# Patient Record
Sex: FEMALE | Race: WHITE | NOT HISPANIC OR LATINO | Employment: PART TIME | URBAN - METROPOLITAN AREA
[De-identification: names, ages, dates, MRNs, and addresses within clinical notes are randomized per-mention and may not be internally consistent; named-entity substitution may affect disease eponyms.]

---

## 2017-01-14 DIAGNOSIS — F33.1 MAJOR DEPRESSIVE DISORDER, RECURRENT EPISODE, MODERATE (H): Primary | ICD-10-CM

## 2017-01-16 ENCOUNTER — MYC MEDICAL ADVICE (OUTPATIENT)
Dept: FAMILY MEDICINE | Facility: CLINIC | Age: 62
End: 2017-01-16

## 2017-01-16 DIAGNOSIS — F33.1 MAJOR DEPRESSIVE DISORDER, RECURRENT EPISODE, MODERATE (H): Primary | ICD-10-CM

## 2017-01-16 RX ORDER — CITALOPRAM HYDROBROMIDE 20 MG/1
20 TABLET ORAL DAILY
Qty: 30 TABLET | Refills: 0 | OUTPATIENT
Start: 2017-01-16

## 2017-01-16 RX ORDER — CITALOPRAM HYDROBROMIDE 40 MG/1
40 TABLET ORAL DAILY
Qty: 90 TABLET | Refills: 1 | Status: SHIPPED | OUTPATIENT
Start: 2017-01-16 | End: 2017-07-08

## 2017-01-16 RX ORDER — CITALOPRAM HYDROBROMIDE 40 MG/1
TABLET ORAL
Qty: 30 TABLET | Status: CANCELLED | OUTPATIENT
Start: 2017-01-16

## 2017-01-16 RX ORDER — CITALOPRAM HYDROBROMIDE 40 MG/1
TABLET ORAL
Qty: 90 TABLET | Refills: 0 | OUTPATIENT
Start: 2017-01-16

## 2017-01-16 NOTE — TELEPHONE ENCOUNTER
Catrachita restarted at the 10/24 visit and she was suppose to talk with you at the next visit?

## 2017-01-16 NOTE — TELEPHONE ENCOUNTER
LES please review this refill.    It looks like you changed was made for 20mg to 40mg at her appointment with you in Dec.    Pending Prescriptions:                       Disp   Refills    citalopram (CELEXA) 40 MG tablet [Pharmac*90 tab*0            Sig: TAKE 1 TABLET (40 MG) BY MOUTH DAILY    Ready to be faxed when Rx is approved or denied.  Please close encounter when finished. Thanks, Liz

## 2017-01-16 NOTE — TELEPHONE ENCOUNTER
LES, can you please review this refill request. They are asking for Celexa 20 mg but in your last office note you have it at 40 mg ?  I did put a 40 mg in pending also.    Pending Prescriptions:                       Disp   Refills    citalopram (CELEXA) 20 MG tablet [Pharmac*30 tab*0            Sig: Take 1 tablet (20 mg) by mouth daily    citalopram (CELEXA) 40 MG tablet          30 tab*           Ready to be faxed when Rx is approved or denied.  Please close encounter when finished. Thanks, Liz

## 2017-01-16 NOTE — TELEPHONE ENCOUNTER
Pt replied back that she has been on 40 mg qd and that is an rx from [facility].

## 2017-01-19 DIAGNOSIS — M54.50 CHRONIC MIDLINE LOW BACK PAIN WITHOUT SCIATICA: Primary | ICD-10-CM

## 2017-01-19 DIAGNOSIS — G89.29 CHRONIC MIDLINE LOW BACK PAIN WITHOUT SCIATICA: Primary | ICD-10-CM

## 2017-01-19 RX ORDER — CYCLOBENZAPRINE HCL 5 MG
TABLET ORAL
Qty: 20 TABLET | Refills: 0 | OUTPATIENT
Start: 2017-01-19

## 2017-01-23 DIAGNOSIS — M48.02 SPINAL STENOSIS IN CERVICAL REGION: Primary | ICD-10-CM

## 2017-01-23 RX ORDER — GABAPENTIN 300 MG/1
CAPSULE ORAL
Qty: 120 CAPSULE | Refills: 0 | Status: SHIPPED | OUTPATIENT
Start: 2017-01-23 | End: 2017-02-03

## 2017-01-23 NOTE — TELEPHONE ENCOUNTER
Pt last seen 12/19/16 and last filled 12/19/16    Pending Prescriptions:                       Disp   Refills    gabapentin (NEURONTIN) 300 MG capsule     120 ca*0            Sig: Take one tablet at breakfast, one at lunch, two           at bedtime

## 2017-01-24 ENCOUNTER — MYC MEDICAL ADVICE (OUTPATIENT)
Dept: FAMILY MEDICINE | Facility: CLINIC | Age: 62
End: 2017-01-24

## 2017-01-24 NOTE — TELEPHONE ENCOUNTER
From: Bere Machuca  To: Wilma Armenta MD  Sent: 1/24/2017 9:59 AM CST  Subject: Chronic pain    I am coming in this Monday and wanted to see about talking to some sort of therapist. This pain I am in constantly is really taking a toll on my usual cheerful self ! I thought I would give you the heads up so you can think of someone. Maybe help with my weight issues also.

## 2017-01-24 NOTE — TELEPHONE ENCOUNTER
MY CHART message sent to patient RE MY CHART message below.     [facility]  Pain Management Clinic  [address]  [place] [address]  [phone] -- appt line  [email or web address]    [facility]   [address] [address]  [phone] -- appt line  [phone] -- fax  [email or web address]

## 2017-02-03 ENCOUNTER — OFFICE VISIT (OUTPATIENT)
Dept: FAMILY MEDICINE | Facility: CLINIC | Age: 62
End: 2017-02-03

## 2017-02-03 VITALS
WEIGHT: 293 LBS | SYSTOLIC BLOOD PRESSURE: 122 MMHG | HEART RATE: 66 BPM | DIASTOLIC BLOOD PRESSURE: 80 MMHG | OXYGEN SATURATION: 96 % | TEMPERATURE: 98.4 F | BODY MASS INDEX: 47.09 KG/M2 | HEIGHT: 66 IN

## 2017-02-03 DIAGNOSIS — M51.26 DISPLACEMENT OF LUMBAR INTERVERTEBRAL DISC WITHOUT MYELOPATHY: ICD-10-CM

## 2017-02-03 DIAGNOSIS — J41.0 SIMPLE CHRONIC BRONCHITIS (H): ICD-10-CM

## 2017-02-03 DIAGNOSIS — F33.1 MAJOR DEPRESSIVE DISORDER, RECURRENT EPISODE, MODERATE (H): ICD-10-CM

## 2017-02-03 DIAGNOSIS — E66.01 MORBID OBESITY DUE TO EXCESS CALORIES (H): ICD-10-CM

## 2017-02-03 DIAGNOSIS — D68.51 FACTOR 5 LEIDEN MUTATION, HETEROZYGOUS (H): Primary | ICD-10-CM

## 2017-02-03 DIAGNOSIS — M48.02 SPINAL STENOSIS IN CERVICAL REGION: ICD-10-CM

## 2017-02-03 DIAGNOSIS — Z79.01 LONG TERM CURRENT USE OF ANTICOAGULANT THERAPY: ICD-10-CM

## 2017-02-03 LAB — INR POINT OF CARE: 2.6 (ref 2–3)

## 2017-02-03 PROCEDURE — 99214 OFFICE O/P EST MOD 30 MIN: CPT | Mod: 25 | Performed by: PHYSICIAN ASSISTANT

## 2017-02-03 PROCEDURE — 36415 COLL VENOUS BLD VENIPUNCTURE: CPT | Performed by: PHYSICIAN ASSISTANT

## 2017-02-03 PROCEDURE — 85610 PROTHROMBIN TIME: CPT | Performed by: PHYSICIAN ASSISTANT

## 2017-02-03 PROCEDURE — 94640 AIRWAY INHALATION TREATMENT: CPT | Performed by: PHYSICIAN ASSISTANT

## 2017-02-03 RX ORDER — CYCLOBENZAPRINE HCL 5 MG
5 TABLET ORAL 3 TIMES DAILY PRN
Qty: 30 TABLET | Refills: 0
Start: 2017-02-03 | End: 2017-02-09

## 2017-02-03 RX ORDER — GABAPENTIN 300 MG/1
CAPSULE ORAL
Qty: 360 CAPSULE | Refills: 1
Start: 2017-02-03 | End: 2017-02-09

## 2017-02-03 NOTE — PROGRESS NOTES
SUBJECTIVE:                                                    Bere Machuca is a 61 year old female who presents to clinic today for the following health issues:        Chronic coumadin use    Indication: PE        Recent Labs   Lab Test  02/03/17   1340  12/19/16   1307  11/18/16   0705  11/07/16   1310 10/24/16  09/19/16   1412   INR  2.6  2.6  1.76*  2.6  2.3  2.2            Bleeding Signs/Symptoms:  None  Thromboembolic Signs/Symptoms:  None    Medication Changes:  No  Dietary Changes:  No  Bacterial/Viral Infection:  Yes    Missed Coumadin Doses:  None    Other Concerns:      1. Low back pain - Gabapentin tid - 2 in am and one midday and one pm  PRN Flexeril - will have up to 5 days of spasm in low back    2. Seeing [facility] for obesity.    Wt Readings from Last 5 Encounters:   02/03/17 179.624 kg (396 lb)   11/18/16 181.439 kg (400 lb)   10/25/16 180.078 kg (397 lb)   07/15/16 182.8 kg (403 lb)   05/23/16 182.346 kg (402 lb)     3. URI sx - slight SOB, cough, wheezing. No fevers.  Using albuterol 3-4x a day  Ears crackling.  Nose running - slight congestion.        Problem list, Medication list, Allergies, and Medical/Social/Surgical histories reviewed in [facility] and updated as appropriate.      Labs reviewed in EPIC  BP Readings from Last 3 Encounters:   02/03/17 122/80   12/19/16 122/82   12/05/16 100/70    Wt Readings from Last 3 Encounters:   02/03/17 179.624 kg (396 lb)   11/18/16 181.439 kg (400 lb)   10/25/16 180.078 kg (397 lb)                  Patient Active Problem List   Diagnosis     History of cocaine abuse     Personal history of tobacco use, presenting hazards to health     Family history of malignant neoplasm of gastrointestinal tract     Esophageal reflux     Chronic airway obstruction (H)     Hypertension     Sleep apnea     Migraine     ACP (advance care planning)     Factor 5 Leiden mutation, heterozygous/ INR 2-3     Health Care Home     Family history of pulmonary embolism     Lymphedema  of arm     Spinal stenosis in cervical region     History of pulmonary embolism     Major depressive disorder, recurrent episode, moderate (H)     Superior vena cava syndrome     Morbid obesity due to excess calories (H)     Displacement of lumbar intervertebral disc without myelopathy     Past Surgical History   Procedure Laterality Date     Hc removal of ovary/tube(s)       Salpingo-Oophorectomy, Unilateral     C total abdom hysterectomy       Hysterectomy, Total Abdominal     C plastic surgery, neck  age 4     Hc remove tonsils/adenoids,<15 y/o  age 14     T & A <14y.o.     Biopsy of skin lesion  multiple     keloids     Hc remove abdominal wall lesion       abscess     C removal colon/ileostomy       reastamosis     C anesth,repair lo abd hernia nos  multiple/  last     panniculus     C exploratory of abdomen       C insertion of access port  1-     sepsis- removed after hospital stay     Hernia repair         Social History   Substance Use Topics     Smoking status: Former Smoker -- 0.50 packs/day for 35 years     Types: Cigarettes     Quit date: 2014     Smokeless tobacco: Never Used     Alcohol Use: 0.5 oz/week     1 drink(s) per week      Comment: social     Family History   Problem Relation Age of Onset     Respiratory Mother      copd     CANCER Mother      skin cancers/ melanomas     C.A.D. Mother      MI  age 79     Cancer - colorectal Father      / diagnosis age 72     GASTROINTESTINAL DISEASE Father      colitis     Breast Cancer No family hx of          Current Outpatient Prescriptions   Medication Sig Dispense Refill     gabapentin (NEURONTIN) 300 MG capsule Take two tablets at breakfast, one at lunch, one at bedtime 360 capsule 1     cyclobenzaprine (FLEXERIL) 5 MG tablet Take 1 tablet (5 mg) by mouth 3 times daily as needed for muscle spasms 30 tablet 0     citalopram (CELEXA) 40 MG tablet Take 1 tablet (40 mg) by mouth daily 90 tablet 1     cholecalciferol  1000 UNITS TABS Take 1,000 Units by mouth       hydrOXYzine (ATARAX) 25 MG tablet        Multiple Vitamins-Calcium (ONE-A-DAY WOMENS FORMULA) TABS        calcium citrate (CALCITRATE) 950 MG tablet        triamcinolone (KENALOG) 0.1 % cream        Flaxseed Oil (LINSEED OIL) OIL        citalopram (CELEXA) 40 MG tablet        metFORMIN (GLUCOPHAGE-XR) 500 MG 24 hr tablet Take 2 tablets (1,000 mg) by mouth 2 times daily (with meals)       warfarin (COUMADIN) 5 MG tablet Take 1 tablet (5 mg) by mouth daily 90 tablet 1     multivitamin, therapeutic with minerals (MULTI-VITAMIN) TABS Take 1 tablet by mouth daily       Omega-3 Fatty Acids (OMEGA-3 FISH OIL PO) Take 1 g by mouth 2 times daily (with meals)        order for DME Equipment being ordered: Cane 1 each 0     COMPRESSION STOCKINGS 1 each daily 1 each 1     ketoconazole (NIZORAL) 2 % shampoo APPLY TOPICALLY, LET SET FOR 5 MINUTES, THEN RINSE. 120 mL 0     lidocaine (LIDODERM) 5 % patch        cholecalciferol (VITAMIN D) 1000 UNIT tablet Take 1 tablet (1,000 Units) by mouth daily       Probiotic Product (PROBIOTIC ACIDOPHILUS) CAPS Take 1 capsule by mouth       hydrOXYzine (ATARAX) 25 MG tablet daily        tiotropium (SPIRIVA HANDIHALER) 18 MCG inhalation capsule Inhale 1 capsule into the lungs daily. DR MELENDE[facility]       albuterol (2.5 MG/3ML) 0.083% nebulizer solution Take 3 mLs by nebulization every 6 hours as needed for shortness of breath / dyspnea. Dr MELENDEZ [facility]       albuterol (PROAIR HFA) 108 (90 BASE) MCG/ACT inhaler Inhale 1-2 puffs into the lungs as needed for shortness of breath / dyspnea. DR MELENDEZ [facility]       budesonide (PULMICORT FLEXHALER) 180 MCG/ACT inhaler Inhale 1 puff into the lungs 2 times daily. DR MELENDEZ pulmonology       Allergies   Allergen Reactions     Aspirin      PN: LW Reaction: sensitive/stomach     Lyrica [Pregabalin]      dizziness     Tape [Adhesive Tape] Itching     Vancomycin      Recent  "Labs   Lab Test  11/18/16   0705 10/24/16  01/21/16   1218  01/11/16   1541  01/21/15   1351  08/17/12   1054   A1C   --   6.8   --    --    --    --    ALT   --   42   --    --   26  18   CR  0.68  0.78  0.84   --   0.72  0.76   GFRESTIMATED  89   --   70   --   93  89   GFRESTBLACK  >90   GFR Calc     --   84   --    --    --    POTASSIUM  3.9  4.5  4.4   --   4.4   --    TSH   --   2.85   --   2.57  3.34  3.15            OBJECTIVE:                                                    /80 mmHg  Pulse 66  Temp(Src) 98.4  F (36.9  C) (Oral)  Ht 1.676 m (5' 5.98\")  Wt 179.624 kg (396 lb)  BMI 63.95 kg/m2  SpO2 96%   Body mass index is 63.95 kg/(m^2).       Patient is a 61 year old female, in no acute distress. Vitals noted  Head: Normocephalic, atraumatic.  Eyes: Conjunctiva clear.  No discharge noted.  Ears: External ears, canals and TMs normal Bilaterally: gray and translucent.   Nose: Normal without discharge.  Mouth / Throat:   Pharynx non-erythematous, no exudates present, tonsils without hypertrophy. Mucous membranes moist.  Neck:  Neck supple, No lymphadenopathy, no thyromegaly.  Cardiac:  Normal rhythm and rate, no murmurs, rubs or gallops.  Lungs: bilateral wheezing and rhonchi  - no crackles    DuoNeb administered.  Rhonchi continue, wheezing improved             ASSESSMENT/PLAN:                                                        (D68.51) Factor 5 Leiden mutation, heterozygous/ INR 2-3  (primary encounter diagnosis)  Plan: CL BFP PROTHROMBIN TIME/INR (BFP)      (Z79.01) Long term current use of anticoagulant therapy  Plan: CL BFP PROTHROMBIN TIME/INR (BFP)  Fu 4-6 weeks    (J41.0) Simple chronic bronchitis (H)  Plan: Inhalation/Nebulizer, Initial, Normal Order,         Clinic Performed      (E66.01) Morbid obesity due to excess calories (H)    (F33.1) Major depressive disorder, recurrent episode, moderate (H)  List of clinic provided    (M51.26) Displacement of lumbar " intervertebral disc without myelopathy  Ok for continued Gabapentin and Flexeril          Catrachita Torres PA-C  2/3/2017

## 2017-02-09 DIAGNOSIS — M51.26 DISPLACEMENT OF LUMBAR INTERVERTEBRAL DISC WITHOUT MYELOPATHY: ICD-10-CM

## 2017-02-09 DIAGNOSIS — M48.02 SPINAL STENOSIS IN CERVICAL REGION: Primary | ICD-10-CM

## 2017-02-09 RX ORDER — CYCLOBENZAPRINE HCL 5 MG
5 TABLET ORAL 3 TIMES DAILY PRN
Qty: 30 TABLET | Refills: 0 | Status: SHIPPED | OUTPATIENT
Start: 2017-02-09 | End: 2017-08-10

## 2017-02-09 RX ORDER — GABAPENTIN 300 MG/1
CAPSULE ORAL
Qty: 360 CAPSULE | Refills: 1 | Status: SHIPPED | OUTPATIENT
Start: 2017-02-09 | End: 2017-06-22

## 2017-02-09 NOTE — TELEPHONE ENCOUNTER
Patient called and left a msg that she lost the paper copy of the following two meds and is wondering if you would please fax them to the pharmacy listed.    Pending Prescriptions:                       Disp   Refills    gabapentin (NEURONTIN) 300 MG capsule     360 ca*1            Sig: Take two tablets at breakfast, one at lunch, one           at bedtime    cyclobenzaprine (FLEXERIL) 5 MG tablet    30 tab*0            Sig: Take 1 tablet (5 mg) by mouth 3 times daily as           needed for muscle spasms    Ready to be faxed when Rx is approved or denied.  Please close encounter when finished. Thanks, Liz

## 2017-03-02 DIAGNOSIS — Z86.711 HISTORY OF PULMONARY EMBOLISM: ICD-10-CM

## 2017-03-02 DIAGNOSIS — D68.51 FACTOR 5 LEIDEN MUTATION, HETEROZYGOUS (H): ICD-10-CM

## 2017-03-02 RX ORDER — WARFARIN SODIUM 5 MG/1
TABLET ORAL
Qty: 30 TABLET | Refills: 0 | Status: SHIPPED | OUTPATIENT
Start: 2017-03-02 | End: 2017-04-17

## 2017-03-02 NOTE — TELEPHONE ENCOUNTER
Please let the patient know that a 30 day refill has been sent for their prescription of their prescription.  They are due for a return NON-FASTING OFFICE VISIT within 30 days.  Failure to schedule an appointment may result in no further refills of his/her medication.

## 2017-03-02 NOTE — TELEPHONE ENCOUNTER
Pending Prescriptions:                       Disp   Refills    warfarin (COUMADIN) 5 MG tablet [Pharmacy*60 tab*0            Sig: TAKE 1 TABLET (5 MG) BY MOUTH DAILY    Do you want to refill for 1months. Per your notes on 2-7-2017 pt to rtc in 4-6 weeks.  Please fax, change qty, or advise, Harshal  [phone] (home)

## 2017-03-23 ENCOUNTER — HOSPITAL ENCOUNTER (EMERGENCY)
Facility: CLINIC | Age: 62
Discharge: HOME | End: 2017-03-23
Attending: EMERGENCY MEDICINE | Admitting: EMERGENCY MEDICINE
Payer: COMMERCIAL

## 2017-03-23 VITALS
OXYGEN SATURATION: 95 % | RESPIRATION RATE: 18 BRPM | DIASTOLIC BLOOD PRESSURE: 100 MMHG | TEMPERATURE: 98.7 F | SYSTOLIC BLOOD PRESSURE: 147 MMHG | HEART RATE: 90 BPM

## 2017-03-23 DIAGNOSIS — N39.0 URINARY TRACT INFECTION IN FEMALE: ICD-10-CM

## 2017-03-23 LAB
ALBUMIN UR-MCNC: 100 MG/DL
APPEARANCE UR: ABNORMAL
BACTERIA #/AREA URNS HPF: ABNORMAL /HPF
BILIRUB UR QL STRIP: NEGATIVE
COLOR UR AUTO: YELLOW
GLUCOSE UR STRIP-MCNC: NEGATIVE MG/DL
HGB UR QL STRIP: ABNORMAL
KETONES UR STRIP-MCNC: NEGATIVE MG/DL
LEUKOCYTE ESTERASE UR QL STRIP: ABNORMAL
MUCOUS THREADS #/AREA URNS LPF: PRESENT /LPF
NITRATE UR QL: NEGATIVE
PH UR STRIP: 5 PH (ref 5–7)
RBC #/AREA URNS AUTO: >182 /HPF (ref 0–2)
SP GR UR STRIP: 1.01 (ref 1–1.03)
SQUAMOUS #/AREA URNS AUTO: 12 /HPF (ref 0–1)
URN SPEC COLLECT METH UR: ABNORMAL
UROBILINOGEN UR STRIP-MCNC: 0 MG/DL (ref 0–2)
WBC #/AREA URNS AUTO: >182 /HPF (ref 0–2)
WBC CLUMPS #/AREA URNS HPF: PRESENT /HPF

## 2017-03-23 PROCEDURE — 81001 URINALYSIS AUTO W/SCOPE: CPT | Performed by: EMERGENCY MEDICINE

## 2017-03-23 PROCEDURE — 87086 URINE CULTURE/COLONY COUNT: CPT | Performed by: EMERGENCY MEDICINE

## 2017-03-23 PROCEDURE — 87186 SC STD MICRODIL/AGAR DIL: CPT | Performed by: EMERGENCY MEDICINE

## 2017-03-23 PROCEDURE — 99283 EMERGENCY DEPT VISIT LOW MDM: CPT

## 2017-03-23 PROCEDURE — 25000132 ZZH RX MED GY IP 250 OP 250 PS 637: Performed by: EMERGENCY MEDICINE

## 2017-03-23 PROCEDURE — 87088 URINE BACTERIA CULTURE: CPT | Performed by: EMERGENCY MEDICINE

## 2017-03-23 RX ORDER — PHENAZOPYRIDINE HYDROCHLORIDE 200 MG/1
200 TABLET, FILM COATED ORAL 3 TIMES DAILY
Qty: 9 TABLET | Refills: 0 | Status: SHIPPED | OUTPATIENT
Start: 2017-03-23 | End: 2017-03-26

## 2017-03-23 RX ORDER — PHENAZOPYRIDINE HYDROCHLORIDE 100 MG/1
200 TABLET, FILM COATED ORAL ONCE
Status: COMPLETED | OUTPATIENT
Start: 2017-03-23 | End: 2017-03-23

## 2017-03-23 RX ORDER — CEPHALEXIN 500 MG/1
500 CAPSULE ORAL ONCE
Status: COMPLETED | OUTPATIENT
Start: 2017-03-23 | End: 2017-03-23

## 2017-03-23 RX ORDER — CEPHALEXIN 500 MG/1
500 CAPSULE ORAL 2 TIMES DAILY
Qty: 14 CAPSULE | Refills: 0 | Status: SHIPPED | OUTPATIENT
Start: 2017-03-23 | End: 2017-03-30

## 2017-03-23 RX ADMIN — CEPHALEXIN 500 MG: 500 CAPSULE ORAL at 02:29

## 2017-03-23 RX ADMIN — PHENAZOPYRIDINE HYDROCHLORIDE 200 MG: 100 TABLET ORAL at 02:29

## 2017-03-23 ASSESSMENT — ENCOUNTER SYMPTOMS
FEVER: 0
FREQUENCY: 1
NAUSEA: 1
BACK PAIN: 1

## 2017-03-23 NOTE — ED AVS SNAPSHOT
[facility] Emergency Department    [address]    [facility] [address]    Phone:  [phone]    Fax:  [phone]                                       Bere Machuca   MRN: [id number]    Department:  [facility] Emergency Department   Date of Visit:  3/23/2017           After Visit Summary Signature Page     I have received my discharge instructions, and my questions have been answered. I have discussed any challenges I see with this plan with the nurse or doctor.    ..........................................................................................................................................  Patient/Patient Representative Signature      ..........................................................................................................................................  Patient Representative Print Name and Relationship to Patient    ..................................................               ................................................  Date                                            Time    ..........................................................................................................................................  Reviewed by Signature/Title    ...................................................              ..............................................  Date                                                            Time

## 2017-03-23 NOTE — ED NOTES
IN TRIAGE airway,breathing and circulation intact, without need for intervention . Alert and interacting appropriately for age and situation. uti  Symptoms back pain for 2 days now since noon frequency

## 2017-03-23 NOTE — DISCHARGE INSTRUCTIONS
Discharge Instructions  Urinary Tract Infection  You have urinary tract infection, or UTI. The urinary tract includes the kidneys (which make urine), ureters (the tubes that carry urine from the kidneys to the bladder), the bladder (which stores urine), and urethra (the tube that carries urine out of the bladder).  Urinary tract infections occur when bacteria travel up the urethra into the bladder. We suspect a UTI based on chemical and microscopic findings in your urine, but if there is a question about your findings, we will do a culture to see if bacteria grow. A urine culture takes several days. You should always follow-up with your primary physician to find out about results of your culture if one was done.   Return to the Emergency Department if:    You have severe back pain.    You are vomiting so that you can t take your medicine, or have signs of dehydration (such as urinating less than 3 times per day).    You have fever over 101.5 degrees F.    You have significant confusion or are very weak, or feel very ill.    Your child seems much more ill, won t wake up, won t respond right, or is crying for a long time and won t calm down.    Your child is showing signs of dehydration, Signs of dehydration can be:  o Your infant has had no wet diapers in 4-5 hours.  o Your older child has not passed urine in 6-8 hours.  o Your infant or child starts to have dry mouth and lips, or no saliva or tears.    Follow-up with your doctor:     Children under 24 months need to be seen by their regular doctor within one week after a diagnosis of a UTI. It may be necessary to do some imaging tests to look at the child s kidney or bladder.    You should begin to feel better within 24 - 48 hours of starting your antibiotic.  If you do not, you need to be seen again.      Treatment:     You will be treated with an antibiotic to kill the bacteria. We have to make an educated guess as to which antibiotic will work for your infection.  "In most healthy people, we can guess right almost all of the time. Sometimes a culture is done to show which antibiotics will work. This usually takes 2-3 days. When the culture is done, we may have to contact you to put you on a different antibiotic.    Take a pain medication such as Tylenol  (acetaminophen), Advil  (ibuprofen), Nuprin  (ibuprofen), or Aleve  (naproxen). If you have been given a narcotic such as Vicodin  (hydrocodone with acetaminophen), Percocet  (oxycodone with acetaminophen), or codeine, do not drive for four hours after you have taken it. If the narcotic contains Tylenol  (acetaminophen), do not take Tylenol  with it. All narcotics will cause constipation, so eat a high fiber diet.      Pyridium  (phenazopyridine) or Uristat  (phenazopyridine) is a prescription medication that numbs the bladder to reduce the burning pain of some UTIs.  The same medication is available in a non-prescription version called Azo-Standard  (phenazopyridine), Urodol  (phenazopyridine), or other brand names. This medication will change the color of the urine and tears (usually blue or orange). If you wear contacts, do not wear them while taking this medication as they may be stained by the medication.    Antibiotic Warning:     If you have been placed on antibiotics - watch for signs of allergic reaction.  These include rash, lip swelling, difficulty breathing, wheezing, and dizziness.  If you develop any of these symptoms, stop the antibiotic immediately and go to an emergency room or urgent care for evaluation.    Probiotics: If you have been given an antibiotic, you may want to also take a probiotic pill or eat yogurt with live cultures. Probiotics have \"good bacteria\" to help your intestines stay healthy. Studies have shown that probiotics help prevent diarrhea and other intestine problems (including C. diff infection) when you take antibiotics. You can buy these without a prescription in the pharmacy section of " the store.   If you were given a prescription for medicine here today, be sure to read all of the information (including the package insert) that comes with your prescription.  This will include important information about the medicine, its side effects, and any warnings that you need to know about.  The pharmacist who fills the prescription can provide more information and answer questions you may have about the medicine.  If you have questions or concerns that the pharmacist cannot address, please call or return to the Emergency Department.   Opioid Medication Information    Pain medications are among the most commonly prescribed medicines, so we are including this information for all our patients. If you did not receive pain medication or get a prescription for pain medicine, you can ignore it.     You may have been given a prescription for an opioid (narcotic) pain medicine and/or have received a pain medicine while here in the Emergency Department. These medicines can make you drowsy or impaired. You must not drive, operate dangerous equipment, or engage in any other dangerous activities while taking these medications. If you drive while taking these medications, you could be arrested for DUI, or driving under the influence. Do not drink any alcohol while you are taking these medications.     Opioid pain medications can cause addiction. If you have a history of chemical dependency of any type, you are at a higher risk of becoming addicted to pain medications.  Only take these prescribed medications to treat your pain when all other options have been tried. Take it for as short a time and as few doses as possible. Store your pain pills in a secure place, as they are frequently stolen and provide a dangerous opportunity for children or visitors in your house to start abusing these powerful medications. We will not replace any lost or stolen medicine.  As soon as your pain is better, you should flush all your  remaining medication.     Many prescription pain medications contain Tylenol  (acetaminophen), including Vicodin , Tylenol #3 , Norco , Lortab , and Percocet .  You should not take any extra pills of Tylenol  if you are using these prescription medications or you can get very sick.  Do not ever take more than 3000 mg of acetaminophen in any 24 hour period.    All opioids tend to cause constipation. Drink plenty of water and eat foods that have a lot of fiber, such as fruits, vegetables, prune juice, apple juice and high fiber cereal.  Take a laxative if you don t move your bowels at least every other day. Miralax , Milk of Magnesia, Colace , or Senna  can be used to keep you regular.      Remember that you can always come back to the Emergency Department if you are not able to see your regular doctor in the amount of time listed above, if you get any new symptoms, or if there is anything that worries you.    Monitor INR closely.     Return with new or worsening symptoms.     Follow up in 2 days if not significantly improved.

## 2017-03-23 NOTE — ED PROVIDER NOTES
History     Chief Complaint:  Rule out Urinary Tract Infection    HPI   Bere Machuca is a 61 year old female who presents to the emergency department today with concerns for a UTI. The patient reports that she has had back pain all week and she has urinary frequency, urgency, and pressure. She reports that she has a history of UTI's but she has not had one in several years. Tonight her back pain and spasms became much worse and she had some nausea so she came in to the emergency department. She states that the back pain is similar to what she experienced with UTIs in the past. The patient denies any fever or history of kidney stones. The patient takes Coumadin for Factor V and she reports that her INR is pretty steady at 2.6. She normally gets her care at [facility].      Allergies:  Aspirin  Lyrica [Pregabalin]  Tape [Adhesive Tape]  Vancomycin     Medications:    Coumadin  Neurontin  Flexeril  Celexa  Glucophage  Coumadin  Nizoral  Vitamin D  Probiotic Acidophilus  Atarax  Spiriva  Albuterol  Pulmicort  Cholecalciferol  Calcitrate  Kenalog  Celexa   Lidoderm    Past Medical History:    Anxiety  Arthritis  Clotting disorder  COPD  Depressive disorder  Diabetes   Hypertension  Lymphedema of arm  Morbidly obese  Pulmonary embolism   Sleep apnea  Substance abuse  Superior vena cava syndrome     Past Surgical History:    Biopsy of skin lesion - keloids  Anesthesia, repair of lower abdominal hernia NOS - panniculus  Exploratory of abdomen  Plastic surgery - neck  Removal colon/ileostomy - reastamosis  Total abdominal hysterectomy   Removal of ovary/tubes - salpingo-oophorectomy, unilateral  Removal abdominal wall lesion abscess  Tonsillectomy and Adenoidectomy  Hernia Repair  Insertion of access port - sepsis - removed after hospital stay     Family History:    Mother: COPD, Skin Cancer, Coronary Artery Disease   Father: Colorectal Cancer, Gastrointestinal Disease, Colitis     Social History:  Smoking  Status: Former Smoker -- 0.50 Packs Per Day for 35 Years -- Quit Date: 08/08/2014   Smokeless Tobacco: Never Used  Alcohol Use: Positive  Marital Status:   [5]     Review of Systems   Constitutional: Negative for fever.   Gastrointestinal: Positive for nausea.   Genitourinary: Positive for frequency and urgency.        Urinary pressure   Musculoskeletal: Positive for back pain.   All other systems reviewed and are negative.    Physical Exam   Vitals:  Patient Vitals for the past 24 hrs:   BP Temp Temp src Pulse Resp SpO2   03/23/17 0228 (!) 147/100 98.7  F (37.1  C) Oral 90 18 95 %        Physical Exam    Nursing note and vitals reviewed.    Constitutional: Pleasant and well groomed. Elevated BMI.          HENT:    Mouth/Throat: Oropharynx is without swelling or erythema. Oral mucosa moist.    Eyes: Conjunctivae normal are normal. No scleral icterus.    Neck: Neck supple.   Cardiovascular: Normal rate, regular rhythm and intact distal pulses.    Pulmonary/Chest: Effort normal and breath sounds normal.   Abdominal: Soft.  No distension. There is no abdominal tenderness.   Musculoskeletal:  No edema, No calf tenderness, No CVA tenderness.  Neurological:Alert. Coordination normal.   Skin: Skin is warm and dry.   Psychiatric: Normal mood and affect.     Emergency Department Course     Laboratory:  Laboratory findings were communicated with the patient who voiced understanding of the findings.    UA with Microscopic reflex to Culture: Blood: Large (A), Protein Albumin: 100 (A), Leukocyte Esterase:  Moderate (A), WBC/HPF: >182 (H), RBC/HPF: >182 (H), WBC Clumps: Present (A), Bacteria:  Many (A), Squamous Epithelial/HPF: 12 (H), Mucous: Present (A)  Urine Culture Aerobic Bacterial: Pending    Interventions:  0229 Pyridium 200 mg PO  0229 Keflex 500 mg PO   Emergency Department Course:  Nursing notes and vitals reviewed.  I performed an exam of the patient as documented above.   The patient provided a urine sample  here in the emergency department. This was sent for laboratory testing, findings above.  I discussed the treatment plan with the patient. They expressed understanding of this plan and consented to discharge. They will be discharged home with instructions for care and follow up. In addition, the patient will return to the emergency department if their symptoms persist, worsen, if new symptoms arise or if there is any concern.  All questions were answered.  I personally reviewed the lab results with the patient and answered all related questions prior to discharge.  Impression & Plan      Medical Decision Making:  This patient has symptoms consistent with a urinary tract infection.  Urinalysis is suggestive of the infection.  There has been no fever or abdominal pain. She does endorse back pain in the setting of having chronic back pain but feels it does seem similar to previous urinary tract infections in the past. Due to the back pain and the fact that she is somewhat  immunocompromised due to diabetes, I am not treating for a simple cystitis but rather with a full course of antibiotics. I have recommended that she monitor her INR closely and return with any new or worsening symptoms, in particular, fever, back pain, or increased hematuria. She will follow up in two days if not improving as anticipated.    Plan:   1. Return to the ED with new or worse symptoms, most importantly fever, flank pain, vomiting.  2. Follow up with your PMD in 2 days for ongoing evaluation and management if not improving as anticipated otherwise in 1 week.  3. Provided with standard EPPA Discharge instructions for UTI  4. Prescription for Keflex and Pyridium was provided.     Diagnosis:    ICD-10-CM    1. Urinary tract infection in female N39.0      Disposition:   The patient is discharged to home.    Discharge Medications:  New Prescriptions    CEPHALEXIN (KEFLEX) 500 MG CAPSULE    Take 1 capsule (500 mg) by mouth 2 times daily for 7 days     PHENAZOPYRIDINE (PYRIDIUM) 200 MG TABLET    Take 1 tablet (200 mg) by mouth 3 times daily for 3 days     Scribe Disclosure:  I, Simon Joyce, am serving as a scribe at 2:19 AM on 3/23/2017 to document services personally performed by Ashley Zamorano, based on my observations and the provider's statements to me.    3/23/2017   [facility] EMERGENCY DEPARTMENT       Ashley Zamorano MD  03/24/17 0619

## 2017-03-23 NOTE — ED AVS SNAPSHOT
[facility] Emergency Department    [address]    [facility] [address]    Phone:  [phone]    Fax:  [phone]                                       Bere Machuca   MRN: [id number]    Department:  [facility] Emergency Department   Date of Visit:  3/23/2017           Patient Information     Date Of Birth          1955        Your diagnoses for this visit were:     Urinary tract infection in female        You were seen by Ashley Zamorano MD.      Follow-up Information     Follow up with Wilma Armenta MD In 2 days.    Specialty:  Family Practice    Why:  As needed    Contact information:    [facility]  [address]  [facility] [address] [phone]          Follow up with Wilma Armenta MD In 1 week.    Specialty:  Family Practice    Contact information:    [facility]  [address]  [facility] [address] [phone]          Discharge Instructions       Discharge Instructions  Urinary Tract Infection  You have urinary tract infection, or UTI. The urinary tract includes the kidneys (which make urine), ureters (the tubes that carry urine from the kidneys to the bladder), the bladder (which stores urine), and urethra (the tube that carries urine out of the bladder).  Urinary tract infections occur when bacteria travel up the urethra into the bladder. We suspect a UTI based on chemical and microscopic findings in your urine, but if there is a question about your findings, we will do a culture to see if bacteria grow. A urine culture takes several days. You should always follow-up with your primary physician to find out about results of your culture if one was done.   Return to the Emergency Department if:    You have severe back pain.    You are vomiting so that you can t take your medicine, or have signs of dehydration (such as urinating less than 3 times per day).    You have fever over  101.5 degrees F.    You have significant confusion or are very weak, or feel very ill.    Your child seems much more ill, won t wake up, won t respond right, or is crying for a long time and won t calm down.    Your child is showing signs of dehydration, Signs of dehydration can be:  o Your infant has had no wet diapers in 4-5 hours.  o Your older child has not passed urine in 6-8 hours.  o Your infant or child starts to have dry mouth and lips, or no saliva or tears.    Follow-up with your doctor:     Children under 24 months need to be seen by their regular doctor within one week after a diagnosis of a UTI. It may be necessary to do some imaging tests to look at the child s kidney or bladder.    You should begin to feel better within 24 - 48 hours of starting your antibiotic.  If you do not, you need to be seen again.      Treatment:     You will be treated with an antibiotic to kill the bacteria. We have to make an educated guess as to which antibiotic will work for your infection. In most healthy people, we can guess right almost all of the time. Sometimes a culture is done to show which antibiotics will work. This usually takes 2-3 days. When the culture is done, we may have to contact you to put you on a different antibiotic.    Take a pain medication such as Tylenol  (acetaminophen), Advil  (ibuprofen), Nuprin  (ibuprofen), or Aleve  (naproxen). If you have been given a narcotic such as Vicodin  (hydrocodone with acetaminophen), Percocet  (oxycodone with acetaminophen), or codeine, do not drive for four hours after you have taken it. If the narcotic contains Tylenol  (acetaminophen), do not take Tylenol  with it. All narcotics will cause constipation, so eat a high fiber diet.      Pyridium  (phenazopyridine) or Uristat  (phenazopyridine) is a prescription medication that numbs the bladder to reduce the burning pain of some UTIs.  The same medication is available in a non-prescription version called  "Azo-Standard  (phenazopyridine), Urodol  (phenazopyridine), or other brand names. This medication will change the color of the urine and tears (usually blue or orange). If you wear contacts, do not wear them while taking this medication as they may be stained by the medication.    Antibiotic Warning:     If you have been placed on antibiotics - watch for signs of allergic reaction.  These include rash, lip swelling, difficulty breathing, wheezing, and dizziness.  If you develop any of these symptoms, stop the antibiotic immediately and go to an emergency room or urgent care for evaluation.    Probiotics: If you have been given an antibiotic, you may want to also take a probiotic pill or eat yogurt with live cultures. Probiotics have \"good bacteria\" to help your intestines stay healthy. Studies have shown that probiotics help prevent diarrhea and other intestine problems (including C. diff infection) when you take antibiotics. You can buy these without a prescription in the pharmacy section of the store.   If you were given a prescription for medicine here today, be sure to read all of the information (including the package insert) that comes with your prescription.  This will include important information about the medicine, its side effects, and any warnings that you need to know about.  The pharmacist who fills the prescription can provide more information and answer questions you may have about the medicine.  If you have questions or concerns that the pharmacist cannot address, please call or return to the Emergency Department.   Opioid Medication Information    Pain medications are among the most commonly prescribed medicines, so we are including this information for all our patients. If you did not receive pain medication or get a prescription for pain medicine, you can ignore it.     You may have been given a prescription for an opioid (narcotic) pain medicine and/or have received a pain medicine while here in " the Emergency Department. These medicines can make you drowsy or impaired. You must not drive, operate dangerous equipment, or engage in any other dangerous activities while taking these medications. If you drive while taking these medications, you could be arrested for DUI, or driving under the influence. Do not drink any alcohol while you are taking these medications.     Opioid pain medications can cause addiction. If you have a history of chemical dependency of any type, you are at a higher risk of becoming addicted to pain medications.  Only take these prescribed medications to treat your pain when all other options have been tried. Take it for as short a time and as few doses as possible. Store your pain pills in a secure place, as they are frequently stolen and provide a dangerous opportunity for children or visitors in your house to start abusing these powerful medications. We will not replace any lost or stolen medicine.  As soon as your pain is better, you should flush all your remaining medication.     Many prescription pain medications contain Tylenol  (acetaminophen), including Vicodin , Tylenol #3 , Norco , Lortab , and Percocet .  You should not take any extra pills of Tylenol  if you are using these prescription medications or you can get very sick.  Do not ever take more than 3000 mg of acetaminophen in any 24 hour period.    All opioids tend to cause constipation. Drink plenty of water and eat foods that have a lot of fiber, such as fruits, vegetables, prune juice, apple juice and high fiber cereal.  Take a laxative if you don t move your bowels at least every other day. Miralax , Milk of Magnesia, Colace , or Senna  can be used to keep you regular.      Remember that you can always come back to the Emergency Department if you are not able to see your regular doctor in the amount of time listed above, if you get any new symptoms, or if there is anything that worries you.    Monitor INR closely.      Return with new or worsening symptoms.     Follow up in 2 days if not significantly improved.     Future Appointments        Provider Department Dept Phone Center    3/27/2017 1:00 PM WESTON Vogel [facility] [phone] [facility]      24 Hour Appointment Hotline       To make an appointment at any [facility], call [phone] ([phone]). If you don't have a family doctor or clinic, we will help you find one. [facility] are conveniently located to serve the needs of you and your family.             Review of your medicines      START taking        Dose / Directions Last dose taken    cephALEXin 500 MG capsule   Commonly known as:  KEFLEX   Dose:  500 mg   Quantity:  14 capsule        Take 1 capsule (500 mg) by mouth 2 times daily for 7 days   Refills:  0        phenazopyridine 200 MG tablet   Commonly known as:  PYRIDIUM   Dose:  200 mg   Quantity:  9 tablet        Take 1 tablet (200 mg) by mouth 3 times daily for 3 days   Refills:  0          Our records show that you are taking the medicines listed below. If these are incorrect, please call your family doctor or clinic.        Dose / Directions Last dose taken    * albuterol (2.5 MG/3ML) 0.083% neb solution   Dose:  1 ampule        Take 3 mLs by nebulization every 6 hours as needed for shortness of breath / dyspnea. Dr [facility]   Refills:  0        * albuterol 108 (90 BASE) MCG/ACT Inhaler   Dose:  1-2 puff   Generic drug:  albuterol        Inhale 1-2 puffs into the lungs as needed for shortness of breath / dyspnea. DR [facility]   Refills:  0        CALCITRATE 950 MG tablet   Generic drug:  calcium citrate        Refills:  0        * cholecalciferol 1000 UNITS Tabs   Dose:  1000 Units        Take 1,000 Units by mouth   Refills:  0        * cholecalciferol 1000 UNIT tablet   Commonly known as:  vitamin D   Dose:  1000 Units        Take 1 tablet (1,000 Units) by mouth daily    Refills:  0        * citalopram 40 MG tablet   Commonly known as:  celeXA        Refills:  0        * citalopram 40 MG tablet   Commonly known as:  celeXA   Dose:  40 mg   Quantity:  90 tablet        Take 1 tablet (40 mg) by mouth daily   Refills:  1        COMPRESSION STOCKINGS   Dose:  1 each   Quantity:  1 each        1 each daily   Refills:  1        cyclobenzaprine 5 MG tablet   Commonly known as:  FLEXERIL   Dose:  5 mg   Quantity:  30 tablet        Take 1 tablet (5 mg) by mouth 3 times daily as needed for muscle spasms   Refills:  0        gabapentin 300 MG capsule   Commonly known as:  NEURONTIN   Quantity:  360 capsule        Take two tablets at breakfast, one at lunch, one at bedtime   Refills:  1        * hydrOXYzine 25 MG tablet   Commonly known as:  ATARAX        daily   Refills:  0        * hydrOXYzine 25 MG tablet   Commonly known as:  ATARAX        Refills:  0        ketoconazole 2 % shampoo   Commonly known as:  NIZORAL   Quantity:  120 mL        APPLY TOPICALLY, LET SET FOR 5 MINUTES, THEN RINSE.   Refills:  0        lidocaine 5 % Patch   Commonly known as:  LIDODERM        Refills:  0        Linseed Oil Oil        Refills:  0        metFORMIN 500 MG 24 hr tablet   Commonly known as:  GLUCOPHAGE-XR   Dose:  1000 mg        Take 2 tablets (1,000 mg) by mouth 2 times daily (with meals)   Refills:  0        Multi-vitamin Tabs tablet   Dose:  1 tablet        Take 1 tablet by mouth daily   Refills:  0        OMEGA-3 FISH OIL PO   Dose:  1 g        Take 1 g by mouth 2 times daily (with meals)   Refills:  0        ONE-A-DAY WOMENS FORMULA Tabs        Refills:  0        order for DME   Quantity:  1 each        Equipment being ordered: Cane   Refills:  0        PROBIOTIC ACIDOPHILUS BIOBEADS Caps   Dose:  1 capsule        Take 1 capsule by mouth   Refills:  0        PULMICORT FLEXHALER 180 MCG/ACT inhaler   Dose:  1 puff   Generic drug:  budesonide        Inhale 1 puff into the lungs 2 times daily. DR ANDREWS  pulmonology   Refills:  0        SPIRIVA HANDIHALER 18 MCG capsule   Dose:  18 mcg   Generic drug:  tiotropium        Inhale 1 capsule into the lungs daily.  [facility]   Refills:  0        triamcinolone 0.1 % cream   Commonly known as:  KENALOG        Refills:  0        * warfarin 5 MG tablet   Commonly known as:  COUMADIN   Dose:  5 mg   Quantity:  90 tablet        Take 1 tablet (5 mg) by mouth daily   Refills:  1        * warfarin 5 MG tablet   Commonly known as:  COUMADIN   Quantity:  30 tablet        TAKE 1 TABLET (5 MG) BY MOUTH DAILY   Refills:  0        * Notice:  This list has 10 medication(s) that are the same as other medications prescribed for you. Read the directions carefully, and ask your doctor or other care provider to review them with you.            Prescriptions were sent or printed at these locations (2 Prescriptions)                   Other Prescriptions                Printed at Department/Unit printer (2 of 2)         phenazopyridine (PYRIDIUM) 200 MG tablet               cephALEXin (KEFLEX) 500 MG capsule                Procedures and tests performed during your visit     UA with Microscopic reflex to Culture    Urine Culture Aerobic Bacterial      Orders Needing Specimen Collection     None      Pending Results     Date and Time Order Name Status Description    3/23/2017 0150 Urine Culture Aerobic Bacterial In process             Pending Culture Results     Date and Time Order Name Status Description    3/23/2017 0150 Urine Culture Aerobic Bacterial In process              Test Results from your hospital stay     3/23/2017  2:12 AM - Interface, [email or web address] Results      Component Results     Component Value Ref Range & Units Status    Color Urine Yellow  Final    Appearance Urine Cloudy  Final    Glucose Urine Negative NEG mg/dL Final    Bilirubin Urine Negative NEG Final    Ketones Urine Negative NEG mg/dL Final    Specific Gravity Urine 1.015 1.003 - 1.035 Final    Blood  Urine Large (A) NEG Final    pH Urine 5.0 5.0 - 7.0 pH Final    Protein Albumin Urine 100 (A) NEG mg/dL Final    Urobilinogen mg/dL 0.0 0.0 - 2.0 mg/dL Final    Nitrite Urine Negative NEG Final    Leukocyte Esterase Urine Moderate (A) NEG Final    Source Midstream Urine  Final    WBC Urine >182 (H) 0 - 2 /HPF Final    RBC Urine >182 (H) 0 - 2 /HPF Final    WBC Clumps Present (A) NEG /HPF Final    Bacteria Urine Many (A) NEG /HPF Final    Squamous Epithelial /HPF Urine 12 (H) 0 - 1 /HPF Final    Mucous Urine Present (A) NEG /LPF Final         3/23/2017  2:13 AM - Interface, [employer] Results                Clinical Quality Measure: Blood Pressure Screening     Your blood pressure was checked while you were in the emergency department today. The last reading we obtained was  BP: (!) 147/100 . Please read the guidelines below about what these numbers mean and what you should do about them.  If your systolic blood pressure (the top number) is less than 120 and your diastolic blood pressure (the bottom number) is less than 80, then your blood pressure is normal. There is nothing more that you need to do about it.  If your systolic blood pressure (the top number) is 120-139 or your diastolic blood pressure (the bottom number) is 80-89, your blood pressure may be higher than it should be. You should have your blood pressure rechecked within a year by a primary care provider.  If your systolic blood pressure (the top number) is 140 or greater or your diastolic blood pressure (the bottom number) is 90 or greater, you may have high blood pressure. High blood pressure is treatable, but if left untreated over time it can put you at risk for heart attack, stroke, or kidney failure. You should have your blood pressure rechecked by a primary care provider within the next 4 weeks.  If your provider in the emergency department today gave you specific instructions to follow-up with your doctor or provider even sooner than that, you  should follow that instruction and not wait for up to 4 weeks for your follow-up visit.        Thank you for choosing [facility]       Thank you for choosing [facility] for your care. Our goal is always to provide you with excellent care. Hearing back from our patients is one way we can continue to improve our services. Please take a few minutes to complete the written survey that you may receive in the mail after you visit with us. Thank you!        [employer] Information     [employer] gives you secure access to your electronic health record. If you see a primary care provider, you can also send messages to your care team and make appointments. If you have questions, please call your primary care clinic.  If you do not have a primary care provider, please call [phone] and they will assist you.        Care EveryWhere ID     This is your Care EveryWhere ID. This could be used by other organizations to access your [facility] medical records  [id number]        After Visit Summary       This is your record. Keep this with you and show to your community pharmacist(s) and doctor(s) at your next visit.

## 2017-03-24 LAB
BACTERIA SPEC CULT: ABNORMAL
Lab: ABNORMAL
MICRO REPORT STATUS: ABNORMAL
MICROORGANISM SPEC CULT: ABNORMAL
SPECIMEN SOURCE: ABNORMAL

## 2017-04-14 ENCOUNTER — TRANSFERRED RECORDS (OUTPATIENT)
Dept: FAMILY MEDICINE | Facility: CLINIC | Age: 62
End: 2017-04-14

## 2017-04-17 ENCOUNTER — OFFICE VISIT (OUTPATIENT)
Dept: FAMILY MEDICINE | Facility: CLINIC | Age: 62
End: 2017-04-17

## 2017-04-17 VITALS
WEIGHT: 293 LBS | RESPIRATION RATE: 16 BRPM | BODY MASS INDEX: 48.82 KG/M2 | HEART RATE: 76 BPM | TEMPERATURE: 98.2 F | HEIGHT: 65 IN | SYSTOLIC BLOOD PRESSURE: 124 MMHG | DIASTOLIC BLOOD PRESSURE: 86 MMHG | OXYGEN SATURATION: 98 %

## 2017-04-17 DIAGNOSIS — Z79.01 LONG TERM CURRENT USE OF ANTICOAGULANT THERAPY: ICD-10-CM

## 2017-04-17 DIAGNOSIS — I10 ESSENTIAL HYPERTENSION, BENIGN: ICD-10-CM

## 2017-04-17 DIAGNOSIS — D68.51 FACTOR 5 LEIDEN MUTATION, HETEROZYGOUS (H): Primary | ICD-10-CM

## 2017-04-17 DIAGNOSIS — Z86.711 HISTORY OF PULMONARY EMBOLISM: ICD-10-CM

## 2017-04-17 DIAGNOSIS — L20.84 INTRINSIC ECZEMA: ICD-10-CM

## 2017-04-17 LAB — INR POINT OF CARE: 1.8 (ref 0.9–1.1)

## 2017-04-17 PROCEDURE — 85610 PROTHROMBIN TIME: CPT | Performed by: FAMILY MEDICINE

## 2017-04-17 PROCEDURE — 99213 OFFICE O/P EST LOW 20 MIN: CPT | Performed by: FAMILY MEDICINE

## 2017-04-17 PROCEDURE — 36415 COLL VENOUS BLD VENIPUNCTURE: CPT | Performed by: FAMILY MEDICINE

## 2017-04-17 RX ORDER — HYDROXYZINE HYDROCHLORIDE 25 MG/1
25 TABLET, FILM COATED ORAL AT BEDTIME
Qty: 90 TABLET | Refills: 0 | Status: SHIPPED | OUTPATIENT
Start: 2017-04-17 | End: 2017-10-09

## 2017-04-17 RX ORDER — LOSARTAN POTASSIUM 25 MG/1
25 TABLET ORAL DAILY
COMMUNITY
End: 2017-11-01

## 2017-04-17 RX ORDER — WARFARIN SODIUM 5 MG/1
5 TABLET ORAL DAILY
Qty: 90 TABLET | Refills: 0 | Status: SHIPPED | OUTPATIENT
Start: 2017-04-17 | End: 2017-08-14

## 2017-04-17 NOTE — MR AVS SNAPSHOT
After Visit Summary   4/17/2017    Bere Machuca    MRN: [id number]           Patient Information     Date Of Birth          1955        Visit Information        Provider Department      4/17/2017 9:45 AM Wilma Armenta MD [facility]        Today's Diagnoses     Factor 5 Leiden mutation, heterozygous/ INR 2-3    -  1    Intrinsic eczema        Long term current use of anticoagulant therapy        History of pulmonary embolism        Essential hypertension, benign          Care Instructions     Factor 5 Leiden mutation, heterozygous/ INR 2-3  (primary encounter diagnosis)  Comment: get back to daily dose  Plan: warfarin (COUMADIN) 5 MG tablet        Recheck 4 weeks          Follow-ups after your visit        Follow-up notes from your care team     Return in about 4 weeks (around 5/15/2017).      Who to contact     If you have questions or need follow up information about today's clinic visit or your schedule please contact [facility] directly at [phone].  Normal or non-critical lab and imaging results will be communicated to you by Radiancehart, letter or phone within 4 business days after the clinic has received the results. If you do not hear from us within 7 days, please contact the clinic through [employer]t or phone. If you have a critical or abnormal lab result, we will notify you by phone as soon as possible.  Submit refill requests through ticketstreet or call your pharmacy and they will forward the refill request to us. Please allow 3 business days for your refill to be completed.          Additional Information About Your Visit        Radiancehart Information     ticketstreet gives you secure access to your electronic health record. If you see a primary care provider, you can also send messages to your care team and make appointments. If you have questions, please call your primary care clinic.  If you do not have a primary care provider, please call  "[phone] and they will assist you.        Care EveryWhere ID     This is your Care EveryWhere ID. This could be used by other organizations to access your [place] medical records  [id number]        Your Vitals Were     Pulse Temperature Respirations Height Pulse Oximetry BMI (Body Mass Index)    76 98.2  F (36.8  C) (Oral) 16 1.651 m (5' 5\") 98% 64.23 kg/m2       Blood Pressure from Last 3 Encounters:   04/17/17 124/86   03/23/17 (!) 147/100   02/03/17 122/80    Weight from Last 3 Encounters:   04/17/17 (!) 175.1 kg (386 lb)   02/03/17 (!) 179.6 kg (396 lb)   11/18/16 (!) 181.4 kg (400 lb)              We Performed the Following     CL BFP PROTHROMBIN TIME/INR (BFP)     VENOUS COLLECTION          Today's Medication Changes          These changes are accurate as of: 4/17/17  2:16 PM.  If you have any questions, ask your nurse or doctor.               These medicines have changed or have updated prescriptions.        Dose/Directions    * hydrOXYzine 25 MG tablet   Commonly known as:  ATARAX   This may have changed:  Another medication with the same name was changed. Make sure you understand how and when to take each.   Used for:  Chronic bronchitis, unspecified chronic bronchitis type (H)   Changed by:  Wilma Armenta MD        Refills:  0       * hydrOXYzine 25 MG tablet   Commonly known as:  ATARAX   This may have changed:    - how much to take  - how to take this  - when to take this   Used for:  Intrinsic eczema   Changed by:  Wilma Armenta MD        Dose:  25 mg   Take 1 tablet (25 mg) by mouth At Bedtime   Quantity:  90 tablet   Refills:  0       warfarin 5 MG tablet   Commonly known as:  COUMADIN   This may have changed:  See the new instructions.   Used for:  Factor 5 Leiden mutation, heterozygous (H), History of pulmonary embolism   Changed by:  Wilma Armenta MD        Dose:  5 mg   Take 1 tablet (5 mg) by mouth daily   Quantity:  90 tablet   Refills:  0       * Notice:  This list has 2 " medication(s) that are the same as other medications prescribed for you. Read the directions carefully, and ask your doctor or other care provider to review them with you.         Where to get your medicines      These medications were sent to [place] [facility] [id number] [facility] - [place], [place] - [address]  [address], [facility] [address]     Phone:  [phone]     hydrOXYzine 25 MG tablet    warfarin 5 MG tablet                Primary Care Provider Office Phone # Fax #    Wilma Armenta [phone]       [facility] [address] [facility]  [address]  [facility] [address]        Thank you!     Thank you for choosing [facility]  for your care. Our goal is always to provide you with excellent care. Hearing back from our patients is one way we can continue to improve our services. Please take a few minutes to complete the written survey that you may receive in the mail after your visit with us. Thank you!             Your Updated Medication List - Protect others around you: Learn how to safely use, store and throw away your medicines at www.disposemymeds.org.          This list is accurate as of: 4/17/17  2:16 PM.  Always use your most recent med list.                   Brand Name Dispense Instructions for use    * albuterol (2.5 MG/3ML) 0.083% neb solution      Take 3 mLs by nebulization every 6 hours as needed for shortness of breath / dyspnea. Dr [facility]       * albuterol 108 (90 BASE) MCG/ACT Inhaler   Generic drug:  albuterol      Inhale 1-2 puffs into the lungs as needed for shortness of breath / dyspnea. DR [facility]EN[facility]       CALCITRATE 950 MG tablet   Generic drug:  calcium citrate          cholecalciferol 1000 UNITS Tabs      Take 1,000 Units by mouth       * citalopram 40 MG tablet    celeXA         * citalopram 40 MG tablet    celeXA    90 tablet    Take 1 tablet (40 mg) by mouth daily       COMPRESSION STOCKINGS      1 each    1 each daily       cyclobenzaprine 5 MG tablet    FLEXERIL    30 tablet    Take 1 tablet (5 mg) by mouth 3 times daily as needed for muscle spasms       gabapentin 300 MG capsule    NEURONTIN    360 capsule    Take two tablets at breakfast, one at lunch, one at bedtime       * hydrOXYzine 25 MG tablet    ATARAX         * hydrOXYzine 25 MG tablet    ATARAX    90 tablet    Take 1 tablet (25 mg) by mouth At Bedtime       ketoconazole 2 % shampoo    NIZORAL    120 mL    APPLY TOPICALLY, LET SET FOR 5 MINUTES, THEN RINSE.       lidocaine 5 % Patch    LIDODERM         Linseed Oil Oil          losartan 25 MG tablet    COZAAR     Take 25 mg by mouth daily       metFORMIN 500 MG 24 hr tablet    GLUCOPHAGE-XR     Take 2 tablets (1,000 mg) by mouth 2 times daily (with meals)       Multi-vitamin Tabs tablet      Take 1 tablet by mouth daily       OMEGA-3 FISH OIL PO      Take 1 g by mouth 2 times daily (with meals)       ONE-A-DAY WOMENS FORMULA Tabs          order for DME     1 each    Equipment being ordered: Cane       PROBIOTIC ACIDOPHILUS BIOBEADS Caps      Take 1 capsule by mouth       PULMICORT FLEXHALER 180 MCG/ACT inhaler   Generic drug:  budesonide      Inhale 1 puff into the lungs 2 times daily. DR ANDREWS pulmonology       SPIRIVA HANDIHALER 18 MCG capsule   Generic drug:  tiotropium      Inhale 1 capsule into the lungs daily. DR [facility]RE[facility]       triamcinolone 0.1 % cream    KENALOG         warfarin 5 MG tablet    COUMADIN    90 tablet    Take 1 tablet (5 mg) by mouth daily       * Notice:  This list has 6 medication(s) that are the same as other medications prescribed for you. Read the directions carefully, and ask your doctor or other care provider to review them with you.

## 2017-04-17 NOTE — PROGRESS NOTES
SUBJECTIVE: 61 year old female complaining of needing her INR and eczema itching medication refilled before seeing the dermatology specialists. She missed a pill this weekend due to the holiday an d a different schedule. She has been getting back injections with improvement for 4 week(s).   The patient describes bleeding issues.   The patient denies a history of SOB.   Smoking history: No.   Relevant past medical history: positive for asthma followed by pulmonary specialists. Went to obesity endocrine clinic and found to have borderline diabetes/ on BP and metformin medication and doing well per their office reports. Losing weight planned.    OBJECTIVE: The patient appears healthy, alert, no distress, cooperative, over weight and fatigued.   EARS: negative  NOSE/SINUS: Nares normal. Septum midline. Mucosa normal. No drainage or sinus tenderness.   THROAT: normal   NECK:Neck supple. No adenopathy. Thyroid symmetric, normal size,, Carotids without bruits.   CHEST: Clear    INR down slightly as expected    ASSESSMENT: (D68.51) Factor 5 Leiden mutation, heterozygous/ INR 2-3  (primary encounter diagnosis)  Comment: get back to daily dose  Plan: warfarin (COUMADIN) 5 MG tablet        Recheck 4 weeks    (L20.84) Intrinsic eczema  Comment: Potential medication side effects were discussed with the patient; let me know if any occur.  Plan: hydrOXYzine (ATARAX) 25 MG tablet        refilled    (Z79.01) Long term current use of anticoagulant therapy  Plan: CL BFP PROTHROMBIN TIME/INR (BFP), VENOUS         COLLECTION            (Z86.711) History of pulmonary embolism  Plan: warfarin (COUMADIN) 5 MG tablet            (I10) Essential hypertension, benign  Plan: losartan (COZAAR) 25 MG tablet        I have reviewed the patient's medical history in detail and updated the computerized patient record.  Followed by endocrinology for type 2 diabetes/ now on metformin

## 2017-04-17 NOTE — PATIENT INSTRUCTIONS
Factor 5 Leiden mutation, heterozygous/ INR 2-3  (primary encounter diagnosis)  Comment: get back to daily dose  Plan: warfarin (COUMADIN) 5 MG tablet        Recheck 4 weeks

## 2017-04-17 NOTE — NURSING NOTE
Patient is here for a recheck of their medication.  Pre-Visit Screening :  Immunizations : up to date    Colonoscopy : is due and to be scheduled by patient for later completion  Mammogram : is up to date  Asthma Action Test/Plan : na  PHQ9/GAD7 :  NA  Pulse - regular    Medication Reconciliation: complete      CLASSIFICATION OF OVERWEIGHT AND OBESITY BY BMI                         Obesity Class           BMI(kg/m2)  Underweight                                    < 18.5  Normal                                         18.5-24.9  Overweight                                     25.0-29.9  OBESITY                     I                  30.0-34.9                              II                 35.0-39.9  EXTREME OBESITY             III                >40                             Patient's  BMI Body mass index is 64.23 kg/(m^2).  http://hin.nhlbi.nih.gov/menuplanner/menu.cgi  Questioned patient about current smoking habits.  Pt. quit smoking some time ago.

## 2017-06-21 ENCOUNTER — HOSPITAL ENCOUNTER (OUTPATIENT)
Dept: LAB | Facility: CLINIC | Age: 62
Discharge: HOME | End: 2017-06-21
Attending: FAMILY MEDICINE | Admitting: PHYSICIAN ASSISTANT
Payer: COMMERCIAL

## 2017-06-21 ENCOUNTER — HOSPITAL ENCOUNTER (EMERGENCY)
Facility: CLINIC | Age: 62
Discharge: HOME | End: 2017-06-21
Attending: EMERGENCY MEDICINE | Admitting: EMERGENCY MEDICINE
Payer: COMMERCIAL

## 2017-06-21 ENCOUNTER — OFFICE VISIT (OUTPATIENT)
Dept: FAMILY MEDICINE | Facility: CLINIC | Age: 62
End: 2017-06-21

## 2017-06-21 VITALS
OXYGEN SATURATION: 96 % | RESPIRATION RATE: 20 BRPM | SYSTOLIC BLOOD PRESSURE: 118 MMHG | HEART RATE: 88 BPM | DIASTOLIC BLOOD PRESSURE: 70 MMHG | TEMPERATURE: 98.4 F

## 2017-06-21 VITALS
BODY MASS INDEX: 62.57 KG/M2 | RESPIRATION RATE: 20 BRPM | SYSTOLIC BLOOD PRESSURE: 124 MMHG | WEIGHT: 293 LBS | HEART RATE: 90 BPM | OXYGEN SATURATION: 98 % | TEMPERATURE: 99 F | DIASTOLIC BLOOD PRESSURE: 84 MMHG

## 2017-06-21 DIAGNOSIS — R79.1 SUBTHERAPEUTIC INTERNATIONAL NORMALIZED RATIO (INR): ICD-10-CM

## 2017-06-21 DIAGNOSIS — H04.202: ICD-10-CM

## 2017-06-21 DIAGNOSIS — R05.9 COUGH: ICD-10-CM

## 2017-06-21 DIAGNOSIS — Z79.01 LONG TERM CURRENT USE OF ANTICOAGULANT THERAPY: Primary | ICD-10-CM

## 2017-06-21 DIAGNOSIS — R06.02 SOB (SHORTNESS OF BREATH): Primary | ICD-10-CM

## 2017-06-21 DIAGNOSIS — J44.1 OBSTRUCTIVE CHRONIC BRONCHITIS WITH EXACERBATION (H): ICD-10-CM

## 2017-06-21 LAB
ANION GAP SERPL CALCULATED.3IONS-SCNC: 8 MMOL/L (ref 3–14)
BASE EXCESS BLDV CALC-SCNC: 2.8 MMOL/L
BASOPHILS # BLD AUTO: 0 10E9/L (ref 0–0.2)
BASOPHILS NFR BLD AUTO: 0.5 %
BUN SERPL-MCNC: 11 MG/DL (ref 7–30)
CALCIUM SERPL-MCNC: 9.5 MG/DL (ref 8.5–10.1)
CHLORIDE SERPL-SCNC: 105 MMOL/L (ref 94–109)
CO2 SERPL-SCNC: 27 MMOL/L (ref 20–32)
CREAT SERPL-MCNC: 0.79 MG/DL (ref 0.52–1.04)
D DIMER PPP FEU-MCNC: 0.7 UG/ML FEU (ref 0–0.5)
DIFFERENTIAL METHOD BLD: ABNORMAL
EOSINOPHIL # BLD AUTO: 0.3 10E9/L (ref 0–0.7)
EOSINOPHIL NFR BLD AUTO: 3.2 %
ERYTHROCYTE [DISTWIDTH] IN BLOOD BY AUTOMATED COUNT: 15.9 % (ref 10–15)
GFR SERPL CREATININE-BSD FRML MDRD: 75 ML/MIN/1.7M2
GLUCOSE SERPL-MCNC: 114 MG/DL (ref 70–99)
HCO3 BLDV-SCNC: 26 MMOL/L (ref 21–28)
HCT VFR BLD AUTO: 47.5 % (ref 35–47)
HGB BLD-MCNC: 15.3 G/DL (ref 11.7–15.7)
IMM GRANULOCYTES # BLD: 0 10E9/L (ref 0–0.4)
IMM GRANULOCYTES NFR BLD: 0.2 %
INR POINT OF CARE: 1.3 (ref 0.9–1.1)
INTERPRETATION ECG - MUSE: NORMAL
LYMPHOCYTES # BLD AUTO: 1.6 10E9/L (ref 0.8–5.3)
LYMPHOCYTES NFR BLD AUTO: 19.2 %
MCH RBC QN AUTO: 30 PG (ref 26.5–33)
MCHC RBC AUTO-ENTMCNC: 32.2 G/DL (ref 31.5–36.5)
MCV RBC AUTO: 93 FL (ref 78–100)
MONOCYTES # BLD AUTO: 0.6 10E9/L (ref 0–1.3)
MONOCYTES NFR BLD AUTO: 7.5 %
NEUTROPHILS # BLD AUTO: 5.7 10E9/L (ref 1.6–8.3)
NEUTROPHILS NFR BLD AUTO: 69.4 %
NRBC # BLD AUTO: 0 10*3/UL
NRBC BLD AUTO-RTO: 0 /100
O2/TOTAL GAS SETTING VFR VENT: ABNORMAL %
PCO2 BLDV: 34 MM HG (ref 40–50)
PH BLDV: 7.49 PH (ref 7.32–7.43)
PLATELET # BLD AUTO: 387 10E9/L (ref 150–450)
PO2 BLDV: 70 MM HG (ref 25–47)
POTASSIUM SERPL-SCNC: 3.8 MMOL/L (ref 3.4–5.3)
RBC # BLD AUTO: 5.1 10E12/L (ref 3.8–5.2)
SODIUM SERPL-SCNC: 140 MMOL/L (ref 133–144)
TROPONIN I SERPL-MCNC: NORMAL UG/L (ref 0–0.04)
WBC # BLD AUTO: 8.1 10E9/L (ref 4–11)

## 2017-06-21 PROCEDURE — 36415 COLL VENOUS BLD VENIPUNCTURE: CPT | Performed by: PHYSICIAN ASSISTANT

## 2017-06-21 PROCEDURE — 94640 AIRWAY INHALATION TREATMENT: CPT

## 2017-06-21 PROCEDURE — 93005 ELECTROCARDIOGRAM TRACING: CPT

## 2017-06-21 PROCEDURE — 96374 THER/PROPH/DIAG INJ IV PUSH: CPT

## 2017-06-21 PROCEDURE — 25000128 H RX IP 250 OP 636: Performed by: EMERGENCY MEDICINE

## 2017-06-21 PROCEDURE — 85025 COMPLETE CBC W/AUTO DIFF WBC: CPT | Performed by: EMERGENCY MEDICINE

## 2017-06-21 PROCEDURE — 96372 THER/PROPH/DIAG INJ SC/IM: CPT

## 2017-06-21 PROCEDURE — 85610 PROTHROMBIN TIME: CPT | Performed by: PHYSICIAN ASSISTANT

## 2017-06-21 PROCEDURE — 99284 EMERGENCY DEPT VISIT MOD MDM: CPT | Mod: 25

## 2017-06-21 PROCEDURE — 82803 BLOOD GASES ANY COMBINATION: CPT | Performed by: EMERGENCY MEDICINE

## 2017-06-21 PROCEDURE — 25000132 ZZH RX MED GY IP 250 OP 250 PS 637: Performed by: EMERGENCY MEDICINE

## 2017-06-21 PROCEDURE — 84484 ASSAY OF TROPONIN QUANT: CPT | Performed by: EMERGENCY MEDICINE

## 2017-06-21 PROCEDURE — 80048 BASIC METABOLIC PNL TOTAL CA: CPT | Performed by: EMERGENCY MEDICINE

## 2017-06-21 PROCEDURE — 36415 COLL VENOUS BLD VENIPUNCTURE: CPT | Performed by: EMERGENCY MEDICINE

## 2017-06-21 PROCEDURE — 87040 BLOOD CULTURE FOR BACTERIA: CPT | Mod: 91 | Performed by: EMERGENCY MEDICINE

## 2017-06-21 PROCEDURE — 25000125 ZZHC RX 250: Performed by: EMERGENCY MEDICINE

## 2017-06-21 PROCEDURE — 99214 OFFICE O/P EST MOD 30 MIN: CPT | Performed by: PHYSICIAN ASSISTANT

## 2017-06-21 PROCEDURE — 87077 CULTURE AEROBIC IDENTIFY: CPT | Performed by: EMERGENCY MEDICINE

## 2017-06-21 PROCEDURE — 87800 DETECT AGNT MULT DNA DIREC: CPT | Performed by: EMERGENCY MEDICINE

## 2017-06-21 PROCEDURE — 87186 SC STD MICRODIL/AGAR DIL: CPT | Performed by: EMERGENCY MEDICINE

## 2017-06-21 RX ORDER — METHYLPREDNISOLONE SODIUM SUCCINATE 125 MG/2ML
125 INJECTION, POWDER, LYOPHILIZED, FOR SOLUTION INTRAMUSCULAR; INTRAVENOUS ONCE
Status: COMPLETED | OUTPATIENT
Start: 2017-06-21 | End: 2017-06-21

## 2017-06-21 RX ORDER — IPRATROPIUM BROMIDE AND ALBUTEROL SULFATE 2.5; .5 MG/3ML; MG/3ML
3 SOLUTION RESPIRATORY (INHALATION)
Status: DISCONTINUED | OUTPATIENT
Start: 2017-06-21 | End: 2017-06-21 | Stop reason: HOSPADM

## 2017-06-21 RX ORDER — DOXYCYCLINE 100 MG/1
100 CAPSULE ORAL 2 TIMES DAILY
Qty: 14 CAPSULE | Refills: 0 | Status: SHIPPED | OUTPATIENT
Start: 2017-06-21 | End: 2017-06-28

## 2017-06-21 RX ORDER — ACETAMINOPHEN 325 MG/1
975 TABLET ORAL ONCE
Status: COMPLETED | OUTPATIENT
Start: 2017-06-21 | End: 2017-06-21

## 2017-06-21 RX ORDER — PREDNISONE 20 MG/1
50 TABLET ORAL DAILY
Qty: 13 TABLET | Refills: 0 | Status: SHIPPED | OUTPATIENT
Start: 2017-06-21 | End: 2017-06-22

## 2017-06-21 RX ORDER — DOXYCYCLINE 100 MG/1
100 CAPSULE ORAL 2 TIMES DAILY
Qty: 20 CAPSULE | Refills: 0 | Status: SHIPPED | OUTPATIENT
Start: 2017-06-21 | End: 2017-06-22

## 2017-06-21 RX ORDER — PREDNISONE 20 MG/1
50 TABLET ORAL DAILY
Qty: 13 TABLET | Refills: 0 | Status: SHIPPED | OUTPATIENT
Start: 2017-06-21 | End: 2017-06-26

## 2017-06-21 RX ADMIN — IPRATROPIUM BROMIDE AND ALBUTEROL SULFATE 3 ML: .5; 3 SOLUTION RESPIRATORY (INHALATION) at 16:37

## 2017-06-21 RX ADMIN — ACETAMINOPHEN 975 MG: 325 TABLET, FILM COATED ORAL at 15:56

## 2017-06-21 RX ADMIN — METHYLPREDNISOLONE SODIUM SUCCINATE 125 MG: 125 INJECTION, POWDER, FOR SOLUTION INTRAMUSCULAR; INTRAVENOUS at 15:57

## 2017-06-21 RX ADMIN — ENOXAPARIN SODIUM 170 MG: 100 INJECTION SUBCUTANEOUS at 16:28

## 2017-06-21 ASSESSMENT — ENCOUNTER SYMPTOMS
CHEST TIGHTNESS: 0
SHORTNESS OF BREATH: 1
VOMITING: 0
WHEEZING: 0
FEVER: 1
BACK PAIN: 1
NAUSEA: 0
COUGH: 1

## 2017-06-21 NOTE — PROGRESS NOTES
SUBJECTIVE:                                                    Bere Machuca is a 61 year old female who presents to clinic today for the following health issues:        Chronic coumadin use    Indication: Factor V Leiden        Recent Labs   Lab Test  06/21/17   1210  04/17/17   1004  02/03/17   1340  12/19/16   1307  11/18/16   0705  11/07/16   1310   INR  1.3*  1.8  2.6  2.6  1.76*  2.6        Current Coumadin Dose:  5 mg daily    Bleeding Signs/Symptoms:  None  Thromboembolic Signs/Symptoms:  None    Medication Changes:  Yes: added Magnesium OTC 3 weeks ago  Dietary Changes:  No  Bacterial/Viral Infection:  Yes: see below    Missed Coumadin Doses:  This week - Friday    Other Concerns:        1. SOB and Fever  Headache and neck pain for about a week.  Some SOB in the last 24 hours.  Missed Coumadin dose on Friday.    People at work have been ill.  Fever 101 this am.  Took APAP this morning.     Has COPD - Pulmicort, Spiriva  Dr. Brown - last appt was less than 2 months ago.  Quit smoking 5 year ago.       2. Eye concern  Cat has conjunctivitis.   Pt has had increased tearing of left eye.  No other discharge, itching, or change in vision.           ROS:   C: NEGATIVE for fever, chills, change in weight  E: NEGATIVE for vision changes or irritation  E/M: NEGATIVE for ear, mouth and throat problems  CV: NEGATIVE for chest pain, palpitations or peripheral edema      Problem list, Medication list, Allergies, and Medical/Social/Surgical histories reviewed in [facility] and updated as appropriate.      Labs reviewed in EPIC  BP Readings from Last 3 Encounters:   06/21/17 124/84   04/17/17 124/86   03/23/17 (!) 147/100    Wt Readings from Last 3 Encounters:   06/21/17 (!) 170.6 kg (376 lb)   04/17/17 (!) 175.1 kg (386 lb)   02/03/17 (!) 179.6 kg (396 lb)                  Patient Active Problem List   Diagnosis     History of cocaine abuse     Personal history of tobacco use, presenting hazards to health     Family  history of malignant neoplasm of gastrointestinal tract     Esophageal reflux     Chronic airway obstruction (H)     Hypertension     Sleep apnea     Migraine     ACP (advance care planning)     Factor 5 Leiden mutation, heterozygous/ INR 2-3     Health Care Home     Family history of pulmonary embolism     Lymphedema of arm     Spinal stenosis in cervical region     History of pulmonary embolism     Major depressive disorder, recurrent episode, moderate (H)     Superior vena cava syndrome     Morbid obesity due to excess calories (H)     Displacement of lumbar intervertebral disc without myelopathy     Past Surgical History:   Procedure Laterality Date     BIOPSY OF SKIN LESION  multiple    keloids     C ANESTH,REPAIR LO ABD HERNIA NOS  multiple/  last    panniculus     C EXPLORATORY OF ABDOMEN       C PLASTIC SURGERY, NECK  age 4     C REMOVAL COLON/ILEOSTOMY      reastamosis     C TOTAL ABDOM HYSTERECTOMY      Hysterectomy, Total Abdominal     HC REMOVAL OF OVARY/TUBE(S)      Salpingo-Oophorectomy, Unilateral     HC REMOVE ABDOMINAL WALL LESION      abscess     HC REMOVE TONSILS/ADENOIDS,<11 Y/O  age 14    T & A <14y.o.     HERNIA REPAIR       INSERTION OF ACCESS PORT  1-4-96    sepsis- removed after hospital stay       Social History   Substance Use Topics     Smoking status: Former Smoker     Packs/day: 0.50     Years: 35.00     Types: Cigarettes     Quit date: 2014     Smokeless tobacco: Never Used     Alcohol use 0.5 oz/week     1 Standard drinks or equivalent per week      Comment: social     Family History   Problem Relation Age of Onset     Respiratory Mother      copd     CANCER Mother      skin cancers/ melanomas     C.A.D. Mother      MI  age 79     Cancer - colorectal Father      / diagnosis age 72     GASTROINTESTINAL DISEASE Father      colitis     Breast Cancer No family hx of          Current Outpatient Prescriptions   Medication Sig Dispense Refill     losartan  (COZAAR) 25 MG tablet Take 25 mg by mouth daily       warfarin (COUMADIN) 5 MG tablet Take 1 tablet (5 mg) by mouth daily 90 tablet 0     hydrOXYzine (ATARAX) 25 MG tablet Take 1 tablet (25 mg) by mouth At Bedtime 90 tablet 0     gabapentin (NEURONTIN) 300 MG capsule Take two tablets at breakfast, one at lunch, one at bedtime 360 capsule 1     cyclobenzaprine (FLEXERIL) 5 MG tablet Take 1 tablet (5 mg) by mouth 3 times daily as needed for muscle spasms 30 tablet 0     citalopram (CELEXA) 40 MG tablet Take 1 tablet (40 mg) by mouth daily 90 tablet 1     cholecalciferol 1000 UNITS TABS Take 1,000 Units by mouth       hydrOXYzine (ATARAX) 25 MG tablet        Multiple Vitamins-Calcium (ONE-A-DAY WOMENS FORMULA) TABS        calcium citrate (CALCITRATE) 950 MG tablet        triamcinolone (KENALOG) 0.1 % cream        Flaxseed Oil (LINSEED OIL) OIL        citalopram (CELEXA) 40 MG tablet        metFORMIN (GLUCOPHAGE-XR) 500 MG 24 hr tablet Take 2 tablets (1,000 mg) by mouth 2 times daily (with meals)       multivitamin, therapeutic with minerals (MULTI-VITAMIN) TABS Take 1 tablet by mouth daily       Omega-3 Fatty Acids (OMEGA-3 FISH OIL PO) Take 1 g by mouth 2 times daily (with meals)        order for DME Equipment being ordered: Cane 1 each 0     COMPRESSION STOCKINGS 1 each daily 1 each 1     ketoconazole (NIZORAL) 2 % shampoo APPLY TOPICALLY, LET SET FOR 5 MINUTES, THEN RINSE. 120 mL 0     lidocaine (LIDODERM) 5 % patch        Probiotic Product (PROBIOTIC ACIDOPHILUS) CAPS Take 1 capsule by mouth       tiotropium (SPIRIVA HANDIHALER) 18 MCG inhalation capsule Inhale 1 capsule into the lungs daily. DR [facility]EN[facility]Z [facility]       albuterol (2.5 MG/3ML) 0.083% nebulizer solution Take 3 mLs by nebulization every 6 hours as needed for shortness of breath / dyspnea. Dr MELENDEZ [facility]       albuterol (PROAIR HFA) 108 (90 BASE) MCG/ACT inhaler Inhale 1-2 puffs into the lungs as needed for shortness of breath /  dyspnea. DR ANDREWS [facility]       budesonide (PULMICORT FLEXHALER) 180 MCG/ACT inhaler Inhale 1 puff into the lungs 2 times daily. DR MELENDEZ pulmonology       Allergies   Allergen Reactions     Aspirin      PN: LW Reaction: sensitive/stomach     Lyrica [Pregabalin]      dizziness     Tape [Adhesive Tape] Itching     Vancomycin      Recent Labs   Lab Test  11/18/16   0705 10/24/16  01/21/16   1218  01/11/16   1541  01/21/15   1351  08/17/12   1054   A1C   --   6.8   --    --    --    --    ALT   --   42   --    --   26  18   CR  0.68  0.78  0.84   --   0.72  0.76   GFRESTIMATED  89   --   70   --   93  89   GFRESTBLACK  >90   GFR Calc     --   84   --    --    --    POTASSIUM  3.9  4.5  4.4   --   4.4   --    TSH   --   2.85   --   2.57  3.34  3.15            OBJECTIVE:                                                    /84 (BP Location: Right arm, Cuff Size: Adult Large)  Pulse 90  Temp 99  F (37.2  C) (Oral)  Resp 20  Wt (!) 170.6 kg (376 lb)  SpO2 98%  BMI 62.57 kg/m2   Body mass index is 62.57 kg/(m^2).       Patient is a 61 year old female, in no acute distress. Vitals noted   Head: Normocephalic, atraumatic.  Eyes: Conjunctiva clear.  No discharge noted.  Ears: External ears, canals and TMs normal Bilaterally: gray and translucent.   Nose: Normal without discharge.  Mouth / Throat:   Pharynx non-erythematous, no exudates present, tonsils without hypertrophy. Mucous membranes moist.  Neck:  Neck supple, No lymphadenopathy, no thyromegaly.  Cardiac:  Normal rhythm and rate, no murmurs, rubs or gallops.  Lungs: bilateral insp. Wheezing throughout.    DuoNeb administered - breathing has improved.            ASSESSMENT/PLAN:                                                        1. Long term current use of anticoagulant therapy    2. Cough    3. Increased tearing, left        STAT D dimer at hospital was slightly elevated.   Advised evaluation ED.   Pt notified.   ED Notified as  yoana.        Catrachita Torres PA-C  6/21/2017

## 2017-06-21 NOTE — ED PROVIDER NOTES
History     Chief Complaint:  Shortness of Breath    HPI   Bere Machuca is a 61 year old female with a history of  COPD currently anticoagulated on coumadin who presents with shortness of breath. The patient reports that there is a sickness going around work and beginning two days ago she began having a fever, cough, and shortness of breath. She was sent to the ED today after her D dimer was off. Her INR is normally 2.1-2.4, but she missed a dose of Coumadin on Friday and read at 1.3 today. She claims significant relief from her nebulizer treatment given here today.    Results from clinic, 6/21 at 1210  INR: 1.3  D dimer: 0.7    Allergies:  Aspirin  Lyrica  Adhesive tape  Vancomycin    Medications:    Cozaar  Coumadin  Atarax  Flexeril  celexa  Cholecalciferol  Kenalog  Glucophage  Lidoderm  Probiotic   Albuterol    Past Medical History:    Anxiety  Arthritis  Clotting disorder  COPD  Depressive disorder  Diabetes  hypertension  Lymphedema of the arm  Morbidly obese  Pulmonary embolism  Substance abuse  Superior vena cava syndrome    Past Surgical History:    Biopsy of skin lesion  C Anesth, repair lo abd hernia nos  Exploratory of abdomen  Removal colon/leostomy  Total abdom hysterectomy  Salpingo-oophorectomy, unilateral  Remove abdominal wall lesion  Remove tonsils/adenoids  Hernia repair  Insertion of access port    Family History:    COPD  Cancer  Colitis    Social History:  Smoking Status: Former  Smokeless Tobacco: no  Alcohol Use: Yes  Marital Status:       Review of Systems   Constitutional: Positive for fever.   Respiratory: Positive for cough and shortness of breath. Negative for chest tightness and wheezing.    Cardiovascular: Negative for chest pain and leg swelling.   Gastrointestinal: Negative for nausea and vomiting.   Musculoskeletal: Positive for back pain.     Physical Exam   Vitals:  Patient Vitals for the past 24 hrs:   BP Temp Temp src Pulse Heart Rate Resp SpO2   06/21/17 1803  118/70 - - 88 - 20 96 %   06/21/17 1738 118/70 - - - 88 14 97 %   06/21/17 1722 - - - - 86 - 94 %   06/21/17 1710 109/77 - - - 77 - -   06/21/17 1653 - - - - 77 20 93 %   06/21/17 1641 - - - - 94 - 93 %   06/21/17 1640 (!) 112/98 - - - - 20 -   06/21/17 1610 (!) 119/103 - - - 85 20 -   06/21/17 1600 - - - - 87 - 95 %   06/21/17 1540 135/76 - - - 77 - 94 %   06/21/17 1510 126/77 - - - 82 - 92 %   06/21/17 1434 (!) 158/98 98.4  F (36.9  C) Oral 99 99 18 96 %   06/21/17 1432 (!) 158/98 - - - - - -       Physical Exam  General: Morbidly obese, but otherwise pleasant middle aged woman in no distress.  HEENT:   The scalp and head appear normal    The pupils are equal, round, and reactive to light    Extraocular muscles are intact.    The nose is normal.    The oropharynx is normal.      Uvula is in the midline.  There is no peritonsillar abscess.  Neck:  Normal range of motion.    Lungs:  Clear.      Decreased breath sounds, scattered expiratory wheezes. Improved since nebulizer treatment at clinic which stated patient had marked wheezing.    There is no tachypnea.  Non-labored.  Cardiac: Regular rate.      Normal S1 and S2.      No S3 or S4.      No pathological murmur.      No pericardial rub.  Abdomen: Soft. No distension. No tympani. No rebound. Non-tender.  Lymph: No anterior or posterior cervical lymphadenopathy noted.  MS:  Normal tone.      Normal movement of all extremities.    Neuro:  Normal mentation.  No focal motor or sensory changes.      Speech normal.  Psych:  Awake.     Alert.      Normal affect.      Appropriate interactions.  Skin:  No rash.      No lesions.      Emergency Department Course     ECG:  ECG taken at 1431, ECG read at 1438  Sinus tachycardia  Nonspecific ST abnormality  Abnormal ECG  Rate 103 bpm. OK interval 122. QRS duration 72. QT/QTc 334/437. P-R-T axes 62 16 64.    Laboratory:  Laboratory findings were communicated with the patient who voiced understanding of the findings.  CBC: YUNIER.  (WBC 8.1, HGB 15.3, )   Blood Gas venous: Ph: 7.49 (H), PCO2: 34 (L), PO2: 70 (H)  Blood culture: Pending  BMP: Glucose: 114 (H) o/w WNL (Creatinine 0.79)  Troponin (Collected 1622): <0.015    Interventions:  1556 tylenol, 975 mg, PO  1557 solu-Medrol. 125 mg, IV  1628 Lovenox, 170 mg, subcutaneous  1637 Duoneb, 3 mL, Nebulization     Emergency Department Course:  Nursing notes and vitals reviewed.  I performed an exam of the patient as documented above.   IV was inserted and blood was drawn for laboratory testing, results above.  EKG obtained in the ED, see results above.   1543 I spoke with Dr. Stallings of hematology regarding this patient.  The patient was updated on the results of their lab tests.     I discussed the treatment plan with the patient. They expressed understanding of this plan and consented to discharge. They will be discharged home with instructions for care and follow up. In addition, the patient will return to the emergency department if their symptoms persist, worsen, if new symptoms arise or if there is any concern.  All questions were answered.    I personally reviewed the laboratory results with the Patient and answered all related questions prior to discharge.    Impression & Plan      Medical Decision Making:  This patient states that the past couple of days she has developed a runny/stuffty nose, chills, and increased shortness of breath and cough. She says she's been exposed to people at work with the same thing and she feels that she has developed worsening of chronic bronchitis and COPD. She went to the clinic with the intention that she would receive a steroid burst and antibiotics however she was sent her because the checked a D dimer that was elevated.    She states that they were very wary of her condition as she had presented a number of times for shortness of breath about 5 years agio before was finally diagnosed with PE and superior vena cava syndrome and was found to be  factor 5 Leiden positive.     The patient is sub therapeutic on her coumadin. She says it was due to one missed dose. I'm wondering if she's fully compliant and perhaps actually missing more doses.    She did state that she was audibly wheezing when she went to clinic. There is documentation that she was wheezing when she got there and improved significantly with one neb. The patient states that she is improved an improved here with further nebulization with increased aeration after the second neb. She did receive IV steroids here. I did not do a chest x ray as the patient had no rales or crackles after nebs and was clear.     However, I was concerned with the low INR and her history of embolism. I did discuss the case with Dr. Regan who concurred with starting Lovenox 1 unit/kg BID subcutaneous over the next 5 days. This was also discussed with our pharmacist on site in the ER. It was established she had a normal creatinine first and I recommended to the patient that she follow up on Friday for recheck of her Coumadin level. She can take an extra dose tonight of Coumadin as well.    I don't feel that advanced imaging studies are warranted at this time.The patient has a normal VBG with actually increased PO2. Her oxygenation has been normal here. She has no pleuritic chest pain shortness of breath.     EKG showed sinus rhythm no ischemic changes, and ISTAT trop was normal.    Impression  1. Acute COPD exacerbation  2. Sub therapeutic INR  3. History of venous thrombus embolism  4. History of superior vena cava syndrome,     Plan per above. Return if symptoms recur or new symptoms appear.    Diagnosis:    ICD-10-CM    1. Obstructive chronic bronchitis with exacerbation (H) J44.1 Basic metabolic panel     Blood culture     Blood culture     Blood gas venous     Troponin I (now)     CBC with platelets differential     CBC with platelets differential     CANCELED: CBC with platelets differential   2. Subtherapeutic  international normalized ratio (INR) R79.1 CANCELED: Heparin 10a Level     Disposition:   Discharge    Discharge Medications:  Discharge Medication List as of 6/21/2017  5:23 PM      START taking these medications    Details   doxycycline (VIBRAMYCIN) 100 MG capsule Take 1 capsule (100 mg) by mouth 2 times daily for 10 days, Disp-20 capsule, R-0, Local Print      predniSONE (DELTASONE) 20 MG tablet Take 2.5 tablets (50 mg) by mouth daily for 5 days, Disp-13 tablet, R-0, Local Print      enoxaparin (LOVENOX) 150 MG/ML injection Inject 1.1 mLs (165 mg) Subcutaneous every 12 hours for 5 days, Disp-10 Syringe, R-0, Local Print           Scribe Disclosure:  I, Victorino Ayala, am serving as a scribe at 2:59 PM on 6/21/2017 to document services personally performed by Kaleb John MD, based on my observations and the provider's statements to me.    6/21/2017   [facility] EMERGENCY DEPARTMENT            Kaleb John MD  06/21/17 1326

## 2017-06-21 NOTE — ED AVS SNAPSHOT
[facility] Emergency Department    [address]    [facility] [address]    Phone:  [phone]    Fax:  [phone]                                       Bere Machuca   MRN: [id number]    Department:  [facility] Emergency Department   Date of Visit:  6/21/2017           Patient Information     Date Of Birth          1955        Your diagnoses for this visit were:     Obstructive chronic bronchitis with exacerbation (H)     Subtherapeutic international normalized ratio (INR)        You were seen by Kaleb John MD.      Follow-up Information     Follow up with Wilma Armenta MD In 2 days.    Specialty:  Family Practice    Contact information:    [facility]  [address]  [facility] [id number] [phone]          Discharge Instructions       Discharge Instructions  Bronchitis, Pneumonia, Bronchospasm    You were seen today for a chest infection or inflammation. If your doctor decided this was due to a bacterial infection, you may need an antibiotic. Sometimes these are caused by a virus, and then an antibiotic will not help.     Return to the Emergency Department if:    Your breathing gets much worse.    You are very weak, or feel much more ill.    You develop new symptoms, such as chest pain.    You cough up blood.    You are vomiting enough that you can t keep fluids or your medicine down.    What can I do to help myself?    Fill any prescriptions the doctor gave you and take them right away--especially antibiotics. Be sure to finish the whole antibiotic prescription.    You may be given a prescription for an inhaler, which can help loosen tight air passages.  Use this as needed, but not more often than directed. Inhalers work much better when used with a spacer.     You may be given a prescription for a steroid to reduce inflammation. Used long-term, these can have many serious side effects, but for short courses these do not  "happen. You may notice restlessness or increased appetite.        You may use non-prescription cough or cold medicines. Cough medicines may help, but don t make the cough go away completely.     Avoid smoke, because this can make your symptoms worse. If you smoke, this may be a good time to quit! Consider using nicotine lozenges, gum, or patches to reduce cravings.     If you have a fever, Tylenol  (acetaminophen), Motrin  (ibuprofen), or Advil  (ibuprofen) may help bring fever down and may help you feel more comfortable. Be sure to read and follow the package directions, and ask your doctor if you have questions.    Be sure to get your flu shot each year.  The pneumonia shot can help prevent pneumonia.  Probiotics: If you have been given an antibiotic, you may want to also take a probiotic pill or eat yogurt with live cultures. Probiotics have \"good bacteria\" to help your intestines stay healthy. Studies have shown that probiotics help prevent diarrhea and other intestine problems (including C. diff infection) when you take antibiotics. You can buy these without a prescription in the pharmacy section of the store.     If your doctor has told you to follow-up at your clinic, be sure to call right away and go to your appointment.  If there is any problem with keeping your appointment, call your doctor or return to the Emergency Department.    If you were given a prescription for medicine here today, be sure to read all of the information (including the package insert) that comes with your prescription.  This will include important information about the medicine, its side effects, and any warnings that you need to know about.  The pharmacist who fills the prescription can provide more information and answer questions you may have about the medicine.  If you have questions or concerns that the pharmacist cannot address, please call or return to the Emergency Department.     Opioid Medication Information    Pain " medications are among the most commonly prescribed medicines, so we are including this information for all our patients. If you did not receive pain medication or get a prescription for pain medicine, you can ignore it.     You may have been given a prescription for an opioid (narcotic) pain medicine and/or have received a pain medicine while here in the Emergency Department. These medicines can make you drowsy or impaired. You must not drive, operate dangerous equipment, or engage in any other dangerous activities while taking these medications. If you drive while taking these medications, you could be arrested for DUI, or driving under the influence. Do not drink any alcohol while you are taking these medications.     Opioid pain medications can cause addiction. If you have a history of chemical dependency of any type, you are at a higher risk of becoming addicted to pain medications.  Only take these prescribed medications to treat your pain when all other options have been tried. Take it for as short a time and as few doses as possible. Store your pain pills in a secure place, as they are frequently stolen and provide a dangerous opportunity for children or visitors in your house to start abusing these powerful medications. We will not replace any lost or stolen medicine.  As soon as your pain is better, you should flush all your remaining medication.     Many prescription pain medications contain Tylenol  (acetaminophen), including Vicodin , Tylenol #3 , Norco , Lortab , and Percocet .  You should not take any extra pills of Tylenol  if you are using these prescription medications or you can get very sick.  Do not ever take more than 3000 mg of acetaminophen in any 24 hour period.    All opioids tend to cause constipation. Drink plenty of water and eat foods that have a lot of fiber, such as fruits, vegetables, prune juice, apple juice and high fiber cereal.  Take a laxative if you don t move your bowels at  least every other day. Miralax , Milk of Magnesia, Colace , or Senna  can be used to keep you regular.      Remember that you can always come back to the Emergency Department if you are not able to see your regular doctor in the amount of time listed above, if you get any new symptoms, or if there is anything that worries you.          24 Hour Appointment Hotline       To make an appointment at any [facility], call [phone] ([phone]). If you don't have a family doctor or clinic, we will help you find one. [place] clinics are conveniently located to serve the needs of you and your family.             Review of your medicines      START taking        Dose / Directions Last dose taken    doxycycline 100 MG capsule   Commonly known as:  VIBRAMYCIN   Dose:  100 mg   Quantity:  20 capsule        Take 1 capsule (100 mg) by mouth 2 times daily for 10 days   Refills:  0        enoxaparin 150 MG/ML injection   Commonly known as:  LOVENOX   Dose:  1 mg/kg   Quantity:  10 Syringe        Inject 1.1 mLs (165 mg) Subcutaneous every 12 hours for 5 days   Refills:  0        predniSONE 20 MG tablet   Commonly known as:  DELTASONE   Dose:  50 mg   Quantity:  13 tablet        Take 2.5 tablets (50 mg) by mouth daily for 5 days   Refills:  0          Our records show that you are taking the medicines listed below. If these are incorrect, please call your family doctor or clinic.        Dose / Directions Last dose taken    * albuterol (2.5 MG/3ML) 0.083% neb solution   Dose:  1 ampule        Take 3 mLs by nebulization every 6 hours as needed for shortness of breath / dyspnea. Dr [facility]   Refills:  0        * albuterol 108 (90 BASE) MCG/ACT Inhaler   Dose:  1-2 puff   Generic drug:  albuterol        Inhale 1-2 puffs into the lungs as needed for shortness of breath / dyspnea. DR [facility]   Refills:  0        CALCITRATE 950 MG tablet   Generic drug:  calcium citrate        Refills:  0         cholecalciferol 1000 UNITS Tabs   Dose:  1000 Units        Take 1,000 Units by mouth   Refills:  0        * citalopram 40 MG tablet   Commonly known as:  celeXA        Refills:  0        * citalopram 40 MG tablet   Commonly known as:  celeXA   Dose:  40 mg   Quantity:  90 tablet        Take 1 tablet (40 mg) by mouth daily   Refills:  1        COMPRESSION STOCKINGS   Dose:  1 each   Quantity:  1 each        1 each daily   Refills:  1        cyclobenzaprine 5 MG tablet   Commonly known as:  FLEXERIL   Dose:  5 mg   Quantity:  30 tablet        Take 1 tablet (5 mg) by mouth 3 times daily as needed for muscle spasms   Refills:  0        gabapentin 300 MG capsule   Commonly known as:  NEURONTIN   Quantity:  360 capsule        Take two tablets at breakfast, one at lunch, one at bedtime   Refills:  1        * hydrOXYzine 25 MG tablet   Commonly known as:  ATARAX        Refills:  0        * hydrOXYzine 25 MG tablet   Commonly known as:  ATARAX   Dose:  25 mg   Quantity:  90 tablet        Take 1 tablet (25 mg) by mouth At Bedtime   Refills:  0        ketoconazole 2 % shampoo   Commonly known as:  NIZORAL   Quantity:  120 mL        APPLY TOPICALLY, LET SET FOR 5 MINUTES, THEN RINSE.   Refills:  0        lidocaine 5 % Patch   Commonly known as:  LIDODERM        Refills:  0        Linseed Oil Oil        Refills:  0        losartan 25 MG tablet   Commonly known as:  COZAAR   Dose:  25 mg        Take 25 mg by mouth daily   Refills:  0        metFORMIN 500 MG 24 hr tablet   Commonly known as:  GLUCOPHAGE-XR   Dose:  1000 mg        Take 2 tablets (1,000 mg) by mouth 2 times daily (with meals)   Refills:  0        Multi-vitamin Tabs tablet   Dose:  1 tablet        Take 1 tablet by mouth daily   Refills:  0        OMEGA-3 FISH OIL PO   Dose:  1 g        Take 1 g by mouth 2 times daily (with meals)   Refills:  0        ONE-A-DAY WOMENS FORMULA Tabs        Refills:  0        order for DME   Quantity:  1 each        Equipment  being ordered: Cane   Refills:  0        PROBIOTIC ACIDOPHILUS BIOBEADS Caps   Dose:  1 capsule        Take 1 capsule by mouth   Refills:  0        PULMICORT FLEXHALER 180 MCG/ACT inhaler   Dose:  1 puff   Generic drug:  budesonide        Inhale 1 puff into the lungs 2 times daily. DR ANDRE[facility]   Refills:  0        SPIRIVA HANDIHALER 18 MCG capsule   Dose:  18 mcg   Generic drug:  tiotropium        Inhale 1 capsule into the lungs daily. DR [facility]   Refills:  0        triamcinolone 0.1 % cream   Commonly known as:  KENALOG        Refills:  0        warfarin 5 MG tablet   Commonly known as:  COUMADIN   Dose:  5 mg   Quantity:  90 tablet        Take 1 tablet (5 mg) by mouth daily   Refills:  0        * Notice:  This list has 6 medication(s) that are the same as other medications prescribed for you. Read the directions carefully, and ask your doctor or other care provider to review them with you.            Prescriptions were sent or printed at these locations (3 Prescriptions)                   Other Prescriptions                Printed at Department/Unit printer (3 of 3)         doxycycline (VIBRAMYCIN) 100 MG capsule               predniSONE (DELTASONE) 20 MG tablet               enoxaparin (LOVENOX) 150 MG/ML injection                Procedures and tests performed during your visit     Procedure/Test Number of Times Performed    Basic metabolic panel 1    Blood culture 2    Blood gas venous 1    CBC with platelets differential 1    EKG 12 lead 1    Troponin I (now) 1      Orders Needing Specimen Collection     None      Pending Results     Date and Time Order Name Status Description    6/21/2017 1544 Blood culture In process     6/21/2017 1544 Blood culture In process             Pending Culture Results     Date and Time Order Name Status Description    6/21/2017 1544 Blood culture In process     6/21/2017 1544 Blood culture In process             Pending Results Instructions     If you  had any lab results that were not finalized at the time of your Discharge, you can call the ED Lab Result RN at [phone]. You will be contacted by this team for any positive Lab results or changes in treatment. The nurses are available 7 days a week from 10A to 6:30P.  You can leave a message 24 hours per day and they will return your call.        Test Results From Your Hospital Stay        6/21/2017  3:51 PM      Component Results     Component Value Ref Range & Units Status    Sodium 140 133 - 144 mmol/L Final    Potassium 3.8 3.4 - 5.3 mmol/L Final    Chloride 105 94 - 109 mmol/L Final    Carbon Dioxide 27 20 - 32 mmol/L Final    Anion Gap 8 3 - 14 mmol/L Final    Glucose 114 (H) 70 - 99 mg/dL Final    Urea Nitrogen 11 7 - 30 mg/dL Final    Creatinine 0.79 0.52 - 1.04 mg/dL Final    GFR Estimate 75 >60 mL/min/1.7m2 Final    Non  GFR Calc    GFR Estimate If Black >90   GFR Calc   >60 mL/min/1.7m2 Final    Calcium 9.5 8.5 - 10.1 mg/dL Final         6/21/2017  4:48 PM         6/21/2017  4:28 PM         6/21/2017  4:39 PM      Component Results     Component Value Ref Range & Units Status    Ph Venous 7.49 (H) 7.32 - 7.43 pH Final    PCO2 Venous 34 (L) 40 - 50 mm Hg Final    PO2 Venous 70 (H) 25 - 47 mm Hg Final    Bicarbonate Venous 26 21 - 28 mmol/L Final    Base Excess Venous 2.8 mmol/L Final    Abnormal Result, Ref range: -7.7 to 1.9    FIO2 Room Air  Final         6/21/2017  4:51 PM      Component Results     Component Value Ref Range & Units Status    Troponin I ES  0.000 - 0.045 ug/L Final    <0.015  The 99th percentile for upper reference range is 0.045 ug/L.  Troponin values in   the range of 0.045 - 0.120 ug/L may be associated with risks of adverse   clinical events.           6/21/2017  4:33 PM      Component Results     Component Value Ref Range & Units Status    WBC 8.1 4.0 - 11.0 10e9/L Final    RBC Count 5.10 3.8 - 5.2 10e12/L Final    Hemoglobin 15.3 11.7 - 15.7  g/dL Final    Hematocrit 47.5 (H) 35.0 - 47.0 % Final    MCV 93 78 - 100 fl Final    MCH 30.0 26.5 - 33.0 pg Final    MCHC 32.2 31.5 - 36.5 g/dL Final    RDW 15.9 (H) 10.0 - 15.0 % Final    Platelet Count 387 150 - 450 10e9/L Final    Diff Method Automated Method  Final    % Neutrophils 69.4 % Final    % Lymphocytes 19.2 % Final    % Monocytes 7.5 % Final    % Eosinophils 3.2 % Final    % Basophils 0.5 % Final    % Immature Granulocytes 0.2 % Final    Nucleated RBCs 0 0 /100 Final    Absolute Neutrophil 5.7 1.6 - 8.3 10e9/L Final    Absolute Lymphocytes 1.6 0.8 - 5.3 10e9/L Final    Absolute Monocytes 0.6 0.0 - 1.3 10e9/L Final    Absolute Eosinophils 0.3 0.0 - 0.7 10e9/L Final    Absolute Basophils 0.0 0.0 - 0.2 10e9/L Final    Abs Immature Granulocytes 0.0 0 - 0.4 10e9/L Final    Absolute Nucleated RBC 0.0  Final                Clinical Quality Measure: Blood Pressure Screening     Your blood pressure was checked while you were in the emergency department today. The last reading we obtained was  BP: (!) 112/98 . Please read the guidelines below about what these numbers mean and what you should do about them.  If your systolic blood pressure (the top number) is less than 120 and your diastolic blood pressure (the bottom number) is less than 80, then your blood pressure is normal. There is nothing more that you need to do about it.  If your systolic blood pressure (the top number) is 120-139 or your diastolic blood pressure (the bottom number) is 80-89, your blood pressure may be higher than it should be. You should have your blood pressure rechecked within a year by a primary care provider.  If your systolic blood pressure (the top number) is 140 or greater or your diastolic blood pressure (the bottom number) is 90 or greater, you may have high blood pressure. High blood pressure is treatable, but if left untreated over time it can put you at risk for heart attack, stroke, or kidney failure. You should have your  blood pressure rechecked by a primary care provider within the next 4 weeks.  If your provider in the emergency department today gave you specific instructions to follow-up with your doctor or provider even sooner than that, you should follow that instruction and not wait for up to 4 weeks for your follow-up visit.        Thank you for choosing [facility]       Thank you for choosing [facility] for your care. Our goal is always to provide you with excellent care. Hearing back from our patients is one way we can continue to improve our services. Please take a few minutes to complete the written survey that you may receive in the mail after you visit with us. Thank you!        [facility] Information     [facility] gives you secure access to your electronic health record. If you see a primary care provider, you can also send messages to your care team and make appointments. If you have questions, please call your primary care clinic.  If you do not have a primary care provider, please call [phone] and they will assist you.        Care EveryWhere ID     This is your Care EveryWhere ID. This could be used by other organizations to access your [facility] medical records  [id number]        Equal Access to Services     LAUREN WAGNER : Hadii korina Thompson, wajonyda lujordanadaha, qaybta kaalaj mueller, aman chavez . So [facility] [phone].    ATENCIÓN: Si habla español, tiene a norman disposición servicios gratuitos de asistencia lingüística. Llame al [phone].    We comply with applicable federal civil rights laws and [place] laws. We do not discriminate on the basis of race, color, national origin, age, disability sex, sexual orientation or gender identity.            After Visit Summary       This is your record. Keep this with you and show to your community pharmacist(s) and doctor(s) at your next visit.

## 2017-06-21 NOTE — ED AVS SNAPSHOT
[facility] Emergency Department    [address]    [facility] [address]    Phone:  [phone]    Fax:  [phone]                                       Bere Machuca   MRN: [id number]    Department:  [facility] Emergency Department   Date of Visit:  6/21/2017           After Visit Summary Signature Page     I have received my discharge instructions, and my questions have been answered. I have discussed any challenges I see with this plan with the nurse or doctor.    ..........................................................................................................................................  Patient/Patient Representative Signature      ..........................................................................................................................................  Patient Representative Print Name and Relationship to Patient    ..................................................               ................................................  Date                                            Time    ..........................................................................................................................................  Reviewed by Signature/Title    ...................................................              ..............................................  Date                                                            Time

## 2017-06-21 NOTE — NURSING NOTE
Patient is here for SOB, fever, eye issue and also recheck of her INR.    Pre-Visit Screening not done today.  Pulse - regular  My Chart - accepts    CLASSIFICATION OF OVERWEIGHT AND OBESITY BY BMI                         Obesity Class           BMI(kg/m2)  Underweight                                    < 18.5  Normal                                         18.5-24.9  Overweight                                     25.0-29.9  OBESITY                     I                  30.0-34.9                              II                 35.0-39.9  EXTREME OBESITY             III                >40                             Patient's  BMI There is no height or weight on file to calculate BMI.  http://hin.nhlbi.nih.gov/menuplanner/menu.cgi  Questioned patient about current smoking habits.  Pt. quit smoking some time ago.

## 2017-06-22 ENCOUNTER — APPOINTMENT (OUTPATIENT)
Dept: CT IMAGING | Facility: CLINIC | Age: 62
DRG: 872 | End: 2017-06-22
Attending: INTERNAL MEDICINE
Payer: COMMERCIAL

## 2017-06-22 ENCOUNTER — MYC MEDICAL ADVICE (OUTPATIENT)
Dept: FAMILY MEDICINE | Facility: CLINIC | Age: 62
End: 2017-06-22

## 2017-06-22 ENCOUNTER — APPOINTMENT (OUTPATIENT)
Dept: GENERAL RADIOLOGY | Facility: CLINIC | Age: 62
DRG: 872 | End: 2017-06-22
Attending: INTERNAL MEDICINE
Payer: COMMERCIAL

## 2017-06-22 ENCOUNTER — HOSPITAL ENCOUNTER (INPATIENT)
Facility: CLINIC | Age: 62
LOS: 1 days | Discharge: HOME | DRG: 872 | End: 2017-06-23
Attending: INTERNAL MEDICINE | Admitting: INTERNAL MEDICINE
Payer: COMMERCIAL

## 2017-06-22 DIAGNOSIS — A41.51 SEPSIS DUE TO ESCHERICHIA COLI (H): ICD-10-CM

## 2017-06-22 PROBLEM — A41.9 SEPSIS (H): Status: ACTIVE | Noted: 2017-06-22

## 2017-06-22 LAB
ALBUMIN SERPL-MCNC: 2.9 G/DL (ref 3.4–5)
ALBUMIN UR-MCNC: 100 MG/DL
ALBUMIN UR-MCNC: 100 MG/DL
ALP SERPL-CCNC: 69 U/L (ref 40–150)
ALT SERPL W P-5'-P-CCNC: 41 U/L (ref 0–50)
ANION GAP SERPL CALCULATED.3IONS-SCNC: 6 MMOL/L (ref 3–14)
APPEARANCE UR: ABNORMAL
APPEARANCE UR: ABNORMAL
AST SERPL W P-5'-P-CCNC: 21 U/L (ref 0–45)
BACTERIA #/AREA URNS HPF: ABNORMAL /HPF
BASOPHILS # BLD AUTO: 0 10E9/L (ref 0–0.2)
BASOPHILS NFR BLD AUTO: 0.2 %
BILIRUB SERPL-MCNC: 0.1 MG/DL (ref 0.2–1.3)
BILIRUB UR QL STRIP: NEGATIVE
BILIRUB UR QL STRIP: NEGATIVE
BUN SERPL-MCNC: 17 MG/DL (ref 7–30)
CALCIUM SERPL-MCNC: 8.9 MG/DL (ref 8.5–10.1)
CHLORIDE SERPL-SCNC: 109 MMOL/L (ref 94–109)
CO2 SERPL-SCNC: 27 MMOL/L (ref 20–32)
COLOR UR AUTO: ABNORMAL
COLOR UR AUTO: YELLOW
CREAT SERPL-MCNC: 0.8 MG/DL (ref 0.52–1.04)
CRP SERPL-MCNC: 37.9 MG/L (ref 0–8)
DIFFERENTIAL METHOD BLD: ABNORMAL
EOSINOPHIL # BLD AUTO: 0 10E9/L (ref 0–0.7)
EOSINOPHIL NFR BLD AUTO: 0 %
ERYTHROCYTE [DISTWIDTH] IN BLOOD BY AUTOMATED COUNT: 15.6 % (ref 10–15)
GFR SERPL CREATININE-BSD FRML MDRD: 74 ML/MIN/1.7M2
GLUCOSE SERPL-MCNC: 127 MG/DL (ref 70–99)
GLUCOSE UR STRIP-MCNC: NEGATIVE MG/DL
GLUCOSE UR STRIP-MCNC: NEGATIVE MG/DL
HCT VFR BLD AUTO: 43.9 % (ref 35–47)
HGB BLD-MCNC: 14.2 G/DL (ref 11.7–15.7)
HGB UR QL STRIP: NEGATIVE
HGB UR QL STRIP: NEGATIVE
HYALINE CASTS #/AREA URNS LPF: 2 /LPF (ref 0–2)
IMM GRANULOCYTES # BLD: 0.1 10E9/L (ref 0–0.4)
IMM GRANULOCYTES NFR BLD: 0.6 %
INR PPP: 1.6 (ref 0.86–1.14)
KETONES UR STRIP-MCNC: 5 MG/DL
KETONES UR STRIP-MCNC: NEGATIVE MG/DL
LACTATE BLD-SCNC: 3.4 MMOL/L (ref 0.7–2.1)
LEUKOCYTE ESTERASE UR QL STRIP: ABNORMAL
LEUKOCYTE ESTERASE UR QL STRIP: NEGATIVE
LYMPHOCYTES # BLD AUTO: 1.5 10E9/L (ref 0.8–5.3)
LYMPHOCYTES NFR BLD AUTO: 14.6 %
MCH RBC QN AUTO: 30.1 PG (ref 26.5–33)
MCHC RBC AUTO-ENTMCNC: 32.3 G/DL (ref 31.5–36.5)
MCV RBC AUTO: 93 FL (ref 78–100)
MONOCYTES # BLD AUTO: 0.5 10E9/L (ref 0–1.3)
MONOCYTES NFR BLD AUTO: 4.9 %
MUCOUS THREADS #/AREA URNS LPF: PRESENT /LPF
MUCOUS THREADS #/AREA URNS LPF: PRESENT /LPF
NEUTROPHILS # BLD AUTO: 8.4 10E9/L (ref 1.6–8.3)
NEUTROPHILS NFR BLD AUTO: 79.7 %
NITRATE UR QL: NEGATIVE
NITRATE UR QL: NEGATIVE
NRBC # BLD AUTO: 0 10*3/UL
NRBC BLD AUTO-RTO: 0 /100
PH UR STRIP: 5 PH (ref 5–7)
PH UR STRIP: 5 PH (ref 5–7)
PLATELET # BLD AUTO: 349 10E9/L (ref 150–450)
POTASSIUM SERPL-SCNC: 4.2 MMOL/L (ref 3.4–5.3)
PROT SERPL-MCNC: 6.7 G/DL (ref 6.8–8.8)
RBC # BLD AUTO: 4.71 10E12/L (ref 3.8–5.2)
RBC #/AREA URNS AUTO: 5 /HPF (ref 0–2)
RBC #/AREA URNS AUTO: 6 /HPF (ref 0–2)
SODIUM SERPL-SCNC: 142 MMOL/L (ref 133–144)
SP GR UR STRIP: 1.03 (ref 1–1.03)
SP GR UR STRIP: 1.03 (ref 1–1.03)
SQUAMOUS #/AREA URNS AUTO: 6 /HPF (ref 0–1)
SQUAMOUS #/AREA URNS AUTO: 64 /HPF (ref 0–1)
TRANS CELLS #/AREA URNS HPF: 1 /HPF (ref 0–1)
URN SPEC COLLECT METH UR: ABNORMAL
URN SPEC COLLECT METH UR: ABNORMAL
UROBILINOGEN UR STRIP-MCNC: 0 MG/DL (ref 0–2)
UROBILINOGEN UR STRIP-MCNC: 0 MG/DL (ref 0–2)
WBC # BLD AUTO: 10.5 10E9/L (ref 4–11)
WBC #/AREA URNS AUTO: 23 /HPF (ref 0–2)
WBC #/AREA URNS AUTO: 5 /HPF (ref 0–2)

## 2017-06-22 PROCEDURE — 25000128 H RX IP 250 OP 636: Performed by: INTERNAL MEDICINE

## 2017-06-22 PROCEDURE — 99285 EMERGENCY DEPT VISIT HI MDM: CPT | Mod: 25

## 2017-06-22 PROCEDURE — 86140 C-REACTIVE PROTEIN: CPT | Performed by: INTERNAL MEDICINE

## 2017-06-22 PROCEDURE — 36415 COLL VENOUS BLD VENIPUNCTURE: CPT | Performed by: INTERNAL MEDICINE

## 2017-06-22 PROCEDURE — 99221 1ST HOSP IP/OBS SF/LOW 40: CPT | Mod: AI | Performed by: INTERNAL MEDICINE

## 2017-06-22 PROCEDURE — 80053 COMPREHEN METABOLIC PANEL: CPT | Performed by: INTERNAL MEDICINE

## 2017-06-22 PROCEDURE — 99207 ZZC DOWN CODE DUE TO INITIAL EXAM: CPT | Performed by: INTERNAL MEDICINE

## 2017-06-22 PROCEDURE — 83605 ASSAY OF LACTIC ACID: CPT | Performed by: INTERNAL MEDICINE

## 2017-06-22 PROCEDURE — 25000132 ZZH RX MED GY IP 250 OP 250 PS 637: Performed by: INTERNAL MEDICINE

## 2017-06-22 PROCEDURE — 87040 BLOOD CULTURE FOR BACTERIA: CPT | Performed by: INTERNAL MEDICINE

## 2017-06-22 PROCEDURE — 74177 CT ABD & PELVIS W/CONTRAST: CPT

## 2017-06-22 PROCEDURE — 85610 PROTHROMBIN TIME: CPT | Performed by: INTERNAL MEDICINE

## 2017-06-22 PROCEDURE — 81001 URINALYSIS AUTO W/SCOPE: CPT | Performed by: INTERNAL MEDICINE

## 2017-06-22 PROCEDURE — 85025 COMPLETE CBC W/AUTO DIFF WBC: CPT | Performed by: INTERNAL MEDICINE

## 2017-06-22 PROCEDURE — 96365 THER/PROPH/DIAG IV INF INIT: CPT

## 2017-06-22 PROCEDURE — 12000000 ZZH R&B MED SURG/OB

## 2017-06-22 PROCEDURE — 71020 XR CHEST 2 VW: CPT

## 2017-06-22 RX ORDER — PREDNISONE 50 MG/1
50 TABLET ORAL DAILY
Status: DISCONTINUED | OUTPATIENT
Start: 2017-06-23 | End: 2017-06-23 | Stop reason: HOSPADM

## 2017-06-22 RX ORDER — ONDANSETRON 4 MG/1
4 TABLET, ORALLY DISINTEGRATING ORAL EVERY 6 HOURS PRN
Status: DISCONTINUED | OUTPATIENT
Start: 2017-06-22 | End: 2017-06-23 | Stop reason: HOSPADM

## 2017-06-22 RX ORDER — LACTOBACILLUS RHAMNOSUS GG 10B CELL
1 CAPSULE ORAL AT BEDTIME
Status: DISCONTINUED | OUTPATIENT
Start: 2017-06-22 | End: 2017-06-23 | Stop reason: HOSPADM

## 2017-06-22 RX ORDER — MULTIPLE VITAMINS W/ MINERALS TAB 9MG-400MCG
1 TAB ORAL DAILY
Status: DISCONTINUED | OUTPATIENT
Start: 2017-06-23 | End: 2017-06-23 | Stop reason: HOSPADM

## 2017-06-22 RX ORDER — POTASSIUM CHLORIDE 29.8 MG/ML
20 INJECTION INTRAVENOUS
Status: DISCONTINUED | OUTPATIENT
Start: 2017-06-22 | End: 2017-06-23 | Stop reason: HOSPADM

## 2017-06-22 RX ORDER — SODIUM CHLORIDE 9 MG/ML
INJECTION, SOLUTION INTRAVENOUS CONTINUOUS
Status: ACTIVE | OUTPATIENT
Start: 2017-06-22 | End: 2017-06-23

## 2017-06-22 RX ORDER — PROCHLORPERAZINE 25 MG
25 SUPPOSITORY, RECTAL RECTAL EVERY 12 HOURS PRN
Status: DISCONTINUED | OUTPATIENT
Start: 2017-06-22 | End: 2017-06-23 | Stop reason: HOSPADM

## 2017-06-22 RX ORDER — POLYETHYLENE GLYCOL 3350 17 G/17G
17 POWDER, FOR SOLUTION ORAL DAILY PRN
Status: DISCONTINUED | OUTPATIENT
Start: 2017-06-22 | End: 2017-06-23 | Stop reason: HOSPADM

## 2017-06-22 RX ORDER — CITALOPRAM HYDROBROMIDE 20 MG/1
40 TABLET ORAL DAILY
Status: DISCONTINUED | OUTPATIENT
Start: 2017-06-23 | End: 2017-06-23 | Stop reason: HOSPADM

## 2017-06-22 RX ORDER — BISACODYL 5 MG
5-15 TABLET, DELAYED RELEASE (ENTERIC COATED) ORAL DAILY PRN
Status: DISCONTINUED | OUTPATIENT
Start: 2017-06-22 | End: 2017-06-23 | Stop reason: HOSPADM

## 2017-06-22 RX ORDER — PROCHLORPERAZINE MALEATE 5 MG
5-10 TABLET ORAL EVERY 6 HOURS PRN
Status: DISCONTINUED | OUTPATIENT
Start: 2017-06-22 | End: 2017-06-23 | Stop reason: HOSPADM

## 2017-06-22 RX ORDER — GABAPENTIN 600 MG/1
600 TABLET ORAL 2 TIMES DAILY
Status: DISCONTINUED | OUTPATIENT
Start: 2017-06-22 | End: 2017-06-23 | Stop reason: HOSPADM

## 2017-06-22 RX ORDER — NALOXONE HYDROCHLORIDE 0.4 MG/ML
.1-.4 INJECTION, SOLUTION INTRAMUSCULAR; INTRAVENOUS; SUBCUTANEOUS
Status: DISCONTINUED | OUTPATIENT
Start: 2017-06-22 | End: 2017-06-23 | Stop reason: HOSPADM

## 2017-06-22 RX ORDER — WARFARIN SODIUM 5 MG/1
5 TABLET ORAL ONCE
Status: COMPLETED | OUTPATIENT
Start: 2017-06-22 | End: 2017-06-22

## 2017-06-22 RX ORDER — LOSARTAN POTASSIUM 25 MG/1
25 TABLET ORAL DAILY
Status: DISCONTINUED | OUTPATIENT
Start: 2017-06-23 | End: 2017-06-23 | Stop reason: HOSPADM

## 2017-06-22 RX ORDER — ACETAMINOPHEN 325 MG/1
650 TABLET ORAL EVERY 4 HOURS PRN
Status: DISCONTINUED | OUTPATIENT
Start: 2017-06-22 | End: 2017-06-23 | Stop reason: HOSPADM

## 2017-06-22 RX ORDER — DOXYCYCLINE HYCLATE 50 MG/1
100 CAPSULE ORAL 2 TIMES DAILY
Status: DISCONTINUED | OUTPATIENT
Start: 2017-06-22 | End: 2017-06-23 | Stop reason: HOSPADM

## 2017-06-22 RX ORDER — HYDROXYZINE HYDROCHLORIDE 25 MG/1
25 TABLET, FILM COATED ORAL AT BEDTIME
Status: DISCONTINUED | OUTPATIENT
Start: 2017-06-22 | End: 2017-06-23 | Stop reason: HOSPADM

## 2017-06-22 RX ORDER — IOPAMIDOL 755 MG/ML
500 INJECTION, SOLUTION INTRAVASCULAR ONCE
Status: COMPLETED | OUTPATIENT
Start: 2017-06-22 | End: 2017-06-22

## 2017-06-22 RX ORDER — MAGNESIUM SULFATE HEPTAHYDRATE 40 MG/ML
4 INJECTION, SOLUTION INTRAVENOUS EVERY 4 HOURS PRN
Status: DISCONTINUED | OUTPATIENT
Start: 2017-06-22 | End: 2017-06-23 | Stop reason: HOSPADM

## 2017-06-22 RX ORDER — ONDANSETRON 2 MG/ML
4 INJECTION INTRAMUSCULAR; INTRAVENOUS EVERY 6 HOURS PRN
Status: DISCONTINUED | OUTPATIENT
Start: 2017-06-22 | End: 2017-06-23 | Stop reason: HOSPADM

## 2017-06-22 RX ORDER — POTASSIUM CHLORIDE 1500 MG/1
20-40 TABLET, EXTENDED RELEASE ORAL
Status: DISCONTINUED | OUTPATIENT
Start: 2017-06-22 | End: 2017-06-23 | Stop reason: HOSPADM

## 2017-06-22 RX ORDER — ALBUTEROL SULFATE 0.83 MG/ML
1.25 SOLUTION RESPIRATORY (INHALATION) EVERY 6 HOURS PRN
Status: DISCONTINUED | OUTPATIENT
Start: 2017-06-22 | End: 2017-06-23 | Stop reason: HOSPADM

## 2017-06-22 RX ORDER — POTASSIUM CHLORIDE 7.45 MG/ML
10 INJECTION INTRAVENOUS
Status: DISCONTINUED | OUTPATIENT
Start: 2017-06-22 | End: 2017-06-23 | Stop reason: HOSPADM

## 2017-06-22 RX ORDER — CEFTRIAXONE 2 G/1
2 INJECTION, POWDER, FOR SOLUTION INTRAMUSCULAR; INTRAVENOUS EVERY 24 HOURS
Status: DISCONTINUED | OUTPATIENT
Start: 2017-06-23 | End: 2017-06-23 | Stop reason: HOSPADM

## 2017-06-22 RX ORDER — ALBUTEROL SULFATE 90 UG/1
1-2 AEROSOL, METERED RESPIRATORY (INHALATION) EVERY 4 HOURS PRN
Status: DISCONTINUED | OUTPATIENT
Start: 2017-06-22 | End: 2017-06-23 | Stop reason: HOSPADM

## 2017-06-22 RX ORDER — POTASSIUM CHLORIDE 1.5 G/1.58G
20-40 POWDER, FOR SOLUTION ORAL
Status: DISCONTINUED | OUTPATIENT
Start: 2017-06-22 | End: 2017-06-23 | Stop reason: HOSPADM

## 2017-06-22 RX ORDER — POTASSIUM CL/LIDO/0.9 % NACL 10MEQ/0.1L
10 INTRAVENOUS SOLUTION, PIGGYBACK (ML) INTRAVENOUS
Status: DISCONTINUED | OUTPATIENT
Start: 2017-06-22 | End: 2017-06-23 | Stop reason: HOSPADM

## 2017-06-22 RX ADMIN — SODIUM CHLORIDE 65 ML: 9 INJECTION, SOLUTION INTRAVENOUS at 21:20

## 2017-06-22 RX ADMIN — Medication 1 CAPSULE: at 22:33

## 2017-06-22 RX ADMIN — SODIUM CHLORIDE: 9 INJECTION, SOLUTION INTRAVENOUS at 22:26

## 2017-06-22 RX ADMIN — IOPAMIDOL 100 ML: 755 INJECTION, SOLUTION INTRAVENOUS at 21:20

## 2017-06-22 RX ADMIN — HYDROXYZINE HYDROCHLORIDE 25 MG: 25 TABLET ORAL at 22:34

## 2017-06-22 RX ADMIN — DOXYCYCLINE HYCLATE 100 MG: 50 CAPSULE ORAL at 22:34

## 2017-06-22 RX ADMIN — GABAPENTIN 600 MG: 600 TABLET, FILM COATED ORAL at 22:34

## 2017-06-22 RX ADMIN — SODIUM CHLORIDE 1000 ML: 9 INJECTION, SOLUTION INTRAVENOUS at 20:18

## 2017-06-22 RX ADMIN — ENOXAPARIN SODIUM 170 MG: 100 INJECTION SUBCUTANEOUS at 22:47

## 2017-06-22 RX ADMIN — WARFARIN SODIUM 5 MG: 5 TABLET ORAL at 22:35

## 2017-06-22 RX ADMIN — CEFTRIAXONE SODIUM 3 G: 1 INJECTION, POWDER, FOR SOLUTION INTRAMUSCULAR; INTRAVENOUS at 20:18

## 2017-06-22 ASSESSMENT — ENCOUNTER SYMPTOMS
SHORTNESS OF BREATH: 0
FEVER: 0
FREQUENCY: 1
CHILLS: 0

## 2017-06-22 NOTE — IP AVS SNAPSHOT
MRN:[id number]                      After Visit Summary   6/22/2017    Bere Machuca    MRN: [id number]           Thank you!     Thank you for choosing [facility] for your care. Our goal is always to provide you with excellent care. Hearing back from our patients is one way we can continue to improve our services. Please take a few minutes to complete the written survey that you may receive in the mail after you visit. If you would like to speak to someone directly about your visit please contact Patient Relations at [phone]. Thank you!          Patient Information     Date Of Birth          1955        About your hospital stay     You were admitted on:  June 22, 2017 You last received care in the:  [facility] [address] [facility]    You were discharged on:  June 23, 2017       Who to Call     For medical emergencies, please call 911.  For non-urgent questions about your medical care, please call your primary care provider or clinic, [phone]          Attending Provider     Provider Specialty    Luisa Alonzo MD Emergency Medicine    [facility], Wood Motta MD Internal Medicine       Primary Care Provider Office Phone # Fax #    Wilma Armenta [phone]      After Care Instructions     Activity       Your activity upon discharge: activity as tolerated            Diet       Follow this diet upon discharge: Moderate consistent carbohydrate (5912-9776 alma / 4-6 CHO units per meal)                  Follow-up Appointments     Follow-up and recommended labs and tests        Follow up with primary care provider, Wilma Armenta, within 7 days for hospital follow- up.                  Pending Results     Date and Time Order Name Status Description    6/22/2017 1818 Blood culture Preliminary     6/21/2017 1544 Blood culture Preliminary     6/21/2017 1544 Blood culture Preliminary             Statement of Approval     Ordered          06/23/17  "1750  I have reviewed and agree with all the recommendations and orders detailed in this document.  EFFECTIVE NOW     Approved and electronically signed by:  Quan Littlejohn DO             Admission Information     Date & Time Provider Department Dept. Phone    6/22/2017 Wood Hart MD [place] [address] [facility] [phone]      Your Vitals Were     Blood Pressure Pulse Temperature Respirations Height Weight    132/81 (BP Location: Right arm) 108 97.6  F (36.4  C) (Axillary) 16 1.664 m (5' 5.5\") 172.8 kg (381 lb)    Pulse Oximetry BMI (Body Mass Index)                94% 62.44 kg/m2          MyChart Information     [facility] gives you secure access to your electronic health record. If you see a primary care provider, you can also send messages to your care team and make appointments. If you have questions, please call your primary care clinic.  If you do not have a primary care provider, please call [phone] and they will assist you.        Care EveryWhere ID     This is your Care EveryWhere ID. This could be used by other organizations to access your [place] medical records  [id number]        Equal Access to Services     LAUREN WAGNER AH: Hadii korina Thompson, wajonyda brayan, qaybta kaalmada ervin, aman love. So [facility] [phone].    ATENCIÓN: Si habla español, tiene a norman disposición servicios gratuitos de asistencia lingüística. Llame al [phone].    We comply with applicable federal civil rights laws and [place] laws. We do not discriminate on the basis of race, color, national origin, age, disability sex, sexual orientation or gender identity.               Review of your medicines      START taking        Dose / Directions    levofloxacin 500 MG tablet   Commonly known as:  LEVAQUIN        Dose:  500 mg   Take 1 tablet (500 mg) by mouth daily for 10 days   Quantity:  10 tablet   Refills:  0         CONTINUE these medicines which " have NOT CHANGED        Dose / Directions    * albuterol (2.5 MG/3ML) 0.083% neb solution   Used for:  Chronic airway obstruction, not elsewhere classified, Personal history of tobacco use, presenting hazards to health        Dose:  1 ampule   Take 3 mLs by nebulization every 6 hours as needed for shortness of breath / dyspnea. Dr [facility]   Refills:  0       * albuterol 108 (90 BASE) MCG/ACT Inhaler   Used for:  Chronic airway obstruction, not elsewhere classified, Personal history of tobacco use, presenting hazards to health   Generic drug:  albuterol        Dose:  1-2 puff   Inhale 1-2 puffs into the lungs as needed for shortness of breath / dyspnea. DR [facility]   Refills:  0       cholecalciferol 1000 UNITS Tabs   Used for:  Morbid obesity due to excess calories (H)        Dose:  1000 Units   Take 1,000 Units by mouth At Bedtime   Refills:  0       citalopram 40 MG tablet   Commonly known as:  celeXA   Used for:  Major depressive disorder, recurrent episode, moderate (H)        Dose:  40 mg   Take 1 tablet (40 mg) by mouth daily   Quantity:  90 tablet   Refills:  1       cyclobenzaprine 5 MG tablet   Commonly known as:  FLEXERIL   Used for:  Displacement of lumbar intervertebral disc without myelopathy, Spinal stenosis in cervical region        Dose:  5 mg   Take 1 tablet (5 mg) by mouth 3 times daily as needed for muscle spasms   Quantity:  30 tablet   Refills:  0       doxycycline 100 MG capsule   Commonly known as:  VIBRAMYCIN        Dose:  100 mg   Take 1 capsule (100 mg) by mouth 2 times daily for 7 days   Quantity:  14 capsule   Refills:  0       enoxaparin 150 MG/ML injection   Commonly known as:  LOVENOX        Dose:  1 mg/kg   Inject 1.1 mLs (165 mg) Subcutaneous every 12 hours for 5 days   Quantity:  10 Syringe   Refills:  0       GABAPENTIN PO        Dose:  600 mg   Take 600 mg by mouth 2 times daily   Refills:  0       hydrOXYzine 25 MG tablet   Commonly known as:   ATARAX   Used for:  Intrinsic eczema        Dose:  25 mg   Take 1 tablet (25 mg) by mouth At Bedtime   Quantity:  90 tablet   Refills:  0       losartan 25 MG tablet   Commonly known as:  COZAAR   Used for:  Essential hypertension, benign        Dose:  25 mg   Take 25 mg by mouth daily   Refills:  0       metFORMIN 500 MG 24 hr tablet   Commonly known as:  GLUCOPHAGE-XR   Used for:  Morbid obesity due to excess calories (H)        Dose:  1000 mg   Take 2 tablets (1,000 mg) by mouth 2 times daily (with meals)   Refills:  0       Multi-vitamin Tabs tablet        Dose:  1 tablet   Take 1 tablet by mouth daily   Refills:  0       OMEGA-3 FISH OIL PO        Dose:  1 g   Take 1 g by mouth 2 times daily (with meals)   Refills:  0       predniSONE 20 MG tablet   Commonly known as:  DELTASONE        Dose:  50 mg   Take 2.5 tablets (50 mg) by mouth daily for 5 days   Quantity:  13 tablet   Refills:  0       PROBIOTIC ACIDOPHILUS BIOBEADS Caps   Used for:  Myalgia and myositis, unspecified        Dose:  1 capsule   Take 1 capsule by mouth At Bedtime   Refills:  0       PULMICORT FLEXHALER 180 MCG/ACT inhaler   Used for:  Chronic airway obstruction, not elsewhere classified, Personal history of tobacco use, presenting hazards to health   Generic drug:  budesonide        Dose:  1 puff   Inhale 1 puff into the lungs 2 times daily. DR [facility]ELEN[facility]   Refills:  0       SPIRIVA HANDIHALER 18 MCG capsule   Used for:  Chronic airway obstruction, not elsewhere classified, Personal history of tobacco use, presenting hazards to health   Generic drug:  tiotropium        Dose:  18 mcg   Inhale 1 capsule into the lungs daily. DR [facility]   Refills:  0       warfarin 5 MG tablet   Commonly known as:  COUMADIN   Used for:  Factor 5 Leiden mutation, heterozygous (H), History of pulmonary embolism        Dose:  5 mg   Take 1 tablet (5 mg) by mouth daily   Quantity:  90 tablet   Refills:  0       * Notice:  This list has 2  medication(s) that are the same as other medications prescribed for you. Read the directions carefully, and ask your doctor or other care provider to review them with you.         Where to get your medicines      These medications were sent to [facility] [address] [facility] - [place], [place] - [address] [place] [address] [address] [place] [address]     Phone:  [phone]     levofloxacin 500 MG tablet                Protect others around you: Learn how to safely use, store and throw away your medicines at www.disposemymeds.org.             Medication List: This is a list of all your medications and when to take them. Check marks below indicate your daily home schedule. Keep this list as a reference.      Medications           Morning Afternoon Evening Bedtime As Needed    * albuterol (2.5 MG/3ML) 0.083% neb solution   Take 3 mLs by nebulization every 6 hours as needed for shortness of breath / dyspnea. Dr [facility]   Last time this was given:  1.25 mg on 6/23/2017  8:13 AM                                * albuterol 108 (90 BASE) MCG/ACT Inhaler   Inhale 1-2 puffs into the lungs as needed for shortness of breath / dyspnea. DR [facility]   Generic drug:  albuterol                                cholecalciferol 1000 UNITS Tabs   Take 1,000 Units by mouth At Bedtime                                citalopram 40 MG tablet   Commonly known as:  celeXA   Take 1 tablet (40 mg) by mouth daily   Last time this was given:  40 mg on 6/23/2017  8:40 AM                                cyclobenzaprine 5 MG tablet   Commonly known as:  FLEXERIL   Take 1 tablet (5 mg) by mouth 3 times daily as needed for muscle spasms                                doxycycline 100 MG capsule   Commonly known as:  VIBRAMYCIN   Take 1 capsule (100 mg) by mouth 2 times daily for 7 days   Last time this was given:  100 mg on 6/23/2017  8:40 AM                                enoxaparin 150 MG/ML injection   Commonly  known as:  LOVENOX   Inject 1.1 mLs (165 mg) Subcutaneous every 12 hours for 5 days                                GABAPENTIN PO   Take 600 mg by mouth 2 times daily   Last time this was given:  600 mg on 6/23/2017  8:40 AM                                hydrOXYzine 25 MG tablet   Commonly known as:  ATARAX   Take 1 tablet (25 mg) by mouth At Bedtime   Last time this was given:  25 mg on 6/22/2017 10:34 PM                                levofloxacin 500 MG tablet   Commonly known as:  LEVAQUIN   Take 1 tablet (500 mg) by mouth daily for 10 days                                losartan 25 MG tablet   Commonly known as:  COZAAR   Take 25 mg by mouth daily   Last time this was given:  25 mg on 6/23/2017  8:40 AM                                metFORMIN 500 MG 24 hr tablet   Commonly known as:  GLUCOPHAGE-XR   Take 2 tablets (1,000 mg) by mouth 2 times daily (with meals)                                Multi-vitamin Tabs tablet   Take 1 tablet by mouth daily   Last time this was given:  1 tablet on 6/23/2017  8:40 AM                                OMEGA-3 FISH OIL PO   Take 1 g by mouth 2 times daily (with meals)                                predniSONE 20 MG tablet   Commonly known as:  DELTASONE   Take 2.5 tablets (50 mg) by mouth daily for 5 days   Last time this was given:  50 mg on 6/23/2017  8:40 AM                                PROBIOTIC ACIDOPHILUS BIOBEADS Caps   Take 1 capsule by mouth At Bedtime                                PULMICORT FLEXHALER 180 MCG/ACT inhaler   Inhale 1 puff into the lungs 2 times daily. DR [facility]EN[facility]   Generic drug:  budesonide                                SPIRIVA HANDIHALER 18 MCG capsule   Inhale 1 capsule into the lungs daily. DR [facility]   Generic drug:  tiotropium                                warfarin 5 MG tablet   Commonly known as:  COUMADIN   Take 1 tablet (5 mg) by mouth daily   Last time this was given:  5 mg on 6/22/2017 10:35 PM                                 * Notice:  This list has 2 medication(s) that are the same as other medications prescribed for you. Read the directions carefully, and ask your doctor or other care provider to review them with you.

## 2017-06-22 NOTE — TELEPHONE ENCOUNTER
From: Bere Machuca  To: Catrachita Torres PA  Sent: 6/22/2017 12:13 AM CDT  Subject: Updates about my health    Thank you for your amazing care today. I spent 6 hours in the er and the good news is I don't have another blood clot. The er doc said that sometimes a person with factor 5 can show an abnormal d dimer. Man he put me through it though ! Blood work for infection out of 2 different sources, iv steriods push, EKG and 2 shots of lovenox. On doxycylclin, steriods and lovenox for 5 days. Missing 1 warfrin shouldn't have brought it down so much but I guess a respiratory issue may have ? Said to see you in 2 days but that is Saturday so I will make an appointment for Monday before work. alem Shi thanks for being so proactive. Jaymie kay I look like a freaking pin cushion.

## 2017-06-22 NOTE — IP AVS SNAPSHOT
[facility] [place] [address] [facility]    [address]    [facility] [address]    Phone:  [phone]    Fax:  [phone]                                       After Visit Summary   6/22/2017    Bere Machuca    MRN: [id number]           After Visit Summary Signature Page     I have received my discharge instructions, and my questions have been answered. I have discussed any challenges I see with this plan with the nurse or doctor.    ..........................................................................................................................................  Patient/Patient Representative Signature      ..........................................................................................................................................  Patient Representative Print Name and Relationship to Patient    ..................................................               ................................................  Date                                            Time    ..........................................................................................................................................  Reviewed by Signature/Title    ...................................................              ..............................................  Date                                                            Time

## 2017-06-22 NOTE — ED PROVIDER NOTES
History     Chief Complaint:  Abnormal Labs    HPI   Bere Machuca is a 61 year old female who presents with abnormal labs. The patient reports being sent home from the ED yesterday waiting on a blood culture after a visit for increasing shortness of breath and low INR. Results came in today and her culture was positive for gram negative rods. She does report improvement of yesterday's symptoms. She also notes that she has never gotten completely back to normal after recent UTI, still having difficulty holding urine. No other urinary symptoms. No abdominal pain.     Allergies:  Aspirin  Lyrica  Adhesive tape  Vancomycin     Medications:    Cozaar  Coumadin  Atarax  Flexeril  celexa  Cholecalciferol  Kenalog  Glucophage  Lidoderm  Probiotic   Albuterol     Past Medical History:    Anxiety  Arthritis  Clotting disorder  COPD  Depressive disorder  Diabetes  hypertension  Lymphedema of the arm  Morbidly obese  Pulmonary embolism  Substance abuse  Superior vena cava syndrome     Past Surgical History:    Biopsy of skin lesion  C Anesth, repair lo abd hernia nos  Exploratory of abdomen  Removal colon/leostomy  Total abdom hysterectomy  Salpingo-oophorectomy, unilateral  Remove abdominal wall lesion  Remove tonsils/adenoids  Hernia repair  Insertion of access port     Family History:    COPD  Cancer  Colitis     Social History:  Smoking Status: Former  Smokeless Tobacco: no  Alcohol Use: Yes  Marital Status:       Review of Systems   Constitutional: Negative for chills and fever.   Respiratory: Negative for shortness of breath.    Genitourinary: Positive for frequency.   All other systems reviewed and are negative.    Physical Exam   Vitals;  Patient Vitals for the past 24 hrs:   BP Temp Temp src Pulse Resp SpO2   06/22/17 2055 105/63 - - - - 92 %   06/22/17 2040 114/52 - - - - 94 %   06/22/17 2025 115/70 - - - - 92 %   06/22/17 2010 118/46 - - - - 94 %   06/22/17 1956 - - - - - 93 %   06/22/17 1955 111/78 -  - - - 93 %   06/22/17 1940 119/65 - - - - 93 %   06/22/17 1743 (!) 154/100 - - - - -   06/22/17 1740 - 98  F (36.7  C) Oral 108 18 95 %     Physical Exam   Constitutional: She is cooperative.   HENT:   Right Ear: Tympanic membrane normal.   Left Ear: Tympanic membrane normal.   Mouth/Throat: Oropharynx is clear and moist and mucous membranes are normal.   Eyes: Conjunctivae are normal.   Neck: Normal range of motion.   Cardiovascular: Regular rhythm and normal heart sounds.    Pulmonary/Chest: Effort normal and breath sounds normal.   Abdominal: Soft. Normal appearance and bowel sounds are normal. There is no rebound and no guarding.   Musculoskeletal: Normal range of motion.   Lymphadenopathy:     She has no cervical adenopathy.   Neurological: She is alert.   Skin: Skin is warm and dry.   Psychiatric: She has a normal mood and affect.       Emergency Department Course     Imaging:  Radiology findings were communicated with the patient who voiced understanding of the findings.  XR Chest 2 views:  Negative  Per Radiologist.     CT Abdomen Pelvis w contrast:  1. No acute abnormality. No bowel obstruction or inflammation.  2. Colonic diverticula without acute diverticulitis.  3. Two ventral hernias containing fat.  4. Fatty infiltration of liver.   Per Radiologist.     Laboratory:  Laboratory findings were communicated with the patient who voiced understanding of the findings.  CRP: 37.9 (H)  CBC:  AWNL. (WBC 10.5, HGB 14.2, )   CMP: Glucose: 127 (H), Bilirubin: 0.1 (L), Albumin: 2.9 (L), Protein total: 6.7 (L) o/w WNL (Creatinine 0.80)  Lactic acid: 3.4 (H)  UA: Protein Albumin: 100 (A), Leukocyte esterase: Small (A), WBC/HPF: 23 (H), RBC/HPF: 6 (H), Bacteria: Few (A), Squamous Epithelial/HPF: 64 H), Mucous: Present (A)  Blood Culture: Pending  INR: 1.60 (H)    Interventions:  2018 Normal Saline 1000 mL IV  2018 Rocephin, 3 g, IV    Emergency Department Course:  Nursing notes and vitals reviewed.  I performed  an exam of the patient as documented above.   IV was inserted and blood was drawn for laboratory testing, results above.  The patient was sent for a XR while in the emergency department, results above.   I spoke with Dr. Watson of infectious disease regarding this patient.  The patient was updated on the results of their course of care.    I discussed the treatment plan with the patient. They expressed understanding of this plan and consented to admission. I discussed the patient with Dr. Hart, who will admit the patient to a monitored bed for further evaluation and treatment.    I personally reviewed the laboratory results with the Patient and answered all related questions prior to admission.    Impression & Plan      Medical Decision Making:  Bere Machuca is a 61 year old female who presents to the emergency department today at our request for further evaluation after having a positive blood culture for E. Coli. Here she appears fairly clinically well although she does a significantly elevated lactate level. I've ordered IV fluids and broad spectrum antibiotics. The origin of E. Coli bacterium is not clear in this patient's case. She mentions some ongoing urinary symptoms after recent infection but catheterized urine sample appears unlikely to be infected. As noted, I spoke with Dr. Watson of infectious disease. We will cover initially with Rocephin . He recommends CT of the abdomen and pelvis for further evaluation of the origin of her bacteremia. As noted, I dicussed the case with Dr. Hart of the hospitalist service. He requested that we start the order for the CT though he will follow up on it as an in patient. Patient was also seen yesterday for inadequate anticoagulation and given Lovenox. Dr. Hart suggested we recheck the INR today though the recommendation of hematology she begin Lovenox for 5 days.    Diagnosis:    ICD-10-CM    1. Sepsis due to Escherichia coli (H) A41.51 CRP inflammation      Comprehensive metabolic panel     Disposition:   Admission    Scribe Disclosure:  I, Victorino Jamie, am serving as a scribe at 6:09 PM on 6/22/2017 to document services personally performed by Luisa Alonzo MD, based on my observations and the provider's statements to me.    6/22/2017   [facility] EMERGENCY DEPARTMENT       Luisa Alonzo MD  06/22/17 2166

## 2017-06-22 NOTE — ED NOTES
Patient comes in for positive blood culture. Patient was seen here yesterday for increasing SOB and low INR. Was called to return for + culture. States breathing is improved.

## 2017-06-23 VITALS
HEART RATE: 108 BPM | WEIGHT: 293 LBS | RESPIRATION RATE: 16 BRPM | BODY MASS INDEX: 47.09 KG/M2 | SYSTOLIC BLOOD PRESSURE: 132 MMHG | OXYGEN SATURATION: 94 % | TEMPERATURE: 97.6 F | HEIGHT: 66 IN | DIASTOLIC BLOOD PRESSURE: 81 MMHG

## 2017-06-23 LAB
ANION GAP SERPL CALCULATED.3IONS-SCNC: 6 MMOL/L (ref 3–14)
BUN SERPL-MCNC: 15 MG/DL (ref 7–30)
CALCIUM SERPL-MCNC: 9 MG/DL (ref 8.5–10.1)
CHLORIDE SERPL-SCNC: 109 MMOL/L (ref 94–109)
CO2 SERPL-SCNC: 28 MMOL/L (ref 20–32)
CREAT SERPL-MCNC: 0.74 MG/DL (ref 0.52–1.04)
GFR SERPL CREATININE-BSD FRML MDRD: 81 ML/MIN/1.7M2
GLUCOSE SERPL-MCNC: 102 MG/DL (ref 70–99)
INR PPP: 1.78 (ref 0.86–1.14)
LACTATE BLD-SCNC: 1.5 MMOL/L (ref 0.7–2.1)
MAGNESIUM SERPL-MCNC: 1.8 MG/DL (ref 1.6–2.3)
POTASSIUM SERPL-SCNC: 3.8 MMOL/L (ref 3.4–5.3)
SODIUM SERPL-SCNC: 143 MMOL/L (ref 133–144)

## 2017-06-23 PROCEDURE — 94640 AIRWAY INHALATION TREATMENT: CPT | Mod: 76

## 2017-06-23 PROCEDURE — 25000128 H RX IP 250 OP 636: Performed by: INTERNAL MEDICINE

## 2017-06-23 PROCEDURE — 25000125 ZZHC RX 250: Performed by: INTERNAL MEDICINE

## 2017-06-23 PROCEDURE — 94640 AIRWAY INHALATION TREATMENT: CPT

## 2017-06-23 PROCEDURE — 80048 BASIC METABOLIC PNL TOTAL CA: CPT | Performed by: INTERNAL MEDICINE

## 2017-06-23 PROCEDURE — 99239 HOSP IP/OBS DSCHRG MGMT >30: CPT | Performed by: INTERNAL MEDICINE

## 2017-06-23 PROCEDURE — 36415 COLL VENOUS BLD VENIPUNCTURE: CPT | Performed by: INTERNAL MEDICINE

## 2017-06-23 PROCEDURE — 25900017 H RX MED GY IP 259 OP 259 PS 637: Performed by: INTERNAL MEDICINE

## 2017-06-23 PROCEDURE — 94660 CPAP INITIATION&MGMT: CPT

## 2017-06-23 PROCEDURE — 83605 ASSAY OF LACTIC ACID: CPT | Performed by: INTERNAL MEDICINE

## 2017-06-23 PROCEDURE — 83735 ASSAY OF MAGNESIUM: CPT | Performed by: INTERNAL MEDICINE

## 2017-06-23 PROCEDURE — 85610 PROTHROMBIN TIME: CPT | Performed by: INTERNAL MEDICINE

## 2017-06-23 PROCEDURE — 40000275 ZZH STATISTIC RCP TIME EA 10 MIN

## 2017-06-23 PROCEDURE — 25000132 ZZH RX MED GY IP 250 OP 250 PS 637: Performed by: INTERNAL MEDICINE

## 2017-06-23 PROCEDURE — 25000131 ZZH RX MED GY IP 250 OP 636 PS 637: Performed by: INTERNAL MEDICINE

## 2017-06-23 RX ORDER — WARFARIN SODIUM 7.5 MG/1
7.5 TABLET ORAL
Status: COMPLETED | OUTPATIENT
Start: 2017-06-23 | End: 2017-06-23

## 2017-06-23 RX ORDER — LEVOFLOXACIN 500 MG/1
500 TABLET, FILM COATED ORAL DAILY
Qty: 10 TABLET | Refills: 0 | Status: SHIPPED | OUTPATIENT
Start: 2017-06-23 | End: 2017-07-03

## 2017-06-23 RX ADMIN — CITALOPRAM HYDROBROMIDE 40 MG: 20 TABLET ORAL at 08:40

## 2017-06-23 RX ADMIN — LOSARTAN POTASSIUM 25 MG: 25 TABLET, FILM COATED ORAL at 08:40

## 2017-06-23 RX ADMIN — ALBUTEROL SULFATE 1.25 MG: 2.5 SOLUTION RESPIRATORY (INHALATION) at 08:13

## 2017-06-23 RX ADMIN — FLUTICASONE FUROATE 1 PUFF: 100 POWDER RESPIRATORY (INHALATION) at 07:48

## 2017-06-23 RX ADMIN — GABAPENTIN 600 MG: 600 TABLET, FILM COATED ORAL at 08:40

## 2017-06-23 RX ADMIN — MULTIPLE VITAMINS W/ MINERALS TAB 1 TABLET: TAB at 08:40

## 2017-06-23 RX ADMIN — SODIUM CHLORIDE: 9 INJECTION, SOLUTION INTRAVENOUS at 08:40

## 2017-06-23 RX ADMIN — ENOXAPARIN SODIUM 170 MG: 100 INJECTION SUBCUTANEOUS at 11:44

## 2017-06-23 RX ADMIN — UMECLIDINIUM 62.5 MCG: 62.5 AEROSOL, POWDER ORAL at 07:48

## 2017-06-23 RX ADMIN — WARFARIN SODIUM 7.5 MG: 7.5 TABLET ORAL at 18:37

## 2017-06-23 RX ADMIN — DOXYCYCLINE HYCLATE 100 MG: 50 CAPSULE ORAL at 08:40

## 2017-06-23 RX ADMIN — PREDNISONE 50 MG: 50 TABLET ORAL at 08:40

## 2017-06-23 NOTE — PROGRESS NOTES
Set pt up on our BB CPAP with +10 and no O2 as pt is on RA. Pt tolerates well, as she is compliant with her home CPAP.     Mary Del Castillo RRT

## 2017-06-23 NOTE — H&P
CHIEF COMPLAINT:  The patient was called to come to the Emergency Department because of a positive blood culture.      HISTORY OF PRESENT ILLNESS:  The patient Bere Machuca is a 61-year-old female who was here in the Emergency Department yesterday with the chief complaint of shortness of breath.  The patient has an underlying history of a clotting disorder, pulmonary embolism, superior vena cava  syndrome, hypertension, diabetes and other co-morbid conditions.  She is on Coumadin, but the patient reports that she did not take her Coumadin because she felt weak.  She was seen by her Primary Care Physician.  The patient says there has been sickness going around her workplace for approximately 2 days, and she has had fever, cough and shortness of breath.  Yesterday when seen in the Emergency Department her INR was subtherapeutic, and her D-dimer was elevated.  In the Emergency Department here she was treated for a chronic obstructive pulmonary disease exacerbation with doxycycline.  A blood culture was done as she reported a history of fever.  Lovenox 1 mg per kg was prescribed for her, and she was discharged home.      This afternoon the patient was called and told to return to the hospital because her blood culture came back positive for Gram-negative rods which later turned out to be E. coli.  The patient denies fever or chills and actually was feeling better after she was discharged from the Emergency Department yesterday.  The patient has a history of an untreated urinary tract infection in the recent past, and since then she says she has continued to have urinary urgency and sometimes urge incontinence.  In the Emergency Department she was evaluated.  I talked to Dr. Alonzo regarding the patient.  She was given 1 dose of Rocephin and was admitted to the hospital.  The patient has a history of chronic obstructive pulmonary disease.  She feels better since yesterday.  She does not use oxygen at home.  She  uses bronchodilators.  Of note the patient was started on prednisone yesterday.      PAST MEDICAL HISTORY:   1.  Chronic obstructive pulmonary disease.   2.  Hypertension.   3.  History of diverticulitis with bowel perforation.   4.  History of cocaine abuse.   5.  Migraine headaches.   6.  Obstructive sleep apnea.   7.  Depression.   8.  Gastroesophageal reflux disease.   9.  Lymphedema.   10.  Pulmonary embolism.   11.  Clotting disorder.   12.  Anxiety.   33.  Arthritis.      PAST SURGICAL HISTORY:     1.  Exploratory laparotomy of the abdomen.   2.  Total abdominal hysterectomy.   3.  Salpingo-oophorectomy.   4.  Excision of abdominal wall lesion.   5.  Repair of abdominal ventral hernias.   6.   Placement of access port for TPN when she had a colon perforation at which time she had colectomy and colostomy.      FAMILY HISTORY:  Significant for chronic obstructive pulmonary disease, cancer and colitis.      SOCIAL HISTORY:  The patient is a former smoker.  She moved here from [facility] approximately 9 years ago and lives with her sister.  She rarely drinks alcohol.  She is .      REVIEW OF SYSTEMS:  A 10-point review was done. and all are negative except those mentioned above in the History of Present Illness.      HOME MEDICATIONS:  Reviewed and reconciled as in electronic medical record.      PHYSICAL EXAMINATION:   GENERAL:  The patient is awake, alert, oriented, comfortable, not in distress.   VITAL SIGNS:  Blood pressure 140/90, pulse rate 75, temperature 97, oxygen saturations 94% on room air.   HEENT:  Pink, nonicteric, extraocular muscle movement intact.   NECK:  Supple, no jugular venous distension, no thyromegaly.   RESPIRATORY:  Good air entry bilaterally anteriorly, decreased breath sounds in bases due to body habitus.   CARDIOVASCULAR:  S1 and S2 heard, no gallop or murmur.   ABDOMEN:  Morbidly obese with significant scarring and a mid abdominal surgical scar which looks like a wound which  healed by secondary intention.  There is a ventral hernia.  There are positive bowel sounds, no organomegaly.   EXTREMITIES:  Trace edema of bilateral lower extremities, no joint deformity.   PSYCHIATRIC:  Normal mood and affect, maintains eye contact, responds to questions appropriately.      LABORATORY DATA/IMAGIN.  Sodium 142, potassium 4.2, BUN 17, creatinine 0.8, glucose 127, albumin 2.9.     2.  CRP 37.9.     3.  Lactic acid 3.4.     4.  WBC 10.5, hemoglobin 14.2, platelets 349,000, INR 1.6.     5.  Urinalysis from catheterized specimen demonstrates 5 WBC/hpf, 5 RBC/hpf.     6.  Blood culture done yesterday, one of two growing E. coli, sensitivity pending.   7.  Urine culture was not done yesterday.     8.  CT scan of the abdomen and pelvis done today demonstrates no acute abnormality.      ASSESSMENT:  The patient Bere Machuca is a 61-year-old female with a past medical history of chronic obstructive pulmonary disease, pulmonary embolism due to clotting disorder, diabetes,  hypertension and other comorbid conditions.  She came into the hospital today after she was called regarding her positive blood culture.  The patient had presented to the Emergency Department yesterday for shortness of breath.  Her INR was found to be subtherapeutic, she was given Lovenox and Coumadin, treated for a chronic obstructive pulmonary disease exacerbation with steroids and doxycycline and was discharged home.  Today she is brought back for a positive blood culture growing Escherichia coli.      DIAGNOSES:   1.  Escherichia coli sepsis, source not clear.   2.  Chronic obstructive pulmonary disease exacerbation.   3.  Morbid obesity with body mass index above 60.   4.  Obstructive sleep apnea on CPAP.   5.  Lactic acidemia.   6.  History of pulmonary embolism and clotting disorder, currently on Lovenox and Coumadin with subtherapeutic INR.      PLAN:     1.  The patient is being admitted as an inpatient; and I expect at  least a 2-night stay in the hospital.     2.  Blood culture will be repeated.     3.  She was given Rocephin, and I will continue Rocephin 2 gm IV daily.   4.  We will continue doxycycline which was started yesterday for the chronic obstructive pulmonary disease exacerbation.     5.  We will continue prednisone 50 mg p.o. daily as ordered yesterday.     6.  We will continue most of her home medications.   7.  We will give her 1 liter of IV fluids.   8.  We will repeat lactic acid level and basic metabolic profile in the morning.   9.  Otherwise patient is stable from a respiratory standpoint and does not require oxygen.   10.  She is advised to use her CPAP at night.     11.  All her questions and concerns were addressed, and she voiced understanding of the plan of care.     12.  One of my colleagues will follow up with the patient in the morning.         IZABELA MONTALVO MD             D: 2017 22:50   T: 2017 00:34   MT: [id number]      Name:     CLEMENT VALENTINE   MRN:      [id number]        Account:      [id number]   :      1955           Admitted:     [id number]      Document: [id number]       cc: Wilma Armenta MD

## 2017-06-23 NOTE — PLAN OF CARE
Problem: Sepsis (Adult)  Goal: Signs and Symptoms of Listed Potential Problems Will be Absent or Manageable (Sepsis)  Signs and symptoms of listed potential problems will be absent or manageable by discharge/transition of care (reference Sepsis (Adult) CPG).  Outcome: Improving  VSS. Pt denies pain. Pt report some CALDERÓN, used CPAP ORA overnight. Lung sounds diminished with expiratory wheezing. Pt up independently, voiding fine. Plan for ID consult today.

## 2017-06-23 NOTE — ED NOTES
[facility]  ED Nurse Handoff Report    Bere Valentine is a 61 year old female   ED Chief complaint: Abnormal Labs  . ED Diagnosis:   Final diagnoses:   Sepsis due to Escherichia coli (H)     Allergies:   Allergies   Allergen Reactions     Aspirin      PN: LW Reaction: sensitive/stomach     Lyrica [Pregabalin]      dizziness     Tape [Adhesive Tape] Itching     Vancomycin        Code Status: Full Code  Activity level - Baseline/Home:  Independent. Activity Level - Current:   Independent. Lift room needed: No. Bariatric: No   Needed: No   Isolation: No. Infection: Not Applicable.     Vital Signs:   Vitals:    06/22/17 2010 06/22/17 2025 06/22/17 2040 06/22/17 2055   BP: 118/46 115/70 114/52 105/63   Pulse:       Resp:       Temp:       TempSrc:       SpO2: 94% 92% 94% 92%       Cardiac Rhythm:  ,      Pain level:    Patient confused: No. Patient Falls Risk: No.   Elimination Status: Has voided   Patient Report - Initial Complaint: Positive BC. Focused Assessment:    Tests Performed: Labs, UA, CT, CXR3. Abnormal Results: Lactic 3.4.   Results for BERE VALENTINE (MRN [id number]) as of 6/22/2017 21:26   Ref. Range 6/22/2017 19:51   Color Urine Unknown Yellow   Appearance Urine Unknown Slightly Cloudy   Glucose Urine Latest Ref Range: NEG mg/dL Negative   Bilirubin Urine Latest Ref Range: NEG  Negative   Ketones Urine Latest Ref Range: NEG mg/dL Negative   Specific Gravity Urine Latest Ref Range: 1.003 - 1.035  1.027   pH Urine Latest Ref Range: 5.0 - 7.0 pH 5.0   Protein Albumin Urine Latest Ref Range: NEG mg/dL 100 (A)   Urobilinogen mg/dL Latest Ref Range: 0.0 - 2.0 mg/dL 0.0   Nitrite Urine Latest Ref Range: NEG  Negative   Blood Urine Latest Ref Range: NEG  Negative   Leukocyte Esterase Urine Latest Ref Range: NEG  Negative   Source Unknown Catheterized Urine   WBC Urine Latest Ref Range: 0 - 2 /HPF 5 (H)   RBC Urine Latest Ref Range: 0 - 2 /HPF 5 (H)   Squamous Epithelial /HPF Urine  Latest Ref Range: 0 - 1 /HPF 6 (H)   Mucous Urine Latest Ref Range: NEG /LPF Present (A)   Hyaline Casts Latest Ref Range: 0 - 2 /LPF 2   CT not back  Treatments provided: IVF, Abx  Family Comments: Hx Factor 5 and 9 abdominal surgeries.  OBS brochure/video discussed/provided to patient:  N/A  ED Medications:   Medications   0.9% sodium chloride BOLUS (1,000 mLs Intravenous New Bag 6/22/17 2018)   cefTRIAXone (ROCEPHIN) 3 g in NaCl 0.9 % 100 mL intermittent infusion (3 g Intravenous New Bag 6/22/17 2018)   iopamidol (ISOVUE-370) solution 500 mL (100 mLs Intravenous Given 6/22/17 2120)   0.9% sodium chloride BOLUS (0 mLs Intravenous Stopped 6/22/17 2124)     Drips infusing:  Yes         ED Nurse Name/Phone Number: Rosa Jean,   9:25 Pm    RECEIVING UNIT ED HANDOFF REVIEW    Above ED Nurse Handoff Report was reviewed: YES  Reviewed by: Kathi Norris on June 22, 2017 at 9:52 PM

## 2017-06-23 NOTE — PHARMACY-ADMISSION MEDICATION HISTORY
Admission medication history interview status for this patient is complete. See [facility] admission navigator for allergy information, prior to admission medications and immunization status.     Medication history interview source(s):Patient  Medication history resources (including written lists, pill bottles, clinic record):None    Changes made to PTA medication list:  Added: vitamin d, probiotic  Deleted: nizoral shampoo  Changed: eileen    Actions taken by pharmacist (provider contacted, etc):None     Additional medication history information:None    Medication reconciliation/reorder completed by provider prior to medication history? No    For patients on insulin therapy: no (Yes/No)   Lantus/levemir/NPH/Mix 70/30 dose: _____ in AM/PM or twice daily   Sliding scale Novolog Y/N   If Yes, do you have a baseline novolog pre-meal dose: ______units with meals   Patients eat three meals a day: Y/N   Any Barriers to therapy: cost of medications/comfortable with giving injections (if applicable)/ comfortable and confident with current diabetes regimen       Prior to Admission medications    Medication Sig Last Dose Taking? Auth Provider   GABAPENTIN PO Take 600 mg by mouth 2 times daily 6/22/2017 at am Yes Unknown, Entered By History   enoxaparin (LOVENOX) 150 MG/ML injection Inject 1.1 mLs (165 mg) Subcutaneous every 12 hours for 5 days 6/22/2017 at am Yes Kaleb John MD   doxycycline (VIBRAMYCIN) 100 MG capsule Take 1 capsule (100 mg) by mouth 2 times daily for 7 days 6/22/2017 at am Yes Kaleb John MD   predniSONE (DELTASONE) 20 MG tablet Take 2.5 tablets (50 mg) by mouth daily for 5 days 6/22/2017 at am Yes Kaleb John MD   losartan (COZAAR) 25 MG tablet Take 25 mg by mouth daily 6/22/2017 at am Yes Reported, Patient   warfarin (COUMADIN) 5 MG tablet Take 1 tablet (5 mg) by mouth daily 6/21/2017 at 5mg Yes Wilma Armenta MD   hydrOXYzine (ATARAX) 25 MG tablet Take 1 tablet (25 mg) by mouth At Bedtime  6/21/2017 at Unknown time Yes Wilma Armenta MD   cyclobenzaprine (FLEXERIL) 5 MG tablet Take 1 tablet (5 mg) by mouth 3 times daily as needed for muscle spasms Past Week at Unknown time Yes Catrachita Torres PA   citalopram (CELEXA) 40 MG tablet Take 1 tablet (40 mg) by mouth daily 6/22/2017 at am Yes Catrachita Torres PA   cholecalciferol 1000 UNITS TABS Take 1,000 Units by mouth At Bedtime  6/21/2017 at Unknown time Yes Reported, Patient   metFORMIN (GLUCOPHAGE-XR) 500 MG 24 hr tablet Take 2 tablets (1,000 mg) by mouth 2 times daily (with meals) 6/22/2017 at am Yes Wilma Armenta MD   multivitamin, therapeutic with minerals (MULTI-VITAMIN) TABS Take 1 tablet by mouth daily 6/22/2017 at am Yes Reported, Patient   Omega-3 Fatty Acids (OMEGA-3 FISH OIL PO) Take 1 g by mouth 2 times daily (with meals)  6/22/2017 at am Yes Reported, Patient   Probiotic Product (PROBIOTIC ACIDOPHILUS) CAPS Take 1 capsule by mouth At Bedtime  6/21/2017 at Unknown time Yes Wilma Armenta MD   tiotropium (SPIRIVA HANDIHALER) 18 MCG inhalation capsule Inhale 1 capsule into the lungs daily. DR [facility]ELEN[facility] 6/22/2017 at am Yes Wilma Armenta MD   albuterol (2.5 MG/3ML) 0.083% nebulizer solution Take 3 mLs by nebulization every 6 hours as needed for shortness of breath / dyspnea. Dr [facility]ELEN[facility] 6/21/2017 at Unknown time Yes Wilma Armenta MD   albuterol (PROAIR HFA) 108 (90 BASE) MCG/ACT inhaler Inhale 1-2 puffs into the lungs as needed for shortness of breath / dyspnea. DR [facility]ELEN[facility] 6/22/2017 at 1700 Yes Wilma Armenta MD   budesonide (PULMICORT FLEXHALER) 180 MCG/ACT inhaler Inhale 1 puff into the lungs 2 times daily. DR MELENDEZ [facility] 6/22/2017 at am Yes Wilma Armenta MD

## 2017-06-23 NOTE — CONSULTS
INFECTIOUS DISEASE CONSULTATION       REFERRING PHYSICIAN:  Dr. Wood Hart.        IMPRESSION:   1.  A 61-year-old female with acute shortness of breath with known underlying lung disease, had some slight low-grade fever.  Blood cultures were done and were found to have Escherichia coli bacteremia, no clear source has been found, computerized tomography scan negative, did have a mildly abnormal urine without a urine culture being done so urine may be the source, clinically improved on antibiotics.   2.  History of chronic lung disease.   3.  History of chronic clotting disorder and recurrent pulmonary embolism.   4.  Diabetes mellitus.      RECOMMENDATIONS:   1.  Currently getting ceftriaxone, also getting doxycycline from a respiratory standpoint.   2.  Generally speaking, if no clear source of gram-negative rachell bacteremia of this type found with significant workup, can be treated with antibiotics alone and simply watch, I would be okay with an oral quinolone such as Levaquin as early as now with approximately a 10-day course, this would probably suffice from a respiratory standpoint as well, can probably discontinue the doxycycline if we go to Levaquin and disposition.      HISTORY:  Bere Machuca is a 61-year-old female is seen in consultation due to E. coli bacteremia.  The patient has a history of chronic lung disease and those have been the primary symptoms that led to her coming into the ER.  She did have some slight fever and blood cultures were obtained.  Blood cultures have now come back with E. coli in them.  She has had some slight urinary symptoms, she states these have been going on to a degree since March when she had a UTI that never quite went away from a symptom standpoint, she has had imaging done now that not show any obstruction or abscess, she already feels better on antibiotics, which have been started, including respiratory symptoms improved.      PAST MEDICAL HISTORY:  The chronic  respiratory symptoms, history of prior pulmonary embolism, superior vena cava syndrome and chronic clotting disorder, is chronically on Coumadin, history of 1 episode of diverticulitis, prior history of some degree of chronic lymphedema, chronic obesity.      SOCIAL AND FAMILY HISTORY:  No recent travel or exposures.  Nonsmoker.      REVIEW OF SYSTEMS:  Largely as above.  Feels essentially normal and well currently, including respiratory symptoms improved.  No other focal symptoms.  No abdominal or GI symptoms.  The urinary tract symptoms, if anything, are better since starting the antibiotics.      PHYSICAL EXAMINATION:   IN GENERAL:  The patient appears her stated age, does not look particularly toxic or ill.   VITAL SIGNS:  Currently normal and she is afebrile.   HEENT:  No thrush or intraoral lesions.  Pupils reactive.   NECK:  Supple and nontender without lymphadenopathy.   HEART:  Regular rhythm, no murmur or gallop.   LUNGS:  Some congestion, few crackles at the right base.   ABDOMEN:  Soft and nontender, no hepatosplenomegaly, no flank tenderness.  No suprapubic tenderness.   EXTREMITIES:  Some edema and stasis changes.  No embolic lesions.      LABORATORY:  CT scan negative.  Blood culture single bottle with E. coli.      Thank you very much for consultation.  I will follow the patient with you.         KANDY ALMANZAR MD             D: 2017 18:28   T: 2017 18:50   MT: #[id number]      Name:     CLEMENT VALENTINE   MRN:      [id number]        Account:       [id number]   :      1955           Consult Date:  2017      Document: [id number]       cc: Wood Hart MD

## 2017-06-23 NOTE — PHARMACY-ANTICOAGULATION SERVICE
Clinical Pharmacy - Warfarin Dosing Consult     Pharmacy has been consulted to manage this patient s warfarin therapy.  Indication: DVT/PE Prophylaxis  Therapy Goal: INR 2-3  Provider/Team: Dr Hart  Warfarin Prior to Admission: Yes  Warfarin PTA Regimen: 5 mg qd  Significant drug interactions: tylenol, doxcycline  Recent documented change in oral intake/nutrition: Unknown  Dose Comments: INR 1.60, Lovenox tx dose, warfarin 5 mg today    INR   Date Value Ref Range Status   06/22/2017 1.60 (H) 0.86 - 1.14 Final     INR Protime   Date Value Ref Range Status   06/21/2017 1.3 (A)  Final       Recommend warfarin 5 mg today.  Pharmacy will monitor Bere Machuca daily and order warfarin doses to achieve specified goal.      Please contact pharmacy as soon as possible if the warfarin needs to be held for a procedure or if the warfarin goals change.

## 2017-06-23 NOTE — CONSULTS
ID consult dictated IMP 1 60 yo acute E coli bacteremia, ? Urine, CT neg , not that sig sepsis    REc ceftriaxone while here, but Ok home 10 days levaquin 500 daily po any time wo for now bigger MCLEAN

## 2017-06-23 NOTE — PLAN OF CARE
Problem: Goal Outcome Summary  Goal: Goal Outcome Summary  Outcome: Improving  Afebrile, no c/o pain, up independently, continues on lovenox bridge with INR 1.78 today, LS diminished with exp wheeze mild, using CPAP at night and prn with naps, continues on IV abx

## 2017-06-23 NOTE — PROGRESS NOTES
[facility]    Hospitalist Progress Note  Name: Bere Machuca    MRN: [id number]  Provider: Sonja Kirby MD  Date of Service: 06/23/2017    Assessment & Plan   Summary of Stay: Bere Machuca is a 61 year old female who was admitted on 6/22/2017. Patient with PMH sig for COPD, PE, HTN, presented to ED with positive blood culture growing E coli. She was seen in ED  Previously for shortness of breath when cultures were obtained.    1.  Escherichia coli sepsis, source not clear.   -- suspect UTI  --sensitivities pending  -- continue ceftriaxone    2.  Chronic obstructive pulmonary disease exacerbation.   -- stable continue Nebs  -- continued doxy and prednisone    3.  Morbid obesity with body mass index above 60.    4.  Obstructive sleep apnea on CPAP.   -- use home CPAP    5.  Lactic acidemia.   -- improved    6.  History of pulmonary embolism and clotting disorder, currently on Lovenox and Coumadin with subtherapeutic INR.       DVT Prophylaxis: Warfarin  Code Status: Full Code    Disposition: Expected discharge in 2-3 days to home      Interval History   Feels better, breathing has improved. No abd pain, no n, no v. Review of all systems negative    -Data reviewed today: I reviewed all new labs and imaging reports over the last 24 hours. I personally reviewed no images or EKG's today.    Physical Exam   Temp: 98.2  F (36.8  C) Temp src: Axillary BP: 132/78 Pulse: 108 Heart Rate: 78 Resp: 20 SpO2: 94 % O2 Device: None (Room air)    Vitals:    06/22/17 2208   Weight: (!) 172.8 kg (381 lb)     Vital Signs with Ranges  Temp:  [96.2  F (35.7  C)-98.2  F (36.8  C)] 98.2  F (36.8  C)  Pulse:  [108] 108  Heart Rate:  [68-78] 78  Resp:  [18-20] 20  BP: (105-154)/() 132/78  SpO2:  [90 %-96 %] 94 %  I/O last 3 completed shifts:  In: 1488 [P.O.:400; I.V.:1088]  Out: -       GEN:  Alert, oriented x 3, appears comfortable, NAD.  HEENT:  Normocephalic/atraumatic, no scleral icterus, no nasal  discharge, mouth moist.  CV:  Regular rate and rhythm, soft murmur.  S1 + S2 noted, no S3 or S4.  LUNGS:  Occasional wheezing, basilar crackles.  Symmetric chest rise on inhalation noted.  ABD:  Active bowel sounds, soft, non-tender/non-distended.  No rebound/guarding/rigidity.  EXT:  No edema.  No cyanosis.   SKIN:  Dry to touch, no exanthems noted in the visualized areas.    Medications     Warfarin Therapy Reminder       - MEDICATION INSTRUCTIONS -         fluticasone furoate  1 puff Inhalation Daily     citalopram  40 mg Oral Daily     doxycycline  100 mg Oral BID     gabapentin (NEURONTIN) tablet 600 mg  600 mg Oral BID     hydrOXYzine  25 mg Oral At Bedtime     losartan  25 mg Oral Daily     multivitamin, therapeutic with minerals  1 tablet Oral Daily     predniSONE  50 mg Oral Daily     lactobacillus rhamnosus (GG)  1 capsule Oral At Bedtime     umeclidinium  62.5 mcg Inhalation Daily     enoxaparin  1 mg/kg Subcutaneous Q12H     cefTRIAXone  2 g Intravenous Q24H     Data       Recent Labs  Lab 06/21/17  1622   PHV 7.49*   PO2V 70*   PCO2V 34*   HCO3V 26       Recent Labs  Lab 06/22/17  1853 06/21/17  1434   WBC 10.5 8.1   HGB 14.2 15.3   HCT 43.9 47.5*   MCV 93 93    387       Recent Labs  Lab 06/23/17  0551 06/22/17  2113 06/21/17  1434    142 140   POTASSIUM 3.8 4.2 3.8   CHLORIDE 109 109 105   CO2 28 27 27   ANIONGAP 6 6 8   * 127* 114*   BUN 15 17 11   CR 0.74 0.80 0.79   GFRESTIMATED 81 74 75   GFRESTBLACK >90African American GFR Calc 89 >90African American GFR Calc   TAWANA 9.0 8.9 9.5       Recent Labs  Lab 06/22/17  1853 06/21/17  1636 06/21/17  1622   CULT No growth after 9 hours No growth after 2 days Cultured on the 1st day of incubation: Escherichia coliCritical Value/Significant Value, preliminary result only, called to and read back by Myra Saez R.N. on [facility] at 09.16 am on 06.22.17 JT.(Note)POSITIVE for E. COLI by [employer] multiplex nucleic acid test. Finalidentification  and antimicrobial susceptibility testing will beverified by standard methods.Specimen tested with Verigene multiplex, gram-negative blood culturenucleic acid test for the following targets: Acinetobacter sp.,Citrobacter sp., Enterobacter sp., Proteus sp., E. coli, K.pneumoniae/oxytoca, P. aeruginosa, and the following resistancemarkers: CTXM, KPC, NDM, VIM, IMP and OXA.Critical Value/Significant Value called to and read back by Dylan @[facility] 6.22.2017 1130 ARF*       Recent Labs  Lab 06/22/17  2113   AST 21   ALT 41   ALKPHOS 69   BILITOTAL 0.1*       Recent Labs  Lab 06/23/17  0600 06/22/17  1853   LACT 1.5 3.4*       Recent Labs  Lab 06/22/17  1951   COLOR Yellow   APPEARANCE Slightly Cloudy   URINEGLC Negative   URINEBILI Negative   URINEKETONE Negative   SG 1.027   UBLD Negative   URINEPH 5.0   PROTEIN 100*   NITRITE Negative   LEUKEST Negative   RBCU 5*   WBCU 5*       Recent Results (from the past 24 hour(s))   XR Chest 2 Views    Narrative    XR CHEST 2 VW   6/22/2017 7:10 PM     HISTORY: Fever    COMPARISON: 3/2/2012.    FINDINGS: Heart size normal. Lungs clear. No interval change.      Impression    IMPRESSION: Negative.    TAMMY JENSEN MD   CT Abdomen Pelvis w Contrast    Narrative    CT ABDOMEN PELVIS WITH CONTRAST   6/22/2017 9:29 PM      HISTORY:  Escherichia coli on blood culture.    TECHNIQUE:  CT abdomen and pelvis with intravenous contrast. Radiation  dose for this scan was reduced using automated exposure control,  adjustment of the mA and/or kV according to patient size, or iterative  reconstruction technique. 100mL Isovue-370     COMPARISON:  None.    FINDINGS:  Abdomen:  Mild atelectasis or scarring at the lung bases. The heart  size is normal. There is mild diffuse fatty infiltration of liver. The  spleen, gallbladder, pancreas, kidneys are normal in appearance. Mild  nonspecific thickening of adrenal glands. There is no abdominal or  pelvic lymph node enlargement.    Pelvis:  There are scattered colonic diverticula without acute  diverticulitis. No bowel obstruction or inflammation. No adnexal mass.  There are postoperative changes in the anterior abdominal wall.  Midline ventral hernia containing fat. Left lower quadrant abdominal  hernia containing fat. Few gas bubbles in the subcutaneous fat of the  left anterior abdomen may be from injections. There is degenerative  disease in the spine.      Impression    IMPRESSION:  1. No acute abnormality. No bowel obstruction or inflammation.  2. Colonic diverticula without acute diverticulitis.  3. Two ventral hernias containing fat.  4. Fatty infiltration of the liver.     MERNA ZIMMERMAN MD

## 2017-06-24 NOTE — PHARMACY-ANTICOAGULATION SERVICE
Clinical Pharmacy- Warfarin Discharge Note  This patient is currently on warfarin for the treatment of DVT/PE  INR Goal= 2-3  Expected length of therapy undetermined.    Anticoagulation Dose History     Recent Dosing and Labs Latest Ref Rng & Units 11/18/2016 12/19/2016 2/3/2017 4/17/2017 6/21/2017 6/22/2017 6/23/2017    Warfarin 5 mg - - - - - - 5 mg -    Warfarin 7.5 mg - - - - - - - 7.5 mg    INR 0.86 - 1.14 1.76(H) - - - - 1.60(H) 1.78(H)    INR - - 2.6 2.6 1.8 1.3(A) - -          Vitamin K doses administered during the last 7 days: no  FFP administered during the last 7 days: no  Recommend discharging the patient on a warfarin regimen of 5 mg daily with INR check in 3-4 days

## 2017-06-24 NOTE — PLAN OF CARE
Problem: Discharge Planning  Goal: Discharge Planning (Adult, OB, Behavioral, Peds)  Pt admitted with positive BC-E.Coli, treated with IV abx. Alert/oriented,vitals stable, afebrile. Up independent. Tolerating diet, appetite good. PIV-d/c'd without difficulty. Discharge instructions reviewed with patient, all questions answered, concerns addressed. Pt aware of prescription for Levaquin sent to [facility] in [place], will p/u on way home. Pt stable at discharge.

## 2017-06-26 ENCOUNTER — CARE COORDINATION (OUTPATIENT)
Dept: CASE MANAGEMENT | Facility: CLINIC | Age: 62
End: 2017-06-26

## 2017-06-26 NOTE — PROGRESS NOTES
Clinic Care Coordination Contact  [facility]/Voicemail    Referral Source: ED Follow-Up  Clinical Data: Care Coordinator Outreach  Outreach attempted x 1.  Left message on voicemail with call back information and requested return call.  Plan:. Care Coordinator will try to reach patient again in 1-2 business days.      Clinic Care Coordination Contact  Care Team Conversations    Spoke with care team - PA who states she would like RN CC to outreach next week for follow up

## 2017-06-27 LAB
BACTERIA SPEC CULT: NO GROWTH
Lab: NORMAL
MICRO REPORT STATUS: NORMAL
SPECIMEN SOURCE: NORMAL

## 2017-06-27 NOTE — DISCHARGE SUMMARY
[facility]  Discharge Summary  Hospitalist      Date of Admission:  6/22/2017  Date of Discharge:  6/23/2017  Provider:  Sonja Kirby MD  Date of Service (when I last saw the patient): 06/23/17      Primary Provider: Wilma Armenta          Discharge Diagnosis:   Discharge Diagnoses   Ecoli Sepsis With unclear Source  COPD exacerbation  Lactic acidosis    Other medical issues:  Past Medical History:   Diagnosis Date     Anxiety      Arthritis      Clotting disorder (H)      COPD (chronic obstructive pulmonary disease) (H)      Depressive disorder      Diabetes (H)      Hypertension      Lymphedema of arm 3/8/2013     Morbidly obese (H) 9/25/2009     Pulmonary embolism (H)      Sleep apnea      Substance abuse      Superior vena cava syndrome           History of Present Illness   Bere Machuca is an 61 year old female who presented with positive blood culture and shortness of breath.  Please see the admission history and physical for full details.    Hospital Course     Bere Machuca was admitted on 6/22/2017. She is a 61 year old female  with PMH sig for COPD, PE, HTN, presented to ED with positive blood culture growing E coli. She was seen in ED  Previously for shortness of breath when cultures were obtained.    The following problems were addressed during her hospitalization:    1.  Escherichia coli bacteremia, source not clear.   -- suspect UTI  -- patient seen by ID and recommended discharge on PO Levoquin for 10 days and follow up with primary care  -- discharged per ID      2.  Chronic obstructive pulmonary disease exacerbation.   -- treated with Nebs and prednisone  -- she was on DOXY that was discontinued when switched to levoflox       3.  Obstructive sleep apnea on CPAP.   --  On CPAP     4  Lactic acidemia.   -- improved with hydration     5.  History of pulmonary embolism and clotting disorder, currently on Lovenox and Coumadin with subtherapeutic INR.            Significant Results and Procedures   As noted    Pending Results   Unresulted Labs Ordered in the Past 30 Days of this Admission     Date and Time Order Name Status Description    6/22/2017 1818 Blood culture Preliminary           Code Status   Full Code       Primary Care Physician   Wilma Armenta    Physical Exam                      Vitals:    06/22/17 2208   Weight: (!) 172.8 kg (381 lb)     Vital Signs with Ranges          GEN:  Alert, oriented x 3, appears comfortable, NAD.  HEENT:  Normocephalic/atraumatic, no scleral icterus, no nasal discharge, mouth moist.  CV:  Regular rate and rhythm, soft murmur.  S1 + S2 noted, no S3 or S4.  LUNGS:  Occasional wheezing, basilar crackles.  Symmetric chest rise on inhalation noted.  ABD:  Active bowel sounds, soft, non-tender/non-distended.  No rebound/guarding/rigidity.  EXT:  No edema.  No cyanosis.   SKIN:  Dry to touch, no exanthems noted in the visualized areas.    Discharge Disposition   Discharged to home    Consultations This Hospital Stay   PHARMACY TO DOSE WARFARIN  INFECTIOUS DISEASES IP CONSULT    Time Spent on this Encounter   I, Sonja Kirby, personally saw the patient today and spent greater than 30 minutes discharging this patient.    Discharge Orders     Follow-up and recommended labs and tests    Follow up with primary care provider, Wilma Armenta, within 7 days for hospital follow- up.     Activity   Your activity upon discharge: activity as tolerated     Full Code     Diet   Follow this diet upon discharge: Moderate consistent carbohydrate (8273-2258 alma / 4-6 CHO units per meal)       Discharge Medications   Discharge Medication List as of 6/23/2017  5:58 PM      START taking these medications    Details   levofloxacin (LEVAQUIN) 500 MG tablet Take 1 tablet (500 mg) by mouth daily for 10 days, Disp-10 tablet, R-0, E-Prescribe         CONTINUE these medications which have NOT CHANGED    Details   GABAPENTIN PO Take 600 mg by mouth  2 times daily, Historical      enoxaparin (LOVENOX) 150 MG/ML injection Inject 1.1 mLs (165 mg) Subcutaneous every 12 hours for 5 days, Disp-10 Syringe, R-0, Local Print      doxycycline (VIBRAMYCIN) 100 MG capsule Take 1 capsule (100 mg) by mouth 2 times daily for 7 days, Disp-14 capsule, R-0, Local Print      predniSONE (DELTASONE) 20 MG tablet Take 2.5 tablets (50 mg) by mouth daily for 5 days, Disp-13 tablet, R-0, Local Print      losartan (COZAAR) 25 MG tablet Take 25 mg by mouth daily, Historical      warfarin (COUMADIN) 5 MG tablet Take 1 tablet (5 mg) by mouth daily, Disp-90 tablet, R-0, E-Prescribe      hydrOXYzine (ATARAX) 25 MG tablet Take 1 tablet (25 mg) by mouth At Bedtime, Disp-90 tablet, R-0, E-Prescribe      cyclobenzaprine (FLEXERIL) 5 MG tablet Take 1 tablet (5 mg) by mouth 3 times daily as needed for muscle spasms, Disp-30 tablet, R-0, E-Prescribe      citalopram (CELEXA) 40 MG tablet Take 1 tablet (40 mg) by mouth daily, Disp-90 tablet, R-1, E-Prescribe      cholecalciferol 1000 UNITS TABS Take 1,000 Units by mouth At Bedtime , Historical      metFORMIN (GLUCOPHAGE-XR) 500 MG 24 hr tablet Take 2 tablets (1,000 mg) by mouth 2 times daily (with meals), Historical      multivitamin, therapeutic with minerals (MULTI-VITAMIN) TABS Take 1 tablet by mouth daily, Historical      Omega-3 Fatty Acids (OMEGA-3 FISH OIL PO) Take 1 g by mouth 2 times daily (with meals) , Historical      Probiotic Product (PROBIOTIC ACIDOPHILUS) CAPS Take 1 capsule by mouth At Bedtime , Historical      tiotropium (SPIRIVA HANDIHALER) 18 MCG inhalation capsule Inhale 1 capsule into the lungs daily. DR ANDREWS [facility], 18 mcg, Inhalation, DAILY Starting 1/30/2013, Until Discontinued, Historical      albuterol (2.5 MG/3ML) 0.083% nebulizer solution Take 3 mLs by nebulization every 6 hours as needed for shortness of breath / dyspnea. Dr MELENDE[facility], 1 ampule = 2.5 mg, Nebulization, EVERY 6 HOURS PRN  Starting 1/30/2013, Until Discontinued, Historical      albuterol (PROAIR HFA) 108 (90 BASE) MCG/ACT inhaler Inhale 1-2 puffs into the lungs as needed for shortness of breath / dyspnea. DR ANDREWS [facility], 1-2 puff, Inhalation, PRN Starting 1/30/2013, Until Discontinued, Historical      budesonide (PULMICORT FLEXHALER) 180 MCG/ACT inhaler Inhale 1 puff into the lungs 2 times daily. DR MELENDEZ [facility], 1 puff, Inhalation, 2 TIMES DAILY Starting 1/30/2013, Until Discontinued, Historical           Allergies   Allergies   Allergen Reactions     Aspirin      PN: LW Reaction: sensitive/stomach     Lyrica [Pregabalin]      dizziness     Tape [Adhesive Tape] Itching     Vancomycin      Data   Most Recent 3 CBC's:  Recent Labs   Lab Test  06/22/17   1853  06/21/17   1434  11/18/16   0705   WBC  10.5  8.1  8.1   HGB  14.2  15.3  14.2   MCV  93  93  92   PLT  349  387  308      Most Recent 3 BMP's:  Recent Labs   Lab Test  06/23/17   0551  06/22/17   2113  06/21/17   1434   NA  143  142  140   POTASSIUM  3.8  4.2  3.8   CHLORIDE  109  109  105   CO2  28  27  27   BUN  15  17  11   CR  0.74  0.80  0.79   ANIONGAP  6  6  8   TAWANA  9.0  8.9  9.5   GLC  102*  127*  114*     Most Recent 2 LFT's:  Recent Labs   Lab Test  06/22/17   2113 10/24/16   AST  21  25   ALT  41  42   ALKPHOS  69  60   BILITOTAL  0.1*  0.4     Most Recent INR's and Anticoagulation Dosing History:  Anticoagulation Dose History     Recent Dosing and Labs Latest Ref Rng & Units 11/18/2016 12/19/2016 2/3/2017 4/17/2017 6/21/2017 6/22/2017 6/23/2017    Warfarin 5 mg - - - - - - 5 mg -    Warfarin 7.5 mg - - - - - - - 7.5 mg    INR 0.86 - 1.14 1.76(H) - - - - 1.60(H) 1.78(H)    INR - - 2.6 2.6 1.8 1.3(A) - -        Most Recent 3 Troponin's:  Recent Labs   Lab Test  06/21/17   1622  01/21/16   1535  01/21/16   1218   TROPI  <0.015  The 99th percentile for upper reference range is 0.045 ug/L.  Troponin values in   the range of 0.045 - 0.120 ug/L may be  associated with risks of adverse   clinical events.    0.021  <0.015  The 99th percentile for upper reference range is 0.045 ug/L.  Troponin values in   the range of 0.045 - 0.120 ug/L may be associated with risks of adverse   clinical events.       Most Recent Cholesterol Panel:No lab results found.  Most Recent 6 Bacteria Isolates From Any Culture (See EPIC Reports for Culture Details):  Recent Labs   Lab Test  06/22/17   1853  06/21/17   1636  06/21/17   1622  03/23/17   0150   CULT  No growth after 5 days  No growth  Cultured on the 1st day of incubation: Escherichia coli  Critical Value/Significant Value, preliminary result only, called to and read   back by Myra ceja [facility] at 09.16 am on 06.22.17 JT.  (Note)  POSITIVE for E. COLI by [employer] multiplex nucleic acid test. Final  identification and antimicrobial susceptibility testing will be  verified by standard methods.    Specimen tested with Verigene multiplex, gram-negative blood culture  nucleic acid test for the following targets: Acinetobacter sp.,  Citrobacter sp., Enterobacter sp., Proteus sp., E. coli, K.  pneumoniae/oxytoca, P. aeruginosa, and the following resistance  markers: CTXM, KPC, NDM, VIM, IMP and OXA.    Critical Value/Significant Value called to and read back by Janey Howard @[facility] 6.22.2017 1130 ARF    *  50,000 to 100,000 colonies/mL Escherichia coli*     Most Recent TSH, T4 and A1c Labs:  Recent Labs   Lab Test 10/24/16   TSH  2.85   T4  0.73   A1C  6.8     Results for orders placed or performed during the hospital encounter of 06/22/17   XR Chest 2 Views    Narrative    XR CHEST 2 VW   6/22/2017 7:10 PM     HISTORY: Fever    COMPARISON: 3/2/2012.    FINDINGS: Heart size normal. Lungs clear. No interval change.      Impression    IMPRESSION: Negative.    TAMMY JENSEN MD   CT Abdomen Pelvis w Contrast    Narrative    CT ABDOMEN PELVIS WITH CONTRAST   6/22/2017 9:29 PM      HISTORY:  Escherichia coli on blood  culture.    TECHNIQUE:  CT abdomen and pelvis with intravenous contrast. Radiation  dose for this scan was reduced using automated exposure control,  adjustment of the mA and/or kV according to patient size, or iterative  reconstruction technique. 100mL Isovue-370     COMPARISON:  None.    FINDINGS:  Abdomen:  Mild atelectasis or scarring at the lung bases. The heart  size is normal. There is mild diffuse fatty infiltration of liver. The  spleen, gallbladder, pancreas, kidneys are normal in appearance. Mild  nonspecific thickening of adrenal glands. There is no abdominal or  pelvic lymph node enlargement.    Pelvis: There are scattered colonic diverticula without acute  diverticulitis. No bowel obstruction or inflammation. No adnexal mass.  There are postoperative changes in the anterior abdominal wall.  Midline ventral hernia containing fat. Left lower quadrant abdominal  hernia containing fat. Few gas bubbles in the subcutaneous fat of the  left anterior abdomen may be from injections. There is degenerative  disease in the spine.      Impression    IMPRESSION:  1. No acute abnormality. No bowel obstruction or inflammation.  2. Colonic diverticula without acute diverticulitis.  3. Two ventral hernias containing fat.  4. Fatty infiltration of the liver.     MERNA ZIMMERMAN MD           Disclaimer: This note consists of symbols derived from keyboarding, dictation and/or voice recognition software. As a result, there may be errors in the script that have gone undetected. Please consider this when interpreting information found in this chart.

## 2017-06-28 LAB
BACTERIA SPEC CULT: NO GROWTH
Lab: NORMAL
MICRO REPORT STATUS: NORMAL
SPECIMEN SOURCE: NORMAL

## 2017-06-30 ENCOUNTER — OFFICE VISIT (OUTPATIENT)
Dept: FAMILY MEDICINE | Facility: CLINIC | Age: 62
End: 2017-06-30

## 2017-06-30 VITALS
DIASTOLIC BLOOD PRESSURE: 92 MMHG | SYSTOLIC BLOOD PRESSURE: 148 MMHG | BODY MASS INDEX: 47.09 KG/M2 | TEMPERATURE: 98.1 F | HEIGHT: 66 IN | RESPIRATION RATE: 20 BRPM | HEART RATE: 88 BPM | WEIGHT: 293 LBS

## 2017-06-30 DIAGNOSIS — R82.90 ABNORMAL URINE: ICD-10-CM

## 2017-06-30 DIAGNOSIS — Z86.711 HISTORY OF PULMONARY EMBOLISM: Primary | ICD-10-CM

## 2017-06-30 DIAGNOSIS — J44.9 CHRONIC OBSTRUCTIVE PULMONARY DISEASE, UNSPECIFIED COPD TYPE (H): ICD-10-CM

## 2017-06-30 DIAGNOSIS — A41.9 BACTERIAL SEPSIS (H): ICD-10-CM

## 2017-06-30 DIAGNOSIS — R79.89 LACTIC ACID BLOOD INCREASED: ICD-10-CM

## 2017-06-30 LAB
ALBUMIN (URINE): 30 MG/DL
APPEARANCE UR: CLEAR
BACTERIA, UR: SLIGHT
BILIRUB UR QL: ABNORMAL
CASTS/LPF: ABNORMAL
COLOR UR: YELLOW
EP/HPF: ABNORMAL
ERYTHROCYTE [DISTWIDTH] IN BLOOD BY AUTOMATED COUNT: 14.5 %
GLUCOSE URINE: ABNORMAL MG/DL
HBA1C MFR BLD: 6.2 % (ref 4–7)
HCT VFR BLD AUTO: 47.2 % (ref 35–47)
HEMOGLOBIN: 15.9 G/DL (ref 11.7–15.7)
HGB UR QL: ABNORMAL
INR POINT OF CARE: 1.8 (ref 0.9–1.1)
KETONES UR QL: ABNORMAL MG/DL
LEUKOCYTE ESTERASE - QUEST: ABNORMAL
MCH RBC QN AUTO: 31.4 PG (ref 26–33)
MCHC RBC AUTO-ENTMCNC: 33.7 G/DL (ref 31–36)
MCV RBC AUTO: 93.3 FL (ref 78–100)
MISC.: ABNORMAL
NITRITE UR QL STRIP: ABNORMAL
PH UR STRIP: 5.5 PH (ref 5–7)
PLATELET COUNT - QUEST: 393 10^9/L (ref 150–375)
RBC # BLD AUTO: 5.06 10*12/L (ref 3.8–5.2)
RBC, UR MICRO: ABNORMAL (ref ?–2)
SP. GRAVITY: 1.02
UROBILINOGEN UR QL STRIP: 0.2 EU/DL (ref 0.2–1)
WBC # BLD AUTO: 10.5 10*9/L (ref 4–11)
WBC, UR MICRO: ABNORMAL (ref ?–2)

## 2017-06-30 PROCEDURE — 87088 URINE BACTERIA CULTURE: CPT | Mod: 90 | Performed by: PHYSICIAN ASSISTANT

## 2017-06-30 PROCEDURE — 85027 COMPLETE CBC AUTOMATED: CPT | Performed by: PHYSICIAN ASSISTANT

## 2017-06-30 PROCEDURE — 99214 OFFICE O/P EST MOD 30 MIN: CPT | Performed by: PHYSICIAN ASSISTANT

## 2017-06-30 PROCEDURE — 80053 COMPREHEN METABOLIC PANEL: CPT | Mod: 90 | Performed by: PHYSICIAN ASSISTANT

## 2017-06-30 PROCEDURE — 83036 HEMOGLOBIN GLYCOSYLATED A1C: CPT | Performed by: PHYSICIAN ASSISTANT

## 2017-06-30 PROCEDURE — 81001 URINALYSIS AUTO W/SCOPE: CPT | Performed by: PHYSICIAN ASSISTANT

## 2017-06-30 PROCEDURE — 85610 PROTHROMBIN TIME: CPT | Performed by: PHYSICIAN ASSISTANT

## 2017-06-30 PROCEDURE — 87086 URINE CULTURE/COLONY COUNT: CPT | Mod: 90 | Performed by: PHYSICIAN ASSISTANT

## 2017-06-30 PROCEDURE — 36415 COLL VENOUS BLD VENIPUNCTURE: CPT | Performed by: PHYSICIAN ASSISTANT

## 2017-06-30 NOTE — PROGRESS NOTES
SUBJECTIVE:                                                    Bere Machuca is a 61 year old female who presents to clinic today for the following health issues:        Hospital Follow-up Visit:    Hospital/Nursing Home/IP Rehab Facility: [facility]  Date of Admission: 6/22/17  Date of Discharge: 6/23/17  Reason(s) for Admission: E-coli Sepsis with unclear Source-likely UTI, COPD exacerbation (short course of steroids), Lactic acidosis            Problems taking medications regularly:  None       Medication changes since discharge: None       Problems adhering to non-medication therapy:  None    Summary of hospitalization:  [facility] discharge summary reviewed    Diagnostic Tests/Treatments reviewed.  Follow up needed: none    Other Healthcare Providers Involved in Patient s Care:         Specialist appointment - pulmonologist, hematologist, orthopedist, dermatologist.     Update since discharge: improved, but not completeyl resolved. Still having bloody stool that she didn't mention during ED visit.    Post Discharge Medication Reconciliation: discharge medications reconciled, continue medications without change other than slight increase to coumadin and recheck in 5-7 days.    Plan of care communicated with patient            PROBLEMS TO ADD ON...  Bloody loose stool for the past month. Urgency. No abdominal pain.   Has a headache. Feeling weak. No nausea.     Problem list and histories reviewed & adjusted, as indicated.  Additional history: none    Patient Active Problem List   Diagnosis     History of cocaine abuse     Personal history of tobacco use, presenting hazards to health     Family history of malignant neoplasm of gastrointestinal tract     Esophageal reflux     Chronic airway obstruction (H)     Hypertension     Sleep apnea     Migraine     ACP (advance care planning)     Factor 5 Leiden mutation, heterozygous/ INR 2-3     Health Care Home     Family history of pulmonary  embolism     Lymphedema of arm     Spinal stenosis in cervical region     History of pulmonary embolism     Major depressive disorder, recurrent episode, moderate (H)     Superior vena cava syndrome     Morbid obesity due to excess calories (H)     Displacement of lumbar intervertebral disc without myelopathy     Sepsis (H)     Past Surgical History:   Procedure Laterality Date     BIOPSY OF SKIN LESION  multiple    keloids     C ANESTH,REPAIR LO ABD HERNIA NOS  multiple/  last    panniculus     C EXPLORATORY OF ABDOMEN       C PLASTIC SURGERY, NECK  age 4     C REMOVAL COLON/ILEOSTOMY      reastamosis     C TOTAL ABDOM HYSTERECTOMY      Hysterectomy, Total Abdominal     HC REMOVAL OF OVARY/TUBE(S)      Salpingo-Oophorectomy, Unilateral     HC REMOVE ABDOMINAL WALL LESION      abscess     HC REMOVE TONSILS/ADENOIDS,<13 Y/O  age 14    T & A <14y.o.     HERNIA REPAIR       INSERTION OF ACCESS PORT  1--96    sepsis- removed after hospital stay       Social History   Substance Use Topics     Smoking status: Former Smoker     Packs/day: 0.50     Years: 35.00     Types: Cigarettes     Quit date: 2014     Smokeless tobacco: Never Used     Alcohol use 0.5 oz/week     1 Standard drinks or equivalent per week      Comment: social     Family History   Problem Relation Age of Onset     Respiratory Mother      copd     CANCER Mother      skin cancers/ melanomas     C.A.D. Mother      MI  age 79     Cancer - colorectal Father      / diagnosis age 72     GASTROINTESTINAL DISEASE Father      colitis     Breast Cancer No family hx of          Current Outpatient Prescriptions   Medication Sig Dispense Refill     levofloxacin (LEVAQUIN) 500 MG tablet Take 1 tablet (500 mg) by mouth daily for 10 days 10 tablet 0     GABAPENTIN PO Take 600 mg by mouth 2 times daily       losartan (COZAAR) 25 MG tablet Take 25 mg by mouth daily       warfarin (COUMADIN) 5 MG tablet Take 1 tablet (5 mg) by mouth daily 90  tablet 0     hydrOXYzine (ATARAX) 25 MG tablet Take 1 tablet (25 mg) by mouth At Bedtime 90 tablet 0     cyclobenzaprine (FLEXERIL) 5 MG tablet Take 1 tablet (5 mg) by mouth 3 times daily as needed for muscle spasms 30 tablet 0     citalopram (CELEXA) 40 MG tablet Take 1 tablet (40 mg) by mouth daily 90 tablet 1     cholecalciferol 1000 UNITS TABS Take 1,000 Units by mouth At Bedtime        metFORMIN (GLUCOPHAGE-XR) 500 MG 24 hr tablet Take 2 tablets (1,000 mg) by mouth 2 times daily (with meals)       multivitamin, therapeutic with minerals (MULTI-VITAMIN) TABS Take 1 tablet by mouth daily       Omega-3 Fatty Acids (OMEGA-3 FISH OIL PO) Take 1 g by mouth 2 times daily (with meals)        Probiotic Product (PROBIOTIC ACIDOPHILUS) CAPS Take 1 capsule by mouth At Bedtime        tiotropium (SPIRIVA HANDIHALER) 18 MCG inhalation capsule Inhale 1 capsule into the lungs daily. DR MELENDEZ [facility]       albuterol (2.5 MG/3ML) 0.083% nebulizer solution Take 3 mLs by nebulization every 6 hours as needed for shortness of breath / dyspnea. Dr MELENDEZ [facility]       albuterol (PROAIR HFA) 108 (90 BASE) MCG/ACT inhaler Inhale 1-2 puffs into the lungs as needed for shortness of breath / dyspnea. DR MELENDEZ [facility]       budesonide (PULMICORT FLEXHALER) 180 MCG/ACT inhaler Inhale 1 puff into the lungs 2 times daily. DR MELENDEZ pulmonology       Allergies   Allergen Reactions     Aspirin      PN: LW Reaction: sensitive/stomach     Lyrica [Pregabalin]      dizziness     Tape [Adhesive Tape] Itching     Vancomycin        Reviewed and updated as needed this visit by clinical staff  Tobacco  Allergies  Meds  Problems       Reviewed and updated as needed this visit by Provider         ROS:  Constitutional, HEENT, cardiovascular, pulmonary, gi and gu systems are negative, except as otherwise noted.    OBJECTIVE:     BP (!) 148/92 (BP Location: Right arm, Patient Position: Chair, Cuff Size: Adult Large)   "Pulse 88  Temp 98.1  F (36.7  C) (Oral)  Resp 20  Ht 1.664 m (5' 5.5\")  Wt (!) 171 kg (377 lb)  Breastfeeding? No  BMI 61.78 kg/m2  Body mass index is 61.78 kg/(m^2).  GENERAL: healthy, alert and no distress  NECK: no adenopathy, no asymmetry, masses, or scars and thyroid normal to palpation  RESP: lungs clear to auscultation - no rales, rhonchi or wheezes  CV: regular rate and rhythm, normal S1 S2, no S3 or S4, no murmur, click or rub, no peripheral edema and peripheral pulses strong  ABDOMEN: soft, nontender, no hepatosplenomegaly, no masses and bowel sounds normal  MS: no gross musculoskeletal defects noted, no edema    Diagnostic Test Results:  Pending CMP, CBC WNL other than elevated hgb/hct. UA shows mild proteinuria.   urine culture pending. A1c 6.2%. INR 1.8    ASSESSMENT/PLAN:     Diabetes Type II, A1c Controlled, non-insulin dependent   Associated with the following complications    Renal Complications:  None    Ophthalmologic Complications: None    Neurologic Complications: None    Peripheral Vascular Complications:  None    Other: None   Plan:  No changes in the patient's current treatment plan    COPD: No recent records from pulmonologist, improved since hospital  Associated with the following complications:  None    Plan:  No changes in the patient's current treatment plan  COPD education: www.lungmn.org          1. History of pulmonary embolism  Increased coumadin to 7.5 mg M/F, and 5 mg remainder of days.  - PROTHROMBIN TIME/INR  - VENOUS COLLECTION    2. Lactic acid blood increased  - Hemoglobin A1c (BFP)  No concerns. If diarrhea persists, will consider changing metformin    3. Abnormal urine  Will recheck culture.  - HCL  Urinalysis, Routine (BFP)    4. Bacterial sepsis (H)  CBC returned to normal other than signs of dehydration. Encouraged rehydration.  - Comprehensive metabolic panel  - HEMOGRAM/PLATELET (BFP)  - Urine Culture Aerobic Bacterial    5. Chronic obstructive pulmonary disease, " unspecified COPD type (H)  Continue inhalers, if not continuing to do well at next weeks appts, will consider change in doses, medications.      FUTURE APPOINTMENTS:       - Follow-up visit in 5-7 days- recheck INR, breathing, bloody diarrhea.    Hyun Braden PA-C  [facility]

## 2017-06-30 NOTE — MR AVS SNAPSHOT
After Visit Summary   6/30/2017    Bere Machuca    MRN: [id number]           Patient Information     Date Of Birth          1955        Visit Information        Provider Department      6/30/2017 9:00 AM Hyun Braden PA-C [facility]        Today's Diagnoses     History of pulmonary embolism    -  1    Lactic acid blood increased        Abnormal urine        Bacterial sepsis (H)        Chronic obstructive pulmonary disease, unspecified COPD type (H)           Follow-ups after your visit        Your next 10 appointments already scheduled     Jul 05, 2017 10:15 AM CDT   Office Visit with Hyun Braden PA-C   [facility] ([facility])    [address]  [address]  [facility] [address] [phone]              Who to contact     If you have questions or need follow up information about today's clinic visit or your schedule please contact [facility] directly at [phone].  Normal or non-critical lab and imaging results will be communicated to you by [employer], letter or phone within 4 business days after the clinic has received the results. If you do not hear from us within 7 days, please contact the clinic through [employer] or phone. If you have a critical or abnormal lab result, we will notify you by phone as soon as possible.  Submit refill requests through [employer] or call your pharmacy and they will forward the refill request to us. Please allow 3 business days for your refill to be completed.          Additional Information About Your Visit        [employer] Information     [employer] gives you secure access to your electronic health record. If you see a primary care provider, you can also send messages to your care team and make appointments. If you have questions, please call your primary care clinic.  If you do not have a primary care provider, please call [phone] and  "they will assist you.        Care EveryWhere ID     This is your Care EveryWhere ID. This could be used by other organizations to access your [facility] medical records  [id number]        Your Vitals Were     Pulse Temperature Respirations Height Breastfeeding? BMI (Body Mass Index)    88 98.1  F (36.7  C) (Oral) 20 1.664 m (5' 5.5\") No 61.78 kg/m2       Blood Pressure from Last 3 Encounters:   06/30/17 (!) 148/92   06/23/17 132/81   06/21/17 118/70    Weight from Last 3 Encounters:   06/30/17 (!) 171 kg (377 lb)   06/22/17 (!) 172.8 kg (381 lb)   06/21/17 (!) 170.6 kg (376 lb)              We Performed the Following     Comprehensive metabolic panel     HCL  Urinalysis, Routine (BFP)     Hemoglobin A1c (BFP)     HEMOGRAM/PLATELET (BFP)     PROTHROMBIN TIME/INR     Urine Culture Aerobic Bacterial     VENOUS COLLECTION        Primary Care Provider Office Phone # Fax #    Wilma Armenta [phone]       [place] [address]  [facility] [address]        Equal Access to Services     LAUREN WAGNER : Hadii aad ku hadasho Soluizali, waaxda luqadaha, qaybta kaalmada adeegyada, aman love. So [facility] [phone].    ATENCIÓN: Si habla español, tiene a norman disposición servicios gratuitos de asistencia lingüística. Christian al [phone].    We comply with applicable federal civil rights laws and [place] laws. We do not discriminate on the basis of race, color, national origin, age, disability sex, sexual orientation or gender identity.            Thank you!     Thank you for choosing [facility]  for your care. Our goal is always to provide you with excellent care. Hearing back from our patients is one way we can continue to improve our services. Please take a few minutes to complete the written survey that you may receive in the mail after your visit with us. Thank you!             Your Updated Medication List - Protect others " around you: Learn how to safely use, store and throw away your medicines at www.disposemymeds.org.          This list is accurate as of: 6/30/17  4:00 PM.  Always use your most recent med list.                   Brand Name Dispense Instructions for use Diagnosis    * albuterol (2.5 MG/3ML) 0.083% neb solution      Take 3 mLs by nebulization every 6 hours as needed for shortness of breath / dyspnea. Dr ANDRE[facility]    Chronic airway obstruction, not elsewhere classified, Personal history of tobacco use, presenting hazards to health       * albuterol 108 (90 BASE) MCG/ACT Inhaler   Generic drug:  albuterol      Inhale 1-2 puffs into the lungs as needed for shortness of breath / dyspnea. DR MELENDEZ [facility]    Chronic airway obstruction, not elsewhere classified, Personal history of tobacco use, presenting hazards to health       cholecalciferol 1000 UNITS Tabs      Take 1,000 Units by mouth At Bedtime    Morbid obesity due to excess calories (H)       citalopram 40 MG tablet    celeXA    90 tablet    Take 1 tablet (40 mg) by mouth daily    Major depressive disorder, recurrent episode, moderate (H)       cyclobenzaprine 5 MG tablet    FLEXERIL    30 tablet    Take 1 tablet (5 mg) by mouth 3 times daily as needed for muscle spasms    Displacement of lumbar intervertebral disc without myelopathy, Spinal stenosis in cervical region       GABAPENTIN PO      Take 600 mg by mouth 2 times daily        hydrOXYzine 25 MG tablet    ATARAX    90 tablet    Take 1 tablet (25 mg) by mouth At Bedtime    Intrinsic eczema       levofloxacin 500 MG tablet    LEVAQUIN    10 tablet    Take 1 tablet (500 mg) by mouth daily for 10 days    Sepsis due to Escherichia coli (H)       losartan 25 MG tablet    COZAAR     Take 25 mg by mouth daily    Essential hypertension, benign       metFORMIN 500 MG 24 hr tablet    GLUCOPHAGE-XR     Take 2 tablets (1,000 mg) by mouth 2 times daily (with meals)    Morbid obesity due to  excess calories (H)       Multi-vitamin Tabs tablet      Take 1 tablet by mouth daily        OMEGA-3 FISH OIL PO      Take 1 g by mouth 2 times daily (with meals)        PROBIOTIC ACIDOPHILUS BIOBEADS Caps      Take 1 capsule by mouth At Bedtime    Myalgia and myositis, unspecified       PULMICORT FLEXHALER 180 MCG/ACT inhaler   Generic drug:  budesonide      Inhale 1 puff into the lungs 2 times daily. DR ANDREWS pulmonology    Chronic airway obstruction, not elsewhere classified, Personal history of tobacco use, presenting hazards to health       SPIRIVA HANDIHALER 18 MCG capsule   Generic drug:  tiotropium      Inhale 1 capsule into the lungs daily. DR ANDREWS [facility]    Chronic airway obstruction, not elsewhere classified, Personal history of tobacco use, presenting hazards to health       warfarin 5 MG tablet    COUMADIN    90 tablet    Take 1 tablet (5 mg) by mouth daily    Factor 5 Leiden mutation, heterozygous (H), History of pulmonary embolism       * Notice:  This list has 2 medication(s) that are the same as other medications prescribed for you. Read the directions carefully, and ask your doctor or other care provider to review them with you.

## 2017-06-30 NOTE — NURSING NOTE
Bere Machuca is here for a hospital F/U and INR.  Questioned patient about current smoking habits.  Pt. quit smoking some time ago.  PULSE regular  My Chart: active  CLASSIFICATION OF OVERWEIGHT AND OBESITY BY BMI                        Obesity Class           BMI(kg/m2)  Underweight                                    < 18.5  Normal                                         18.5-24.9  Overweight                                     25.0-29.9  OBESITY                     I                  30.0-34.9                             II                 35.0-39.9  EXTREME OBESITY             III                >40                            Patient's  BMI Body mass index is 61.78 kg/(m^2).  http://hin.nhlbi.nih.gov/menuplanner/menu.cgi  Pre-visit planning  Immunizations - up to date  Colonoscopy - declines for now  Mammogram - is up to date  Asthma -   PHQ9 -    MILEY-7 -

## 2017-07-01 LAB
ALBUMIN SERPL-MCNC: 3.9 G/DL (ref 3.6–5.1)
ALBUMIN/GLOB SERPL: 1.4 (CALC) (ref 1–2.5)
ALP SERPL-CCNC: 62 U/L (ref 33–130)
ALT SERPL-CCNC: 35 U/L (ref 6–29)
AST SERPL-CCNC: 14 U/L (ref 10–35)
BILIRUB SERPL-MCNC: 0.5 MG/DL (ref 0.2–1.2)
BUN SERPL-MCNC: 18 MG/DL (ref 7–25)
BUN/CREATININE RATIO: ABNORMAL (CALC) (ref 6–22)
CALCIUM SERPL-MCNC: 9.4 MG/DL (ref 8.6–10.4)
CHLORIDE SERPLBLD-SCNC: 102 MMOL/L (ref 98–110)
CO2 SERPL-SCNC: 24 MMOL/L (ref 20–31)
CREAT SERPL-MCNC: 0.83 MG/DL (ref 0.5–1.05)
EGFR AFRICAN AMERICAN - QUEST: 89 ML/MIN/1.73M2
GFR SERPL CREATININE-BSD FRML MDRD: 77 ML/MIN/1.73M2
GLOBULIN, CALCULATED - QUEST: 2.7 G/DL (CALC) (ref 1.9–3.7)
GLUCOSE - QUEST: 97 MG/DL (ref 65–99)
POTASSIUM SERPL-SCNC: 4.3 MMOL/L (ref 3.5–5.3)
PROT SERPL-MCNC: 6.6 G/DL (ref 6.1–8.1)
SODIUM SERPL-SCNC: 139 MMOL/L (ref 135–146)

## 2017-07-03 LAB — URINE VOIDED CULTURE: ABNORMAL

## 2017-07-05 ENCOUNTER — MYC MEDICAL ADVICE (OUTPATIENT)
Dept: FAMILY MEDICINE | Facility: CLINIC | Age: 62
End: 2017-07-05

## 2017-07-05 ENCOUNTER — OFFICE VISIT (OUTPATIENT)
Dept: FAMILY MEDICINE | Facility: CLINIC | Age: 62
End: 2017-07-05

## 2017-07-05 VITALS
HEART RATE: 76 BPM | DIASTOLIC BLOOD PRESSURE: 84 MMHG | WEIGHT: 293 LBS | OXYGEN SATURATION: 98 % | TEMPERATURE: 98.2 F | BODY MASS INDEX: 62.44 KG/M2 | SYSTOLIC BLOOD PRESSURE: 132 MMHG | RESPIRATION RATE: 18 BRPM

## 2017-07-05 DIAGNOSIS — R19.7 BLOODY DIARRHEA: ICD-10-CM

## 2017-07-05 DIAGNOSIS — Z79.01 LONG TERM CURRENT USE OF ANTICOAGULANT THERAPY: Primary | ICD-10-CM

## 2017-07-05 PROBLEM — E11.9 TYPE 2 DIABETES MELLITUS WITHOUT COMPLICATION, WITHOUT LONG-TERM CURRENT USE OF INSULIN (H): Status: ACTIVE | Noted: 2017-07-05

## 2017-07-05 LAB — INR POINT OF CARE: 2.4 (ref 2–3)

## 2017-07-05 PROCEDURE — 36415 COLL VENOUS BLD VENIPUNCTURE: CPT | Performed by: PHYSICIAN ASSISTANT

## 2017-07-05 PROCEDURE — 99213 OFFICE O/P EST LOW 20 MIN: CPT | Performed by: PHYSICIAN ASSISTANT

## 2017-07-05 PROCEDURE — 85610 PROTHROMBIN TIME: CPT | Performed by: PHYSICIAN ASSISTANT

## 2017-07-05 NOTE — PROGRESS NOTES
CC: Recheck symptoms    History:    Darline is here today to f/u on her hospital f/u from last week. She continues to be out of work due to her significant diarrhea and digestional issues. Last week, labs were checked and e.coli sepsis from hospitalization is resolved and A1c showed good control at 6.2%.     Unfortunately, Darline is concerned her metformin could be causing her symptoms. She has been having bloody diarrhea since starting metformin 10/2016 when she was found to have an a1c of 6.8%.  Has also noticed breathing has been less good roughly since metformin. Has not been checking sugars at home, but does have a glucometer she could use.    She does have a history of colitis and diverticulitis, but has not seen GI specialist since she lived in [place].    INR today shows good control at 2.3. She has been taking 5 mg 5 days per week, and 10 mg 2 days per week.      PMH, MEDICATIONS, ALLERGIES, SOCIAL AND FAMILY HISTORY in EPIC and reviewed by me personally.      ROS negative other than the symptoms noted above in the HPI.        Examination   /84 (BP Location: Right arm, Cuff Size: Adult Large)  Pulse 76  Temp 98.2  F (36.8  C) (Oral)  Resp 18  Wt (!) 172.8 kg (381 lb)  SpO2 98%  BMI 62.44 kg/m2       Constitutional: Sitting comfortably, in no acute distress. Vital signs noted  Cardiovascular:  regular rate and rhythm, no murmurs, clicks, or gallops  Respiratory:  normal respiratory rate and rhythm, lungs clear to auscultation  Psychiatric: mentation appears normal and affect normal/bright        A/P    ICD-10-CM    1. Long term current use of anticoagulant therapy Z79.01 CL BFP PROTHROMBIN TIME/INR (BFP)     VENOUS COLLECTION   2. Bloody diarrhea R19.7 GASTROENTEROLOGY ADULT REF CONSULT ONLY       DISCUSSION: Discussed Darline's case with Kenia, pharm D. We agreed that Darline should try trial off of metformin. We further decided Darline would not need to take any medication in it's place, but we would  rather have her closely monitor her morning fasting sugar and 2 hours post-prandial sugars. She should follow-up on Thurs or Fri next week, seeing both myself and Kenia. We can reevaulate for return to work at that time. I will refer her to GI to get scheduled now as well, and we can always cancel if symptoms resolve prior to appt.     follow up visit: 1 week    ELDON [facility]

## 2017-07-05 NOTE — NURSING NOTE
Patient is here for a recheck.  Pulse - regular  My Chart - accepts    CLASSIFICATION OF OVERWEIGHT AND OBESITY BY BMI                         Obesity Class           BMI(kg/m2)  Underweight                                    < 18.5  Normal                                         18.5-24.9  Overweight                                     25.0-29.9  OBESITY                     I                  30.0-34.9                              II                 35.0-39.9  EXTREME OBESITY             III                >40                             Patient's  BMI Body mass index is 62.44 kg/(m^2).  http://hin.nhlbi.nih.gov/menuplanner/menu.cgi  Questioned patient about current smoking habits.  Pt. quit smoking some time ago.

## 2017-07-05 NOTE — MR AVS SNAPSHOT
After Visit Summary   7/5/2017    Bere Machuca    MRN: [id number]           Patient Information     Date Of Birth          1955        Visit Information        Provider Department      7/5/2017 10:15 AM Hyun Braden PA-C [facility]        Today's Diagnoses     Long term current use of anticoagulant therapy    -  1    Bloody diarrhea           Follow-ups after your visit        Additional Services     GASTROENTEROLOGY ADULT REF CONSULT ONLY       Preferred Location: [facility] [phone], Denny Rosario please fax if necessary. Patient will contact to schedule tomorrow if she hasn't heard from them.     Please be aware that coverage of these services is subject to the terms and limitations of your health insurance plan.  Call member services at your health plan with any benefit or coverage questions.  Any procedures must be performed at a [facility] facility OR coordinated by your clinic's referral office.    Please bring the following with you to your appointment:    (1) Any X-Rays, CTs or MRIs which have been performed.  Contact the facility where they were done to arrange for  prior to your scheduled appointment.    (2) List of current medications   (3) This referral request   (4) Any documents/labs given to you for this referral                  Who to contact     If you have questions or need follow up information about today's clinic visit or your schedule please contact [facility] directly at [phone].  Normal or non-critical lab and imaging results will be communicated to you by MyChart, letter or phone within 4 business days after the clinic has received the results. If you do not hear from us within 7 days, please contact the clinic through MyChart or phone. If you have a critical or abnormal lab result, we will notify you by phone as soon as possible.  Submit refill requests through FindMySongt or call your pharmacy  and they will forward the refill request to us. Please allow 3 business days for your refill to be completed.          Additional Information About Your Visit        [employer] Information     [employer] gives you secure access to your electronic health record. If you see a primary care provider, you can also send messages to your care team and make appointments. If you have questions, please call your primary care clinic.  If you do not have a primary care provider, please call [phone] and they will assist you.        Care EveryWhere ID     This is your Care EveryWhere ID. This could be used by other organizations to access your [facility] medical records  [id number]        Your Vitals Were     Pulse Temperature Respirations Pulse Oximetry BMI (Body Mass Index)       76 98.2  F (36.8  C) (Oral) 18 98% 62.44 kg/m2        Blood Pressure from Last 3 Encounters:   07/05/17 132/84   06/30/17 (!) 148/92   06/23/17 132/81    Weight from Last 3 Encounters:   07/05/17 (!) 172.8 kg (381 lb)   06/30/17 (!) 171 kg (377 lb)   06/22/17 (!) 172.8 kg (381 lb)              We Performed the Following     CL BFP PROTHROMBIN TIME/INR (BFP)     GASTROENTEROLOGY ADULT REF CONSULT ONLY     VENOUS COLLECTION        Primary Care Provider Office Phone # Fax #    Wilma Armenta [phone]       [facility] [address]  [facility] [address]        Equal Access to Services     ALEXANDRE WAGNER : Hadii korina robertoo Sohailey, waaxda luqadaha, qaybta kaalmada ervin, aman love. So [facility] [phone].    ATENCIÓN: Si habla español, tiene a norman disposición servicios gratuitos de asistencia lingüística. Christian al [phone].    We comply with applicable federal civil rights laws and [place] laws. We do not discriminate on the basis of race, color, national origin, age, disability sex, sexual orientation or gender identity.            Thank you!     Thank you for choosing  [facility]  for your care. Our goal is always to provide you with excellent care. Hearing back from our patients is one way we can continue to improve our services. Please take a few minutes to complete the written survey that you may receive in the mail after your visit with us. Thank you!             Your Updated Medication List - Protect others around you: Learn how to safely use, store and throw away your medicines at www.disposemymeds.org.          This list is accurate as of: 7/5/17  1:29 PM.  Always use your most recent med list.                   Brand Name Dispense Instructions for use Diagnosis    * albuterol (2.5 MG/3ML) 0.083% neb solution      Take 3 mLs by nebulization every 6 hours as needed for shortness of breath / dyspnea. Dr [facility]ELEN[facility]Z [facility]    Chronic airway obstruction, not elsewhere classified, Personal history of tobacco use, presenting hazards to health       * albuterol 108 (90 BASE) MCG/ACT Inhaler   Generic drug:  albuterol      Inhale 1-2 puffs into the lungs as needed for shortness of breath / dyspnea. DR MELENDEZ [facility]    Chronic airway obstruction, not elsewhere classified, Personal history of tobacco use, presenting hazards to health       cholecalciferol 1000 UNITS Tabs      Take 1,000 Units by mouth At Bedtime    Morbid obesity due to excess calories (H)       citalopram 40 MG tablet    celeXA    90 tablet    Take 1 tablet (40 mg) by mouth daily    Major depressive disorder, recurrent episode, moderate (H)       cyclobenzaprine 5 MG tablet    FLEXERIL    30 tablet    Take 1 tablet (5 mg) by mouth 3 times daily as needed for muscle spasms    Displacement of lumbar intervertebral disc without myelopathy, Spinal stenosis in cervical region       GABAPENTIN PO      Take 600 mg by mouth 2 times daily        hydrOXYzine 25 MG tablet    ATARAX    90 tablet    Take 1 tablet (25 mg) by mouth At Bedtime    Intrinsic eczema       losartan 25 MG  tablet    COZAAR     Take 25 mg by mouth daily    Essential hypertension, benign       metFORMIN 500 MG 24 hr tablet    GLUCOPHAGE-XR     Take 2 tablets (1,000 mg) by mouth 2 times daily (with meals)    Morbid obesity due to excess calories (H)       Multi-vitamin Tabs tablet      Take 1 tablet by mouth daily        OMEGA-3 FISH OIL PO      Take 1 g by mouth 2 times daily (with meals)        PROBIOTIC ACIDOPHILUS BIOBEADS Caps      Take 1 capsule by mouth At Bedtime    Myalgia and myositis, unspecified       PULMICORT FLEXHALER 180 MCG/ACT inhaler   Generic drug:  budesonide      Inhale 1 puff into the lungs 2 times daily. DR ANDREWS pulmonology    Chronic airway obstruction, not elsewhere classified, Personal history of tobacco use, presenting hazards to health       SPIRIVA HANDIHALER 18 MCG capsule   Generic drug:  tiotropium      Inhale 1 capsule into the lungs daily. DR ANDREWS pulmonary specialists    Chronic airway obstruction, not elsewhere classified, Personal history of tobacco use, presenting hazards to health       warfarin 5 MG tablet    COUMADIN    90 tablet    Take 1 tablet (5 mg) by mouth daily    Factor 5 Leiden mutation, heterozygous (H), History of pulmonary embolism       * Notice:  This list has 2 medication(s) that are the same as other medications prescribed for you. Read the directions carefully, and ask your doctor or other care provider to review them with you.

## 2017-07-10 NOTE — TELEPHONE ENCOUNTER
From: Hyun Braden PA-C  To: Bere Machuca  Sent: 7/5/2017 1:10 PM CDT  Subject: Recommendation    Mickey Gregorio,    I was able to have a conversation with Kenia Simmons, the pharmacist that we work with in our clinic a lot, especially with patients with diabetes.     Based on our conversation, we would like to have you stop taking the metformin, and at this time, we don't want you to take anything in it's place. Instead, we would like for you to monitor your sugar levels right away when you wake up in the morning (before any food), and then 2 hours after dinner. Please monitor and record these (bring numbers to next appt) these every day for the next 1 week. We would like you to schedule an appointment for Thursday or Friday next week where you could have a separate appt with myself and another appt with Kenia (back-to-back).    Please call our office to schedule this, and let me know if you have any questions in the meantime. I will look out for any faxes for your disability paperwork.    Hyun Braden PA-C  7/5/2017

## 2017-07-11 ENCOUNTER — TELEPHONE (OUTPATIENT)
Dept: FAMILY MEDICINE | Facility: CLINIC | Age: 62
End: 2017-07-11

## 2017-07-11 NOTE — TELEPHONE ENCOUNTER
Telephone call and scheduled appointment at [facility]  With Dr. Kasia Sheldon  Date 7/13/17  Time 10:40am    LMOM for Pt with time and address

## 2017-07-13 ENCOUNTER — CARE COORDINATION (OUTPATIENT)
Dept: CARE COORDINATION | Facility: CLINIC | Age: 62
End: 2017-07-13

## 2017-07-13 ENCOUNTER — TELEPHONE (OUTPATIENT)
Dept: FAMILY MEDICINE | Facility: CLINIC | Age: 62
End: 2017-07-13

## 2017-07-13 NOTE — PROGRESS NOTES
Clinic Care Coordination Contact    Situation: Patient chart reviewed by care coordinator.    Background: Paient recent ED visit, one previous outreach attempt and care team conversationon  Assessment: Patient is scheduled for Visit on 7-14-17 with PCP and MTM    Plan/Recommendations: RN CC will be available for face to face introduction

## 2017-07-13 NOTE — TELEPHONE ENCOUNTER
Valarie from [facility] called regarding Bere.   They would like to schedule a Colonoscopy for the Pt, but she is on Warfarin and they needs orders to have her hold this and how many doses to hold and if you are wanting to bridges her with another anticoagulant  We can leave the message orders with any one of the phone schedulers at: [phone]    Please advise.     Tika.NARESH Fenton ([facility])

## 2017-07-14 ENCOUNTER — OFFICE VISIT (OUTPATIENT)
Dept: FAMILY MEDICINE | Facility: CLINIC | Age: 62
End: 2017-07-14

## 2017-07-14 ENCOUNTER — OFFICE VISIT (OUTPATIENT)
Dept: PHARMACY | Facility: PHYSICIAN GROUP | Age: 62
End: 2017-07-14
Payer: COMMERCIAL

## 2017-07-14 ENCOUNTER — TELEPHONE (OUTPATIENT)
Dept: FAMILY MEDICINE | Facility: CLINIC | Age: 62
End: 2017-07-14

## 2017-07-14 VITALS
DIASTOLIC BLOOD PRESSURE: 88 MMHG | SYSTOLIC BLOOD PRESSURE: 118 MMHG | WEIGHT: 293 LBS | TEMPERATURE: 97.6 F | HEIGHT: 66 IN | RESPIRATION RATE: 20 BRPM | BODY MASS INDEX: 47.09 KG/M2 | HEART RATE: 68 BPM

## 2017-07-14 DIAGNOSIS — D68.51 FACTOR V LEIDEN (H): Primary | ICD-10-CM

## 2017-07-14 DIAGNOSIS — D68.51 FACTOR 5 LEIDEN MUTATION, HETEROZYGOUS (H): Primary | ICD-10-CM

## 2017-07-14 DIAGNOSIS — E11.9 TYPE 2 DIABETES MELLITUS WITHOUT COMPLICATION, WITHOUT LONG-TERM CURRENT USE OF INSULIN (H): ICD-10-CM

## 2017-07-14 DIAGNOSIS — J42 CHRONIC BRONCHITIS, UNSPECIFIED CHRONIC BRONCHITIS TYPE (H): ICD-10-CM

## 2017-07-14 LAB — INR POINT OF CARE: 2.1 (ref 0.9–1.1)

## 2017-07-14 PROCEDURE — 36415 COLL VENOUS BLD VENIPUNCTURE: CPT | Performed by: PHYSICIAN ASSISTANT

## 2017-07-14 PROCEDURE — 85610 PROTHROMBIN TIME: CPT | Performed by: PHYSICIAN ASSISTANT

## 2017-07-14 PROCEDURE — 99213 OFFICE O/P EST LOW 20 MIN: CPT | Performed by: PHYSICIAN ASSISTANT

## 2017-07-14 PROCEDURE — 99607 MTMS BY PHARM ADDL 15 MIN: CPT | Performed by: PHARMACIST

## 2017-07-14 PROCEDURE — 99605 MTMS BY PHARM NP 15 MIN: CPT | Performed by: PHARMACIST

## 2017-07-14 RX ORDER — WARFARIN SODIUM 5 MG/1
5 TABLET ORAL DAILY
Qty: 90 TABLET | Refills: 0 | Status: SHIPPED | OUTPATIENT
Start: 2017-07-14 | End: 2017-07-14

## 2017-07-14 NOTE — MR AVS SNAPSHOT
After Visit Summary   7/14/2017    Bere Machuca    MRN: [id number]           Patient Information     Date Of Birth          1955        Visit Information        Provider Department      7/14/2017 10:00 AM Kenia Simmons, [facility] MTM        Today's Diagnoses     Factor V Leiden (H)    -  1      Care Instructions    Recommendations from today's MTM visit:                                                    MTM (medication therapy management) is a service provided by a clinical pharmacist designed to help you get the most of out of your medicines.   Today we reviewed what your medicines are for, how to know if they are working, that your medicines are safe and how to make your medicine regimen as easy as possible.     1. Stop warfarin 5 day prior to colonoscopy and start Dalteparin 18,000 units twice daily stopping this 24 hours prior to procedure.      2. Start Breo inhaler 1 puff once daily - stop Pulmicort, continue with Spiriva.    Next MTM visit: Aug 4th at 9am    To schedule another MTM appointment, please call the clinic directly or you may call the MTM scheduling line at [phone] or toll-free at [phone].     My Clinical Pharmacist's contact information:                                                      It was a pleasure seeing you today!  Please feel free to contact me with any questions or concerns you have.      Kenia Simmons, Pharm.D, [facility]  Medication Therapy Management Pharmacist  [phone]    You may receive a survey about the MTM services you received.  I would appreciate your feedback to help me serve you better in the future. Please fill it out and return it when you can. Your comments will be anonymous.        Diabetes Goals:    Home Monitoring of Blood Sugars:Fasting  mg/dL and 2 hours after a meal less than 150 mg/dL.    Hemoglobin A1C: Less than 7%. Yours is   Lab Results   Component Value Date    A1C 6.2 06/30/2017    .    Blood Pressure: Less than 140/90mmHg. Yours is There were no vitals taken for this visit..      Things you can do to help lower your blood sugars:    Diet: 3 meals and 1-2 snacks per day with 45-60 grams of carbohydrates per meal and 15-30 grams of carbohydrates per snack. Try to fill your plate at least half-full with vegetables, fill one-quarter full with lean meats or protein, and also make sure you get at least some carbohydrate with every meal.    Exercise: 30 minutes per day of anything that will increase your heart rate and make you break a sweat! Gardening, walking, cleaning the house, changing the oil in your car, etc. If you feel like 30 minutes per day is too much, start small. Even lifting canned foods or working your arms with a resistance band in front of the TV can help.              Follow-ups after your visit        Your next 10 appointments already scheduled     Aug 04, 2017  9:00 AM CDT   [facility] ([facility])    [facility] [address]  [address] [place] [address] [phone]           Bring a current list of meds and any records pertaining to this visit.  For Physicals, please bring immunization records and any forms needing to be filled out.  Please arrive 10 minutes early to complete paperwork.              Who to contact     If you have questions or need follow up information about today's clinic visit or your schedule please contact [facility] directly at [phone].  Normal or non-critical lab and imaging results will be communicated to you by MyChart, letter or phone within 4 business days after the clinic has received the results. If you do not hear from us within 7 days, please contact the clinic through LaunchLabhart or phone. If you have a critical or abnormal lab result, we will notify you by phone as soon as possible.  Submit refill requests through [employer] or call  your pharmacy and they will forward the refill request to us. Please allow 3 business days for your refill to be completed.          Additional Information About Your Visit        F[employer] Information     [employer] gives you secure access to your electronic health record. If you see a primary care provider, you can also send messages to your care team and make appointments. If you have questions, please call your primary care clinic.  If you do not have a primary care provider, please call [phone] and they will assist you.        Care EveryWhere ID     This is your Care EveryWhere ID. This could be used by other organizations to access your [place] medical records  [id number]         Blood Pressure from Last 3 Encounters:   07/14/17 118/88   07/05/17 132/84   06/30/17 (!) 148/92    Weight from Last 3 Encounters:   07/14/17 (!) 173.4 kg (382 lb 3.2 oz)   07/05/17 (!) 172.8 kg (381 lb)   06/30/17 (!) 171 kg (377 lb)              Today, you had the following     No orders found for display         Today's Medication Changes          These changes are accurate as of: 7/14/17 10:35 AM.  If you have any questions, ask your nurse or doctor.               Start taking these medicines.        Dose/Directions    blood glucose lancets standard   Commonly known as:  no brand specified   Used for:  Type 2 diabetes mellitus without complication, without long-term current use of insulin (H)   Started by:  Hyun Braden PA-C        Use to test blood sugar 2-3 times daily or as directed.   Quantity:  100 each   Refills:  1       blood glucose monitoring test strip   Commonly known as:  ISAAC CONTOUR NEXT   Used for:  Type 2 diabetes mellitus without complication, without long-term current use of insulin (H)   Started by:  Hyun Braden PA-C        Use to test blood sugar 2-3 times daily or as directed.   Quantity:  100 strip   Refills:  1         These medicines have changed or have updated prescriptions.         Dose/Directions    * warfarin 5 MG tablet   Commonly known as:  COUMADIN   This may have changed:  Another medication with the same name was added. Make sure you understand how and when to take each.   Used for:  Factor 5 Leiden mutation, heterozygous (H), History of pulmonary embolism   Changed by:  Wilma Armenta MD        Dose:  5 mg   Take 1 tablet (5 mg) by mouth daily   Quantity:  90 tablet   Refills:  0       * warfarin 5 MG tablet   Commonly known as:  COUMADIN   This may have changed:  You were already taking a medication with the same name, and this prescription was added. Make sure you understand how and when to take each.   Used for:  Factor 5 Leiden mutation, heterozygous (H)   Changed by:  Hyun Braden PA-C        Dose:  5 mg   Take 1 tablet (5 mg) by mouth daily   Quantity:  90 tablet   Refills:  0       * Notice:  This list has 2 medication(s) that are the same as other medications prescribed for you. Read the directions carefully, and ask your doctor or other care provider to review them with you.         Where to get your medicines      These medications were sent to [place] [address] [place] [address] [address]     Phone:  [phone]     blood glucose lancets standard    blood glucose monitoring test strip    warfarin 5 MG tablet                Primary Care Provider Office Phone # Fax #    Wilma Armenta [phone]       [facility] [address]  [address]  [facility] [address]        Equal Access to Services     LAUREN WAGNER : Hadii korina dillard hadasho Soomaali, waaxda luqadaha, qaybta kaalmada adeegyada, aman love. So [facility] [phone].    ATENCIÓN: Si habla español, tiene a norman disposición servicios gratuitos de asistencia lingüística. Llame al [phone].    We comply with applicable federal civil rights laws and [place] laws. We do not discriminate on  the basis of race, color, national origin, age, disability sex, sexual orientation or gender identity.            Thank you!     Thank you for choosing [facility]  for your care. Our goal is always to provide you with excellent care. Hearing back from our patients is one way we can continue to improve our services. Please take a few minutes to complete the written survey that you may receive in the mail after your visit with us. Thank you!             Your Updated Medication List - Protect others around you: Learn how to safely use, store and throw away your medicines at www.disposemymeds.org.          This list is accurate as of: 7/14/17 10:35 AM.  Always use your most recent med list.                   Brand Name Dispense Instructions for use Diagnosis    * albuterol (2.5 MG/3ML) 0.083% neb solution      Take 3 mLs by nebulization every 6 hours as needed for shortness of breath / dyspnea. Dr MELENDEZ [facility]    Chronic airway obstruction, not elsewhere classified, Personal history of tobacco use, presenting hazards to health       * albuterol 108 (90 BASE) MCG/ACT Inhaler   Generic drug:  albuterol      Inhale 1-2 puffs into the lungs as needed for shortness of breath / dyspnea. DR MELENDEZ pulmonary [facility]    Chronic airway obstruction, not elsewhere classified, Personal history of tobacco use, presenting hazards to health       blood glucose lancets standard    no brand specified    100 each    Use to test blood sugar 2-3 times daily or as directed.    Type 2 diabetes mellitus without complication, without long-term current use of insulin (H)       blood glucose monitoring test strip    ISAAC CONTOUR NEXT    100 strip    Use to test blood sugar 2-3 times daily or as directed.    Type 2 diabetes mellitus without complication, without long-term current use of insulin (H)       cholecalciferol 1000 UNITS Tabs      Take 1,000 Units by mouth At Bedtime    Morbid obesity due to excess  calories (H)       citalopram 40 MG tablet    celeXA    30 tablet    TAKE 1 TABLET (40 MG) BY MOUTH DAILY    Major depressive disorder, recurrent episode, moderate (H)       cyclobenzaprine 5 MG tablet    FLEXERIL    30 tablet    Take 1 tablet (5 mg) by mouth 3 times daily as needed for muscle spasms    Displacement of lumbar intervertebral disc without myelopathy, Spinal stenosis in cervical region       GABAPENTIN PO      Take 600 mg by mouth 2 times daily        hydrOXYzine 25 MG tablet    ATARAX    90 tablet    Take 1 tablet (25 mg) by mouth At Bedtime    Intrinsic eczema       losartan 25 MG tablet    COZAAR     Take 25 mg by mouth daily    Essential hypertension, benign       metFORMIN 500 MG 24 hr tablet    GLUCOPHAGE-XR     Take 2 tablets (1,000 mg) by mouth 2 times daily (with meals)    Morbid obesity due to excess calories (H)       Multi-vitamin Tabs tablet      Take 1 tablet by mouth daily        OMEGA-3 FISH OIL PO      Take 1 g by mouth 2 times daily (with meals)        PROBIOTIC ACIDOPHILUS BIOBEADS Caps      Take 1 capsule by mouth At Bedtime    Myalgia and myositis, unspecified       PULMICORT FLEXHALER 180 MCG/ACT inhaler   Generic drug:  budesonide      Inhale 1 puff into the lungs 2 times daily. DR MELENDEZ pulmonology    Chronic airway obstruction, not elsewhere classified, Personal history of tobacco use, presenting hazards to health       SPIRIVA HANDIHALER 18 MCG capsule   Generic drug:  tiotropium      Inhale 1 capsule into the lungs daily. DR MELENDEZ pulmonary specialists    Chronic airway obstruction, not elsewhere classified, Personal history of tobacco use, presenting hazards to health       * warfarin 5 MG tablet    COUMADIN    90 tablet    Take 1 tablet (5 mg) by mouth daily    Factor 5 Leiden mutation, heterozygous (H), History of pulmonary embolism       * warfarin 5 MG tablet    COUMADIN    90 tablet    Take 1 tablet (5 mg) by mouth daily    Factor 5 Leiden mutation, heterozygous (H)        * Notice:  This list has 4 medication(s) that are the same as other medications prescribed for you. Read the directions carefully, and ask your doctor or other care provider to review them with you.

## 2017-07-14 NOTE — PROGRESS NOTES
"Indication: History of PE    INR Today:  2.1  Current Dose:  Coumadin 5 mg per day 5 days per week, and 10 mg per day 2 days per week.    Bleeding Signs/Symptoms:  Bloody diarrhea worsening over past 6 months  Including headache, stroke symptoms (confusion, weakness, slurred speech), vision changes, nosebleeds, red/black vomit, red/black stool, prolonged bleeded, dizziness, SOB, pica.  Thromboembolic Signs/Symptoms:  None  Including pain in one leg, swelling in extremity, chest pain, numbess, weakness, mental status change.      Medication Changes:  No  Dietary Changes:  No  Bacterial/Viral Infection:  No    Missed Coumadin Doses:  None    Other concerns:  Has stayed off metformin as we hoped it would help with diarrhea. It has not, but has done 9 readings. Fasting 108, after meal 109, after 102, fasting 119, fasting 105, after meal 122, fasting 107. These are all within an acceptable range.    Met with [facility] yesterday, and are working on bridging to have colonoscopy to determine cause of bloody diarrhea.    Other Concerns:  None    OBJECTIVE:  /88 (BP Location: Right arm, Patient Position: Chair, Cuff Size: Adult Large)  Pulse 68  Temp 97.6  F (36.4  C) (Oral)  Resp 20  Ht 1.664 m (5' 5.5\")  Wt (!) 173.4 kg (382 lb 3.2 oz)  BMI 62.63 kg/m2  Body mass index is 62.63 kg/(m^2).    Patient is a 61 year old female, in no acute distress. Vitals noted.  Cardiac:  Normal rhythm and rate, no murmurs, rubs or gallops.  Lungs: clear to auscultation bilaterally; no wheezes, crackles or rhonchi      ASSESSMENT and PLAN:    Therapeutic INR for goal of 2-3    New Dose: No change. 5 mg 5 days per week. 10 mg 2 days per week.    Next INR: 2 weeks    Having colonoscopy to determine cause of urgent bloody diarrhea.     Continue holding metformin at this point as glucose levels are still well controlled.    She will have [employer] fax me additional paperwork to excuse her from work until she has bowel urgency " under better control.     Hyun Braden PA-C  7/14/2017

## 2017-07-14 NOTE — PATIENT INSTRUCTIONS
Recommendations from today's MTM visit:                                                    MTM (medication therapy management) is a service provided by a clinical pharmacist designed to help you get the most of out of your medicines.   Today we reviewed what your medicines are for, how to know if they are working, that your medicines are safe and how to make your medicine regimen as easy as possible.     1. Stop warfarin 5 day prior to colonoscopy and start Dalteparin 18,000 units twice daily stopping this 24 hours prior to procedure.      2. Start Breo inhaler 1 puff once daily - stop Pulmicort, continue with Spiriva.    Next MTM visit: Aug 4th at 9am    To schedule another MTM appointment, please call the clinic directly or you may call the MTM scheduling line at [phone] or toll-free at [phone].     My Clinical Pharmacist's contact information:                                                      It was a pleasure seeing you today!  Please feel free to contact me with any questions or concerns you have.      Kenia Simmons, Pharm.D, [facility]  Medication Therapy Management Pharmacist  [phone]    You may receive a survey about the MTM services you received.  I would appreciate your feedback to help me serve you better in the future. Please fill it out and return it when you can. Your comments will be anonymous.        Diabetes Goals:    Home Monitoring of Blood Sugars:Fasting  mg/dL and 2 hours after a meal less than 150 mg/dL.    Hemoglobin A1C: Less than 7%. Yours is   Lab Results   Component Value Date    A1C 6.2 06/30/2017   .    Blood Pressure: Less than 140/90mmHg. Yours is There were no vitals taken for this visit..      Things you can do to help lower your blood sugars:    Diet: 3 meals and 1-2 snacks per day with 45-60 grams of carbohydrates per meal and 15-30 grams of carbohydrates per snack. Try to fill your plate at least half-full with vegetables, fill one-quarter full with lean  meats or protein, and also make sure you get at least some carbohydrate with every meal.    Exercise: 30 minutes per day of anything that will increase your heart rate and make you break a sweat! Gardening, walking, cleaning the house, changing the oil in your car, etc. If you feel like 30 minutes per day is too much, start small. Even lifting canned foods or working your arms with a resistance band in front of the TV can help.

## 2017-07-14 NOTE — PROGRESS NOTES
SUBJECTIVE/OBJECTIVE:                           Bere Machuca is a 61 year old female coming in for an initial visit for Medication Therapy Management.  She was referred to me from Dr. Armenta.     Chief Complaint: Bridging for colonoscopy. Saw Hyun ONTIVEROS today prior to MTM.     Allergies/ADRs: Reviewed in Epic  Tobacco: History of tobacco dependence - quit     Alcohol: Less than 1 beverage / month  PMH: Reviewed in Epic    Medication Adherence: no issues reported    Factor V Leiden/SVC syndrome: Currently on warfarin 5mg daily with 7.5mg two days per week (pt reported), adjusted per INR. She sees Dr. Spicer at [facility]. Having colonoscopy at [facility] due to size and breathing concerns. Colonoscopy is being done due to ongoing GI bleeding.   Has been bridged with Lovenox in the past as outpatient at doses of 120mg-165mg BID while she was being bridged. Based on weight of 173kg. She would require 170mg BID for treatment dosing. She is currently having bleeding issues. Discussed options in detail with Dr. Spicer including tx dosing, capped dosing, Dalteparin and timeline of stopping/starting anticoagulation. Largest syringe for Lovenox is 150mg. Greater than 1 year since PE.     COPD: Current medications include Spiriva and Pulmicort BID. Has MDI albuterol using this 4times daily right. Also has albuterol nebs. Patient is experiencing an increase in her shortness of breath, and she is coughing up stuff (crud as the patient called it) when she was on the Symbicort, so she went back to just the Pulmicort/Spiriva regimen. Hyun switched albuterol neb to once daily scheduled.     Current labs include:  BP Readings from Last 3 Encounters:   07/14/17 118/88   07/05/17 132/84   06/30/17 (!) 148/92     Today's Vitals: There were no vitals taken for this visit.  Lab Results   Component Value Date    A1C 6.2 06/30/2017   .  Lab Results   Component Value Date    CHOL 190 02/16/2008     Lab Results    Component Value Date    TRIG 125 02/16/2008     Lab Results   Component Value Date    HDL 45 02/16/2008     Lab Results   Component Value Date     02/16/2008       Liver Function Studies -   Recent Labs   Lab Test  06/30/17   1130   PROTTOTAL  6.6   ALBUMIN  3.9   BILITOTAL  0.5   ALKPHOS  62   AST  14   ALT  35*       No results found for: UCRR, MICROL, UMALCR    Last Basic Metabolic Panel:  Lab Results   Component Value Date     06/30/2017      Lab Results   Component Value Date    POTASSIUM 4.3 06/30/2017     Lab Results   Component Value Date    CHLORIDE 102 06/30/2017     Lab Results   Component Value Date    BUN 18 06/30/2017    BUN NOT APPLICABLE 06/30/2017     Lab Results   Component Value Date    CR 0.83 06/30/2017     GFR Estimate   Date Value Ref Range Status   06/30/2017 77 > OR = 60 mL/min/1.73m2 Final   06/23/2017 81 >60 mL/min/1.7m2 Final     Comment:     Non  GFR Calc   06/22/2017 74 >60 mL/min/1.7m2 Final     Comment:     Non  GFR Calc     GFR Estimate If Black   Date Value Ref Range Status   06/23/2017 >90   GFR Calc   >60 mL/min/1.7m2 Final   06/22/2017 89 >60 mL/min/1.7m2 Final     Comment:      GFR Calc   06/21/2017 >90   GFR Calc   >60 mL/min/1.7m2 Final     TSH   Date Value Ref Range Status   10/24/2016 2.85 mcU/mL Final   ]    Most Recent Immunizations   Administered Date(s) Administered     Influenza (H1N1) 11/30/2009     Influenza (IIV3) 08/17/2012     Influenza Vaccine IM 3yrs+ 4 Valent IIV4 08/24/2016     Pneumococcal 23 valent 12/01/2003     TDAP Vaccine (Boostrix) 05/21/2010       ASSESSMENT:                             Current medications were reviewed today.     Medication Adherence: no issues identified    Factor V Leiden/SVC syndrome: Needs improvement, reviewed data with patient regarding treatments and Dr. Spicer's recommendation is to use Lovenox at 150mg BID while off  anticoagulation, but stopping 24 hours prior to colonoscopy. She should stop warfarin 5 days prior to procedure.     COPD: Needs improvement, Suggest trial of Breo once daily inhaler with her Spiriva as she would benefit from LABA treatment given her frequency of ECHO. Sample demonstrated and given to patient to start. This is replacing her Pulmicort.     PLAN:                            1. Bridging plan- Stop warfarin 5 days prior to colonoscopy, start Lovenox 150mg BID. - ordered per verbal auth with Dr. Armenta as directed by Dr. Spicer's recommendation.     2. Will start Breo 200/25 once daily, stop pulmicort- verbal auth with Hyun ONTIVEROS.       I spent 30 minutes with this patient today. A copy of the visit note was provided to the patient's primary care provider.    Will follow up in Aug 4th.    The patient was given a summary of these recommendations as an after visit summary.     Kenia Simmons, Pharm.D, [facility] [place]  Medication Therapy Management Pharmacist  [phone]

## 2017-07-14 NOTE — NURSING NOTE
Bere Machuca is here for an INR and recheck.  Questioned patient about current smoking habits.  Pt. quit smoking some time ago.  PULSE regular  My Chart: active  CLASSIFICATION OF OVERWEIGHT AND OBESITY BY BMI                        Obesity Class           BMI(kg/m2)  Underweight                                    < 18.5  Normal                                         18.5-24.9  Overweight                                     25.0-29.9  OBESITY                     I                  30.0-34.9                             II                 35.0-39.9  EXTREME OBESITY             III                >40                            Patient's  BMI Body mass index is 62.63 kg/(m^2).  http://hin.nhlbi.nih.gov/menuplanner/menu.cgi  Pre-visit planning  Immunizations - up to date  Colonoscopy - is due and scheduled

## 2017-07-14 NOTE — MR AVS SNAPSHOT
After Visit Summary   7/14/2017    Bere Macuhca    MRN: [id number]           Patient Information     Date Of Birth          1955        Visit Information        Provider Department      7/14/2017 9:30 AM Hyun Braden PA-C [facility]        Today's Diagnoses     Factor 5 Leiden mutation, heterozygous/ INR 2-3    -  1    Type 2 diabetes mellitus without complication, without long-term current use of insulin (H)           Follow-ups after your visit        Follow-up notes from your care team     Return in about 2 weeks (around 7/28/2017) for Routine Visit.      Your next 10 appointments already scheduled     Aug 04, 2017  9:00 AM CDT   Office Visit with Kenia Simmons [facility] ([facility])    [address] [facility]  [address]  [facility] [id number] [phone]           Bring a current list of meds and any records pertaining to this visit.  For Physicals, please bring immunization records and any forms needing to be filled out.  Please arrive 10 minutes early to complete paperwork.              Who to contact     If you have questions or need follow up information about today's clinic visit or your schedule please contact [facility] directly at [phone].  Normal or non-critical lab and imaging results will be communicated to you by Oculus360hart, letter or phone within 4 business days after the clinic has received the results. If you do not hear from us within 7 days, please contact the clinic through Oculus360hart or phone. If you have a critical or abnormal lab result, we will notify you by phone as soon as possible.  Submit refill requests through [email or web address] or call your pharmacy and they will forward the refill request to us. Please allow 3 business days for your refill to be completed.          Additional Information About Your Visit        [employer]culus360[employer]udding [employer] Information     [email or web address] gives you  "secure access to your electronic health record. If you see a primary care provider, you can also send messages to your care team and make appointments. If you have questions, please call your primary care clinic.  If you do not have a primary care provider, please call [phone] and they will assist you.        Care EveryWhere ID     This is your Care EveryWhere ID. This could be used by other organizations to access your [facility] medical records  [id number]        Your Vitals Were     Pulse Temperature Respirations Height BMI (Body Mass Index)       68 97.6  F (36.4  C) (Oral) 20 1.664 m (5' 5.5\") 62.63 kg/m2        Blood Pressure from Last 3 Encounters:   07/14/17 118/88   07/05/17 132/84   06/30/17 (!) 148/92    Weight from Last 3 Encounters:   07/14/17 (!) 173.4 kg (382 lb 3.2 oz)   07/05/17 (!) 172.8 kg (381 lb)   06/30/17 (!) 171 kg (377 lb)              We Performed the Following     CL BFP PROTHROMBIN TIME/INR (BFP)     VENOUS COLLECTION          Today's Medication Changes          These changes are accurate as of: 7/14/17 11:59 PM.  If you have any questions, ask your nurse or doctor.               Start taking these medicines.        Dose/Directions    blood glucose lancets standard   Commonly known as:  no brand specified   Used for:  Type 2 diabetes mellitus without complication, without long-term current use of insulin (H)   Started by:  Hyun Bradne PA-C        Use to test blood sugar 2-3 times daily or as directed.   Quantity:  100 each   Refills:  1       blood glucose monitoring test strip   Commonly known as:  ISAAC CONTOUR NEXT   Used for:  Type 2 diabetes mellitus without complication, without long-term current use of insulin (H)   Started by:  Hyun Braden PA-C        Use to test blood sugar 2-3 times daily or as directed.   Quantity:  100 strip   Refills:  1       enoxaparin 150 MG/ML injection   Commonly known as:  LOVENOX   Used for:  Factor V Leiden (H)   Started by: "  Kenia Simmons RPH        Dose:  150 mg   Inject 1 mL (150 mg) Subcutaneous every 12 hours   Quantity:  12 Syringe   Refills:  0       fluticasone-vilanterol 200-25 MCG/INH oral inhaler   Commonly known as:  BREO ELLIPTA   Used for:  Chronic bronchitis, unspecified chronic bronchitis type (H)   Started by:  Kenia Simmons RPFAUSTINA        Dose:  1 puff   Inhale 1 puff into the lungs daily   Quantity:  1 Inhaler   Refills:  0         These medicines have changed or have updated prescriptions.        Dose/Directions    warfarin 5 MG tablet   Commonly known as:  COUMADIN   This may have changed:  additional instructions   Used for:  Factor 5 Leiden mutation, heterozygous (H), History of pulmonary embolism        Dose:  5 mg   Take 1 tablet (5 mg) by mouth daily   Quantity:  90 tablet   Refills:  0            Where to get your medicines      These medications were sent to [place] [address] [place] [facility] - [address] [facility] [address]     Phone:  [phone]     blood glucose lancets standard    blood glucose monitoring test strip    enoxaparin 150 MG/ML injection    fluticasone-vilanterol 200-25 MCG/INH oral inhaler                Primary Care Provider Office Phone # Fax #    Wilma Armenta [phone]       [place] [address] [address] [address]        Equal Access to Services     LAUREN WAGNER AH: Hadii korina dillard hadasho Sohailey, waaxda luqadaha, qaybta kaalmada adeegyada, aman love. So [facility] [phone].    ATENCIÓN: Si habla español, tiene a norman disposición servicios gratuitos de asistencia lingüística. Christian al [phone].    We comply with applicable federal civil rights laws and [place] laws. We do not discriminate on the basis of race, color, national origin, age, disability sex, sexual orientation or gender identity.            Thank you!     Thank you for choosing [facility]  for your care. Our goal is always to provide you with excellent care. Hearing back from our patients is one way we can continue to improve our services. Please take a few minutes to complete the written survey that you may receive in the mail after your visit with us. Thank you!             Your Updated Medication List - Protect others around you: Learn how to safely use, store and throw away your medicines at www.disposemymeds.org.          This list is accurate as of: 7/14/17 11:59 PM.  Always use your most recent med list.                   Brand Name Dispense Instructions for use Diagnosis    * albuterol (2.5 MG/3ML) 0.083% neb solution      Take 3 mLs by nebulization every 6 hours as needed for shortness of breath / dyspnea. Dr [facility]    Chronic airway obstruction, not elsewhere classified, Personal history of tobacco use, presenting hazards to health       * albuterol 108 (90 BASE) MCG/ACT Inhaler   Generic drug:  albuterol      Inhale 1-2 puffs into the lungs as needed for shortness of breath / dyspnea. DR [facility]    Chronic airway obstruction, not elsewhere classified, Personal history of tobacco use, presenting hazards to health       blood glucose lancets standard    no brand specified    100 each    Use to test blood sugar 2-3 times daily or as directed.    Type 2 diabetes mellitus without complication, without long-term current use of insulin (H)       blood glucose monitoring test strip    ISAAC CONTOUR NEXT    100 strip    Use to test blood sugar 2-3 times daily or as directed.    Type 2 diabetes mellitus without complication, without long-term current use of insulin (H)       cholecalciferol 1000 UNITS Tabs      Take 1,000 Units by mouth At Bedtime    Morbid obesity due to excess calories (H)       citalopram 40 MG tablet    celeXA    30 tablet    TAKE 1 TABLET (40 MG) BY MOUTH DAILY    Major depressive disorder, recurrent episode,  moderate (H)       cyclobenzaprine 5 MG tablet    FLEXERIL    30 tablet    Take 1 tablet (5 mg) by mouth 3 times daily as needed for muscle spasms    Displacement of lumbar intervertebral disc without myelopathy, Spinal stenosis in cervical region       enoxaparin 150 MG/ML injection    LOVENOX    12 Syringe    Inject 1 mL (150 mg) Subcutaneous every 12 hours    Factor V Leiden (H)       fluticasone-vilanterol 200-25 MCG/INH oral inhaler    BREO ELLIPTA    1 Inhaler    Inhale 1 puff into the lungs daily    Chronic bronchitis, unspecified chronic bronchitis type (H)       GABAPENTIN PO      Take 600 mg by mouth 2 times daily        hydrOXYzine 25 MG tablet    ATARAX    90 tablet    Take 1 tablet (25 mg) by mouth At Bedtime    Intrinsic eczema       losartan 25 MG tablet    COZAAR     Take 25 mg by mouth daily    Essential hypertension, benign       Multi-vitamin Tabs tablet      Take 1 tablet by mouth daily        OMEGA-3 FISH OIL PO      Take 1 g by mouth 2 times daily (with meals)        PROBIOTIC ACIDOPHILUS BIOBEADS Caps      Take 1 capsule by mouth At Bedtime    Myalgia and myositis, unspecified       SPIRIVA HANDIHALER 18 MCG capsule   Generic drug:  tiotropium      Inhale 1 capsule into the lungs daily.  [facility]    Chronic airway obstruction, not elsewhere classified, Personal history of tobacco use, presenting hazards to health       warfarin 5 MG tablet    COUMADIN    90 tablet    Take 1 tablet (5 mg) by mouth daily    Factor 5 Leiden mutation, heterozygous (H), History of pulmonary embolism       * Notice:  This list has 2 medication(s) that are the same as other medications prescribed for you. Read the directions carefully, and ask your doctor or other care provider to review them with you.

## 2017-07-14 NOTE — Clinical Note
Reviewed with Dr. Spicer again today based on previous treatment dosing and determined best to go with Lovenox 150mg BID, so this was ordered for her. She does not have a procedure date yet, but is calling to set it up.

## 2017-07-14 NOTE — TELEPHONE ENCOUNTER
[facility] is calling for orders as they are trying to schedule Bere for a colonoscopy but she is on warfarin and they need hold and bridging orders-their policy is to hold the warfarin for 4 days - please advise    ERICKA's scheduling phone # [phone] option 1, 1 and speak to any  that answers

## 2017-07-14 NOTE — PROGRESS NOTES
Clinic Care Coordination Contact  Care Team Conversations  Patient was in clinic today for visit with PCP, medication management needed priority and MTM met with patient.   RN CC spoke with care team, Patient will need follow up and RN CC will plan to meet and assess needs at that time, will be available earlier if needed.

## 2017-07-17 ENCOUNTER — TRANSFERRED RECORDS (OUTPATIENT)
Dept: FAMILY MEDICINE | Facility: CLINIC | Age: 62
End: 2017-07-17

## 2017-07-17 ENCOUNTER — TELEPHONE (OUTPATIENT)
Dept: FAMILY MEDICINE | Facility: CLINIC | Age: 62
End: 2017-07-17

## 2017-07-17 NOTE — TELEPHONE ENCOUNTER
Faxed over last 2 OV and referral for Gastro. This is an ongoing issue and does not need a PMI, there is already one in chart

## 2017-07-17 NOTE — TELEPHONE ENCOUNTER
Medical records request from [employer]. [facility] to informed them we need to have a pmi on patient before we can release records.

## 2017-07-26 ENCOUNTER — MYC MEDICAL ADVICE (OUTPATIENT)
Dept: FAMILY MEDICINE | Facility: CLINIC | Age: 62
End: 2017-07-26

## 2017-07-31 ENCOUNTER — OFFICE VISIT (OUTPATIENT)
Dept: FAMILY MEDICINE | Facility: CLINIC | Age: 62
End: 2017-07-31

## 2017-07-31 VITALS
BODY MASS INDEX: 47.09 KG/M2 | SYSTOLIC BLOOD PRESSURE: 142 MMHG | WEIGHT: 293 LBS | HEIGHT: 66 IN | HEART RATE: 68 BPM | RESPIRATION RATE: 20 BRPM | DIASTOLIC BLOOD PRESSURE: 92 MMHG | TEMPERATURE: 97.6 F

## 2017-07-31 DIAGNOSIS — Z01.818 PRE-OP EXAM: Primary | ICD-10-CM

## 2017-07-31 LAB
ERYTHROCYTE [DISTWIDTH] IN BLOOD BY AUTOMATED COUNT: 13 %
HCT VFR BLD AUTO: 43.3 % (ref 35–47)
HEMOGLOBIN: 13.7 G/DL (ref 11.7–15.7)
MCH RBC QN AUTO: 31.1 PG (ref 26–33)
MCHC RBC AUTO-ENTMCNC: 31.6 G/DL (ref 31–36)
MCV RBC AUTO: 98.5 FL (ref 78–100)
PLATELET COUNT - QUEST: 221 10^9/L (ref 150–375)
RBC # BLD AUTO: 4.4 10*12/L (ref 3.8–5.2)
WBC # BLD AUTO: 6.8 10*9/L (ref 4–11)

## 2017-07-31 PROCEDURE — 85027 COMPLETE CBC AUTOMATED: CPT | Performed by: PHYSICIAN ASSISTANT

## 2017-07-31 PROCEDURE — 99214 OFFICE O/P EST MOD 30 MIN: CPT | Performed by: PHYSICIAN ASSISTANT

## 2017-07-31 PROCEDURE — 36415 COLL VENOUS BLD VENIPUNCTURE: CPT | Performed by: PHYSICIAN ASSISTANT

## 2017-07-31 NOTE — LETTER
PRE-OP EVALUATION:  Today's date: 2017     Bere Machuca (: 1955) presents for pre-operative evaluation assessment as requested by Dr. Kasia Sheldon.  She requires evaluation and anesthesia risk assessment prior to undergoing surgery/procedure for treatment of colon .  Proposed procedure: colonoscopy     Date of Surgery/ Procedure: 17  Time of Surgery/ Procedure: 9am  Hospital/Surgical Facility: [facility]  Fax number for surgical facility: [phone]  Primary Physician: Wilma Armenta  Type of Anesthesia Anticipated: to be determined     Patient has a Health Care Directive or Living Will:  NO     1. NO - Do you have a history of heart attack, stroke, stent, bypass or surgery on an artery in the head, neck, heart or legs?  2. NO - Do you ever have any pain or discomfort in your chest?  3. NO - Do you have a history of  Heart Failure?  4. NO - Are you troubled by shortness of breath when: walking on the level, up a slight hill or at night?  5. NO - Do you currently have a cold, bronchitis or other respiratory infection?  6. NO - Do you have a cough, shortness of breath or wheezing?  7. NO - Do you sometimes get pains in the calves of your legs when you walk?  8. YES - DO YOU OR ANYONE IN YOUR FAMILY HAVE PREVIOUS HISTORY OF BLOOD CLOTS? Has history of inferior vena cava clot, and has Factor 5 Leiden.  9. NO - Do you or does anyone in your family have a serious bleeding problem such as prolonged bleeding following surgeries or cuts?  10. NO - Have you ever had problems with anemia or been told to take iron pills?  11. YES - HAVE YOU HAD ANY ABNORMAL BLOOD LOSS SUCH AS BLACK, TARRY OR BLOODY STOOLS, OR ABNORMAL VAGINAL BLEEDING? Currently having bloody loose stool which is being addressed with this procedure.  12. NO - Have you ever had a blood transfusion?  13. NO - Have you or any of your relatives ever had problems with anesthesia?  14. YES - DO YOU HAVE SLEEP APNEA, EXCESSIVE SNORING OR DAYTIME  DROWSINESS? Has sleep apnea. Wears CPAP at night.  15. NO - Do you have any prosthetic heart valves?  16. NO - Do you have prosthetic joints?  17. NO - Is there any chance that you may be pregnant?           HPI:                                                       Brief HPI related to upcoming procedure: Has been having bloody diarrhea for the past 2 months. Was loose prior to that, but that was thought to be due to metformin. Tried to have a colonoscopy 2 years ago, but was not able to given to INR level.        DIABETES - Patient has a longstanding history of DiabetesType Type II . Patient is being treated with diet and denies significant side effects. Control has been good.                          .  HYPERTENSION - Patient has longstanding history of mod-severe HTN , currently denies any symptoms referable to elevated blood pressure. Specifically denies chest pain, palpitations, dyspnea, orthopnea, PND or peripheral edema. Blood pressure readings have been in normal range. Current medication regimen is as listed below. Patient denies any side effects of medication.                                                                                                                                                                                            COPD - Patient has a longstanding history of moderate-severe COPD . Patient has been doing well overall noting { and continues on medication regimen consisting of Breo without adverse reactions or side effects.                                                                                                         .             MEDICAL HISTORY:            Patient Active Problem List     Diagnosis Date Noted     Type 2 diabetes mellitus without complication, without long-term current use of insulin (H) 07/05/2017       Priority: Medium     Sepsis (H) 06/22/2017       Priority: Medium     Displacement of lumbar intervertebral disc without myelopathy 02/03/2017        Priority: Medium     Morbid obesity due to excess calories (H) 09/19/2016       Priority: Medium     Superior vena cava syndrome         Priority: Medium     Major depressive disorder, recurrent episode, moderate (H) 09/17/2015       Priority: Medium     History of pulmonary embolism 02/27/2015       Priority: Medium     Spinal stenosis in cervical region 07/28/2014       Priority: Medium     Lymphedema of arm 03/08/2013       Priority: Medium     Health Care Home 11/21/2012       Priority: Medium       State Tier Level:  Tier 2  Status:  n/a  Care Coordinator:    See Letters for [facility] Care Plan           ACP (advance care planning) 05/18/2012       Priority: Medium       Advance Care Planning:   ACP Review and Resources Provided:  Reviewed chart for advance care plan.  Bere Machuca has no plan or code status on file however states presence of ACP document. Copy requested. Confirmed code status reflects current choices pending receipt of document/advance care plan review. Confirmed/documented designated decision maker(s). See permanent comments section of demographics in clinical tab.   Added by Liz Lopez on 1/20/2014                 Factor 5 Leiden mutation, heterozygous/ INR 2-3 05/18/2012       Priority: Medium     Family history of pulmonary embolism 03/03/2012       Priority: Medium     Sleep apnea 08/27/2010       Priority: Medium     Migraine 08/27/2010       Priority: Medium       Problem list name updated by automated process. Provider to review     History of cocaine abuse 10/10/2008       Priority: Medium     Personal history of tobacco use, presenting hazards to health 10/10/2008       Priority: Medium     Family history of malignant neoplasm of gastrointestinal tract 10/10/2008       Priority: Medium     Esophageal reflux 10/10/2008       Priority: Medium     Chronic airway obstruction (H) 10/10/2008       Priority: Medium       Problem list name updated by automated process. Provider to review      Hypertension 02/22/2008       Priority: Medium           Past Medical History:   Diagnosis Date     Anxiety       Arthritis       Clotting disorder (H)       COPD (chronic obstructive pulmonary disease) (H)       Depressive disorder       Diabetes (H)       Hypertension       Lymphedema of arm 3/8/2013     Morbidly obese (H) 9/25/2009     Pulmonary embolism (H)       Sleep apnea       Substance abuse       Superior vena cava syndrome        Past Surgical History:   Procedure Laterality Date     BIOPSY OF SKIN LESION   multiple     keloids     C ANESTH,REPAIR LO ABD HERNIA NOS   multiple/ 2006 last     panniculus     C EXPLORATORY OF ABDOMEN   1996     C PLASTIC SURGERY, NECK   age 4     C REMOVAL COLON/ILEOSTOMY         reastamosis     C TOTAL ABDOM HYSTERECTOMY   1992     Hysterectomy, Total Abdominal     HC REMOVAL OF OVARY/TUBE(S)   1992     Salpingo-Oophorectomy, Unilateral     HC REMOVE ABDOMINAL WALL LESION   2006     abscess     HC REMOVE TONSILS/ADENOIDS,<13 Y/O   age 14     T & A <14y.o.     HERNIA REPAIR         INSERTION OF ACCESS PORT   1-4-96     sepsis- removed after hospital stay             Current Outpatient Prescriptions   Medication Sig Dispense Refill     blood glucose monitoring (ISAAC CONTOUR NEXT) test strip Use to test blood sugar 2-3 times daily or as directed. 100 strip 1     blood glucose (NO BRAND SPECIFIED) lancets standard Use to test blood sugar 2-3 times daily or as directed. 100 each 1     fluticasone-vilanterol (BREO ELLIPTA) 200-25 MCG/INH oral inhaler Inhale 1 puff into the lungs daily 1 Inhaler 0     enoxaparin (LOVENOX) 150 MG/ML injection Inject 1 mL (150 mg) Subcutaneous every 12 hours 12 Syringe 0     citalopram (CELEXA) 40 MG tablet TAKE 1 TABLET (40 MG) BY MOUTH DAILY 30 tablet 0     GABAPENTIN PO Take 600 mg by mouth 2 times daily         losartan (COZAAR) 25 MG tablet Take 25 mg by mouth daily         hydrOXYzine (ATARAX) 25 MG tablet Take 1 tablet (25 mg) by mouth At  Bedtime 90 tablet 0     cyclobenzaprine (FLEXERIL) 5 MG tablet Take 1 tablet (5 mg) by mouth 3 times daily as needed for muscle spasms 30 tablet 0     cholecalciferol 1000 UNITS TABS Take 1,000 Units by mouth At Bedtime          multivitamin, therapeutic with minerals (MULTI-VITAMIN) TABS Take 1 tablet by mouth daily         Omega-3 Fatty Acids (OMEGA-3 FISH OIL PO) Take 1 g by mouth 2 times daily (with meals)          Probiotic Product (PROBIOTIC ACIDOPHILUS) CAPS Take 1 capsule by mouth At Bedtime          tiotropium (SPIRIVA HANDIHALER) 18 MCG inhalation capsule Inhale 1 capsule into the lungs daily. DR ANDREWS [facility]         albuterol (2.5 MG/3ML) 0.083% nebulizer solution Take 3 mLs by nebulization every 6 hours as needed for shortness of breath / dyspnea. Dr MELENDEZ [facility]         albuterol (PROAIR HFA) 108 (90 BASE) MCG/ACT inhaler Inhale 1-2 puffs into the lungs as needed for shortness of breath / dyspnea. DR MELENDEZ [facility]         warfarin (COUMADIN) 5 MG tablet Take 1 tablet (5 mg) by mouth daily (Patient taking differently: Take 5 mg by mouth daily Taking 7.5mg twice per week.) 90 tablet 0      OTC products: None, except as noted above           Allergies   Allergen Reactions     Aspirin         PN: LW Reaction: sensitive/stomach     Lyrica [Pregabalin]         dizziness     Tape [Adhesive Tape] Itching     Vancomycin        Latex Allergy: NO             Social History   Substance Use Topics     Smoking status: Former Smoker       Packs/day: 0.50       Years: 35.00       Types: Cigarettes       Quit date: 8/8/2014     Smokeless tobacco: Never Used     Alcohol use 0.5 oz/week        1 Standard drinks or equivalent per week          Comment: social          History   Drug Use No         REVIEW OF SYSTEMS:      Constitutional, neuro, ENT, endocrine, pulmonary, cardiac, gastrointestinal, genitourinary, musculoskeletal, integument and psychiatric systems are negative, except  "as otherwise noted.        EXAM:      BP (!) 142/92 (BP Location: Right arm, Patient Position: Chair, Cuff Size: Adult Large)  Pulse 68  Temp 97.6  F (36.4  C) (Oral)  Resp 20  Ht 1.664 m (5' 5.5\")  Wt (!) 174.4 kg (384 lb 6.4 oz)  BMI 62.99 kg/m2    GENERAL APPEARANCE: healthy, alert and no distress     EYES: EOMI, PERRL     HENT: ear canals and TM's normal and nose and mouth without ulcers or lesions     NECK: no adenopathy, no asymmetry, masses, or scars and thyroid normal to palpation     RESP: lungs clear to auscultation - no rales, rhonchi or wheezes     CV: regular rates and rhythm, normal S1 S2, no S3 or S4 and no murmur, click or rub     ABDOMEN:  soft, nontender, no HSM or masses and bowel sounds normal     MS: extremities normal- no gross deformities noted, no evidence of inflammation in joints, FROM in all extremities.     SKIN: no suspicious lesions or rashes     NEURO: Normal strength and tone, sensory exam grossly normal, mentation intact and speech normal     PSYCH: mentation appears normal. and affect normal/bright     LYMPHATICS: No axillary, cervical, or supraclavicular nodes     DIAGNOSTICS:      EKG: appears normal, NSR, normal axis, normal intervals, no acute ST/T changes c/w ischemia, no LVH by voltage criteria, unchanged from previous tracings. Had been stable since 1/2016  Hemoglobin (indicated for history of anemia or procedure with significant blood loss such as tonsillectomy, major intraperitoneal surgery, vascular surgery, major spine surgery, total joint replacement)  Serum Potassium  Hemoglobin A1C- 6/30/2017- 6.2%             Office Visit on 07/31/2017   Component Date Value Ref Range Status     WBC 07/31/2017 6.8  4.0 - 11 10*9/L Final     RBC Count 07/31/2017 4.40  3.8 - 5.2 10*12/L Final     Hemoglobin 07/31/2017 13.7  11.7 - 15.7 g/dL Final     Hematocrit 07/31/2017 43.3  35.0 - 47.0 % Final     MCV 07/31/2017 98.5  78 - 100 fL Final     MCH 07/31/2017 31.1  26 - 33 pg Final "     [facility] 07/31/2017 31.6  31 - 36 g/dL Final     RDW 07/31/2017 13.0  % Final     Platelet Count 07/31/2017 221  150 - 375 10^9/L Final         IMPRESSION:      Reason for surgery/procedure: Bloody diarrhea  Diagnosis/reason for consult: Pre-operative examination   The proposed surgical procedure is considered INTERMEDIATE risk.     REVISED CARDIAC RISK INDEX  The patient has the following serious cardiovascular risks for perioperative complications such as (MI, PE, VFib and 3  AV Block):  Diabetes Mellitus (on Insulin)  INTERPRETATION: 1 risks: Class II (low risk - 0.9% complication rate)     The patient has the following additional risks for perioperative complications:  No identified additional risks         ICD-10-CM     1. Pre-op exam Z01.818 HEMOGRAM/PLATELET (BFP)       VENOUS COLLECTION         RECOMMENDATIONS:         --Patient is to take all scheduled medications on the day of surgery EXCEPT for modifications listed below.     APPROVAL GIVEN to proceed with proposed procedure, without further diagnostic evaluation     1. Seven days before surgery do not take Aspirin or any over-the-counter pain medications other than Tylenol.  TYLENOL is the safest pain pill to use before surgery because it does not affect your bleeding time. If tylenol is not sufficient for pain control talk to me or the surgeon and we will decide what is safe to use.     2. Do not eat anything after midnight  (bo of the surgery) and nothing the morning of the surgery.     3. Medications: Take all medications as normal other that warfarin which was stopped 5 days prior to surgery, and has been doing Lovenox bridging until day prior to surgery.     4. Follow all instructions given by the surgery team. They usually give out a packet. Read it and please follow it precisely. This helps surgical experience and outcomes.     5. If you have any questions do not hesitate to call me or the surgeon/surgical team.        Hyun Braden,  ELDON  [facility]               Signed Electronically by: Hyun Braden PA-C

## 2017-07-31 NOTE — MR AVS SNAPSHOT
After Visit Summary   7/31/2017    Bere Machuca    MRN: [id number]           Patient Information     Date Of Birth          1955        Visit Information        Provider Department      7/31/2017 4:15 PM Hyun Braden PA-C [facility]        Today's Diagnoses     Pre-op exam    -  1       Follow-ups after your visit        Follow-up notes from your care team     Return if symptoms worsen or fail to improve.      Your next 10 appointments already scheduled     Aug 04, 2017  9:00 AM CDT   Office Visit with Kenia Simmons [facility] ([facility])    [address]  [address]  [facility] [address] [phone]           Bring a current list of meds and any records pertaining to this visit. For Physicals, please bring immunization records and any forms needing to be filled out. Please arrive 10 minutes early to complete paperwork.              Who to contact     If you have questions or need follow up information about today's clinic visit or your schedule please contact [facility] directly at [phone].  Normal or non-critical lab and imaging results will be communicated to you by Presentainhart, letter or phone within 4 business days after the clinic has received the results. If you do not hear from us within 7 days, please contact the clinic through Presentainhart or phone. If you have a critical or abnormal lab result, we will notify you by phone as soon as possible.  Submit refill requests through [email or web address] or call your pharmacy and they will forward the refill request to us. Please allow 3 business days for your refill to be completed.          Additional Information About Your Visit        MyChart Information     [email or web address] gives you secure access to your electronic health record. If you see a primary care provider, you can also send messages to your care team and make appointments. If  "you have questions, please call your primary care clinic.  If you do not have a primary care provider, please call [phone] and they will assist you.        Care EveryWhere ID     This is your Care EveryWhere ID. This could be used by other organizations to access your [place] medical records  [id number]        Your Vitals Were     Pulse Temperature Respirations Height BMI (Body Mass Index)       68 97.6  F (36.4  C) (Oral) 20 1.664 m (5' 5.5\") 62.99 kg/m2        Blood Pressure from Last 3 Encounters:   07/31/17 (!) 142/92   07/14/17 118/88   07/05/17 132/84    Weight from Last 3 Encounters:   07/31/17 (!) 174.4 kg (384 lb 6.4 oz)   07/14/17 (!) 173.4 kg (382 lb 3.2 oz)   07/05/17 (!) 172.8 kg (381 lb)              We Performed the Following     HEMOGRAM/PLATELET (BFP)     VENOUS COLLECTION          Today's Medication Changes          These changes are accurate as of: 7/31/17  4:47 PM.  If you have any questions, ask your nurse or doctor.               These medicines have changed or have updated prescriptions.        Dose/Directions    warfarin 5 MG tablet   Commonly known as:  COUMADIN   This may have changed:  additional instructions   Used for:  Factor 5 Leiden mutation, heterozygous (H), History of pulmonary embolism        Dose:  5 mg   Take 1 tablet (5 mg) by mouth daily   Quantity:  90 tablet   Refills:  0                Primary Care Provider Office Phone # Fax #    Wilma Armenta [phone]       [facility] [address]  [facility] [address]        Equal Access to Services     ALEXANDRE WAGNER AH: Hadprince Thompson, luis schmidt, qaaman torrez. So [facility] [phone].    ATENCIÓN: Si habla español, tiene a norman disposición servicios gratuitos de asistencia lingüística. Llame al [phone].    We comply with applicable federal civil rights laws and [place] laws. We do not discriminate on the " basis of race, color, national origin, age, disability sex, sexual orientation or gender identity.            Thank you!     Thank you for choosing [facility]  for your care. Our goal is always to provide you with excellent care. Hearing back from our patients is one way we can continue to improve our services. Please take a few minutes to complete the written survey that you may receive in the mail after your visit with us. Thank you!             Your Updated Medication List - Protect others around you: Learn how to safely use, store and throw away your medicines at www.disposemymeds.org.          This list is accurate as of: 7/31/17  4:47 PM.  Always use your most recent med list.                   Brand Name Dispense Instructions for use Diagnosis    * albuterol (2.5 MG/3ML) 0.083% neb solution      Take 3 mLs by nebulization every 6 hours as needed for shortness of breath / dyspnea. Dr ANDRE[facility]    Chronic airway obstruction, not elsewhere classified, Personal history of tobacco use, presenting hazards to health       * albuterol 108 (90 BASE) MCG/ACT Inhaler   Generic drug:  albuterol      Inhale 1-2 puffs into the lungs as needed for shortness of breath / dyspnea. DR MELENDEZ [facility]    Chronic airway obstruction, not elsewhere classified, Personal history of tobacco use, presenting hazards to health       blood glucose lancets standard    no brand specified    100 each    Use to test blood sugar 2-3 times daily or as directed.    Type 2 diabetes mellitus without complication, without long-term current use of insulin (H)       blood glucose monitoring test strip    ISAAC CONTOUR NEXT    100 strip    Use to test blood sugar 2-3 times daily or as directed.    Type 2 diabetes mellitus without complication, without long-term current use of insulin (H)       cholecalciferol 1000 UNITS Tabs      Take 1,000 Units by mouth At Bedtime    Morbid obesity due to excess  calories (H)       citalopram 40 MG tablet    celeXA    30 tablet    TAKE 1 TABLET (40 MG) BY MOUTH DAILY    Major depressive disorder, recurrent episode, moderate (H)       cyclobenzaprine 5 MG tablet    FLEXERIL    30 tablet    Take 1 tablet (5 mg) by mouth 3 times daily as needed for muscle spasms    Displacement of lumbar intervertebral disc without myelopathy, Spinal stenosis in cervical region       enoxaparin 150 MG/ML injection    LOVENOX    12 Syringe    Inject 1 mL (150 mg) Subcutaneous every 12 hours    Factor V Leiden (H)       fluticasone-vilanterol 200-25 MCG/INH oral inhaler    BREO ELLIPTA    1 Inhaler    Inhale 1 puff into the lungs daily    Chronic bronchitis, unspecified chronic bronchitis type (H)       GABAPENTIN PO      Take 600 mg by mouth 2 times daily        hydrOXYzine 25 MG tablet    ATARAX    90 tablet    Take 1 tablet (25 mg) by mouth At Bedtime    Intrinsic eczema       losartan 25 MG tablet    COZAAR     Take 25 mg by mouth daily    Essential hypertension, benign       Multi-vitamin Tabs tablet      Take 1 tablet by mouth daily        OMEGA-3 FISH OIL PO      Take 1 g by mouth 2 times daily (with meals)        PROBIOTIC ACIDOPHILUS BIOBEADS Caps      Take 1 capsule by mouth At Bedtime    Myalgia and myositis, unspecified       warfarin 5 MG tablet    COUMADIN    90 tablet    Take 1 tablet (5 mg) by mouth daily    Factor 5 Leiden mutation, heterozygous (H), History of pulmonary embolism       * Notice:  This list has 2 medication(s) that are the same as other medications prescribed for you. Read the directions carefully, and ask your doctor or other care provider to review them with you.

## 2017-07-31 NOTE — PROGRESS NOTES
[facility]  [address]  [address]  [facility] [address]  [phone]  Dept: [phone]    PRE-OP EVALUATION:  Today's date: 2017    Bere Machuca (: 1955) presents for pre-operative evaluation assessment as requested by Dr. Kasia Sheldon.  She requires evaluation and anesthesia risk assessment prior to undergoing surgery/procedure for treatment of colon .  Proposed procedure: colonoscopy    Date of Surgery/ Procedure: 17  Time of Surgery/ Procedure: 9am  Hospital/Surgical Facility: [facility]  Fax number for surgical facility: [phone]  Primary Physician: Wilma Armenta  Type of Anesthesia Anticipated: to be determined    Patient has a Health Care Directive or Living Will:  NO    1. NO - Do you have a history of heart attack, stroke, stent, bypass or surgery on an artery in the head, neck, heart or legs?  2. NO - Do you ever have any pain or discomfort in your chest?  3. NO - Do you have a history of  Heart Failure?  4. NO - Are you troubled by shortness of breath when: walking on the level, up a slight hill or at night?  5. NO - Do you currently have a cold, bronchitis or other respiratory infection?  6. NO - Do you have a cough, shortness of breath or wheezing?  7. NO - Do you sometimes get pains in the calves of your legs when you walk?  8. YES - DO YOU OR ANYONE IN YOUR FAMILY HAVE PREVIOUS HISTORY OF BLOOD CLOTS? Has history of inferior vena cava clot, and has Factor 5 Leiden.  9. NO - Do you or does anyone in your family have a serious bleeding problem such as prolonged bleeding following surgeries or cuts?  10. NO - Have you ever had problems with anemia or been told to take iron pills?  11. YES - HAVE YOU HAD ANY ABNORMAL BLOOD LOSS SUCH AS BLACK, TARRY OR BLOODY STOOLS, OR ABNORMAL VAGINAL BLEEDING? Currently having bloody loose stool which is being addressed with this procedure.  12. NO - Have you ever had a blood transfusion?  13. NO - Have  you or any of your relatives ever had problems with anesthesia?  14. YES - DO YOU HAVE SLEEP APNEA, EXCESSIVE SNORING OR DAYTIME DROWSINESS? Has sleep apnea. Wears CPAP at night.  15. NO - Do you have any prosthetic heart valves?  16. NO - Do you have prosthetic joints?  17. NO - Is there any chance that you may be pregnant?        HPI:                                                      Brief HPI related to upcoming procedure: Has been having bloody diarrhea for the past 2 months. Was loose prior to that, but that was thought to be due to metformin. Tried to have a colonoscopy 2 years ago, but was not able to given to INR level.      DIABETES - Patient has a longstanding history of DiabetesType Type II . Patient is being treated with diet and denies significant side effects. Control has been good.                          .  HYPERTENSION - Patient has longstanding history of mod-severe HTN , currently denies any symptoms referable to elevated blood pressure. Specifically denies chest pain, palpitations, dyspnea, orthopnea, PND or peripheral edema. Blood pressure readings have been in normal range. Current medication regimen is as listed below. Patient denies any side effects of medication.                                                                                                                                                                                            COPD - Patient has a longstanding history of moderate-severe COPD . Patient has been doing well overall noting { and continues on medication regimen consisting of Breo without adverse reactions or side effects.                                                                                                         .    MEDICAL HISTORY:                                                    Patient Active Problem List    Diagnosis Date Noted     Type 2 diabetes mellitus without complication, without long-term current use of insulin (H) 07/05/2017      Priority: Medium     Sepsis (H) 06/22/2017     Priority: Medium     Displacement of lumbar intervertebral disc without myelopathy 02/03/2017     Priority: Medium     Morbid obesity due to excess calories (H) 09/19/2016     Priority: Medium     Superior vena cava syndrome      Priority: Medium     Major depressive disorder, recurrent episode, moderate (H) 09/17/2015     Priority: Medium     History of pulmonary embolism 02/27/2015     Priority: Medium     Spinal stenosis in cervical region 07/28/2014     Priority: Medium     Lymphedema of arm 03/08/2013     Priority: Medium     Health Care Home 11/21/2012     Priority: Medium     State Tier Level:  Tier 2  Status:  n/a  Care Coordinator:    See Letters for [facility] Care Plan           ACP (advance care planning) 05/18/2012     Priority: Medium     Advance Care Planning:   ACP Review and Resources Provided:  Reviewed chart for advance care plan.  Bere SHARMIN Machuca has no plan or code status on file however states presence of ACP document. Copy requested. Confirmed code status reflects current choices pending receipt of document/advance care plan review. Confirmed/documented designated decision maker(s). See permanent comments section of demographics in clinical tab.   Added by Liz Loepz on 1/20/2014               Factor 5 Leiden mutation, heterozygous/ INR 2-3 05/18/2012     Priority: Medium     Family history of pulmonary embolism 03/03/2012     Priority: Medium     Sleep apnea 08/27/2010     Priority: Medium     Migraine 08/27/2010     Priority: Medium     Problem list name updated by automated process. Provider to review       History of cocaine abuse 10/10/2008     Priority: Medium     Personal history of tobacco use, presenting hazards to health 10/10/2008     Priority: Medium     Family history of malignant neoplasm of gastrointestinal tract 10/10/2008     Priority: Medium     Esophageal reflux 10/10/2008     Priority: Medium     Chronic airway obstruction (H)  10/10/2008     Priority: Medium     Problem list name updated by automated process. Provider to review       Hypertension 02/22/2008     Priority: Medium      Past Medical History:   Diagnosis Date     Anxiety      Arthritis      Clotting disorder (H)      COPD (chronic obstructive pulmonary disease) (H)      Depressive disorder      Diabetes (H)      Hypertension      Lymphedema of arm 3/8/2013     Morbidly obese (H) 9/25/2009     Pulmonary embolism (H)      Sleep apnea      Substance abuse      Superior vena cava syndrome      Past Surgical History:   Procedure Laterality Date     BIOPSY OF SKIN LESION  multiple    keloids     C ANESTH,REPAIR LO ABD HERNIA NOS  multiple/ 2006 last    panniculus     C EXPLORATORY OF ABDOMEN  1996     C PLASTIC SURGERY, NECK  age 4     C REMOVAL COLON/ILEOSTOMY      reastamosis     C TOTAL ABDOM HYSTERECTOMY  1992    Hysterectomy, Total Abdominal     HC REMOVAL OF OVARY/TUBE(S)  1992    Salpingo-Oophorectomy, Unilateral     HC REMOVE ABDOMINAL WALL LESION  2006    abscess     HC REMOVE TONSILS/ADENOIDS,<11 Y/O  age 14    T & A <14y.o.     HERNIA REPAIR       INSERTION OF ACCESS PORT  1-4-96    sepsis- removed after hospital stay     Current Outpatient Prescriptions   Medication Sig Dispense Refill     blood glucose monitoring (ISAAC CONTOUR NEXT) test strip Use to test blood sugar 2-3 times daily or as directed. 100 strip 1     blood glucose (NO BRAND SPECIFIED) lancets standard Use to test blood sugar 2-3 times daily or as directed. 100 each 1     fluticasone-vilanterol (BREO ELLIPTA) 200-25 MCG/INH oral inhaler Inhale 1 puff into the lungs daily 1 Inhaler 0     enoxaparin (LOVENOX) 150 MG/ML injection Inject 1 mL (150 mg) Subcutaneous every 12 hours 12 Syringe 0     citalopram (CELEXA) 40 MG tablet TAKE 1 TABLET (40 MG) BY MOUTH DAILY 30 tablet 0     GABAPENTIN PO Take 600 mg by mouth 2 times daily       losartan (COZAAR) 25 MG tablet Take 25 mg by mouth daily       hydrOXYzine  (ATARAX) 25 MG tablet Take 1 tablet (25 mg) by mouth At Bedtime 90 tablet 0     cyclobenzaprine (FLEXERIL) 5 MG tablet Take 1 tablet (5 mg) by mouth 3 times daily as needed for muscle spasms 30 tablet 0     cholecalciferol 1000 UNITS TABS Take 1,000 Units by mouth At Bedtime        multivitamin, therapeutic with minerals (MULTI-VITAMIN) TABS Take 1 tablet by mouth daily       Omega-3 Fatty Acids (OMEGA-3 FISH OIL PO) Take 1 g by mouth 2 times daily (with meals)        Probiotic Product (PROBIOTIC ACIDOPHILUS) CAPS Take 1 capsule by mouth At Bedtime        tiotropium (SPIRIVA HANDIHALER) 18 MCG inhalation capsule Inhale 1 capsule into the lungs daily. DR ANDRE[facility]       albuterol (2.5 MG/3ML) 0.083% nebulizer solution Take 3 mLs by nebulization every 6 hours as needed for shortness of breath / dyspnea. Dr MELENDEZ [facility]       albuterol (PROAIR HFA) 108 (90 BASE) MCG/ACT inhaler Inhale 1-2 puffs into the lungs as needed for shortness of breath / dyspnea. DR MELENDEZ [facility]       warfarin (COUMADIN) 5 MG tablet Take 1 tablet (5 mg) by mouth daily (Patient taking differently: Take 5 mg by mouth daily Taking 7.5mg twice per week.) 90 tablet 0     OTC products: None, except as noted above    Allergies   Allergen Reactions     Aspirin      PN: LW Reaction: sensitive/stomach     Lyrica [Pregabalin]      dizziness     Tape [Adhesive Tape] Itching     Vancomycin       Latex Allergy: NO    Social History   Substance Use Topics     Smoking status: Former Smoker     Packs/day: 0.50     Years: 35.00     Types: Cigarettes     Quit date: 8/8/2014     Smokeless tobacco: Never Used     Alcohol use 0.5 oz/week     1 Standard drinks or equivalent per week      Comment: social     History   Drug Use No       REVIEW OF SYSTEMS:                                                    Constitutional, neuro, ENT, endocrine, pulmonary, cardiac, gastrointestinal, genitourinary, musculoskeletal, integument  "and psychiatric systems are negative, except as otherwise noted.      EXAM:                                                    BP (!) 142/92 (BP Location: Right arm, Patient Position: Chair, Cuff Size: Adult Large)  Pulse 68  Temp 97.6  F (36.4  C) (Oral)  Resp 20  Ht 1.664 m (5' 5.5\")  Wt (!) 174.4 kg (384 lb 6.4 oz)  BMI 62.99 kg/m2    GENERAL APPEARANCE: healthy, alert and no distress     EYES: EOMI, PERRL     HENT: ear canals and TM's normal and nose and mouth without ulcers or lesions     NECK: no adenopathy, no asymmetry, masses, or scars and thyroid normal to palpation     RESP: lungs clear to auscultation - no rales, rhonchi or wheezes     CV: regular rates and rhythm, normal S1 S2, no S3 or S4 and no murmur, click or rub     ABDOMEN:  soft, nontender, no HSM or masses and bowel sounds normal     MS: extremities normal- no gross deformities noted, no evidence of inflammation in joints, FROM in all extremities.     SKIN: no suspicious lesions or rashes     NEURO: Normal strength and tone, sensory exam grossly normal, mentation intact and speech normal     PSYCH: mentation appears normal. and affect normal/bright     LYMPHATICS: No axillary, cervical, or supraclavicular nodes    DIAGNOSTICS:                                                    EKG: appears normal, NSR, normal axis, normal intervals, no acute ST/T changes c/w ischemia, no LVH by voltage criteria, unchanged from previous tracings. Had been stable since 1/2016  Hemoglobin (indicated for history of anemia or procedure with significant blood loss such as tonsillectomy, major intraperitoneal surgery, vascular surgery, major spine surgery, total joint replacement)  Serum Potassium  Hemoglobin A1C- 6/30/2017- 6.2%    Office Visit on 07/31/2017   Component Date Value Ref Range Status     WBC 07/31/2017 6.8  4.0 - 11 10*9/L Final     RBC Count 07/31/2017 4.40  3.8 - 5.2 10*12/L Final     Hemoglobin 07/31/2017 13.7  11.7 - 15.7 g/dL Final     " Hematocrit 07/31/2017 43.3  35.0 - 47.0 % Final     MCV 07/31/2017 98.5  78 - 100 fL Final     MCH 07/31/2017 31.1  26 - 33 pg Final     MCHC 07/31/2017 31.6  31 - 36 g/dL Final     RDW 07/31/2017 13.0  % Final     Platelet Count 07/31/2017 221  150 - 375 10^9/L Final       IMPRESSION:                                                    Reason for surgery/procedure: Bloody diarrhea  Diagnosis/reason for consult: Pre-operative examination    The proposed surgical procedure is considered INTERMEDIATE risk.    REVISED CARDIAC RISK INDEX  The patient has the following serious cardiovascular risks for perioperative complications such as (MI, PE, VFib and 3  AV Block):  Diabetes Mellitus (on Insulin)  INTERPRETATION: 1 risks: Class II (low risk - 0.9% complication rate)    The patient has the following additional risks for perioperative complications:  No identified additional risks      ICD-10-CM    1. Pre-op exam Z01.818 HEMOGRAM/PLATELET (BFP)     VENOUS COLLECTION       RECOMMENDATIONS:                                                      --Patient is to take all scheduled medications on the day of surgery EXCEPT for modifications listed below.    APPROVAL GIVEN to proceed with proposed procedure, without further diagnostic evaluation    1. Seven days before surgery do not take Aspirin or any over-the-counter pain medications other than Tylenol.  TYLENOL is the safest pain pill to use before surgery because it does not affect your bleeding time. If tylenol is not sufficient for pain control talk to me or the surgeon and we will decide what is safe to use.    2. Do not eat anything after midnight  (bo of the surgery) and nothing the morning of the surgery.    3. Medications: Take all medications as normal other that warfarin which was stopped 5 days prior to surgery, and has been doing Lovenox bridging until day prior to surgery.    4. Follow all instructions given by the surgery team. They usually give out a packet.  Read it and please follow it precisely. This helps surgical experience and outcomes.    5. If you have any questions do not hesitate to call me or the surgeon/surgical team.      Hyun Braden PA-C  [facility]           Signed Electronically by: Hyun Braden PA-C    Copy of this evaluation report is provided to requesting physician.    [place] Preop Guidelines

## 2017-07-31 NOTE — LETTER
[facility]  [address]  [address]  [facility] [address]  [phone]  Dept: [phone]    PRE-OP EVALUATION:  Today's date: 2017    Bere Machuca (: 1955) presents for pre-operative evaluation assessment as requested by Dr. Kasia Sheldon.  She requires evaluation and anesthesia risk assessment prior to undergoing surgery/procedure for treatment of colon .  Proposed procedure: colonoscopy    Date of Surgery/ Procedure: 17  Time of Surgery/ Procedure: 9am  Hospital/Surgical Facility: [facility]  Fax number for surgical facility: [phone]  Primary Physician: Wilma Armenta  Type of Anesthesia Anticipated: to be determined    Patient has a Health Care Directive or Living Will:  NO    1. NO - Do you have a history of heart attack, stroke, stent, bypass or surgery on an artery in the head, neck, heart or legs?  2. NO - Do you ever have any pain or discomfort in your chest?  3. NO - Do you have a history of  Heart Failure?  4. NO - Are you troubled by shortness of breath when: walking on the level, up a slight hill or at night?  5. NO - Do you currently have a cold, bronchitis or other respiratory infection?  6. NO - Do you have a cough, shortness of breath or wheezing?  7. NO - Do you sometimes get pains in the calves of your legs when you walk?  8. YES - DO YOU OR ANYONE IN YOUR FAMILY HAVE PREVIOUS HISTORY OF BLOOD CLOTS? Has history of inferior vena cava clot, and has Factor 5 Leiden.  9. NO - Do you or does anyone in your family have a serious bleeding problem such as prolonged bleeding following surgeries or cuts?  10. NO - Have you ever had problems with anemia or been told to take iron pills?  11. YES - HAVE YOU HAD ANY ABNORMAL BLOOD LOSS SUCH AS BLACK, TARRY OR BLOODY STOOLS, OR ABNORMAL VAGINAL BLEEDING? Currently having bloody loose stool which is being addressed with this procedure.  12. NO - Have you ever had a blood transfusion?  13. NO - Have  you or any of your relatives ever had problems with anesthesia?  14. YES - DO YOU HAVE SLEEP APNEA, EXCESSIVE SNORING OR DAYTIME DROWSINESS? Has sleep apnea. Wears CPAP at night.  15. NO - Do you have any prosthetic heart valves?  16. NO - Do you have prosthetic joints?  17. NO - Is there any chance that you may be pregnant?        HPI:                                                      Brief HPI related to upcoming procedure: Has been having bloody diarrhea for the past 2 months. Was loose prior to that, but that was thought to be due to metformin. Tried to have a colonoscopy 2 years ago, but was not able to given to INR level.      DIABETES - Patient has a longstanding history of DiabetesType Type II . Patient is being treated with diet and denies significant side effects. Control has been good.                          .  HYPERTENSION - Patient has longstanding history of mod-severe HTN , currently denies any symptoms referable to elevated blood pressure. Specifically denies chest pain, palpitations, dyspnea, orthopnea, PND or peripheral edema. Blood pressure readings have been in normal range. Current medication regimen is as listed below. Patient denies any side effects of medication.                                                                                                                                                                                            COPD - Patient has a longstanding history of moderate-severe COPD . Patient has been doing well overall noting { and continues on medication regimen consisting of Breo without adverse reactions or side effects.                                                                                                         .    MEDICAL HISTORY:                                                    Patient Active Problem List    Diagnosis Date Noted     Type 2 diabetes mellitus without complication, without long-term current use of insulin (H) 07/05/2017      Priority: Medium     Sepsis (H) 06/22/2017     Priority: Medium     Displacement of lumbar intervertebral disc without myelopathy 02/03/2017     Priority: Medium     Morbid obesity due to excess calories (H) 09/19/2016     Priority: Medium     Superior vena cava syndrome      Priority: Medium     Major depressive disorder, recurrent episode, moderate (H) 09/17/2015     Priority: Medium     History of pulmonary embolism 02/27/2015     Priority: Medium     Spinal stenosis in cervical region 07/28/2014     Priority: Medium     Lymphedema of arm 03/08/2013     Priority: Medium     Health Care Home 11/21/2012     Priority: Medium     State Tier Level:  Tier 2  Status:  n/a  Care Coordinator:    See Letters for [facility] Care Plan           ACP (advance care planning) 05/18/2012     Priority: Medium     Advance Care Planning:   ACP Review and Resources Provided:  Reviewed chart for advance care plan.  Bere SHARMIN Machuca has no plan or code status on file however states presence of ACP document. Copy requested. Confirmed code status reflects current choices pending receipt of document/advance care plan review. Confirmed/documented designated decision maker(s). See permanent comments section of demographics in clinical tab.   Added by Liz Lopez on 1/20/2014               Factor 5 Leiden mutation, heterozygous/ INR 2-3 05/18/2012     Priority: Medium     Family history of pulmonary embolism 03/03/2012     Priority: Medium     Sleep apnea 08/27/2010     Priority: Medium     Migraine 08/27/2010     Priority: Medium     Problem list name updated by automated process. Provider to review       History of cocaine abuse 10/10/2008     Priority: Medium     Personal history of tobacco use, presenting hazards to health 10/10/2008     Priority: Medium     Family history of malignant neoplasm of gastrointestinal tract 10/10/2008     Priority: Medium     Esophageal reflux 10/10/2008     Priority: Medium     Chronic airway obstruction (H)  10/10/2008     Priority: Medium     Problem list name updated by automated process. Provider to review       Hypertension 02/22/2008     Priority: Medium      Past Medical History:   Diagnosis Date     Anxiety      Arthritis      Clotting disorder (H)      COPD (chronic obstructive pulmonary disease) (H)      Depressive disorder      Diabetes (H)      Hypertension      Lymphedema of arm 3/8/2013     Morbidly obese (H) 9/25/2009     Pulmonary embolism (H)      Sleep apnea      Substance abuse      Superior vena cava syndrome      Past Surgical History:   Procedure Laterality Date     BIOPSY OF SKIN LESION  multiple    keloids     C ANESTH,REPAIR LO ABD HERNIA NOS  multiple/ 2006 last    panniculus     C EXPLORATORY OF ABDOMEN  1996     C PLASTIC SURGERY, NECK  age 4     C REMOVAL COLON/ILEOSTOMY      reastamosis     C TOTAL ABDOM HYSTERECTOMY  1992    Hysterectomy, Total Abdominal     HC REMOVAL OF OVARY/TUBE(S)  1992    Salpingo-Oophorectomy, Unilateral     HC REMOVE ABDOMINAL WALL LESION  2006    abscess     HC REMOVE TONSILS/ADENOIDS,<13 Y/O  age 14    T & A <14y.o.     HERNIA REPAIR       INSERTION OF ACCESS PORT  1-4-96    sepsis- removed after hospital stay     Current Outpatient Prescriptions   Medication Sig Dispense Refill     blood glucose monitoring (ISAAC CONTOUR NEXT) test strip Use to test blood sugar 2-3 times daily or as directed. 100 strip 1     blood glucose (NO BRAND SPECIFIED) lancets standard Use to test blood sugar 2-3 times daily or as directed. 100 each 1     fluticasone-vilanterol (BREO ELLIPTA) 200-25 MCG/INH oral inhaler Inhale 1 puff into the lungs daily 1 Inhaler 0     enoxaparin (LOVENOX) 150 MG/ML injection Inject 1 mL (150 mg) Subcutaneous every 12 hours 12 Syringe 0     citalopram (CELEXA) 40 MG tablet TAKE 1 TABLET (40 MG) BY MOUTH DAILY 30 tablet 0     GABAPENTIN PO Take 600 mg by mouth 2 times daily       losartan (COZAAR) 25 MG tablet Take 25 mg by mouth daily       hydrOXYzine  (ATARAX) 25 MG tablet Take 1 tablet (25 mg) by mouth At Bedtime 90 tablet 0     cyclobenzaprine (FLEXERIL) 5 MG tablet Take 1 tablet (5 mg) by mouth 3 times daily as needed for muscle spasms 30 tablet 0     cholecalciferol 1000 UNITS TABS Take 1,000 Units by mouth At Bedtime        multivitamin, therapeutic with minerals (MULTI-VITAMIN) TABS Take 1 tablet by mouth daily       Omega-3 Fatty Acids (OMEGA-3 FISH OIL PO) Take 1 g by mouth 2 times daily (with meals)        Probiotic Product (PROBIOTIC ACIDOPHILUS) CAPS Take 1 capsule by mouth At Bedtime        tiotropium (SPIRIVA HANDIHALER) 18 MCG inhalation capsule Inhale 1 capsule into the lungs daily. DR ANDRE[facility]       albuterol (2.5 MG/3ML) 0.083% nebulizer solution Take 3 mLs by nebulization every 6 hours as needed for shortness of breath / dyspnea. Dr MELENDEZ [facility]       albuterol (PROAIR HFA) 108 (90 BASE) MCG/ACT inhaler Inhale 1-2 puffs into the lungs as needed for shortness of breath / dyspnea. DR MELENDEZ [facility]       warfarin (COUMADIN) 5 MG tablet Take 1 tablet (5 mg) by mouth daily (Patient taking differently: Take 5 mg by mouth daily Taking 7.5mg twice per week.) 90 tablet 0     OTC products: None, except as noted above    Allergies   Allergen Reactions     Aspirin      PN: LW Reaction: sensitive/stomach     Lyrica [Pregabalin]      dizziness     Tape [Adhesive Tape] Itching     Vancomycin       Latex Allergy: NO    Social History   Substance Use Topics     Smoking status: Former Smoker     Packs/day: 0.50     Years: 35.00     Types: Cigarettes     Quit date: 8/8/2014     Smokeless tobacco: Never Used     Alcohol use 0.5 oz/week     1 Standard drinks or equivalent per week      Comment: social     History   Drug Use No       REVIEW OF SYSTEMS:                                                    Constitutional, neuro, ENT, endocrine, pulmonary, cardiac, gastrointestinal, genitourinary, musculoskeletal, integument  "and psychiatric systems are negative, except as otherwise noted.      EXAM:                                                    BP (!) 142/92 (BP Location: Right arm, Patient Position: Chair, Cuff Size: Adult Large)  Pulse 68  Temp 97.6  F (36.4  C) (Oral)  Resp 20  Ht 1.664 m (5' 5.5\")  Wt (!) 174.4 kg (384 lb 6.4 oz)  BMI 62.99 kg/m2    GENERAL APPEARANCE: healthy, alert and no distress     EYES: EOMI, PERRL     HENT: ear canals and TM's normal and nose and mouth without ulcers or lesions     NECK: no adenopathy, no asymmetry, masses, or scars and thyroid normal to palpation     RESP: lungs clear to auscultation - no rales, rhonchi or wheezes     CV: regular rates and rhythm, normal S1 S2, no S3 or S4 and no murmur, click or rub     ABDOMEN:  soft, nontender, no HSM or masses and bowel sounds normal     MS: extremities normal- no gross deformities noted, no evidence of inflammation in joints, FROM in all extremities.     SKIN: no suspicious lesions or rashes     NEURO: Normal strength and tone, sensory exam grossly normal, mentation intact and speech normal     PSYCH: mentation appears normal. and affect normal/bright     LYMPHATICS: No axillary, cervical, or supraclavicular nodes    DIAGNOSTICS:                                                    EKG: appears normal, NSR, normal axis, normal intervals, no acute ST/T changes c/w ischemia, no LVH by voltage criteria, unchanged from previous tracings. Had been stable since 1/2016  Hemoglobin (indicated for history of anemia or procedure with significant blood loss such as tonsillectomy, major intraperitoneal surgery, vascular surgery, major spine surgery, total joint replacement)  Serum Potassium  Hemoglobin A1C- 6/30/2017- 6.2%    Office Visit on 07/31/2017   Component Date Value Ref Range Status     WBC 07/31/2017 6.8  4.0 - 11 10*9/L Final     RBC Count 07/31/2017 4.40  3.8 - 5.2 10*12/L Final     Hemoglobin 07/31/2017 13.7  11.7 - 15.7 g/dL Final     " Hematocrit 07/31/2017 43.3  35.0 - 47.0 % Final     MCV 07/31/2017 98.5  78 - 100 fL Final     MCH 07/31/2017 31.1  26 - 33 pg Final     MCHC 07/31/2017 31.6  31 - 36 g/dL Final     RDW 07/31/2017 13.0  % Final     Platelet Count 07/31/2017 221  150 - 375 10^9/L Final       IMPRESSION:                                                    Reason for surgery/procedure: Bloody diarrhea  Diagnosis/reason for consult: Pre-operative examination    The proposed surgical procedure is considered INTERMEDIATE risk.    REVISED CARDIAC RISK INDEX  The patient has the following serious cardiovascular risks for perioperative complications such as (MI, PE, VFib and 3  AV Block):  Diabetes Mellitus (on Insulin)  INTERPRETATION: 1 risks: Class II (low risk - 0.9% complication rate)    The patient has the following additional risks for perioperative complications:  No identified additional risks      ICD-10-CM    1. Pre-op exam Z01.818 HEMOGRAM/PLATELET (BFP)     VENOUS COLLECTION       RECOMMENDATIONS:                                                      --Patient is to take all scheduled medications on the day of surgery EXCEPT for modifications listed below.    APPROVAL GIVEN to proceed with proposed procedure, without further diagnostic evaluation    1. Seven days before surgery do not take Aspirin or any over-the-counter pain medications other than Tylenol.  TYLENOL is the safest pain pill to use before surgery because it does not affect your bleeding time. If tylenol is not sufficient for pain control talk to me or the surgeon and we will decide what is safe to use.    2. Do not eat anything after midnight  (bo of the surgery) and nothing the morning of the surgery.    3. Medications: Take all medications as normal other that warfarin which was stopped 5 days prior to surgery, and has been doing Lovenox bridging until day prior to surgery.    4. Follow all instructions given by the surgery team. They usually give out a packet.  Read it and please follow it precisely. This helps surgical experience and outcomes.    5. If you have any questions do not hesitate to call me or the surgeon/surgical team.      Hyun Braden PA-C  [facility]           Signed Electronically by: Hyun Braden PA-C    Copy of this evaluation report is provided to requesting physician.    [facility] Preop Guidelines

## 2017-07-31 NOTE — NURSING NOTE
Bere Machuca is here for a pre-op exam.  Questioned patient about current smoking habits.  Pt. quit smoking some time ago.  PULSE regular  My Chart: active  CLASSIFICATION OF OVERWEIGHT AND OBESITY BY BMI                        Obesity Class           BMI(kg/m2)  Underweight                                    < 18.5  Normal                                         18.5-24.9  Overweight                                     25.0-29.9  OBESITY                     I                  30.0-34.9                             II                 35.0-39.9  EXTREME OBESITY             III                >40                            Patient's  BMI Body mass index is 62.99 kg/(m^2).  http://hin.nhlbi.nih.gov/menuplanner/menu.cgi  Pre-visit planning  Immunizations - up to date  Colonoscopy - is having tomorrow

## 2017-08-04 ENCOUNTER — OFFICE VISIT (OUTPATIENT)
Dept: PHARMACY | Facility: PHYSICIAN GROUP | Age: 62
End: 2017-08-04
Payer: COMMERCIAL

## 2017-08-04 ENCOUNTER — OFFICE VISIT (OUTPATIENT)
Dept: FAMILY MEDICINE | Facility: CLINIC | Age: 62
End: 2017-08-04

## 2017-08-04 VITALS
SYSTOLIC BLOOD PRESSURE: 130 MMHG | OXYGEN SATURATION: 99 % | TEMPERATURE: 98.4 F | DIASTOLIC BLOOD PRESSURE: 84 MMHG | HEART RATE: 64 BPM

## 2017-08-04 DIAGNOSIS — I87.1 SUPERIOR VENA CAVA SYNDROME: ICD-10-CM

## 2017-08-04 DIAGNOSIS — D68.51 FACTOR 5 LEIDEN MUTATION, HETEROZYGOUS (H): ICD-10-CM

## 2017-08-04 DIAGNOSIS — J42 CHRONIC BRONCHITIS, UNSPECIFIED CHRONIC BRONCHITIS TYPE (H): Primary | ICD-10-CM

## 2017-08-04 DIAGNOSIS — F33.1 MAJOR DEPRESSIVE DISORDER, RECURRENT EPISODE, MODERATE (H): ICD-10-CM

## 2017-08-04 DIAGNOSIS — K21.9 GASTROESOPHAGEAL REFLUX DISEASE WITHOUT ESOPHAGITIS: ICD-10-CM

## 2017-08-04 DIAGNOSIS — K51.819 OTHER ULCERATIVE COLITIS WITH COMPLICATION (H): ICD-10-CM

## 2017-08-04 DIAGNOSIS — Z86.711 HISTORY OF PULMONARY EMBOLISM: Primary | ICD-10-CM

## 2017-08-04 DIAGNOSIS — E63.9 NUTRITIONAL DEFICIENCY: ICD-10-CM

## 2017-08-04 DIAGNOSIS — K51.919 ULCERATIVE COLITIS WITH COMPLICATION, UNSPECIFIED LOCATION (H): ICD-10-CM

## 2017-08-04 DIAGNOSIS — L20.84 INTRINSIC ECZEMA: ICD-10-CM

## 2017-08-04 LAB — INR POINT OF CARE: 1.3 (ref 2–3)

## 2017-08-04 PROCEDURE — 99607 MTMS BY PHARM ADDL 15 MIN: CPT | Performed by: PHARMACIST

## 2017-08-04 PROCEDURE — 99213 OFFICE O/P EST LOW 20 MIN: CPT | Performed by: PHYSICIAN ASSISTANT

## 2017-08-04 PROCEDURE — 99606 MTMS BY PHARM EST 15 MIN: CPT | Performed by: PHARMACIST

## 2017-08-04 PROCEDURE — 36415 COLL VENOUS BLD VENIPUNCTURE: CPT | Performed by: PHYSICIAN ASSISTANT

## 2017-08-04 PROCEDURE — 85610 PROTHROMBIN TIME: CPT | Performed by: PHYSICIAN ASSISTANT

## 2017-08-04 RX ORDER — PREDNISONE 20 MG/1
20 TABLET ORAL DAILY
Qty: 5 TABLET | Refills: 0 | Status: SHIPPED | OUTPATIENT
Start: 2017-08-04 | End: 2017-10-17

## 2017-08-04 RX ORDER — GABAPENTIN 300 MG/1
600 CAPSULE ORAL 2 TIMES DAILY
COMMUNITY
End: 2018-01-18

## 2017-08-04 RX ORDER — TIOTROPIUM BROMIDE 18 UG/1
CAPSULE ORAL; RESPIRATORY (INHALATION)
Qty: 30 CAPSULE | Refills: 1 | COMMUNITY
Start: 2017-08-04 | End: 2019-06-21

## 2017-08-04 RX ORDER — HYDROXYZINE HYDROCHLORIDE 25 MG/1
25 TABLET, FILM COATED ORAL AT BEDTIME
Qty: 90 TABLET | Refills: 0 | Status: SHIPPED | OUTPATIENT
Start: 2017-08-04 | End: 2017-10-09

## 2017-08-04 NOTE — NURSING NOTE
Bere is here for INR follow up    Pre-Visit Screening :  Immunizations : up to date  Colonoscopy : is up to date  Mammogram : is up to date  Asthma Action Test/Plan : NA  PHQ9/GAD7 :  NA  Pulse - regular  My Chart - accepts    CLASSIFICATION OF OVERWEIGHT AND OBESITY BY BMI                         Obesity Class           BMI(kg/m2)  Underweight                                    < 18.5  Normal                                         18.5-24.9  Overweight                                     25.0-29.9  OBESITY                     I                  30.0-34.9                              II                 35.0-39.9  EXTREME OBESITY             III                >40                             Patient's  BMI There is no height or weight on file to calculate BMI.  http://hin.nhlbi.nih.gov/menuplanner/menu.cgi  Questioned patient about current smoking habits.  Pt. has never smoked.

## 2017-08-04 NOTE — PROGRESS NOTES
SUBJECTIVE/OBJECTIVE:                Bere Machuca is a 61 year old female coming in for a follow-up visit for Medication Therapy Management.  She was referred to me from Dr. Armenta.     Chief Complaint: Follow up from our visit on 7/14.  CMR today- previously seen for short visit.    Tobacco: No tobacco use  Alcohol: Less than 1 beverage / month    Medication Adherence: no issues reported    Factor V Leiden/SVC syndrome: Currently on warfarin 5mg daily restarted on 8/1 after colonoscopy. Finished last Lovenox yesterday night, here for INR today. She sees Dr. Spicer at [facility]. Having colonoscopy at [facility] due to size and breathing concerns. Greater than 1 year since PE. Was bridged with Lovenox 150mg BID, however she is out of this now. She did accidentally pay for the dalteparin that was suppose to be discontinued and switched to the Lovenox after last visit, so has this to use if needing ongoing bridging.     COPD: Current medications: Fluticasone/Vilanterol (Breo Ellipta) once daily and albuterol 1-2 times daily. Pt rinses their mouth after using steroid inhaler.  She previously had been on Pulmicort/Spiriva, but didn't like this, so switched to Breo and was suppose to stay on Spiriva, however she stopped this also.  Pt is not experiencing side effects.   Pt reports the following symptoms: none, improved shortness of breath with Breo versus symbicort.    GI bleeding/Ulcerative colitis: Has a follow up with them on Monday, but looking to get something to help her now for symptoms. Frustrated by the duration of symptoms and looking to get relief.    Depression:  Current medications include: Citalopram 40mg once daily. Pt reports that depression symptoms are stable for now given her ongoing health issues.    Supplements: Currently taking fish oil BID, vit D, b complex, MV. Feels the b complex helps her nails. Started these as a recommendation from her chiropractor. Wondering about glucosamine.        Current labs include:BP Readings from Last 3 Encounters:   07/31/17 (!) 142/92   07/14/17 118/88   07/05/17 132/84     Today's Vitals: There were no vitals taken for this visit.  Lab Results   Component Value Date    A1C 6.2 06/30/2017   .  Lab Results   Component Value Date    CHOL 190 02/16/2008     Lab Results   Component Value Date    TRIG 125 02/16/2008     Lab Results   Component Value Date    HDL 45 02/16/2008     Lab Results   Component Value Date     02/16/2008       Liver Function Studies -   Recent Labs   Lab Test  06/30/17   1130   PROTTOTAL  6.6   ALBUMIN  3.9   BILITOTAL  0.5   ALKPHOS  62   AST  14   ALT  35*       No results found for: UCRR, MICROL, UMALCR    Last Basic Metabolic Panel:  Lab Results   Component Value Date     06/30/2017      Lab Results   Component Value Date    POTASSIUM 4.3 06/30/2017     Lab Results   Component Value Date    CHLORIDE 102 06/30/2017     Lab Results   Component Value Date    BUN 18 06/30/2017    BUN NOT APPLICABLE 06/30/2017     Lab Results   Component Value Date    CR 0.83 06/30/2017     GFR Estimate   Date Value Ref Range Status   06/30/2017 77 > OR = 60 mL/min/1.73m2 Final   06/23/2017 81 >60 mL/min/1.7m2 Final     Comment:     Non  GFR Calc   06/22/2017 74 >60 mL/min/1.7m2 Final     Comment:     Non  GFR Calc     GFR Estimate If Black   Date Value Ref Range Status   06/23/2017 >90   GFR Calc   >60 mL/min/1.7m2 Final   06/22/2017 89 >60 mL/min/1.7m2 Final     Comment:      GFR Calc   06/21/2017 >90   GFR Calc   >60 mL/min/1.7m2 Final     TSH   Date Value Ref Range Status   10/24/2016 2.85 mcU/mL Final   ]    Most Recent Immunizations   Administered Date(s) Administered     Influenza (H1N1) 11/30/2009     Influenza (IIV3) 08/17/2012     Influenza Vaccine IM 3yrs+ 4 Valent IIV4 08/24/2016     Pneumococcal 23 valent 12/01/2003     TDAP Vaccine (Boostrix) 05/21/2010        ASSESSMENT:              Current medications were reviewed today as discussed above.      Medication Adherence: no issues identified    Factor V Leiden/SVC syndrome: Needs improvement, not at INR goal of 2-3, will need to resume bridging until therapeutic, okay to use dalteparin 83630 BID as she has this filled already and is out of Lovenox.     COPD: Needs improvement, would benefit from restarting Spiriva as this was not intended to be stopped when adding in Breo.     GI bleeding/Ulcerative colitis: Needs improvement, no report from the colonoscopy so unable to recommend treatment options - discussed with Hyun ONTIVEROS today and she will be in contact with [facility].    Depression:  Stable.    Supplements: Needs improvement, okay to stop fish oil. Could try glucosamine sulfate if she is interested, discussed limited evidence, target doses and preferred formulations.      PLAN:                  1. Restart bridging- using dalteparin 55865 bid since she already has this filled.    2. Seeing Hyun today for INR adjustment    3. Hyun reaching out to [facility] regarding tx option over the weekend.    4. Restart Spiriva inhaler 1 puff daily.    5. Okay to stop fish oil.     I spent 45 minutes with this patient today.  A copy of the visit note was provided to the patient's primary are provider.     Will follow up in 1 month.    The patient declined a summary of these recommendations as an after visit summary.    Kenia Simmons, Pharm.D, [facility]  Medication Therapy Management Pharmacist  [phone]

## 2017-08-04 NOTE — PROGRESS NOTES
Indication: PE    INR Today:  1.3  Current Dose:  Coumadin 5 mg per day since restarting after colonoscopy 8/1. Lovenox injections until last night.    Bleeding Signs/Symptoms:  None  Including headache, stroke symptoms (confusion, weakness, slurred speech), vision changes, nosebleeds, red/black vomit, red/black stool, prolonged bleeded, dizziness, SOB, pica.  Thromboembolic Signs/Symptoms:  None  Including pain in one leg, swelling in extremity, chest pain, numbess, weakness, mental status change.      Medication Changes:  Had colonoscopy 8/1 and has been on Lovenox. Stopped last night. Restarted coumadin 5 mg daily 8/1  Dietary Changes:  No  Bacterial/Viral Infection:  No    Missed Coumadin Doses:  None. Other than being off coumadin for 7 days leading up to colonoscopy.    Other Concerns:  Needs refills of hydroxyzine for intrinsic eczema. Sees derm in September.    OBJECTIVE:  /84 (BP Location: Left arm, Patient Position: Chair, Cuff Size: Adult Large)  Pulse 64  Temp 98.4  F (36.9  C) (Oral)  SpO2 99%  Breastfeeding? No  There is no height or weight on file to calculate BMI.    Patient is a 61 year old female, in no acute distress. Vitals noted.  Cardiac:  Normal rhythm and rate, no murmurs, rubs or gallops.  Lungs: clear to auscultation bilaterally; no wheezes, crackles or rhonchi    ASSESSMENT and PLAN:    Subtherapeutic INR for goal of 2-3    New Dose: Would like Darline to take 10 mg coumadin today, then coumadin 5 mg per day.     Darline had an appointment with Kenia Simmons today, pharm D. She is being restarted on Dalteparin as her INR is still subtherapeutic.     Talked with GI, and they sent rx for Rowasa to Darline's pharmacy to start over the weekend. If she is unable to take this enema/suppository, they said it would be okay to do prednisone 20 mg daily, so I will write that script to get her to Monday appt with GI.    Also refilled hydroxyzine.    Next INR: 4 days. If therapeutic at that  time, can d/c Dalteparin.    Hyun Braden PA-C  8/4/2017

## 2017-08-04 NOTE — MR AVS SNAPSHOT
After Visit Summary   8/4/2017    Bere Machuca    MRN: [id number]           Patient Information     Date Of Birth          1955        Visit Information        Provider Department      8/4/2017 3:30 PM Hyun Braden PA-C [facility]        Today's Diagnoses     History of pulmonary embolism    -  1    Other ulcerative colitis with complication (H)        Intrinsic eczema          Care Instructions    Increased coumadin to 10 mg tonight to give a boost, then can continue 5 mg daily.   Will continue heparin injections.  Return to clinic on Monday to recheck INR, and hopefully d/c injections at that time.          Follow-ups after your visit        Your next 10 appointments already scheduled     Aug 04, 2017  3:30 PM CDT   Office Visit with Hyun Braden PA-C   [facility] ([facility])    [address]  [address]  [facility] [id number] [phone]              Who to contact     If you have questions or need follow up information about today's clinic visit or your schedule please contact [facility] directly at [phone].  Normal or non-critical lab and imaging results will be communicated to you by [employer] I[employer] Interactivehart, letter or phone within 4 business days after the clinic has received the results. If you do not hear from us within 7 days, please contact the clinic through [employer] or phone. If you have a critical or abnormal lab result, we will notify you by phone as soon as possible.  Submit refill requests through Hinge or call your pharmacy and they will forward the refill request to us. Please allow 3 business days for your refill to be completed.          Additional Information About Your Visit        MyChart Information     Hinge gives you secure access to your electronic health record. If you see a primary care provider, you can also send messages to your care team and  make appointments. If you have questions, please call your primary care clinic.  If you do not have a primary care provider, please call [phone] and they will assist you.        Care EveryWhere ID     This is your Care EveryWhere ID. This could be used by other organizations to access your [place] medical records  [id number]        Your Vitals Were     Pulse Temperature Pulse Oximetry Breastfeeding?          64 98.4  F (36.9  C) (Oral) 99% No         Blood Pressure from Last 3 Encounters:   08/04/17 130/84   07/31/17 (!) 142/92   07/14/17 118/88    Weight from Last 3 Encounters:   07/31/17 (!) 174.4 kg (384 lb 6.4 oz)   07/14/17 (!) 173.4 kg (382 lb 3.2 oz)   07/05/17 (!) 172.8 kg (381 lb)              We Performed the Following     PROTHROMBIN TIME/INR     VENOUS COLLECTION          Today's Medication Changes          These changes are accurate as of: 8/4/17  3:16 PM.  If you have any questions, ask your nurse or doctor.               Start taking these medicines.        Dose/Directions    predniSONE 20 MG tablet   Commonly known as:  DELTASONE   Used for:  Other ulcerative colitis with complication (H)   Started by:  Hyun Braden PA-C        Dose:  20 mg   Take 1 tablet (20 mg) by mouth daily with food   Quantity:  5 tablet   Refills:  0         These medicines have changed or have updated prescriptions.        Dose/Directions    * hydrOXYzine 25 MG tablet   Commonly known as:  ATARAX   This may have changed:  Another medication with the same name was added. Make sure you understand how and when to take each.   Used for:  Intrinsic eczema   Changed by:  Wilma Armenta MD        Dose:  25 mg   Take 1 tablet (25 mg) by mouth At Bedtime   Quantity:  90 tablet   Refills:  0       * hydrOXYzine 25 MG tablet   Commonly known as:  ATARAX   This may have changed:  You were already taking a medication with the same name, and this prescription was added. Make sure you understand how and when to take  each.   Used for:  Intrinsic eczema   Changed by:  Hyun Braden PA-C        Dose:  25 mg   Take 1 tablet (25 mg) by mouth At Bedtime   Quantity:  90 tablet   Refills:  0       warfarin 5 MG tablet   Commonly known as:  COUMADIN   This may have changed:  additional instructions   Used for:  Factor 5 Leiden mutation, heterozygous (H), History of pulmonary embolism        Dose:  5 mg   Take 1 tablet (5 mg) by mouth daily   Quantity:  90 tablet   Refills:  0       * Notice:  This list has 2 medication(s) that are the same as other medications prescribed for you. Read the directions carefully, and ask your doctor or other care provider to review them with you.         Where to get your medicines      These medications were sent to [place] [address] [place] [facility] [address] [place] [address] [address] [place] [address] [address] [facility] [address]     Phone:  [phone]     hydrOXYzine 25 MG tablet    predniSONE 20 MG tablet                Primary Care Provider Office Phone # Fax #    Wilma Armenta [phone]       [place] [address] [address] [address]        Equal Access to Services     LAUREN WAGNER AH: Hadii korina dillard hadasho Soomaali, waaxda luqadaha, qaybta kaalmada adechristopheryadunia, aman chavez . So [facility] [phone].    ATENCIÓN: Si habla español, tiene a norman disposición servicios gratuitos de asistencia lingüística. [facility] [phone].    We comply with applicable federal civil rights laws and [place] laws. We do not discriminate on the basis of race, color, national origin, age, disability sex, sexual orientation or gender identity.            Thank you!     Thank you for choosing [facility]  for your care. Our goal is always to provide you with excellent care. Hearing back from our patients is one way we can continue to improve our services. Please take a few minutes to complete the written survey that you may  receive in the mail after your visit with us. Thank you!             Your Updated Medication List - Protect others around you: Learn how to safely use, store and throw away your medicines at www.disposemymeds.org.          This list is accurate as of: 8/4/17  3:16 PM.  Always use your most recent med list.                   Brand Name Dispense Instructions for use Diagnosis    * albuterol (2.5 MG/3ML) 0.083% neb solution      Take 3 mLs by nebulization every 6 hours as needed for shortness of breath / dyspnea. Dr [facility]    Chronic airway obstruction, not elsewhere classified, Personal history of tobacco use, presenting hazards to health       * albuterol 108 (90 BASE) MCG/ACT Inhaler   Generic drug:  albuterol      Inhale 1-2 puffs into the lungs as needed for shortness of breath / dyspnea. DR [facility]RE[facility]    Chronic airway obstruction, not elsewhere classified, Personal history of tobacco use, presenting hazards to health       blood glucose lancets standard    no brand specified    100 each    Use to test blood sugar 2-3 times daily or as directed.    Type 2 diabetes mellitus without complication, without long-term current use of insulin (H)       blood glucose monitoring test strip    ISAAC CONTOUR NEXT    100 strip    Use to test blood sugar 2-3 times daily or as directed.    Type 2 diabetes mellitus without complication, without long-term current use of insulin (H)       cholecalciferol 1000 UNITS Tabs      Take 1,000 Units by mouth At Bedtime    Morbid obesity due to excess calories (H)       citalopram 40 MG tablet    celeXA    30 tablet    TAKE 1 TABLET (40 MG) BY MOUTH DAILY    Major depressive disorder, recurrent episode, moderate (H)       cyclobenzaprine 5 MG tablet    FLEXERIL    30 tablet    Take 1 tablet (5 mg) by mouth 3 times daily as needed for muscle spasms    Displacement of lumbar intervertebral disc without myelopathy, Spinal stenosis in cervical region        Dalteparin Sodium 52820 UNT/0.72ML Soln      Inject 18,000 Units Subcutaneous 2 times daily        fluticasone-vilanterol 200-25 MCG/INH oral inhaler    BREO ELLIPTA    1 Inhaler    Inhale 1 puff into the lungs daily    Chronic bronchitis, unspecified chronic bronchitis type (H)       gabapentin 300 MG capsule    NEURONTIN     Take 600 mg by mouth 2 times daily        * hydrOXYzine 25 MG tablet    ATARAX    90 tablet    Take 1 tablet (25 mg) by mouth At Bedtime    Intrinsic eczema       * hydrOXYzine 25 MG tablet    ATARAX    90 tablet    Take 1 tablet (25 mg) by mouth At Bedtime    Intrinsic eczema       losartan 25 MG tablet    COZAAR     Take 25 mg by mouth daily    Essential hypertension, benign       Multi-vitamin Tabs tablet      Take 1 tablet by mouth daily        predniSONE 20 MG tablet    DELTASONE    5 tablet    Take 1 tablet (20 mg) by mouth daily with food    Other ulcerative colitis with complication (H)       PROBIOTIC ACIDOPHILUS BIOBEADS Caps      Take 1 capsule by mouth At Bedtime    Myalgia and myositis, unspecified       SPIRIVA HANDIHALER 18 MCG capsule   Generic drug:  tiotropium     30 capsule    Inhale contents of one capsule daily.        SUPER B COMPLEX PO      Take 1 tablet by mouth daily        warfarin 5 MG tablet    COUMADIN    90 tablet    Take 1 tablet (5 mg) by mouth daily    Factor 5 Leiden mutation, heterozygous (H), History of pulmonary embolism       * Notice:  This list has 4 medication(s) that are the same as other medications prescribed for you. Read the directions carefully, and ask your doctor or other care provider to review them with you.

## 2017-08-04 NOTE — PATIENT INSTRUCTIONS
Increased coumadin to 10 mg tonight to give a boost, then can continue 5 mg daily.   Will continue heparin injections.  Return to clinic on Monday to recheck INR, and hopefully d/c injections at that time.

## 2017-08-07 ENCOUNTER — OFFICE VISIT (OUTPATIENT)
Dept: FAMILY MEDICINE | Facility: CLINIC | Age: 62
End: 2017-08-07

## 2017-08-07 VITALS
OXYGEN SATURATION: 98 % | SYSTOLIC BLOOD PRESSURE: 124 MMHG | DIASTOLIC BLOOD PRESSURE: 84 MMHG | WEIGHT: 293 LBS | TEMPERATURE: 98.2 F | HEIGHT: 66 IN | HEART RATE: 76 BPM | RESPIRATION RATE: 16 BRPM | BODY MASS INDEX: 47.09 KG/M2

## 2017-08-07 DIAGNOSIS — D68.51 FACTOR 5 LEIDEN MUTATION, HETEROZYGOUS (H): Primary | ICD-10-CM

## 2017-08-07 DIAGNOSIS — Z79.01 LONG TERM CURRENT USE OF ANTICOAGULANT THERAPY: ICD-10-CM

## 2017-08-07 LAB — INR POINT OF CARE: 2.4 (ref 2–3)

## 2017-08-07 PROCEDURE — 36415 COLL VENOUS BLD VENIPUNCTURE: CPT | Performed by: FAMILY MEDICINE

## 2017-08-07 PROCEDURE — 99212 OFFICE O/P EST SF 10 MIN: CPT | Performed by: FAMILY MEDICINE

## 2017-08-07 PROCEDURE — 85610 PROTHROMBIN TIME: CPT | Performed by: FAMILY MEDICINE

## 2017-08-07 NOTE — NURSING NOTE
Patient is here for a recheck of her INR.  Pulse - regular  My Chart - accepts    CLASSIFICATION OF OVERWEIGHT AND OBESITY BY BMI                         Obesity Class           BMI(kg/m2)  Underweight                                    < 18.5  Normal                                         18.5-24.9  Overweight                                     25.0-29.9  OBESITY                     I                  30.0-34.9                              II                 35.0-39.9  EXTREME OBESITY             III                >40                             Patient's  BMI Body mass index is 62.11 kg/(m^2).  http://hin.nhlbi.nih.gov/menuplanner/menu.cgi  Questioned patient about current smoking habits.  Pt. quit smoking some time ago.

## 2017-08-07 NOTE — MR AVS SNAPSHOT
After Visit Summary   8/7/2017    Bere Machuca    MRN: [id number]           Patient Information     Date Of Birth          1955        Visit Information        Provider Department      8/7/2017 12:00 PM Wilma Armenta MD [facility]        Today's Diagnoses     Factor 5 Leiden mutation, heterozygous/ INR 2-3    -  1    Long term current use of anticoagulant therapy          Care Instructions    Back on usual diagnosis 5 mgm daily and 7.5 mgm tow days a week/ recheck 7-10 days              Follow-ups after your visit        Follow-up notes from your care team     Return in about 1 week (around 8/14/2017).      Who to contact     If you have questions or need follow up information about today's clinic visit or your schedule please contact [facility] directly at [phone].  Normal or non-critical lab and imaging results will be communicated to you by MyChart, letter or phone within 4 business days after the clinic has received the results. If you do not hear from us within 7 days, please contact the clinic through Apps4Prohart or phone. If you have a critical or abnormal lab result, we will notify you by phone as soon as possible.  Submit refill requests through Fever or call your pharmacy and they will forward the refill request to us. Please allow 3 business days for your refill to be completed.          Additional Information About Your Visit        MyChart Information     Fever gives you secure access to your electronic health record. If you see a primary care provider, you can also send messages to your care team and make appointments. If you have questions, please call your primary care clinic.  If you do not have a primary care provider, please call [phone] and they will assist you.        Care EveryWhere ID     This is your Care EveryWhere ID. This could be used by other organizations to access your [facility]  "records  [id number]        Your Vitals Were     Pulse Temperature Respirations Height Pulse Oximetry BMI (Body Mass Index)    76 98.2  F (36.8  C) (Oral) 16 1.664 m (5' 5.5\") 98% 62.11 kg/m2       Blood Pressure from Last 3 Encounters:   08/07/17 124/84   08/04/17 130/84   07/31/17 (!) 142/92    Weight from Last 3 Encounters:   08/07/17 (!) 171.9 kg (379 lb)   07/31/17 (!) 174.4 kg (384 lb 6.4 oz)   07/14/17 (!) 173.4 kg (382 lb 3.2 oz)              We Performed the Following     CL BFP PROTHROMBIN TIME/INR (BFP)     VENOUS COLLECTION          Today's Medication Changes          These changes are accurate as of: 8/7/17 12:45 PM.  If you have any questions, ask your nurse or doctor.               These medicines have changed or have updated prescriptions.        Dose/Directions    warfarin 5 MG tablet   Commonly known as:  COUMADIN   This may have changed:  additional instructions   Used for:  Factor 5 Leiden mutation, heterozygous (H), History of pulmonary embolism        Dose:  5 mg   Take 1 tablet (5 mg) by mouth daily   Quantity:  90 tablet   Refills:  0                Primary Care Provider Office Phone # Fax #    Wilma Armenta [phone]       [place] [address] [address] [facility]  [address]  [facility] [address]        Equal Access to Services     LAUREN WAGNER AH: Hadii korina dillard hadasho Soluizali, waaxda luqadaha, qaybta kaalmada adeegyada, aman love. So [facility] [phone].    ATENCIÓN: Si habla español, tiene a norman disposición servicios gratuitos de asistencia lingüística. Llame al [phone].    We comply with applicable federal civil rights laws and [place] laws. We do not discriminate on the basis of race, color, national origin, age, disability sex, sexual orientation or gender identity.            Thank you!     Thank you for choosing [facility]  for your care. Our goal is always to provide you with excellent care. Hearing back " from our patients is one way we can continue to improve our services. Please take a few minutes to complete the written survey that you may receive in the mail after your visit with us. Thank you!             Your Updated Medication List - Protect others around you: Learn how to safely use, store and throw away your medicines at www.disposemymeds.org.          This list is accurate as of: 8/7/17 12:45 PM.  Always use your most recent med list.                   Brand Name Dispense Instructions for use Diagnosis    * albuterol (2.5 MG/3ML) 0.083% neb solution      Take 3 mLs by nebulization every 6 hours as needed for shortness of breath / dyspnea.  [facility]    Chronic airway obstruction, not elsewhere classified, Personal history of tobacco use, presenting hazards to health       * albuterol 108 (90 BASE) MCG/ACT Inhaler   Generic drug:  albuterol      Inhale 1-2 puffs into the lungs as needed for shortness of breath / dyspnea. DR ANDREWS pulmonary [facility]    Chronic airway obstruction, not elsewhere classified, Personal history of tobacco use, presenting hazards to health       blood glucose lancets standard    no brand specified    100 each    Use to test blood sugar 2-3 times daily or as directed.    Type 2 diabetes mellitus without complication, without long-term current use of insulin (H)       blood glucose monitoring test strip    ISAAC CONTOUR NEXT    100 strip    Use to test blood sugar 2-3 times daily or as directed.    Type 2 diabetes mellitus without complication, without long-term current use of insulin (H)       cholecalciferol 1000 UNITS Tabs      Take 1,000 Units by mouth At Bedtime    Morbid obesity due to excess calories (H)       citalopram 40 MG tablet    celeXA    30 tablet    TAKE 1 TABLET (40 MG) BY MOUTH DAILY    Major depressive disorder, recurrent episode, moderate (H)       cyclobenzaprine 5 MG tablet    FLEXERIL    30 tablet    Take 1 tablet (5 mg) by mouth 3 times  daily as needed for muscle spasms    Displacement of lumbar intervertebral disc without myelopathy, Spinal stenosis in cervical region       Dalteparin Sodium 57819 UNT/0.72ML Soln      Inject 18,000 Units Subcutaneous 2 times daily        fluticasone-vilanterol 200-25 MCG/INH oral inhaler    BREO ELLIPTA    1 Inhaler    Inhale 1 puff into the lungs daily    Chronic bronchitis, unspecified chronic bronchitis type (H)       gabapentin 300 MG capsule    NEURONTIN     Take 600 mg by mouth 2 times daily        * hydrOXYzine 25 MG tablet    ATARAX    90 tablet    Take 1 tablet (25 mg) by mouth At Bedtime    Intrinsic eczema       * hydrOXYzine 25 MG tablet    ATARAX    90 tablet    Take 1 tablet (25 mg) by mouth At Bedtime    Intrinsic eczema       losartan 25 MG tablet    COZAAR     Take 25 mg by mouth daily    Essential hypertension, benign       Multi-vitamin Tabs tablet      Take 1 tablet by mouth daily        predniSONE 20 MG tablet    DELTASONE    5 tablet    Take 1 tablet (20 mg) by mouth daily with food    Other ulcerative colitis with complication (H)       PROBIOTIC ACIDOPHILUS BIOBEADS Caps      Take 1 capsule by mouth At Bedtime    Myalgia and myositis, unspecified       SPIRIVA HANDIHALER 18 MCG capsule   Generic drug:  tiotropium     30 capsule    Inhale contents of one capsule daily.        SUPER B COMPLEX PO      Take 1 tablet by mouth daily        warfarin 5 MG tablet    COUMADIN    90 tablet    Take 1 tablet (5 mg) by mouth daily    Factor 5 Leiden mutation, heterozygous (H), History of pulmonary embolism       * Notice:  This list has 4 medication(s) that are the same as other medications prescribed for you. Read the directions carefully, and ask your doctor or other care provider to review them with you.

## 2017-08-07 NOTE — PROGRESS NOTES
SUBJECTIVE: here to recheck INR after stopping warfarin for a colonoscopy with biopsies. On 10 mgm warfarin the last 2 days/ using Lovenox. Colonoscopy showed ulcerative colitis/ starting prednisone and a new medication from GI specialists.    The patient denies abnormal bleeding from any body orifice such as bleeding from nose or gums, blood in urine or stool, or melena, hemoptysis or hematemesis.      OBJECTIVE: INR therapeutic at 2.4    ASSESSMENT: (D68.51) Factor 5 Leiden mutation, heterozygous/ INR 2-3  (primary encounter diagnosis)  Plan: CL BFP PROTHROMBIN TIME/INR (BFP), VENOUS         COLLECTION        Back on usual diagnosis 5 mgm daily and 7.5 mgm tow days a week/ recheck 7-10 days    (Z79.01) Long term current use of anticoagulant therapy  Plan: CL BFP PROTHROMBIN TIME/INR (BFP), VENOUS         COLLECTION

## 2017-08-09 ENCOUNTER — TRANSFERRED RECORDS (OUTPATIENT)
Dept: FAMILY MEDICINE | Facility: CLINIC | Age: 62
End: 2017-08-09

## 2017-08-09 ENCOUNTER — HOSPITAL ENCOUNTER (OUTPATIENT)
Dept: LAB | Facility: CLINIC | Age: 62
Discharge: HOME | End: 2017-08-09
Admitting: PHYSICIAN ASSISTANT
Payer: COMMERCIAL

## 2017-08-09 DIAGNOSIS — K52.9 RECTOCOLITIS, HEMORRHAGIC: Primary | ICD-10-CM

## 2017-08-09 LAB
BASOPHILS # BLD AUTO: 0.1 10E9/L (ref 0–0.2)
BASOPHILS NFR BLD AUTO: 0.5 %
CRP SERPL-MCNC: 16 MG/L (ref 0–8)
DIFFERENTIAL METHOD BLD: ABNORMAL
EOSINOPHIL # BLD AUTO: 0.5 10E9/L (ref 0–0.7)
EOSINOPHIL NFR BLD AUTO: 5.3 %
ERYTHROCYTE [DISTWIDTH] IN BLOOD BY AUTOMATED COUNT: 15.8 % (ref 10–15)
HCT VFR BLD AUTO: 44.2 % (ref 35–47)
HGB BLD-MCNC: 14.2 G/DL (ref 11.7–15.7)
IMM GRANULOCYTES # BLD: 0.1 10E9/L (ref 0–0.4)
IMM GRANULOCYTES NFR BLD: 0.6 %
LYMPHOCYTES # BLD AUTO: 3.6 10E9/L (ref 0.8–5.3)
LYMPHOCYTES NFR BLD AUTO: 35.2 %
MCH RBC QN AUTO: 30.3 PG (ref 26.5–33)
MCHC RBC AUTO-ENTMCNC: 32.1 G/DL (ref 31.5–36.5)
MCV RBC AUTO: 94 FL (ref 78–100)
MONOCYTES # BLD AUTO: 0.7 10E9/L (ref 0–1.3)
MONOCYTES NFR BLD AUTO: 6.6 %
NEUTROPHILS # BLD AUTO: 5.2 10E9/L (ref 1.6–8.3)
NEUTROPHILS NFR BLD AUTO: 51.8 %
NRBC # BLD AUTO: 0 10*3/UL
NRBC BLD AUTO-RTO: 0 /100
PLATELET # BLD AUTO: 344 10E9/L (ref 150–450)
RBC # BLD AUTO: 4.69 10E12/L (ref 3.8–5.2)
WBC # BLD AUTO: 10.1 10E9/L (ref 4–11)

## 2017-08-09 PROCEDURE — 87340 HEPATITIS B SURFACE AG IA: CPT | Performed by: PHYSICIAN ASSISTANT

## 2017-08-09 PROCEDURE — 86708 HEPATITIS A ANTIBODY: CPT | Performed by: PHYSICIAN ASSISTANT

## 2017-08-09 PROCEDURE — 86706 HEP B SURFACE ANTIBODY: CPT | Performed by: PHYSICIAN ASSISTANT

## 2017-08-09 PROCEDURE — 86704 HEP B CORE ANTIBODY TOTAL: CPT | Performed by: PHYSICIAN ASSISTANT

## 2017-08-09 PROCEDURE — 86705 HEP B CORE ANTIBODY IGM: CPT | Performed by: PHYSICIAN ASSISTANT

## 2017-08-09 PROCEDURE — 85025 COMPLETE CBC W/AUTO DIFF WBC: CPT | Performed by: PHYSICIAN ASSISTANT

## 2017-08-09 PROCEDURE — 82784 ASSAY IGA/IGD/IGG/IGM EACH: CPT | Performed by: PHYSICIAN ASSISTANT

## 2017-08-09 PROCEDURE — 36415 COLL VENOUS BLD VENIPUNCTURE: CPT | Performed by: PHYSICIAN ASSISTANT

## 2017-08-09 PROCEDURE — 86480 TB TEST CELL IMMUN MEASURE: CPT | Performed by: PHYSICIAN ASSISTANT

## 2017-08-09 PROCEDURE — 86140 C-REACTIVE PROTEIN: CPT | Performed by: PHYSICIAN ASSISTANT

## 2017-08-09 PROCEDURE — 86709 HEPATITIS A IGM ANTIBODY: CPT | Performed by: PHYSICIAN ASSISTANT

## 2017-08-09 PROCEDURE — 83516 IMMUNOASSAY NONANTIBODY: CPT | Performed by: PHYSICIAN ASSISTANT

## 2017-08-09 PROCEDURE — 82657 ENZYME CELL ACTIVITY: CPT | Performed by: PHYSICIAN ASSISTANT

## 2017-08-10 ENCOUNTER — TRANSFERRED RECORDS (OUTPATIENT)
Dept: FAMILY MEDICINE | Facility: CLINIC | Age: 62
End: 2017-08-10

## 2017-08-10 DIAGNOSIS — F33.1 MAJOR DEPRESSIVE DISORDER, RECURRENT EPISODE, MODERATE (H): ICD-10-CM

## 2017-08-10 DIAGNOSIS — M51.26 DISPLACEMENT OF LUMBAR INTERVERTEBRAL DISC WITHOUT MYELOPATHY: ICD-10-CM

## 2017-08-10 DIAGNOSIS — M48.02 SPINAL STENOSIS IN CERVICAL REGION: ICD-10-CM

## 2017-08-10 LAB
HAV IGG SER QL IA: NORMAL
HAV IGM SERPL QL IA: NORMAL
HBV CORE AB SERPL QL IA: NONREACTIVE
HBV CORE IGM SERPL QL IA: NORMAL
HBV SURFACE AB SERPL IA-ACNC: 0.24 M[IU]/ML
HBV SURFACE AG SERPL QL IA: NONREACTIVE
IGA SERPL-MCNC: 165 MG/DL (ref 70–380)

## 2017-08-10 RX ORDER — CITALOPRAM HYDROBROMIDE 40 MG/1
TABLET ORAL
Qty: 30 TABLET | Refills: 0 | Status: SHIPPED | OUTPATIENT
Start: 2017-08-10 | End: 2017-09-09

## 2017-08-10 NOTE — TELEPHONE ENCOUNTER
Pt is requesting the following prescription.     Pending Prescriptions:                       Disp   Refills    citalopram (CELEXA) 40 MG tablet          30 tab*0            Sig: TAKE 1 TABLET (40 MG) BY MOUTH DAILY      Pt's last OV was: 8/7/17    Please advise.     Tika.NARESH Fenton ([facility])

## 2017-08-11 ENCOUNTER — TRANSFERRED RECORDS (OUTPATIENT)
Dept: FAMILY MEDICINE | Facility: CLINIC | Age: 62
End: 2017-08-11

## 2017-08-11 LAB
M TB TUBERC IFN-G BLD QL: NEGATIVE
M TB TUBERC IFN-G/MITOGEN IGNF BLD: 0 IU/ML
TTG IGA SER-ACNC: NORMAL U/ML

## 2017-08-11 RX ORDER — CYCLOBENZAPRINE HCL 5 MG
TABLET ORAL
Qty: 30 TABLET | Refills: 0 | Status: SHIPPED | OUTPATIENT
Start: 2017-08-11 | End: 2017-09-09

## 2017-08-11 NOTE — TELEPHONE ENCOUNTER
Sent pt doe, she does have appt on Monday, Aug 14th, unsure if this medication will be addressed.  In message informed pt that she will need a med check appt for refills.

## 2017-08-11 NOTE — TELEPHONE ENCOUNTER
Patient is requesting a refill of the following med:    Pending Prescriptions:                       Disp   Refills    cyclobenzaprine (FLEXERIL) 5 MG tablet [P*                    Sig: TAKE 1 TABLET BY MOUTH AS NEEDED FOR MUSCLE SPASM    Last Refill: 02/09/2017  Last Refill Quantity: 30 tabs  Scheduled Office Visit: 08/14/2017    Hyun could you please review this request and advise for CER?    Thank You,  Christel Ashley CMA

## 2017-08-12 ENCOUNTER — TRANSFERRED RECORDS (OUTPATIENT)
Dept: FAMILY MEDICINE | Facility: CLINIC | Age: 62
End: 2017-08-12

## 2017-08-13 LAB — TPMT BLD-CCNC: 31 U/ML

## 2017-08-14 ENCOUNTER — OFFICE VISIT (OUTPATIENT)
Dept: FAMILY MEDICINE | Facility: CLINIC | Age: 62
End: 2017-08-14

## 2017-08-14 VITALS
WEIGHT: 293 LBS | HEART RATE: 61 BPM | TEMPERATURE: 97.7 F | OXYGEN SATURATION: 96 % | DIASTOLIC BLOOD PRESSURE: 80 MMHG | SYSTOLIC BLOOD PRESSURE: 128 MMHG | BODY MASS INDEX: 62.44 KG/M2

## 2017-08-14 DIAGNOSIS — Z86.711 HISTORY OF PULMONARY EMBOLISM: ICD-10-CM

## 2017-08-14 DIAGNOSIS — R15.9 INCONTINENCE OF FECES, UNSPECIFIED FECAL INCONTINENCE TYPE: ICD-10-CM

## 2017-08-14 DIAGNOSIS — D68.51 FACTOR 5 LEIDEN MUTATION, HETEROZYGOUS (H): Primary | ICD-10-CM

## 2017-08-14 LAB — INR POINT OF CARE: 2 (ref 2–3)

## 2017-08-14 PROCEDURE — 36415 COLL VENOUS BLD VENIPUNCTURE: CPT | Performed by: PHYSICIAN ASSISTANT

## 2017-08-14 PROCEDURE — 85610 PROTHROMBIN TIME: CPT | Performed by: PHYSICIAN ASSISTANT

## 2017-08-14 PROCEDURE — 99213 OFFICE O/P EST LOW 20 MIN: CPT | Performed by: PHYSICIAN ASSISTANT

## 2017-08-14 RX ORDER — WARFARIN SODIUM 5 MG/1
TABLET ORAL
Qty: 90 TABLET | Refills: 0 | COMMUNITY
Start: 2017-08-14 | End: 2017-08-30

## 2017-08-14 NOTE — PROGRESS NOTES
SUBJECTIVE:                                                    Bere Machuca is a 61 year old female who presents to clinic today for the following health issues:        Chronic coumadin use    Indication: Factor V Leiden        Recent Labs   Lab Test  08/14/17   1419  08/07/17   1218  08/04/17   1429  07/14/17   0944  07/05/17   1037  06/30/17   0912   INR  2.0  2.4  1.3*  2.1  2.4  1.8        Current Coumadin Dose:  5 mgm daily and 7.5 mgm two days a week- Wed and Friday    Bleeding Signs/Symptoms:  Minor - rectal bleeding  Thromboembolic Signs/Symptoms:  None    Medication Changes:  Yes: mesalamine  Dietary Changes:  No  Bacterial/Viral Infection:  No    Missed Coumadin Doses:  None    Other Concerns:  Incontinence - fecal since UC dg.     Specifically denies chest pain, palpitations, dyspnea, or peripheral edema.         Problem list, Medication list, Allergies, and Medical/Social/Surgical histories reviewed in [facility] and updated as appropriate.      Labs reviewed in [facility]  BP Readings from Last 3 Encounters:   08/14/17 128/80   08/07/17 124/84   08/04/17 130/84    Wt Readings from Last 3 Encounters:   08/14/17 (!) 172.8 kg (381 lb)   08/07/17 (!) 171.9 kg (379 lb)   07/31/17 (!) 174.4 kg (384 lb 6.4 oz)                  Patient Active Problem List   Diagnosis     History of cocaine abuse     Personal history of tobacco use, presenting hazards to health     Family history of malignant neoplasm of gastrointestinal tract     Esophageal reflux     Chronic airway obstruction (H)     Hypertension     Sleep apnea     Migraine     ACP (advance care planning)     Factor 5 Leiden mutation, heterozygous/ INR 2-3     Health Care Home     Family history of pulmonary embolism     Lymphedema of arm     Spinal stenosis in cervical region     History of pulmonary embolism     Major depressive disorder, recurrent episode, moderate (H)     Superior vena cava syndrome     Morbid obesity due to excess calories (H)      Displacement of lumbar intervertebral disc without myelopathy     Sepsis (H)     Type 2 diabetes mellitus without complication, without long-term current use of insulin (H)     Past Surgical History:   Procedure Laterality Date     BIOPSY OF SKIN LESION  multiple    keloids     C ANESTH,REPAIR LO ABD HERNIA NOS  multiple/  last    panniculus     C EXPLORATORY OF ABDOMEN       C PLASTIC SURGERY, NECK  age 4     C REMOVAL COLON/ILEOSTOMY      reastamosis     C TOTAL ABDOM HYSTERECTOMY      Hysterectomy, Total Abdominal     HC REMOVAL OF OVARY/TUBE(S)      Salpingo-Oophorectomy, Unilateral     HC REMOVE ABDOMINAL WALL LESION      abscess     HC REMOVE TONSILS/ADENOIDS,<11 Y/O  age 14    T & A <14y.o.     HERNIA REPAIR       INSERTION OF ACCESS PORT  1-4-96    sepsis- removed after hospital stay       Social History   Substance Use Topics     Smoking status: Former Smoker     Packs/day: 0.50     Years: 35.00     Types: Cigarettes     Quit date: 2014     Smokeless tobacco: Never Used     Alcohol use 0.5 oz/week     1 Standard drinks or equivalent per week      Comment: social     Family History   Problem Relation Age of Onset     Respiratory Mother      copd     CANCER Mother      skin cancers/ melanomas     C.A.D. Mother      MI  age 79     Cancer - colorectal Father      / diagnosis age 72     GASTROINTESTINAL DISEASE Father      colitis     Breast Cancer No family hx of          Current Outpatient Prescriptions   Medication Sig Dispense Refill     cyclobenzaprine (FLEXERIL) 5 MG tablet TAKE 1 TABLET BY MOUTH AS NEEDED FOR MUSCLE SPASM 30 tablet 0     citalopram (CELEXA) 40 MG tablet TAKE 1 TABLET (40 MG) BY MOUTH DAILY 30 tablet 0     gabapentin (NEURONTIN) 300 MG capsule Take 600 mg by mouth 2 times daily       B Complex-C (SUPER B COMPLEX PO) Take 1 tablet by mouth daily       Dalteparin Sodium 50136 UNT/0.72ML SOLN Inject 18,000 Units Subcutaneous 2 times daily       tiotropium  (SPIRIVA HANDIHALER) 18 MCG capsule Inhale contents of one capsule daily. 30 capsule 1     predniSONE (DELTASONE) 20 MG tablet Take 1 tablet (20 mg) by mouth daily with food 5 tablet 0     hydrOXYzine (ATARAX) 25 MG tablet Take 1 tablet (25 mg) by mouth At Bedtime 90 tablet 0     blood glucose monitoring (ISAAC CONTOUR NEXT) test strip Use to test blood sugar 2-3 times daily or as directed. 100 strip 1     blood glucose (NO BRAND SPECIFIED) lancets standard Use to test blood sugar 2-3 times daily or as directed. 100 each 1     fluticasone-vilanterol (BREO ELLIPTA) 200-25 MCG/INH oral inhaler Inhale 1 puff into the lungs daily 1 Inhaler 0     losartan (COZAAR) 25 MG tablet Take 25 mg by mouth daily       warfarin (COUMADIN) 5 MG tablet Take 1 tablet (5 mg) by mouth daily (Patient taking differently: Take 5 mg by mouth daily Taking 7.5mg twice per week.) 90 tablet 0     hydrOXYzine (ATARAX) 25 MG tablet Take 1 tablet (25 mg) by mouth At Bedtime 90 tablet 0     cholecalciferol 1000 UNITS TABS Take 1,000 Units by mouth At Bedtime        multivitamin, therapeutic with minerals (MULTI-VITAMIN) TABS Take 1 tablet by mouth daily       Probiotic Product (PROBIOTIC ACIDOPHILUS) CAPS Take 1 capsule by mouth At Bedtime        albuterol (2.5 MG/3ML) 0.083% nebulizer solution Take 3 mLs by nebulization every 6 hours as needed for shortness of breath / dyspnea. Dr [facility]       albuterol (PROAIR HFA) 108 (90 BASE) MCG/ACT inhaler Inhale 1-2 puffs into the lungs as needed for shortness of breath / dyspnea. DR [facility]ELEN[facility]       Allergies   Allergen Reactions     Aspirin      PN: LW Reaction: sensitive/stomach     Lyrica [Pregabalin]      dizziness     Tape [Adhesive Tape] Itching     Vancomycin      Recent Labs   Lab Test  06/30/17   1130  06/30/17   1007  06/23/17   0551  06/22/17   2113  10/24/16   01/11/16   1541   A1C   --   6.2   --    --    --   6.8   --    --    ALT  35*   --    --   41   --    42   --    --    CR  0.83   --   0.74  0.80   < >  0.78   < >   --    GFRESTIMATED  77   --   81  74   < >   --    < >   --    GFRESTBLACK   --    --   >90   GFR Calc    89   < >   --    < >   --    POTASSIUM  4.3   --   3.8  4.2   < >  4.5   < >   --    TSH   --    --    --    --    --   2.85   --   2.57    < > = values in this interval not displayed.            OBJECTIVE:                                                    /80 (BP Location: Right arm, Patient Position: Chair, Cuff Size: Adult Large)  Pulse 61  Temp 97.7  F (36.5  C) (Oral)  Wt (!) 172.8 kg (381 lb)  SpO2 96%  Breastfeeding? No  BMI 62.44 kg/m2   Body mass index is 62.44 kg/(m^2).       Patient is a 61 year old female, in no acute distress. Vitals noted   Head: Normocephalic, atraumatic.  Cardiac:  Normal rhythm and rate, no murmurs, rubs or gallops.  Lungs: clear to auscultation bilaterally; no wheezes, crackles or rhonchi           ASSESSMENT/PLAN:                                                        1. Factor 5 Leiden mutation, heterozygous/ INR 2-3    2. Incontinence of feces, unspecified fecal incontinence type  FU with GI    Therapeutic INR for goal of 2-3    New Dose: MWF 7.5 mg and 5 other days    Next INR: 2 weeks      Catrachita Torres PA-C  8/13/2017

## 2017-08-14 NOTE — MR AVS SNAPSHOT
After Visit Summary   8/14/2017    Bere Machuca    MRN: [id number]           Patient Information     Date Of Birth          1955        Visit Information        Provider Department      8/14/2017 2:00 PM Catrachita Torres PA [facility]        Today's Diagnoses     Factor 5 Leiden mutation, heterozygous/ INR 2-3    -  1    Incontinence of feces, unspecified fecal incontinence type        History of pulmonary embolism           Follow-ups after your visit        Who to contact     If you have questions or need follow up information about today's clinic visit or your schedule please contact [facility] directly at [phone].  Normal or non-critical lab and imaging results will be communicated to you by [employer], letter or phone within 4 business days after the clinic has received the results. If you do not hear from us within 7 days, please contact the clinic through [employer] or phone. If you have a critical or abnormal lab result, we will notify you by phone as soon as possible.  Submit refill requests through [employer] or call your pharmacy and they will forward the refill request to us. Please allow 3 business days for your refill to be completed.          Additional Information About Your Visit        MyChart Information     [employer] gives you secure access to your electronic health record. If you see a primary care provider, you can also send messages to your care team and make appointments. If you have questions, please call your primary care clinic.  If you do not have a primary care provider, please call [phone] and they will assist you.        Care EveryWhere ID     This is your Care EveryWhere ID. This could be used by other organizations to access your [facility] medical records  [id number]        Your Vitals Were     Pulse Temperature Pulse Oximetry Breastfeeding? BMI (Body Mass Index)       61 97.7  F (36.5  C) (Oral) 96%  No 62.44 kg/m2        Blood Pressure from Last 3 Encounters:   08/14/17 128/80   08/07/17 124/84   08/04/17 130/84    Weight from Last 3 Encounters:   08/14/17 (!) 172.8 kg (381 lb)   08/07/17 (!) 171.9 kg (379 lb)   07/31/17 (!) 174.4 kg (384 lb 6.4 oz)              We Performed the Following     PROTHROMBIN TIME/INR     VENOUS COLLECTION          Today's Medication Changes          These changes are accurate as of: 8/14/17  2:47 PM.  If you have any questions, ask your nurse or doctor.               These medicines have changed or have updated prescriptions.        Dose/Directions    warfarin 5 MG tablet   Commonly known as:  COUMADIN   This may have changed:    - how much to take  - how to take this  - when to take this  - additional instructions   Used for:  Factor 5 Leiden mutation, heterozygous (H), History of pulmonary embolism   Changed by:  Catrachita Torres PA        MWF 7.5 mg , 5 mg other days   Quantity:  90 tablet   Refills:  0                Primary Care Provider Office Phone # Fax #    Wilma Armenta [phone]       [address] [address]        Equal Access to Services     LAUREN WAGNER AH: Hadii korina dillard hadasho Soluizali, waaxda luqadaha, qaybta kaalmada adeegyada, aman love. So [place] [phone].    ATENCIÓN: Si habla español, tiene a norman disposición servicios gratuitos de asistencia lingüística. Llame al [phone].    We comply with applicable federal civil rights laws and [place] laws. We do not discriminate on the basis of race, color, national origin, age, disability sex, sexual orientation or gender identity.            Thank you!     Thank you for choosing [facility]  for your care. Our goal is always to provide you with excellent care. Hearing back from our patients is one way we can continue to improve our services. Please take a few minutes to complete the written survey that  you may receive in the mail after your visit with us. Thank you!             Your Updated Medication List - Protect others around you: Learn how to safely use, store and throw away your medicines at www.disposemymeds.org.          This list is accurate as of: 8/14/17  2:47 PM.  Always use your most recent med list.                   Brand Name Dispense Instructions for use Diagnosis    * albuterol (2.5 MG/3ML) 0.083% neb solution      Take 3 mLs by nebulization every 6 hours as needed for shortness of breath / dyspnea. Dr ANDRE[facility]    Chronic airway obstruction, not elsewhere classified, Personal history of tobacco use, presenting hazards to health       * albuterol 108 (90 BASE) MCG/ACT Inhaler   Generic drug:  albuterol      Inhale 1-2 puffs into the lungs as needed for shortness of breath / dyspnea. DR ANDREWS [facility]    Chronic airway obstruction, not elsewhere classified, Personal history of tobacco use, presenting hazards to health       blood glucose lancets standard    no brand specified    100 each    Use to test blood sugar 2-3 times daily or as directed.    Type 2 diabetes mellitus without complication, without long-term current use of insulin (H)       blood glucose monitoring test strip    ISAAC CONTOUR NEXT    100 strip    Use to test blood sugar 2-3 times daily or as directed.    Type 2 diabetes mellitus without complication, without long-term current use of insulin (H)       cholecalciferol 1000 UNITS Tabs      Take 1,000 Units by mouth At Bedtime    Morbid obesity due to excess calories (H)       citalopram 40 MG tablet    celeXA    30 tablet    TAKE 1 TABLET (40 MG) BY MOUTH DAILY    Major depressive disorder, recurrent episode, moderate (H)       cyclobenzaprine 5 MG tablet    FLEXERIL    30 tablet    TAKE 1 TABLET BY MOUTH AS NEEDED FOR MUSCLE SPASM    Displacement of lumbar intervertebral disc without myelopathy, Spinal stenosis in cervical region       Dalteparin  Sodium 88645 UNT/0.72ML Soln      Inject 18,000 Units Subcutaneous 2 times daily        fluticasone-vilanterol 200-25 MCG/INH oral inhaler    BREO ELLIPTA    1 Inhaler    Inhale 1 puff into the lungs daily    Chronic bronchitis, unspecified chronic bronchitis type (H)       gabapentin 300 MG capsule    NEURONTIN     Take 600 mg by mouth 2 times daily        * hydrOXYzine 25 MG tablet    ATARAX    90 tablet    Take 1 tablet (25 mg) by mouth At Bedtime    Intrinsic eczema       * hydrOXYzine 25 MG tablet    ATARAX    90 tablet    Take 1 tablet (25 mg) by mouth At Bedtime    Intrinsic eczema       losartan 25 MG tablet    COZAAR     Take 25 mg by mouth daily    Essential hypertension, benign       Multi-vitamin Tabs tablet      Take 1 tablet by mouth daily        predniSONE 20 MG tablet    DELTASONE    5 tablet    Take 1 tablet (20 mg) by mouth daily with food    Other ulcerative colitis with complication (H)       PROBIOTIC ACIDOPHILUS BIOBEADS Caps      Take 1 capsule by mouth At Bedtime    Myalgia and myositis, unspecified       SPIRIVA HANDIHALER 18 MCG capsule   Generic drug:  tiotropium     30 capsule    Inhale contents of one capsule daily.        SUPER B COMPLEX PO      Take 1 tablet by mouth daily        warfarin 5 MG tablet    COUMADIN    90 tablet    MWF 7.5 mg , 5 mg other days    Factor 5 Leiden mutation, heterozygous (H), History of pulmonary embolism       * Notice:  This list has 4 medication(s) that are the same as other medications prescribed for you. Read the directions carefully, and ask your doctor or other care provider to review them with you.

## 2017-08-14 NOTE — NURSING NOTE
Bere is here for INR    Pre-Visit Screening :  Immunizations : up to date  Colonoscopy : is up to date  Mammogram : is up to date  Asthma Action Test/Plan : NA  PHQ9/GAD7 :  NA  Pulse - regular  My Chart - accepts    CLASSIFICATION OF OVERWEIGHT AND OBESITY BY BMI                         Obesity Class           BMI(kg/m2)  Underweight                                    < 18.5  Normal                                         18.5-24.9  Overweight                                     25.0-29.9  OBESITY                     I                  30.0-34.9                              II                 35.0-39.9  EXTREME OBESITY             III                >40                             Patient's  BMI Body mass index is 62.44 kg/(m^2).  http://hin.nhlbi.nih.gov/menuplanner/menu.cgi  Questioned patient about current smoking habits.  Pt. auit smoking 5 years ago

## 2017-08-30 DIAGNOSIS — J42 CHRONIC BRONCHITIS, UNSPECIFIED CHRONIC BRONCHITIS TYPE (H): ICD-10-CM

## 2017-08-30 DIAGNOSIS — D68.51 FACTOR 5 LEIDEN MUTATION, HETEROZYGOUS (H): ICD-10-CM

## 2017-08-30 DIAGNOSIS — Z86.711 HISTORY OF PULMONARY EMBOLISM: ICD-10-CM

## 2017-08-30 RX ORDER — WARFARIN SODIUM 5 MG/1
TABLET ORAL
Qty: 45 TABLET | Refills: 0 | Status: SHIPPED | OUTPATIENT
Start: 2017-08-30 | End: 2017-09-11

## 2017-08-30 NOTE — TELEPHONE ENCOUNTER
Bere Machuca is requesting a refill of:    Pending Prescriptions:                       Disp   Refills    warfarin (COUMADIN) 5 MG tablet           90 tab*0            Sig: MWF 7.5 mg , 5 mg other days    Please close encounter if RX was sent. Thanks, Leah

## 2017-08-30 NOTE — TELEPHONE ENCOUNTER
Her inhalers are filled by her pulmonary specialists at [facility]. This last script was done by [facility]

## 2017-08-30 NOTE — TELEPHONE ENCOUNTER
Received a fax from the patient's pharmacy requesting a refill of the following medication.   Pending Prescriptions:                       Disp   Refills    fluticasone-vilanterol (BREO ELLIPTA) 200*1 Inha*0            Sig: Inhale 1 puff into the lungs daily    Ready to be faxed when Rx is approved or denied.  Please close encounter when finished. Thanks, Liz  Thank-You,  Liz

## 2017-08-30 NOTE — TELEPHONE ENCOUNTER
LM on VM letting Darline know CER wants her in yet this week to have her INR rechecked and to call to schedule

## 2017-08-30 NOTE — TELEPHONE ENCOUNTER
Call Darline rankin -she is due for INR recheck this week.  I sent in 30 days of her Coumadin but she needs to be rechecked soon.    Catrachita Torres PA-C  8/30/2017

## 2017-09-01 ENCOUNTER — TELEPHONE (OUTPATIENT)
Dept: PHARMACY | Facility: PHYSICIAN GROUP | Age: 62
End: 2017-09-01

## 2017-09-01 NOTE — TELEPHONE ENCOUNTER
Left message for pt to call back and schedule follow up MTM visit.     Kenia Simmons, Pharm.D, [facility]  Medication Therapy Management Pharmacist  [phone]

## 2017-09-05 ENCOUNTER — TRANSFERRED RECORDS (OUTPATIENT)
Dept: FAMILY MEDICINE | Facility: CLINIC | Age: 62
End: 2017-09-05

## 2017-09-07 DIAGNOSIS — J42 CHRONIC BRONCHITIS, UNSPECIFIED CHRONIC BRONCHITIS TYPE (H): ICD-10-CM

## 2017-09-07 NOTE — TELEPHONE ENCOUNTER
Bere Machuca is requesting a refill of:    Pending Prescriptions:                       Disp   Refills    fluticasone-vilanterol (BREO ELLIPTA) 200*3 Inha*0            Sig: Inhale 1 puff into the lungs daily    Please close encounter if RX was sent. Thanks, Leah

## 2017-09-09 DIAGNOSIS — M51.26 DISPLACEMENT OF LUMBAR INTERVERTEBRAL DISC WITHOUT MYELOPATHY: ICD-10-CM

## 2017-09-09 DIAGNOSIS — M48.02 SPINAL STENOSIS IN CERVICAL REGION: ICD-10-CM

## 2017-09-09 RX ORDER — MESALAMINE 4 G/60ML
SUSPENSION RECTAL
Refills: 5 | COMMUNITY
Start: 2017-08-04 | End: 2017-10-09

## 2017-09-09 RX ORDER — MESALAMINE 1.2 G/1
1200 TABLET, DELAYED RELEASE ORAL 4 TIMES DAILY
Refills: 4 | COMMUNITY
Start: 2017-09-07 | End: 2019-04-12

## 2017-09-09 RX ORDER — CYCLOBENZAPRINE HCL 5 MG
TABLET ORAL
Qty: 30 TABLET | Refills: 0 | Status: SHIPPED | OUTPATIENT
Start: 2017-09-09 | End: 2017-10-17

## 2017-09-09 RX ORDER — PREDNISONE 10 MG/1
TABLET ORAL SEE ADMIN INSTRUCTIONS
Refills: 0 | COMMUNITY
Start: 2017-08-07 | End: 2017-10-09

## 2017-09-09 RX ORDER — LANCETS
EACH MISCELLANEOUS
Refills: 1 | COMMUNITY
Start: 2017-08-14 | End: 2020-05-27

## 2017-09-09 RX ORDER — CITALOPRAM HYDROBROMIDE 20 MG/1
TABLET ORAL
Refills: 2 | COMMUNITY
Start: 2017-08-30 | End: 2017-10-09

## 2017-09-09 RX ORDER — METFORMIN HCL 500 MG
TABLET, EXTENDED RELEASE 24 HR ORAL
Refills: 2 | COMMUNITY
Start: 2017-09-03 | End: 2017-10-09

## 2017-09-09 NOTE — TELEPHONE ENCOUNTER
Bere Machuca is requesting a refill of:    Pending Prescriptions:                       Disp   Refills    cyclobenzaprine (FLEXERIL) 5 MG tablet    30 tab*0            Sig: TAKE 1 TABLET BY MOUTH AS NEEDED FOR MUSCLE SPASM    Please close encounter if RX was sent. Thanks, Leah

## 2017-09-11 ENCOUNTER — OFFICE VISIT (OUTPATIENT)
Dept: FAMILY MEDICINE | Facility: CLINIC | Age: 62
End: 2017-09-11

## 2017-09-11 VITALS
BODY MASS INDEX: 63.03 KG/M2 | SYSTOLIC BLOOD PRESSURE: 122 MMHG | TEMPERATURE: 97.6 F | HEART RATE: 82 BPM | OXYGEN SATURATION: 95 % | WEIGHT: 293 LBS | DIASTOLIC BLOOD PRESSURE: 70 MMHG

## 2017-09-11 DIAGNOSIS — F33.1 MAJOR DEPRESSIVE DISORDER, RECURRENT EPISODE, MODERATE (H): ICD-10-CM

## 2017-09-11 DIAGNOSIS — Z86.711 HISTORY OF PULMONARY EMBOLISM: ICD-10-CM

## 2017-09-11 DIAGNOSIS — Z23 NEED FOR VACCINATION: ICD-10-CM

## 2017-09-11 DIAGNOSIS — J42 CHRONIC BRONCHITIS, UNSPECIFIED CHRONIC BRONCHITIS TYPE (H): Primary | ICD-10-CM

## 2017-09-11 DIAGNOSIS — D68.51 FACTOR 5 LEIDEN MUTATION, HETEROZYGOUS (H): ICD-10-CM

## 2017-09-11 LAB — INR POINT OF CARE: 2.9 (ref 2–3)

## 2017-09-11 PROCEDURE — 90686 IIV4 VACC NO PRSV 0.5 ML IM: CPT | Performed by: PHYSICIAN ASSISTANT

## 2017-09-11 PROCEDURE — 85610 PROTHROMBIN TIME: CPT | Performed by: PHYSICIAN ASSISTANT

## 2017-09-11 PROCEDURE — 99213 OFFICE O/P EST LOW 20 MIN: CPT | Mod: 25 | Performed by: PHYSICIAN ASSISTANT

## 2017-09-11 PROCEDURE — 36415 COLL VENOUS BLD VENIPUNCTURE: CPT | Performed by: PHYSICIAN ASSISTANT

## 2017-09-11 PROCEDURE — 90471 IMMUNIZATION ADMIN: CPT | Performed by: PHYSICIAN ASSISTANT

## 2017-09-11 RX ORDER — WARFARIN SODIUM 5 MG/1
TABLET ORAL
Qty: 45 TABLET | Refills: 0 | Status: SHIPPED | OUTPATIENT
Start: 2017-09-11 | End: 2017-10-09

## 2017-09-11 RX ORDER — CITALOPRAM HYDROBROMIDE 40 MG/1
40 TABLET ORAL DAILY
Qty: 90 TABLET | Refills: 1 | Status: SHIPPED | OUTPATIENT
Start: 2017-09-11 | End: 2018-02-25

## 2017-09-11 ASSESSMENT — PATIENT HEALTH QUESTIONNAIRE - PHQ9: SUM OF ALL RESPONSES TO PHQ QUESTIONS 1-9: 11

## 2017-09-11 NOTE — MR AVS SNAPSHOT
After Visit Summary   9/11/2017    Bere Machuca    MRN: [id number]           Patient Information     Date Of Birth          1955        Visit Information        Provider Department      9/11/2017 2:00 PM Catrachita Torres PA [facility]        Today's Diagnoses     Chronic bronchitis, unspecified chronic bronchitis type (H)    -  1    Major depressive disorder, recurrent episode, moderate (H)        Factor 5 Leiden mutation, heterozygous/ INR 2-3        History of pulmonary embolism        Need for vaccination           Follow-ups after your visit        Who to contact     If you have questions or need follow up information about today's clinic visit or your schedule please contact [facility] directly at [phone].  Normal or non-critical lab and imaging results will be communicated to you by [employer], letter or phone within 4 business days after the clinic has received the results. If you do not hear from us within 7 days, please contact the clinic through [employer] or phone. If you have a critical or abnormal lab result, we will notify you by phone as soon as possible.  Submit refill requests through [employer] or call your pharmacy and they will forward the refill request to us. Please allow 3 business days for your refill to be completed.          Additional Information About Your Visit        MyChart Information     [employer] gives you secure access to your electronic health record. If you see a primary care provider, you can also send messages to your care team and make appointments. If you have questions, please call your primary care clinic.  If you do not have a primary care provider, please call [phone] and they will assist you.        Care EveryWhere ID     This is your Care EveryWhere ID. This could be used by other organizations to access your [place] medical records  [id number]        Your Vitals Were     Pulse  Temperature Pulse Oximetry Breastfeeding? BMI (Body Mass Index)       82 97.6  F (36.4  C) (Oral) 95% No 63.03 kg/m2        Blood Pressure from Last 3 Encounters:   09/11/17 122/70   08/14/17 128/80   08/07/17 124/84    Weight from Last 3 Encounters:   09/11/17 (!) 174.5 kg (384 lb 9.6 oz)   08/14/17 (!) 172.8 kg (381 lb)   08/07/17 (!) 171.9 kg (379 lb)              We Performed the Following     HC FLU VAC PRESRV FREE QUAD SPLIT VIR 3+YRS IM     PROTHROMBIN TIME/INR     VACCINE ADMINISTRATION, INITIAL     VENOUS COLLECTION          Today's Medication Changes          These changes are accurate as of: 9/11/17 11:59 PM.  If you have any questions, ask your nurse or doctor.               These medicines have changed or have updated prescriptions.        Dose/Directions    * citalopram 20 MG tablet   Commonly known as:  celeXA   This may have changed:  Another medication with the same name was added. Make sure you understand how and when to take each.   Changed by:  Hyun Braden PA-C        Refills:  2       * citalopram 40 MG tablet   Commonly known as:  celeXA   This may have changed:  You were already taking a medication with the same name, and this prescription was added. Make sure you understand how and when to take each.   Used for:  Major depressive disorder, recurrent episode, moderate (H)   Changed by:  Catrachita Torres PA        Dose:  40 mg   Take 1 tablet (40 mg) by mouth daily   Quantity:  90 tablet   Refills:  1       warfarin 5 MG tablet   Commonly known as:  COUMADIN   This may have changed:  additional instructions   Used for:  Factor 5 Leiden mutation, heterozygous (H), History of pulmonary embolism   Changed by:  Catrachita Torres PA        Wed 7.5 mg , 5 mg other days   Quantity:  45 tablet   Refills:  0       * Notice:  This list has 2 medication(s) that are the same as other medications prescribed for you. Read the directions carefully, and ask your doctor or other  care provider to review them with you.         Where to get your medicines      These medications were sent to [facility] [id number] [facility] - [place], [place] - [address]  [address], [facility] [address]     Phone:  [phone]     citalopram 40 MG tablet    warfarin 5 MG tablet                Primary Care Provider Office Phone # Fax #    Wilma Armenta [phone] [address]  [facility] [address]        Equal Access to Services     [facility] Center[facility] : Hadii aad ku hadasho Soomaali, waaxda luqadaha, qaybta kaalmada adeegyada, waxay idiin hayaan adeeg prosper chavez . So [facility] [phone].    ATENCIÓN: Si habla español, tiene a norman disposición servicios gratuitos de asistencia lingüística. [facility] [phone].    We comply with applicable federal civil rights laws and [place] laws. We do not discriminate on the basis of race, color, national origin, age, disability sex, sexual orientation or gender identity.            Thank you!     Thank you for choosing [facility]  for your care. Our goal is always to provide you with excellent care. Hearing back from our patients is one way we can continue to improve our services. Please take a few minutes to complete the written survey that you may receive in the mail after your visit with us. Thank you!             Your Updated Medication List - Protect others around you: Learn how to safely use, store and throw away your medicines at www.disposemymeds.org.          This list is accurate as of: 9/11/17 11:59 PM.  Always use your most recent med list.                   Brand Name Dispense Instructions for use Diagnosis    * albuterol (2.5 MG/3ML) 0.083% neb solution      Take 3 mLs by nebulization every 6 hours as needed for shortness of breath / dyspnea.  [facility]    Chronic airway obstruction, not elsewhere classified, Personal history of tobacco use, presenting hazards to  health       * PROAIR  (90 BASE) MCG/ACT Inhaler   Generic drug:  albuterol      Inhale 1-2 puffs into the lungs as needed for shortness of breath / dyspnea.  [facility] specialists    Chronic airway obstruction, not elsewhere classified, Personal history of tobacco use, presenting hazards to health       blood glucose lancets standard    no brand specified    100 each    Use to test blood sugar 2-3 times daily or as directed.    Type 2 diabetes mellitus without complication, without long-term current use of insulin (H)       cholecalciferol 1000 UNITS Tabs      Take 1,000 Units by mouth At Bedtime    Morbid obesity due to excess calories (H)       * citalopram 20 MG tablet    celeXA          * citalopram 40 MG tablet    celeXA    90 tablet    Take 1 tablet (40 mg) by mouth daily    Major depressive disorder, recurrent episode, moderate (H)       cyclobenzaprine 5 MG tablet    FLEXERIL    30 tablet    TAKE 1 TABLET BY MOUTH AS NEEDED FOR MUSCLE SPASM    Displacement of lumbar intervertebral disc without myelopathy, Spinal stenosis in cervical region       fluticasone-vilanterol 200-25 MCG/INH oral inhaler    BREO ELLIPTA    3 Inhaler    Inhale 1 puff into the lungs daily    Chronic bronchitis, unspecified chronic bronchitis type (H)       gabapentin 300 MG capsule    NEURONTIN     Take 600 mg by mouth 2 times daily        * hydrOXYzine 25 MG tablet    ATARAX    90 tablet    Take 1 tablet (25 mg) by mouth At Bedtime    Intrinsic eczema       * hydrOXYzine 25 MG tablet    ATARAX    90 tablet    Take 1 tablet (25 mg) by mouth At Bedtime    Intrinsic eczema       losartan 25 MG tablet    COZAAR     Take 25 mg by mouth daily    Essential hypertension, benign       * mesalamine 4 G enema    ROWASA     INSERT 60 MILLILITER BY RECTAL ROUTE EVERY DAY AT BEDTIME        * mesalamine 1.2 G EC tablet    LIALDA          metFORMIN 500 MG 24 hr tablet    GLUCOPHAGE-XR          MICROLET LANCETS Misc      TEST 2-3 TIMES  DAILY OR AS DIRECTED        Multi-vitamin Tabs tablet      Take 1 tablet by mouth daily        * predniSONE 20 MG tablet    DELTASONE    5 tablet    Take 1 tablet (20 mg) by mouth daily with food    Other ulcerative colitis with complication (H)       * predniSONE 10 MG tablet    DELTASONE     See Admin Instructions        PROBIOTIC ACIDOPHILUS BIOBEADS Caps      Take 1 capsule by mouth At Bedtime    Myalgia and myositis, unspecified       SPIRIVA HANDIHALER 18 MCG capsule   Generic drug:  tiotropium     30 capsule    Inhale contents of one capsule daily.        SUPER B COMPLEX PO      Take 1 tablet by mouth daily        warfarin 5 MG tablet    COUMADIN    45 tablet    Wed 7.5 mg , 5 mg other days    Factor 5 Leiden mutation, heterozygous (H), History of pulmonary embolism       * Notice:  This list has 10 medication(s) that are the same as other medications prescribed for you. Read the directions carefully, and ask your doctor or other care provider to review them with you.

## 2017-09-11 NOTE — NURSING NOTE
Bere is here for her INR followup and medication recheck.     Pre-Visit Screening :  Immunizations : up to date    Colonoscopy : is up to date  Mammogram : is up to date  Asthma Action Test/Plan : NA  PHQ9/GAD7 :  Done today  Pulse - regular  My Chart - accepts    CLASSIFICATION OF OVERWEIGHT AND OBESITY BY BMI                         Obesity Class           BMI(kg/m2)  Underweight                                    < 18.5  Normal                                         18.5-24.9  Overweight                                     25.0-29.9  OBESITY                     I                  30.0-34.9                              II                 35.0-39.9  EXTREME OBESITY             III                >40                             Patient's  BMI Body mass index is 63.03 kg/(m^2).  http://hin.nhlbi.nih.gov/menuplanner/menu.cgi  Questioned patient about current smoking habits.  Pt. Quit sometime ago.       NARESH Bonner ([facility])

## 2017-09-11 NOTE — PROGRESS NOTES
SUBJECTIVE:                                                    Bere Machuca is a 61 year old female who presents to clinic today for the following health issues:        Chronic coumadin use    Indication: Factor V Leiden        Recent Labs   Lab Test  09/11/17   1413  08/14/17   1419  08/07/17   1218  08/04/17   1429  07/14/17   0944  07/05/17   1037   INR  2.9  2.0  2.4  1.3*  2.1  2.4        Current Coumadin Dose:   MWF 7.5 mg and 5 other days - PATIENT SAY SHE HAS ONLY BEEN DOING 7.5 2 X A WEEK.       Bleeding Signs/Symptoms:  Minor - bruising  Thromboembolic Signs/Symptoms:  None    Medication Changes:  No  Dietary Changes:  No  Bacterial/Viral Infection:  No        Other Concerns:  Prednisone 20 mg daily.  Having trouble sleeping    States depression is stable.  Not seeing therapist, not interested in therapy.  Denies SI      ROS:   C: NEGATIVE for fever, chills, change in weight  E: NEGATIVE for vision changes or irritation  E/M: NEGATIVE for ear, mouth and throat problems  R: NEGATIVE for significant cough or SOB  CV: NEGATIVE for chest pain, palpitations or peripheral edema  GI: NEGATIVE for nausea, abdominal pain, heartburn, or change in bowel habits  : NEGATIVE for frequency, dysuria, or hematuria    Problem list, Medication list, Allergies, and Medical/Social/Surgical histories reviewed in [facility] and updated as appropriate.      Labs reviewed in EPIC  BP Readings from Last 3 Encounters:   09/11/17 122/70   08/14/17 128/80   08/07/17 124/84    Wt Readings from Last 3 Encounters:   09/11/17 (!) 174.5 kg (384 lb 9.6 oz)   08/14/17 (!) 172.8 kg (381 lb)   08/07/17 (!) 171.9 kg (379 lb)                  Patient Active Problem List   Diagnosis     History of cocaine abuse     Personal history of tobacco use, presenting hazards to health     Family history of malignant neoplasm of gastrointestinal tract     Esophageal reflux     Chronic airway obstruction (H)     Hypertension     Sleep apnea     Migraine      ACP (advance care planning)     Factor 5 Leiden mutation, heterozygous/ INR 2-3     Health Care Home     Family history of pulmonary embolism     Lymphedema of arm     Spinal stenosis in cervical region     History of pulmonary embolism     Major depressive disorder, recurrent episode, moderate (H)     Superior vena cava syndrome     Morbid obesity due to excess calories (H)     Displacement of lumbar intervertebral disc without myelopathy     Sepsis (H)     Type 2 diabetes mellitus without complication, without long-term current use of insulin (H)     Past Surgical History:   Procedure Laterality Date     BIOPSY OF SKIN LESION  multiple    keloids     C ANESTH,REPAIR LO ABD HERNIA NOS  multiple/  last    panniculus     C EXPLORATORY OF ABDOMEN       C PLASTIC SURGERY, NECK  age 4     C REMOVAL COLON/ILEOSTOMY      reastamosis     C TOTAL ABDOM HYSTERECTOMY      Hysterectomy, Total Abdominal     HC REMOVAL OF OVARY/TUBE(S)      Salpingo-Oophorectomy, Unilateral     HC REMOVE ABDOMINAL WALL LESION      abscess     HC REMOVE TONSILS/ADENOIDS,<11 Y/O  age 14    T & A <14y.o.     HERNIA REPAIR       INSERTION OF ACCESS PORT  1-4-96    sepsis- removed after hospital stay       Social History   Substance Use Topics     Smoking status: Former Smoker     Packs/day: 0.50     Years: 35.00     Types: Cigarettes     Quit date: 2014     Smokeless tobacco: Never Used     Alcohol use 0.5 oz/week     1 Standard drinks or equivalent per week      Comment: social     Family History   Problem Relation Age of Onset     Respiratory Mother      copd     CANCER Mother      skin cancers/ melanomas     C.A.D. Mother      MI  age 79     Cancer - colorectal Father      / diagnosis age 72     GASTROINTESTINAL DISEASE Father      colitis     Breast Cancer No family hx of          Current Outpatient Prescriptions   Medication Sig Dispense Refill     cyclobenzaprine (FLEXERIL) 5 MG tablet TAKE 1 TABLET BY MOUTH  AS NEEDED FOR MUSCLE SPASM 30 tablet 0     mesalamine (ROWASA) 4 G enema INSERT 60 MILLILITER BY RECTAL ROUTE EVERY DAY AT BEDTIME  5     mesalamine (LIALDA) 1.2 G EC tablet   4     metFORMIN (GLUCOPHAGE-XR) 500 MG 24 hr tablet   2     predniSONE (DELTASONE) 10 MG tablet See Admin Instructions  0     MICROLET LANCETS MISC TEST 2-3 TIMES DAILY OR AS DIRECTED  1     citalopram (CELEXA) 20 MG tablet   2     fluticasone-vilanterol (BREO ELLIPTA) 200-25 MCG/INH oral inhaler Inhale 1 puff into the lungs daily 3 Inhaler 0     warfarin (COUMADIN) 5 MG tablet MWF 7.5 mg , 5 mg other days 45 tablet 0     gabapentin (NEURONTIN) 300 MG capsule Take 600 mg by mouth 2 times daily       B Complex-C (SUPER B COMPLEX PO) Take 1 tablet by mouth daily       tiotropium (SPIRIVA HANDIHALER) 18 MCG capsule Inhale contents of one capsule daily. 30 capsule 1     predniSONE (DELTASONE) 20 MG tablet Take 1 tablet (20 mg) by mouth daily with food 5 tablet 0     hydrOXYzine (ATARAX) 25 MG tablet Take 1 tablet (25 mg) by mouth At Bedtime 90 tablet 0     blood glucose monitoring (ISAAC CONTOUR NEXT) test strip Use to test blood sugar 2-3 times daily or as directed. 100 strip 1     blood glucose (NO BRAND SPECIFIED) lancets standard Use to test blood sugar 2-3 times daily or as directed. 100 each 1     losartan (COZAAR) 25 MG tablet Take 25 mg by mouth daily       hydrOXYzine (ATARAX) 25 MG tablet Take 1 tablet (25 mg) by mouth At Bedtime 90 tablet 0     cholecalciferol 1000 UNITS TABS Take 1,000 Units by mouth At Bedtime        multivitamin, therapeutic with minerals (MULTI-VITAMIN) TABS Take 1 tablet by mouth daily       Probiotic Product (PROBIOTIC ACIDOPHILUS) CAPS Take 1 capsule by mouth At Bedtime        albuterol (2.5 MG/3ML) 0.083% nebulizer solution Take 3 mLs by nebulization every 6 hours as needed for shortness of breath / dyspnea.  [facility] pulmonary specialists       albuterol (PROAIR HFA) 108 (90 BASE) MCG/ACT inhaler Inhale 1-2 puffs  into the lungs as needed for shortness of breath / dyspnea. DR ANDREWS pulmonary specialists       Allergies   Allergen Reactions     Aspirin      PN: LW Reaction: sensitive/stomach     Lyrica [Pregabalin]      dizziness     Tape [Adhesive Tape] Itching     Vancomycin      Recent Labs   Lab Test  06/30/17   1130  06/30/17   1007  06/23/17   0551  06/22/17   2113  10/24/16   01/11/16   1541   A1C   --   6.2   --    --    --   6.8   --    --    ALT  35*   --    --   41   --   42   --    --    CR  0.83   --   0.74  0.80   < >  0.78   < >   --    GFRESTIMATED  77   --   81  74   < >   --    < >   --    GFRESTBLACK   --    --   >90   GFR Calc    89   < >   --    < >   --    POTASSIUM  4.3   --   3.8  4.2   < >  4.5   < >   --    TSH   --    --    --    --    --   2.85   --   2.57    < > = values in this interval not displayed.            OBJECTIVE:                                                    /70 (BP Location: Left arm, Patient Position: Chair, Cuff Size: Adult Large)  Pulse 82  Temp 97.6  F (36.4  C) (Oral)  Wt (!) 174.5 kg (384 lb 9.6 oz)  SpO2 95%  Breastfeeding? No  BMI 63.03 kg/m2   Body mass index is 63.03 kg/(m^2).       Patient is a 61 year old female, in no acute distress. Vitals noted   Head: Normocephalic, atraumatic.  Cardiac:  Normal rhythm and rate, no murmurs, rubs or gallops.  Lungs: clear to auscultation bilaterally; no wheezes, crackles or rhonchi    Mental Status Exam:   Appearance: calm  Grooming: adequately groomed  Demeanor: engaged, cooperative  Affect: normal  Speech: Normal.  Gait:Normal.  Movements: Normal  Form of thought: Logical, Linear and Goal directed  Thought content:  Normal  Insight:Good   Judgment: Good   Cognition: Good              ASSESSMENT/PLAN:                                                        1. Major depressive disorder, recurrent episode, moderate (H)    - citalopram (CELEXA) 40 MG tablet; Take 1 tablet (40 mg) by mouth daily  Dispense: 90  tablet; Refill: 1    2. Factor 5 Leiden mutation, heterozygous/ INR 2-3    - warfarin (COUMADIN) 5 MG tablet; Wed 7.5 mg , 5 mg other days  Dispense: 45 tablet; Refill: 0    Therapeutic INR for goal of 2-3    New Dose: 7.5 mg Wed, 5 other days    Next INR: 3 weeks      3. History of pulmonary embolism    - PROTHROMBIN TIME/INR  - VENOUS COLLECTION  - warfarin (COUMADIN) 5 MG tablet; Wed 7.5 mg , 5 mg other days  Dispense: 45 tablet; Refill: 0    4. Chronic bronchitis, unspecified chronic bronchitis type (H)      5. Need for vaccination    - VACCINE ADMINISTRATION, INITIAL  - HC FLU VAC PRESRV FREE QUAD SPLIT VIR 3+YRS IM          Catrachita Torres PA-C  9/11/2017

## 2017-09-12 DIAGNOSIS — E11.9 TYPE 2 DIABETES MELLITUS WITHOUT COMPLICATION, WITHOUT LONG-TERM CURRENT USE OF INSULIN (H): ICD-10-CM

## 2017-09-12 NOTE — TELEPHONE ENCOUNTER
Bere Machuca is requesting a refill of:    Pending Prescriptions:                       Disp   Refills    blood glucose monitoring (ISAAC CONTOUR N*[address] st*3            Sig: Use to test blood sugar 2-3 times daily or as           directed.    Please close encounter if RX was sent. Thanks, Leah

## 2017-09-17 ENCOUNTER — HEALTH MAINTENANCE LETTER (OUTPATIENT)
Age: 62
End: 2017-09-17

## 2017-09-20 ENCOUNTER — CARE COORDINATION (OUTPATIENT)
Dept: CARE COORDINATION | Facility: CLINIC | Age: 62
End: 2017-09-20

## 2017-09-20 NOTE — PROGRESS NOTES
Clinic Care Coordination Contact  [facility]/Voicemail    Referral Source: ED Follow-Up  Clinical Data: Care Coordinator Outreach    Patient has been attending regular visit with PCP and MTM and RN CC outreach for status update noting that patient is scheduled to be seen in early October.   Outreach attempted x 1.  Left message on voicemail with call back information and requested return call.  Plan:   Care Coordinator will try to reach patient again in 2-3 weeks  At visit

## 2017-10-05 ENCOUNTER — TRANSFERRED RECORDS (OUTPATIENT)
Dept: FAMILY MEDICINE | Facility: CLINIC | Age: 62
End: 2017-10-05

## 2017-10-09 ENCOUNTER — OFFICE VISIT (OUTPATIENT)
Dept: FAMILY MEDICINE | Facility: CLINIC | Age: 62
End: 2017-10-09

## 2017-10-09 VITALS
RESPIRATION RATE: 16 BRPM | HEART RATE: 63 BPM | DIASTOLIC BLOOD PRESSURE: 72 MMHG | TEMPERATURE: 97.5 F | OXYGEN SATURATION: 96 % | SYSTOLIC BLOOD PRESSURE: 123 MMHG

## 2017-10-09 DIAGNOSIS — L20.84 INTRINSIC ECZEMA: ICD-10-CM

## 2017-10-09 DIAGNOSIS — Z86.711 HISTORY OF PULMONARY EMBOLISM: ICD-10-CM

## 2017-10-09 DIAGNOSIS — K51.011 ULCERATIVE PANCOLITIS WITH RECTAL BLEEDING (H): ICD-10-CM

## 2017-10-09 DIAGNOSIS — D68.51 FACTOR 5 LEIDEN MUTATION, HETEROZYGOUS (H): Primary | ICD-10-CM

## 2017-10-09 PROBLEM — A41.9 SEPSIS (H): Status: RESOLVED | Noted: 2017-06-22 | Resolved: 2017-10-09

## 2017-10-09 LAB — INR POINT OF CARE: 3 (ref 2–3)

## 2017-10-09 PROCEDURE — 36415 COLL VENOUS BLD VENIPUNCTURE: CPT | Performed by: FAMILY MEDICINE

## 2017-10-09 PROCEDURE — 85610 PROTHROMBIN TIME: CPT | Performed by: FAMILY MEDICINE

## 2017-10-09 PROCEDURE — 99213 OFFICE O/P EST LOW 20 MIN: CPT | Performed by: FAMILY MEDICINE

## 2017-10-09 RX ORDER — WARFARIN SODIUM 5 MG/1
5 TABLET ORAL DAILY
Qty: 100 TABLET | Refills: 0 | Status: SHIPPED | OUTPATIENT
Start: 2017-10-09 | End: 2017-11-01

## 2017-10-09 RX ORDER — WARFARIN SODIUM 5 MG/1
5 TABLET ORAL DAILY
COMMUNITY
Start: 2017-10-09 | End: 2017-10-09

## 2017-10-09 RX ORDER — HYDROXYZINE HYDROCHLORIDE 25 MG/1
25 TABLET, FILM COATED ORAL AT BEDTIME
Qty: 90 TABLET | Refills: 0 | Status: SHIPPED | OUTPATIENT
Start: 2017-10-09 | End: 2020-09-11

## 2017-10-09 NOTE — PATIENT INSTRUCTIONS
Factor 5 Leiden mutation, heterozygous/ INR 2-3  (primary encounter diagnosis)  Plan: warfarin (COUMADIN) 5 MG tablet, DISCONTINUED:         warfarin (COUMADIN) 5 MG tablet        Cut back dose/ recheck 4 weeks

## 2017-10-09 NOTE — PROGRESS NOTES
SUBJECTIVE:  Here for INR recheck. Slowly going up but now off prednisone. The patient denies abnormal bleeding from any body orifice such as bleeding from nose or gums, blood in urine or stool, or melena, hemoptysis or hematemesis.    Needs a refill of hydroxyzine for her skin until dermatology appointment in 1/2018    OBJECTIVE:  INR 3    ASSESSMENT: (D68.51) Factor 5 Leiden mutation, heterozygous/ INR 2-3  (primary encounter diagnosis)  Plan: warfarin (COUMADIN) 5 MG tablet, DISCONTINUED:         warfarin (COUMADIN) 5 MG tablet        Cut back dose/ recheck 4 weeks    (Z86.711) History of pulmonary embolism  Plan: PROTHROMBIN TIME/INR, VENOUS COLLECTION,         warfarin (COUMADIN) 5 MG tablet, DISCONTINUED:         warfarin (COUMADIN) 5 MG tablet            (L20.84) Intrinsic eczema  Plan: hydrOXYzine (ATARAX) 25 MG tablet        Potential medication side effects were discussed with the patient; let me know if any occur.  Refilled 3 months    (K51.011) Ulcerative pancolitis with rectal bleeding (H)  Plan: budesonide (UCERIS) 9 MG 24 hr tablet        I have reviewed the patient's medical history in detail and updated the computerized patient record.

## 2017-10-09 NOTE — MR AVS SNAPSHOT
After Visit Summary   10/9/2017    Bere Machuca    MRN: [id number]           Patient Information     Date Of Birth          1955        Visit Information        Provider Department      10/9/2017 12:45 PM Wilma Armenta MD [facility]        Today's Diagnoses     Factor 5 Leiden mutation, heterozygous/ INR 2-3    -  1    History of pulmonary embolism        Intrinsic eczema        Ulcerative pancolitis with rectal bleeding (H)          Care Instructions     Factor 5 Leiden mutation, heterozygous/ INR 2-3  (primary encounter diagnosis)  Plan: warfarin (COUMADIN) 5 MG tablet, DISCONTINUED:         warfarin (COUMADIN) 5 MG tablet        Cut back dose/ recheck 4 weeks          Follow-ups after your visit        Follow-up notes from your care team     Return in about 4 weeks (around 11/6/2017).      Who to contact     If you have questions or need follow up information about today's clinic visit or your schedule please contact [facility] directly at [phone].  Normal or non-critical lab and imaging results will be communicated to you by Roadhophart, letter or phone within 4 business days after the clinic has received the results. If you do not hear from us within 7 days, please contact the clinic through Ovalis or phone. If you have a critical or abnormal lab result, we will notify you by phone as soon as possible.  Submit refill requests through Ovalis or call your pharmacy and they will forward the refill request to us. Please allow 3 business days for your refill to be completed.          Additional Information About Your Visit        Roadhophart Information     Ovalis gives you secure access to your electronic health record. If you see a primary care provider, you can also send messages to your care team and make appointments. If you have questions, please call your primary care clinic.  If you do not have a primary care provider, please call  [phone] and they will assist you.        Care EveryWhere ID     This is your Care EveryWhere ID. This could be used by other organizations to access your [place] medical records  [id number]        Your Vitals Were     Pulse Temperature Respirations Pulse Oximetry Breastfeeding?       63 97.5  F (36.4  C) (Oral) 16 96% No        Blood Pressure from Last 3 Encounters:   10/09/17 123/72   09/11/17 122/70   08/14/17 128/80    Weight from Last 3 Encounters:   09/11/17 (!) 174.5 kg (384 lb 9.6 oz)   08/14/17 (!) 172.8 kg (381 lb)   08/07/17 (!) 171.9 kg (379 lb)              We Performed the Following     PROTHROMBIN TIME/INR     VENOUS COLLECTION          Today's Medication Changes          These changes are accurate as of: 10/9/17  1:53 PM.  If you have any questions, ask your nurse or doctor.               Start taking these medicines.        Dose/Directions    warfarin 5 MG tablet   Commonly known as:  COUMADIN   Used for:  Factor 5 Leiden mutation, heterozygous (H), History of pulmonary embolism   Started by:  Wilma Armenta MD        Dose:  5 mg   Take 1 tablet (5 mg) by mouth daily , skip on Monday   Quantity:  100 tablet   Refills:  0            Where to get your medicines      These medications were sent to [place] [address] [place] [address] [address]     Phone:  [phone]     hydrOXYzine 25 MG tablet    warfarin 5 MG tablet                Primary Care Provider Office Phone # Fax #    Wilma Armenta [phone]       [facility] [address] [address]        Equal Access to Services     ALEXANDRE WAGNER : Hadii korina adler Sohailey, waaxda luqadaha, qaybta kaalmada adevivek, aman love. So [facility] [phone].    ATENCIÓN: Si habla español, tiene a norman disposición servicios gratuitos de asistencia lingüística. Llame al [phone].    We comply with applicable federal civil  rights laws and [place] laws. We do not discriminate on the basis of race, color, national origin, age, disability, sex, sexual orientation, or gender identity.            Thank you!     Thank you for choosing [facility]  for your care. Our goal is always to provide you with excellent care. Hearing back from our patients is one way we can continue to improve our services. Please take a few minutes to complete the written survey that you may receive in the mail after your visit with us. Thank you!             Your Updated Medication List - Protect others around you: Learn how to safely use, store and throw away your medicines at www.disposemymeds.org.          This list is accurate as of: 10/9/17  1:53 PM.  Always use your most recent med list.                   Brand Name Dispense Instructions for use Diagnosis    * albuterol (2.5 MG/3ML) 0.083% neb solution      Take 3 mLs by nebulization every 6 hours as needed for shortness of breath / dyspnea. Dr ANDREWS [facility]    Chronic airway obstruction, not elsewhere classified, Personal history of tobacco use, presenting hazards to health       * PROAIR  (90 BASE) MCG/ACT Inhaler   Generic drug:  albuterol      Inhale 1-2 puffs into the lungs as needed for shortness of breath / dyspnea. DR MELENDEZ [facility]    Chronic airway obstruction, not elsewhere classified, Personal history of tobacco use, presenting hazards to health       blood glucose lancets standard    no brand specified    100 each    Use to test blood sugar 2-3 times daily or as directed.    Type 2 diabetes mellitus without complication, without long-term current use of insulin (H)       blood glucose monitoring test strip    ISAAC CONTOUR NEXT    100 strip    Use to test blood sugar 2-3 times daily or as directed.    Type 2 diabetes mellitus without complication, without long-term current use of insulin (H)       cholecalciferol 1000 UNITS Tabs      Take  1,000 Units by mouth At Bedtime    Morbid obesity due to excess calories (H)       citalopram 40 MG tablet    celeXA    90 tablet    Take 1 tablet (40 mg) by mouth daily    Major depressive disorder, recurrent episode, moderate (H)       cyclobenzaprine 5 MG tablet    FLEXERIL    30 tablet    TAKE 1 TABLET BY MOUTH AS NEEDED FOR MUSCLE SPASM    Displacement of lumbar intervertebral disc without myelopathy, Spinal stenosis in cervical region       fluticasone-vilanterol 200-25 MCG/INH oral inhaler    BREO ELLIPTA    3 Inhaler    Inhale 1 puff into the lungs daily    Chronic bronchitis, unspecified chronic bronchitis type (H)       gabapentin 300 MG capsule    NEURONTIN     Take 600 mg by mouth 2 times daily        hydrOXYzine 25 MG tablet    ATARAX    90 tablet    Take 1 tablet (25 mg) by mouth At Bedtime    Intrinsic eczema       losartan 25 MG tablet    COZAAR     Take 25 mg by mouth daily    Essential hypertension, benign       mesalamine 1.2 G EC tablet    LIALDA          MICROLET LANCETS Misc      TEST 2-3 TIMES DAILY OR AS DIRECTED        Multi-vitamin Tabs tablet      Take 1 tablet by mouth daily        predniSONE 20 MG tablet    DELTASONE    5 tablet    Take 1 tablet (20 mg) by mouth daily with food    Other ulcerative colitis with complication (H)       PROBIOTIC ACIDOPHILUS BIOBEADS Caps      Take 1 capsule by mouth At Bedtime    Myalgia and myositis, unspecified       SPIRIVA HANDIHALER 18 MCG capsule   Generic drug:  tiotropium     30 capsule    Inhale contents of one capsule daily.        SUPER B COMPLEX PO      Take 1 tablet by mouth daily        UCERIS 9 MG 24 hr tablet   Generic drug:  budesonide      Take 9 mg by mouth every morning    Ulcerative pancolitis with rectal bleeding (H)       warfarin 5 MG tablet    COUMADIN    100 tablet    Take 1 tablet (5 mg) by mouth daily , skip on Monday    Factor 5 Leiden mutation, heterozygous (H), History of pulmonary embolism       * Notice:  This list has 2  medication(s) that are the same as other medications prescribed for you. Read the directions carefully, and ask your doctor or other care provider to review them with you.

## 2017-10-09 NOTE — NURSING NOTE
Darline is here for an INR and a medication recheck.     Pre-Visit Screening :  Immunizations : up to date    Colonoscopy : is up to date  Mammogram : is up to date  Asthma Action Test/Plan : NA  PHQ9/GAD7 :  -    Pulse - regular  My Chart - accepts    CLASSIFICATION OF OVERWEIGHT AND OBESITY BY BMI                         Obesity Class           BMI(kg/m2)  Underweight                                    < 18.5  Normal                                         18.5-24.9  Overweight                                     25.0-29.9  OBESITY                     I                  30.0-34.9                              II                 35.0-39.9  EXTREME OBESITY             III                >40                             Patient's  BMI There is no height or weight on file to calculate BMI.  http://hin.nhlbi.nih.gov/menuplanner/menu.cgi  Questioned patient about current smoking habits.  Pt. quit smoking some time ago.    NARESH Bonner ([facility])

## 2017-10-11 ENCOUNTER — TRANSFERRED RECORDS (OUTPATIENT)
Dept: FAMILY MEDICINE | Facility: CLINIC | Age: 62
End: 2017-10-11

## 2017-10-17 DIAGNOSIS — M48.02 SPINAL STENOSIS IN CERVICAL REGION: ICD-10-CM

## 2017-10-17 DIAGNOSIS — M51.26 DISPLACEMENT OF LUMBAR INTERVERTEBRAL DISC WITHOUT MYELOPATHY: ICD-10-CM

## 2017-10-17 RX ORDER — CYCLOBENZAPRINE HCL 5 MG
TABLET ORAL
Qty: 30 TABLET | Refills: 0 | Status: SHIPPED | OUTPATIENT
Start: 2017-10-17 | End: 2018-05-04

## 2017-10-17 NOTE — TELEPHONE ENCOUNTER
Darline Chand would like to get a refill on the following prescription.     Pending Prescriptions:                       Disp   Refills    cyclobenzaprine (FLEXERIL) 5 MG tablet    30 tab*0            Sig: TAKE 1 TABLET BY MOUTH AS NEEDED FOR MUSCLE SPASM    Please advise.     NARESH Bonner ([facility])

## 2017-10-18 NOTE — TELEPHONE ENCOUNTER
I have attempted to contact Darline and I got her VM.   I have left a message letting her know that we did give her 30 days and have asked that she start going to her Orthopedists for refills.     Tika.NARESH Fenton ([facility])

## 2017-10-27 ENCOUNTER — TRANSFERRED RECORDS (OUTPATIENT)
Dept: FAMILY MEDICINE | Facility: CLINIC | Age: 62
End: 2017-10-27

## 2017-11-01 ENCOUNTER — OFFICE VISIT (OUTPATIENT)
Dept: FAMILY MEDICINE | Facility: CLINIC | Age: 62
End: 2017-11-01

## 2017-11-01 VITALS — DIASTOLIC BLOOD PRESSURE: 98 MMHG | SYSTOLIC BLOOD PRESSURE: 152 MMHG | TEMPERATURE: 97.7 F | RESPIRATION RATE: 20 BRPM

## 2017-11-01 DIAGNOSIS — I10 ESSENTIAL HYPERTENSION, BENIGN: ICD-10-CM

## 2017-11-01 DIAGNOSIS — R23.3 ABNORMAL BRUISING: ICD-10-CM

## 2017-11-01 DIAGNOSIS — D68.51 FACTOR 5 LEIDEN MUTATION, HETEROZYGOUS (H): Primary | ICD-10-CM

## 2017-11-01 DIAGNOSIS — Z86.711 HISTORY OF PULMONARY EMBOLISM: ICD-10-CM

## 2017-11-01 LAB — INR POINT OF CARE: 3.2 (ref 2–3)

## 2017-11-01 PROCEDURE — 36415 COLL VENOUS BLD VENIPUNCTURE: CPT | Performed by: PHYSICIAN ASSISTANT

## 2017-11-01 PROCEDURE — 99213 OFFICE O/P EST LOW 20 MIN: CPT | Performed by: PHYSICIAN ASSISTANT

## 2017-11-01 PROCEDURE — 85610 PROTHROMBIN TIME: CPT | Performed by: PHYSICIAN ASSISTANT

## 2017-11-01 RX ORDER — LOSARTAN POTASSIUM 25 MG/1
50 TABLET ORAL DAILY
Qty: 30 TABLET | Refills: 0
Start: 2017-11-01 | End: 2017-11-20 | Stop reason: DRUGHIGH

## 2017-11-01 RX ORDER — WARFARIN SODIUM 5 MG/1
5 TABLET ORAL DAILY
Qty: 1 TABLET | Refills: 0 | COMMUNITY
Start: 2017-11-01 | End: 2017-11-20 | Stop reason: DRUGHIGH

## 2017-11-01 NOTE — LETTER
[facility]  [facility] [address]  [place]  [address]    2017        Patient: Bere Machuca  : 1955                To Whom It May Concern:    Please allow Bere to excuse herself from all work responsibilities for up to 5 days per month, 1 at a time, or contiguously, due to ongoing illness. This would be necessary for both November and 2017.     If you have any further questions or problems, please contact our office at [phone].          Hyun Braden PA-C

## 2017-11-01 NOTE — NURSING NOTE
"Bere Machuca is here for a an INR. Has been scratched by her cat and keeps bleeding. States that she has been feeling \"off\".     "

## 2017-11-01 NOTE — MR AVS SNAPSHOT
After Visit Summary   11/1/2017    Bere Machuca    MRN: [id number]           Patient Information     Date Of Birth          1955        Visit Information        Provider Department      11/1/2017 10:30 AM Hyun Braden PA-C [facility]        Today's Diagnoses     Factor 5 Leiden mutation, heterozygous/ INR 2-3    -  1    Abnormal bruising        Essential hypertension, benign        History of pulmonary embolism           Follow-ups after your visit        Follow-up notes from your care team     Return in about 2 weeks (around 11/15/2017).      Your next 10 appointments already scheduled     Nov 16, 2017 10:00 AM TJ Alberto [facility] Office Visit with Hyun Braden PA-C   [facility] ([facility])    [address]  [address]  [facility] [id number] [phone]            Nov 16, 2017 10:30 AM TJ HOLGUIN with Kenia Simmons [facility] [place] ([facility])    [address]  [address]  [facility] [id number] [phone]              Who to contact     If you have questions or need follow up information about today's clinic visit or your schedule please contact [facility] directly at [phone].  Normal or non-critical lab and imaging results will be communicated to you by Spottlyhart, letter or phone within 4 business days after the clinic has received the results. If you do not hear from us within 7 days, please contact the clinic through Spottlyhart or phone. If you have a critical or abnormal lab result, we will notify you by phone as soon as possible.  Submit refill requests through Comprimato or call your pharmacy and they will forward the refill request to us. Please allow 3 business days for your refill to be completed.          Additional Information About Your Visit        Spottlyhart Information     Comprimato gives you secure  access to your electronic health record. If you see a primary care provider, you can also send messages to your care team and make appointments. If you have questions, please call your primary care clinic.  If you do not have a primary care provider, please call [phone] and they will assist you.        Care EveryWhere ID     This is your Care EveryWhere ID. This could be used by other organizations to access your [place] medical records  [id number]        Your Vitals Were     Temperature Respirations                97.7  F (36.5  C) (Oral) 20           Blood Pressure from Last 3 Encounters:   11/01/17 (!) 152/98   10/09/17 123/72   09/11/17 122/70    Weight from Last 3 Encounters:   09/11/17 (!) 174.5 kg (384 lb 9.6 oz)   08/14/17 (!) 172.8 kg (381 lb)   08/07/17 (!) 171.9 kg (379 lb)              We Performed the Following     CL BFP PROTHROMBIN TIME/INR (BFP)     VENOUS COLLECTION          Today's Medication Changes          These changes are accurate as of: 11/1/17 10:54 PM.  If you have any questions, ask your nurse or doctor.               These medicines have changed or have updated prescriptions.        Dose/Directions    losartan 25 MG tablet   Commonly known as:  COZAAR   This may have changed:  how much to take   Used for:  Essential hypertension, benign   Changed by:  Hyun Braden PA-C        Dose:  50 mg   Take 2 tablets (50 mg) by mouth daily   Quantity:  30 tablet   Refills:  0       warfarin 5 MG tablet   Commonly known as:  COUMADIN   This may have changed:  additional instructions   Used for:  Factor 5 Leiden mutation, heterozygous (H), History of pulmonary embolism   Changed by:  Hyun Braden PA-C        Dose:  5 mg   Take 1 tablet (5 mg) by mouth daily , skip on Monday, Friday   Quantity:  1 tablet   Refills:  0            Where to get your medicines      Some of these will need a paper prescription and others can be bought over the counter.  Ask your nurse if you have  questions.     You don't need a prescription for these medications     losartan 25 MG tablet                Primary Care Provider Office Phone # Fax #    Wilma Armenta [phone] [address]  [address] [place] [address]        Equal Access to Services     LAUREN WAGNER : Hadii aad ku hadasho Soomaali, waaxda luqadaha, qaybta kaalmada adeegyada, waxay vdia shravanerica francisco mpjuan love. So [facility] [phone].    ATENCIÓN: Si habla español, tiene a norman disposición servicios gratuitos de asistencia lingüística. Michellyousif al [phone].    We comply with applicable federal civil rights laws and [place] laws. We do not discriminate on the basis of race, color, national origin, age, disability, sex, sexual orientation, or gender identity.            Thank you!     Thank you for choosing [facility]  for your care. Our goal is always to provide you with excellent care. Hearing back from our patients is one way we can continue to improve our services. Please take a few minutes to complete the written survey that you may receive in the mail after your visit with us. Thank you!             Your Updated Medication List - Protect others around you: Learn how to safely use, store and throw away your medicines at www.disposemymeds.org.          This list is accurate as of: 11/1/17 10:54 PM.  Always use your most recent med list.                   Brand Name Dispense Instructions for use Diagnosis    * albuterol (2.5 MG/3ML) 0.083% neb solution      Take 3 mLs by nebulization every 6 hours as needed for shortness of breath / dyspnea. Dr MELENDEZ [facility]    Chronic airway obstruction, not elsewhere classified, Personal history of tobacco use, presenting hazards to health       * PROAIR  (90 BASE) MCG/ACT Inhaler   Generic drug:  albuterol      Inhale 1-2 puffs into the lungs as needed for shortness of breath / dyspnea. DR MELENDEZ [facility]    Chronic  airway obstruction, not elsewhere classified, Personal history of tobacco use, presenting hazards to health       blood glucose lancets standard    no brand specified    100 each    Use to test blood sugar 2-3 times daily or as directed.    Type 2 diabetes mellitus without complication, without long-term current use of insulin (H)       blood glucose monitoring test strip    ISAAC CONTOUR NEXT    100 strip    Use to test blood sugar 2-3 times daily or as directed.    Type 2 diabetes mellitus without complication, without long-term current use of insulin (H)       cholecalciferol 1000 UNITS Tabs      Take 1,000 Units by mouth At Bedtime    Morbid obesity due to excess calories (H)       citalopram 40 MG tablet    celeXA    90 tablet    Take 1 tablet (40 mg) by mouth daily    Major depressive disorder, recurrent episode, moderate (H)       cyclobenzaprine 5 MG tablet    FLEXERIL    30 tablet    TAKE 1 TABLET BY MOUTH AS NEEDED FOR MUSCLE SPASM    Displacement of lumbar intervertebral disc without myelopathy, Spinal stenosis in cervical region       fluticasone-vilanterol 200-25 MCG/INH oral inhaler    BREO ELLIPTA    3 Inhaler    Inhale 1 puff into the lungs daily    Chronic bronchitis, unspecified chronic bronchitis type (H)       gabapentin 300 MG capsule    NEURONTIN     Take 600 mg by mouth 2 times daily        hydrOXYzine 25 MG tablet    ATARAX    90 tablet    Take 1 tablet (25 mg) by mouth At Bedtime    Intrinsic eczema       losartan 25 MG tablet    COZAAR    30 tablet    Take 2 tablets (50 mg) by mouth daily    Essential hypertension, benign       mesalamine 1.2 G EC tablet    LIALDA          MICROLET LANCETS Misc      TEST 2-3 TIMES DAILY OR AS DIRECTED        Multi-vitamin Tabs tablet      Take 1 tablet by mouth daily        PROBIOTIC ACIDOPHILUS BIOBEADS Caps      Take 1 capsule by mouth At Bedtime    Myalgia and myositis, unspecified       SPIRIVA HANDIHALER 18 MCG capsule   Generic drug:  tiotropium      30 capsule    Inhale contents of one capsule daily.        SUPER B COMPLEX PO      Take 1 tablet by mouth daily        UCERIS 9 MG 24 hr tablet   Generic drug:  budesonide      Take 9 mg by mouth every morning    Ulcerative pancolitis with rectal bleeding (H)       warfarin 5 MG tablet    COUMADIN    1 tablet    Take 1 tablet (5 mg) by mouth daily , skip on Monday, Friday    Factor 5 Leiden mutation, heterozygous (H), History of pulmonary embolism       * Notice:  This list has 2 medication(s) that are the same as other medications prescribed for you. Read the directions carefully, and ask your doctor or other care provider to review them with you.

## 2017-11-01 NOTE — PROGRESS NOTES
CC: INR check, bleeding scratch    History:  Darline was last here 10/09/2017 for INR. At that time, Darline's INR was 3.0, and trending upwards. Dr. Armenta recommended she decrease warfarin from 5 mg daily to 5 mg every day except Saturday. Unfortunately, there was a misunderstanding, and Darline has not been skipping a dose. Yesterday, her cat scratched her arm, and it wouldn't stop bleeding, so she skipped her dose of warfarin yesterday.     Today, the bleeding has stopped, and her INR is 3.3. No other skipped doses. No other signs of bleeding.     PMH, MEDICATIONS, ALLERGIES, SOCIAL AND FAMILY HISTORY in [facility] and reviewed by me personally.      ROS negative other than the symptoms noted above in the HPI.            Examination   BP (!) 152/98 (BP Location: Right arm, Patient Position: Chair, Cuff Size: Adult Large)  Temp 97.7  F (36.5  C) (Oral)  Resp 20       Constitutional: Sitting comfortably, in no acute distress. Vital signs noted. BP elevated  Cardiovascular:  regular rate and rhythm, no murmurs, clicks, or gallops  Respiratory:  normal respiratory rate and rhythm, lungs clear to auscultation  Skin: superficial excoriation with eschar formation. No signs of active bleeding.   Psychiatric: mentation appears normal and affect normal/bright        A/P    ICD-10-CM    1. Factor 5 Leiden mutation, heterozygous/ INR 2-3 D68.51 CL BFP PROTHROMBIN TIME/INR (BFP)     VENOUS COLLECTION     warfarin (COUMADIN) 5 MG tablet     CANCELED: HEMOGRAM/PLATELET (BFP)   2. Abnormal bruising R23.8 CANCELED: HEMOGRAM/PLATELET (BFP)   3. Essential hypertension, benign I10 losartan (COZAAR) 25 MG tablet   4. History of pulmonary embolism Z86.711 warfarin (COUMADIN) 5 MG tablet       DISCUSSION: Given high INR, and upcoming winter, with risk of falling, recommended Darline skip 2 doses warfarin weekly  (M&F) Recheck in 2 weeks with back to back visit with myself and Kenia LEVINE.   Double losartan due to elevated BP. Does not need  refills today.     Will try to be fasting at that appt.so can check cholesterol.    follow up visit: 2 weeks    Hyun Braden PA-C  [facility]

## 2017-11-20 ENCOUNTER — CARE COORDINATION (OUTPATIENT)
Dept: CARE COORDINATION | Facility: CLINIC | Age: 62
End: 2017-11-20

## 2017-11-20 ENCOUNTER — OFFICE VISIT (OUTPATIENT)
Dept: FAMILY MEDICINE | Facility: CLINIC | Age: 62
End: 2017-11-20

## 2017-11-20 VITALS
TEMPERATURE: 97.4 F | OXYGEN SATURATION: 98 % | WEIGHT: 293 LBS | DIASTOLIC BLOOD PRESSURE: 80 MMHG | BODY MASS INDEX: 64.04 KG/M2 | SYSTOLIC BLOOD PRESSURE: 120 MMHG | HEART RATE: 84 BPM

## 2017-11-20 DIAGNOSIS — Z13.220 SCREENING FOR LIPID DISORDERS: ICD-10-CM

## 2017-11-20 DIAGNOSIS — I10 BENIGN ESSENTIAL HYPERTENSION: ICD-10-CM

## 2017-11-20 DIAGNOSIS — J42 CHRONIC BRONCHITIS, UNSPECIFIED CHRONIC BRONCHITIS TYPE (H): ICD-10-CM

## 2017-11-20 DIAGNOSIS — Z86.711 HISTORY OF PULMONARY EMBOLISM: Primary | ICD-10-CM

## 2017-11-20 DIAGNOSIS — E11.9 TYPE 2 DIABETES MELLITUS WITHOUT COMPLICATION, WITHOUT LONG-TERM CURRENT USE OF INSULIN (H): ICD-10-CM

## 2017-11-20 LAB
HBA1C MFR BLD: 7.4 % (ref 4–7)
INR POINT OF CARE: 2.3 (ref 2–3)

## 2017-11-20 PROCEDURE — 36415 COLL VENOUS BLD VENIPUNCTURE: CPT | Performed by: PHYSICIAN ASSISTANT

## 2017-11-20 PROCEDURE — 80053 COMPREHEN METABOLIC PANEL: CPT | Mod: 90 | Performed by: PHYSICIAN ASSISTANT

## 2017-11-20 PROCEDURE — 80061 LIPID PANEL: CPT | Mod: 90 | Performed by: PHYSICIAN ASSISTANT

## 2017-11-20 PROCEDURE — 83036 HEMOGLOBIN GLYCOSYLATED A1C: CPT | Performed by: PHYSICIAN ASSISTANT

## 2017-11-20 PROCEDURE — 99213 OFFICE O/P EST LOW 20 MIN: CPT | Performed by: PHYSICIAN ASSISTANT

## 2017-11-20 PROCEDURE — 85610 PROTHROMBIN TIME: CPT | Performed by: PHYSICIAN ASSISTANT

## 2017-11-20 RX ORDER — METFORMIN HCL 500 MG
1000 TABLET, EXTENDED RELEASE 24 HR ORAL
Qty: 60 TABLET | Refills: 2 | Status: SHIPPED | OUTPATIENT
Start: 2017-11-20 | End: 2018-04-26 | Stop reason: SINTOL

## 2017-11-20 RX ORDER — LOSARTAN POTASSIUM 50 MG/1
50 TABLET ORAL DAILY
Qty: 90 TABLET | Refills: 0 | Status: SHIPPED | OUTPATIENT
Start: 2017-11-20 | End: 2018-02-20

## 2017-11-20 RX ORDER — WARFARIN SODIUM 5 MG/1
TABLET ORAL
Qty: 30 TABLET | Refills: 0 | Status: SHIPPED | OUTPATIENT
Start: 2017-11-20 | End: 2017-12-29

## 2017-11-20 NOTE — PROGRESS NOTES
Clinic Care Coordination Contact  Care Team Conversations    RN CC review chart and noted follow up appointment today with PCP, offered to met with patient.   PCP discussed care coordination at visit, patient denies any needs at this time    No further outreach at this time, RN CC will be available if needed in future.      Darline Delgadillo RN  Clinic Care Coordinator  Mobile: [phone]  [email or web address]

## 2017-11-20 NOTE — PROGRESS NOTES
Indication: PE    INR Today: 2.3  Current Dose:  Coumadin 5 mg per day 5 days per week    Bleeding Signs/Symptoms:  Minor - lesion on right leg from fall, left forearm with cat scratches  Including headache, stroke symptoms (confusion, weakness, slurred speech), vision changes, nosebleeds, red/black vomit, red/black stool, prolonged bleeded, dizziness, SOB, pica.  Thromboembolic Signs/Symptoms:  None  Including pain in one leg, swelling in extremity, chest pain, numbess, weakness, mental status change.      Medication Changes:  No  Dietary Changes:  No  Bacterial/Viral Infection:  No    Missed Coumadin Doses:  None    Other Concerns:  Has been feeling leg weakness. Has not had A1c checked since stopping metformin while dealing with symptoms now known as from colitis. BP is better on doubled losartan. Needs refill of Breo    OBJECTIVE:  /80 (BP Location: Left arm, Patient Position: Chair, Cuff Size: Adult Large)  Pulse 84  Temp 97.4  F (36.3  C) (Oral)  Wt (!) 177.3 kg (390 lb 12.8 oz)  SpO2 98%  Breastfeeding? No  BMI 64.04 kg/m2  Body mass index is 64.04 kg/(m^2).    Patient is a 62 year old female, in no acute distress. Vitals noted  Head: Normocephalic, atraumatic.  Eyes: Conjunctiva clear.  No discharge noted.  Ears: External ears, canals and TMs normal Bilaterally: gray and translucent.   Nose: Normal without discharge.  Mouth / Throat:   Pharynx non-erythematous, no exudates present, tonsils without hypertrophy. Mucous membranes moist.  Neck:  Neck supple, No lymphadenopathy, no thyromegaly.  Cardiac:  Normal rhythm and rate, no murmurs, rubs or gallops.  Lungs: clear to auscultation bilaterally; no wheezes, crackles or rhonchi      ASSESSMENT and PLAN:    Therapeutic INR for goal of 2-3    New Dose: Coumadin 5 mg per day 5 days per week    Next INR: 4 weeks      Type 2 diabetes mellitus without complication, without long-term current use of insulin (H)  Will restart on Metformin XR. Start with 1  tablet for 1 week, then increase to 2 tablets if tolerated.   - Hemoglobin A1c (BFP)  - metFORMIN (GLUCOPHAGE-XR) 500 MG 24 hr tablet; Take 2 tablets (1,000 mg) by mouth daily (with dinner)  Dispense: 60 tablet; Refill: 2  - VENOUS COLLECTION  - Comprehensive metabolic panel    Benign essential hypertension  Stay at 50 mg daily dose.   - losartan (COZAAR) 50 MG tablet; Take 1 tablet (50 mg) by mouth daily  Dispense: 90 tablet; Refill: 0    Chronic bronchitis, unspecified chronic bronchitis type (H)  Refilled Breo as Darline is happy with improvement.   - fluticasone-vilanterol (BREO ELLIPTA) 200-25 MCG/INH oral inhaler; Inhale 1 puff into the lungs daily  Dispense: 3 Inhaler; Refill: 0    Leg Weakness  Try cutting down on gabapetin to see if weakness improves without pain worsening.       Hyun Braden PA-C  11/20/2017

## 2017-11-20 NOTE — NURSING NOTE
Bere is here  For an INR followup.     Pre-Visit Screening :  Immunizations : up to date    Colonoscopy : is up to date  Mammogram : is up to date  Asthma Action Test/Plan : NA  PHQ9/GAD7 :  -    Pulse - regular  My Chart - accepts    CLASSIFICATION OF OVERWEIGHT AND OBESITY BY BMI                         Obesity Class           BMI(kg/m2)  Underweight                                    < 18.5  Normal                                         18.5-24.9  Overweight                                     25.0-29.9  OBESITY                     I                  30.0-34.9                              II                 35.0-39.9  EXTREME OBESITY             III                >40                             Patient's  BMI Body mass index is 64.04 kg/(m^2).  http://hin.nhlbi.nih.gov/menuplanner/menu.cgi  Questioned patient about current smoking habits.  Pt. Quit sometime ago    The patient has verbalized that it is ok to leave a detailed voice message on the patient's cell phone with results/recommendations from this visit.     Verified Cell phone number    [email or web address] Fenton ([facility])

## 2017-11-21 LAB
ALBUMIN SERPL-MCNC: 3.9 G/DL (ref 3.6–5.1)
ALBUMIN/GLOB SERPL: 1.7 (CALC) (ref 1–2.5)
ALP SERPL-CCNC: 50 U/L (ref 33–130)
ALT SERPL-CCNC: 53 U/L (ref 6–29)
AST SERPL-CCNC: 17 U/L (ref 10–35)
BILIRUB SERPL-MCNC: 0.4 MG/DL (ref 0.2–1.2)
BUN SERPL-MCNC: 21 MG/DL (ref 7–25)
BUN/CREATININE RATIO: ABNORMAL (CALC) (ref 6–22)
CALCIUM SERPL-MCNC: 9.2 MG/DL (ref 8.6–10.4)
CHLORIDE SERPLBLD-SCNC: 105 MMOL/L (ref 98–110)
CHOLEST SERPL-MCNC: 290 MG/DL
CHOLEST/HDLC SERPL: 3.4 (CALC)
CO2 SERPL-SCNC: 23 MMOL/L (ref 20–31)
CREAT SERPL-MCNC: 0.65 MG/DL (ref 0.5–0.99)
EGFR AFRICAN AMERICAN - QUEST: 112 ML/MIN/1.73M2
GFR SERPL CREATININE-BSD FRML MDRD: 96 ML/MIN/1.73M2
GLOBULIN, CALCULATED - QUEST: 2.3 G/DL (CALC) (ref 1.9–3.7)
GLUCOSE - QUEST: 112 MG/DL (ref 65–99)
HDLC SERPL-MCNC: 86 MG/DL
LDLC SERPL CALC-MCNC: 173 MG/DL (CALC)
NONHDLC SERPL-MCNC: 204 MG/DL (CALC)
POTASSIUM SERPL-SCNC: 3.8 MMOL/L (ref 3.5–5.3)
PROT SERPL-MCNC: 6.2 G/DL (ref 6.1–8.1)
SODIUM SERPL-SCNC: 140 MMOL/L (ref 135–146)
TRIGL SERPL-MCNC: 162 MG/DL

## 2017-11-25 ENCOUNTER — HOSPITAL ENCOUNTER (EMERGENCY)
Facility: CLINIC | Age: 62
Discharge: HOME | End: 2017-11-25
Attending: EMERGENCY MEDICINE | Admitting: EMERGENCY MEDICINE
Payer: COMMERCIAL

## 2017-11-25 VITALS
WEIGHT: 293 LBS | TEMPERATURE: 97.5 F | HEART RATE: 100 BPM | HEIGHT: 66 IN | DIASTOLIC BLOOD PRESSURE: 79 MMHG | OXYGEN SATURATION: 95 % | SYSTOLIC BLOOD PRESSURE: 155 MMHG | RESPIRATION RATE: 20 BRPM | BODY MASS INDEX: 47.09 KG/M2

## 2017-11-25 DIAGNOSIS — L03.116 CELLULITIS OF LEFT FOOT: ICD-10-CM

## 2017-11-25 LAB
ANION GAP SERPL CALCULATED.3IONS-SCNC: 10 MMOL/L (ref 3–14)
BASOPHILS # BLD AUTO: 0 10E9/L (ref 0–0.2)
BASOPHILS NFR BLD AUTO: 0.2 %
BUN SERPL-MCNC: 24 MG/DL (ref 7–30)
CALCIUM SERPL-MCNC: 9.2 MG/DL (ref 8.5–10.1)
CHLORIDE SERPL-SCNC: 106 MMOL/L (ref 94–109)
CO2 SERPL-SCNC: 25 MMOL/L (ref 20–32)
CREAT SERPL-MCNC: 0.64 MG/DL (ref 0.52–1.04)
DIFFERENTIAL METHOD BLD: ABNORMAL
EOSINOPHIL # BLD AUTO: 0.1 10E9/L (ref 0–0.7)
EOSINOPHIL NFR BLD AUTO: 0.9 %
ERYTHROCYTE [DISTWIDTH] IN BLOOD BY AUTOMATED COUNT: 15.6 % (ref 10–15)
GFR SERPL CREATININE-BSD FRML MDRD: >90 ML/MIN/1.7M2
GLUCOSE SERPL-MCNC: 121 MG/DL (ref 70–99)
HCT VFR BLD AUTO: 42.9 % (ref 35–47)
HGB BLD-MCNC: 13.8 G/DL (ref 11.7–15.7)
IMM GRANULOCYTES # BLD: 0.1 10E9/L (ref 0–0.4)
IMM GRANULOCYTES NFR BLD: 0.7 %
INR PPP: 2.46 (ref 0.86–1.14)
LYMPHOCYTES # BLD AUTO: 2.4 10E9/L (ref 0.8–5.3)
LYMPHOCYTES NFR BLD AUTO: 24.5 %
MCH RBC QN AUTO: 29.7 PG (ref 26.5–33)
MCHC RBC AUTO-ENTMCNC: 32.2 G/DL (ref 31.5–36.5)
MCV RBC AUTO: 93 FL (ref 78–100)
MONOCYTES # BLD AUTO: 0.7 10E9/L (ref 0–1.3)
MONOCYTES NFR BLD AUTO: 6.8 %
NEUTROPHILS # BLD AUTO: 6.6 10E9/L (ref 1.6–8.3)
NEUTROPHILS NFR BLD AUTO: 66.9 %
NRBC # BLD AUTO: 0 10*3/UL
NRBC BLD AUTO-RTO: 0 /100
PLATELET # BLD AUTO: 261 10E9/L (ref 150–450)
POTASSIUM SERPL-SCNC: 3.5 MMOL/L (ref 3.4–5.3)
RBC # BLD AUTO: 4.64 10E12/L (ref 3.8–5.2)
SODIUM SERPL-SCNC: 141 MMOL/L (ref 133–144)
WBC # BLD AUTO: 9.9 10E9/L (ref 4–11)

## 2017-11-25 PROCEDURE — 85025 COMPLETE CBC W/AUTO DIFF WBC: CPT | Performed by: EMERGENCY MEDICINE

## 2017-11-25 PROCEDURE — 25000128 H RX IP 250 OP 636: Performed by: EMERGENCY MEDICINE

## 2017-11-25 PROCEDURE — 80048 BASIC METABOLIC PNL TOTAL CA: CPT | Performed by: EMERGENCY MEDICINE

## 2017-11-25 PROCEDURE — 85610 PROTHROMBIN TIME: CPT | Performed by: EMERGENCY MEDICINE

## 2017-11-25 PROCEDURE — 96374 THER/PROPH/DIAG INJ IV PUSH: CPT

## 2017-11-25 PROCEDURE — 99284 EMERGENCY DEPT VISIT MOD MDM: CPT | Mod: 25

## 2017-11-25 RX ORDER — CEPHALEXIN 500 MG/1
500 CAPSULE ORAL 4 TIMES DAILY
Qty: 40 CAPSULE | Refills: 0 | Status: SHIPPED | OUTPATIENT
Start: 2017-11-25 | End: 2017-12-05

## 2017-11-25 RX ORDER — CEFAZOLIN SODIUM 2 G/100ML
2 INJECTION, SOLUTION INTRAVENOUS ONCE
Status: COMPLETED | OUTPATIENT
Start: 2017-11-25 | End: 2017-11-25

## 2017-11-25 RX ADMIN — CEFAZOLIN SODIUM 2 G: 2 INJECTION, SOLUTION INTRAVENOUS at 19:17

## 2017-11-25 ASSESSMENT — ENCOUNTER SYMPTOMS
COLOR CHANGE: 1
JOINT SWELLING: 1

## 2017-11-25 NOTE — ED NOTES
Patient comes in with complaints right foot swelling which started the day before Thanksgiving. Patient states that she thinks she may have scrapped her toe a few days before she noticed the swelling

## 2017-11-25 NOTE — ED AVS SNAPSHOT
[facility] Emergency Department    [address]    [facility] [address]    Phone:  [phone]    Fax:  [phone]                                       Bere Machuca   MRN: [id number]    Department:  [facility] Emergency Department   Date of Visit:  11/25/2017           After Visit Summary Signature Page     I have received my discharge instructions, and my questions have been answered. I have discussed any challenges I see with this plan with the nurse or doctor.    ..........................................................................................................................................  Patient/Patient Representative Signature      ..........................................................................................................................................  Patient Representative Print Name and Relationship to Patient    ..................................................               ................................................  Date                                            Time    ..........................................................................................................................................  Reviewed by Signature/Title    ...................................................              ..............................................  Date                                                            Time

## 2017-11-25 NOTE — ED AVS SNAPSHOT
[facility] Emergency Department    [address]    [facility] [address]    Phone:  [phone]    Fax:  [phone]                                       Bere Machuca   MRN: [id number]    Department:  [facility] Emergency Department   Date of Visit:  11/25/2017           Patient Information     Date Of Birth          1955        Your diagnoses for this visit were:     Cellulitis of left foot        You were seen by Artemio Mccollum MD.      Follow-up Information     Follow up with Wilma Armenta MD In 1 week.    Specialty:  Family Practice    Why:  As needed    Contact information:    [address]  [facility] [address] [phone]          Discharge Instructions         Discharge Instructions for Cellulitis  You have been diagnosed with cellulitis. This is an infection in the deepest layer of the skin. In some cases, the infection also affects the muscle. Cellulitis is caused by bacteria. The bacteria can enter the body through broken skin. This can happen with a cut, scratch, animal bite, or an insect bite that has been scratched. You may have been treated in the hospital with antibiotics and fluids. You will likely be given a prescription for antibiotics to take at home. This sheet will help you take care of yourself at home.  Home care  When you are home:    Take the prescribed antibiotic medicine you are given as directed until it is gone. Take it even if you feel better. It treats the infection and stops it from returning. Not taking all the medicine can make future infections hard to treat.    Keep the infected area clean.    When possible, raise the infected area above the level of your heart. This helps keep swelling down.    Talk with your healthcare provider if you are in pain. Ask what kind of over-the-counter medicine you can take for pain.    Apply clean bandages as advised.    Take your temperature once a day for a  week.    Wash your hands often to prevent spreading the infection.  In the future, wash your hands before and after you touch cuts, scratches, or bandages. This will help prevent infection.   When to call your healthcare provider  Call your healthcare provider immediately if you have any of the following:    Difficulty or pain when moving the joints above or below the infected area    Discharge or pus draining from the area    Fever of 100.4 F (38 C) or higher, or as directed by your healthcare provider    Pain that gets worse in or around the infected     Redness that gets worse in or around the infected area, particularly if the area of redness expands to a wider area    Shaking chills    Swelling of the infected area    Vomiting   Date Last Reviewed: 8/1/2016 2000-2017 [employer]. [address] [place] [address], [place] [address]. All rights reserved. This information is not intended as a substitute for professional medical care. Always follow your healthcare professional's instructions.          24 Hour Appointment Hotline       To make an appointment at any [facility] [place], call [phone] ([phone]). If you don't have a family doctor or clinic, we will help you find one. [facility] clinics are conveniently located to serve the needs of you and your family.             Review of your medicines      START taking        Dose / Directions Last dose taken    cephALEXin 500 MG capsule   Commonly known as:  KEFLEX   Dose:  500 mg   Quantity:  40 capsule        Take 1 capsule (500 mg) by mouth 4 times daily for 10 days   Refills:  0          Our records show that you are taking the medicines listed below. If these are incorrect, please call your family doctor or clinic.        Dose / Directions Last dose taken    * albuterol (2.5 MG/3ML) 0.083% neb solution   Dose:  1 ampule        Take 3 mLs by nebulization every 6 hours as needed for shortness of breath / dyspnea.  [facility] pulmonary specialists    Refills:  0        * PROAIR  (90 BASE) MCG/ACT Inhaler   Dose:  1-2 puff   Generic drug:  albuterol        Inhale 1-2 puffs into the lungs as needed for shortness of breath / dyspnea.  [facility] pulmonary specialists   Refills:  0        blood glucose lancets standard   Commonly known as:  no brand specified   Quantity:  100 each        Use to test blood sugar 2-3 times daily or as directed.   Refills:  1        blood glucose monitoring test strip   Commonly known as:  ISAAC CONTOUR NEXT   Quantity:  100 strip        Use to test blood sugar 2-3 times daily or as directed.   Refills:  3        cholecalciferol 1000 UNITS Tabs   Dose:  1000 Units        Take 1,000 Units by mouth At Bedtime   Refills:  0        citalopram 40 MG tablet   Commonly known as:  celeXA   Dose:  40 mg   Quantity:  90 tablet        Take 1 tablet (40 mg) by mouth daily   Refills:  1        cyclobenzaprine 5 MG tablet   Commonly known as:  FLEXERIL   Quantity:  30 tablet        TAKE 1 TABLET BY MOUTH AS NEEDED FOR MUSCLE SPASM   Refills:  0        fluticasone-vilanterol 200-25 MCG/INH oral inhaler   Commonly known as:  BREO ELLIPTA   Dose:  1 puff   Quantity:  3 Inhaler        Inhale 1 puff into the lungs daily   Refills:  0        gabapentin 300 MG capsule   Commonly known as:  NEURONTIN   Dose:  600 mg        Take 600 mg by mouth 2 times daily   Refills:  0        hydrOXYzine 25 MG tablet   Commonly known as:  ATARAX   Dose:  25 mg   Quantity:  90 tablet        Take 1 tablet (25 mg) by mouth At Bedtime   Refills:  0        losartan 50 MG tablet   Commonly known as:  COZAAR   Dose:  50 mg   Quantity:  90 tablet        Take 1 tablet (50 mg) by mouth daily   Refills:  0        mesalamine 1.2 G EC tablet   Commonly known as:  LIALDA        Refills:  4        metFORMIN 500 MG 24 hr tablet   Commonly known as:  GLUCOPHAGE-XR   Dose:  1000 mg   Quantity:  60 tablet        Take 2 tablets (1,000 mg) by mouth daily (with dinner)   Refills:  2         MICROLET LANCETS Misc        TEST 2-3 TIMES DAILY OR AS DIRECTED   Refills:  1        Multi-vitamin Tabs tablet   Dose:  1 tablet        Take 1 tablet by mouth daily   Refills:  0        PROBIOTIC ACIDOPHILUS BIOBEADS Caps   Dose:  1 capsule        Take 1 capsule by mouth At Bedtime   Refills:  0        SPIRIVA HANDIHALER 18 MCG capsule   Quantity:  30 capsule   Generic drug:  tiotropium        Inhale contents of one capsule daily.   Refills:  1        SUPER B COMPLEX PO   Dose:  1 tablet        Take 1 tablet by mouth daily   Refills:  0        UCERIS 9 MG 24 hr tablet   Dose:  9 mg   Generic drug:  budesonide        Take 9 mg by mouth every morning   Refills:  0        warfarin 5 MG tablet   Commonly known as:  COUMADIN   Quantity:  30 tablet        Take 5 mg daily 5 days per week.   Refills:  0        * Notice:  This list has 2 medication(s) that are the same as other medications prescribed for you. Read the directions carefully, and ask your doctor or other care provider to review them with you.            Prescriptions were sent or printed at these locations (1 Prescription)                   Other Prescriptions                Printed at Department/Unit printer (1 of 1)         cephALEXin (KEFLEX) 500 MG capsule                Procedures and tests performed during your visit     Basic metabolic panel (BMP)    CBC + differential    INR    IV access      Orders Needing Specimen Collection     None      Pending Results     No orders found from 11/23/2017 to 11/26/2017.            Pending Culture Results     No orders found from 11/23/2017 to 11/26/2017.            Pending Results Instructions     If you had any lab results that were not finalized at the time of your Discharge, you can call the ED Lab Result RN at [phone]. You will be contacted by this team for any positive Lab results or changes in treatment. The nurses are available 7 days a week from 10A to 6:30P.  You can leave a message 24 hours per day  and they will return your call.        Test Results From Your Hospital Stay        11/25/2017  6:17 PM      Component Results     Component Value Ref Range & Units Status    WBC 9.9 4.0 - 11.0 10e9/L Final    RBC Count 4.64 3.8 - 5.2 10e12/L Final    Hemoglobin 13.8 11.7 - 15.7 g/dL Final    Hematocrit 42.9 35.0 - 47.0 % Final    MCV 93 78 - 100 fl Final    MCH 29.7 26.5 - 33.0 pg Final    MCHC 32.2 31.5 - 36.5 g/dL Final    RDW 15.6 (H) 10.0 - 15.0 % Final    Platelet Count 261 150 - 450 10e9/L Final    Diff Method Automated Method  Final    % Neutrophils 66.9 % Final    % Lymphocytes 24.5 % Final    % Monocytes 6.8 % Final    % Eosinophils 0.9 % Final    % Basophils 0.2 % Final    % Immature Granulocytes 0.7 % Final    Nucleated RBCs 0 0 /100 Final    Absolute Neutrophil 6.6 1.6 - 8.3 10e9/L Final    Absolute Lymphocytes 2.4 0.8 - 5.3 10e9/L Final    Absolute Monocytes 0.7 0.0 - 1.3 10e9/L Final    Absolute Eosinophils 0.1 0.0 - 0.7 10e9/L Final    Absolute Basophils 0.0 0.0 - 0.2 10e9/L Final    Abs Immature Granulocytes 0.1 0 - 0.4 10e9/L Final    Absolute Nucleated RBC 0.0  Final         11/25/2017  6:33 PM      Component Results     Component Value Ref Range & Units Status    Sodium 141 133 - 144 mmol/L Final    Potassium 3.5 3.4 - 5.3 mmol/L Final    Chloride 106 94 - 109 mmol/L Final    Carbon Dioxide 25 20 - 32 mmol/L Final    Anion Gap 10 3 - 14 mmol/L Final    Glucose 121 (H) 70 - 99 mg/dL Final    Urea Nitrogen 24 7 - 30 mg/dL Final    Creatinine 0.64 0.52 - 1.04 mg/dL Final    GFR Estimate >90 >60 mL/min/1.7m2 Final    Non  GFR Calc    GFR Estimate If Black >90 >60 mL/min/1.7m2 Final    African American GFR Calc    Calcium 9.2 8.5 - 10.1 mg/dL Final         11/25/2017  6:25 PM      Component Results     Component Value Ref Range & Units Status    INR 2.46 (H) 0.86 - 1.14 Final                Clinical Quality Measure: Blood Pressure Screening     Your blood pressure was checked while you  were in the emergency department today. The last reading we obtained was  BP: 144/83 . Please read the guidelines below about what these numbers mean and what you should do about them.  If your systolic blood pressure (the top number) is less than 120 and your diastolic blood pressure (the bottom number) is less than 80, then your blood pressure is normal. There is nothing more that you need to do about it.  If your systolic blood pressure (the top number) is 120-139 or your diastolic blood pressure (the bottom number) is 80-89, your blood pressure may be higher than it should be. You should have your blood pressure rechecked within a year by a primary care provider.  If your systolic blood pressure (the top number) is 140 or greater or your diastolic blood pressure (the bottom number) is 90 or greater, you may have high blood pressure. High blood pressure is treatable, but if left untreated over time it can put you at risk for heart attack, stroke, or kidney failure. You should have your blood pressure rechecked by a primary care provider within the next 4 weeks.  If your provider in the emergency department today gave you specific instructions to follow-up with your doctor or provider even sooner than that, you should follow that instruction and not wait for up to 4 weeks for your follow-up visit.        Thank you for choosing [facility]       Thank you for choosing [facility] for your care. Our goal is always to provide you with excellent care. Hearing back from our patients is one way we can continue to improve our services. Please take a few minutes to complete the written survey that you may receive in the mail after you visit with us. Thank you!        [facility] Information     [employer] gives you secure access to your electronic health record. If you see a primary care provider, you can also send messages to your care team and make appointments. If you have questions, please call your primary care clinic.  If you do  not have a primary care provider, please call [phone] and they will assist you.        Care EveryWhere ID     This is your Care EveryWhere ID. This could be used by other organizations to access your [facility] medical records  [id number]        Equal Access to Services     LAUREN WAGNER : Kaedem Thompson, luis schmidt, tiffany mueller, aman love. So [facility] [phone].    ATENCIÓN: Si habla español, tiene a norman disposición servicios gratuitos de asistencia lingüística. Llame al [phone].    We comply with applicable federal civil rights laws and [place] laws. We do not discriminate on the basis of race, color, national origin, age, disability, sex, sexual orientation, or gender identity.            After Visit Summary       This is your record. Keep this with you and show to your community pharmacist(s) and doctor(s) at your next visit.

## 2017-11-26 NOTE — DISCHARGE INSTRUCTIONS
Discharge Instructions for Cellulitis  You have been diagnosed with cellulitis. This is an infection in the deepest layer of the skin. In some cases, the infection also affects the muscle. Cellulitis is caused by bacteria. The bacteria can enter the body through broken skin. This can happen with a cut, scratch, animal bite, or an insect bite that has been scratched. You may have been treated in the hospital with antibiotics and fluids. You will likely be given a prescription for antibiotics to take at home. This sheet will help you take care of yourself at home.  Home care  When you are home:    Take the prescribed antibiotic medicine you are given as directed until it is gone. Take it even if you feel better. It treats the infection and stops it from returning. Not taking all the medicine can make future infections hard to treat.    Keep the infected area clean.    When possible, raise the infected area above the level of your heart. This helps keep swelling down.    Talk with your healthcare provider if you are in pain. Ask what kind of over-the-counter medicine you can take for pain.    Apply clean bandages as advised.    Take your temperature once a day for a week.    Wash your hands often to prevent spreading the infection.  In the future, wash your hands before and after you touch cuts, scratches, or bandages. This will help prevent infection.   When to call your healthcare provider  Call your healthcare provider immediately if you have any of the following:    Difficulty or pain when moving the joints above or below the infected area    Discharge or pus draining from the area    Fever of 100.4 F (38 C) or higher, or as directed by your healthcare provider    Pain that gets worse in or around the infected     Redness that gets worse in or around the infected area, particularly if the area of redness expands to a wider area    Shaking chills    Swelling of the infected area    Vomiting   Date Last Reviewed:  8/1/2016 2000-2017 [employer]. [address] [place] [address], [place] [address]. All rights reserved. This information is not intended as a substitute for professional medical care. Always follow your healthcare professional's instructions.

## 2017-11-26 NOTE — ED PROVIDER NOTES
History     Chief Complaint:  Foot Swelling    The history is provided by the patient.      Bere Machuca is an anticoagulated 62 year old female on Coumadin for history of Factor V Leiden who presents with foot swelling. Patient reports that her left foot started swelling 4 days ago that was worse and red today prompting visit to the emergency department. She denies any serious trauma to the foot but does believe she may scraped her toe a few days prior to onset of swelling. The patient's INR has been running high, last week at 2.3. She denies other complaint.     Allergies:  Aspirin  Lyrica [Pregabalin]  Tape [Adhesive Tape]  Vancomycin     Medications:    Metformin  Losartan  Coumadin  Breo ellipta  Flexeril  Uceris  Atarax  Celexa  Lialda  Gabapentin  B-complex  Spiriva  MVI  Probiotic  Albuterol inhaler/neb    Past Medical History:    Anxiety  Arthritis  Clotting disorder  COPD (chronic obstructive pulmonary disease)  Depressive disorder  Diabetes type 2  GERD  Factor V Leiden  Hypertension  Lymphedema of arm  Migraine  Morbidly obese   Pulmonary embolism  Sleep apnea  Substance abuse  Superior vena cava syndrome  Spinal stenosis, cervical   Displacement of lumbar intervertebral disc without myelopathy     Past Surgical History:    Biopsy of skin lesion  Panniculus  Exploratory of abdomen  Plastic surgery, neck  Removal colon/ileostomy   JAIRO  USO  Abdominal wall abscess  T&A  Hernia repair  Insertion of access port     Family History:    COPD  Skin cancer  CAD  Colorectal cancer  Colitis   PE    Social History:  Presents alone, sister met in ED   Tobacco use: Former 0.50 PPD smoker of 35 years, QD 2014  Alcohol use: Social   PCP: Wilma Armenta    Marital Status:       Review of Systems   Musculoskeletal: Positive for joint swelling.   Skin: Positive for color change.   All other systems reviewed and are negative.    Physical Exam     Patient Vitals for the past 24 hrs:   BP Temp Pulse Heart Rate  "Resp SpO2 Height Weight   11/25/17 1930 144/83 - - - - - - -   11/25/17 1915 139/71 - - - - 94 % - -   11/25/17 1900 144/80 - - - - 94 % - -   11/25/17 1845 147/84 - - - - 97 % - -   11/25/17 1830 (!) 132/91 - - - - (!) 85 % - -   11/25/17 1815 126/79 - - - - - - -   11/25/17 1800 145/79 - - - - - - -   11/25/17 1756 191/89 - - - - - - -   11/25/17 1753 - 97.5  F (36.4  C) 100 100 20 100 % 1.676 m (5' 6\") (!) 174.6 kg (385 lb)      Physical Exam   Constitutional: She is oriented to person, place, and time. She appears well-developed.   HENT:   Head: Normocephalic and atraumatic.   Right Ear: External ear normal.   Mouth/Throat: Oropharynx is clear and moist.   Eyes: Conjunctivae and EOM are normal. Pupils are equal, round, and reactive to light.   Neck: Normal range of motion. Neck supple. No JVD present.   Cardiovascular: Normal rate, regular rhythm and normal heart sounds.    Pulmonary/Chest: Effort normal and breath sounds normal.   Abdominal: Soft. Bowel sounds are normal. She exhibits no distension. There is no tenderness. There is no rebound.   Musculoskeletal: Normal range of motion.        Feet:    Lymphadenopathy:     She has no cervical adenopathy.   Neurological: She is alert and oriented to person, place, and time. She displays normal reflexes. No cranial nerve deficit. She exhibits normal muscle tone. Coordination normal.   Skin: Skin is warm and dry.   Psychiatric: She has a normal mood and affect. Her behavior is normal. Judgment normal.   Nursing note and vitals reviewed.        Emergency Department Course   Laboratory:  CBC: WNL (WBC 9.9, HGB 13.8, )   BMP: Glucose 121 (H) ow WNL (Creatinine 0.64)   INR: 2.46 (H)    Interventions:  1917: Ancef 1g IV Infusion      Emergency Department Course:  Past medical records, nursing notes, and vitals reviewed.  1803: I performed an exam of the patient and obtained history, as documented above.  IV inserted and blood drawn.   Above interventions " provided.   I personally reviewed the laboratory results with the Patient and answered all related questions prior to discharge.    1930: I rechecked the patient. Findings and plan explained to the Patient. Patient discharged home with instructions regarding supportive care, medications, and reasons to return. The importance of close follow-up was reviewed.      Impression & Plan    Medical Decision Making:  Bere Machuca is a 62 year old female presenting with redness and pain in the left foot. Examination shows ecchymosis over the right leg where she injured it a while ago. There is redness, but not a lot of warmth, in the left foot and there is an open wound at the first toe. Clinical exam finding are suspicious for cellulitis. There is no clinical concern for DVT due to the location as well as her therapeutic INR. Patient is offered reassurance, a dose of IV Ancef is given. Patient is asked to return with fever, rever rapidly increasing, or redness.     Diagnosis:    ICD-10-CM    1. Cellulitis of left foot L03.116        Disposition:  Discharged to home with plan as outlined.    Discharge Medications:  New Prescriptions    CEPHALEXIN (KEFLEX) 500 MG CAPSULE    Take 1 capsule (500 mg) by mouth 4 times daily for 10 days         Reji RODRIGUEZ, kashmir serving as a scribe at 6:03 PM on 11/25/2017 to document services personally performed by Artemio Mccollum MD based on my observations and the provider's statements to me.    11/25/2017   [facility] [place] EMERGENCY DEPARTMENT       Artemio Mccollum MD  11/28/17 2008

## 2017-11-30 ENCOUNTER — NURSE TRIAGE (OUTPATIENT)
Dept: NURSING | Facility: CLINIC | Age: 62
End: 2017-11-30

## 2017-11-30 NOTE — TELEPHONE ENCOUNTER
Seen in ED on Saturday 11/25/17, diagnosed with cellulitis to foot.  Received dose of IV abx and is now on day 5 of Keflex.  Symptoms not worse, but not better.  No new symptoms.      Reason for Disposition    [1] Taking antibiotic > 72 hours (3 days) AND [2] cellulitis symptoms are SAME (not getting better)    Protocols used: CELLULITIS ON ANTIBIOTIC FOLLOW-UP CALL-ADULT-AH

## 2017-12-04 ENCOUNTER — TRANSFERRED RECORDS (OUTPATIENT)
Dept: FAMILY MEDICINE | Facility: CLINIC | Age: 62
End: 2017-12-04

## 2017-12-08 ENCOUNTER — TRANSFERRED RECORDS (OUTPATIENT)
Dept: FAMILY MEDICINE | Facility: CLINIC | Age: 62
End: 2017-12-08

## 2017-12-14 ENCOUNTER — TELEPHONE (OUTPATIENT)
Dept: FAMILY MEDICINE | Facility: CLINIC | Age: 62
End: 2017-12-14

## 2017-12-14 NOTE — TELEPHONE ENCOUNTER
Hyun,     We have received a form from [facility] regarding Bere and getting Protective Shoes.     It's in your box and ready for you to review.     Tika.NARESH Fenton ([facility])

## 2017-12-18 NOTE — TELEPHONE ENCOUNTER
"It looks like this form will need to be signed by Dr. Armenta, as it says \"MD or DO only\". I filled out what I could, so please sign if you are willing Dr. Armenta. It's in your bin, thanks.   "

## 2017-12-28 ENCOUNTER — OFFICE VISIT (OUTPATIENT)
Dept: FAMILY MEDICINE | Facility: CLINIC | Age: 62
End: 2017-12-28

## 2017-12-28 VITALS
HEART RATE: 92 BPM | SYSTOLIC BLOOD PRESSURE: 136 MMHG | DIASTOLIC BLOOD PRESSURE: 94 MMHG | HEIGHT: 66 IN | BODY MASS INDEX: 47.09 KG/M2 | TEMPERATURE: 98.1 F | RESPIRATION RATE: 20 BRPM | WEIGHT: 293 LBS

## 2017-12-28 DIAGNOSIS — I89.0 LYMPHEDEMA: ICD-10-CM

## 2017-12-28 DIAGNOSIS — H66.002 ACUTE SUPPURATIVE OTITIS MEDIA OF LEFT EAR WITHOUT SPONTANEOUS RUPTURE OF TYMPANIC MEMBRANE, RECURRENCE NOT SPECIFIED: ICD-10-CM

## 2017-12-28 DIAGNOSIS — D68.51 FACTOR 5 LEIDEN MUTATION, HETEROZYGOUS (H): Primary | ICD-10-CM

## 2017-12-28 DIAGNOSIS — Z86.711 HISTORY OF PULMONARY EMBOLISM: ICD-10-CM

## 2017-12-28 DIAGNOSIS — R22.42 FOOT MASS, LEFT: ICD-10-CM

## 2017-12-28 DIAGNOSIS — E66.01 MORBID OBESITY (H): ICD-10-CM

## 2017-12-28 LAB
% GRANULOCYTES: 57.4 %
HCT VFR BLD AUTO: 44.4 % (ref 35–47)
HEMOGLOBIN: 14 G/DL (ref 11.7–15.7)
INR POINT OF CARE: 2 (ref 0.9–1.1)
LYMPHOCYTES NFR BLD AUTO: 34.4 %
MCH RBC QN AUTO: 30.2 PG (ref 26–33)
MCHC RBC AUTO-ENTMCNC: 31.5 G/DL (ref 31–36)
MCV RBC AUTO: 95.9 FL (ref 78–100)
MONOCYTES NFR BLD AUTO: 8.2 %
PLATELET COUNT - QUEST: 246 10^9/L (ref 150–375)
RBC # BLD AUTO: NORMAL 10*12/L (ref 3.8–5.2)
WBC # BLD AUTO: 9.8 10*9/L (ref 4–11)

## 2017-12-28 PROCEDURE — 85025 COMPLETE CBC W/AUTO DIFF WBC: CPT | Performed by: PHYSICIAN ASSISTANT

## 2017-12-28 PROCEDURE — 85610 PROTHROMBIN TIME: CPT | Performed by: PHYSICIAN ASSISTANT

## 2017-12-28 PROCEDURE — 36415 COLL VENOUS BLD VENIPUNCTURE: CPT | Performed by: PHYSICIAN ASSISTANT

## 2017-12-28 PROCEDURE — 99214 OFFICE O/P EST MOD 30 MIN: CPT | Performed by: PHYSICIAN ASSISTANT

## 2017-12-28 NOTE — MR AVS SNAPSHOT
After Visit Summary   12/28/2017    Bere Machuca    MRN: [id number]           Patient Information     Date Of Birth          1955        Visit Information        Provider Department      12/28/2017 11:15 AM Catrachita Torres PA [facility]        Today's Diagnoses     Factor 5 Leiden mutation, heterozygous/ INR 2-3    -  1    Acute suppurative otitis media of left ear without spontaneous rupture of tympanic membrane, recurrence not specified        Lymphedema        Morbid obesity (H)        Foot mass, left        History of pulmonary embolism           Follow-ups after your visit        Additional Services     CARDIOLOGY EVAL ADULT REFERRAL       Your provider has referred you to:  [facility]: [facility] [phone]   [email or web address]    Please be aware that coverage of these services is subject to the terms and limitations of your health insurance plan.  Call member services at your health plan with any benefit or coverage questions.      Type of Referral:  New Cardiology Consult    Dr. Ny requested    Timeframe requested:  1 Week    Please bring the following to your appointment:  >>   Any x-rays, CTs or MRIs which have been performed.  Contact the facility where they were done to arrange for  prior to your scheduled appointment.    >>   List of current medications  >>   This referral request   >>   Any documents/labs given to you for this referral            ORTHOPEDICS ADULT REFERRAL       Your provider has referred you to: [facility] [place] [phone]   [email or web address]    Please be aware that coverage of these services is subject to the terms and limitations of your health insurance plan.  Call member services at your health plan with any benefit or coverage questions.      Please bring the following  "to your appointment:    >>   Any x-rays, CTs or MRIs which have been performed.  Contact the facility where they were done to arrange for  prior to your scheduled appointment.  Any new CT, MRI or other procedures ordered by your specialist must be performed at a [place] facility or coordinated by your clinic's referral office.    >>   List of current medications   >>   This referral request   >>   Any documents/labs given to you for this referral                  Who to contact     If you have questions or need follow up information about today's clinic visit or your schedule please contact [facility] directly at [phone].  Normal or non-critical lab and imaging results will be communicated to you by MyChart, letter or phone within 4 business days after the clinic has received the results. If you do not hear from us within 7 days, please contact the clinic through [employer] or phone. If you have a critical or abnormal lab result, we will notify you by phone as soon as possible.  Submit refill requests through [employer] or call your pharmacy and they will forward the refill request to us. Please allow 3 business days for your refill to be completed.          Additional Information About Your Visit        [employer] Information     [employer] gives you secure access to your electronic health record. If you see a primary care provider, you can also send messages to your care team and make appointments. If you have questions, please call your primary care clinic.  If you do not have a primary care provider, please call [phone] and they will assist you.        Care EveryWhere ID     This is your Care EveryWhere ID. This could be used by other organizations to access your [place] medical records  [id number]        Your Vitals Were     Pulse Temperature Respirations Height BMI (Body Mass Index)       92 98.1  F (36.7  C) (Oral) 20 1.676 m (5' 6\") 62.14 kg/m2        Blood Pressure from Last " 3 Encounters:   12/28/17 (!) 136/94   11/25/17 155/79   11/20/17 120/80    Weight from Last 3 Encounters:   12/28/17 (!) 174.6 kg (385 lb)   11/25/17 (!) 174.6 kg (385 lb)   11/20/17 (!) 177.3 kg (390 lb 12.8 oz)              We Performed the Following     CARDIOLOGY EVAL ADULT REFERRAL     CL AFF HEMOGRAM/PLATE/DIFF ([facility])     CL BFP PROTHROMBIN TIME/INR ([facility])     ORTHOPEDICS ADULT REFERRAL     VENOUS COLLECTION          Today's Medication Changes          These changes are accurate as of: 12/28/17 11:59 PM.  If you have any questions, ask your nurse or doctor.               These medicines have changed or have updated prescriptions.        Dose/Directions    warfarin 5 MG tablet   Commonly known as:  COUMADIN   This may have changed:  additional instructions   Used for:  History of pulmonary embolism   Changed by:  Catrachita Torres PA        Take 5 mg daily 5 days per week. On Mondays take 1/2 pill. No warfarin on Friday   Quantity:  30 tablet   Refills:  0                Primary Care Provider Office Phone # Fax #    Wilma Armenta [phone]       [address] [address]        Equal Access to Services     LAUREN WAGNER : Hadii korina ku hadasho Soomaali, waaxda luqadaha, qaybta kaalmada adeegyada, waxay alanain hayannmarie love. So [facility] [phone].    ATENCIÓN: Si habla español, tiene a norman disposición servicios gratuitos de asistencia lingüística. Llame al [phone].    We comply with applicable federal civil rights laws and [place] laws. We do not discriminate on the basis of race, color, national origin, age, disability, sex, sexual orientation, or gender identity.            Thank you!     Thank you for choosing [facility]  for your care. Our goal is always to provide you with excellent care. Hearing back from our patients is one way we can continue to improve our services. Please take a few minutes to complete the  written survey that you may receive in the mail after your visit with us. Thank you!             Your Updated Medication List - Protect others around you: Learn how to safely use, store and throw away your medicines at www.disposemymeds.org.          This list is accurate as of: 12/28/17 11:59 PM.  Always use your most recent med list.                   Brand Name Dispense Instructions for use Diagnosis    * albuterol (2.5 MG/3ML) 0.083% neb solution      Take 3 mLs by nebulization every 6 hours as needed for shortness of breath / dyspnea. Dr [facility]ELEN[facility]    Chronic airway obstruction, not elsewhere classified, Personal history of tobacco use, presenting hazards to health       * PROAIR  (90 BASE) MCG/ACT Inhaler   Generic drug:  albuterol      Inhale 1-2 puffs into the lungs as needed for shortness of breath / dyspnea. DR MELENDEZ [facility]    Chronic airway obstruction, not elsewhere classified, Personal history of tobacco use, presenting hazards to health       blood glucose lancets standard    no brand specified    100 each    Use to test blood sugar 2-3 times daily or as directed.    Type 2 diabetes mellitus without complication, without long-term current use of insulin (H)       blood glucose monitoring test strip    ISAAC CONTOUR NEXT    100 strip    Use to test blood sugar 2-3 times daily or as directed.    Type 2 diabetes mellitus without complication, without long-term current use of insulin (H)       cholecalciferol 1000 UNITS Tabs      Take 1,000 Units by mouth At Bedtime    Morbid obesity due to excess calories (H)       citalopram 40 MG tablet    celeXA    90 tablet    Take 1 tablet (40 mg) by mouth daily    Major depressive disorder, recurrent episode, moderate (H)       cyclobenzaprine 5 MG tablet    FLEXERIL    30 tablet    TAKE 1 TABLET BY MOUTH AS NEEDED FOR MUSCLE SPASM    Displacement of lumbar intervertebral disc without myelopathy, Spinal stenosis in cervical  region       fluticasone-vilanterol 200-25 MCG/INH oral inhaler    BREO ELLIPTA    3 Inhaler    Inhale 1 puff into the lungs daily    Chronic bronchitis, unspecified chronic bronchitis type (H)       gabapentin 300 MG capsule    NEURONTIN     Take 600 mg by mouth 2 times daily        hydrOXYzine 25 MG tablet    ATARAX    90 tablet    Take 1 tablet (25 mg) by mouth At Bedtime    Intrinsic eczema       losartan 50 MG tablet    COZAAR    90 tablet    Take 1 tablet (50 mg) by mouth daily    Benign essential hypertension       mesalamine 1.2 G EC tablet    LIALDA          metFORMIN 500 MG 24 hr tablet    GLUCOPHAGE-XR    60 tablet    Take 2 tablets (1,000 mg) by mouth daily (with dinner)    Type 2 diabetes mellitus without complication, without long-term current use of insulin (H)       MICROLET LANCETS Misc      TEST 2-3 TIMES DAILY OR AS DIRECTED        Multi-vitamin Tabs tablet      Take 1 tablet by mouth daily        PROBIOTIC ACIDOPHILUS BIOBEADS Caps      Take 1 capsule by mouth At Bedtime    Myalgia and myositis, unspecified       SPIRIVA HANDIHALER 18 MCG capsule   Generic drug:  tiotropium     30 capsule    Inhale contents of one capsule daily.        SUPER B COMPLEX PO      Take 1 tablet by mouth daily        UCERIS 9 MG 24 hr tablet   Generic drug:  budesonide      Take 9 mg by mouth every morning    Ulcerative pancolitis with rectal bleeding (H)       warfarin 5 MG tablet    COUMADIN    30 tablet    Take 5 mg daily 5 days per week. On Mondays take 1/2 pill. No warfarin on Friday    History of pulmonary embolism       * Notice:  This list has 2 medication(s) that are the same as other medications prescribed for you. Read the directions carefully, and ask your doctor or other care provider to review them with you.

## 2017-12-28 NOTE — PROGRESS NOTES
SUBJECTIVE:                                                    Bere Machuca is a 62 year old female who presents to clinic today for the following health issues:        Chronic coumadin use    Indication: PE        Recent Labs   Lab Test  12/28/17   1114  11/25/17   1805  11/20/17   1425  11/01/17   1032  10/09/17   1322  09/11/17   1413   INR  2.0  2.46*  2.3  3.2*  3.0  2.9        Current Coumadin Dose:  5 daily minus M and F skipped dose    Bleeding Signs/Symptoms:  Minor - scratches and bruising  Thromboembolic Signs/Symptoms:  None    Medication Changes:  No  Dietary Changes:  No  Bacterial/Viral Infection:  Yes: see below    Missed Coumadin Doses:  None    Other Concerns:    Left foot - saw podiatrist this am - thinks she is Leaking lymph fluid and has cellulitis again in left foot  Denies fevers  Has been increasing her albuterol used      URI sx - fullness in left ear      Labs reviewed in EPIC  BP Readings from Last 3 Encounters:   12/28/17 (!) 136/94   11/25/17 155/79   11/20/17 120/80    Wt Readings from Last 3 Encounters:   12/28/17 (!) 174.6 kg (385 lb)   11/25/17 (!) 174.6 kg (385 lb)   11/20/17 (!) 177.3 kg (390 lb 12.8 oz)                  Patient Active Problem List   Diagnosis     History of cocaine abuse     Personal history of tobacco use, presenting hazards to health     Family history of malignant neoplasm of gastrointestinal tract     Esophageal reflux     Chronic airway obstruction (H)     Hypertension     Sleep apnea     Migraine     ACP (advance care planning)     Factor 5 Leiden mutation, heterozygous/ INR 2-3     Health Care Home     Family history of pulmonary embolism     Lymphedema of arm     Spinal stenosis in cervical region     History of pulmonary embolism     Major depressive disorder, recurrent episode, moderate (H)     Superior vena cava syndrome     Morbid obesity due to excess calories (H)     Displacement of lumbar intervertebral disc without myelopathy     Type 2  diabetes mellitus without complication, without long-term current use of insulin (H)     Past Surgical History:   Procedure Laterality Date     BIOPSY OF SKIN LESION  multiple    keloids     C ANESTH,REPAIR LO ABD HERNIA NOS  multiple/  last    panniculus     C EXPLORATORY OF ABDOMEN       C PLASTIC SURGERY, NECK  age 4     C REMOVAL COLON/ILEOSTOMY      reastamosis     C TOTAL ABDOM HYSTERECTOMY      Hysterectomy, Total Abdominal     HC REMOVAL OF OVARY/TUBE(S)      Salpingo-Oophorectomy, Unilateral     HC REMOVE ABDOMINAL WALL LESION      abscess     HC REMOVE TONSILS/ADENOIDS,<13 Y/O  age 14    T & A <14y.o.     HERNIA REPAIR       INSERTION OF ACCESS PORT  -    sepsis- removed after hospital stay       Social History   Substance Use Topics     Smoking status: Former Smoker     Packs/day: 0.50     Years: 35.00     Types: Cigarettes     Quit date: 2014     Smokeless tobacco: Never Used     Alcohol use 0.5 oz/week     1 Standard drinks or equivalent per week      Comment: social     Family History   Problem Relation Age of Onset     Respiratory Mother      copd     CANCER Mother      skin cancers/ melanomas     C.A.D. Mother      MI  age 79     Cancer - colorectal Father      / diagnosis age 72     GASTROINTESTINAL DISEASE Father      colitis     Breast Cancer No family hx of          Current Outpatient Prescriptions   Medication Sig Dispense Refill     metFORMIN (GLUCOPHAGE-XR) 500 MG 24 hr tablet Take 2 tablets (1,000 mg) by mouth daily (with dinner) 60 tablet 2     losartan (COZAAR) 50 MG tablet Take 1 tablet (50 mg) by mouth daily 90 tablet 0     warfarin (COUMADIN) 5 MG tablet Take 5 mg daily 5 days per week. 30 tablet 0     fluticasone-vilanterol (BREO ELLIPTA) 200-25 MCG/INH oral inhaler Inhale 1 puff into the lungs daily 3 Inhaler 0     cyclobenzaprine (FLEXERIL) 5 MG tablet TAKE 1 TABLET BY MOUTH AS NEEDED FOR MUSCLE SPASM 30 tablet 0     budesonide (UCERIS) 9 MG 24 hr  tablet Take 9 mg by mouth every morning       hydrOXYzine (ATARAX) 25 MG tablet Take 1 tablet (25 mg) by mouth At Bedtime 90 tablet 0     blood glucose monitoring (ISAAC CONTOUR NEXT) test strip Use to test blood sugar 2-3 times daily or as directed. 100 strip 3     citalopram (CELEXA) 40 MG tablet Take 1 tablet (40 mg) by mouth daily 90 tablet 1     mesalamine (LIALDA) 1.2 G EC tablet   4     MICROLET LANCETS MISC TEST 2-3 TIMES DAILY OR AS DIRECTED  1     gabapentin (NEURONTIN) 300 MG capsule Take 600 mg by mouth 2 times daily       B Complex-C (SUPER B COMPLEX PO) Take 1 tablet by mouth daily       tiotropium (SPIRIVA HANDIHALER) 18 MCG capsule Inhale contents of one capsule daily. 30 capsule 1     blood glucose (NO BRAND SPECIFIED) lancets standard Use to test blood sugar 2-3 times daily or as directed. 100 each 1     cholecalciferol 1000 UNITS TABS Take 1,000 Units by mouth At Bedtime        multivitamin, therapeutic with minerals (MULTI-VITAMIN) TABS Take 1 tablet by mouth daily       Probiotic Product (PROBIOTIC ACIDOPHILUS) CAPS Take 1 capsule by mouth At Bedtime        albuterol (2.5 MG/3ML) 0.083% nebulizer solution Take 3 mLs by nebulization every 6 hours as needed for shortness of breath / dyspnea. Dr [facility]ELENDEZ [facility]       albuterol (PROAIR HFA) 108 (90 BASE) MCG/ACT inhaler Inhale 1-2 puffs into the lungs as needed for shortness of breath / dyspnea. DR MELENDEZ [facility]       Allergies   Allergen Reactions     Aspirin      PN: LW Reaction: sensitive/stomach     Lyrica [Pregabalin]      dizziness     Tape [Adhesive Tape] Itching     Vancomycin      Recent Labs   Lab Test  11/25/17   1805  11/20/17   1505  11/20/17   1458  06/30/17   1130  06/30/17   1007  06/23/17   0551  06/22/17   2113  10/24/16   01/11/16   1541   A1C   --   7.4*   --    --   6.2   --    --    --   6.8   --    --    LDL   --    --   173*   --    --    --    --    --    --    --    --    HDL   --    --   86   --     "--    --    --    --    --    --    --    TRIG   --    --   162*   --    --    --    --    --    --    --    --    ALT   --    --   53*  35*   --    --   41   --   42   --    --    CR  0.64   --   0.65  0.83   --   0.74  0.80   < >  0.78   < >   --    GFRESTIMATED  >90   --   96  77   --   81  74   < >   --    < >   --    GFRESTBLACK  >90   --    --    --    --   >90   GFR Calc    89   < >   --    < >   --    POTASSIUM  3.5   --   3.8  4.3   --   3.8  4.2   < >  4.5   < >   --    TSH   --    --    --    --    --    --    --    --   2.85   --   2.57    < > = values in this interval not displayed.            OBJECTIVE:                                                    BP (!) 136/94 (BP Location: Right arm, Patient Position: Chair, Cuff Size: Adult Large)  Pulse 92  Temp 98.1  F (36.7  C) (Oral)  Resp 20  Ht 1.676 m (5' 6\")  Wt (!) 174.6 kg (385 lb)  BMI 62.14 kg/m2   Body mass index is 62.14 kg/(m^2).       Patient is a 62 year old female, in no acute distress. Vitals noted   Head: Normocephalic, atraumatic.  Eyes: Conjunctiva clear.  No discharge noted.  Ears: External ears, canals and TMs normal Bilaterally: gray and translucent.   Clear fluid behind left ear  Nose: Normal without discharge.  Mouth / Throat:   Pharynx non-erythematous, no exudates present, tonsils without hypertrophy. Mucous membranes moist.  Neck:  Neck supple, No lymphadenopathy, no thyromegaly.  Cardiac:  Normal rhythm and rate, no murmurs, rubs or gallops.  Lungs: clear to auscultation bilaterally; no wheezes, crackles or rhonchi    Skin: the skin of the left food is mildly erythematous - there is a hard mass of the left foot proximal 1st MT, not fluctuant.  Skin is not warm to the touch.    INR 2.0     There is 2+ pitting edema of the left leg, 1 + pitting edema of the right.            ASSESSMENT/PLAN:                                                        1. Factor 5 Leiden mutation, heterozygous/ INR 2-3  - CL BFP " PROTHROMBIN TIME/INR (BFP)  - VENOUS COLLECTION      Therapeutic INR for goal of 2-3    New Dose: Coumadin 5  mg per day  All days but Monday 2.5 mg and Friday no Coumadin    Next INR: 3 weeks    2. Acute suppurative otitis media of left ear without spontaneous rupture of tympanic membrane, recurrence not specified  Obs for now  ENT if persistent > 6 weeks    3. Lymphedema  - CARDIOLOGY EVAL ADULT REFERRAL  - ORTHOPEDICS ADULT REFERRAL    4. Morbid obesity (H)    5. Foot mass, left  Advised [facility] consult  Pt to be seen in Urgent care  I do not believe this is cellulitis - pt also examined by Dr. Rodarte who agrees with plan  - VENOUS COLLECTION  - CL AFF HEMOGRAM/PLATE/DIFF (BFP)  - ORTHOPEDICS ADULT REFERRAL          Catrachita Torres PA-C  12/28/2017

## 2017-12-28 NOTE — NURSING NOTE
Bere M Machuca is here for possible lymphedema in her arms and legs. Has been ongoing for the past 2 weeks.  Also needs her INR  Questioned patient about current smoking habits.  Pt. quit smoking some time ago.  PULSE regular  My Chart: active  CLASSIFICATION OF OVERWEIGHT AND OBESITY BY BMI                        Obesity Class           BMI(kg/m2)  Underweight                                    < 18.5  Normal                                         18.5-24.9  Overweight                                     25.0-29.9  OBESITY                     I                  30.0-34.9                             II                 35.0-39.9  EXTREME OBESITY             III                >40                            Patient's  BMI Body mass index is 62.14 kg/(m^2).  http://hin.nhlbi.nih.gov/menuplanner/menu.cgi  Pre-visit planning  Immunizations - up to date  Colonoscopy - is up to date  Mammogram - is up to date  Asthma -   PHQ9 -    MILEY-7 -

## 2017-12-29 RX ORDER — WARFARIN SODIUM 5 MG/1
TABLET ORAL
Qty: 30 TABLET | Refills: 0 | COMMUNITY
Start: 2017-12-29 | End: 2018-01-20

## 2018-01-02 ENCOUNTER — TELEPHONE (OUTPATIENT)
Dept: OTHER | Facility: CLINIC | Age: 63
End: 2018-01-02

## 2018-01-02 NOTE — TELEPHONE ENCOUNTER
MIREYA for pt to give us a call back to set up a consult appt with vascular medicine. Informed pt that Dr. Ny is in [place] on Tuesday 1/23, otherwise pt will have to come see him in [place].

## 2018-01-02 NOTE — TELEPHONE ENCOUNTER
Pt referred to [facility] by Catrachita Torres PA-C for lymphedema.    Pt needs to be scheduled for a consult with Dr. Ny (per referring).  Will route to scheduling to coordinate an appointment within a week (per referring).    Leticia Arellano, GINGERN, RN

## 2018-01-05 NOTE — TELEPHONE ENCOUNTER
Left voice mail for patient to give us a call back to set up a consult appt with vascular medicine. Informed pt that Dr. Ny is in [place] on Tuesday 1/23, otherwise pt will have to come to [place] to see him.     Vika Maciel MA

## 2018-01-09 ENCOUNTER — TELEPHONE (OUTPATIENT)
Dept: FAMILY MEDICINE | Facility: CLINIC | Age: 63
End: 2018-01-09

## 2018-01-10 NOTE — TELEPHONE ENCOUNTER
Patient has not responed to our 2nd attempt to reach her. Will rout message back to Jasmine Maciel MA

## 2018-01-18 ENCOUNTER — OFFICE VISIT (OUTPATIENT)
Dept: FAMILY MEDICINE | Facility: CLINIC | Age: 63
End: 2018-01-18

## 2018-01-18 ENCOUNTER — OFFICE VISIT (OUTPATIENT)
Dept: PHARMACY | Facility: PHYSICIAN GROUP | Age: 63
End: 2018-01-18
Payer: COMMERCIAL

## 2018-01-18 VITALS
OXYGEN SATURATION: 97 % | HEART RATE: 78 BPM | DIASTOLIC BLOOD PRESSURE: 90 MMHG | TEMPERATURE: 98.7 F | SYSTOLIC BLOOD PRESSURE: 132 MMHG

## 2018-01-18 DIAGNOSIS — J44.9 COPD (CHRONIC OBSTRUCTIVE PULMONARY DISEASE) (H): ICD-10-CM

## 2018-01-18 DIAGNOSIS — E63.9 NUTRITIONAL DEFICIENCY: ICD-10-CM

## 2018-01-18 DIAGNOSIS — E66.01 MORBID OBESITY DUE TO EXCESS CALORIES (H): ICD-10-CM

## 2018-01-18 DIAGNOSIS — M48.02 SPINAL STENOSIS IN CERVICAL REGION: ICD-10-CM

## 2018-01-18 DIAGNOSIS — Z86.711 HISTORY OF PULMONARY EMBOLISM: Primary | ICD-10-CM

## 2018-01-18 DIAGNOSIS — K51.319 ULCERATIVE RECTOSIGMOIDITIS WITH COMPLICATION (H): ICD-10-CM

## 2018-01-18 DIAGNOSIS — I10 ESSENTIAL HYPERTENSION: ICD-10-CM

## 2018-01-18 DIAGNOSIS — M25.552 HIP PAIN, LEFT: ICD-10-CM

## 2018-01-18 DIAGNOSIS — J44.9 CHRONIC OBSTRUCTIVE PULMONARY DISEASE, UNSPECIFIED COPD TYPE (H): ICD-10-CM

## 2018-01-18 DIAGNOSIS — D68.51 FACTOR V LEIDEN (H): ICD-10-CM

## 2018-01-18 DIAGNOSIS — G62.9 NEUROPATHY: ICD-10-CM

## 2018-01-18 DIAGNOSIS — E78.5 HYPERLIPIDEMIA LDL GOAL <100: ICD-10-CM

## 2018-01-18 DIAGNOSIS — K51.919 ULCERATIVE COLITIS WITH COMPLICATION, UNSPECIFIED LOCATION (H): ICD-10-CM

## 2018-01-18 DIAGNOSIS — E11.9 TYPE 2 DIABETES MELLITUS WITHOUT COMPLICATION, WITHOUT LONG-TERM CURRENT USE OF INSULIN (H): Primary | ICD-10-CM

## 2018-01-18 DIAGNOSIS — I87.1 SUPERIOR VENA CAVA SYNDROME: ICD-10-CM

## 2018-01-18 DIAGNOSIS — F33.1 MAJOR DEPRESSIVE DISORDER, RECURRENT EPISODE, MODERATE (H): ICD-10-CM

## 2018-01-18 DIAGNOSIS — K21.9 GASTROESOPHAGEAL REFLUX DISEASE WITHOUT ESOPHAGITIS: ICD-10-CM

## 2018-01-18 DIAGNOSIS — L50.9 HIVES: ICD-10-CM

## 2018-01-18 DIAGNOSIS — A08.4 VIRAL GASTROENTERITIS: ICD-10-CM

## 2018-01-18 LAB — INR POINT OF CARE: 2.1 (ref 0.9–1.1)

## 2018-01-18 PROCEDURE — 99213 OFFICE O/P EST LOW 20 MIN: CPT | Performed by: PHYSICIAN ASSISTANT

## 2018-01-18 PROCEDURE — 99605 MTMS BY PHARM NP 15 MIN: CPT | Performed by: PHARMACIST

## 2018-01-18 PROCEDURE — 85610 PROTHROMBIN TIME: CPT | Performed by: PHYSICIAN ASSISTANT

## 2018-01-18 PROCEDURE — 36415 COLL VENOUS BLD VENIPUNCTURE: CPT | Performed by: PHYSICIAN ASSISTANT

## 2018-01-18 PROCEDURE — 99607 MTMS BY PHARM ADDL 15 MIN: CPT | Performed by: PHARMACIST

## 2018-01-18 RX ORDER — GABAPENTIN 300 MG/1
CAPSULE ORAL
Qty: 270 CAPSULE | Refills: 1 | Status: SHIPPED | OUTPATIENT
Start: 2018-01-18 | End: 2018-06-27

## 2018-01-18 RX ORDER — GABAPENTIN 300 MG/1
CAPSULE ORAL
Qty: 270 CAPSULE | Refills: 0
Start: 2018-01-18 | End: 2018-01-18

## 2018-01-18 RX ORDER — ATORVASTATIN CALCIUM 40 MG/1
40 TABLET, FILM COATED ORAL DAILY
Qty: 90 TABLET | Refills: 1 | Status: SHIPPED | OUTPATIENT
Start: 2018-01-18 | End: 2018-06-27

## 2018-01-18 NOTE — PROGRESS NOTES
CC: INR check, FMLA    History:  Darline is here today meeting with both myself and Kenia Simmons PharmD. Kenia initiated on atorvastatin 40 mg daily, and also will wait to check A1c in 1 month, as she has recently been on oral steroids.     1. Darline is here today for INR check. INR last checked 12/28/2017 and was 2.0. Continued to take 5 mg 5 days per week, and 2.5 mg M, nothing on F. No signs of blood too thin or thick.     2. Darline also mentions she has been out since 1/4/2018 due to what she believes is a stomach virus. Vomiting and diarrhea for 2-3 days, and now just diarrhea 3-5 episodes per day. Had stopped Euceris right around 1 week of when her diarrhea onset was. Does feel like she is getting slowly better. Saw DAGOBERTO COLÓN 12/4/2017 for ulcerative colitis. Currently on Lialda, considering med change.     3. Would like to [facility] for physical therapy for left hip. Fell in laundry room Sunday monring, and could not get up for 2 hours. Left hip gave out and she fell.  Knows she needs hip needs replacement, but has been told she needs to lose 80 lbs to be considered.     Plans to go part time in March 2018.     Needs gabapentin refill.     Calling therapist for chronic illness.     PMH, MEDICATIONS, ALLERGIES, SOCIAL AND FAMILY HISTORY in [facility] and reviewed by me personally.      ROS negative other than the symptoms noted above in the HPI.        Examination   /90 (BP Location: Right arm, Patient Position: Chair, Cuff Size: Adult Large)  Pulse 78  Temp 98.7  F (37.1  C) (Oral)  SpO2 97%  Breastfeeding? No       Constitutional: Sitting comfortably, in no acute distress. Vital signs noted  Cardiovascular:  regular rate and rhythm, no murmurs, clicks, or gallops  Respiratory:  normal respiratory rate and rhythm, lungs clear to auscultation  SKIN: No jaundice/pallor/rash.   Psychiatric: mentation appears normal and affect normal/bright        A/P    ICD-10-CM    1. History of pulmonary embolism Z86.711  PROTHROMBIN TIME/INR     VENOUS COLLECTION   2. Viral gastroenteritis A08.4    3. 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 Hip pain, left M25.552 PHYSICAL THERAPY REFERRAL   5. Spinal stenosis in cervical region M48.02 gabapentin (NEURONTIN) 300 MG capsule       DISCUSSION:  1. INR today borderline at 2.1. Will have her take 5 mg daily 5 days per week, and 2.5 mg daily M & F.     2. Completed [facility] paperwork to return to work Monday after continuous absence as well as episodic absences for flares. If her symptoms do not continue to improve she should return in 1 week to do stool cultures.     3.  Will write referral to [facility] for left hip PT.     4. Refilled gabapentin. Continue to decrease as tolerated.     follow up visit: 1 month, recheck CMP, A1c.    Hyun Braden PA-C  [facility]

## 2018-01-18 NOTE — NURSING NOTE
Bere is here for INR and FMLA paperwork    Pre-visit Screening:  Immunizations:  up to date  Colonoscopy:  is up to date  Mammogram: is up to date  Asthma Action Test/Plan:  ARGENIS  PHQ9:  NA  GAD7:  NA  Questioned patient about current smoking habits Pt. quit smoking some time ago. Does do vapor  Ok to leave detailed message on voice mail for today's visit only Yes, phone # [phone]

## 2018-01-18 NOTE — MR AVS SNAPSHOT
After Visit Summary   1/18/2018    Bere Machuca    MRN: [id number]           Patient Information     Date Of Birth          1955        Visit Information        Provider Department      1/18/2018 9:00 AM Kenia Simmons [facility]        Care Instructions    Recommendations from today's MTM visit                                                      1. [facility] (therapist) [phone]    2. Set up referral for nutritionist     3. Start atorvastatin 40 mg at bedtime    4. Recheck A1C in February     5. Set up referral for physical therapy    Next MTM visit: 1 month (scheduled with INR check)    To schedule another MTM appointment, please call the clinic directly or you may call the MTM scheduling line at [phone] or toll-free at [phone].     My MTM Pharmacist's contact information:                                                      It was a pleasure seeing you today!  Please feel free to contact me with any questions or concerns you have.      Kenia Simmons PharmD, [facility]  [phone]  Medication Therapy Management Pharmacist                    Follow-ups after your visit        Your next 10 appointments already scheduled     Jan 18, 2018 10:00 AM CST   [facility] Office Visit with Hyun Braden PA-C   [facility] ([facility])    [address]  [address]  [facility] [address] [phone]              Who to contact     If you have questions or need follow up information about today's clinic visit or your schedule please contact [facility] M[facility] directly at [phone].  Normal or non-critical lab and imaging results will be communicated to you by MyChart, letter or phone within 4 business days after the clinic has received the results. If you do not hear from us within 7 days, please contact the clinic through MyChart or phone. If you have a critical or abnormal lab  result, we will notify you by phone as soon as possible.  Submit refill requests through [employer] or call your pharmacy and they will forward the refill request to us. Please allow 3 business days for your refill to be completed.          Additional Information About Your Visit        [facility] Information     [employer] gives you secure access to your electronic health record. If you see a primary care provider, you can also send messages to your care team and make appointments. If you have questions, please call your primary care clinic.  If you do not have a primary care provider, please call [phone] and they will assist you.        Care EveryWhere ID     This is your Care EveryWhere ID. This could be used by other organizations to access your [place] medical records  [id number]         Blood Pressure from Last 3 Encounters:   12/28/17 (!) 136/94   11/25/17 155/79   11/20/17 120/80    Weight from Last 3 Encounters:   12/28/17 (!) 385 lb (174.6 kg)   11/25/17 (!) 385 lb (174.6 kg)   11/20/17 (!) 390 lb 12.8 oz (177.3 kg)              Today, you had the following     No orders found for display       Primary Care Provider Office Phone # Fax #    Wilma Armenta [phone]       [facility] [address]  [address] [place] [address]        Equal Access to Services     ALUREN WAGNER : Hadii aad ku hadasho Soomaali, waaxda luqadaha, qaybta kaalmada adeegyada, aman chavez . So [facility] [phone].    ATENCIÓN: Si habla español, tiene a norman disposición servicios gratuitos de asistencia lingüística. Christian al [phone].    We comply with applicable federal civil rights laws and [place] laws. We do not discriminate on the basis of race, color, national origin, age, disability, sex, sexual orientation, or gender identity.            Thank you!     Thank you for choosing [facility]  for your care. Our goal is always to provide you with excellent care.  Hearing back from our patients is one way we can continue to improve our services. Please take a few minutes to complete the written survey that you may receive in the mail after your visit with us. Thank you!             Your Updated Medication List - Protect others around you: Learn how to safely use, store and throw away your medicines at www.disposemymeds.org.          This list is accurate as of: 1/18/18  9:41 AM.  Always use your most recent med list.                   Brand Name Dispense Instructions for use Diagnosis    * albuterol (2.5 MG/3ML) 0.083% neb solution      Take 3 mLs by nebulization every 6 hours as needed for shortness of breath / dyspnea. Dr ANDREWS [facility]    Chronic airway obstruction, not elsewhere classified, Personal history of tobacco use, presenting hazards to health       * PROAIR  (90 BASE) MCG/ACT Inhaler   Generic drug:  albuterol      Inhale 1-2 puffs into the lungs as needed for shortness of breath / dyspnea. DR ANDREWS [facility]    Chronic airway obstruction, not elsewhere classified, Personal history of tobacco use, presenting hazards to health       blood glucose lancets standard    no brand specified    100 each    Use to test blood sugar 2-3 times daily or as directed.    Type 2 diabetes mellitus without complication, without long-term current use of insulin (H)       blood glucose monitoring test strip    ISAAC CONTOUR NEXT    100 strip    Use to test blood sugar 2-3 times daily or as directed.    Type 2 diabetes mellitus without complication, without long-term current use of insulin (H)       cholecalciferol 1000 UNITS Tabs      Take 1,000 Units by mouth At Bedtime    Morbid obesity due to excess calories (H)       citalopram 40 MG tablet    celeXA    90 tablet    Take 1 tablet (40 mg) by mouth daily    Major depressive disorder, recurrent episode, moderate (H)       cyclobenzaprine 5 MG tablet    FLEXERIL    30 tablet    TAKE 1 TABLET BY MOUTH AS  NEEDED FOR MUSCLE SPASM    Displacement of lumbar intervertebral disc without myelopathy, Spinal stenosis in cervical region       fluticasone-vilanterol 200-25 MCG/INH oral inhaler    BREO ELLIPTA    3 Inhaler    Inhale 1 puff into the lungs daily    Chronic bronchitis, unspecified chronic bronchitis type (H)       gabapentin 300 MG capsule    NEURONTIN     Take 600 mg by mouth 2 times daily        hydrOXYzine 25 MG tablet    ATARAX    90 tablet    Take 1 tablet (25 mg) by mouth At Bedtime    Intrinsic eczema       losartan 50 MG tablet    COZAAR    90 tablet    Take 1 tablet (50 mg) by mouth daily    Benign essential hypertension       mesalamine 1.2 G EC tablet    LIALDA          metFORMIN 500 MG 24 hr tablet    GLUCOPHAGE-XR    60 tablet    Take 2 tablets (1,000 mg) by mouth daily (with dinner)    Type 2 diabetes mellitus without complication, without long-term current use of insulin (H)       MICROLET LANCETS Misc      TEST 2-3 TIMES DAILY OR AS DIRECTED        Multi-vitamin Tabs tablet      Take 1 tablet by mouth daily        PROBIOTIC ACIDOPHILUS BIOBEADS Caps      Take 1 capsule by mouth At Bedtime    Myalgia and myositis, unspecified       SPIRIVA HANDIHALER 18 MCG capsule   Generic drug:  tiotropium     30 capsule    Inhale contents of one capsule daily.        SUPER B COMPLEX PO      Take 1 tablet by mouth daily        UCERIS 9 MG 24 hr tablet   Generic drug:  budesonide      Take 9 mg by mouth every morning    Ulcerative pancolitis with rectal bleeding (H)       warfarin 5 MG tablet    COUMADIN    30 tablet    Take 5 mg daily 5 days per week. On Mondays take 1/2 pill. No warfarin on Friday    History of pulmonary embolism       * Notice:  This list has 2 medication(s) that are the same as other medications prescribed for you. Read the directions carefully, and ask your doctor or other care provider to review them with you.

## 2018-01-18 NOTE — PROGRESS NOTES
SUBJECTIVE/OBJECTIVE:                Bere Machuca is a 62 year old female coming in for a follow-up visit for Medication Therapy Management.  She was referred to me from Hyun ONTIVEROS.     Chief Complaint: First visit of the year. Seeing Hyun after MTM today.  Tobacco: History of tobacco dependence - quit 2014  Alcohol: 1-3 beverages / week    Medication Adherence: no issues reported    Diabetes:  Pt was taking metformin  mg BID but had to stop. Pt was experiencing the following side effects: diarrhea (see colitis below)  Pt reports having discussed alternative medications such as Januvia with a coworker in a similar situation.  SMBG: never. Would like to avoid this for now.  New Dx as of Nov 2017- pt had been taking oral steroids for some time before this a1c reading.  Patient is not experiencing hypoglycemia  Recent symptoms of high blood sugar? none  Eye exam: due  Foot exam: due  Microalbumin is < 30 mg/g. Pt is taking an ACEi/ARB.  Aspirin: Not taking due to Factor V Leiden and warfarin use  Diet/Exercise: Pt expressed interest in losing weight and getting GI issues under control.  She plans to pursue PT and/or exercise regimen to get in better shape/improve strength.    Hypertension: Current medications include losartan 50 mg daily.  Patient does not self-monitor BP.  Patient reports no current medication side effects.    Hyperlipidemia: Current therapy includes no current medications.  Discussed use of atorvastatin due to risk factors and went over the potential side effects and concerns.      The 10-year ASCVD risk score ([place] CLARK Jr, et al., 2013) is: 8.8%    Values used to calculate the score:      Age: 62 years      Sex: Female      Is Non- : No      Diabetic: Yes      Tobacco smoker: No      Systolic Blood Pressure: 132 mmHg      Is BP treated: Yes      HDL Cholesterol: 86 mg/dL      Total Cholesterol: 290 mg/dL    Factor V Leiden/SVC syndrome: Currently  taking 2.5 mg of warfarin on Mon and Fri and 5 mg the other days of the week.  Last INR was 2.1 on 1/18/18.  Does have bruising, but no bleeding at this time.     COPD: Currently using albuterol inhaler and nebulizer as needed and breo ellipta and spiriva daily. Doing okay on current regimen. No side effects reported.     Ulcerative colitis: Currently taking lialda 1200 mg four times daily.  Was taking Uceris (budesonide 9mg PO) but stopped 2 weeks ago per GI instructions. Pt reports no signs of blood but difficulty leaving the home due to fecal urgency.  She has discussed 6MP with specialists but is not currently planning to start this therapy.  She plans to watch her GI aggravation over the next few weeks as she believes a viral infection may have led to recent exacerbation of symptoms in addition to trial of starting metformin which did aggravate her symptoms.  Pt reports that she is a member of crohns/colitis support group which is very helpful to her.  Diet consists of eating a lot of carbohydrates since little else is effective in calming UC and GI flare.      Supplements: Currently taking B complex daily, vitamin D daily, multivitamin daily, and probiotic daily.  No concerns expressed with these.    Depression:  Current medications include: Citalopram 40 mg once daily. Pt reports that depression symptoms are stable.  No side effects reported.  Discussed therapy and health assistance for coming up with plans for weight loss and balancing UC issues.    PHQ-9 SCORE 11/12/2015 7/11/2016 9/11/2017   Total Score - - -   Total Score 4 6 11     Nerve pain/muscle pain: Current therapy includes gabapentin cut down from four 300 mg tablets to 3 per day, taking 1 am and 2HS.  Cyclobenzaprine 5 mg tablet being used maybe once daily.  This is more frequent than in the past but patient reports no side effects.      Obesity: Pt has previously worked with [facility] and they were focused on med management for weight  loss which was not something she was interested in given her hx of drug use. She is currently considering [facility] for PT and weight loss.  Ultimately would like to lose weight (~80 lbs) prior to scheduling a hip surgery.      Hives: Pt reports taking hydroxyzine 25 mg every night for chronic hives.  She has no concerns with this medication.    GERD: Pt is not currently on any rx for this and manages reflux with apple cider vinegar.  Doing well with no concerns at this time.      Current labs include:  BP Readings from Last 3 Encounters:   01/18/18 132/90   12/28/17 (!) 136/94   11/25/17 155/79     Today's Vitals: There were no vitals taken for this visit.  Lab Results   Component Value Date    A1C 7.4 11/20/2017   .  Lab Results   Component Value Date    CHOL 290 11/20/2017     Lab Results   Component Value Date    TRIG 162 11/20/2017     Lab Results   Component Value Date    HDL 86 11/20/2017     Lab Results   Component Value Date     11/20/2017       Liver Function Studies -   Recent Labs   Lab Test  11/20/17   1458   PROTTOTAL  6.2   ALBUMIN  3.9   BILITOTAL  0.4   ALKPHOS  50   AST  17   ALT  53*       No results found for: UCRR, MICROL, UMALCR    Last Basic Metabolic Panel:  Lab Results   Component Value Date     11/25/2017      Lab Results   Component Value Date    POTASSIUM 3.5 11/25/2017     Lab Results   Component Value Date    CHLORIDE 106 11/25/2017     Lab Results   Component Value Date    BUN 24 11/25/2017     Lab Results   Component Value Date    CR 0.64 11/25/2017     GFR Estimate   Date Value Ref Range Status   11/25/2017 >90 >60 mL/min/1.7m2 Final     Comment:     Non  GFR Calc   11/20/2017 96 > OR = 60 mL/min/1.73m2 Final   06/30/2017 77 > OR = 60 mL/min/1.73m2 Final     GFR Estimate If Black   Date Value Ref Range Status   11/25/2017 >90 >60 mL/min/1.7m2 Final     Comment:      GFR Calc   06/23/2017 >90   GFR Calc   >60  mL/min/1.7m2 Final   06/22/2017 89 >60 mL/min/1.7m2 Final     Comment:      GFR Calc     TSH   Date Value Ref Range Status   10/24/2016 2.85 mcU/mL Final   ]    Most Recent Immunizations   Administered Date(s) Administered     Influenza (H1N1) 11/30/2009     Influenza (IIV3) PF 08/17/2012     Influenza Vaccine IM 3yrs+ 4 Valent IIV4 09/11/2017     Pneumococcal 23 valent 12/01/2003     TDAP Vaccine (Boostrix) 05/21/2010       ASSESSMENT:              Current medications were reviewed today as discussed above.      Medication Adherence: no issues identified    Diabetes: Needs improvement - Last A1C was 7.4 on 11/20/17 which is above the goal of <7.  She was taking metformin but had to discontinue due to GI related side effects.  Pt is not monitoring BG regularly.  Alternative therapies were discussed but current thought is to better manage DM through lifestyle changes and diet at this time, also will be off oral steroid which will likely help to lower sugars.  Pt is willing and motivated to pursue weight loss and start an exercise regimen. Recommend CDE visit to discuss diet efforts.    Hypertension: Stable - BP readings have been largely at goal of <140/90 since starting losartan 50 mg daily with only departure being 12/28/17 when diastolic was 94.  Will continue to monitor periodically and consider an increase to dose at next visit if still above goal.    Hyperlipidemia: Additional therapy needed - Current ASCVD risk is 8.8%.  Per ACC/AHA guidelines, a high intensity statin is warranted for diabetic patients with an ASCVD risk >7.5% within the next 10 years.  The benefits as well as risks of atorvastatin were discussed with the patient.    Factor V Leiden/SVC syndrome: Stable - INR continues to fall within therapeutic range of 2-3 on current warfarin regimen.  No signs of unusual bleeding and no other concerns at this time.      COPD: Controlled - Pt is well controlled and compliant with current  medications.  There are no concerns at this time.      Ulcerative colitis: Improving - Pt continues on lialda and has completed her course of Uceris per GI specialist.  The recent exacerbation of diarrhea and GI discomfort may be related to a viral infection that the patient is recovering from.  Pt will continue to monitor symptoms for improvement and contact us with any concerns over the next few weeks.    Supplements: Stable.     Depression: Stable - Recommended setting up appointment with therapist regarding management of weight and chronic illness.    Nerve pain/muscle pain: Stable.    Obesity: Needs improvement - Pt set a goal of losing 80 lbs and plans to pursue therapy and nutrition education assistance to meet her goal.  Resources have been discussed with the patient and she is receptive.      Hives: Controlled.    GERD: Controlled.     PLAN:                  1. Provide phone number for CANDIDA (therapist) [phone]    2. Referral for CDE placed- focusing on management of DM diet with UC diet.     3. Start atorvastatin 40 mg by mouth once at bedtime    4. Recheck A1C in February     5. PCP to consider physical therapy referral    I spent 50 minutes with this patient today.  All changes were made via collaborative practice agreement with Wilma Armenta.  A copy of the visit note was provided to the patient's primary care provider.     Will follow up in 1 month.    The patient was given a summary of these recommendations as an after visit summary.    Karsten John, 2018 PharmD Candidate    Kenia Simmons, Pharm.D, BCACP  Medication Therapy Management Pharmacist  [phone]

## 2018-01-18 NOTE — MR AVS SNAPSHOT
After Visit Summary   1/18/2018    Bere Machuca    MRN: [id number]           Patient Information     Date Of Birth          1955        Visit Information        Provider Department      1/18/2018 10:00 AM Hyun Braden PA-C [facility]        Today's Diagnoses     History of pulmonary embolism    -  1    Viral gastroenteritis        Ulcerative rectosigmoiditis with complication (H)        Hip pain, left        Spinal stenosis in cervical region           Follow-ups after your visit        Additional Services     PHYSICAL THERAPY REFERRAL       Patient going to [facility]. Please fax. Pt will call to schedule.                  Follow-up notes from your care team     Return in about 1 month (around 2/18/2018) for Lab Work, Routine Visit.      Who to contact     If you have questions or need follow up information about today's clinic visit or your schedule please contact [facility] directly at [phone].  Normal or non-critical lab and imaging results will be communicated to you by CapsoVisionhart, letter or phone within 4 business days after the clinic has received the results. If you do not hear from us within 7 days, please contact the clinic through CapsoVisionhart or phone. If you have a critical or abnormal lab result, we will notify you by phone as soon as possible.  Submit refill requests through tapviva or call your pharmacy and they will forward the refill request to us. Please allow 3 business days for your refill to be completed.          Additional Information About Your Visit        CapsoVisionhart Information     tapviva gives you secure access to your electronic health record. If you see a primary care provider, you can also send messages to your care team and make appointments. If you have questions, please call your primary care clinic.  If you do not have a primary care provider, please call [phone] and they will assist you.         Care EveryWhere ID     This is your Care EveryWhere ID. This could be used by other organizations to access your [facility] medical records  [id number]        Your Vitals Were     Pulse Temperature Pulse Oximetry Breastfeeding?          78 98.7  F (37.1  C) (Oral) 97% No         Blood Pressure from Last 3 Encounters:   01/18/18 132/90   12/28/17 (!) 136/94   11/25/17 155/79    Weight from Last 3 Encounters:   12/28/17 (!) 174.6 kg (385 lb)   11/25/17 (!) 174.6 kg (385 lb)   11/20/17 (!) 177.3 kg (390 lb 12.8 oz)              We Performed the Following     PHYSICAL THERAPY REFERRAL     PROTHROMBIN TIME/INR     VENOUS COLLECTION          Today's Medication Changes          These changes are accurate as of: 1/18/18 11:59 PM.  If you have any questions, ask your nurse or doctor.               Start taking these medicines.        Dose/Directions    atorvastatin 40 MG tablet   Commonly known as:  LIPITOR   Used for:  Type 2 diabetes mellitus without complication, without long-term current use of insulin (H), Hyperlipidemia LDL goal <100   Started by:  Kenia Simmons RPH        Dose:  40 mg   Take 1 tablet (40 mg) by mouth daily   Quantity:  90 tablet   Refills:  1       gabapentin 300 MG capsule   Commonly known as:  NEURONTIN   Used for:  Spinal stenosis in cervical region   Started by:  Hyun Braden PA-C        Take 300mg daily in the morning and 600mg at night   Quantity:  270 capsule   Refills:  1            Where to get your medicines      These medications were sent to [place] [id number] [place] [address] [place] [address] [address] [facility] [address]     Phone:  [phone]     atorvastatin 40 MG tablet    gabapentin 300 MG capsule                Primary Care Provider Office Phone # Fax #    Wilma Armenta [phone]       [address] [address]        Equal Access to Services     LAUREN WAGNER AH: Kadeem Thompson  wajonyda lujordanadaha, qaybta karoelda ervin, aman pinoaan ah. So [facility] [phone].    ATENCIÓN: Si ruchila frances, tiene a norman disposición servicios gratuitos de asistencia lingüística. Llame al [phone].    We comply with applicable federal civil rights laws and [place] laws. We do not discriminate on the basis of race, color, national origin, age, disability, sex, sexual orientation, or gender identity.            Thank you!     Thank you for choosing [facility]  for your care. Our goal is always to provide you with excellent care. Hearing back from our patients is one way we can continue to improve our services. Please take a few minutes to complete the written survey that you may receive in the mail after your visit with us. Thank you!             Your Updated Medication List - Protect others around you: Learn how to safely use, store and throw away your medicines at www.disposemymeds.org.          This list is accurate as of: 1/18/18 11:59 PM.  Always use your most recent med list.                   Brand Name Dispense Instructions for use Diagnosis    * albuterol (2.5 MG/3ML) 0.083% neb solution      Take 3 mLs by nebulization every 6 hours as needed for shortness of breath / dyspnea. Dr [facility]    Chronic airway obstruction, not elsewhere classified, Personal history of tobacco use, presenting hazards to health       * PROAIR  (90 BASE) MCG/ACT Inhaler   Generic drug:  albuterol      Inhale 1-2 puffs into the lungs as needed for shortness of breath / dyspnea. DR [facility]    Chronic airway obstruction, not elsewhere classified, Personal history of tobacco use, presenting hazards to health       atorvastatin 40 MG tablet    LIPITOR    90 tablet    Take 1 tablet (40 mg) by mouth daily    Type 2 diabetes mellitus without complication, without long-term current use of insulin (H), Hyperlipidemia LDL goal <100        blood glucose lancets standard    no brand specified    100 each    Use to test blood sugar 2-3 times daily or as directed.    Type 2 diabetes mellitus without complication, without long-term current use of insulin (H)       blood glucose monitoring test strip    ISAAC CONTOUR NEXT    100 strip    Use to test blood sugar 2-3 times daily or as directed.    Type 2 diabetes mellitus without complication, without long-term current use of insulin (H)       cholecalciferol 1000 UNITS Tabs      Take 1,000 Units by mouth At Bedtime    Morbid obesity due to excess calories (H)       citalopram 40 MG tablet    celeXA    90 tablet    Take 1 tablet (40 mg) by mouth daily    Major depressive disorder, recurrent episode, moderate (H)       cyclobenzaprine 5 MG tablet    FLEXERIL    30 tablet    TAKE 1 TABLET BY MOUTH AS NEEDED FOR MUSCLE SPASM    Displacement of lumbar intervertebral disc without myelopathy, Spinal stenosis in cervical region       fluticasone-vilanterol 200-25 MCG/INH oral inhaler    BREO ELLIPTA    3 Inhaler    Inhale 1 puff into the lungs daily    Chronic bronchitis, unspecified chronic bronchitis type (H)       gabapentin 300 MG capsule    NEURONTIN    270 capsule    Take 300mg daily in the morning and 600mg at night    Spinal stenosis in cervical region       hydrOXYzine 25 MG tablet    ATARAX    90 tablet    Take 1 tablet (25 mg) by mouth At Bedtime    Intrinsic eczema       losartan 50 MG tablet    COZAAR    90 tablet    Take 1 tablet (50 mg) by mouth daily    Benign essential hypertension       mesalamine 1.2 G EC tablet    LIALDA     Take 1,200 mg by mouth 4 times daily        metFORMIN 500 MG 24 hr tablet    GLUCOPHAGE-XR    60 tablet    Take 2 tablets (1,000 mg) by mouth daily (with dinner)    Type 2 diabetes mellitus without complication, without long-term current use of insulin (H)       MICROLET LANCETS Misc      TEST 2-3 TIMES DAILY OR AS DIRECTED        Multi-vitamin Tabs tablet      Take 1 tablet  by mouth daily        PROBIOTIC ACIDOPHILUS BIOBEADS Caps      Take 1 capsule by mouth At Bedtime    Myalgia and myositis, unspecified       SPIRIVA HANDIHALER 18 MCG capsule   Generic drug:  tiotropium     30 capsule    Inhale contents of one capsule daily.        SUPER B COMPLEX PO      Take 1 tablet by mouth daily        * Notice:  This list has 2 medication(s) that are the same as other medications prescribed for you. Read the directions carefully, and ask your doctor or other care provider to review them with you.

## 2018-01-18 NOTE — PATIENT INSTRUCTIONS
Recommendations from today's MTM visit                                                      1. BHP (therapist) [phone]    2. Set up referral for nutritionist     3. Start atorvastatin 40 mg at bedtime    4. Recheck A1C in February 5. Set up referral for physical therapy    Next MTM visit: 1 month (scheduled with INR check)    To schedule another MTM appointment, please call the clinic directly or you may call the MTM scheduling line at [phone] or toll-free at [phone].     My MTM Pharmacist's contact information:                                                      It was a pleasure seeing you today!  Please feel free to contact me with any questions or concerns you have.      Kenia Simmons PharmD, BCACP  [phone]  Medication Therapy Management Pharmacist

## 2018-01-19 PROBLEM — K51.30 ULCERATIVE RECTOSIGMOIDITIS (H): Status: ACTIVE | Noted: 2018-01-19

## 2018-01-20 DIAGNOSIS — Z86.711 HISTORY OF PULMONARY EMBOLISM: ICD-10-CM

## 2018-01-20 RX ORDER — WARFARIN SODIUM 5 MG/1
TABLET ORAL
Qty: 90 TABLET | Refills: 0 | Status: SHIPPED | OUTPATIENT
Start: 2018-01-20 | End: 2018-04-25

## 2018-01-20 NOTE — TELEPHONE ENCOUNTER
Bere Machuca is requesting a refill of:    Pending Prescriptions:                       Disp   Refills    warfarin (COUMADIN) 5 MG tablet           30 tab*1            Sig: Take 5 mg daily 5 days per week. Mondays take           1/2. Friday off.    Please close encounter if RX was sent. Thanks, Leah

## 2018-01-30 DIAGNOSIS — E11.9 TYPE 2 DIABETES MELLITUS WITHOUT COMPLICATION, WITHOUT LONG-TERM CURRENT USE OF INSULIN (H): ICD-10-CM

## 2018-01-30 NOTE — TELEPHONE ENCOUNTER
Darline is requesting a refill of the following       Pending Prescriptions:                       Disp   Refills    blood glucose monitoring (ISAAC CONTOUR N*100 st*3            Sig: Use to test blood sugar 2-3 times daily or as           directed.      Pt has been in clinic to see Maranda 1-18-18    Please advise

## 2018-02-16 ENCOUNTER — TRANSFERRED RECORDS (OUTPATIENT)
Dept: FAMILY MEDICINE | Facility: CLINIC | Age: 63
End: 2018-02-16

## 2018-02-20 ENCOUNTER — TRANSFERRED RECORDS (OUTPATIENT)
Dept: FAMILY MEDICINE | Facility: CLINIC | Age: 63
End: 2018-02-20

## 2018-02-20 DIAGNOSIS — I10 BENIGN ESSENTIAL HYPERTENSION: ICD-10-CM

## 2018-02-20 RX ORDER — LOSARTAN POTASSIUM 50 MG/1
50 TABLET ORAL DAILY
Qty: 90 TABLET | Refills: 0 | Status: SHIPPED | OUTPATIENT
Start: 2018-02-20 | End: 2018-03-29

## 2018-02-20 NOTE — TELEPHONE ENCOUNTER
Darline is requesting a refill of the following    Pending Prescriptions:                       Disp   Refills    losartan (COZAAR) 50 MG tablet            90 tab*0            Sig: Take 1 tablet (50 mg) by mouth daily    Pt was last in 1-18-18, but received this medication 11-20-17 and in the notes there isn't a mention of when she should return to the clinic in regards to her hypertension.    Please advise,    Thank you,    Felicia

## 2018-02-20 NOTE — TELEPHONE ENCOUNTER
Refilled 90 days. Followed by Hyun.  Please let the patient know that a 90 day refill has been sent for their prescription.  They are due for a return fasting office visit within 90 days.  Failure to schedule an appointment may result in no further refills of his/her medication.

## 2018-03-08 ENCOUNTER — TELEPHONE (OUTPATIENT)
Dept: FAMILY MEDICINE | Facility: CLINIC | Age: 63
End: 2018-03-08

## 2018-03-08 NOTE — TELEPHONE ENCOUNTER
We received some plan of care orders from [facility] physical therapy for Darline that were sent to Hyun for a signature. They have been signed and faxed back to [phone] and is to be scanned into chart.

## 2018-03-26 DIAGNOSIS — J42 CHRONIC BRONCHITIS, UNSPECIFIED CHRONIC BRONCHITIS TYPE (H): ICD-10-CM

## 2018-03-26 NOTE — TELEPHONE ENCOUNTER
Bere Machuca is requesting a refill of:    Pending Prescriptions:                       Disp   Refills    fluticasone-vilanterol (BREO ELLIPTA) 200*3 Inha*0            Sig: Inhale 1 puff into the lungs daily    Pt has OV on 3/29/18

## 2018-03-29 ENCOUNTER — OFFICE VISIT (OUTPATIENT)
Dept: FAMILY MEDICINE | Facility: CLINIC | Age: 63
End: 2018-03-29

## 2018-03-29 VITALS
TEMPERATURE: 97.9 F | WEIGHT: 293 LBS | HEART RATE: 88 BPM | DIASTOLIC BLOOD PRESSURE: 78 MMHG | BODY MASS INDEX: 62.46 KG/M2 | OXYGEN SATURATION: 95 % | SYSTOLIC BLOOD PRESSURE: 136 MMHG

## 2018-03-29 DIAGNOSIS — D68.51 FACTOR 5 LEIDEN MUTATION, HETEROZYGOUS (H): ICD-10-CM

## 2018-03-29 DIAGNOSIS — E66.01 MORBID OBESITY DUE TO EXCESS CALORIES (H): ICD-10-CM

## 2018-03-29 DIAGNOSIS — F33.1 MAJOR DEPRESSIVE DISORDER, RECURRENT EPISODE, MODERATE (H): ICD-10-CM

## 2018-03-29 DIAGNOSIS — I10 BENIGN ESSENTIAL HYPERTENSION: ICD-10-CM

## 2018-03-29 DIAGNOSIS — J42 CHRONIC BRONCHITIS, UNSPECIFIED CHRONIC BRONCHITIS TYPE (H): ICD-10-CM

## 2018-03-29 DIAGNOSIS — K51.319 ULCERATIVE RECTOSIGMOIDITIS WITH COMPLICATION (H): ICD-10-CM

## 2018-03-29 DIAGNOSIS — E11.9 TYPE 2 DIABETES MELLITUS WITHOUT COMPLICATION, WITHOUT LONG-TERM CURRENT USE OF INSULIN (H): Primary | ICD-10-CM

## 2018-03-29 DIAGNOSIS — E78.2 MIXED HYPERLIPIDEMIA: ICD-10-CM

## 2018-03-29 LAB
HBA1C MFR BLD: 7 % (ref 4–7)
INR POINT OF CARE: 1.6 (ref 2–3)

## 2018-03-29 PROCEDURE — 85610 PROTHROMBIN TIME: CPT | Performed by: PHYSICIAN ASSISTANT

## 2018-03-29 PROCEDURE — 80053 COMPREHEN METABOLIC PANEL: CPT | Mod: 90 | Performed by: PHYSICIAN ASSISTANT

## 2018-03-29 PROCEDURE — 36415 COLL VENOUS BLD VENIPUNCTURE: CPT | Performed by: PHYSICIAN ASSISTANT

## 2018-03-29 PROCEDURE — 99214 OFFICE O/P EST MOD 30 MIN: CPT | Performed by: PHYSICIAN ASSISTANT

## 2018-03-29 PROCEDURE — 83036 HEMOGLOBIN GLYCOSYLATED A1C: CPT | Performed by: PHYSICIAN ASSISTANT

## 2018-03-29 PROCEDURE — 80061 LIPID PANEL: CPT | Mod: 90 | Performed by: PHYSICIAN ASSISTANT

## 2018-03-29 RX ORDER — TRAMADOL HYDROCHLORIDE 50 MG/1
TABLET ORAL
Refills: 0 | COMMUNITY
Start: 2018-02-02 | End: 2018-08-13

## 2018-03-29 RX ORDER — DULOXETIN HYDROCHLORIDE 30 MG/1
30 CAPSULE, DELAYED RELEASE ORAL 2 TIMES DAILY
Qty: 60 CAPSULE | Refills: 1 | Status: SHIPPED | OUTPATIENT
Start: 2018-03-29 | End: 2018-05-04

## 2018-03-29 RX ORDER — CYCLOBENZAPRINE HCL 10 MG
TABLET ORAL
Refills: 3 | COMMUNITY
Start: 2018-02-26 | End: 2019-04-12

## 2018-03-29 RX ORDER — LOSARTAN POTASSIUM 50 MG/1
50 TABLET ORAL DAILY
Qty: 90 TABLET | Refills: 0 | Status: SHIPPED | OUTPATIENT
Start: 2018-03-29 | End: 2018-06-27

## 2018-03-29 RX ORDER — LACTOBACIL 2/BIFIDO 1/S.THERMO 450B CELL
1 PACKET (EA) ORAL DAILY
Qty: 90 CAPSULE | Refills: 1 | Status: SHIPPED | OUTPATIENT
Start: 2018-03-29 | End: 2018-06-28

## 2018-03-29 ASSESSMENT — PATIENT HEALTH QUESTIONNAIRE - PHQ9: 5. POOR APPETITE OR OVEREATING: SEVERAL DAYS

## 2018-03-29 ASSESSMENT — ANXIETY QUESTIONNAIRES
IF YOU CHECKED OFF ANY PROBLEMS ON THIS QUESTIONNAIRE, HOW DIFFICULT HAVE THESE PROBLEMS MADE IT FOR YOU TO DO YOUR WORK, TAKE CARE OF THINGS AT HOME, OR GET ALONG WITH OTHER PEOPLE: VERY DIFFICULT
2. NOT BEING ABLE TO STOP OR CONTROL WORRYING: MORE THAN HALF THE DAYS
5. BEING SO RESTLESS THAT IT IS HARD TO SIT STILL: NOT AT ALL
1. FEELING NERVOUS, ANXIOUS, OR ON EDGE: MORE THAN HALF THE DAYS
6. BECOMING EASILY ANNOYED OR IRRITABLE: SEVERAL DAYS
3. WORRYING TOO MUCH ABOUT DIFFERENT THINGS: MORE THAN HALF THE DAYS
GAD7 TOTAL SCORE: 8
7. FEELING AFRAID AS IF SOMETHING AWFUL MIGHT HAPPEN: NOT AT ALL

## 2018-03-29 NOTE — PROGRESS NOTES
CC: Medication Check    History:  1. Type 2 diabetes mellitus without complication, without long-term current use of insulin (H)  Most recently took metformin  mg 2 tablets daily. Does not take anymore due to diarrhea. Had eye exam at [facility] within the past year. Goes to [place] for foot exams.  Most recent A1c was 11/20/2017 and was 7.4%.  - VENOUS COLLECTION  - Hemoglobin A1c    2. Major depressive disorder, recurrent episode, moderate (H)  Has gone to part time at work because she is so low in mood due to UC, COPD, hip/knee pain. Currently taking Celexa 40 mg. Last PHQ-9 was 11 9/2017. She did not want to see therapist at that time, but is currently looking into being seen at [facility]. Has tried sertraline, Prozac in the past. Feels that her low mood is the largest issue in her life right now. No Si/HI.    3. Ulcerative rectosigmoiditis with complication (H)  Still on Lialda. Joined a Crohn/colitis support group. Still lives in fear of having an accident with BMs. Would like to try VSL#3 probiotic for her UC.    4. Morbid obesity due to excess calories (H)    5. Factor 5 Leiden mutation, heterozygous/ INR 2-3  Last INR was 1/18/2018 and was 2.1. At that visit, was slightly bumped up from 5 mg 5 day per week, 2.5 1 day, and nothing on 1 day, to 5 mg 5 days per week, and 2.5 mg 2 days per week. Has been taking these consistently without any missed doses.    6. Chronic bronchitis  Happy with improvement on Breo Ellipta, but still bothered overall that she deals with this. Also using albuterol inhaler and nebulizer as needed, but has not been needing as much.    PMH, MEDICATIONS, ALLERGIES, SOCIAL AND FAMILY HISTORY in [facility] and reviewed by me personally.      ROS negative other than the symptoms noted above in the HPI.        Examination   /78 (BP Location: Right arm, Patient Position: Sitting, Cuff Size: Adult Large)  Pulse 88  Temp 97.9  F (36.6  C) (Oral)  Wt (!) 175.5 kg (387 lb)  SpO2 95%  BMI  62.46 kg/m2       Constitutional: Sitting comfortably, in no acute distress. Vital signs noted  Eyes: pupils equal round reactive to light and accomodation, extra ocular movements intact  Neck:  no adenopathy, trachea midline and normal to palpation  Cardiovascular:  regular rate and rhythm, no murmurs, clicks, or gallops  Respiratory:  normal respiratory rate and rhythm, lungs clear to auscultation  SKIN: No jaundice/pallor/rash.   Psychiatric: mentation appears normal and affect normal/bright  Foot:        A/P    ICD-10-CM    1. Type 2 diabetes mellitus without complication, without long-term current use of insulin (H) E11.9 VENOUS COLLECTION     Hemoglobin A1c     Lipid Profile (QUEST)     Comprehensive metabolic panel     CANCELED: Albumin Random Urine Quantitative with Creat Ratio   2. Major depressive disorder, recurrent episode, moderate (H) F33.1 DULoxetine (CYMBALTA) 30 MG EC capsule   3. Ulcerative rectosigmoiditis with complication (H) K51.319 Probiotic Product (VSL#3) CAPS   4. Morbid obesity due to excess calories (H) E66.01    5. Factor 5 Leiden mutation, heterozygous/ INR 2-3 D68.51 PROTHROMBIN TIME/INR   6. Chronic bronchitis, unspecified chronic bronchitis type (H) J42    7. Mixed hyperlipidemia E78.2 Lipid Profile (QUEST)   8. Benign essential hypertension I10 losartan (COZAAR) 50 MG tablet       DISCUSSION:  1. Type 2 diabetes mellitus without complication, without long-term current use of insulin (H)  A1c today improved to 7.0%. Encouraged further efforts on diet and activity,but this is good improvement given unable to take metformin.   - VENOUS COLLECTION  - Hemoglobin A1c  - Lipid Profile (QUEST)  - Comprehensive metabolic panel    2. Major depressive disorder, recurrent episode, moderate (H)  Given that still having significant symptoms on citalopram full dose, recommended taper switch to duloxetine. Discussed taper with Kenia Simmons, Pharm D. Recommended decrease Celexa to 20 mg daily  for 2 weeks, while taking Cymbalta 30 mg daily. If tolerating after 2 weeks, increase to Cymbalta 60 mg daily, and discontinue Celexa at that time. Will update in 3 weeks, and consider refills if working. Gave enough for 2 months.   - DULoxetine (CYMBALTA) 30 MG EC capsule; Take 1 capsule (30 mg) by mouth 2 times daily  Dispense: 60 capsule; Refill: 1    3. Ulcerative rectosigmoiditis with complication (H)  Did prescribe VSL#3 to further help with UC.   - Probiotic Product (VSL#3) CAPS; Take 1 capsule by mouth daily  Dispense: 90 capsule; Refill: 1    4. Morbid obesity due to excess calories (H)    5. Factor 5 Leiden mutation, heterozygous/ INR 2-3  INR subtherapeutic at 1.6. Increase to 5 mg 6 days per week, and 2.5 mg 1 day per week. Recheck in 1-2 weeks.   - PROTHROMBIN TIME/INR    6. Chronic bronchitis, unspecified chronic bronchitis type (H)  Stable. Will refill inhalers as needed.     7. Mixed hyperlipidemia  Will check fasting labs today given recently started atorvastatin. Tolerating without side effects.   - Lipid Profile (QUEST)    8. Benign essential hypertension  Refilled losartan. Well controlled today.   - losartan (COZAAR) 50 MG tablet; Take 1 tablet (50 mg) by mouth daily  Dispense: 90 tablet; Refill: 0        follow up visit: 3 months    Hyun Braden PA-C  [facility]

## 2018-03-29 NOTE — MR AVS SNAPSHOT
After Visit Summary   3/29/2018    Bere Machuca    MRN: [id number]           Patient Information     Date Of Birth          1955        Visit Information        Provider Department      3/29/2018 2:00 PM Hyun Braden PA-C [facility]        Today's Diagnoses     Type 2 diabetes mellitus without complication, without long-term current use of insulin (H)    -  1    Major depressive disorder, recurrent episode, moderate (H)        Ulcerative rectosigmoiditis with complication (H)        Morbid obesity due to excess calories (H)        Factor 5 Leiden mutation, heterozygous/ INR 2-3        Chronic bronchitis, unspecified chronic bronchitis type (H)        Mixed hyperlipidemia        Benign essential hypertension           Follow-ups after your visit        Follow-up notes from your care team     Return in about 3 months (around 6/29/2018).      Who to contact     If you have questions or need follow up information about today's clinic visit or your schedule please contact [facility] directly at [phone].  Normal or non-critical lab and imaging results will be communicated to you by [employer], letter or phone within 4 business days after the clinic has received the results. If you do not hear from us within 7 days, please contact the clinic through [employer] or phone. If you have a critical or abnormal lab result, we will notify you by phone as soon as possible.  Submit refill requests through [employer] or call your pharmacy and they will forward the refill request to us. Please allow 3 business days for your refill to be completed.          Additional Information About Your Visit        Expert [employer] Information     [employer] gives you secure access to your electronic health record. If you see a primary care provider, you can also send messages to your care team and make appointments. If you have questions, please call your primary care clinic.  If  you do not have a primary care provider, please call [phone] and they will assist you.        Care EveryWhere ID     This is your Care EveryWhere ID. This could be used by other organizations to access your [place] medical records  [id number]        Your Vitals Were     Pulse Temperature Pulse Oximetry BMI (Body Mass Index)          88 97.9  F (36.6  C) (Oral) 95% 62.46 kg/m2         Blood Pressure from Last 3 Encounters:   03/29/18 136/78   01/18/18 132/90   12/28/17 (!) 136/94    Weight from Last 3 Encounters:   03/29/18 (!) 175.5 kg (387 lb)   12/28/17 (!) 174.6 kg (385 lb)   11/25/17 (!) 174.6 kg (385 lb)              We Performed the Following     Comprehensive metabolic panel     Hemoglobin A1c     Lipid Profile (QUEST)     PROTHROMBIN TIME/INR     VENOUS COLLECTION          Today's Medication Changes          These changes are accurate as of 3/29/18 11:59 PM.  If you have any questions, ask your nurse or doctor.               Start taking these medicines.        Dose/Directions    DULoxetine 30 MG EC capsule   Commonly known as:  CYMBALTA   Used for:  Major depressive disorder, recurrent episode, moderate (H)   Started by:  Hyun Braden PA-C        Dose:  30 mg   Take 1 capsule (30 mg) by mouth 2 times daily   Quantity:  60 capsule   Refills:  1         These medicines have changed or have updated prescriptions.        Dose/Directions    * PROBIOTIC ACIDOPHILUS BIOBEADS Caps   This may have changed:  Another medication with the same name was added. Make sure you understand how and when to take each.   Used for:  Myalgia and myositis, unspecified   Changed by:  Hyun Braden PA-C        Dose:  1 capsule   Take 1 capsule by mouth At Bedtime   Refills:  0       * VSL#3 Caps   This may have changed:  You were already taking a medication with the same name, and this prescription was added. Make sure you understand how and when to take each.   Used for:  Ulcerative rectosigmoiditis with  complication (H)   Changed by:  Hyun Braden PA-C        Dose:  1 capsule   Take 1 capsule by mouth daily   Quantity:  90 capsule   Refills:  1       * Notice:  This list has 2 medication(s) that are the same as other medications prescribed for you. Read the directions carefully, and ask your doctor or other care provider to review them with you.         Where to get your medicines      These medications were sent to [place] [facility] [id number] [facility] - [place], [place] - [address] [place] [address] [address] [facility] [address] [facility] [address]     Phone:  [phone]     DULoxetine 30 MG EC capsule    losartan 50 MG tablet    VSL#3 Caps                Primary Care Provider Office Phone # Fax #    Wilma Armenta [phone]       [facility] [address]  [address] [place] [address]        Equal Access to Services     ALEXANDRE [facility] Center[facility]MONE : Hadii aad nishant hadasho Soomaali, waaxda luqadaha, qaybta kaalmada adeegyada, aman chavez . So [facility] [phone].    ATENCIÓN: Si habla español, tiene a norman disposición servicios gratuitos de asistencia lingüística. Llame al [phone].    We comply with applicable federal civil rights laws and [place] laws. We do not discriminate on the basis of race, color, national origin, age, disability, sex, sexual orientation, or gender identity.            Thank you!     Thank you for choosing [facility]  for your care. Our goal is always to provide you with excellent care. Hearing back from our patients is one way we can continue to improve our services. Please take a few minutes to complete the written survey that you may receive in the mail after your visit with us. Thank you!             Your Updated Medication List - Protect others around you: Learn how to safely use, store and throw away your medicines at www.disposemymeds.org.          This list is accurate as of 3/29/18 11:59 PM.  Always use your most recent med list.                    Brand Name Dispense Instructions for use Diagnosis    * albuterol (2.5 MG/3ML) 0.083% neb solution      Take 3 mLs by nebulization every 6 hours as needed for shortness of breath / dyspnea. Dr [facility]    Chronic airway obstruction, not elsewhere classified, Personal history of tobacco use, presenting hazards to health       * PROAIR  (90 BASE) MCG/ACT Inhaler   Generic drug:  albuterol      Inhale 1-2 puffs into the lungs as needed for shortness of breath / dyspnea. DR [facility]RE[facility]    Chronic airway obstruction, not elsewhere classified, Personal history of tobacco use, presenting hazards to health       atorvastatin 40 MG tablet    LIPITOR    90 tablet    Take 1 tablet (40 mg) by mouth daily    Type 2 diabetes mellitus without complication, without long-term current use of insulin (H), Hyperlipidemia LDL goal <100       blood glucose lancets standard    no brand specified    100 each    Use to test blood sugar 2-3 times daily or as directed.    Type 2 diabetes mellitus without complication, without long-term current use of insulin (H)       blood glucose monitoring test strip    ISAAC CONTOUR NEXT    100 strip    Use to test blood sugar 2-3 times daily or as directed.    Type 2 diabetes mellitus without complication, without long-term current use of insulin (H)       cholecalciferol 1000 UNITS Tabs      Take 1,000 Units by mouth At Bedtime    Morbid obesity due to excess calories (H)       * cyclobenzaprine 5 MG tablet    FLEXERIL    30 tablet    TAKE 1 TABLET BY MOUTH AS NEEDED FOR MUSCLE SPASM    Displacement of lumbar intervertebral disc without myelopathy, Spinal stenosis in cervical region       * cyclobenzaprine 10 MG tablet    FLEXERIL     TAKE 1 TABLET 3 TIMES DAILY AS NEEDED.        DULoxetine 30 MG EC capsule    CYMBALTA    60 capsule    Take 1 capsule (30 mg) by mouth 2 times daily    Major depressive disorder, recurrent episode, moderate (H)        fluticasone-vilanterol 200-25 MCG/INH oral inhaler    BREO ELLIPTA    3 Inhaler    Inhale 1 puff into the lungs daily    Chronic bronchitis, unspecified chronic bronchitis type (H)       gabapentin 300 MG capsule    NEURONTIN    270 capsule    Take 300mg daily in the morning and 600mg at night    Spinal stenosis in cervical region       hydrOXYzine 25 MG tablet    ATARAX    90 tablet    Take 1 tablet (25 mg) by mouth At Bedtime    Intrinsic eczema       losartan 50 MG tablet    COZAAR    90 tablet    Take 1 tablet (50 mg) by mouth daily    Benign essential hypertension       mesalamine 1.2 G EC tablet    LIALDA     Take 1,200 mg by mouth 4 times daily        metFORMIN 500 MG 24 hr tablet    GLUCOPHAGE-XR    60 tablet    Take 2 tablets (1,000 mg) by mouth daily (with dinner)    Type 2 diabetes mellitus without complication, without long-term current use of insulin (H)       MICROLET LANCETS Misc      TEST 2-3 TIMES DAILY OR AS DIRECTED        Multi-vitamin Tabs tablet      Take 1 tablet by mouth daily        * PROBIOTIC ACIDOPHILUS BIOBEADS Caps      Take 1 capsule by mouth At Bedtime    Myalgia and myositis, unspecified       * VSL#3 Caps     90 capsule    Take 1 capsule by mouth daily    Ulcerative rectosigmoiditis with complication (H)       SPIRIVA HANDIHALER 18 MCG capsule   Generic drug:  tiotropium     30 capsule    Inhale contents of one capsule daily.        SUPER B COMPLEX PO      Take 1 tablet by mouth daily        traMADol 50 MG tablet    ULTRAM     TAKE 1-2 TABLETS BY MOUTH EVERY 6 HOURS AS NEEDED FOR PAIN        warfarin 5 MG tablet    COUMADIN    90 tablet    Take 5 mg daily 5 days per week. Mondays and Fridays take 1/2 tablet (2.5 mg) daily.    History of pulmonary embolism       * Notice:  This list has 6 medication(s) that are the same as other medications prescribed for you. Read the directions carefully, and ask your doctor or other care provider to review them with you.

## 2018-03-29 NOTE — NURSING NOTE
Bere is here for a1c check and INR.    Pre-visit Screening:  Immunizations:  up to date  Colonoscopy:  is up to date  Mammogram: is up to date  Asthma Action Test/Plan:  NA  PHQ9:  NA  GAD7:  NA  Questioned patient about current smoking habits Pt. quit smoking some time ago.  Ok to leave detailed message on voice mail for today's visit only Yes, phone # [phone]    Valarie Rome CMA

## 2018-03-30 LAB
ALBUMIN SERPL-MCNC: 4 G/DL (ref 3.6–5.1)
ALBUMIN/GLOB SERPL: 1.7 (CALC) (ref 1–2.5)
ALP SERPL-CCNC: 62 U/L (ref 33–130)
ALT SERPL-CCNC: 49 U/L (ref 6–29)
AST SERPL-CCNC: 25 U/L (ref 10–35)
BILIRUB SERPL-MCNC: 0.5 MG/DL (ref 0.2–1.2)
BUN SERPL-MCNC: 14 MG/DL (ref 7–25)
BUN/CREATININE RATIO: ABNORMAL (CALC) (ref 6–22)
CALCIUM SERPL-MCNC: 9.4 MG/DL (ref 8.6–10.4)
CHLORIDE SERPLBLD-SCNC: 103 MMOL/L (ref 98–110)
CHOLEST SERPL-MCNC: 176 MG/DL
CHOLEST/HDLC SERPL: 2.3 (CALC)
CO2 SERPL-SCNC: 27 MMOL/L (ref 20–31)
CREAT SERPL-MCNC: 0.64 MG/DL (ref 0.5–0.99)
EGFR AFRICAN AMERICAN - QUEST: 112 ML/MIN/1.73M2
GFR SERPL CREATININE-BSD FRML MDRD: 97 ML/MIN/1.73M2
GLOBULIN, CALCULATED - QUEST: 2.4 G/DL (CALC) (ref 1.9–3.7)
GLUCOSE - QUEST: 92 MG/DL (ref 65–99)
HDLC SERPL-MCNC: 76 MG/DL
LDLC SERPL CALC-MCNC: 76 MG/DL (CALC)
NONHDLC SERPL-MCNC: 100 MG/DL (CALC)
POTASSIUM SERPL-SCNC: 4 MMOL/L (ref 3.5–5.3)
PROT SERPL-MCNC: 6.4 G/DL (ref 6.1–8.1)
SODIUM SERPL-SCNC: 141 MMOL/L (ref 135–146)
TRIGL SERPL-MCNC: 139 MG/DL

## 2018-03-30 ASSESSMENT — ANXIETY QUESTIONNAIRES: GAD7 TOTAL SCORE: 8

## 2018-03-30 ASSESSMENT — PATIENT HEALTH QUESTIONNAIRE - PHQ9: SUM OF ALL RESPONSES TO PHQ QUESTIONS 1-9: 16

## 2018-03-31 DIAGNOSIS — F33.1 MAJOR DEPRESSIVE DISORDER, RECURRENT EPISODE, MODERATE (H): ICD-10-CM

## 2018-03-31 RX ORDER — CITALOPRAM HYDROBROMIDE 40 MG/1
40 TABLET ORAL DAILY
Qty: 30 TABLET | Refills: 0 | Status: SHIPPED | OUTPATIENT
Start: 2018-03-31 | End: 2018-04-26

## 2018-03-31 NOTE — TELEPHONE ENCOUNTER
Discussed this specifically at recent appt, and Darline said she had plenty of this medication, especially since we are having her switch to a different medication.     Can't tell if this request if from pharmacy or patient. I will send through 30 days in case, but this was probably an auto refill.

## 2018-03-31 NOTE — TELEPHONE ENCOUNTER
Patient just in can you please authorize    Pending Prescriptions:                       Disp   Refills    citalopram (CELEXA) 40 MG tablet          90 tab*1

## 2018-04-20 ENCOUNTER — TELEPHONE (OUTPATIENT)
Dept: PHARMACY | Facility: PHYSICIAN GROUP | Age: 63
End: 2018-04-20

## 2018-04-20 NOTE — TELEPHONE ENCOUNTER
Left message for pt to call back to follow up on medication changes made on 3/29 with Hyun Braden PA-C.     Kenia Simmons, Pharm.D, [facility]  Medication Therapy Management Pharmacist  [phone]

## 2018-04-23 ENCOUNTER — TRANSFERRED RECORDS (OUTPATIENT)
Dept: FAMILY MEDICINE | Facility: CLINIC | Age: 63
End: 2018-04-23

## 2018-04-24 ENCOUNTER — MYC MEDICAL ADVICE (OUTPATIENT)
Dept: FAMILY MEDICINE | Facility: CLINIC | Age: 63
End: 2018-04-24

## 2018-04-24 NOTE — TELEPHONE ENCOUNTER
From: Hyun Braden PA-C  To: Bere Machuca  Sent: 4/24/2018 4:11 PM CDT  Subject: duloxetine?    Mickey Gregorio,    Just wanted to check in and ask how you are doing on the duloxetine? Any improvement? Side effects? I believe Kenia called you and left a message as well, but this may be a more convenient way for you to let us know.    Hyun Braden PA-C  4/24/2018

## 2018-04-25 DIAGNOSIS — Z86.711 HISTORY OF PULMONARY EMBOLISM: ICD-10-CM

## 2018-04-25 NOTE — TELEPHONE ENCOUNTER
Bere Machuca is requesting a refill of:    Pending Prescriptions:                       Disp   Refills    warfarin (COUMADIN) 5 MG tablet           90 tab*0            Sig: Take 5 mg daily 6 days per week. Take1/2 tablet           (2.5 mg) once a week    Please close encounter if RX was sent. Thanks, Leah

## 2018-04-26 RX ORDER — WARFARIN SODIUM 5 MG/1
TABLET ORAL
Qty: 90 TABLET | Refills: 0 | Status: SHIPPED | OUTPATIENT
Start: 2018-04-26 | End: 2018-06-27

## 2018-05-02 ENCOUNTER — MYC MEDICAL ADVICE (OUTPATIENT)
Dept: FAMILY MEDICINE | Facility: CLINIC | Age: 63
End: 2018-05-02

## 2018-05-02 DIAGNOSIS — L03.116 CELLULITIS OF LEFT FOOT: Primary | ICD-10-CM

## 2018-05-03 RX ORDER — CEPHALEXIN 500 MG/1
500 CAPSULE ORAL 3 TIMES DAILY
Qty: 30 CAPSULE | Refills: 0 | Status: SHIPPED | OUTPATIENT
Start: 2018-05-03 | End: 2018-06-28

## 2018-05-03 NOTE — TELEPHONE ENCOUNTER
From: Bere Machuca  To: Hyun Braden PA-C  Sent: 5/2/2018 9:24 PM CDT  Subject: Question about an upcoming visit    Remember in November I had gone to the er at [place] for my foot that turned out to be cellulitis. I was given iv meds and 10 days of an antibiotic. Well my same foot is swollen and a little uncomfortable and I believe it's coming back plus I still have the lump that had formed on the top of that foot that never went away. Should I wait until I see you Friday or if it's worse tomorrow go back to the er ? It maybe was Catrachita I saw after that and not you....

## 2018-05-04 ENCOUNTER — OFFICE VISIT (OUTPATIENT)
Dept: FAMILY MEDICINE | Facility: CLINIC | Age: 63
End: 2018-05-04

## 2018-05-04 VITALS
TEMPERATURE: 98.5 F | DIASTOLIC BLOOD PRESSURE: 82 MMHG | SYSTOLIC BLOOD PRESSURE: 134 MMHG | HEART RATE: 96 BPM | RESPIRATION RATE: 24 BRPM

## 2018-05-04 DIAGNOSIS — D68.51 FACTOR 5 LEIDEN MUTATION, HETEROZYGOUS (H): Primary | ICD-10-CM

## 2018-05-04 DIAGNOSIS — F33.0 MILD EPISODE OF RECURRENT MAJOR DEPRESSIVE DISORDER (H): ICD-10-CM

## 2018-05-04 DIAGNOSIS — M54.41 CHRONIC MIDLINE LOW BACK PAIN WITH RIGHT-SIDED SCIATICA: ICD-10-CM

## 2018-05-04 DIAGNOSIS — G89.29 CHRONIC MIDLINE LOW BACK PAIN WITH RIGHT-SIDED SCIATICA: ICD-10-CM

## 2018-05-04 LAB — INR POINT OF CARE: 1.6 (ref 2–3)

## 2018-05-04 PROCEDURE — 99213 OFFICE O/P EST LOW 20 MIN: CPT | Performed by: PHYSICIAN ASSISTANT

## 2018-05-04 PROCEDURE — 85610 PROTHROMBIN TIME: CPT | Performed by: PHYSICIAN ASSISTANT

## 2018-05-04 PROCEDURE — 36415 COLL VENOUS BLD VENIPUNCTURE: CPT | Performed by: PHYSICIAN ASSISTANT

## 2018-05-04 RX ORDER — CITALOPRAM HYDROBROMIDE 40 MG/1
40 TABLET ORAL DAILY
Qty: 30 TABLET | Refills: 1 | Status: SHIPPED | OUTPATIENT
Start: 2018-05-04 | End: 2018-06-27

## 2018-05-04 NOTE — MR AVS SNAPSHOT
After Visit Summary   5/4/2018    Bere Machuca    MRN: [id number]           Patient Information     Date Of Birth          1955        Visit Information        Provider Department      5/4/2018 2:15 PM Hyun Braden PA-C [facility]        Today's Diagnoses     Factor 5 Leiden mutation, heterozygous/ INR 2-3    -  1    Chronic midline low back pain with right-sided sciatica        Mild episode of recurrent major depressive disorder (H)           Follow-ups after your visit        Follow-up notes from your care team     Return in about 2 weeks (around 5/18/2018).      Who to contact     If you have questions or need follow up information about today's clinic visit or your schedule please contact [facility] directly at [phone].  Normal or non-critical lab and imaging results will be communicated to you by TranSwitchhart, letter or phone within 4 business days after the clinic has received the results. If you do not hear from us within 7 days, please contact the clinic through TranSwitchhart or phone. If you have a critical or abnormal lab result, we will notify you by phone as soon as possible.  Submit refill requests through Useful Systems or call your pharmacy and they will forward the refill request to us. Please allow 3 business days for your refill to be completed.          Additional Information About Your Visit        MyChart Information     Useful Systems gives you secure access to your electronic health record. If you see a primary care provider, you can also send messages to your care team and make appointments. If you have questions, please call your primary care clinic.  If you do not have a primary care provider, please call [phone] and they will assist you.        Care EveryWhere ID     This is your Care EveryWhere ID. This could be used by other organizations to access your [facility] medical records  [id number]        Your Vitals Were      Pulse Temperature Respirations             96 98.5  F (36.9  C) (Oral) 24          Blood Pressure from Last 3 Encounters:   05/04/18 134/82   03/29/18 136/78   01/18/18 132/90    Weight from Last 3 Encounters:   03/29/18 (!) 175.5 kg (387 lb)   12/28/17 (!) 174.6 kg (385 lb)   11/25/17 (!) 174.6 kg (385 lb)              We Performed the Following     CL BFP PROTHROMBIN TIME/INR (BFP)     VENOUS COLLECTION          Today's Medication Changes          These changes are accurate as of 5/4/18  3:54 PM.  If you have any questions, ask your nurse or doctor.               Start taking these medicines.        Dose/Directions    citalopram 40 MG tablet   Commonly known as:  celeXA   Used for:  Mild episode of recurrent major depressive disorder (H)   Started by:  Hyun Braden PA-C        Dose:  40 mg   Take 1 tablet (40 mg) by mouth daily   Quantity:  30 tablet   Refills:  1            Where to get your medicines      These medications were sent to [place] [address] [place] [address] [address]     Phone:  [phone]     citalopram 40 MG tablet                Primary Care Provider Office Phone # Fax #    Wilma Armenta [phone] [address] [address]        Equal Access to Services     [facility]: Hadii korina dillard hadasho Soomaali, waaxda luqadaha, qaybta kaalmada adeegyada, aman love. So [facility] [phone].    ATENCIÓN: Si habla español, tiene a norman disposición servicios gratuitos de asistencia lingüística. Llame al [phone].    We comply with applicable federal civil rights laws and [place] laws. We do not discriminate on the basis of race, color, national origin, age, disability, sex, sexual orientation, or gender identity.            Thank you!     Thank you for choosing [facility]  for your care. Our goal is always to provide you with  excellent care. Hearing back from our patients is one way we can continue to improve our services. Please take a few minutes to complete the written survey that you may receive in the mail after your visit with us. Thank you!             Your Updated Medication List - Protect others around you: Learn how to safely use, store and throw away your medicines at www.disposemymeds.org.          This list is accurate as of 5/4/18  3:54 PM.  Always use your most recent med list.                   Brand Name Dispense Instructions for use Diagnosis    * albuterol (2.5 MG/3ML) 0.083% neb solution      Take 3 mLs by nebulization every 6 hours as needed for shortness of breath / dyspnea. Dr [facility]    Chronic airway obstruction, not elsewhere classified, Personal history of tobacco use, presenting hazards to health       * PROAIR  (90 Base) MCG/ACT Inhaler   Generic drug:  albuterol      Inhale 1-2 puffs into the lungs as needed for shortness of breath / dyspnea. DR [facility]    Chronic airway obstruction, not elsewhere classified, Personal history of tobacco use, presenting hazards to health       ASPIRIN NOT PRESCRIBED    INTENTIONAL    0 each    Please choose reason not prescribed, below        atorvastatin 40 MG tablet    LIPITOR    90 tablet    Take 1 tablet (40 mg) by mouth daily    Type 2 diabetes mellitus without complication, without long-term current use of insulin (H), Hyperlipidemia LDL goal <100       blood glucose lancets standard    no brand specified    100 each    Use to test blood sugar 2-3 times daily or as directed.    Type 2 diabetes mellitus without complication, without long-term current use of insulin (H)       blood glucose monitoring test strip    ISAAC CONTOUR NEXT    100 strip    Use to test blood sugar 2-3 times daily or as directed.    Type 2 diabetes mellitus without complication, without long-term current use of insulin (H)       cephALEXin 500 MG capsule     KEFLEX    30 capsule    Take 1 capsule (500 mg) by mouth 3 times daily    Cellulitis of left foot       cholecalciferol 1000 units Tabs      Take 1,000 Units by mouth At Bedtime    Morbid obesity due to excess calories (H)       citalopram 40 MG tablet    celeXA    30 tablet    Take 1 tablet (40 mg) by mouth daily    Mild episode of recurrent major depressive disorder (H)       cyclobenzaprine 10 MG tablet    FLEXERIL     TAKE 1 TABLET 3 TIMES DAILY AS NEEDED.        fluticasone-vilanterol 200-25 MCG/INH oral inhaler    BREO ELLIPTA    3 Inhaler    Inhale 1 puff into the lungs daily    Chronic bronchitis, unspecified chronic bronchitis type (H)       gabapentin 300 MG capsule    NEURONTIN    270 capsule    Take 300mg daily in the morning and 600mg at night    Spinal stenosis in cervical region       hydrOXYzine 25 MG tablet    ATARAX    90 tablet    Take 1 tablet (25 mg) by mouth At Bedtime    Intrinsic eczema       losartan 50 MG tablet    COZAAR    90 tablet    Take 1 tablet (50 mg) by mouth daily    Benign essential hypertension       mesalamine 1.2 g EC tablet    LIALDA     Take 1,200 mg by mouth 4 times daily        MICROLET LANCETS Misc      TEST 2-3 TIMES DAILY OR AS DIRECTED        Multi-vitamin Tabs tablet      Take 1 tablet by mouth daily        * PROBIOTIC ACIDOPHILUS BIOBEADS Caps      Take 1 capsule by mouth At Bedtime    Myalgia and myositis, unspecified       * VSL#3 Caps     90 capsule    Take 1 capsule by mouth daily    Ulcerative rectosigmoiditis with complication (H)       SPIRIVA HANDIHALER 18 MCG capsule   Generic drug:  tiotropium     30 capsule    Inhale contents of one capsule daily.        SUPER B COMPLEX PO      Take 1 tablet by mouth daily        traMADol 50 MG tablet    ULTRAM     TAKE 1-2 TABLETS BY MOUTH EVERY 6 HOURS AS NEEDED FOR PAIN        warfarin 5 MG tablet    COUMADIN    90 tablet    Take 5 mg daily 6 days per week. Take1/2 tablet (2.5 mg) once a week    History of pulmonary  embolism       * Notice:  This list has 4 medication(s) that are the same as other medications prescribed for you. Read the directions carefully, and ask your doctor or other care provider to review them with you.

## 2018-05-04 NOTE — PROGRESS NOTES
CC: INR, several concerns    History:  Darline continues to deal with severe back issues. Has been seeing new chiropractor who she likes. Has been told she has severe degenerative changes in lumbar spine. Has been out of work for the past 3 weeks due to the low back and right leg pain. Thinks she will get fired. Went to [facility] in [place] for pain clinic. Tried ablation there, and ended up in an ambulance as she stopped breathing.     Has been more tearful lately after switch from citalopram to Cymbalta. Would like to return to citalopram as she did not have any side effects. Is having constant fear of falling and ending up stuck in street unable to get up.     Would be interested in having Darline contact her with care coordinator resources.    Has been taking warfarin 5 mg 6 days per week, and 2.5 mg 1 day per week. No missed doses. No signs of bleeding, thrombosis.     PMH, MEDICATIONS, ALLERGIES, SOCIAL AND FAMILY HISTORY in Psychiatric and reviewed by me personally.      ROS negative other than the symptoms noted above in the HPI.        Examination   /82 (BP Location: Left arm, Patient Position: Chair, Cuff Size: Adult Large)  Pulse 96  Temp 98.5  F (36.9  C) (Oral)  Resp 24       Constitutional: Sitting comfortably, in no acute distress. Vital signs noted  Cardiovascular:  regular rate and rhythm, no murmurs, clicks, or gallops  Respiratory:  normal respiratory rate and rhythm, lungs clear to auscultation  M/S: Left ankle with 1+ non-pitted edema. Mild erythema in proximal left foot. Non-tender to palpation.   SKIN: No jaundice/pallor/rash.   Psychiatric: mentation appears normal and affect normal/bright        A/P    ICD-10-CM    1. Factor 5 Leiden mutation, heterozygous/ INR 2-3 D68.51 CL BFP PROTHROMBIN TIME/INR (BFP)     VENOUS COLLECTION   2. Chronic midline low back pain with right-sided sciatica M54.41     G89.29    3. Mild episode of recurrent major depressive disorder (H) F33.0 citalopram (CELEXA) 40  MG tablet       DISCUSSION:    INR today still low at 1.6. Increase to 7.5 mg daily MF and 5 mg daily other 5 days. Recheck in 2 weeks.  For foot, more consistent with leg edema vs. Cellulitis. Recommended elevate, compression stockings.     For back, consider other pain clinic to try pain treatments other than ablation. Gave cards for other pain clinics besides [facility]. Will have Darline Delgadillo contact to go over possible community resources for both physical and mental health. Okay to continue working with chiropractor, but may eventually need more advanced therapies.     Given on maximum dose of Cymbalta through PCP not effective, will switch back to Celexa. Can switch directly from one to other without taper similar to how she switch from Celexa to Cymbalta without complication.     follow up visit: As needed    Hyun Braden PA-C  [facility]

## 2018-05-04 NOTE — NURSING NOTE
Bere Machuca is here for a an INR, foot check and eye redness.    Patient declined to have their weight and height checked and because of this we are unable to calculate a BMI.  Questioned patient about current smoking habits.  Pt. quit smoking some time ago.  Body mass index is 33.67 kg/(m^2).  PULSE regular  My Chart: active  Pre-visit planning  Immunizations - up to date  Colonoscopy - is up to date  Mammogram - is up to date  Asthma -   PHQ9 -    MILEY-7 -    The patient has verbalized that it is ok to leave a detailed voice message on the patient's cell phone with results/recommendations from this visit. [phone], can use My chart

## 2018-05-08 ENCOUNTER — PATIENT OUTREACH (OUTPATIENT)
Dept: CARE COORDINATION | Facility: CLINIC | Age: 63
End: 2018-05-08

## 2018-05-08 DIAGNOSIS — F32.A DEPRESSION: Primary | ICD-10-CM

## 2018-05-08 NOTE — PROGRESS NOTES
Clinic Care Coordination Contact  [facility]/Voicemail    Referral Source: PCP  Clinical Data: Care Coordinator Outreach  Outreach attempted x 1.  Left message on voicemail with call back information and requested return call.  Plan:  . Care Coordinator will try to reach patient again in 3-5 business days.

## 2018-05-14 ENCOUNTER — PATIENT OUTREACH (OUTPATIENT)
Dept: CARE COORDINATION | Facility: CLINIC | Age: 63
End: 2018-05-14

## 2018-05-14 NOTE — LETTER
[facility]   Complex Care Plan  About Me  Patient Name:  Bere Machuca    YOB: 1955  Age:   62 year old   [place] MRN: [id number] Telephone Information:     Home Phone [phone]   Mobile [phone]       Address:    [address]  [facility] [address] Email address:  [email or web address]      Emergency Contact(s)  Name Relationship Lgl Grd Work Phone Home Phone Mobile Phone   1. RONAL ARAUZ Sister  none [phone] none           Primary language:  English     needed? No    Language Services - Call Clinic:  [phone]  Other communication barriers:    Preferred Method of Communication:  Mail  Current living arrangement:    Mobility Status/ Medical Equipment: Independent w/Device    Health Maintenance    My Access Plan  Medical Emergency 911   Primary Clinic Line   - [phone]   24 Hour Appointment Line [phone]   24 Hour Nurse Line During business hours:  [phone]  After hours:  [phone]   Preferred Urgent Care     Preferred Hospital     Preferred Pharmacy [facility] [address] [facility] - [place], [place] - [address]     Behavioral Health Crisis Line Crisis Connection, [phone] or 911     My Care Team Members  Patient Care Team       Relationship Specialty Notifications Start End    Wilma Armenta MD PCP - General   8/5/08     Phone: [phone] Fax: [phone]         [address] [facility]  [address] [facility] [address]    Darline Delgadillo, RN Lead Care Coordinator Primary Care - CC  5/8/18     Phone: [phone] Fax: [phone]             My Care Plans  Self Management and Treatment Plan  Goals and (Comments)  Goals        General    Financial Wellbeing (pt-stated)     Notes - Note created  5/14/2018  2:29 PM by Darline Delgadillo, RN    Goal Statement: I will have secured financial assistance or employment within 2-3 months  Measure of Success: Pt will be able to identify resources and plan   Supportive Steps to  Achieve: RN CC provided contact information to [facility], Senior linkage line and introduce SW CC if additional resources needed.   Barriers: Pt has ongoing back pain and is unemployed  Strengths: Pt is motivated and was able to verbalize understanding and need to connect with resources to review options.     Date to Achieve By: review in one month, sooner if additional CC needs         Mental Health Management (pt-stated)     Notes - Note created  5/14/2018  2:34 PM by Darline Delgadillo, RN    Goal Statement: I will identify and attend visit with Behavioral health provider within one month     Supportive Steps to Achieve: Pt will outreach to [facility] for low to no cost behaviorla health options   Barriers: Financial, and back pain   Strengths: Pt motivated to identify provider and RN CC discussed the importance of behavioral health as well as physical health   Date to Achieve By: review month       Pain Management (pt-stated)     Notes - Note created  5/14/2018  2:32 PM by Darline Delgadillo, RN    Goal Statement: I will have plan of care for ongoing back pain   Measure of Success: Pt will be able to complete ADL without barrier of pain  Supportive Steps to Achieve: RN CC provided information about [facility] to identifly low to no cost medical care options  Barriers: Job loss and loss of medical benefit - cost  Strengths: Pt motivated to decrease pain  Date to Achieve By: review monthly              Action Plans on File:                       Advance Care Plans/Directives Type:        My Medical and Care Information  Problem List   Patient Active Problem List   Diagnosis     History of cocaine abuse     Personal history of tobacco use, presenting hazards to health     Family history of malignant neoplasm of gastrointestinal tract     Esophageal reflux     Chronic airway obstruction (H)     Hypertension     Sleep apnea     Migraine     ACP (advance care planning)     Factor 5 Leiden  mutation, heterozygous/ INR 2-3     Health Care Home     Family history of pulmonary embolism     Lymphedema of arm     Spinal stenosis in cervical region     History of pulmonary embolism     Major depressive disorder, recurrent episode, moderate (H)     Superior vena cava syndrome     Morbid obesity due to excess calories (H)     Displacement of lumbar intervertebral disc without myelopathy     Type 2 diabetes mellitus without complication, without long-term current use of insulin (H)     Ulcerative rectosigmoiditis (H)      Medications and Allergies:  See printed Medication Report.    Care Coordination Start Date: 5/14/2018   Frequency of Care Coordination: monthly   Form Last Updated: 05/14/2018

## 2018-05-14 NOTE — LETTER
[facility]  [facility] [address]  [place]  [address]    May 14, 2018    Bere Machuca  [address] [place] [address]    [facility] [address]      Dear Bere,    I am a clinic care coordinator who works with Wilma Armenta MD. I wanted to thank you for spending the time to talk with me.  I wanted to introduce myself and provide you with my contact information so that you can call me with questions or concerns about your health care. Below is a description of clinic care coordination and how I can further assist you.     The clinic care coordinator is a registered nurse and/or  who understand the health care system. The goal of clinic care coordination is to help you manage your health and improve access to the [facility] system in the most efficient manner. The registered nurse can assist you in meeting your health care goals by providing education, coordinating services, and strengthening the communication among your providers. The  can assist you with financial, behavioral, psychosocial, chemical dependency, counseling, and/or psychiatric resources.    Please feel free to contact me at [phone], with any questions or concerns. We at [facility] are focused on providing you with the highest-quality healthcare experience possible and that all starts with you.     Sincerely,     Darline Delgadillo    Enclosed: I have enclosed a copy of the Complex Care Plan. This has helpful information and goals that we have talked about. Please keep this in an easy to access place to use as needed. and I have enclosed helpful educational material. Please review and call me with any questions.

## 2018-05-14 NOTE — PATIENT INSTRUCTIONS
Change in the Workplace: Layoffs    Company layoffs and downsizing can be hard on those who are still working, as well as those who are let go.  Coping with layoffs  If you ve been laid off:  Remember that you re a victim of the economy, not a bad employee.    Prioritize your bills and find out if you can get unemployment insurance.    Identify what you do well and what you like to do.    Decide if you want a similar job or if it s time for a career change. Talking with an employment counselor or a  may help you decide.    Contact your state department of labor for information on job placement resources.  If you re still working:  You may be worried about your own job security. You may feel guilty for still having a job. You re also probably faced with more work.    Give the new situation some time. Then decide if it suits you or if you want to make your own changes.    Review your finances. See if you have enough savings to meet your expenses for a few months in case you are laid off in the future.  Staying positive  If you keep a healthy outlook, coping with change can be easier.    Give yourself credit for all that you do right.    Talk with people who will give you honest feedback and encouragement.    Take care of yourself. Exercise, eat well, get enough sleep, and avoid alcohol and drugs.    Get help if you re stuck. Talk with your EAP (employee assistance program) or . Or look online for workshops on managing stress and change.  Date Last Reviewed: 9/5/2015 2000-2017 [employer]. [address] [place] [address], [place] [address]. All rights reserved. This information is not intended as a substitute for professional medical care. Always follow your healthcare professional's instructions.

## 2018-05-14 NOTE — PROGRESS NOTES
Clinic Care Coordination Contact    Clinic Care Coordination Contact  OUTREACH    Referral Information:       Primary Diagnosis: Behavioral Health       [place] Utilization:   Clinic Utilization  Difficulty keeping appointments:: No  Utilization    Last refreshed: 5/8/2018 12:32 PM:  No Show Count (past year) 0       Last refreshed: 5/8/2018 12:32 PM:  ED visits 2       Last refreshed: 5/8/2018 12:32 PM:  Hospital admissions 1          Current as of: 5/8/2018 12:32 PM             Clinical Concerns:  Pt returned RN CC outreach phone call.   Pt notes that she is now unemployed and would like to work with care coordination services to identify community resource and low to no cost health care options.        Goals:   Goals        General    Financial Wellbeing (pt-stated)     Notes - Note created  5/14/2018  2:29 PM by Darline Delgadillo, RN    Goal Statement: I will have secured financial assistance or employment within 2-3 months  Measure of Success: Pt will be able to identify resources and plan   Supportive Steps to Achieve: RN CC provided contact information to [facility], Senior linkage line and introduce SW CC if additional resources needed.   Barriers: Pt has ongoing back pain and is unemployed  Strengths: Pt is motivated and was able to verbalize understanding and need to connect with resources to review options.     Date to Achieve By: review in one month, sooner if additional CC needs         Mental Health Management (pt-stated)     Notes - Note created  5/14/2018  2:34 PM by Darline Delgadillo, RN    Goal Statement: I will identify and attend visit with Behavioral health provider within one month     Supportive Steps to Achieve: [facility] for low to no cost behaviorla health options   Barriers: Financial, and back pain   Strengths: Pt motivated to identify provider and RN CC discussed the importance of behavioral health as well as physical health   Date to Achieve By:  review month       Pain Management (pt-stated)     Notes - Note created  5/14/2018  2:32 PM by Darline Delgadillo RN    Goal Statement: I will have plan of care for ongoing back pain   Measure of Success: Pt will be able to complete ADL without barrier of pain  Supportive Steps to Achieve: RN CC provided information about [facility] to identifly low to no cost medical care options  Barriers: Job loss and loss of medical benefit - cost  Strengths: Pt motivated to decrease pain  Date to Achieve By: review monthly               Patient/Caregiver understanding: Patient verbalized understanding, engaged in AIDET communication behavior during encounter.      Outreach Frequency: monthly      Plan:   Patient will outreach to community resources provided by RN CC and call back if additional resources needed. RN CC has offered to introduce patient to  CC if needed.   RN CC will outreach within one month for status update, will be available sooner if needed. Contact information given.     Darline Delgadillo RN  Clinic Care Coordinator  Mobile: [phone]  [email or web address]

## 2018-05-29 ENCOUNTER — MYC MEDICAL ADVICE (OUTPATIENT)
Dept: FAMILY MEDICINE | Facility: CLINIC | Age: 63
End: 2018-05-29

## 2018-05-29 NOTE — TELEPHONE ENCOUNTER
From: Bere Machuca  To: Hyun Braden PA-C  Sent: 5/29/2018 4:22 PM CDT  Subject: Question about medications    Hyun, I lost my job and my insurance. I was wondering is you could ask Kenia if she has any samples of the medications I am on ? Thanks Jaymie

## 2018-05-31 NOTE — TELEPHONE ENCOUNTER
Sent Darline message saying Kenia would let her know if she has any samples in this case, and Darline would contact her to discuss insurance resources. Patient responds better to MyChart, but may be better about phone given job loss.

## 2018-06-14 ENCOUNTER — PATIENT OUTREACH (OUTPATIENT)
Dept: CARE COORDINATION | Facility: CLINIC | Age: 63
End: 2018-06-14

## 2018-06-14 NOTE — PROGRESS NOTES
Clinic Care Coordination Contact  [facility]/Voicemail       Clinical Data: Care Coordinator Outreach  Outreach attempted x 1.  Left message on voicemail with call back information and requested return call.  Plan: RN CC will outreach in 1-2 weeks, will be available sooner if needed. Contact information given.

## 2018-06-26 ENCOUNTER — MYC MEDICAL ADVICE (OUTPATIENT)
Dept: FAMILY MEDICINE | Facility: CLINIC | Age: 63
End: 2018-06-26

## 2018-06-26 DIAGNOSIS — J42 CHRONIC BRONCHITIS, UNSPECIFIED CHRONIC BRONCHITIS TYPE (H): ICD-10-CM

## 2018-06-26 NOTE — TELEPHONE ENCOUNTER
Left 1 month of samples for Breo at . Please contact pt to inform, and encourage her to be in contact with Darline Delgadillo to continue navigating through insurance issues.

## 2018-06-26 NOTE — TELEPHONE ENCOUNTER
Pt got back to us and she needs a refill of Breo. Are you ok with giving her samples do to insurance issues?    Please advise.  Thanks,Leah

## 2018-06-26 NOTE — TELEPHONE ENCOUNTER
Bere Machuca is requesting a refill of:    Pending Prescriptions:                       Disp   Refills    fluticasone-vilanterol (BREO ELLIPTA) 200*3 Inha*0            Sig: Inhale 1 puff into the lungs daily    Are you ok with giving Pt samples of this medication?  Received a refill from [facility]. Sent Pt my chart to Pt to see if she needs.    Please advise  Thanks,Leah

## 2018-06-27 ENCOUNTER — PATIENT OUTREACH (OUTPATIENT)
Dept: CARE COORDINATION | Facility: CLINIC | Age: 63
End: 2018-06-27

## 2018-06-27 ENCOUNTER — MYC MEDICAL ADVICE (OUTPATIENT)
Dept: FAMILY MEDICINE | Facility: CLINIC | Age: 63
End: 2018-06-27

## 2018-06-27 DIAGNOSIS — M48.02 SPINAL STENOSIS IN CERVICAL REGION: ICD-10-CM

## 2018-06-27 DIAGNOSIS — F33.0 MILD EPISODE OF RECURRENT MAJOR DEPRESSIVE DISORDER (H): ICD-10-CM

## 2018-06-27 DIAGNOSIS — I10 BENIGN ESSENTIAL HYPERTENSION: ICD-10-CM

## 2018-06-27 DIAGNOSIS — E11.9 TYPE 2 DIABETES MELLITUS WITHOUT COMPLICATION, WITHOUT LONG-TERM CURRENT USE OF INSULIN (H): ICD-10-CM

## 2018-06-27 DIAGNOSIS — Z86.711 HISTORY OF PULMONARY EMBOLISM: ICD-10-CM

## 2018-06-27 DIAGNOSIS — E78.5 HYPERLIPIDEMIA LDL GOAL <100: ICD-10-CM

## 2018-06-27 RX ORDER — WARFARIN SODIUM 5 MG/1
TABLET ORAL
Qty: 30 TABLET | Refills: 0 | Status: SHIPPED | OUTPATIENT
Start: 2018-06-27 | End: 2018-06-27

## 2018-06-27 RX ORDER — ATORVASTATIN CALCIUM 40 MG/1
40 TABLET, FILM COATED ORAL DAILY
Qty: 30 TABLET | Refills: 0 | Status: SHIPPED | OUTPATIENT
Start: 2018-06-27 | End: 2019-04-12

## 2018-06-27 RX ORDER — CITALOPRAM HYDROBROMIDE 40 MG/1
40 TABLET ORAL DAILY
Qty: 30 TABLET | Refills: 0 | Status: SHIPPED | OUTPATIENT
Start: 2018-06-27 | End: 2018-08-10

## 2018-06-27 RX ORDER — ATORVASTATIN CALCIUM 40 MG/1
40 TABLET, FILM COATED ORAL DAILY
Qty: 30 TABLET | Refills: 0 | Status: SHIPPED | OUTPATIENT
Start: 2018-06-27 | End: 2018-06-27

## 2018-06-27 RX ORDER — CITALOPRAM HYDROBROMIDE 40 MG/1
40 TABLET ORAL DAILY
Qty: 30 TABLET | Refills: 0 | Status: SHIPPED | OUTPATIENT
Start: 2018-06-27 | End: 2018-06-27

## 2018-06-27 RX ORDER — GABAPENTIN 300 MG/1
CAPSULE ORAL
Qty: 90 CAPSULE | Refills: 0 | Status: SHIPPED | OUTPATIENT
Start: 2018-06-27 | End: 2018-06-27

## 2018-06-27 RX ORDER — LOSARTAN POTASSIUM 50 MG/1
50 TABLET ORAL DAILY
Qty: 30 TABLET | Refills: 0 | Status: SHIPPED | OUTPATIENT
Start: 2018-06-27 | End: 2018-06-27

## 2018-06-27 RX ORDER — WARFARIN SODIUM 5 MG/1
TABLET ORAL
Qty: 30 TABLET | Refills: 0 | Status: SHIPPED | OUTPATIENT
Start: 2018-06-27 | End: 2018-08-14

## 2018-06-27 RX ORDER — GABAPENTIN 300 MG/1
CAPSULE ORAL
Qty: 90 CAPSULE | Refills: 0 | Status: SHIPPED | OUTPATIENT
Start: 2018-06-27 | End: 2018-08-10

## 2018-06-27 RX ORDER — LOSARTAN POTASSIUM 50 MG/1
50 TABLET ORAL DAILY
Qty: 30 TABLET | Refills: 0 | Status: SHIPPED | OUTPATIENT
Start: 2018-06-27 | End: 2018-08-10

## 2018-06-27 NOTE — PROGRESS NOTES
Clinic Care Coordination Contact    Clinic Care Coordination Contact  OUTREACH    Incoming message from patient returning RN CC outreach      Chief Complaint   Patient presents with     Clinic Care Coordination - Follow-up         Patient message notes that she has an appointment with [facility] and should hear back within 2 weeks and will find out if she is eligible for Medical assistance.   Concern about cost of RX, but hopeful she will  Hear back soon and will keep us posted.               Goals:   Goals        General    Financial Wellbeing (pt-stated)     Notes - Note created  5/14/2018  2:29 PM by Darline Delgadillo, RN    Goal Statement: I will have secured financial assistance or employment within 2-3 months  Measure of Success: Pt will be able to identify resources and plan   Supportive Steps to Achieve: RN CC provided contact information to [facility], Senior linkage line and introduce SW CC if additional resources needed.   Barriers: Pt has ongoing back pain and is unemployed  Strengths: Pt is motivated and was able to verbalize understanding and need to connect with resources to review options.     Date to Achieve By: review in one month, sooner if additional CC needs         Mental Health Management (pt-stated)     Notes - Note created  5/14/2018  2:34 PM by Darline Delgadillo, RN    Goal Statement: I will identify and attend visit with Behavioral health provider within one month     Supportive Steps to Achieve: Pt will outreach to [facility] for low to no cost behaviorla health options   Barriers: Financial, and back pain   Strengths: Pt motivated to identify provider and RN CC discussed the importance of behavioral health as well as physical health   Date to Achieve By: review month       Pain Management (pt-stated)     Notes - Note created  5/14/2018  2:32 PM by Darline Delgadillo, RN    Goal Statement: I will have plan of care for ongoing back pain   Measure of Success: Pt will be able to  complete ADL without barrier of pain  Supportive Steps to Achieve: RN CC provided information about [facility] to identifly low to no cost medical care options  Barriers: Job loss and loss of medical benefit - cost  Strengths: Pt motivated to decrease pain  Date to Achieve By: review monthly               Plan: Patient will notify PCP office upon decision of insurance benefit eligibility   RN CC will outreach within one month for status update, will be available sooner if needed. Contact information given.     Darline Delgadillo RN  Clinic Care Coordinator  Mobile: [phone]  [email or web address]

## 2018-06-27 NOTE — TELEPHONE ENCOUNTER
Bere Machuca is requesting a refill of:    Pending Prescriptions:                       Disp   Refills    atorvastatin (LIPITOR) 40 MG tablet       30 tab*0            Sig: Take 1 tablet (40 mg) by mouth daily    citalopram (CELEXA) 40 MG tablet          30 tab*0            Sig: Take 1 tablet (40 mg) by mouth daily    gabapentin (NEURONTIN) 300 MG capsule     90 cap*0            Sig: Take 300mg daily in the morning and 600mg at           night    losartan (COZAAR) 50 MG tablet            30 tab*0            Sig: Take 1 tablet (50 mg) by mouth daily    warfarin (COUMADIN) 5 MG tablet           30 tab*0            Sig: Take 5 mg daily 6 days per week. Take1/2 tablet           (2.5 mg) once a week    Please close encounter if RX was sent. Thanks, Leah

## 2018-06-28 DIAGNOSIS — K51.319 ULCERATIVE RECTOSIGMOIDITIS WITH COMPLICATION (H): ICD-10-CM

## 2018-06-28 RX ORDER — LACTOBACIL 2/BIFIDO 1/S.THERMO 450B CELL
1 PACKET (EA) ORAL DAILY
Qty: 30 CAPSULE | Refills: 0 | Status: SHIPPED | OUTPATIENT
Start: 2018-06-28 | End: 2019-11-12

## 2018-06-28 NOTE — TELEPHONE ENCOUNTER
Called in her medication to [facility].  Atorvastatin, losartan, citalopram, warfarin and gabapentin for 1 month, needs OV for refills  Pt informed that she needs OV for refills.

## 2018-06-28 NOTE — TELEPHONE ENCOUNTER
Bere Machuca is requesting a refill of:    Pending Prescriptions:                       Disp   Refills    Probiotic Product (VSL#3) CAPS            30 cap*0            Sig: Take 1 capsule by mouth daily    Can you Ok for Hyun. Pt is on new insurance and Hyun is unable to ok a 1 month refill of this medication.    Thanks,Leah

## 2018-08-08 DIAGNOSIS — I10 BENIGN ESSENTIAL HYPERTENSION: ICD-10-CM

## 2018-08-08 DIAGNOSIS — E11.9 TYPE 2 DIABETES MELLITUS WITHOUT COMPLICATION, WITHOUT LONG-TERM CURRENT USE OF INSULIN (H): ICD-10-CM

## 2018-08-08 DIAGNOSIS — F33.0 MILD EPISODE OF RECURRENT MAJOR DEPRESSIVE DISORDER (H): ICD-10-CM

## 2018-08-08 DIAGNOSIS — E78.5 HYPERLIPIDEMIA LDL GOAL <100: ICD-10-CM

## 2018-08-08 DIAGNOSIS — Z86.711 HISTORY OF PULMONARY EMBOLISM: ICD-10-CM

## 2018-08-08 DIAGNOSIS — M48.02 SPINAL STENOSIS IN CERVICAL REGION: ICD-10-CM

## 2018-08-08 RX ORDER — CITALOPRAM HYDROBROMIDE 40 MG/1
TABLET ORAL
Qty: 15 TABLET | Refills: 0 | OUTPATIENT
Start: 2018-08-08

## 2018-08-08 RX ORDER — WARFARIN SODIUM 5 MG/1
TABLET ORAL
Qty: 30 TABLET | Refills: 0 | OUTPATIENT
Start: 2018-08-08

## 2018-08-08 RX ORDER — LOSARTAN POTASSIUM 50 MG/1
TABLET ORAL
Qty: 15 TABLET | Refills: 0 | OUTPATIENT
Start: 2018-08-08

## 2018-08-08 RX ORDER — ATORVASTATIN CALCIUM 40 MG/1
TABLET, FILM COATED ORAL
Qty: 15 TABLET | Refills: 0 | OUTPATIENT
Start: 2018-08-08

## 2018-08-08 RX ORDER — GABAPENTIN 300 MG/1
CAPSULE ORAL
Qty: 60 CAPSULE | Refills: 0 | OUTPATIENT
Start: 2018-08-08

## 2018-08-08 NOTE — TELEPHONE ENCOUNTER
Pending Prescriptions:                       Disp   Refills    citalopram (CELEXA) 40 MG tablet [Pharmac*15 tab*0            Sig: TAKE 1 TABLET BY MOUTH EVERY DAY    gabapentin (NEURONTIN) 300 MG capsule [Ph*60 cap*0            Sig: TAKE ONE CAPSULE BY MOUTH IN THE MORNING AND TAKE           2 CAPSULES BY MOUTH NIGHTLY AT BEDTIME    losartan (COZAAR) 50 MG tablet [Pharmacy *15 tab*0            Sig: TAKE 1 TABLET BY MOUTH EVERY DAY    warfarin (COUMADIN) 5 MG tablet [Pharmacy*30 tab*0            Sig: TAKE 1 TABLET BY MOUTH 6 DAYS A WEEK AND TAKE 1/2           TABLET 1 DAY A WEEK    atorvastatin (LIPITOR) 40 MG tablet [Phar*15 tab*0            Sig: TAKE 1 TABLET BY MOUTH EVERY DAY - NEED TO BE           SEEN FOR FURTHER REFILLS    Pt has an appt on 8- Friday  I called and vm to see if pt has enough meds to get her to her appt  We gave 30 on 6-  Harshal  [phone] (home) [phone] (work)

## 2018-08-08 NOTE — TELEPHONE ENCOUNTER
Refused Prescriptions:                       Disp   Refills    citalopram (CELEXA) 40 MG tablet [Pharmacy*15 tab*0        Sig: TAKE 1 TABLET BY MOUTH EVERY DAY  Refused By: MERLIN MACEDO  Reason for Refusal: OTHER    gabapentin (NEURONTIN) 300 MG capsule [Pha*60 cap*0        Sig: TAKE ONE CAPSULE BY MOUTH IN THE MORNING AND TAKE 2           CAPSULES BY MOUTH NIGHTLY AT BEDTIME  Refused By: MERLIN MACEDO  Reason for Refusal: OTHER    losartan (COZAAR) 50 MG tablet [Pharmacy M*15 tab*0        Sig: TAKE 1 TABLET BY MOUTH EVERY DAY  Refused By: MERLIN MACEDO  Reason for Refusal: OTHER    warfarin (COUMADIN) 5 MG tablet [Pharmacy *30 tab*0        Sig: TAKE 1 TABLET BY MOUTH 6 DAYS A WEEK AND TAKE 1/2           TABLET 1 DAY A WEEK  Refused By: MERLIN MACEDO  Reason for Refusal: OTHER    atorvastatin (LIPITOR) 40 MG tablet [Pharm*15 tab*0        Sig: TAKE 1 TABLET BY MOUTH EVERY DAY - NEED TO BE SEEN           FOR FURTHER REFILLS  Refused By: MERLIN MACEDO  Reason for Refusal: OTHER    Talked to pt she had enough meds to get to appt  Harshal  [phone] (home) [phone] (work)

## 2018-08-10 ENCOUNTER — OFFICE VISIT (OUTPATIENT)
Dept: FAMILY MEDICINE | Facility: CLINIC | Age: 63
End: 2018-08-10

## 2018-08-10 VITALS
OXYGEN SATURATION: 95 % | TEMPERATURE: 98.3 F | BODY MASS INDEX: 62.3 KG/M2 | HEART RATE: 90 BPM | DIASTOLIC BLOOD PRESSURE: 88 MMHG | WEIGHT: 293 LBS | SYSTOLIC BLOOD PRESSURE: 128 MMHG

## 2018-08-10 DIAGNOSIS — E78.5 HYPERLIPIDEMIA LDL GOAL <100: ICD-10-CM

## 2018-08-10 DIAGNOSIS — G47.30 SLEEP APNEA, UNSPECIFIED TYPE: ICD-10-CM

## 2018-08-10 DIAGNOSIS — E66.01 MORBID OBESITY DUE TO EXCESS CALORIES (H): ICD-10-CM

## 2018-08-10 DIAGNOSIS — M48.02 SPINAL STENOSIS IN CERVICAL REGION: ICD-10-CM

## 2018-08-10 DIAGNOSIS — J42 CHRONIC BRONCHITIS, UNSPECIFIED CHRONIC BRONCHITIS TYPE (H): ICD-10-CM

## 2018-08-10 DIAGNOSIS — D68.51 FACTOR 5 LEIDEN MUTATION, HETEROZYGOUS (H): Primary | ICD-10-CM

## 2018-08-10 DIAGNOSIS — M54.5 CHRONIC LOW BACK PAIN, UNSPECIFIED BACK PAIN LATERALITY, WITH SCIATICA PRESENCE UNSPECIFIED: ICD-10-CM

## 2018-08-10 DIAGNOSIS — E11.9 TYPE 2 DIABETES MELLITUS WITHOUT COMPLICATION, WITHOUT LONG-TERM CURRENT USE OF INSULIN (H): ICD-10-CM

## 2018-08-10 DIAGNOSIS — F33.0 MILD EPISODE OF RECURRENT MAJOR DEPRESSIVE DISORDER (H): ICD-10-CM

## 2018-08-10 DIAGNOSIS — K51.319 ULCERATIVE RECTOSIGMOIDITIS WITH COMPLICATION (H): ICD-10-CM

## 2018-08-10 DIAGNOSIS — G89.29 CHRONIC LOW BACK PAIN, UNSPECIFIED BACK PAIN LATERALITY, WITH SCIATICA PRESENCE UNSPECIFIED: ICD-10-CM

## 2018-08-10 DIAGNOSIS — M16.11 PRIMARY OSTEOARTHRITIS OF RIGHT HIP: ICD-10-CM

## 2018-08-10 DIAGNOSIS — I10 BENIGN ESSENTIAL HYPERTENSION: ICD-10-CM

## 2018-08-10 LAB
HBA1C MFR BLD: 6.4 % (ref 4–7)
INR POINT OF CARE: 4.9 (ref 2–3)

## 2018-08-10 PROCEDURE — 99214 OFFICE O/P EST MOD 30 MIN: CPT | Performed by: PHYSICIAN ASSISTANT

## 2018-08-10 PROCEDURE — 80053 COMPREHEN METABOLIC PANEL: CPT | Mod: 90 | Performed by: PHYSICIAN ASSISTANT

## 2018-08-10 PROCEDURE — 85610 PROTHROMBIN TIME: CPT | Performed by: PHYSICIAN ASSISTANT

## 2018-08-10 PROCEDURE — 83036 HEMOGLOBIN GLYCOSYLATED A1C: CPT | Performed by: PHYSICIAN ASSISTANT

## 2018-08-10 PROCEDURE — 36415 COLL VENOUS BLD VENIPUNCTURE: CPT | Performed by: PHYSICIAN ASSISTANT

## 2018-08-10 PROCEDURE — 80061 LIPID PANEL: CPT | Mod: 90 | Performed by: PHYSICIAN ASSISTANT

## 2018-08-10 RX ORDER — TRAMADOL HYDROCHLORIDE 50 MG/1
50 TABLET ORAL EVERY 6 HOURS PRN
Qty: 20 TABLET | Refills: 0 | Status: SHIPPED | OUTPATIENT
Start: 2018-08-10 | End: 2018-11-23

## 2018-08-10 RX ORDER — ATORVASTATIN CALCIUM 40 MG/1
40 TABLET, FILM COATED ORAL DAILY
Qty: 30 TABLET | Refills: 0 | Status: CANCELLED | OUTPATIENT
Start: 2018-08-10

## 2018-08-10 RX ORDER — LOSARTAN POTASSIUM 50 MG/1
50 TABLET ORAL DAILY
Qty: 90 TABLET | Refills: 0 | Status: SHIPPED | OUTPATIENT
Start: 2018-08-10 | End: 2018-11-23

## 2018-08-10 RX ORDER — CITALOPRAM HYDROBROMIDE 40 MG/1
40 TABLET ORAL DAILY
Qty: 90 TABLET | Refills: 0 | Status: SHIPPED | OUTPATIENT
Start: 2018-08-10 | End: 2018-12-10

## 2018-08-10 RX ORDER — GABAPENTIN 300 MG/1
CAPSULE ORAL
Qty: 540 CAPSULE | Refills: 0 | Status: SHIPPED | OUTPATIENT
Start: 2018-08-10 | End: 2018-11-07

## 2018-08-10 NOTE — NURSING NOTE
Bere is here for INR, med check, shingles vaccine        Pre-visit Screening:  Immunizations:  up to date  Colonoscopy:  is up to date  Mammogram: is up to date  Asthma Action Test/Plan:  COPD  PHQ9:  none  GAD7:  none  Questioned patient about current smoking habits Pt. quit smoking some time ago.  Ok to leave detailed message on voice mail for today's visit only Yes, phone # [phone]

## 2018-08-10 NOTE — PROGRESS NOTES
"CC: INR, med check    History:  Darline is here today with several concerns. She has recently gotten back on health insurance after a few months off so needing to catch up on all her various medications. 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 Has not been to local pain specialist, but knows she has tried Lyrica and Celebrex in the past without relief. Her pain right now is as bad as she can remember it being, and involves her whole body. Has been using a friends walker, but it is not wide enough for her.     Unfortunately, Darline has been unable to lose weight. I did briefly mention a weight consult and possible gastric surgery consult, but she is very concerned with her history of complications with abdominal surgeries. Has had 7 abdominal surgeries- partial hysterectomy, full hysterectomy, abscess that went septic, lateral hernia repair, tummy tuck.     Her ulcerative colitis is managed by GI, which is okay right now.    Has JERILYN, chronic bronchitis, managed by pulmonary, but she is needing refills of her Breo inhaler today.    For BP, takes losartan 50 mg daily. BP is well controlled. Takes atorvastatin for cholesterol. Did go 1 week without this without any improvement in her chronic pain.    Darline also notes that depression is not controlled almost entirely due to pain. She has been taking gabapentin 300 mg 1 in AM and 2 in PM. Wondering if this can be increased and what else she can do for pain. She feels safe and denies any SI/HI.     Takes warfarin 5 mg daily other than 2.5 mg twice weekly for factor 5 leiden mutation.     PMH, MEDICATIONS, ALLERGIES, SOCIAL AND FAMILY HISTORY in [facility] and reviewed by me personally.      ROS negative other than the symptoms noted above in the HPI.        Examination   /88 (BP " Location: Right arm, Patient Position: Sitting, Cuff Size: Adult Large)  Pulse 90  Temp 98.3  F (36.8  C) (Oral)  Wt (!) 175.1 kg (386 lb)  SpO2 95%  BMI 62.3 kg/m2       Constitutional: Sitting comfortably, in no acute distress. Vital signs noted  Cardiovascular:  regular rate and rhythm, no murmurs, clicks, or gallops  Respiratory:  normal respiratory rate and rhythm, lungs clear to auscultation  Psychiatric: mentation appears normal and affect normal/bright        A/P    ICD-10-CM    1. Factor 5 Leiden mutation, heterozygous/ INR 2-3 D68.51 CL BFP PROTHROMBIN TIME/INR (BFP)     VENOUS COLLECTION   2. Benign essential hypertension I10 VENOUS COLLECTION     losartan (COZAAR) 50 MG tablet     Comprehensive metabolic panel   3. Type 2 diabetes mellitus without complication, without long-term current use of insulin (H) E11.9 VENOUS COLLECTION     Comprehensive metabolic panel     Lipid Profile ([facility])     Hemoglobin A1c (BFP)   4. Hyperlipidemia LDL goal <100 E78.5 Comprehensive metabolic panel     Lipid Profile ([facility])   5. Mild episode of recurrent major depressive disorder (H) F33.0 citalopram (CELEXA) 40 MG tablet   6. Spinal stenosis in cervical region M48.02 gabapentin (NEURONTIN) 300 MG capsule     traMADol (ULTRAM) 50 MG tablet   7. Morbid obesity due to excess calories (H) E66.01    8. Chronic bronchitis, unspecified chronic bronchitis type (H) J42 fluticasone-vilanterol (BREO ELLIPTA) 200-25 MCG/INH inhaler   9. Sleep apnea, unspecified type G47.30    10. Ulcerative rectosigmoiditis with complication (H) K51.319    11. Need for vaccination Z23 C ZOSTER VACC LIVE SUBQ NJX, ZOSTAVAX       DISCUSSION:  1. Factor 5 Leiden mutation, heterozygous/ INR 2-3  INR today is 4.9. Will have her skip warfarin tonight and tomorrow.  Then take 3 days/week 5 mg  4 days/week 2.5 mg  follow up visit: next Wed (5 days)  - CL BFP PROTHROMBIN TIME/INR (BFP)  - VENOUS COLLECTION    2. Benign essential hypertension  BP  controlled. Will continue on same dose of losartan.   - VENOUS COLLECTION  - losartan (COZAAR) 50 MG tablet; Take 1 tablet (50 mg) by mouth daily  Dispense: 90 tablet; Refill: 0  - Comprehensive metabolic panel    3. Type 2 diabetes mellitus without complication, without long-term current use of insulin (H)  A1c controlled. Will continue to work on diet and exercise. Will hold off on weight consult at this time.   - VENOUS COLLECTION  - Comprehensive metabolic panel  - Lipid Profile ([facility])  - Hemoglobin A1c (BFP)    4. Hyperlipidemia LDL goal <100  Will do trial of atorvastatin to see if any improvement in muscle/joint pain.   - Comprehensive metabolic panel  - Lipid Profile ([facility])    5. Mild episode of recurrent major depressive disorder (H)  Will continue on Celexa. Recommended psychiatrist, and she will look into this option.   - citalopram (CELEXA) 40 MG tablet; Take 1 tablet (40 mg) by mouth daily  Dispense: 90 tablet; Refill: 0    6. Spinal stenosis in cervical region  Increased dose somewhat to 300 mg 1-2 capsules up to 3 times daily, but start with just the 1 capsule. Will refer to rheumatology, partially on recommendation of GI provider, and also refer to pain specialist as this is a chronic issue that is worsening and needs to be addressed. Also gave small quantity of tramadol to use only as needed for the pain. Warned of drowsiness, addictive potential. Take with food.   - gabapentin (NEURONTIN) 300 MG capsule; Take 600 mg 3 times daily.  Dispense: 540 capsule; Refill: 0  - traMADol (ULTRAM) 50 MG tablet; Take 1 tablet (50 mg) by mouth every 6 hours as needed for severe pain  Dispense: 20 tablet; Refill: 0    7. Morbid obesity due to excess calories (H)    8. Chronic bronchitis, unspecified chronic bronchitis type (H)  Refilled Breo   - fluticasone-vilanterol (BREO ELLIPTA) 200-25 MCG/INH inhaler; Inhale 1 puff into the lungs daily  Dispense: 3 Inhaler; Refill: 0    9. Sleep apnea, unspecified  type    10. Ulcerative rectosigmoiditis with complication (H)    11. Chronic low back pain.  Having Darline follow up with [facility]. Increased gabapentin. Gave small quantity of tramadol. Will start process of getting walker through DME order.     Greater than 50% of this 40 minute appt was spent in counseling and coordination of care.     Hyun Braden PA-C  [facility]

## 2018-08-10 NOTE — MR AVS SNAPSHOT
After Visit Summary   8/10/2018    Bere Machuca    MRN: [id number]           Patient Information     Date Of Birth          1955        Visit Information        Provider Department      8/10/2018 2:45 PM Hyun Braden PA-C [facility]        Today's Diagnoses     Factor 5 Leiden mutation, heterozygous/ INR 2-3    -  1    Benign essential hypertension        Type 2 diabetes mellitus without complication, without long-term current use of insulin (H)        Hyperlipidemia LDL goal <100        Mild episode of recurrent major depressive disorder (H)        Spinal stenosis in cervical region        Morbid obesity due to excess calories (H)        Chronic bronchitis, unspecified chronic bronchitis type (H)        Sleep apnea, unspecified type        Ulcerative rectosigmoiditis with complication (H)        Chronic low back pain, unspecified back pain laterality, with sciatica presence unspecified           Follow-ups after your visit        Additional Services     PAIN MANAGEMENT REFERRAL       Your provider has referred you to:   [facility] [place]. Fax [phone]. Please fax, and have them call pt to schedule.     **ANY DIAGNOSTIC TESTS THAT ARE NOT IN EPIC SHOULD BE SENT TO THE PAIN CENTER**    REGARDING OPIOID MEDICATIONS:  The discussion of opioids management, appropriateness of therapy, and dosing will be discussed in patients being seen for evaluation.  The pain management clinics are not long-term prescribing clinics, with transition of prescribing of medications ultimately going back to the referring provider/PCP.  If prescribing is taken over at the pain clinic, it is in actively involved patients whom are appropriate for opioids, urine drug screening is completed, and long-term prescribing plan has been determined.  Therefore, we will not be automatically taking over prescribing at the patient's first visit.  Is this agreeable to you? agrees.      Please be aware that coverage of these services is subject to the terms and limitations of your health insurance plan.  Call member services at your health plan with any benefit or coverage questions.      Please bring the following with you to your appointment:    (1) Any X-Rays, CTs or MRIs which have been performed.  Contact the facility where they were done to arrange for  prior to your scheduled appointment.    (2) List of current medications   (3) This referral request   (4) Any documents/labs given to you for this referral            RHEUMATOLOGY REFERRAL       Your provider has referred you [facility]: [facility] [phone]   [email or web address]    Please fax and have them contact pt to schedule.     Please be aware that coverage of these services is subject to the terms and limitations of your health insurance plan.  Call member services at your health plan with any benefit or coverage questions.      Please bring the following with you to your appointment:    (1) Any X-Rays, CTs or MRIs which have been performed.  Contact the facility where they were done to arrange for  prior to your scheduled appointment.    (2) List of current medications   (3) This referral request   (4) Any documents/labs given to you for this referral                  Follow-up notes from your care team     Return in about 5 days (around 8/15/2018).      Your next 10 appointments already scheduled     Aug 15, 2018  3:00 PM CDT   Office Visit with Hyun Braden PA-C   [facility] ([facility])    [address]  [address]  [facility] [address] [phone]              Who to contact     If you have questions or need follow up information about today's clinic visit or your schedule please contact [facility] directly at [phone].  Normal or non-critical lab and imaging results will be  communicated to you by [employer], letter or phone within 4 business days after the clinic has received the results. If you do not hear from us within 7 days, please contact the clinic through [employer] or phone. If you have a critical or abnormal lab result, we will notify you by phone as soon as possible.  Submit refill requests through [employer] or call your pharmacy and they will forward the refill request to us. Please allow 3 business days for your refill to be completed.          Additional Information About Your Visit        [employer] Information     [employer] gives you secure access to your electronic health record. If you see a primary care provider, you can also send messages to your care team and make appointments. If you have questions, please call your primary care clinic.  If you do not have a primary care provider, please call [phone] and they will assist you.        Care EveryWhere ID     This is your Care EveryWhere ID. This could be used by other organizations to access your [facility] medical records  [id number]        Your Vitals Were     Pulse Temperature Pulse Oximetry BMI (Body Mass Index)          90 98.3  F (36.8  C) (Oral) 95% 62.3 kg/m2         Blood Pressure from Last 3 Encounters:   08/10/18 128/88   05/04/18 134/82   03/29/18 136/78    Weight from Last 3 Encounters:   08/10/18 (!) 175.1 kg (386 lb)   03/29/18 (!) 175.5 kg (387 lb)   12/28/17 (!) 174.6 kg (385 lb)              We Performed the Following     CL BFP PROTHROMBIN TIME/INR ([facility])     Comprehensive metabolic panel     Hemoglobin A1c ([facility])     Lipid Profile ([facility])     PAIN MANAGEMENT REFERRAL     RHEUMATOLOGY REFERRAL     VENOUS COLLECTION          Today's Medication Changes          These changes are accurate as of 8/10/18 11:59 PM.  If you have any questions, ask your nurse or doctor.               Start taking these medicines.        Dose/Directions    traMADol 50 MG tablet   Commonly known as:  ULTRAM   Used for:  Spinal  stenosis in cervical region   Started by:  Hyun Braden PA-C        Dose:  50 mg   Take 1 tablet (50 mg) by mouth every 6 hours as needed for severe pain   Quantity:  20 tablet   Refills:  0         These medicines have changed or have updated prescriptions.        Dose/Directions    gabapentin 300 MG capsule   Commonly known as:  NEURONTIN   This may have changed:  additional instructions   Used for:  Spinal stenosis in cervical region   Changed by:  Hyun Braden PA-C        Take 600 mg 3 times daily.   Quantity:  540 capsule   Refills:  0            Where to get your medicines      These medications were sent to [facility] [place] [address] [address]     Phone:  [phone]     citalopram 40 MG tablet    fluticasone-vilanterol 200-25 MCG/INH inhaler    gabapentin 300 MG capsule    losartan 50 MG tablet         Some of these will need a paper prescription and others can be bought over the counter.  Ask your nurse if you have questions.     Bring a paper prescription for each of these medications     traMADol 50 MG tablet               Information about OPIOIDS     PRESCRIPTION OPIOIDS: WHAT YOU NEED TO KNOW   We gave you an opioid (narcotic) pain medicine. It is important to manage your pain, but opioids are not always the best choice. You should first try all the other options your care team gave you. Take this medicine for as short a time (and as few doses) as possible.    Some activities can increase your pain, such as bandage changes or therapy sessions. It may help to take your pain medicine 30 to 60 minutes before these activities. Reduce your stress by getting enough sleep, working on hobbies you enjoy and practicing relaxation or meditation. Talk to your care team about ways to manage your pain beyond prescription opioids.    These medicines have risks:    DO NOT drive when on new or higher doses of pain medicine.  These medicines can affect your alertness and reaction times, and you could be arrested for driving under the influence (DUI). If you need to use opioids long-term, talk to your care team about driving.    DO NOT operate heavy machinery    DO NOT do any other dangerous activities while taking these medicines.    DO NOT drink any alcohol while taking these medicines.     If the opioid prescribed includes acetaminophen, DO NOT take with any other medicines that contain acetaminophen. Read all labels carefully. Look for the word  acetaminophen  or  Tylenol.  Ask your pharmacist if you have questions or are unsure.    You can get addicted to pain medicines, especially if you have a history of addiction (chemical, alcohol or substance dependence). Talk to your care team about ways to reduce this risk.    All opioids tend to cause constipation. Drink plenty of water and eat foods that have a lot of fiber, such as fruits, vegetables, prune juice, apple juice and high-fiber cereal. Take a laxative (Miralax, milk of magnesia, Colace, Senna) if you don t move your bowels at least every other day. Other side effects include upset stomach, sleepiness, dizziness, throwing up, tolerance (needing more of the medicine to have the same effect), physical dependence and slowed breathing.    Store your pills in a secure place, locked if possible. We will not replace any lost or stolen medicine. If you don t finish your medicine, please throw away (dispose) as directed by your pharmacist. The [place] [employer]ution [employer] has more information about safe disposal: [email or web address]://[email or web address].[email or web address]/living-green/managing-unwanted-medications         Primary Care Provider Office Phone # Fax #    Wilma Armenta [phone]       [address] [address]        Goals        General    Financial Wellbeing (pt-stated)     Notes - Note created  5/14/2018  2:29 PM by Darline Delgadillo RN    Goal  Statement: I will have secured financial assistance or employment within 2-3 months  Measure of Success: Pt will be able to identify resources and plan   Supportive Steps to Achieve: RN CC provided contact information to [facility], Senior linkage line and introduce SW CC if additional resources needed.   Barriers: Pt has ongoing back pain and is unemployed  Strengths: Pt is motivated and was able to verbalize understanding and need to connect with resources to review options.     Date to Achieve By: review in one month, sooner if additional CC needs         Mental Health Management (pt-stated)     Notes - Note created  5/14/2018  2:34 PM by Darline Delgadillo, RN    Goal Statement: I will identify and attend visit with Behavioral health provider within one month     Supportive Steps to Achieve: Pt will outreach to [facility] for low to no cost behaviorla health options   Barriers: Financial, and back pain   Strengths: Pt motivated to identify provider and RN CC discussed the importance of behavioral health as well as physical health   Date to Achieve By: review month       Pain Management (pt-stated)     Notes - Note created  5/14/2018  2:32 PM by Darline Delgadillo, RN    Goal Statement: I will have plan of care for ongoing back pain   Measure of Success: Pt will be able to complete ADL without barrier of pain  Supportive Steps to Achieve: RN CC provided information about [facility] to identifly low to no cost medical care options  Barriers: Job loss and loss of medical benefit - cost  Strengths: Pt motivated to decrease pain  Date to Achieve By: review monthly         Equal Access to Services     LAUREN WAGNER AH: Hadii korina adler Soluizali, waaxda luqadaha, qaybta kaalmada aman mueller. So [facility] [phone].    ATENCIÓN: Si habla español, tiene a norman disposición servicios gratuitos de asistencia lingüística. Llame al [phone].    We comply with  applicable federal civil rights laws and [place] laws. We do not discriminate on the basis of race, color, national origin, age, disability, sex, sexual orientation, or gender identity.            Thank you!     Thank you for choosing [facility]  for your care. Our goal is always to provide you with excellent care. Hearing back from our patients is one way we can continue to improve our services. Please take a few minutes to complete the written survey that you may receive in the mail after your visit with us. Thank you!             Your Updated Medication List - Protect others around you: Learn how to safely use, store and throw away your medicines at www.disposemymeds.org.          This list is accurate as of 8/10/18 11:59 PM.  Always use your most recent med list.                   Brand Name Dispense Instructions for use Diagnosis    * albuterol (2.5 MG/3ML) 0.083% neb solution      Take 3 mLs by nebulization every 6 hours as needed for shortness of breath / dyspnea. Dr [facility]    Chronic airway obstruction, not elsewhere classified, Personal history of tobacco use, presenting hazards to health       * PROAIR  (90 Base) MCG/ACT inhaler   Generic drug:  albuterol      Inhale 1-2 puffs into the lungs as needed for shortness of breath / dyspnea. DR [facility]    Chronic airway obstruction, not elsewhere classified, Personal history of tobacco use, presenting hazards to health       ASPIRIN NOT PRESCRIBED    INTENTIONAL    0 each    Please choose reason not prescribed, below        atorvastatin 40 MG tablet    LIPITOR    30 tablet    Take 1 tablet (40 mg) by mouth daily    Type 2 diabetes mellitus without complication, without long-term current use of insulin (H), Hyperlipidemia LDL goal <100       blood glucose lancets standard    no brand specified    100 each    Use to test blood sugar 2-3 times daily or as directed.    Type 2 diabetes mellitus  without complication, without long-term current use of insulin (H)       blood glucose monitoring test strip    ISAAC CONTOUR NEXT    100 strip    Use to test blood sugar 2-3 times daily or as directed.    Type 2 diabetes mellitus without complication, without long-term current use of insulin (H)       cholecalciferol 1000 units Tabs      Take 1,000 Units by mouth At Bedtime    Morbid obesity due to excess calories (H)       citalopram 40 MG tablet    celeXA    90 tablet    Take 1 tablet (40 mg) by mouth daily    Mild episode of recurrent major depressive disorder (H)       cyclobenzaprine 10 MG tablet    FLEXERIL     TAKE 1 TABLET 3 TIMES DAILY AS NEEDED.        fluticasone-vilanterol 200-25 MCG/INH inhaler    BREO ELLIPTA    3 Inhaler    Inhale 1 puff into the lungs daily    Chronic bronchitis, unspecified chronic bronchitis type (H)       gabapentin 300 MG capsule    NEURONTIN    540 capsule    Take 600 mg 3 times daily.    Spinal stenosis in cervical region       hydrOXYzine 25 MG tablet    ATARAX    90 tablet    Take 1 tablet (25 mg) by mouth At Bedtime    Intrinsic eczema       losartan 50 MG tablet    COZAAR    90 tablet    Take 1 tablet (50 mg) by mouth daily    Benign essential hypertension       mesalamine 1.2 g EC tablet    LIALDA     Take 1,200 mg by mouth 4 times daily        MICROLET LANCETS Misc      TEST 2-3 TIMES DAILY OR AS DIRECTED        Multi-vitamin Tabs tablet      Take 1 tablet by mouth daily        SPIRIVA HANDIHALER 18 MCG capsule   Generic drug:  tiotropium     30 capsule    Inhale contents of one capsule daily.        SUPER B COMPLEX PO      Take 1 tablet by mouth daily        traMADol 50 MG tablet    ULTRAM    20 tablet    Take 1 tablet (50 mg) by mouth every 6 hours as needed for severe pain    Spinal stenosis in cervical region       VSL#3 Caps     30 capsule    Take 1 capsule by mouth daily    Ulcerative rectosigmoiditis with complication (H)       warfarin 5 MG tablet    COUMADIN     30 tablet    Take 5 mg daily 6 days per week. Take1/2 tablet (2.5 mg) once a week    History of pulmonary embolism       * Notice:  This list has 2 medication(s) that are the same as other medications prescribed for you. Read the directions carefully, and ask your doctor or other care provider to review them with you.

## 2018-08-11 LAB
ALBUMIN SERPL-MCNC: 4.1 G/DL (ref 3.6–5.1)
ALBUMIN/GLOB SERPL: 1.5 (CALC) (ref 1–2.5)
ALP SERPL-CCNC: 68 U/L (ref 33–130)
ALT SERPL-CCNC: 22 U/L (ref 6–29)
AST SERPL-CCNC: 18 U/L (ref 10–35)
BILIRUB SERPL-MCNC: 0.6 MG/DL (ref 0.2–1.2)
BUN SERPL-MCNC: 11 MG/DL (ref 7–25)
BUN/CREATININE RATIO: ABNORMAL (CALC) (ref 6–22)
CALCIUM SERPL-MCNC: 9.4 MG/DL (ref 8.6–10.4)
CHLORIDE SERPLBLD-SCNC: 103 MMOL/L (ref 98–110)
CHOLEST SERPL-MCNC: 202 MG/DL
CHOLEST/HDLC SERPL: 4.1 (CALC)
CO2 SERPL-SCNC: 23 MMOL/L (ref 20–32)
CREAT SERPL-MCNC: 0.65 MG/DL (ref 0.5–0.99)
EGFR AFRICAN AMERICAN - QUEST: 112 ML/MIN/1.73M2
GFR SERPL CREATININE-BSD FRML MDRD: 96 ML/MIN/1.73M2
GLOBULIN, CALCULATED - QUEST: 2.7 G/DL (CALC) (ref 1.9–3.7)
GLUCOSE - QUEST: 114 MG/DL (ref 65–99)
HDLC SERPL-MCNC: 49 MG/DL
LDLC SERPL CALC-MCNC: 121 MG/DL (CALC)
NONHDLC SERPL-MCNC: 153 MG/DL (CALC)
POTASSIUM SERPL-SCNC: 4.1 MMOL/L (ref 3.5–5.3)
PROT SERPL-MCNC: 6.8 G/DL (ref 6.1–8.1)
SODIUM SERPL-SCNC: 140 MMOL/L (ref 135–146)
TRIGL SERPL-MCNC: 199 MG/DL

## 2018-08-12 DIAGNOSIS — M48.02 SPINAL STENOSIS IN CERVICAL REGION: ICD-10-CM

## 2018-08-13 RX ORDER — GABAPENTIN 300 MG/1
CAPSULE ORAL
Qty: 90 CAPSULE | Refills: 0 | OUTPATIENT
Start: 2018-08-13

## 2018-08-13 NOTE — TELEPHONE ENCOUNTER
Refused Prescriptions:                       Disp   Refills    gabapentin (NEURONTIN) 300 MG capsule [Pha*90 cap*0        Sig: TAKE ONE CAPSULE BY MOUTH IN THE MORNING AND TAKE 2           CAPSULES BY MOUTH NIGHTLY AT BEDTIME  Refused By: MERLIN MACEDO  Reason for Refusal: Request already responded to by other means (phone, fax, etc.)  Reason for Refusal Comment: refilled 8-    Evdarrion  [phone] (home) [phone] (work)

## 2018-08-15 ENCOUNTER — OFFICE VISIT (OUTPATIENT)
Dept: FAMILY MEDICINE | Facility: CLINIC | Age: 63
End: 2018-08-15

## 2018-08-15 VITALS — SYSTOLIC BLOOD PRESSURE: 132 MMHG | TEMPERATURE: 97.1 F | HEART RATE: 84 BPM | DIASTOLIC BLOOD PRESSURE: 78 MMHG

## 2018-08-15 DIAGNOSIS — Z86.711 HISTORY OF PULMONARY EMBOLISM: Primary | ICD-10-CM

## 2018-08-15 LAB — INR POINT OF CARE: 1.7 (ref 2–3)

## 2018-08-15 PROCEDURE — 36415 COLL VENOUS BLD VENIPUNCTURE: CPT | Performed by: PHYSICIAN ASSISTANT

## 2018-08-15 PROCEDURE — 99213 OFFICE O/P EST LOW 20 MIN: CPT | Performed by: PHYSICIAN ASSISTANT

## 2018-08-15 PROCEDURE — 85610 PROTHROMBIN TIME: CPT | Performed by: PHYSICIAN ASSISTANT

## 2018-08-15 NOTE — PROGRESS NOTES
Indication: Factor V Leiden    INR Today:  1.9  Current Dose:  Coumadin 2.5 mg per day starting 8/12/2018, 8/13/2018 and 8/14/2018. Had skipped 2 days prior to that with INR of 4.9    Bleeding Signs/Symptoms:  None  Including headache, stroke symptoms (confusion, weakness, slurred speech), vision changes, nosebleeds, red/black vomit, red/black stool, prolonged bleeded, dizziness, SOB, pica.  Thromboembolic Signs/Symptoms:  None  Including pain in one leg, swelling in extremity, chest pain, numbess, weakness, mental status change.      Medication Changes:  Yes- increased gabapentin  Dietary Changes:  No  Bacterial/Viral Infection:  No    Missed Coumadin Doses:  None, other than instructed    Other Concerns:  Did discuss ongoing pain. Has scheduled with pain clinic. Still waiting for rheumatology appt to be scheduled. Mood does feel a little bit better this week- higher gabapentin dose is helping.     OBJECTIVE:  /78 (BP Location: Right arm, Patient Position: Sitting, Cuff Size: Adult Large)  Pulse 84  Temp 97.1  F (36.2  C) (Oral)  There is no height or weight on file to calculate BMI.    Patient is a 62 year old female, in no acute distress. Vitals noted  Head: Normocephalic, atraumatic.  Eyes: Conjunctiva clear.  No discharge noted.  Ears: External ears, canals and TMs normal Bilaterally: gray and translucent.   Nose: Normal without discharge.  Mouth / Throat:   Pharynx non-erythematous, no exudates present, tonsils without hypertrophy. Mucous membranes moist.  Neck:  Neck supple, No lymphadenopathy, no thyromegaly.  Cardiac:  Normal rhythm and rate, no murmurs, rubs or gallops.  Lungs: clear to auscultation bilaterally; no wheezes, crackles or rhonchi      ASSESSMENT and PLAN:    Subtherapeutic INR for goal of 2-3    New Dose:   5 mg 4 x /week starting today  2.5 mg 3 x /week.       Next INR: 1 week    I will submit order for DME for walker as Darline is very unstable with weight     Hyun Braden  ELDON  8/16/2018

## 2018-08-15 NOTE — NURSING NOTE
Darline is here for INR        Pre-visit Screening:  Immunizations:  up to date  Colonoscopy:  is up to date  Mammogram: is up to date  Asthma Action Test/Plan:  NA  PHQ9:  none  GAD7:  none  Questioned patient about current smoking habits Pt. quit smoking some time ago.  Ok to leave detailed message on voice mail for today's visit only Yes, phone # [phone]

## 2018-08-15 NOTE — MR AVS SNAPSHOT
After Visit Summary   8/15/2018    Bere Machuca    MRN: [id number]           Patient Information     Date Of Birth          1955        Visit Information        Provider Department      8/15/2018 3:00 PM Hyun Braden PA-C [facility]        Today's Diagnoses     History of pulmonary embolism    -  1       Follow-ups after your visit        Follow-up notes from your care team     Return in about 1 week (around 8/22/2018).      Your next 10 appointments already scheduled     Aug 22, 2018  3:00 PM CDT   Office Visit with Hyun Braden PA-C   [facility] ([facility])    [address]  [address]  [facility] [address] [phone]              Who to contact     If you have questions or need follow up information about today's clinic visit or your schedule please contact [facility] directly at [phone].  Normal or non-critical lab and imaging results will be communicated to you by [facility] [employer], letter or phone within 4 business days after the clinic has received the results. If you do not hear from us within 7 days, please contact the clinic through "[employer]"t or phone. If you have a critical or abnormal lab result, we will notify you by phone as soon as possible.  Submit refill requests through [employer] or call your pharmacy and they will forward the refill request to us. Please allow 3 business days for your refill to be completed.          Additional Information About Your Visit        [employer] Tag Information     [employer] gives you secure access to your electronic health record. If you see a primary care provider, you can also send messages to your care team and make appointments. If you have questions, please call your primary care clinic.  If you do not have a primary care provider, please call [phone] and they will assist you.        Care EveryWhere ID     This is your Care  EveryWhere ID. This could be used by other organizations to access your [facility] medical records  [id number]        Your Vitals Were     Pulse Temperature                84 97.1  F (36.2  C) (Oral)           Blood Pressure from Last 3 Encounters:   08/15/18 132/78   08/10/18 128/88   05/04/18 134/82    Weight from Last 3 Encounters:   08/10/18 (!) 175.1 kg (386 lb)   03/29/18 (!) 175.5 kg (387 lb)   12/28/17 (!) 174.6 kg (385 lb)              We Performed the Following     CL BFP PROTHROMBIN TIME/INR (BFP)     VENOUS COLLECTION        Primary Care Provider Office Phone # Fax #    Wilma Armenta [phone] [address]  [address] [place] [address]        Goals        General    Financial Wellbeing (pt-stated)     Notes - Note created  5/14/2018  2:29 PM by Darline Delgadillo, RN    Goal Statement: I will have secured financial assistance or employment within 2-3 months  Measure of Success: Pt will be able to identify resources and plan   Supportive Steps to Achieve: RN CC provided contact information to [facility], Senior linkage line and introduce  CC if additional resources needed.   Barriers: Pt has ongoing back pain and is unemployed  Strengths: Pt is motivated and was able to verbalize understanding and need to connect with resources to review options.     Date to Achieve By: review in one month, sooner if additional CC needs         Mental Health Management (pt-stated)     Notes - Note created  5/14/2018  2:34 PM by Dalrine Delgadillo, RN    Goal Statement: I will identify and attend visit with Behavioral health provider within one month     Supportive Steps to Achieve: Pt will outreach to [facility] for low to no cost behaviorla health options   Barriers: Financial, and back pain   Strengths: Pt motivated to identify provider and RN CC discussed the importance of behavioral health as well as physical health   Date to Achieve By: review month       Pain  Management (pt-stated)     Notes - Note created  5/14/2018  2:32 PM by Darline Delgadillo, RN    Goal Statement: I will have plan of care for ongoing back pain   Measure of Success: Pt will be able to complete ADL without barrier of pain  Supportive Steps to Achieve: RN CC provided information about [facility] to identifly low to no cost medical care options  Barriers: Job loss and loss of medical benefit - cost  Strengths: Pt motivated to decrease pain  Date to Achieve By: review monthly         Equal Access to Services     LAUREN WAGNER AH: Hadii aad ku hadasho Soomaali, waaxda luqadaha, qaybta kaalmada adeegyada, waxay idiin hayjuan franciscon oneleg kharash lanano . So [facility] [phone].    ATENCIÓN: Si habla español, tiene a norman disposición servicios gratuitos de asistencia lingüística. Llame al [phone].    We comply with applicable federal civil rights laws and [place] laws. We do not discriminate on the basis of race, color, national origin, age, disability, sex, sexual orientation, or gender identity.            Thank you!     Thank you for choosing [facility]  for your care. Our goal is always to provide you with excellent care. Hearing back from our patients is one way we can continue to improve our services. Please take a few minutes to complete the written survey that you may receive in the mail after your visit with us. Thank you!             Your Updated Medication List - Protect others around you: Learn how to safely use, store and throw away your medicines at www.disposemymeds.org.          This list is accurate as of 8/15/18 11:59 PM.  Always use your most recent med list.                   Brand Name Dispense Instructions for use Diagnosis    * albuterol (2.5 MG/3ML) 0.083% neb solution      Take 3 mLs by nebulization every 6 hours as needed for shortness of breath / dyspnea.  [facility] pulmonary specialists    Chronic airway obstruction, not elsewhere classified, Personal  history of tobacco use, presenting hazards to health       * PROAIR  (90 Base) MCG/ACT inhaler   Generic drug:  albuterol      Inhale 1-2 puffs into the lungs as needed for shortness of breath / dyspnea.  [facility] pulmonary specialists    Chronic airway obstruction, not elsewhere classified, Personal history of tobacco use, presenting hazards to health       ASPIRIN NOT PRESCRIBED    INTENTIONAL    0 each    Please choose reason not prescribed, below        atorvastatin 40 MG tablet    LIPITOR    30 tablet    Take 1 tablet (40 mg) by mouth daily    Type 2 diabetes mellitus without complication, without long-term current use of insulin (H), Hyperlipidemia LDL goal <100       blood glucose lancets standard    no brand specified    100 each    Use to test blood sugar 2-3 times daily or as directed.    Type 2 diabetes mellitus without complication, without long-term current use of insulin (H)       blood glucose monitoring test strip    ISAAC CONTOUR NEXT    100 strip    Use to test blood sugar 2-3 times daily or as directed.    Type 2 diabetes mellitus without complication, without long-term current use of insulin (H)       cholecalciferol 1000 units Tabs      Take 1,000 Units by mouth At Bedtime    Morbid obesity due to excess calories (H)       citalopram 40 MG tablet    celeXA    90 tablet    Take 1 tablet (40 mg) by mouth daily    Mild episode of recurrent major depressive disorder (H)       cyclobenzaprine 10 MG tablet    FLEXERIL     TAKE 1 TABLET 3 TIMES DAILY AS NEEDED.        fluticasone-vilanterol 200-25 MCG/INH inhaler    BREO ELLIPTA    3 Inhaler    Inhale 1 puff into the lungs daily    Chronic bronchitis, unspecified chronic bronchitis type (H)       gabapentin 300 MG capsule    NEURONTIN    540 capsule    Take 600 mg 3 times daily.    Spinal stenosis in cervical region       hydrOXYzine 25 MG tablet    ATARAX    90 tablet    Take 1 tablet (25 mg) by mouth At Bedtime    Intrinsic eczema       losartan  50 MG tablet    COZAAR    90 tablet    Take 1 tablet (50 mg) by mouth daily    Benign essential hypertension       mesalamine 1.2 g EC tablet    LIALDA     Take 1,200 mg by mouth 4 times daily        MICROLET LANCETS Misc      TEST 2-3 TIMES DAILY OR AS DIRECTED        Multi-vitamin Tabs tablet      Take 1 tablet by mouth daily        SPIRIVA HANDIHALER 18 MCG capsule   Generic drug:  tiotropium     30 capsule    Inhale contents of one capsule daily.        SUPER B COMPLEX PO      Take 1 tablet by mouth daily        traMADol 50 MG tablet    ULTRAM    20 tablet    Take 1 tablet (50 mg) by mouth every 6 hours as needed for severe pain    Spinal stenosis in cervical region       VSL#3 Caps     30 capsule    Take 1 capsule by mouth daily    Ulcerative rectosigmoiditis with complication (H)       warfarin 5 MG tablet    COUMADIN    90 tablet    Take 1 tablet by mouth 3 days a week and take 1/2 tablet 4 days a week    History of pulmonary embolism       * Notice:  This list has 2 medication(s) that are the same as other medications prescribed for you. Read the directions carefully, and ask your doctor or other care provider to review them with you.

## 2018-08-21 ENCOUNTER — TRANSFERRED RECORDS (OUTPATIENT)
Dept: FAMILY MEDICINE | Facility: CLINIC | Age: 63
End: 2018-08-21

## 2018-08-22 ENCOUNTER — OFFICE VISIT (OUTPATIENT)
Dept: FAMILY MEDICINE | Facility: CLINIC | Age: 63
End: 2018-08-22

## 2018-08-22 VITALS
DIASTOLIC BLOOD PRESSURE: 80 MMHG | OXYGEN SATURATION: 97 % | TEMPERATURE: 98.2 F | SYSTOLIC BLOOD PRESSURE: 134 MMHG | HEART RATE: 84 BPM

## 2018-08-22 DIAGNOSIS — Z23 NEED FOR VACCINATION: ICD-10-CM

## 2018-08-22 DIAGNOSIS — J44.9 CHRONIC OBSTRUCTIVE PULMONARY DISEASE, UNSPECIFIED COPD TYPE (H): ICD-10-CM

## 2018-08-22 DIAGNOSIS — Z86.711 HISTORY OF PULMONARY EMBOLISM: Primary | ICD-10-CM

## 2018-08-22 DIAGNOSIS — D68.51 FACTOR 5 LEIDEN MUTATION, HETEROZYGOUS (H): ICD-10-CM

## 2018-08-22 LAB — INR POINT OF CARE: 1.7 (ref 2–3)

## 2018-08-22 PROCEDURE — 99213 OFFICE O/P EST LOW 20 MIN: CPT | Mod: 25 | Performed by: PHYSICIAN ASSISTANT

## 2018-08-22 PROCEDURE — 90471 IMMUNIZATION ADMIN: CPT | Performed by: PHYSICIAN ASSISTANT

## 2018-08-22 PROCEDURE — 85610 PROTHROMBIN TIME: CPT | Performed by: PHYSICIAN ASSISTANT

## 2018-08-22 PROCEDURE — 90750 HZV VACC RECOMBINANT IM: CPT | Performed by: PHYSICIAN ASSISTANT

## 2018-08-22 PROCEDURE — 36415 COLL VENOUS BLD VENIPUNCTURE: CPT | Performed by: PHYSICIAN ASSISTANT

## 2018-08-22 RX ORDER — BUDESONIDE AND FORMOTEROL FUMARATE DIHYDRATE 160; 4.5 UG/1; UG/1
2 AEROSOL RESPIRATORY (INHALATION) 2 TIMES DAILY
Qty: 3 INHALER | Refills: 1 | Status: SHIPPED | OUTPATIENT
Start: 2018-08-22 | End: 2019-06-21

## 2018-08-22 NOTE — PROGRESS NOTES
Indication: Factor V Leiden    INR Today: 1.7  Current Dose:  Coumadin 5 mg per day 4 days per week, 2 mg 3 days per week    Bleeding Signs/Symptoms:  None  Including headache, stroke symptoms (confusion, weakness, slurred speech), vision changes, nosebleeds, red/black vomit, red/black stool, prolonged bleeded, dizziness, SOB, pica.  Thromboembolic Signs/Symptoms:  None  Including pain in one leg, swelling in extremity, chest pain, numbess, weakness, mental status change.      Medication Changes:  No  Dietary Changes:  No  Bacterial/Viral Infection:  No    Missed Coumadin Doses:  None    Other Concerns:  Insurance does not cover Breo. Needs to be changed to Symbicort as alternative.    OBJECTIVE:  /80 (BP Location: Right arm, Patient Position: Sitting, Cuff Size: Adult Large)  Pulse 84  Temp 98.2  F (36.8  C) (Oral)  SpO2 97%  There is no height or weight on file to calculate BMI.    Patient is a 62 year old female, in no acute distress. Vitals noted  Head: Normocephalic, atraumatic.  Eyes: Conjunctiva clear.  No discharge noted.  Ears: External ears, canals and TMs normal Bilaterally: gray and translucent.   Nose: Normal without discharge.  Mouth / Throat:   Pharynx non-erythematous, no exudates present, tonsils without hypertrophy. Mucous membranes moist.  Neck:  Neck supple, No lymphadenopathy, no thyromegaly.  Cardiac:  Normal rhythm and rate, no murmurs, rubs or gallops.  Lungs: clear to auscultation bilaterally; no wheezes, crackles or rhonchi      ASSESSMENT and PLAN:    Subtherapeutic INR for goal of 2-3    New Dose: Coumadin 5 mg 5 days per week 2.5 mg 2 days per week    Sent new script for comparable dose of Symbicort since Breo no longer covered.    Next INR: 2 weeks    Hyun Braden PA-C  8/22/2018

## 2018-08-22 NOTE — NURSING NOTE
Darline is here for INR check and Shingles shot          Pre-visit Screening:  Immunizations:  up to date  Colonoscopy:  is up to date  Mammogram: is up to date  Asthma Action Test/Plan:  NA  PHQ9:  none  GAD7:  none  Questioned patient about current smoking habits Pt. quit smoking some time ago.  Ok to leave detailed message on voice mail for today's visit only Yes, phone # [phone]

## 2018-08-22 NOTE — MR AVS SNAPSHOT
After Visit Summary   8/22/2018    Bere Machuca    MRN: [id number]           Patient Information     Date Of Birth          1955        Visit Information        Provider Department      8/22/2018 3:00 PM Hyun Braden PA-C [facility]        Today's Diagnoses     History of pulmonary embolism    -  1    Chronic obstructive pulmonary disease, unspecified COPD type (H)        Need for vaccination        Factor 5 Leiden mutation, heterozygous/ INR 2-3           Follow-ups after your visit        Follow-up notes from your care team     Return in about 2 weeks (around 9/5/2018).      Who to contact     If you have questions or need follow up information about today's clinic visit or your schedule please contact [facility] directly at [phone].  Normal or non-critical lab and imaging results will be communicated to you by [employer], letter or phone within 4 business days after the clinic has received the results. If you do not hear from us within 7 days, please contact the clinic through [employer] or phone. If you have a critical or abnormal lab result, we will notify you by phone as soon as possible.  Submit refill requests through [employer] or call your pharmacy and they will forward the refill request to us. Please allow 3 business days for your refill to be completed.          Additional Information About Your Visit        [employer] Information     [employer] gives you secure access to your electronic health record. If you see a primary care provider, you can also send messages to your care team and make appointments. If you have questions, please call your primary care clinic.  If you do not have a primary care provider, please call [phone] and they will assist you.        Care EveryWhere ID     This is your Care EveryWhere ID. This could be used by other organizations to access your [facility] medical records  [id number]        Your  Vitals Were     Pulse Temperature Pulse Oximetry             84 98.2  F (36.8  C) (Oral) 97%          Blood Pressure from Last 3 Encounters:   08/22/18 134/80   08/15/18 132/78   08/10/18 128/88    Weight from Last 3 Encounters:   08/10/18 (!) 175.1 kg (386 lb)   03/29/18 (!) 175.5 kg (387 lb)   12/28/17 (!) 174.6 kg (385 lb)              We Performed the Following     CL BFP PROTHROMBIN TIME/INR (BFP)     HC ZOSTER VACCINE RECOMBINANT ADJUVANTED IM NJX     VACCINE ADMINISTRATION, INITIAL     VENOUS COLLECTION          Today's Medication Changes          These changes are accurate as of 8/22/18  5:17 PM.  If you have any questions, ask your nurse or doctor.               Start taking these medicines.        Dose/Directions    budesonide-formoterol 160-4.5 MCG/ACT Inhaler   Commonly known as:  SYMBICORT   Used for:  Chronic obstructive pulmonary disease, unspecified COPD type (H)   Started by:  Hyun Braden PA-C        Dose:  2 puff   Inhale 2 puffs into the lungs 2 times daily   Quantity:  3 Inhaler   Refills:  1         Stop taking these medicines if you haven't already. Please contact your care team if you have questions.     fluticasone-vilanterol 200-25 MCG/INH inhaler   Commonly known as:  BREO ELLIPTA   Stopped by:  Hyun Braden PA-C                Where to get your medicines      These medications were sent to [place] [address] [facility] - [address] [facility] [address]     Phone:  [phone]     budesonide-formoterol 160-4.5 MCG/ACT Inhaler                Primary Care Provider Office Phone # Fax #    Wilma Armenta [phone] [address]  [address] [place] [address]        Goals        General    Financial Wellbeing (pt-stated)     Notes - Note created  5/14/2018  2:29 PM by Darline Delgadillo, RN    Goal Statement: I will have secured financial assistance or employment within 2-3 months  Measure of  Success: Pt will be able to identify resources and plan   Supportive Steps to Achieve: RN CC provided contact information to [facility], Senior linkage line and introduce SW CC if additional resources needed.   Barriers: Pt has ongoing back pain and is unemployed  Strengths: Pt is motivated and was able to verbalize understanding and need to connect with resources to review options.     Date to Achieve By: review in one month, sooner if additional CC needs         Mental Health Management (pt-stated)     Notes - Note created  5/14/2018  2:34 PM by Darline Delgadillo, RN    Goal Statement: I will identify and attend visit with Behavioral health provider within one month     Supportive Steps to Achieve: Pt will outreach to [facility] for low to no cost behaviorla health options   Barriers: Financial, and back pain   Strengths: Pt motivated to identify provider and RN CC discussed the importance of behavioral health as well as physical health   Date to Achieve By: review month       Pain Management (pt-stated)     Notes - Note created  5/14/2018  2:32 PM by Darline Delgadillo, RN    Goal Statement: I will have plan of care for ongoing back pain   Measure of Success: Pt will be able to complete ADL without barrier of pain  Supportive Steps to Achieve: RN CC provided information about [facility] to identifly low to no cost medical care options  Barriers: Job loss and loss of medical benefit - cost  Strengths: Pt motivated to decrease pain  Date to Achieve By: review monthly         Equal Access to Services     LAUREN WAGNER AH: Hadii korina robertoo Soomaali, waaxda luqadaha, qaybta kaalmada adevivek, aman love. So [facility] [phone].    ATENCIÓN: Si habla español, tiene a norman disposición servicios gratuitos de asistencia lingüística. Llame al [phone].    We comply with applicable federal civil rights laws and [place] laws. We do not discriminate on the basis of  race, color, national origin, age, disability, sex, sexual orientation, or gender identity.            Thank you!     Thank you for choosing [facility]  for your care. Our goal is always to provide you with excellent care. Hearing back from our patients is one way we can continue to improve our services. Please take a few minutes to complete the written survey that you may receive in the mail after your visit with us. Thank you!             Your Updated Medication List - Protect others around you: Learn how to safely use, store and throw away your medicines at www.disposemymeds.org.          This list is accurate as of 8/22/18  5:17 PM.  Always use your most recent med list.                   Brand Name Dispense Instructions for use Diagnosis    * albuterol (2.5 MG/3ML) 0.083% neb solution      Take 3 mLs by nebulization every 6 hours as needed for shortness of breath / dyspnea. Dr ANDREWS pulmonary specialists    Chronic airway obstruction, not elsewhere classified, Personal history of tobacco use, presenting hazards to health       * PROAIR  (90 Base) MCG/ACT inhaler   Generic drug:  albuterol      Inhale 1-2 puffs into the lungs as needed for shortness of breath / dyspnea. DR MELENDEZ pulmonary specialists    Chronic airway obstruction, not elsewhere classified, Personal history of tobacco use, presenting hazards to health       ASPIRIN NOT PRESCRIBED    INTENTIONAL    0 each    Please choose reason not prescribed, below        atorvastatin 40 MG tablet    LIPITOR    30 tablet    Take 1 tablet (40 mg) by mouth daily    Type 2 diabetes mellitus without complication, without long-term current use of insulin (H), Hyperlipidemia LDL goal <100       blood glucose lancets standard    no brand specified    100 each    Use to test blood sugar 2-3 times daily or as directed.    Type 2 diabetes mellitus without complication, without long-term current use of insulin (H)       blood glucose monitoring  "test strip    ISAAC CONTOUR NEXT    100 strip    Use to test blood sugar 2-3 times daily or as directed.    Type 2 diabetes mellitus without complication, without long-term current use of insulin (H)       budesonide-formoterol 160-4.5 MCG/ACT Inhaler    SYMBICORT    3 Inhaler    Inhale 2 puffs into the lungs 2 times daily    Chronic obstructive pulmonary disease, unspecified COPD type (H)       cholecalciferol 1000 units Tabs      Take 1,000 Units by mouth At Bedtime    Morbid obesity due to excess calories (H)       citalopram 40 MG tablet    celeXA    90 tablet    Take 1 tablet (40 mg) by mouth daily    Mild episode of recurrent major depressive disorder (H)       cyclobenzaprine 10 MG tablet    FLEXERIL     TAKE 1 TABLET 3 TIMES DAILY AS NEEDED.        gabapentin 300 MG capsule    NEURONTIN    540 capsule    Take 600 mg 3 times daily.    Spinal stenosis in cervical region       hydrOXYzine 25 MG tablet    ATARAX    90 tablet    Take 1 tablet (25 mg) by mouth At Bedtime    Intrinsic eczema       losartan 50 MG tablet    COZAAR    90 tablet    Take 1 tablet (50 mg) by mouth daily    Benign essential hypertension       mesalamine 1.2 g EC tablet    LIALDA     Take 1,200 mg by mouth 4 times daily        MICROLET LANCETS Misc      TEST 2-3 TIMES DAILY OR AS DIRECTED        Multi-vitamin Tabs tablet      Take 1 tablet by mouth daily        order for DME     1 Device    Equipment being ordered: Walker with seat due to chronic low back pain with herniated discs, morbid obesity, and bone-on-bone OA in right hip.   Pt requires larger size \"extra wide\" with weight of 386 lbs.    Chronic low back pain, unspecified back pain laterality, with sciatica presence unspecified, Morbid obesity due to excess calories (H), Primary osteoarthritis of right hip       SPIRIVA HANDIHALER 18 MCG capsule   Generic drug:  tiotropium     30 capsule    Inhale contents of one capsule daily.        SUPER B COMPLEX PO      Take 1 tablet by mouth " daily        traMADol 50 MG tablet    ULTRAM    20 tablet    Take 1 tablet (50 mg) by mouth every 6 hours as needed for severe pain    Spinal stenosis in cervical region       VSL#3 Caps     30 capsule    Take 1 capsule by mouth daily    Ulcerative rectosigmoiditis with complication (H)       warfarin 5 MG tablet    COUMADIN    90 tablet    Take 1 tablet by mouth 3 days a week and take 1/2 tablet 4 days a week    History of pulmonary embolism       * Notice:  This list has 2 medication(s) that are the same as other medications prescribed for you. Read the directions carefully, and ask your doctor or other care provider to review them with you.

## 2018-09-06 ENCOUNTER — PATIENT OUTREACH (OUTPATIENT)
Dept: CARE COORDINATION | Facility: CLINIC | Age: 63
End: 2018-09-06

## 2018-09-06 NOTE — PROGRESS NOTES
Clinic Care Coordination Contact    Situation: Patient chart reviewed by care coordinator.    Background: RN CC spoke with patient to assist with enrollment in medical insurance program   Chart review notes patient contacted care team and verify enrollment complete and was able to get medication refills and attend specialist visit as recommended.     No further outreaches will be made at this time unless a new referral is made or a change in the pt's status occurs. Patient was provided with this writer's contact information and encouraged to call with any questions or concerns.      Darline Delgadillo RN  Clinic Care Coordinator  Mobile: [phone]  [email or web address]

## 2018-09-21 ENCOUNTER — OFFICE VISIT (OUTPATIENT)
Dept: FAMILY MEDICINE | Facility: CLINIC | Age: 63
End: 2018-09-21

## 2018-09-21 VITALS
OXYGEN SATURATION: 96 % | HEART RATE: 102 BPM | TEMPERATURE: 98 F | WEIGHT: 293 LBS | SYSTOLIC BLOOD PRESSURE: 130 MMHG | DIASTOLIC BLOOD PRESSURE: 78 MMHG | BODY MASS INDEX: 61.82 KG/M2

## 2018-09-21 DIAGNOSIS — G89.29 CHRONIC BILATERAL LOW BACK PAIN WITH BILATERAL SCIATICA: Primary | ICD-10-CM

## 2018-09-21 DIAGNOSIS — D68.51 FACTOR 5 LEIDEN MUTATION, HETEROZYGOUS (H): ICD-10-CM

## 2018-09-21 DIAGNOSIS — Z23 NEED FOR VACCINATION: ICD-10-CM

## 2018-09-21 DIAGNOSIS — M54.42 CHRONIC BILATERAL LOW BACK PAIN WITH BILATERAL SCIATICA: Primary | ICD-10-CM

## 2018-09-21 DIAGNOSIS — M54.41 CHRONIC BILATERAL LOW BACK PAIN WITH BILATERAL SCIATICA: Primary | ICD-10-CM

## 2018-09-21 LAB — INR POINT OF CARE: 2 (ref 2–3)

## 2018-09-21 PROCEDURE — 85610 PROTHROMBIN TIME: CPT | Performed by: PHYSICIAN ASSISTANT

## 2018-09-21 PROCEDURE — 90471 IMMUNIZATION ADMIN: CPT | Performed by: PHYSICIAN ASSISTANT

## 2018-09-21 PROCEDURE — 90686 IIV4 VACC NO PRSV 0.5 ML IM: CPT | Performed by: PHYSICIAN ASSISTANT

## 2018-09-21 PROCEDURE — 99213 OFFICE O/P EST LOW 20 MIN: CPT | Mod: 25 | Performed by: PHYSICIAN ASSISTANT

## 2018-09-21 PROCEDURE — 36415 COLL VENOUS BLD VENIPUNCTURE: CPT | Performed by: PHYSICIAN ASSISTANT

## 2018-09-21 RX ORDER — TRAMADOL HYDROCHLORIDE 50 MG/1
50-100 TABLET ORAL EVERY 6 HOURS PRN
Qty: 20 TABLET | Refills: 0 | Status: SHIPPED | OUTPATIENT
Start: 2018-09-21 | End: 2019-04-16

## 2018-09-21 NOTE — NURSING NOTE
Darline is here for INR and flu shot        Pre-visit Screening:  Immunizations:  up to date  Colonoscopy:  is up to date  Mammogram: is up to date  Asthma Action Test/Plan:  NA  PHQ9:  none  GAD7:  none  Questioned patient about current smoking habits Pt. quit smoking some time ago.  Ok to leave detailed message on voice mail for today's visit only Yes, phone # [phone]

## 2018-09-21 NOTE — MR AVS SNAPSHOT
After Visit Summary   9/21/2018    Bere Machuca    MRN: [id number]           Patient Information     Date Of Birth          1955        Visit Information        Provider Department      9/21/2018 1:45 PM Hyun Braden PA-C [facility]        Today's Diagnoses     Chronic bilateral low back pain with bilateral sciatica    -  1    Need for vaccination        Factor 5 Leiden mutation, heterozygous/ INR 2-3           Follow-ups after your visit        Follow-up notes from your care team     Return in about 4 weeks (around 10/19/2018) for Routine Visit, Lab Work.      Who to contact     If you have questions or need follow up information about today's clinic visit or your schedule please contact [facility] directly at [phone].  Normal or non-critical lab and imaging results will be communicated to you by MyChart, letter or phone within 4 business days after the clinic has received the results. If you do not hear from us within 7 days, please contact the clinic through [employer] or phone. If you have a critical or abnormal lab result, we will notify you by phone as soon as possible.  Submit refill requests through [employer] or call your pharmacy and they will forward the refill request to us. Please allow 3 business days for your refill to be completed.          Additional Information About Your Visit        MyChart Information     [employer] gives you secure access to your electronic health record. If you see a primary care provider, you can also send messages to your care team and make appointments. If you have questions, please call your primary care clinic.  If you do not have a primary care provider, please call [phone] and they will assist you.        Care EveryWhere ID     This is your Care EveryWhere ID. This could be used by other organizations to access your [place] medical records  [id number]        Your Vitals Were      Pulse Temperature Pulse Oximetry BMI (Body Mass Index)          102 98  F (36.7  C) (Oral) 96% 61.82 kg/m2         Blood Pressure from Last 3 Encounters:   09/21/18 130/78   08/22/18 134/80   08/15/18 132/78    Weight from Last 3 Encounters:   09/21/18 (!) 173.7 kg (383 lb)   08/10/18 (!) 175.1 kg (386 lb)   03/29/18 (!) 175.5 kg (387 lb)              We Performed the Following     CL BFP PROTHROMBIN TIME/INR (BFP)     HC FLU VAC PRESRV FREE QUAD SPLIT VIR 3+YRS IM     VACCINE ADMINISTRATION, INITIAL     VENOUS COLLECTION          Today's Medication Changes          These changes are accurate as of 9/21/18  5:05 PM.  If you have any questions, ask your nurse or doctor.               These medicines have changed or have updated prescriptions.        Dose/Directions    * traMADol 50 MG tablet   Commonly known as:  ULTRAM   This may have changed:  Another medication with the same name was added. Make sure you understand how and when to take each.   Used for:  Spinal stenosis in cervical region   Changed by:  Hyun Braden PA-C        Dose:  50 mg   Take 1 tablet (50 mg) by mouth every 6 hours as needed for severe pain   Quantity:  20 tablet   Refills:  0       * traMADol 50 MG tablet   Commonly known as:  ULTRAM   This may have changed:  You were already taking a medication with the same name, and this prescription was added. Make sure you understand how and when to take each.   Used for:  Chronic bilateral low back pain with bilateral sciatica   Changed by:  Hyun Braden PA-C        Dose:   mg   Take 1-2 tablets ( mg) by mouth every 6 hours as needed for severe pain   Quantity:  20 tablet   Refills:  0       * Notice:  This list has 2 medication(s) that are the same as other medications prescribed for you. Read the directions carefully, and ask your doctor or other care provider to review them with you.         Where to get your medicines      Some of these will need a paper prescription  and others can be bought over the counter.  Ask your nurse if you have questions.     Bring a paper prescription for each of these medications     traMADol 50 MG tablet               Information about OPIOIDS     PRESCRIPTION OPIOIDS: WHAT YOU NEED TO KNOW   We gave you an opioid (narcotic) pain medicine. It is important to manage your pain, but opioids are not always the best choice. You should first try all the other options your care team gave you. Take this medicine for as short a time (and as few doses) as possible.    Some activities can increase your pain, such as bandage changes or therapy sessions. It may help to take your pain medicine 30 to 60 minutes before these activities. Reduce your stress by getting enough sleep, working on hobbies you enjoy and practicing relaxation or meditation. Talk to your care team about ways to manage your pain beyond prescription opioids.    These medicines have risks:    DO NOT drive when on new or higher doses of pain medicine. These medicines can affect your alertness and reaction times, and you could be arrested for driving under the influence (DUI). If you need to use opioids long-term, talk to your care team about driving.    DO NOT operate heavy machinery    DO NOT do any other dangerous activities while taking these medicines.    DO NOT drink any alcohol while taking these medicines.     If the opioid prescribed includes acetaminophen, DO NOT take with any other medicines that contain acetaminophen. Read all labels carefully. Look for the word  acetaminophen  or  Tylenol.  Ask your pharmacist if you have questions or are unsure.    You can get addicted to pain medicines, especially if you have a history of addiction (chemical, alcohol or substance dependence). Talk to your care team about ways to reduce this risk.    All opioids tend to cause constipation. Drink plenty of water and eat foods that have a lot of fiber, such as fruits, vegetables, prune juice, apple  juice and high-fiber cereal. Take a laxative (Miralax, milk of magnesia, Colace, Senna) if you don t move your bowels at least every other day. Other side effects include upset stomach, sleepiness, dizziness, throwing up, tolerance (needing more of the medicine to have the same effect), physical dependence and slowed breathing.    Store your pills in a secure place, locked if possible. We will not replace any lost or stolen medicine. If you don t finish your medicine, please throw away (dispose) as directed by your pharmacist. The [place] Pollution Control Agency has more information about safe disposal: [email or web address]         Primary Care Provider Office Phone # Fax #    Wilma Armenta [phone] [address]  [address] [place] [phone]        Equal Access to Services     ALEXANDRE [facility] : Hadii aad ku hadasho Sohailey, waaxda luqadaha, qaybta kaalmada adechristopheryada, aman chavez . So [facility] [phone].    ATENCIÓN: Si habla español, tiene a norman disposición servicios gratuitos de asistencia lingüística. Christian al [phone].    We comply with applicable federal civil rights laws and [place] laws. We do not discriminate on the basis of race, color, national origin, age, disability, sex, sexual orientation, or gender identity.            Thank you!     Thank you for choosing [facility]  for your care. Our goal is always to provide you with excellent care. Hearing back from our patients is one way we can continue to improve our services. Please take a few minutes to complete the written survey that you may receive in the mail after your visit with us. Thank you!             Your Updated Medication List - Protect others around you: Learn how to safely use, store and throw away your medicines at www.disposemymeds.org.          This list is accurate as of 9/21/18  5:05 PM.  Always  use your most recent med list.                   Brand Name Dispense Instructions for use Diagnosis    * albuterol (2.5 MG/3ML) 0.083% neb solution      Take 3 mLs by nebulization every 6 hours as needed for shortness of breath / dyspnea. Dr [facility]RE[facility]    Chronic airway obstruction, not elsewhere classified, Personal history of tobacco use, presenting hazards to health       * PROAIR  (90 Base) MCG/ACT inhaler   Generic drug:  albuterol      Inhale 1-2 puffs into the lungs as needed for shortness of breath / dyspnea. DR [facility]ELENDEZ [facility]    Chronic airway obstruction, not elsewhere classified, Personal history of tobacco use, presenting hazards to health       ASPIRIN NOT PRESCRIBED    INTENTIONAL    0 each    Please choose reason not prescribed, below        atorvastatin 40 MG tablet    LIPITOR    30 tablet    Take 1 tablet (40 mg) by mouth daily    Type 2 diabetes mellitus without complication, without long-term current use of insulin (H), Hyperlipidemia LDL goal <100       blood glucose lancets standard    no brand specified    100 each    Use to test blood sugar 2-3 times daily or as directed.    Type 2 diabetes mellitus without complication, without long-term current use of insulin (H)       blood glucose monitoring test strip    ISAAC CONTOUR NEXT    100 strip    Use to test blood sugar 2-3 times daily or as directed.    Type 2 diabetes mellitus without complication, without long-term current use of insulin (H)       budesonide-formoterol 160-4.5 MCG/ACT Inhaler    SYMBICORT    3 Inhaler    Inhale 2 puffs into the lungs 2 times daily    Chronic obstructive pulmonary disease, unspecified COPD type (H)       cholecalciferol 1000 units Tabs      Take 1,000 Units by mouth At Bedtime    Morbid obesity due to excess calories (H)       citalopram 40 MG tablet    celeXA    90 tablet    Take 1 tablet (40 mg) by mouth daily    Mild episode of recurrent major depressive disorder (H)        "cyclobenzaprine 10 MG tablet    FLEXERIL     TAKE 1 TABLET 3 TIMES DAILY AS NEEDED.        gabapentin 300 MG capsule    NEURONTIN    540 capsule    Take 600 mg 3 times daily.    Spinal stenosis in cervical region       hydrOXYzine 25 MG tablet    ATARAX    90 tablet    Take 1 tablet (25 mg) by mouth At Bedtime    Intrinsic eczema       losartan 50 MG tablet    COZAAR    90 tablet    Take 1 tablet (50 mg) by mouth daily    Benign essential hypertension       mesalamine 1.2 g EC tablet    LIALDA     Take 1,200 mg by mouth 4 times daily        MICROLET LANCETS Misc      TEST 2-3 TIMES DAILY OR AS DIRECTED        Multi-vitamin Tabs tablet      Take 1 tablet by mouth daily        order for DME     1 Device    Equipment being ordered: Walker with seat due to chronic low back pain with herniated discs, morbid obesity, and bone-on-bone OA in right hip.   Pt requires larger size \"extra wide\" with weight of 386 lbs.    Chronic low back pain, unspecified back pain laterality, with sciatica presence unspecified, Morbid obesity due to excess calories (H), Primary osteoarthritis of right hip       SPIRIVA HANDIHALER 18 MCG capsule   Generic drug:  tiotropium     30 capsule    Inhale contents of one capsule daily.        SUPER B COMPLEX PO      Take 1 tablet by mouth daily        * traMADol 50 MG tablet    ULTRAM    20 tablet    Take 1 tablet (50 mg) by mouth every 6 hours as needed for severe pain    Spinal stenosis in cervical region       * traMADol 50 MG tablet    ULTRAM    20 tablet    Take 1-2 tablets ( mg) by mouth every 6 hours as needed for severe pain    Chronic bilateral low back pain with bilateral sciatica       VSL#3 Caps     30 capsule    Take 1 capsule by mouth daily    Ulcerative rectosigmoiditis with complication (H)       warfarin 5 MG tablet    COUMADIN    90 tablet    Take 1 tablet by mouth 3 days a week and take 1/2 tablet 4 days a week    History of pulmonary embolism       * Notice:  This list has 4 " medication(s) that are the same as other medications prescribed for you. Read the directions carefully, and ask your doctor or other care provider to review them with you.

## 2018-09-21 NOTE — PROGRESS NOTES
Indication: Factor V Leiden    INR Today:  2.0  Current Dose:   Coumadin  5 mg per day x 5 days per week       2.5 mg per day x 2 days per week    Bleeding Signs/Symptoms:  None  Including headache, stroke symptoms (confusion, weakness, slurred speech), vision changes, nosebleeds, red/black vomit, red/black stool, prolonged bleeded, dizziness, SOB, pica.  Thromboembolic Signs/Symptoms:  None  Including pain in one leg, swelling in extremity, chest pain, numbess, weakness, mental status change.      Medication Changes:  No  Dietary Changes:  No  Bacterial/Viral Infection:  No    Missed Coumadin Doses:  None    Other Concerns: Would like more tramadol for pain. Seeing rheumatology next week.    OBJECTIVE:  /78 (BP Location: Left arm, Patient Position: Sitting, Cuff Size: Adult Large)  Pulse 102  Temp 98  F (36.7  C) (Oral)  Wt (!) 173.7 kg (383 lb)  SpO2 96%  BMI 61.82 kg/m2  Body mass index is 61.82 kg/(m^2).    Patient is a 62 year old female, in no acute distress. Vitals noted.  Head: Normocephalic, atraumatic.  Neck:  Neck supple, No lymphadenopathy, no thyromegaly.  Cardiac:  Normal rhythm and rate, no murmurs, rubs or gallops.  Lungs: clear to auscultation bilaterally; no wheezes, crackles or rhonchi      ASSESSMENT and PLAN:    Therapeutic INR for goal of 2-3    New Dose: Same dose.  5 mg per day x 5 days per week      2.5 mg per day x 2 days per week    Next INR: 4 weeks    Refilled tramadol to get through to rheumatology appt.     Hyun Braden PA-C  9/21/2018

## 2018-10-25 ENCOUNTER — TRANSFERRED RECORDS (OUTPATIENT)
Dept: FAMILY MEDICINE | Facility: CLINIC | Age: 63
End: 2018-10-25

## 2018-11-07 DIAGNOSIS — M48.02 SPINAL STENOSIS IN CERVICAL REGION: ICD-10-CM

## 2018-11-07 RX ORDER — GABAPENTIN 300 MG/1
600 CAPSULE ORAL 3 TIMES DAILY
Qty: 540 CAPSULE | Refills: 0 | Status: SHIPPED | OUTPATIENT
Start: 2018-11-07 | End: 2019-04-12

## 2018-11-07 NOTE — TELEPHONE ENCOUNTER
Bere Machuca is requesting a refill of:    Pending Prescriptions:                       Disp   Refills    gabapentin (NEURONTIN) 300 MG capsule     540 ca*0            Sig: Take 2 capsules (600 mg) by mouth 3 times daily    Pt has OV on 11/15/18  Please advise

## 2018-11-21 NOTE — PROGRESS NOTES
SUBJECTIVE:                                                    Bere Machuca is a 63 year old female who presents to clinic today for the following health issues:        Chronic coumadin use    Indication: Factor V Leiden    Lab Results   Component Value Date    INR 2.1 11/23/2018    INR 2.0 09/21/2018    INR 1.7 (A) 08/22/2018    INR 1.7 (A) 08/15/2018    INR 4.9 (A) 08/10/2018    INR 1.6 (A) 05/04/2018       Current Coumadin Dose:  5mg  All days except 2.5 mg on M and Thurs    Bleeding Signs/Symptoms:  None  Thromboembolic Signs/Symptoms:  None    Medication Changes:  Yes: New Dg RA - 15mg daily Prednisone  until appt next  Dietary Changes:  No  Bacterial/Viral Infection:  No    Missed Coumadin Doses:  None    Other Concerns:    none    ROS:   C: NEGATIVE for fever, chills, change in weight  E: NEGATIVE for vision changes or irritation  E/M: NEGATIVE for ear, mouth and throat problems  R: NEGATIVE for significant cough or SOB  CV: NEGATIVE for chest pain, palpitations or peripheral edema          Labs reviewed in EPIC  BP Readings from Last 3 Encounters:   11/23/18 130/80   09/21/18 130/78   08/22/18 134/80    Wt Readings from Last 3 Encounters:   11/23/18 (!) 170.6 kg (376 lb)   09/21/18 (!) 173.7 kg (383 lb)   08/10/18 (!) 175.1 kg (386 lb)                  Patient Active Problem List   Diagnosis     History of cocaine abuse     Cigarette - quit 2014 -  now vaping     Family history of malignant neoplasm of gastrointestinal tract     Esophageal reflux     Chronic airway obstruction (H)     Hypertension     Sleep apnea     Migraine     ACP (advance care planning)     Factor 5 Leiden mutation, heterozygous/ INR 2-3     Health Care Home     Family history of pulmonary embolism     Lymphedema of arm     Spinal stenosis in cervical region     History of pulmonary embolism     Major depressive disorder, recurrent episode, moderate (H)     Superior vena cava syndrome     Morbid obesity due to excess calories (H)      Displacement of lumbar intervertebral disc without myelopathy     Type 2 diabetes mellitus without complication, without long-term current use of insulin (H)     Ulcerative rectosigmoiditis (H)     Past Surgical History:   Procedure Laterality Date     BIOPSY OF SKIN LESION  multiple    keloids     C ANESTH,REPAIR LO ABD HERNIA NOS  multiple/  last    panniculus     C EXPLORATORY OF ABDOMEN       C PLASTIC SURGERY, NECK  age 4     C REMOVAL COLON/ILEOSTOMY      reastamosis     C TOTAL ABDOM HYSTERECTOMY      Hysterectomy, Total Abdominal     HC REMOVAL OF OVARY/TUBE(S)      Salpingo-Oophorectomy, Unilateral     HC REMOVE ABDOMINAL WALL LESION      abscess     HC REMOVE TONSILS/ADENOIDS,<11 Y/O  age 14    T & A <14y.o.     HERNIA REPAIR       INSERTION OF ACCESS PORT  1--96    sepsis- removed after hospital stay       Social History   Substance Use Topics     Smoking status: Former Smoker     Packs/day: 0.50     Years: 35.00     Types: Cigarettes     Quit date: 2014     Smokeless tobacco: Never Used     Alcohol use 0.5 oz/week     1 Standard drinks or equivalent per week      Comment: social     Family History   Problem Relation Age of Onset     Respiratory Mother      copd     Cancer Mother      skin cancers/ melanomas     C.A.D. Mother      MI  age 79     Cancer - colorectal Father      / diagnosis age 72     GASTROINTESTINAL DISEASE Father      colitis     Breast Cancer No family hx of          Current Outpatient Prescriptions   Medication Sig Dispense Refill     albuterol (2.5 MG/3ML) 0.083% nebulizer solution Take 3 mLs by nebulization every 6 hours as needed for shortness of breath / dyspnea. Dr [facility]ELENDEZ [facility]       albuterol (PROAIR HFA) 108 (90 BASE) MCG/ACT inhaler Inhale 1-2 puffs into the lungs as needed for shortness of breath / dyspnea. DR MELENDEZ [facility]       atorvastatin (LIPITOR) 40 MG tablet Take 1 tablet (40 mg) by mouth daily 30 tablet 0  "    B Complex-C (SUPER B COMPLEX PO) Take 1 tablet by mouth daily       budesonide-formoterol (SYMBICORT) 160-4.5 MCG/ACT Inhaler Inhale 2 puffs into the lungs 2 times daily 3 Inhaler 1     cholecalciferol 1000 UNITS TABS Take 1,000 Units by mouth At Bedtime        citalopram (CELEXA) 40 MG tablet Take 1 tablet (40 mg) by mouth daily 90 tablet 0     cyclobenzaprine (FLEXERIL) 10 MG tablet TAKE 1 TABLET 3 TIMES DAILY AS NEEDED.  3     gabapentin (NEURONTIN) 300 MG capsule Take 2 capsules (600 mg) by mouth 3 times daily 540 capsule 0     hydrOXYzine (ATARAX) 25 MG tablet Take 1 tablet (25 mg) by mouth At Bedtime 90 tablet 0     losartan (COZAAR) 50 MG tablet Take 1 tablet (50 mg) by mouth daily 90 tablet 1     mesalamine (LIALDA) 1.2 G EC tablet Take 1,200 mg by mouth 4 times daily   4     multivitamin, therapeutic with minerals (MULTI-VITAMIN) TABS Take 1 tablet by mouth daily       order for DME Equipment being ordered: Walker with seat due to chronic low back pain with herniated discs, morbid obesity, and bone-on-bone OA in right hip.     Pt requires larger size \"extra wide\" with weight of 386 lbs. 1 Device 0     PREDNISONE PO Take 5 mg by mouth 3 times daily       Probiotic Product (VSL#3) CAPS Take 1 capsule by mouth daily 30 capsule 0     tiotropium (SPIRIVA HANDIHALER) 18 MCG capsule Inhale contents of one capsule daily. 30 capsule 1     traMADol (ULTRAM) 50 MG tablet Take 1-2 tablets ( mg) by mouth every 6 hours as needed for severe pain 20 tablet 0     warfarin (COUMADIN) 5 MG tablet 1 Tablet all days except 1/2 tablet on Mondays and Thursdays 90 tablet 0     ASPIRIN NOT PRESCRIBED (INTENTIONAL) Please choose reason not prescribed, below 0 each 0     blood glucose (NO BRAND SPECIFIED) lancets standard Use to test blood sugar 2-3 times daily or as directed. 100 each 1     blood glucose monitoring (ISAAC CONTOUR NEXT) test strip Use to test blood sugar 2-3 times daily or as directed. 100 strip 3     " MICROLET LANCETS MISC TEST 2-3 TIMES DAILY OR AS DIRECTED  1     [DISCONTINUED] losartan (COZAAR) 50 MG tablet Take 1 tablet (50 mg) by mouth daily 90 tablet 0     [DISCONTINUED] warfarin (COUMADIN) 5 MG tablet Take 1 tablet by mouth 3 days a week and take 1/2 tablet 4 days a week 90 tablet 0     Allergies   Allergen Reactions     Metformin Diarrhea     Aspirin      PN: LW Reaction: sensitive/stomach     Lyrica [Pregabalin]      dizziness     Tape [Adhesive Tape] Itching     Vancomycin      Recent Labs   Lab Test  08/10/18   1549 08/10/18  03/29/18   1515  03/29/18   1434  11/25/17   1805  11/20/17   1505  11/20/17   1458   06/23/17   0551  10/24/16   01/11/16   1541   A1C   --   6.4   --   7.0   --   7.4*   --    < >   --    --   6.8   --    --    LDL  121*   --   76   --    --    --   173*   --    --    --    --    --    --    HDL  49*   --   76   --    --    --   86   --    --    --    --    --    --    TRIG  199*   --   139   --    --    --   162*   --    --    --    --    --    --    ALT  22   --   49*   --    --    --   53*   < >   --    < >  42   --    --    CR  0.65   --   0.64   --   0.64   --   0.65   < >  0.74   < >  0.78   < >   --    GFRESTIMATED  96   --   97   --   >90   --   96   < >  81   < >   --    < >   --    GFRESTBLACK   --    --    --    --   >90   --    --    --   >90   GFR Calc     < >   --    < >   --    POTASSIUM  4.1   --   4.0   --   3.5   --   3.8   < >  3.8   < >  4.5   < >   --    TSH   --    --    --    --    --    --    --    --    --    --   2.85   --   2.57    < > = values in this interval not displayed.            OBJECTIVE:                                                    /80 (BP Location: Right arm, Patient Position: Sitting, Cuff Size: Adult Large)  Pulse 72  Temp 98.2  F (36.8  C) (Oral)  Wt (!) 170.6 kg (376 lb)  SpO2 98%  BMI 60.69 kg/m2   Body mass index is 60.69 kg/(m^2).       Patient is a 63 year old female, in no acute distress. Vitals  noted   Head: Normocephalic, atraumatic.  Eyes: Conjunctiva clear.  No discharge noted.  Ears: External ears, canals and TMs normal Bilaterally: gray and translucent.   Nose: Normal without discharge.  Mouth / Throat:   Pharynx non-erythematous, no exudates present, tonsils without hypertrophy. Mucous membranes moist.  Neck:  Neck supple, No lymphadenopathy, no thyromegaly.  Cardiac:  Normal rhythm and rate, no murmurs, rubs or gallops.  Lungs: clear to auscultation bilaterally; no wheezes, crackles or rhonchi           ASSESSMENT/PLAN:                                                        (D68.51) Factor 5 Leiden mutation, heterozygous/ INR 2-3  (primary encounter diagnosis)  (Z86.711) History of pulmonary embolism  Plan: CL BFP PROTHROMBIN TIME/INR (BFP), VENOUS         COLLECTION          Continue current medication(s) at current dose(s).      Therapeutic INR for goal of 2-3        Next INR: if new med added by Rheumatologist - RTC 1 week after change  If not, RTC 4 weeks      (I10) Benign essential hypertension  Comment: stable  Plan: losartan (COZAAR) 50 MG tablet        Continue current medication(s) at current dose(s).      (Z87.891) Cigarette - quit 2014 -  now vaping  Comment: stable  Plan: Advised we don't yet know long term consequence of vaping      (Z23) Need for vaccination  2nd Shingrix today      Catrachita Torres PA-C  11/20/2018

## 2018-11-23 ENCOUNTER — OFFICE VISIT (OUTPATIENT)
Dept: FAMILY MEDICINE | Facility: CLINIC | Age: 63
End: 2018-11-23

## 2018-11-23 VITALS
TEMPERATURE: 98.2 F | OXYGEN SATURATION: 98 % | BODY MASS INDEX: 60.69 KG/M2 | SYSTOLIC BLOOD PRESSURE: 130 MMHG | WEIGHT: 293 LBS | HEART RATE: 72 BPM | DIASTOLIC BLOOD PRESSURE: 80 MMHG

## 2018-11-23 DIAGNOSIS — Z86.711 HISTORY OF PULMONARY EMBOLISM: ICD-10-CM

## 2018-11-23 DIAGNOSIS — Z23 NEED FOR VACCINATION: ICD-10-CM

## 2018-11-23 DIAGNOSIS — D68.51 FACTOR 5 LEIDEN MUTATION, HETEROZYGOUS (H): Primary | ICD-10-CM

## 2018-11-23 DIAGNOSIS — Z87.891 PERSONAL HISTORY OF TOBACCO USE, PRESENTING HAZARDS TO HEALTH: ICD-10-CM

## 2018-11-23 DIAGNOSIS — I10 BENIGN ESSENTIAL HYPERTENSION: ICD-10-CM

## 2018-11-23 LAB — INR POINT OF CARE: 2.1 (ref 0.9–1.1)

## 2018-11-23 PROCEDURE — 90750 HZV VACC RECOMBINANT IM: CPT | Performed by: PHYSICIAN ASSISTANT

## 2018-11-23 PROCEDURE — 99213 OFFICE O/P EST LOW 20 MIN: CPT | Mod: 25 | Performed by: PHYSICIAN ASSISTANT

## 2018-11-23 PROCEDURE — 85610 PROTHROMBIN TIME: CPT | Performed by: PHYSICIAN ASSISTANT

## 2018-11-23 PROCEDURE — 36415 COLL VENOUS BLD VENIPUNCTURE: CPT | Performed by: PHYSICIAN ASSISTANT

## 2018-11-23 PROCEDURE — 90471 IMMUNIZATION ADMIN: CPT | Performed by: PHYSICIAN ASSISTANT

## 2018-11-23 RX ORDER — LOSARTAN POTASSIUM 50 MG/1
50 TABLET ORAL DAILY
Qty: 90 TABLET | Refills: 1 | Status: SHIPPED | OUTPATIENT
Start: 2018-11-23 | End: 2019-03-29

## 2018-11-23 RX ORDER — WARFARIN SODIUM 5 MG/1
TABLET ORAL
Qty: 90 TABLET | Refills: 0 | Status: SHIPPED | OUTPATIENT
Start: 2018-11-23 | End: 2019-03-29

## 2018-11-23 ASSESSMENT — ANXIETY QUESTIONNAIRES
IF YOU CHECKED OFF ANY PROBLEMS ON THIS QUESTIONNAIRE, HOW DIFFICULT HAVE THESE PROBLEMS MADE IT FOR YOU TO DO YOUR WORK, TAKE CARE OF THINGS AT HOME, OR GET ALONG WITH OTHER PEOPLE: NOT DIFFICULT AT ALL
GAD7 TOTAL SCORE: 1
2. NOT BEING ABLE TO STOP OR CONTROL WORRYING: NOT AT ALL
7. FEELING AFRAID AS IF SOMETHING AWFUL MIGHT HAPPEN: NOT AT ALL
1. FEELING NERVOUS, ANXIOUS, OR ON EDGE: SEVERAL DAYS
5. BEING SO RESTLESS THAT IT IS HARD TO SIT STILL: NOT AT ALL
6. BECOMING EASILY ANNOYED OR IRRITABLE: NOT AT ALL
3. WORRYING TOO MUCH ABOUT DIFFERENT THINGS: NOT AT ALL

## 2018-11-23 ASSESSMENT — PATIENT HEALTH QUESTIONNAIRE - PHQ9
5. POOR APPETITE OR OVEREATING: NOT AT ALL
SUM OF ALL RESPONSES TO PHQ QUESTIONS 1-9: 6

## 2018-11-23 NOTE — MR AVS SNAPSHOT
After Visit Summary   11/23/2018    Bere Machuca    MRN: [id number]           Patient Information     Date Of Birth          1955        Visit Information        Provider Department      11/23/2018 10:00 AM Catrachita Torres PA [facility]        Today's Diagnoses     Factor 5 Leiden mutation, heterozygous/ INR 2-3    -  1    Benign essential hypertension        History of pulmonary embolism        Cigarette - quit 2014 -  now vaping        Need for vaccination           Follow-ups after your visit        Who to contact     If you have questions or need follow up information about today's clinic visit or your schedule please contact [facility] directly at [phone].  Normal or non-critical lab and imaging results will be communicated to you by [employer], letter or phone within 4 business days after the clinic has received the results. If you do not hear from us within 7 days, please contact the clinic through [employer] or phone. If you have a critical or abnormal lab result, we will notify you by phone as soon as possible.  Submit refill requests through [employer] or call your pharmacy and they will forward the refill request to us. Please allow 3 business days for your refill to be completed.          Additional Information About Your Visit        MyChart Information     [employer] gives you secure access to your electronic health record. If you see a primary care provider, you can also send messages to your care team and make appointments. If you have questions, please call your primary care clinic.  If you do not have a primary care provider, please call [phone] and they will assist you.        Care EveryWhere ID     This is your Care EveryWhere ID. This could be used by other organizations to access your [place] medical records  [id number]        Your Vitals Were     Pulse Temperature Pulse Oximetry BMI (Body Mass Index)           72 98.2  F (36.8  C) (Oral) 98% 60.69 kg/m2         Blood Pressure from Last 3 Encounters:   11/23/18 130/80   09/21/18 130/78   08/22/18 134/80    Weight from Last 3 Encounters:   11/23/18 (!) 170.6 kg (376 lb)   09/21/18 (!) 173.7 kg (383 lb)   08/10/18 (!) 175.1 kg (386 lb)              We Performed the Following     CL BFP PROTHROMBIN TIME/INR (BFP)     HC ZOSTER VACCINE RECOMBINANT ADJUVANTED IM NJX     VACCINE ADMINISTRATION, INITIAL     VENOUS COLLECTION          Today's Medication Changes          These changes are accurate as of 11/23/18  1:02 PM.  If you have any questions, ask your nurse or doctor.               These medicines have changed or have updated prescriptions.        Dose/Directions    warfarin 5 MG tablet   Commonly known as:  COUMADIN   This may have changed:  additional instructions   Used for:  History of pulmonary embolism   Changed by:  Catrachita Torres PA        1 Tablet all days except 1/2 tablet on Mondays and Thursdays   Quantity:  90 tablet   Refills:  0            Where to get your medicines      These medications were sent to [place] [id number] [place] [address] [address]     Phone:  [phone]     losartan 50 MG tablet    warfarin 5 MG tablet                Primary Care Provider Office Phone # Fax #    Wilma Armenta [phone] [address] [address]        Equal Access to Services     ALEXANDRE WAGNER : Hadii aad ku hadasho Soomaali, waaxda luqadaha, qaybta kaalmada adeegyada, waxay alanain hayannmarie love. So [facility] [phone].    ATENCIÓN: Si habla español, tiene a norman disposición servicios gratuitos de asistencia lingüística. Llame al [phone].    We comply with applicable federal civil rights laws and [place] laws. We do not discriminate on the basis of race, color, national origin, age, disability, sex, sexual orientation, or gender  identity.            Thank you!     Thank you for choosing [facility]  for your care. Our goal is always to provide you with excellent care. Hearing back from our patients is one way we can continue to improve our services. Please take a few minutes to complete the written survey that you may receive in the mail after your visit with us. Thank you!             Your Updated Medication List - Protect others around you: Learn how to safely use, store and throw away your medicines at www.disposemymeds.org.          This list is accurate as of 11/23/18  1:02 PM.  Always use your most recent med list.                   Brand Name Dispense Instructions for use Diagnosis    * albuterol (2.5 MG/3ML) 0.083% neb solution    PROVENTIL     Take 3 mLs by nebulization every 6 hours as needed for shortness of breath / dyspnea. Dr [facility]    Chronic airway obstruction, not elsewhere classified, Personal history of tobacco use, presenting hazards to health       * PROAIR  (90 Base) MCG/ACT inhaler   Generic drug:  albuterol      Inhale 1-2 puffs into the lungs as needed for shortness of breath / dyspnea. DR [facility]    Chronic airway obstruction, not elsewhere classified, Personal history of tobacco use, presenting hazards to health       ASPIRIN NOT PRESCRIBED    INTENTIONAL    0 each    Please choose reason not prescribed, below        atorvastatin 40 MG tablet    LIPITOR    30 tablet    Take 1 tablet (40 mg) by mouth daily    Type 2 diabetes mellitus without complication, without long-term current use of insulin (H), Hyperlipidemia LDL goal <100       blood glucose lancets standard    no brand specified    100 each    Use to test blood sugar 2-3 times daily or as directed.    Type 2 diabetes mellitus without complication, without long-term current use of insulin (H)       blood glucose monitoring test strip    ISAAC CONTOUR NEXT    100 strip    Use to test blood  "sugar 2-3 times daily or as directed.    Type 2 diabetes mellitus without complication, without long-term current use of insulin (H)       budesonide-formoterol 160-4.5 MCG/ACT Inhaler    SYMBICORT    3 Inhaler    Inhale 2 puffs into the lungs 2 times daily    Chronic obstructive pulmonary disease, unspecified COPD type (H)       cholecalciferol 1000 units Tabs      Take 1,000 Units by mouth At Bedtime    Morbid obesity due to excess calories (H)       citalopram 40 MG tablet    celeXA    90 tablet    Take 1 tablet (40 mg) by mouth daily    Mild episode of recurrent major depressive disorder (H)       cyclobenzaprine 10 MG tablet    FLEXERIL     TAKE 1 TABLET 3 TIMES DAILY AS NEEDED.        gabapentin 300 MG capsule    NEURONTIN    540 capsule    Take 2 capsules (600 mg) by mouth 3 times daily    Spinal stenosis in cervical region       hydrOXYzine 25 MG tablet    ATARAX    90 tablet    Take 1 tablet (25 mg) by mouth At Bedtime    Intrinsic eczema       losartan 50 MG tablet    COZAAR    90 tablet    Take 1 tablet (50 mg) by mouth daily    Benign essential hypertension       mesalamine 1.2 g EC tablet    LIALDA     Take 1,200 mg by mouth 4 times daily        MICROLET LANCETS Misc      TEST 2-3 TIMES DAILY OR AS DIRECTED        Multi-vitamin Tabs tablet      Take 1 tablet by mouth daily        order for DME     1 Device    Equipment being ordered: Walker with seat due to chronic low back pain with herniated discs, morbid obesity, and bone-on-bone OA in right hip.   Pt requires larger size \"extra wide\" with weight of 386 lbs.    Chronic low back pain, unspecified back pain laterality, with sciatica presence unspecified, Morbid obesity due to excess calories (H), Primary osteoarthritis of right hip       PREDNISONE PO      Take 5 mg by mouth 3 times daily        SPIRIVA HANDIHALER 18 MCG inhaled capsule   Generic drug:  tiotropium     30 capsule    Inhale contents of one capsule daily.        SUPER B COMPLEX PO      " Take 1 tablet by mouth daily        traMADol 50 MG tablet    ULTRAM    20 tablet    Take 1-2 tablets ( mg) by mouth every 6 hours as needed for severe pain    Chronic bilateral low back pain with bilateral sciatica       VSL#3 Caps     30 capsule    Take 1 capsule by mouth daily    Ulcerative rectosigmoiditis with complication (H)       warfarin 5 MG tablet    COUMADIN    90 tablet    1 Tablet all days except 1/2 tablet on Mondays and Thursdays    History of pulmonary embolism       * Notice:  This list has 2 medication(s) that are the same as other medications prescribed for you. Read the directions carefully, and ask your doctor or other care provider to review them with you.

## 2018-11-23 NOTE — NURSING NOTE
Darline is here for INR check            Pre-visit Screening:  Immunizations:  up to date  Colonoscopy:  is up to date  Mammogram: is up to date  Asthma Action Test/Plan:  NA  PHQ9:  Done today  GAD7:  Done today  Questioned patient about current smoking habits Pt. quit smoking some time ago.  Ok to leave detailed message on voice mail for today's visit only Yes, phone # [phone]

## 2018-11-24 ASSESSMENT — ANXIETY QUESTIONNAIRES: GAD7 TOTAL SCORE: 1

## 2018-11-29 ENCOUNTER — TELEPHONE (OUTPATIENT)
Dept: FAMILY MEDICINE | Facility: CLINIC | Age: 63
End: 2018-11-29

## 2018-11-29 ENCOUNTER — TRANSFERRED RECORDS (OUTPATIENT)
Dept: FAMILY MEDICINE | Facility: CLINIC | Age: 63
End: 2018-11-29

## 2018-11-29 DIAGNOSIS — I73.9 PVD (PERIPHERAL VASCULAR DISEASE) (H): ICD-10-CM

## 2018-11-30 PROBLEM — E11.59 CONTROLLED TYPE 2 DIABETES MELLITUS WITH CIRCULATORY DISORDER, WITHOUT LONG-TERM CURRENT USE OF INSULIN (H): Status: ACTIVE | Noted: 2017-07-05

## 2018-11-30 PROBLEM — I73.9 PVD (PERIPHERAL VASCULAR DISEASE) (H): Status: ACTIVE | Noted: 2018-11-30

## 2018-11-30 NOTE — TELEPHONE ENCOUNTER
Yes we do. A1c due in February.   Completed form and will have [facility] sign as supervising MD.   Please fax.

## 2018-12-10 DIAGNOSIS — F33.0 MILD EPISODE OF RECURRENT MAJOR DEPRESSIVE DISORDER (H): ICD-10-CM

## 2018-12-10 RX ORDER — CITALOPRAM HYDROBROMIDE 40 MG/1
40 TABLET ORAL DAILY
Qty: 90 TABLET | Refills: 0 | Status: SHIPPED | OUTPATIENT
Start: 2018-12-10 | End: 2019-04-12

## 2018-12-10 NOTE — TELEPHONE ENCOUNTER
Bere Machuca is requesting a refill of:    Pending Prescriptions:                       Disp   Refills    citalopram (CELEXA) 40 MG tablet          90 tab*0            Sig: Take 1 tablet (40 mg) by mouth daily    Please close encounter if RX was sent. Thanks, Leah

## 2018-12-26 ENCOUNTER — TRANSFERRED RECORDS (OUTPATIENT)
Dept: FAMILY MEDICINE | Facility: CLINIC | Age: 63
End: 2018-12-26

## 2019-01-15 ENCOUNTER — MYC MEDICAL ADVICE (OUTPATIENT)
Dept: PHARMACY | Facility: PHYSICIAN GROUP | Age: 64
End: 2019-01-15

## 2019-01-16 NOTE — TELEPHONE ENCOUNTER
Sorry, I didn't mean to send that back to you.  I do have samples of Spiriva for her and will let her know she can pick them up.

## 2019-01-16 NOTE — TELEPHONE ENCOUNTER
Kenia Simmons, [facility] 1/16/2019 7:59 AM CST    Can you check on the spiriva samples? If we can't get Spiriva, can we look at getting a couple Incruse inhalers?     I will have her talk to  about the other samples.  ----- Message -----  From: Bere Machuca  Sent: 1/15/2019 6:07 PM  To: Kenia Simmons [facility]  Subject: Question about medications     ----- Message from [employer] sent at 1/15/2019 6:07 PM CST -----    Mickey Aquino, I haven't made an appointment with you and Hyun as I lost my insurance and will get new coverage on Feb 1st. That being said I am out of Spriva and it's 500 out of pocket and my generic Lialda is more than that. Do you have and samples I can have to get me through ?   Thanks Darline Machuca

## 2019-02-08 ENCOUNTER — MYC MEDICAL ADVICE (OUTPATIENT)
Dept: PHARMACY | Facility: PHYSICIAN GROUP | Age: 64
End: 2019-02-08

## 2019-03-29 DIAGNOSIS — I10 BENIGN ESSENTIAL HYPERTENSION: ICD-10-CM

## 2019-03-29 DIAGNOSIS — Z86.711 HISTORY OF PULMONARY EMBOLISM: ICD-10-CM

## 2019-03-29 RX ORDER — LOSARTAN POTASSIUM 50 MG/1
TABLET ORAL
Qty: 30 TABLET | Refills: 0 | Status: SHIPPED | OUTPATIENT
Start: 2019-03-29 | End: 2019-04-12

## 2019-03-29 RX ORDER — WARFARIN SODIUM 5 MG/1
TABLET ORAL
Qty: 30 TABLET | Refills: 0 | Status: SHIPPED | OUTPATIENT
Start: 2019-03-29 | End: 2019-04-12

## 2019-03-29 NOTE — TELEPHONE ENCOUNTER
Please call pt   She is WAY overdue for INR check  Have her see Dr. Armenta for recheck next week please    I sent in 30 pills of her meds    Catrachita Torres PA-C  3/29/2019

## 2019-04-12 ENCOUNTER — OFFICE VISIT (OUTPATIENT)
Dept: FAMILY MEDICINE | Facility: CLINIC | Age: 64
End: 2019-04-12

## 2019-04-12 VITALS
WEIGHT: 293 LBS | DIASTOLIC BLOOD PRESSURE: 68 MMHG | TEMPERATURE: 98.1 F | BODY MASS INDEX: 60.43 KG/M2 | HEART RATE: 88 BPM | OXYGEN SATURATION: 95 % | SYSTOLIC BLOOD PRESSURE: 112 MMHG

## 2019-04-12 DIAGNOSIS — Z86.711 HISTORY OF PULMONARY EMBOLISM: ICD-10-CM

## 2019-04-12 DIAGNOSIS — J44.9 CHRONIC OBSTRUCTIVE PULMONARY DISEASE, UNSPECIFIED COPD TYPE (H): Primary | ICD-10-CM

## 2019-04-12 DIAGNOSIS — I10 BENIGN ESSENTIAL HYPERTENSION: ICD-10-CM

## 2019-04-12 DIAGNOSIS — M48.02 SPINAL STENOSIS IN CERVICAL REGION: ICD-10-CM

## 2019-04-12 DIAGNOSIS — G89.29 CHRONIC BILATERAL LOW BACK PAIN WITH BILATERAL SCIATICA: ICD-10-CM

## 2019-04-12 DIAGNOSIS — M62.830 BACK MUSCLE SPASM: ICD-10-CM

## 2019-04-12 DIAGNOSIS — E11.59 CONTROLLED TYPE 2 DIABETES MELLITUS WITH OTHER CIRCULATORY COMPLICATION, WITHOUT LONG-TERM CURRENT USE OF INSULIN (H): ICD-10-CM

## 2019-04-12 DIAGNOSIS — M54.42 CHRONIC BILATERAL LOW BACK PAIN WITH BILATERAL SCIATICA: ICD-10-CM

## 2019-04-12 DIAGNOSIS — M54.41 CHRONIC BILATERAL LOW BACK PAIN WITH BILATERAL SCIATICA: ICD-10-CM

## 2019-04-12 DIAGNOSIS — Z87.891 PERSONAL HISTORY OF TOBACCO USE, PRESENTING HAZARDS TO HEALTH: ICD-10-CM

## 2019-04-12 DIAGNOSIS — K51.30 ULCERATIVE RECTOSIGMOIDITIS WITHOUT COMPLICATION (H): ICD-10-CM

## 2019-04-12 DIAGNOSIS — F33.0 MILD EPISODE OF RECURRENT MAJOR DEPRESSIVE DISORDER (H): ICD-10-CM

## 2019-04-12 DIAGNOSIS — M05.79 RHEUMATOID ARTHRITIS INVOLVING MULTIPLE SITES WITH POSITIVE RHEUMATOID FACTOR (H): ICD-10-CM

## 2019-04-12 LAB
ERYTHROCYTE [DISTWIDTH] IN BLOOD BY AUTOMATED COUNT: 15.2 %
HBA1C MFR BLD: 6.1 % (ref 4–7)
HCT VFR BLD AUTO: 40.6 % (ref 35–47)
HEMOGLOBIN: 13 G/DL (ref 11.7–15.7)
INR POINT OF CARE: 1.4 (ref 0.9–1.1)
MCH RBC QN AUTO: 29.5 PG (ref 26–33)
MCHC RBC AUTO-ENTMCNC: 32 G/DL (ref 31–36)
MCV RBC AUTO: 92.1 FL (ref 78–100)
PLATELET COUNT - QUEST: 329 10^9/L (ref 150–375)
RBC # BLD AUTO: 4.41 10*12/L (ref 3.8–5.2)
WBC # BLD AUTO: 7.6 10*9/L (ref 4–11)

## 2019-04-12 PROCEDURE — 36415 COLL VENOUS BLD VENIPUNCTURE: CPT | Performed by: PHYSICIAN ASSISTANT

## 2019-04-12 PROCEDURE — 86140 C-REACTIVE PROTEIN: CPT | Mod: 90 | Performed by: PHYSICIAN ASSISTANT

## 2019-04-12 PROCEDURE — 99213 OFFICE O/P EST LOW 20 MIN: CPT | Performed by: PHYSICIAN ASSISTANT

## 2019-04-12 PROCEDURE — 83036 HEMOGLOBIN GLYCOSYLATED A1C: CPT | Performed by: PHYSICIAN ASSISTANT

## 2019-04-12 PROCEDURE — 80053 COMPREHEN METABOLIC PANEL: CPT | Mod: 90 | Performed by: PHYSICIAN ASSISTANT

## 2019-04-12 PROCEDURE — 85610 PROTHROMBIN TIME: CPT | Performed by: PHYSICIAN ASSISTANT

## 2019-04-12 PROCEDURE — 85027 COMPLETE CBC AUTOMATED: CPT | Performed by: PHYSICIAN ASSISTANT

## 2019-04-12 RX ORDER — CYCLOBENZAPRINE HCL 10 MG
10 TABLET ORAL 3 TIMES DAILY PRN
Qty: 30 TABLET | Refills: 0 | Status: SHIPPED | OUTPATIENT
Start: 2019-04-12 | End: 2019-10-25

## 2019-04-12 RX ORDER — TRAMADOL HYDROCHLORIDE 50 MG/1
50 TABLET ORAL EVERY 6 HOURS PRN
Qty: 20 TABLET | Refills: 0 | Status: SHIPPED | OUTPATIENT
Start: 2019-04-12 | End: 2019-06-21

## 2019-04-12 RX ORDER — LOSARTAN POTASSIUM 50 MG/1
50 TABLET ORAL DAILY
Qty: 90 TABLET | Refills: 1 | Status: SHIPPED | OUTPATIENT
Start: 2019-04-12 | End: 2019-10-25

## 2019-04-12 RX ORDER — GABAPENTIN 300 MG/1
600 CAPSULE ORAL 3 TIMES DAILY
Qty: 540 CAPSULE | Refills: 1 | Status: SHIPPED | OUTPATIENT
Start: 2019-04-12 | End: 2019-10-25

## 2019-04-12 RX ORDER — FOLIC ACID 1 MG/1
2 TABLET ORAL DAILY
Refills: 0 | COMMUNITY
Start: 2019-03-04

## 2019-04-12 RX ORDER — CITALOPRAM HYDROBROMIDE 40 MG/1
40 TABLET ORAL DAILY
Qty: 90 TABLET | Refills: 1 | Status: SHIPPED | OUTPATIENT
Start: 2019-04-12 | End: 2019-10-25 | Stop reason: DRUGHIGH

## 2019-04-12 RX ORDER — BALSALAZIDE DISODIUM 750 MG/1
CAPSULE ORAL
COMMUNITY
Start: 2019-04-12 | End: 2019-10-25

## 2019-04-12 RX ORDER — TRAMADOL HYDROCHLORIDE 50 MG/1
50-100 TABLET ORAL EVERY 6 HOURS PRN
Qty: 20 TABLET | Refills: 0 | Status: CANCELLED | OUTPATIENT
Start: 2019-04-12

## 2019-04-12 RX ORDER — WARFARIN SODIUM 5 MG/1
TABLET ORAL
Qty: 102 TABLET | Refills: 1 | Status: SHIPPED | OUTPATIENT
Start: 2019-04-12 | End: 2019-06-21

## 2019-04-12 RX ORDER — FLUTICASONE PROPIONATE AND SALMETEROL 232; 14 UG/1; UG/1
1 POWDER, METERED RESPIRATORY (INHALATION) 2 TIMES DAILY
Qty: 1 INHALER | Refills: 11 | Status: CANCELLED | OUTPATIENT
Start: 2019-04-12

## 2019-04-12 ASSESSMENT — ANXIETY QUESTIONNAIRES
5. BEING SO RESTLESS THAT IT IS HARD TO SIT STILL: MORE THAN HALF THE DAYS
2. NOT BEING ABLE TO STOP OR CONTROL WORRYING: MORE THAN HALF THE DAYS
IF YOU CHECKED OFF ANY PROBLEMS ON THIS QUESTIONNAIRE, HOW DIFFICULT HAVE THESE PROBLEMS MADE IT FOR YOU TO DO YOUR WORK, TAKE CARE OF THINGS AT HOME, OR GET ALONG WITH OTHER PEOPLE: VERY DIFFICULT
GAD7 TOTAL SCORE: 15
4. TROUBLE RELAXING: NEARLY EVERY DAY
6. BECOMING EASILY ANNOYED OR IRRITABLE: MORE THAN HALF THE DAYS
7. FEELING AFRAID AS IF SOMETHING AWFUL MIGHT HAPPEN: SEVERAL DAYS
1. FEELING NERVOUS, ANXIOUS, OR ON EDGE: MORE THAN HALF THE DAYS
3. WORRYING TOO MUCH ABOUT DIFFERENT THINGS: NEARLY EVERY DAY

## 2019-04-12 ASSESSMENT — PATIENT HEALTH QUESTIONNAIRE - PHQ9: SUM OF ALL RESPONSES TO PHQ QUESTIONS 1-9: 9

## 2019-04-12 NOTE — NURSING NOTE
Darline is here for INR and discuss meds    Pre-visit Screening:  Immunizations:  up to date  Colonoscopy:  is up to date  Mammogram: is up to date  Asthma Action Test/Plan:  NA  PHQ9:  None  GAD7:  None  Questioned patient about current smoking habits Pt. quit smoking some time ago.  Ok to leave detailed message on voice mail for today's visit only yes, phone # [phone]

## 2019-04-12 NOTE — PROGRESS NOTES
CC: Medication Check    History:  Darline is a patient who returns today on self pay for an INR check, but is needing several other medications refilled as she wont be back on insurance until November    History of PE:  Has been taking warfarin 5 mg daily    MDD: Takes celexa 40 mg. This works well for her without side effects, as she feels she is reacting normal to stress in her life, and with chronic pain.     RA: Working with rheumatology on methotrexate. Still minimal improvement, but will follow up with them. Rheumatologist had ordered labs CBC, ALT, CRP through us so I will have these completed today.    COPD:  Typically uses Breo, but has been unable to afford this, so breathing has been bad. Is working with our pharmacist Kenia Simmons to find covered/affordable medication with self-pay, as she thinks she will be self pay until November.     HTN: Takes losartan. No side effects.    DM2: Controlled. No medications. Last A1c was 8/2018 and was 6.4%.     Spinal stenosis in cervical region: Takes gabapentin 300 mg two capsules 3 times daily.     Bilateral low back pain with bilateral sciatica: For the most part controlled, but does occasionally flare up where she has ends up taking Flexeril or even tramadol at times.     UC: Hoping to do humira to help with both RA and UC. Currently taking Lialda and balsalazide per pt, but unable to confirm this with records at this time.    PMH, MEDICATIONS, ALLERGIES, SOCIAL AND FAMILY HISTORY in [facility] and reviewed by me personally.      ROS negative other than the symptoms noted above in the HPI.        Examination   /68 (BP Location: Right arm, Patient Position: Sitting, Cuff Size: Adult Large)   Pulse 88   Temp 98.1  F (36.7  C) (Oral)   Wt (!) 169.8 kg (374 lb 6.4 oz)   SpO2 95%   BMI 60.43 kg/m          Constitutional: Sitting comfortably, in no acute distress. Vital signs noted  Eyes: pupils equal round reactive to light and accomodation, extra ocular  movements intact  Neck:  no adenopathy, trachea midline and normal to palpation  Cardiovascular:  regular rate and rhythm, no murmurs, clicks, or gallops  Respiratory:  normal respiratory rate and rhythm, lungs clear to auscultation  SKIN: No jaundice/pallor/rash.   Psychiatric: mentation appears normal and affect normal/bright        A/P    ICD-10-CM    1. Chronic obstructive pulmonary disease, unspecified COPD type (H) J44.9 Fluticasone-Umeclidin-Vilanterol (TRELEGY ELLIPTA) 100-62.5-25 MCG/INH oral inhaler   2. Chronic bilateral low back pain with bilateral sciatica M54.42 cyclobenzaprine (FLEXERIL) 10 MG tablet    M54.41 traMADol (ULTRAM) 50 MG tablet    G89.29    3. History of pulmonary embolism Z86.711 warfarin (COUMADIN) 5 MG tablet     PROTHROMBIN TIME/INR     VENOUS COLLECTION   4. Controlled type 2 diabetes mellitus with other circulatory complication, without long-term current use of insulin (H) E11.59 COMPREHENSIVE METABOLIC PANEL ([facility]) XCMP     Hemoglobin A1c (BFP)   5. Rheumatoid arthritis involving multiple sites with positive rheumatoid factor (H) M05.79 HEMOGRAM/PLATELET (BFP)     C-Reactive Protein CRP ([facility])   6. Back muscle spasm M62.830 cyclobenzaprine (FLEXERIL) 10 MG tablet   7. Personal history of tobacco use, presenting hazards to health Z87.891    8. Mild episode of recurrent major depressive disorder (H) F33.0 citalopram (CELEXA) 40 MG tablet   9. Spinal stenosis in cervical region M48.02 gabapentin (NEURONTIN) 300 MG capsule   10. Benign essential hypertension I10 losartan (COZAAR) 50 MG tablet       DISCUSSION:  History of PE:  Increase to 5 mg daily plus extra 2.5 mg 2 days per week. (weekly increase of 5 mg) Recheck in 2 weeks for INR visit only.    MDD: Continue Celexa at current dose. Refilled for 6 months. No SI/HI- pt feels she is completely safe.     RA: Will check labs today, and send to rheum when available.     COPD:  Kenia Simmons Pharm D was able to get her coverage for  1 year of Incruse Trelegy, and gave pt Breo samples for in meantime.     HTN: Refilled losartan for 6 months    DM2: A1c improved today to 6.1%. No changes.     Spinal stenosis in cervical region: Continue gabapentin at same dose. Hopefully pain will improve soon with endocrinology.     Bilateral low back pain with bilateral sciatica: Agreed to refill #20 tramadol for 1 year, despite history of drug abuse (Cocaine), as pt has been able to make previous prescription last for over 1 year (she shows me a few leftover pills in office), and does not feel addictive nature of this medication. Warned of no driving.     UC: Working with GI to get Lialda coverage while not on insurance plan.      follow up visit: 6 months    Hyun Braden PA-C  [facility]

## 2019-04-13 LAB
ALBUMIN SERPL-MCNC: 3.9 G/DL (ref 3.6–5.1)
ALBUMIN/GLOB SERPL: 1.6 (CALC) (ref 1–2.5)
ALP SERPL-CCNC: 44 U/L (ref 33–130)
ALT SERPL-CCNC: 13 U/L (ref 6–29)
AST SERPL-CCNC: 16 U/L (ref 10–35)
BILIRUB SERPL-MCNC: 0.4 MG/DL (ref 0.2–1.2)
BUN SERPL-MCNC: 12 MG/DL (ref 7–25)
BUN/CREATININE RATIO: ABNORMAL (CALC) (ref 6–22)
CALCIUM SERPL-MCNC: 9 MG/DL (ref 8.6–10.4)
CHLORIDE SERPLBLD-SCNC: 103 MMOL/L (ref 98–110)
CO2 SERPL-SCNC: 27 MMOL/L (ref 20–32)
CREAT SERPL-MCNC: 0.69 MG/DL (ref 0.5–0.99)
CRP SERPL-MCNC: 47.6 MG/L (ref 0–0.8)
EGFR AFRICAN AMERICAN - QUEST: 109 ML/MIN/1.73M2
GFR SERPL CREATININE-BSD FRML MDRD: 94 ML/MIN/1.73M2
GLOBULIN, CALCULATED - QUEST: 2.4 G/DL (CALC) (ref 1.9–3.7)
GLUCOSE - QUEST: 118 MG/DL (ref 65–99)
POTASSIUM SERPL-SCNC: 4.2 MMOL/L (ref 3.5–5.3)
PROT SERPL-MCNC: 6.3 G/DL (ref 6.1–8.1)
SODIUM SERPL-SCNC: 141 MMOL/L (ref 135–146)

## 2019-04-13 ASSESSMENT — ANXIETY QUESTIONNAIRES: GAD7 TOTAL SCORE: 15

## 2019-04-15 NOTE — PROGRESS NOTES
Saw patient as consult today to help with medication cost for her inhalers as she is currently without any insurance and will have to wait until Nov to get insurance now.     She is out of her inhalers - usually taking Symbicort BID and Sprivia daily.     Recommend she use Breo +Incruse 1 puff of each inhaler once daily for now and fill out patient assistance application for [employer] to get the Trelegy inhaler from the .     The Trelegy contains ICS/LABA/LAMA in one inhaler- 1 puff once per day.     She filled out her portion of the application today in office, will get prescription and signature from PCP to fax into [employer].     Patient will use samples of Breo + Incruse for time being - 1 month sample provided today.   Reviewed appropriate use, benefits, risks and monitoring at length.    Printed application for [employer] for her to see if GI will work with her on this one.     Kenia Simmons, Pharm.D, BCACP  Medication Therapy Management Pharmacist  [phone]

## 2019-04-16 ENCOUNTER — MYC REFILL (OUTPATIENT)
Dept: FAMILY MEDICINE | Facility: CLINIC | Age: 64
End: 2019-04-16

## 2019-04-16 DIAGNOSIS — M54.42 CHRONIC BILATERAL LOW BACK PAIN WITH BILATERAL SCIATICA: ICD-10-CM

## 2019-04-16 DIAGNOSIS — G89.29 CHRONIC BILATERAL LOW BACK PAIN WITH BILATERAL SCIATICA: ICD-10-CM

## 2019-04-16 DIAGNOSIS — M54.41 CHRONIC BILATERAL LOW BACK PAIN WITH BILATERAL SCIATICA: ICD-10-CM

## 2019-04-16 PROBLEM — M05.79 RHEUMATOID ARTHRITIS INVOLVING MULTIPLE SITES WITH POSITIVE RHEUMATOID FACTOR (H): Status: ACTIVE | Noted: 2019-04-16

## 2019-04-16 RX ORDER — MESALAMINE 1.2 G/1
1200 TABLET, DELAYED RELEASE ORAL
COMMUNITY
Start: 2019-04-16 | End: 2020-06-05

## 2019-04-16 RX ORDER — TRAMADOL HYDROCHLORIDE 50 MG/1
50-100 TABLET ORAL EVERY 6 HOURS PRN
Qty: 20 TABLET | Refills: 0 | Status: SHIPPED | OUTPATIENT
Start: 2019-04-16 | End: 2020-06-05

## 2019-04-16 NOTE — TELEPHONE ENCOUNTER
Pending Prescriptions:                       Disp   Refills    traMADol (ULTRAM) 50 MG tablet            20 tab*0            Sig: Take 1-2 tablets ( mg) by mouth every 6           hours as needed for severe pain    This is a my chart refill request  Please review and send a my chart message to pt  Thank you  Bindu  [phone] (home) [phone] (work)

## 2019-04-16 NOTE — TELEPHONE ENCOUNTER
Attempted to refill this last Friday at pt's appt, but printer wasn't working. Tried again today, and rx printed. Please fax to pharmacy.

## 2019-05-08 DIAGNOSIS — Z86.711 HISTORY OF PULMONARY EMBOLISM: ICD-10-CM

## 2019-05-08 RX ORDER — WARFARIN SODIUM 5 MG/1
TABLET ORAL
Qty: 30 TABLET | OUTPATIENT
Start: 2019-05-08

## 2019-05-08 NOTE — TELEPHONE ENCOUNTER
Pending Prescriptions:                       Disp   Refills    warfarin (COUMADIN) 5 MG tablet [Pharmacy*30 tab*             Sig: TAKE 0.5 TABLET BY MOUTH DAILY ON MON AND THUR.           TAKE 1 TABLET BY MOUTH DAILY ALL OTHER DAYS.    SRB please review:    Pt here last on 4-2019 rtc in six months  Due in Oct  Does pt have enough meds? Please review  Change qty fax deny   And close encounter  Bindu  [phone] (home) [phone] (work)

## 2019-06-21 ENCOUNTER — OFFICE VISIT (OUTPATIENT)
Dept: FAMILY MEDICINE | Facility: CLINIC | Age: 64
End: 2019-06-21

## 2019-06-21 VITALS
OXYGEN SATURATION: 97 % | HEART RATE: 72 BPM | SYSTOLIC BLOOD PRESSURE: 128 MMHG | BODY MASS INDEX: 58.75 KG/M2 | TEMPERATURE: 98 F | DIASTOLIC BLOOD PRESSURE: 84 MMHG | WEIGHT: 293 LBS

## 2019-06-21 DIAGNOSIS — Z86.711 HISTORY OF PULMONARY EMBOLISM: ICD-10-CM

## 2019-06-21 DIAGNOSIS — I73.9 PVD (PERIPHERAL VASCULAR DISEASE) (H): Primary | ICD-10-CM

## 2019-06-21 LAB — INR POINT OF CARE: 2.4 (ref 2–3)

## 2019-06-21 PROCEDURE — 85610 PROTHROMBIN TIME: CPT | Performed by: PHYSICIAN ASSISTANT

## 2019-06-21 PROCEDURE — 36415 COLL VENOUS BLD VENIPUNCTURE: CPT | Performed by: PHYSICIAN ASSISTANT

## 2019-06-21 PROCEDURE — 99212 OFFICE O/P EST SF 10 MIN: CPT | Performed by: PHYSICIAN ASSISTANT

## 2019-06-21 RX ORDER — WARFARIN SODIUM 5 MG/1
TABLET ORAL
Qty: 102 TABLET | Refills: 1 | Status: SHIPPED | OUTPATIENT
Start: 2019-06-21 | End: 2020-01-31

## 2019-06-21 NOTE — PROGRESS NOTES
Indication: PE    INR Today:  2.4  Current Dose: 5 mg daily plus extra 2.5 mg 2 days per week.    Bleeding Signs/Symptoms:  None  Including headache, stroke symptoms (confusion, weakness, slurred speech), vision changes, nosebleeds, red/black vomit, red/black stool, prolonged bleeded, dizziness, SOB, pica.  Thromboembolic Signs/Symptoms:  None  Including pain in one leg, swelling in extremity, chest pain, numbess, weakness, mental status change.      Medication Changes:  No  Dietary Changes:  No  Bacterial/Viral Infection:  No    Missed Coumadin Doses:  None    Other Concerns:  Still suffering from chronic pain, which is worsening depression, but she still feels that she is safe- no plan/intent, would never commit suicide. Has rheum f/u next week.     OBJECTIVE:  /84 (BP Location: Right arm, Patient Position: Sitting, Cuff Size: Adult Large)   Pulse 72   Temp 98  F (36.7  C) (Oral)   Wt (!) 165.1 kg (364 lb)   SpO2 97%   BMI 58.75 kg/m    Body mass index is 58.75 kg/m .    Patient is a 63 year old female, in no acute distress. Vitals noted.  Head: Normocephalic, atraumatic.  Neck:  Neck supple, No lymphadenopathy, no thyromegaly.  Cardiac:  Normal rhythm and rate, no murmurs, rubs or gallops.  Lungs: clear to auscultation bilaterally; no wheezes, crackles or rhonchi      ASSESSMENT and PLAN:    Therapeutic INR for goal of 2-3    New Dose: No change. Coumadin 5 mg daily plus extra 2.5 mg 2 days per week    Refilled warfarin    Next INR: 1 month    Hyun Braden PA-C  6/21/2019

## 2019-06-21 NOTE — NURSING NOTE
Darline is here for an INR check.        Pre-visit Screening:  Immunizations:  up to date  Colonoscopy:  is up to date  Mammogram: is due and to be scheduled by patient for later completion  Asthma Action Test/Plan:  NA  PHQ9:  NA   GAD7:  NA  Questioned patient about current smoking habits Pt. vapes everyday  Ok to leave detailed message on voice mail for today's visit only Yes, phone # [phone]

## 2019-08-02 ENCOUNTER — TRANSFERRED RECORDS (OUTPATIENT)
Dept: FAMILY MEDICINE | Facility: CLINIC | Age: 64
End: 2019-08-02

## 2019-10-10 ENCOUNTER — TRANSFERRED RECORDS (OUTPATIENT)
Dept: FAMILY MEDICINE | Facility: CLINIC | Age: 64
End: 2019-10-10

## 2019-10-25 ENCOUNTER — OFFICE VISIT (OUTPATIENT)
Dept: FAMILY MEDICINE | Facility: CLINIC | Age: 64
End: 2019-10-25

## 2019-10-25 VITALS
TEMPERATURE: 96.8 F | OXYGEN SATURATION: 98 % | DIASTOLIC BLOOD PRESSURE: 84 MMHG | HEART RATE: 71 BPM | SYSTOLIC BLOOD PRESSURE: 118 MMHG | BODY MASS INDEX: 59.24 KG/M2 | WEIGHT: 293 LBS

## 2019-10-25 DIAGNOSIS — I10 BENIGN ESSENTIAL HYPERTENSION: ICD-10-CM

## 2019-10-25 DIAGNOSIS — G89.29 CHRONIC BILATERAL LOW BACK PAIN WITH BILATERAL SCIATICA: ICD-10-CM

## 2019-10-25 DIAGNOSIS — M54.42 CHRONIC BILATERAL LOW BACK PAIN WITH BILATERAL SCIATICA: ICD-10-CM

## 2019-10-25 DIAGNOSIS — M62.830 BACK MUSCLE SPASM: ICD-10-CM

## 2019-10-25 DIAGNOSIS — M48.02 SPINAL STENOSIS IN CERVICAL REGION: ICD-10-CM

## 2019-10-25 DIAGNOSIS — F33.0 MILD EPISODE OF RECURRENT MAJOR DEPRESSIVE DISORDER (H): ICD-10-CM

## 2019-10-25 DIAGNOSIS — M54.41 CHRONIC BILATERAL LOW BACK PAIN WITH BILATERAL SCIATICA: ICD-10-CM

## 2019-10-25 DIAGNOSIS — Z12.31 ENCOUNTER FOR SCREENING MAMMOGRAM FOR BREAST CANCER: Primary | ICD-10-CM

## 2019-10-25 LAB — INR POINT OF CARE: 2.2 (ref 2–3)

## 2019-10-25 PROCEDURE — 90471 IMMUNIZATION ADMIN: CPT | Performed by: PHYSICIAN ASSISTANT

## 2019-10-25 PROCEDURE — 36415 COLL VENOUS BLD VENIPUNCTURE: CPT | Performed by: PHYSICIAN ASSISTANT

## 2019-10-25 PROCEDURE — 99213 OFFICE O/P EST LOW 20 MIN: CPT | Mod: 25 | Performed by: PHYSICIAN ASSISTANT

## 2019-10-25 PROCEDURE — 90686 IIV4 VACC NO PRSV 0.5 ML IM: CPT | Performed by: PHYSICIAN ASSISTANT

## 2019-10-25 PROCEDURE — 85610 PROTHROMBIN TIME: CPT | Performed by: PHYSICIAN ASSISTANT

## 2019-10-25 RX ORDER — GABAPENTIN 300 MG/1
600 CAPSULE ORAL 3 TIMES DAILY
Qty: 540 CAPSULE | Refills: 1 | Status: SHIPPED | OUTPATIENT
Start: 2019-10-25 | End: 2020-06-16

## 2019-10-25 RX ORDER — MESALAMINE 1.2 G/1
1200 TABLET, DELAYED RELEASE ORAL 2 TIMES DAILY
COMMUNITY

## 2019-10-25 RX ORDER — ESCITALOPRAM OXALATE 20 MG/1
20 TABLET ORAL DAILY
Qty: 30 TABLET | Refills: 1 | Status: SHIPPED | OUTPATIENT
Start: 2019-10-25 | End: 2020-01-20

## 2019-10-25 RX ORDER — CYCLOBENZAPRINE HCL 10 MG
10 TABLET ORAL 3 TIMES DAILY PRN
Qty: 30 TABLET | Refills: 0 | Status: SHIPPED | OUTPATIENT
Start: 2019-10-25 | End: 2020-05-26

## 2019-10-25 RX ORDER — LOSARTAN POTASSIUM 50 MG/1
50 TABLET ORAL DAILY
Qty: 90 TABLET | Refills: 1 | Status: SHIPPED | OUTPATIENT
Start: 2019-10-25 | End: 2020-01-20

## 2019-10-25 ASSESSMENT — PATIENT HEALTH QUESTIONNAIRE - PHQ9
SUM OF ALL RESPONSES TO PHQ QUESTIONS 1-9: 13
5. POOR APPETITE OR OVEREATING: NOT AT ALL

## 2019-10-25 ASSESSMENT — ANXIETY QUESTIONNAIRES
GAD7 TOTAL SCORE: 4
5. BEING SO RESTLESS THAT IT IS HARD TO SIT STILL: SEVERAL DAYS
6. BECOMING EASILY ANNOYED OR IRRITABLE: SEVERAL DAYS
2. NOT BEING ABLE TO STOP OR CONTROL WORRYING: NOT AT ALL
7. FEELING AFRAID AS IF SOMETHING AWFUL MIGHT HAPPEN: NOT AT ALL
3. WORRYING TOO MUCH ABOUT DIFFERENT THINGS: SEVERAL DAYS
IF YOU CHECKED OFF ANY PROBLEMS ON THIS QUESTIONNAIRE, HOW DIFFICULT HAVE THESE PROBLEMS MADE IT FOR YOU TO DO YOUR WORK, TAKE CARE OF THINGS AT HOME, OR GET ALONG WITH OTHER PEOPLE: SOMEWHAT DIFFICULT
1. FEELING NERVOUS, ANXIOUS, OR ON EDGE: SEVERAL DAYS

## 2019-10-25 NOTE — NURSING NOTE
Darline is here for an INR check.          Pre-visit Screening:  Immunizations:  up to date  Colonoscopy:  is up to date  Mammogram: is due and to be scheduled by patient for later completion  Asthma Action Test/Plan:  NA  PHQ9:  Done today  GAD7:  Done today  Questioned patient about current smoking habits Pt. Currently vapes  Ok to leave detailed message on voice mail for today's visit only Yes, phone # [phone]

## 2019-10-25 NOTE — PROGRESS NOTES
SUBJECTIVE:                                                    Bere Machuca is a 64 year old female who presents to clinic today for the following health issues:        Chronic coumadin use    Indication: PE    Lab Results   Component Value Date    INR 2.2 10/25/2019    INR 2.4 06/21/2019    INR 1.4 04/12/2019    INR 2.1 11/23/2018    INR 2.0 09/21/2018    INR 1.7 (A) 08/22/2018       Current Coumadin Dose:      5 mg daily & MR 7.5      Bleeding Signs/Symptoms:  None  Thromboembolic Signs/Symptoms:  None    Medication Changes:  Yes: Prednisone 5 mg daily and Trelegy  Dietary Changes:  No  Bacterial/Viral Infection:  No    Missed Coumadin Doses:  None    Other Concerns:        Flexeril - using occ.     12/6/19 - Vacation to [place]  Requesting Tramadol for back and hip pain. Has multiple herniated discs severe OA of  Hip    Wt Readings from Last 5 Encounters:   10/25/19 (!) 166.5 kg (367 lb)   06/21/19 (!) 165.1 kg (364 lb)   04/12/19 (!) 169.8 kg (374 lb 6.4 oz)   11/23/18 (!) 170.6 kg (376 lb)   09/21/18 (!) 173.7 kg (383 lb)     Body mass index is 59.24 kg/m .    Depression - not well controlled due to chronic pain and recent colitis flares. Not seeing therapist.  Denies SI    She is not fasting today but needs medications refilled.     PHQ-9 SCORE 11/23/2018 4/12/2019 10/25/2019   PHQ-9 Total Score - - -   PHQ-9 Total Score 6 9 13         MILEY-7 SCORE 11/23/2018 4/12/2019 10/25/2019   Total Score 1 15 4         ROS:   C: NEGATIVE for fever, chills, change in weight  E: NEGATIVE for vision changes or irritation  E/M: NEGATIVE for ear, mouth and throat problems  R: NEGATIVE for significant cough or SOB  CV: NEGATIVE for chest pain, palpitations or peripheral edema          Labs reviewed in EPIC  BP Readings from Last 3 Encounters:   10/25/19 118/84   06/21/19 128/84   04/12/19 112/68    Wt Readings from Last 3 Encounters:   10/25/19 (!) 166.5 kg (367 lb)   06/21/19 (!) 165.1 kg (364 lb)   04/12/19 (!) 169.8 kg  (374 lb 6.4 oz)                  Patient Active Problem List   Diagnosis     History of cocaine abuse (H)     Cigarette - quit  -  now vaping     Family history of malignant neoplasm of gastrointestinal tract     Esophageal reflux     Chronic airway obstruction (H)     Hypertension     Sleep apnea     Migraine     ACP (advance care planning)     Factor 5 Leiden mutation, heterozygous/ INR 2-3     Health Care Home     Family history of pulmonary embolism     Lymphedema of arm     Spinal stenosis in cervical region     History of pulmonary embolism     Major depressive disorder, recurrent episode, moderate (H)     Superior vena cava syndrome     Morbid obesity due to excess calories (H)     Displacement of lumbar intervertebral disc without myelopathy     Controlled type 2 diabetes mellitus with circulatory disorder, without long-term current use of insulin (H)     Ulcerative rectosigmoiditis (H)     PVD (peripheral vascular disease) (H)     Rheumatoid arthritis involving multiple sites with positive rheumatoid factor (H)     Past Surgical History:   Procedure Laterality Date     BIOPSY OF SKIN LESION  multiple    keloids     C ANESTH,REPAIR LO ABD HERNIA NOS  multiple/  last    panniculus     C EXPLORATORY OF ABDOMEN       C PLASTIC SURGERY, NECK  age 4     C REMOVAL COLON/ILEOSTOMY      reastamosis     C TOTAL ABDOM HYSTERECTOMY      Hysterectomy, Total Abdominal     HC REMOVAL OF OVARY/TUBE(S)      Salpingo-Oophorectomy, Unilateral     HC REMOVE ABDOMINAL WALL LESION      abscess     HC REMOVE TONSILS/ADENOIDS,<13 Y/O  age 14    T & A <14y.o.     HERNIA REPAIR       INSERTION OF ACCESS PORT  1-4-96    sepsis- removed after hospital stay       Social History     Tobacco Use     Smoking status: Former Smoker     Packs/day: 0.50     Years: 35.00     Pack years: 17.50     Types: Cigarettes     Last attempt to quit: 2014     Years since quittin.2     Smokeless tobacco: Never Used    Substance Use Topics     Alcohol use: Yes     Alcohol/week: 0.8 standard drinks     Types: 1 Standard drinks or equivalent per week     Comment: social     Family History   Problem Relation Age of Onset     Respiratory Mother         copd     Cancer Mother         skin cancers/ melanomas     C.A.D. Mother         MI  age 79     Cancer - colorectal Father         / diagnosis age 72     Gastrointestinal Disease Father         colitis     Breast Cancer No family hx of          Current Outpatient Medications   Medication Sig Dispense Refill     blood glucose (NO BRAND SPECIFIED) lancets standard Use to test blood sugar 2-3 times daily or as directed. 100 each 1     blood glucose monitoring (ISAAC CONTOUR NEXT) test strip Use to test blood sugar 2-3 times daily or as directed. 100 strip 3     cholecalciferol 1000 UNITS TABS Take 1,000 Units by mouth At Bedtime        cyclobenzaprine (FLEXERIL) 10 MG tablet Take 1 tablet (10 mg) by mouth 3 times daily as needed for muscle spasms 30 tablet 0     escitalopram (LEXAPRO) 20 MG tablet Take 1 tablet (20 mg) by mouth daily 30 tablet 1     Fluticasone-Umeclidin-Vilanterol (TRELEGY ELLIPTA) 100-62.5-25 MCG/INH oral inhaler Inhale 1 puff into the lungs daily - Rinse mouth and spit after using 3 Inhaler 3     folic acid (FOLVITE) 1 MG tablet TAKE 1 TABLET BY MOUTH EVERY DAY IN THE MORNING  0     gabapentin (NEURONTIN) 300 MG capsule Take 2 capsules (600 mg) by mouth 3 times daily 540 capsule 1     hydrOXYzine (ATARAX) 25 MG tablet Take 1 tablet (25 mg) by mouth At Bedtime 90 tablet 0     losartan (COZAAR) 50 MG tablet Take 1 tablet (50 mg) by mouth daily 90 tablet 1     mesalamine (LIALDA) 1.2 g EC tablet Take 1,200 mg by mouth daily (with breakfast) 4 pills a day       mesalamine (LIALDA) 1.2 g EC tablet Take 1 tablet (1,200 mg) by mouth daily (with breakfast)       METHOTREXate, PF, (OTREXUP PF) 7.5 MG/0.4ML pen Inject 7.5 mg Subcutaneous every 7 days       MICROLET  "LANCETS MISC TEST 2-3 TIMES DAILY OR AS DIRECTED  1     multivitamin, therapeutic with minerals (MULTI-VITAMIN) TABS Take 1 tablet by mouth daily       order for DME Equipment being ordered: Walker with seat due to chronic low back pain with herniated discs, morbid obesity, and bone-on-bone OA in right hip.     Pt requires larger size \"extra wide\" with weight of 386 lbs. 1 Device 0     PREDNISONE PO Take 5 mg by mouth 3 times daily       Probiotic Product (VSL#3) CAPS Take 1 capsule by mouth daily 30 capsule 0     traMADol (ULTRAM) 50 MG tablet Take 1-2 tablets ( mg) by mouth every 6 hours as needed for severe pain 20 tablet 0     warfarin (COUMADIN) 5 MG tablet Take 1 tablet (5 mg) daily, with extra 1/2 tab (2.5 mg) on Mon and Thurs 102 tablet 1     albuterol (2.5 MG/3ML) 0.083% nebulizer solution Take 3 mLs by nebulization every 6 hours as needed for shortness of breath / dyspnea. Dr [facility]       albuterol (PROAIR HFA) 108 (90 BASE) MCG/ACT inhaler Inhale 1-2 puffs into the lungs as needed for shortness of breath / dyspnea. DR [facility]       ASPIRIN NOT PRESCRIBED (INTENTIONAL) Please choose reason not prescribed, below 0 each 0     B Complex-C (SUPER B COMPLEX PO) Take 1 tablet by mouth daily       Allergies   Allergen Reactions     Metformin Diarrhea     Aspirin      PN: LW Reaction: sensitive/stomach     Lyrica [Pregabalin]      dizziness     Tape [Adhesive Tape] Itching     Vancomycin      Recent Labs   Lab Test 04/12/19  1557 04/12/19  1548 08/10/18  1549 08/10/18 03/29/18  1515 03/29/18  1434 11/25/17  1805  11/20/17  1458  06/23/17  0551  10/24/16  01/11/16  1541   A1C  --  6.1  --  6.4  --  7.0  --    < >  --    < >  --   --  6.8  --   --    LDL  --   --  121*  --  76  --   --   --  173*  --   --   --   --   --   --    HDL  --   --  49*  --  76  --   --   --  86  --   --   --   --   --   --    TRIG  --   --  199*  --  139  --   --   --  162*  --   --   --   --   " --   --    ALT 13  --  22  --  49*  --   --   --  53*   < >  --    < > 42  --   --    CR 0.69  --  0.65  --  0.64  --  0.64  --  0.65   < > 0.74   < > 0.78   < >  --    GFRESTIMATED 94  --  96  --  97  --  >90  --  96   < > 81   < >  --    < >  --    GFRESTBLACK  --   --   --   --   --   --  >90  --   --   --  >90  African American GFR Calc     < >  --    < >  --    POTASSIUM 4.2  --  4.1  --  4.0  --  3.5  --  3.8   < > 3.8   < > 4.5   < >  --    TSH  --   --   --   --   --   --   --   --   --   --   --   --  2.85  --  2.57    < > = values in this interval not displayed.            OBJECTIVE:                                                    /84 (BP Location: Left arm, Patient Position: Sitting, Cuff Size: Adult Large)   Pulse 71   Temp 96.8  F (36  C) (Temporal)   Wt (!) 166.5 kg (367 lb)   SpO2 98%   BMI 59.24 kg/m     Body mass index is 59.24 kg/m .       Patient is a 64 year old female, in no acute distress. Vitals noted   Head: Normocephalic, atraumatic.  Eyes: Conjunctiva clear.  No discharge noted.  Ears: External ears, canals and TMs normal Bilaterally: gray and translucent.   Nose: Normal without discharge.  Mouth / Throat:   Pharynx non-erythematous, no exudates present, tonsils without hypertrophy. Mucous membranes moist.  Neck:  Neck supple, No lymphadenopathy, no thyromegaly.  Cardiac:  Normal rhythm and rate, no murmurs, rubs or gallops.  Lungs: clear to auscultation bilaterally; no wheezes, crackles or rhonchi    Mental Status Exam:   Appearance: tentative  Grooming: adequately groomed  Demeanor: engaged, cooperative  Affect: normal  Speech: Normal.  Gait:Normal.  Movements: Normal  Form of thought: Logical, Linear and Goal directed  Thought content:  Normal  Insight:Good   Judgment: Good   Cognition: Good              ASSESSMENT/PLAN:                                                        1. Encounter for screening mammogram for breast cancer    - Mammo Screening digital (bilat);  Future  - CL BFP PROTHROMBIN TIME/INR (BFP)  - VENOUS COLLECTION    2. Back muscle spasm    - cyclobenzaprine (FLEXERIL) 10 MG tablet; Take 1 tablet (10 mg) by mouth 3 times daily as needed for muscle spasms  Dispense: 30 tablet; Refill: 0    3. Chronic bilateral low back pain with bilateral sciatica  I will not rx Tramadol given her hx of heroin abuse. I have sent note to her PCP to see if she is willing to fill. Consider pain clinic referral  - cyclobenzaprine (FLEXERIL) 10 MG tablet; Take 1 tablet (10 mg) by mouth 3 times daily as needed for muscle spasms  Dispense: 30 tablet; Refill: 0    4. Spinal stenosis in cervical region    - gabapentin (NEURONTIN) 300 MG capsule; Take 2 capsules (600 mg) by mouth 3 times daily  Dispense: 540 capsule; Refill: 1    5. Benign essential hypertension  controlled  - losartan (COZAAR) 50 MG tablet; Take 1 tablet (50 mg) by mouth daily  Dispense: 90 tablet; Refill: 1    6. Mild episode of recurrent major depressive disorder (H)  Switch to Lexapro  Follow-up in 4 weeks.   Consider therapy  - escitalopram (LEXAPRO) 20 MG tablet; Take 1 tablet (20 mg) by mouth daily  Dispense: 30 tablet; Refill: 1      Therapeutic INR for goal of 2-3        Next INR: 4 weeks      Catrachita Torres PA-C  10/25/2019

## 2019-10-26 ASSESSMENT — ANXIETY QUESTIONNAIRES: GAD7 TOTAL SCORE: 4

## 2019-11-12 ENCOUNTER — MYC REFILL (OUTPATIENT)
Dept: FAMILY MEDICINE | Facility: CLINIC | Age: 64
End: 2019-11-12

## 2019-11-12 DIAGNOSIS — K51.319 ULCERATIVE RECTOSIGMOIDITIS WITH COMPLICATION (H): ICD-10-CM

## 2019-11-13 RX ORDER — LACTOBACIL 2/BIFIDO 1/S.THERMO 450B CELL
1 PACKET (EA) ORAL DAILY
Qty: 90 CAPSULE | Refills: 3 | Status: SHIPPED | OUTPATIENT
Start: 2019-11-13 | End: 2021-06-11

## 2019-11-13 NOTE — TELEPHONE ENCOUNTER
Patient send MyChart refill request for Probiotic. Last Refill was by Dr. Armenta on 6/28/2018, please advise.

## 2019-11-19 ENCOUNTER — MYC MEDICAL ADVICE (OUTPATIENT)
Dept: FAMILY MEDICINE | Facility: CLINIC | Age: 64
End: 2019-11-19

## 2019-11-21 DIAGNOSIS — F33.0 MILD EPISODE OF RECURRENT MAJOR DEPRESSIVE DISORDER (H): ICD-10-CM

## 2019-11-21 RX ORDER — ESCITALOPRAM OXALATE 20 MG/1
TABLET ORAL
Qty: 30 TABLET | Refills: 1 | COMMUNITY
Start: 2019-11-21

## 2019-11-21 NOTE — TELEPHONE ENCOUNTER
Bere Machuca is requesting a refill of:    Refused Prescriptions:                       Disp   Refills    escitalopram (LEXAPRO) 20 MG tablet [Pharm*30 tab*1        Sig: TAKE 1 TABLET BY MOUTH EVERY DAY  Refused By: BOUCHRA HORTA  Reason for Refusal: Patient needs appointment    Per notes on 10/25/19 pt needs 4 week recheck before further refills. No appt scheduled.

## 2020-01-05 DIAGNOSIS — F33.0 MILD EPISODE OF RECURRENT MAJOR DEPRESSIVE DISORDER (H): ICD-10-CM

## 2020-01-06 RX ORDER — ESCITALOPRAM OXALATE 20 MG/1
TABLET ORAL
Qty: 30 TABLET | Refills: 1 | OUTPATIENT
Start: 2020-01-06

## 2020-01-06 NOTE — TELEPHONE ENCOUNTER
Refused Prescriptions:                       Disp   Refills    escitalopram (LEXAPRO) 20 MG tablet [Pharm*30 tab*1        Sig: TAKE 1 TABLET BY MOUTH EVERY DAY  Refused By: BINDU MACEDO  Reason for Refusal: Patient needs appointment    See RE on 11-  Due for ov  No Future Appt's for pt     Bindu

## 2020-01-19 ENCOUNTER — MYC MEDICAL ADVICE (OUTPATIENT)
Dept: FAMILY MEDICINE | Facility: CLINIC | Age: 65
End: 2020-01-19

## 2020-01-19 DIAGNOSIS — I10 BENIGN ESSENTIAL HYPERTENSION: ICD-10-CM

## 2020-01-19 DIAGNOSIS — F33.0 MILD EPISODE OF RECURRENT MAJOR DEPRESSIVE DISORDER (H): ICD-10-CM

## 2020-01-20 RX ORDER — ESCITALOPRAM OXALATE 20 MG/1
20 TABLET ORAL DAILY
Qty: 15 TABLET | Refills: 0 | Status: SHIPPED | OUTPATIENT
Start: 2020-01-20 | End: 2020-01-31

## 2020-01-20 RX ORDER — LOSARTAN POTASSIUM 50 MG/1
50 TABLET ORAL DAILY
Qty: 15 TABLET | Refills: 0 | Status: SHIPPED | OUTPATIENT
Start: 2020-01-20 | End: 2020-01-31

## 2020-01-20 NOTE — TELEPHONE ENCOUNTER
Pending Prescriptions:                       Disp   Refills    escitalopram (LEXAPRO) 20 MG tablet       15 tab*0            Sig: Take 1 tablet (20 mg) by mouth daily    losartan (COZAAR) 50 MG tablet            15 tab*0            Sig: Take 1 tablet (50 mg) by mouth daily    See my chart message refill request below  Respond to pt on my chart  Pt does have an appt on 1-31

## 2020-01-31 ENCOUNTER — OFFICE VISIT (OUTPATIENT)
Dept: FAMILY MEDICINE | Facility: CLINIC | Age: 65
End: 2020-01-31

## 2020-01-31 VITALS
OXYGEN SATURATION: 98 % | SYSTOLIC BLOOD PRESSURE: 118 MMHG | WEIGHT: 293 LBS | DIASTOLIC BLOOD PRESSURE: 84 MMHG | TEMPERATURE: 98.4 F | BODY MASS INDEX: 60.2 KG/M2 | HEART RATE: 85 BPM

## 2020-01-31 DIAGNOSIS — E11.59 CONTROLLED TYPE 2 DIABETES MELLITUS WITH OTHER CIRCULATORY COMPLICATION, WITHOUT LONG-TERM CURRENT USE OF INSULIN (H): Primary | ICD-10-CM

## 2020-01-31 DIAGNOSIS — I10 BENIGN ESSENTIAL HYPERTENSION: ICD-10-CM

## 2020-01-31 DIAGNOSIS — Z86.711 HISTORY OF PULMONARY EMBOLISM: ICD-10-CM

## 2020-01-31 DIAGNOSIS — F33.0 MILD EPISODE OF RECURRENT MAJOR DEPRESSIVE DISORDER (H): ICD-10-CM

## 2020-01-31 LAB
HBA1C MFR BLD: 5.9 % (ref 4–7)
INR POINT OF CARE: 3.1 (ref 2–3)

## 2020-01-31 PROCEDURE — 99213 OFFICE O/P EST LOW 20 MIN: CPT | Performed by: PHYSICIAN ASSISTANT

## 2020-01-31 PROCEDURE — 36415 COLL VENOUS BLD VENIPUNCTURE: CPT | Performed by: PHYSICIAN ASSISTANT

## 2020-01-31 PROCEDURE — 83036 HEMOGLOBIN GLYCOSYLATED A1C: CPT | Performed by: PHYSICIAN ASSISTANT

## 2020-01-31 PROCEDURE — 85610 PROTHROMBIN TIME: CPT | Performed by: PHYSICIAN ASSISTANT

## 2020-01-31 RX ORDER — LOSARTAN POTASSIUM 50 MG/1
50 TABLET ORAL DAILY
Qty: 90 TABLET | Refills: 3 | Status: SHIPPED | OUTPATIENT
Start: 2020-01-31 | End: 2021-01-29

## 2020-01-31 RX ORDER — ESCITALOPRAM OXALATE 20 MG/1
20 TABLET ORAL DAILY
Qty: 90 TABLET | Refills: 1 | Status: SHIPPED | OUTPATIENT
Start: 2020-01-31 | End: 2020-08-14

## 2020-01-31 RX ORDER — WARFARIN SODIUM 5 MG/1
TABLET ORAL
Qty: 96 TABLET | Refills: 0 | Status: SHIPPED | OUTPATIENT
Start: 2020-01-31 | End: 2020-06-05

## 2020-01-31 NOTE — PROGRESS NOTES
CC: Medication Check    History:  Darline is here today needing refills of a few of her medications. Admits she continues to have a low quality of life, but she feels like this is all stemming from her hip that needs to be replaced, which she is working on.     HTN: Takes losartan 50 mg daily. Has not been checking BP at home.     Depression: Recently switched from citalopram to escitalopram 20 mg. Feels like it made her more irritable, for a period of time, but this has gotten somewhat better, and would like to stay on it. Overall does not feel like it is controlling her depression, but is confident that it will be better from getting her hip surgery.     History of PE: Taking warfarin Take 1 tablet (5 mg) daily, with extra 1/2 tab (2.5 mg) on Mon and Thurs    PMH, MEDICATIONS, ALLERGIES, SOCIAL AND FAMILY HISTORY in EPIC and reviewed by me personally.    ROS negative other than the symptoms noted above in the HPI.      Examination   /84 (BP Location: Right arm, Patient Position: Sitting, Cuff Size: Adult Large)   Pulse 85   Temp 98.4  F (36.9  C) (Oral)   Wt (!) 169.2 kg (373 lb)   SpO2 98%   BMI 60.20 kg/m       Constitutional: Sitting comfortably, in no acute distress. Vital signs noted  Neck:  no adenopathy, trachea midline and normal to palpation  Cardiovascular:  regular rate and rhythm, no murmurs, clicks, or gallops  Respiratory:  normal respiratory rate and rhythm, lungs clear to auscultation  Psychiatric: mentation appears normal and affect normal/bright        A/P    ICD-10-CM    1. Controlled type 2 diabetes mellitus with other circulatory complication, without long-term current use of insulin (H) E11.59 Hemoglobin A1c (BFP)     C FOOT EXAM  NO CHARGE     CANCELED: Comprehensive Metobolic Panel (BFP)   2. History of pulmonary embolism Z86.711 CL BFP PROTHROMBIN TIME/INR (BFP)     VENOUS COLLECTION     warfarin ANTICOAGULANT (COUMADIN) 5 MG tablet   3. Benign essential hypertension I10 losartan  (COZAAR) 50 MG tablet     CANCELED: Comprehensive Metobolic Panel (BFP)   4. Mild episode of recurrent major depressive disorder (H) F33.0 escitalopram (LEXAPRO) 20 MG tablet       DISCUSSION:  1. Controlled type 2 diabetes mellitus with other circulatory complication, without long-term current use of insulin (H)  A1c today continues to be controlled at 5.9%. Due for foot exam today, but I did not complete this, so will plan on doing this at next appt in 1 month.   - Hemoglobin A1c (BFP)  - C FOOT EXAM  NO CHARGE    2. History of pulmonary embolism  INR today is 3.1. Will have her decrease slightly to 2.5 mg extra only on Thursday. Should return ideally fasting in 1 month to recheck.   - CL BFP PROTHROMBIN TIME/INR (BFP)  - VENOUS COLLECTION  - warfarin ANTICOAGULANT (COUMADIN) 5 MG tablet; Take 1 tablet (5 mg) daily, with extra 1/2 tab (2.5 mg) on Thurs  Dispense: 96 tablet; Refill: 0    3. Benign essential hypertension  BP controlled today. Will refill without change for 1 year. Will check CMP when she returns in 1 month if not already checked by rheumatologist.  - losartan (COZAAR) 50 MG tablet; Take 1 tablet (50 mg) by mouth daily  Dispense: 90 tablet; Refill: 3    4. Mild episode of recurrent major depressive disorder (H)  Will continue on same dose. Refilled for 6 months.   - escitalopram (LEXAPRO) 20 MG tablet; Take 1 tablet (20 mg) by mouth daily  Dispense: 90 tablet; Refill: 1      follow up visit: 1 month, fasting draw, INR    Hyun Braden PA-C  [facility]

## 2020-01-31 NOTE — NURSING NOTE
Darline is here for a non fasting med check and INR check.          Pre-visit Screening:  Immunizations:  up to date  Colonoscopy:  is up to date  Mammogram: is due and to be scheduled by patient for later completion  Asthma Action Test/Plan:  NA  PHQ9:  Up to date  GAD7:  Up to date  Questioned patient about current smoking habits Pt. quit smoking some time ago.  Ok to leave detailed message on voice mail for today's visit only Yes, phone # [phone]

## 2020-02-23 ENCOUNTER — HEALTH MAINTENANCE LETTER (OUTPATIENT)
Age: 65
End: 2020-02-23

## 2020-02-24 ENCOUNTER — TRANSFERRED RECORDS (OUTPATIENT)
Dept: FAMILY MEDICINE | Facility: CLINIC | Age: 65
End: 2020-02-24

## 2020-03-13 ENCOUNTER — TRANSFERRED RECORDS (OUTPATIENT)
Dept: FAMILY MEDICINE | Facility: CLINIC | Age: 65
End: 2020-03-13

## 2020-03-23 ENCOUNTER — TRANSFERRED RECORDS (OUTPATIENT)
Dept: FAMILY MEDICINE | Facility: CLINIC | Age: 65
End: 2020-03-23

## 2020-03-25 ENCOUNTER — MYC REFILL (OUTPATIENT)
Dept: FAMILY MEDICINE | Facility: CLINIC | Age: 65
End: 2020-03-25

## 2020-03-25 DIAGNOSIS — J44.9 CHRONIC OBSTRUCTIVE PULMONARY DISEASE, UNSPECIFIED COPD TYPE (H): ICD-10-CM

## 2020-03-26 RX ORDER — PREDNISONE 5 MG/1
5 TABLET ORAL DAILY
COMMUNITY
Start: 2020-01-17

## 2020-03-26 NOTE — TELEPHONE ENCOUNTER
Bere Machuca is requesting a refill of:    Pending Prescriptions:                       Disp   Refills    Fluticasone-Umeclidin-Vilanterol (TRELEGY*3 Inha*0            Sig: Inhale 1 puff into the lungs daily - Rinse mouth           and spit after using    Please close encounter if RX was sent. Thanks, Leah

## 2020-05-13 DIAGNOSIS — J44.9 CHRONIC OBSTRUCTIVE PULMONARY DISEASE, UNSPECIFIED COPD TYPE (H): ICD-10-CM

## 2020-05-13 RX ORDER — ALBUTEROL SULFATE 90 UG/1
1-2 AEROSOL, METERED RESPIRATORY (INHALATION) PRN
Qty: 1 INHALER | Refills: 0 | Status: SHIPPED | OUTPATIENT
Start: 2020-05-13 | End: 2020-06-05

## 2020-05-26 ENCOUNTER — MYC REFILL (OUTPATIENT)
Dept: FAMILY MEDICINE | Facility: CLINIC | Age: 65
End: 2020-05-26

## 2020-05-26 ENCOUNTER — MYC MEDICAL ADVICE (OUTPATIENT)
Dept: FAMILY MEDICINE | Facility: CLINIC | Age: 65
End: 2020-05-26

## 2020-05-26 DIAGNOSIS — M54.42 CHRONIC BILATERAL LOW BACK PAIN WITH BILATERAL SCIATICA: ICD-10-CM

## 2020-05-26 DIAGNOSIS — M62.830 BACK MUSCLE SPASM: ICD-10-CM

## 2020-05-26 DIAGNOSIS — G89.29 CHRONIC BILATERAL LOW BACK PAIN WITH BILATERAL SCIATICA: ICD-10-CM

## 2020-05-26 DIAGNOSIS — M54.41 CHRONIC BILATERAL LOW BACK PAIN WITH BILATERAL SCIATICA: ICD-10-CM

## 2020-05-27 RX ORDER — METHOTREXATE 25 MG/ML
INJECTION INTRA-ARTERIAL; INTRAMUSCULAR; INTRATHECAL; INTRAVENOUS
COMMUNITY
Start: 2020-04-23 | End: 2021-06-11

## 2020-05-27 RX ORDER — CYCLOBENZAPRINE HCL 10 MG
10 TABLET ORAL 3 TIMES DAILY PRN
Qty: 10 TABLET | Refills: 0 | Status: SHIPPED | OUTPATIENT
Start: 2020-05-27 | End: 2020-07-22

## 2020-05-27 NOTE — TELEPHONE ENCOUNTER
Bere Machuca is requesting a refill of:    Pending Prescriptions:                       Disp   Refills    cyclobenzaprine (FLEXERIL) 10 MG tablet   30 tab*0            Sig: Take 1 tablet (10 mg) by mouth 3 times daily as           needed for muscle spasms    Please close encounter if RX was sent. Thanks, Leah

## 2020-06-04 ENCOUNTER — TRANSFERRED RECORDS (OUTPATIENT)
Dept: FAMILY MEDICINE | Facility: CLINIC | Age: 65
End: 2020-06-04

## 2020-06-04 DIAGNOSIS — J44.9 CHRONIC OBSTRUCTIVE PULMONARY DISEASE, UNSPECIFIED COPD TYPE (H): ICD-10-CM

## 2020-06-04 RX ORDER — ALBUTEROL SULFATE 90 UG/1
AEROSOL, METERED RESPIRATORY (INHALATION)
Qty: 8.5 G | COMMUNITY
Start: 2020-06-04

## 2020-06-04 NOTE — TELEPHONE ENCOUNTER
Bere Machuca is requesting a refill of:    Refused Prescriptions:                       Disp   Refills    albuterol (PROAIR HFA/PROVENTIL HFA/VENTOL*8.5 g           Sig: INHALE 1 TO 2 PUFFS BY MOUTH AS NEEDED FOR SHORTNESS           OF BREATH/DYSPNEA AS DIRECTED  Refused By: BOUCHRA HORTA  Reason for Refusal: Patient needs appointment  Reason for Refusal Comment: Appt scheduled for 06/05/20

## 2020-06-05 ENCOUNTER — OFFICE VISIT (OUTPATIENT)
Dept: FAMILY MEDICINE | Facility: CLINIC | Age: 65
End: 2020-06-05

## 2020-06-05 VITALS
HEART RATE: 65 BPM | TEMPERATURE: 98.2 F | DIASTOLIC BLOOD PRESSURE: 82 MMHG | RESPIRATION RATE: 24 BRPM | OXYGEN SATURATION: 97 % | BODY MASS INDEX: 47.09 KG/M2 | SYSTOLIC BLOOD PRESSURE: 116 MMHG | HEIGHT: 66 IN | WEIGHT: 293 LBS

## 2020-06-05 DIAGNOSIS — Z86.711 HISTORY OF PULMONARY EMBOLISM: ICD-10-CM

## 2020-06-05 DIAGNOSIS — E66.01 MORBID OBESITY (H): ICD-10-CM

## 2020-06-05 DIAGNOSIS — E11.59 CONTROLLED TYPE 2 DIABETES MELLITUS WITH OTHER CIRCULATORY COMPLICATION, WITHOUT LONG-TERM CURRENT USE OF INSULIN (H): ICD-10-CM

## 2020-06-05 DIAGNOSIS — Z79.01 LONG TERM CURRENT USE OF ANTICOAGULANT THERAPY: ICD-10-CM

## 2020-06-05 DIAGNOSIS — M16.11 PRIMARY OSTEOARTHRITIS OF RIGHT HIP: ICD-10-CM

## 2020-06-05 DIAGNOSIS — Z23 NEED FOR VACCINATION: ICD-10-CM

## 2020-06-05 DIAGNOSIS — J44.9 CHRONIC OBSTRUCTIVE PULMONARY DISEASE, UNSPECIFIED COPD TYPE (H): Primary | ICD-10-CM

## 2020-06-05 LAB
HBA1C MFR BLD: 5.9 % (ref 4–7)
INR POINT OF CARE: 2.3 (ref 2–3)

## 2020-06-05 PROCEDURE — 99214 OFFICE O/P EST MOD 30 MIN: CPT | Mod: 25 | Performed by: FAMILY MEDICINE

## 2020-06-05 PROCEDURE — 85610 PROTHROMBIN TIME: CPT | Performed by: FAMILY MEDICINE

## 2020-06-05 PROCEDURE — 36415 COLL VENOUS BLD VENIPUNCTURE: CPT | Performed by: FAMILY MEDICINE

## 2020-06-05 PROCEDURE — 83036 HEMOGLOBIN GLYCOSYLATED A1C: CPT | Performed by: FAMILY MEDICINE

## 2020-06-05 PROCEDURE — 90471 IMMUNIZATION ADMIN: CPT | Performed by: FAMILY MEDICINE

## 2020-06-05 PROCEDURE — 90714 TD VACC NO PRESV 7 YRS+ IM: CPT | Performed by: FAMILY MEDICINE

## 2020-06-05 RX ORDER — HYDROCODONE BITARTRATE AND ACETAMINOPHEN 5; 325 MG/1; MG/1
1 TABLET ORAL EVERY 6 HOURS PRN
Refills: 0 | COMMUNITY
Start: 2020-06-05 | End: 2021-06-11

## 2020-06-05 RX ORDER — WARFARIN SODIUM 5 MG/1
TABLET ORAL
Qty: 100 TABLET | Refills: 0 | Status: SHIPPED | OUTPATIENT
Start: 2020-06-05 | End: 2020-09-08

## 2020-06-05 RX ORDER — ALBUTEROL SULFATE 90 UG/1
1-2 AEROSOL, METERED RESPIRATORY (INHALATION) PRN
Qty: 2 INHALER | Refills: 1 | Status: SHIPPED | OUTPATIENT
Start: 2020-06-05 | End: 2021-02-15

## 2020-06-05 SDOH — ECONOMIC STABILITY: TRANSPORTATION INSECURITY
IN THE PAST 12 MONTHS, HAS THE LACK OF TRANSPORTATION KEPT YOU FROM MEDICAL APPOINTMENTS OR FROM GETTING MEDICATIONS?: NO

## 2020-06-05 SDOH — ECONOMIC STABILITY: INCOME INSECURITY: HOW HARD IS IT FOR YOU TO PAY FOR THE VERY BASICS LIKE FOOD, HOUSING, MEDICAL CARE, AND HEATING?: NOT VERY HARD

## 2020-06-05 SDOH — ECONOMIC STABILITY: FOOD INSECURITY: WITHIN THE PAST 12 MONTHS, YOU WORRIED THAT YOUR FOOD WOULD RUN OUT BEFORE YOU GOT MONEY TO BUY MORE.: NEVER TRUE

## 2020-06-05 SDOH — ECONOMIC STABILITY: TRANSPORTATION INSECURITY
IN THE PAST 12 MONTHS, HAS LACK OF TRANSPORTATION KEPT YOU FROM MEETINGS, WORK, OR FROM GETTING THINGS NEEDED FOR DAILY LIVING?: NO

## 2020-06-05 SDOH — ECONOMIC STABILITY: FOOD INSECURITY: WITHIN THE PAST 12 MONTHS, THE FOOD YOU BOUGHT JUST DIDN'T LAST AND YOU DIDN'T HAVE MONEY TO GET MORE.: NEVER TRUE

## 2020-06-05 ASSESSMENT — MIFFLIN-ST. JEOR: SCORE: 2295.88

## 2020-06-05 NOTE — PATIENT INSTRUCTIONS
Chronic obstructive pulmonary disease, unspecified COPD type (H)  (primary encounter diagnosis)  Plan: Fluticasone-Umeclidin-Vilanterol (TRELEGY         ELLIPTA) 100-62.5-25 MCG/INH oral inhaler,         albuterol (PROAIR HFA) 108 (90 Base) MCG/ACT         inhaler        Follow COPD action plan    (E11.59) Controlled type 2 diabetes mellitus with other circulatory complication, without long-term current use of insulin (H)  Plan: Hemoglobin A1c (BFP), VENOUS COLLECTION        Weight loss reviewed/ will try to get a referral in network for this and getting ready for hip surgery    (Z86.711) History of pulmonary embolism  Plan: VENOUS COLLECTION, CL BFP PROTHROMBIN TIME/INR         (BFP), warfarin ANTICOAGULANT (COUMADIN) 5 MG         tablet        Continue current dose/ recheck 4-6 weeks

## 2020-06-05 NOTE — PROGRESS NOTES
SUBJECTIVE: 64 year old female complaining of COPD medication refill  And INR recheck. Previous pulmonary embolism/ stable INR    She is needing weight loss for right hip replacement and left ankle tendon surgery. Weight is the issue.     Bere Machuca is a 64 year old female who presents to the clinic for recheck on COPD   Last exacerbation was 1 year ago.  Marked improvement with new inhaler  Overall the patient feels improved with less wheezing and less shortness of breath.    The patient denies abnormal bleeding from any body orifice such as bleeding from nose or gums, blood in urine or stool, or melena, hemoptysis or hematemesis.      Current medications:  Current Outpatient Medications   Medication Sig Dispense Refill     albuterol (2.5 MG/3ML) 0.083% nebulizer solution Take 3 mLs by nebulization every 6 hours as needed for shortness of breath / dyspnea.  [facility] specialists       albuterol (PROAIR HFA) 108 (90 Base) MCG/ACT inhaler Inhale 1-2 puffs into the lungs as needed for shortness of breath / dyspnea 2 Inhaler 1     ASPIRIN NOT PRESCRIBED (INTENTIONAL) Please choose reason not prescribed, below 0 each 0     B Complex-C (SUPER B COMPLEX PO) Take 1 tablet by mouth daily       cholecalciferol 1000 UNITS TABS Take 1,000 Units by mouth At Bedtime        cyclobenzaprine (FLEXERIL) 10 MG tablet Take 1 tablet (10 mg) by mouth 3 times daily as needed for muscle spasms 10 tablet 0     escitalopram (LEXAPRO) 20 MG tablet Take 1 tablet (20 mg) by mouth daily 90 tablet 1     Fluticasone-Umeclidin-Vilanterol (TRELEGY ELLIPTA) 100-62.5-25 MCG/INH oral inhaler Inhale 1 puff into the lungs daily - Rinse mouth and spit after using 3 Inhaler 1     folic acid (FOLVITE) 1 MG tablet TAKE 1 TABLET BY MOUTH EVERY DAY IN THE MORNING  0     gabapentin (NEURONTIN) 300 MG capsule Take 2 capsules (600 mg) by mouth 3 times daily 540 capsule 1     HYDROcodone-acetaminophen (NORCO) 5-325 MG tablet Take 1 tablet by mouth  "every 6 hours as needed for pain  0     hydrOXYzine (ATARAX) 25 MG tablet Take 1 tablet (25 mg) by mouth At Bedtime 90 tablet 0     inFLIXimab (REMICADE IV)        losartan (COZAAR) 50 MG tablet Take 1 tablet (50 mg) by mouth daily 90 tablet 3     mesalamine (LIALDA) 1.2 g EC tablet Take 1,200 mg by mouth daily (with breakfast) 4 pills a day       methotrexate sodium, pres-free, 50 MG/2ML SOLN injection ADM 0.7 ML SC Q WK       METHOTREXate, PF, (OTREXUP PF) 7.5 MG/0.4ML pen Inject 7.5 mg Subcutaneous every 7 days       multivitamin, therapeutic with minerals (MULTI-VITAMIN) TABS Take 1 tablet by mouth daily       order for DME Equipment being ordered: Walker with seat due to chronic low back pain with herniated discs, morbid obesity, and bone-on-bone OA in right hip.     Pt requires larger size \"extra wide\" with weight of 386 lbs. 1 Device 0     predniSONE (DELTASONE) 5 MG tablet        Probiotic Product (VSL#3) CAPS Take 1 capsule by mouth daily 90 capsule 3     warfarin ANTICOAGULANT (COUMADIN) 5 MG tablet Take 1 tablet (5 mg) daily, with extra 1/2 tab (2.5 mg) on Thurs 100 tablet 0       Past Medical History:  Past Medical History:   Diagnosis Date     Anxiety      Arthritis      Clotting disorder (H)      COPD (chronic obstructive pulmonary disease) (H)      Depressive disorder      Diabetes (H)      Hypertension      Lymphedema of arm 3/8/2013     Morbidly obese (H) 9/25/2009     Pulmonary embolism (H)      Sleep apnea      Substance abuse (H)      Superior vena cava syndrome        EXAM:  VITALS: Blood pressure 116/82, pulse 65, temperature 98.2  F (36.8  C), temperature source Oral, height 1.676 m (5' 6\"), weight (!) 171.9 kg (379 lb), SpO2 97 %, not currently breastfeeding.  RESP: Normal - Clear to auscultation without rales, rhonchi, or wheezing.  CARDIAC: NORMAL - regular rate and rhythm without murmur.  ABDOMEN: Abdomen soft, non-tender. BS normal. No masses, organomegaly, positive findings: morbidly " obese/ difficult exam    Assessment:(J44.9) Chronic obstructive pulmonary disease, unspecified COPD type (H)  (primary encounter diagnosis)  Plan: Fluticasone-Umeclidin-Vilanterol (TRELEGY         ELLIPTA) 100-62.5-25 MCG/INH oral inhaler,         albuterol (PROAIR HFA) 108 (90 Base) MCG/ACT         inhaler        Follow COPD action plan    (E11.59) Controlled type 2 diabetes mellitus with other circulatory complication, without long-term current use of insulin (H)  Plan: Hemoglobin A1c (BFP), VENOUS COLLECTION        Weight loss reviewed/ will try to get a referral in network for this and getting ready for hip surgery    (Z86.711) History of pulmonary embolism  Plan: VENOUS COLLECTION, CL BFP PROTHROMBIN TIME/INR         (BFP), warfarin ANTICOAGULANT (COUMADIN) 5 MG         tablet        Continue current dose/ recheck 4-6 weeks    (Z79.01) Long term current use of anticoagulant therapy  Plan: VENOUS COLLECTION, CL BFP PROTHROMBIN TIME/INR         (BFP), warfarin ANTICOAGULANT (COUMADIN) 5 MG         tablet            (E66.01) Morbid obesity (H)  Plan: Other Specialty Referral        I have reviewed the patient's medical history in detail and updated the computerized patient record.      (M16.11) Primary osteoarthritis of right hip  Plan: Other Specialty Referral        Weight loss    (Z23) Need for vaccination  Plan: I have reviewed the patient's medical history in detail and updated the computerized patient record.

## 2020-06-06 ASSESSMENT — ASTHMA QUESTIONNAIRES: ACT_TOTALSCORE: 25

## 2020-06-08 ENCOUNTER — TRANSFERRED RECORDS (OUTPATIENT)
Dept: FAMILY MEDICINE | Facility: CLINIC | Age: 65
End: 2020-06-08

## 2020-06-11 ENCOUNTER — TELEPHONE (OUTPATIENT)
Dept: FAMILY MEDICINE | Facility: CLINIC | Age: 65
End: 2020-06-11

## 2020-06-16 ENCOUNTER — MYC MEDICAL ADVICE (OUTPATIENT)
Dept: FAMILY MEDICINE | Facility: CLINIC | Age: 65
End: 2020-06-16

## 2020-06-16 ENCOUNTER — MYC REFILL (OUTPATIENT)
Dept: FAMILY MEDICINE | Facility: CLINIC | Age: 65
End: 2020-06-16

## 2020-06-16 DIAGNOSIS — M05.79 RHEUMATOID ARTHRITIS INVOLVING MULTIPLE SITES WITH POSITIVE RHEUMATOID FACTOR (H): Primary | ICD-10-CM

## 2020-06-16 DIAGNOSIS — M48.02 SPINAL STENOSIS IN CERVICAL REGION: ICD-10-CM

## 2020-06-16 DIAGNOSIS — M05.79 RHEUMATOID ARTHRITIS INVOLVING MULTIPLE SITES WITH POSITIVE RHEUMATOID FACTOR (H): ICD-10-CM

## 2020-06-16 RX ORDER — GABAPENTIN 300 MG/1
CAPSULE ORAL
Qty: 180 CAPSULE | Refills: 0 | Status: CANCELLED | OUTPATIENT
Start: 2020-06-16

## 2020-06-16 RX ORDER — GABAPENTIN 300 MG/1
CAPSULE ORAL
Qty: 180 CAPSULE | Refills: 0 | Status: SHIPPED | OUTPATIENT
Start: 2020-06-16 | End: 2020-07-22

## 2020-06-16 NOTE — TELEPHONE ENCOUNTER
Please advise.    Bere Machuca is requesting a refill of:    Pending Prescriptions:                       Disp   Refills    gabapentin (NEURONTIN) 300 MG capsule [Ph*540 ca*1            Sig: TAKE 2 CAPSULES BY MOUTH THREE TIMES DAILY

## 2020-06-16 NOTE — TELEPHONE ENCOUNTER
Call pharmacy. [facility] is ordering this medication/ Dr Hill in [place]. Sent 30 days/ ask them for 90 day refill sin the future

## 2020-06-17 ENCOUNTER — MYC MEDICAL ADVICE (OUTPATIENT)
Dept: FAMILY MEDICINE | Facility: CLINIC | Age: 65
End: 2020-06-17

## 2020-06-19 ENCOUNTER — TELEPHONE (OUTPATIENT)
Dept: FAMILY MEDICINE | Facility: CLINIC | Age: 65
End: 2020-06-19

## 2020-06-19 NOTE — TELEPHONE ENCOUNTER
Per last refill from [facility]: Call pharmacy. [facility] is ordering this medication/ Dr Hill in [place]. Sent 30 days/ ask them for 90 day refill sin the future    Pt has never gotten a refill from Rheumatology for this medication. They do not prescribe this medication. She has been getting from [facility] since 2014    Called in the 30 day to her [facility]. Advised Pt to contact us when she needs a refill and can send to [facility] who last prescribed.

## 2020-07-22 ENCOUNTER — MYC MEDICAL ADVICE (OUTPATIENT)
Dept: FAMILY MEDICINE | Facility: CLINIC | Age: 65
End: 2020-07-22

## 2020-07-22 DIAGNOSIS — M54.41 CHRONIC BILATERAL LOW BACK PAIN WITH BILATERAL SCIATICA: ICD-10-CM

## 2020-07-22 DIAGNOSIS — M48.02 SPINAL STENOSIS IN CERVICAL REGION: ICD-10-CM

## 2020-07-22 DIAGNOSIS — M62.830 BACK MUSCLE SPASM: ICD-10-CM

## 2020-07-22 DIAGNOSIS — G89.29 CHRONIC BILATERAL LOW BACK PAIN WITH BILATERAL SCIATICA: ICD-10-CM

## 2020-07-22 DIAGNOSIS — M05.79 RHEUMATOID ARTHRITIS INVOLVING MULTIPLE SITES WITH POSITIVE RHEUMATOID FACTOR (H): ICD-10-CM

## 2020-07-22 DIAGNOSIS — M54.42 CHRONIC BILATERAL LOW BACK PAIN WITH BILATERAL SCIATICA: ICD-10-CM

## 2020-07-22 RX ORDER — GABAPENTIN 300 MG/1
CAPSULE ORAL
Qty: 180 CAPSULE | Refills: 2 | Status: SHIPPED | OUTPATIENT
Start: 2020-07-22 | End: 2020-10-21

## 2020-07-22 RX ORDER — CYCLOBENZAPRINE HCL 10 MG
10 TABLET ORAL 3 TIMES DAILY PRN
Qty: 10 TABLET | Refills: 0 | Status: SHIPPED | OUTPATIENT
Start: 2020-07-22 | End: 2021-06-11

## 2020-07-22 NOTE — TELEPHONE ENCOUNTER
Received incoming refill request for  Pending Prescriptions:                       Disp   Refills    gabapentin (NEURONTIN) 300 MG capsule [Ph*180 ca*0            Sig: TAKE 2 CAPSULES BY MOUTH THREE TIMES DAILY    Routing to Catrachita for review, please see Picseanhart message from patient.

## 2020-07-27 ENCOUNTER — TRANSFERRED RECORDS (OUTPATIENT)
Dept: FAMILY MEDICINE | Facility: CLINIC | Age: 65
End: 2020-07-27

## 2020-07-28 ENCOUNTER — TRANSFERRED RECORDS (OUTPATIENT)
Dept: FAMILY MEDICINE | Facility: CLINIC | Age: 65
End: 2020-07-28

## 2020-08-12 ENCOUNTER — MYC REFILL (OUTPATIENT)
Dept: FAMILY MEDICINE | Facility: CLINIC | Age: 65
End: 2020-08-12

## 2020-08-12 DIAGNOSIS — F33.0 MILD EPISODE OF RECURRENT MAJOR DEPRESSIVE DISORDER (H): ICD-10-CM

## 2020-08-12 RX ORDER — ESCITALOPRAM OXALATE 20 MG/1
20 TABLET ORAL DAILY
Qty: 90 TABLET | Refills: 1 | Status: CANCELLED | OUTPATIENT
Start: 2020-08-12

## 2020-08-14 RX ORDER — ESCITALOPRAM OXALATE 20 MG/1
20 TABLET ORAL DAILY
Qty: 15 TABLET | Refills: 0 | COMMUNITY
Start: 2020-08-14 | End: 2020-09-11

## 2020-08-14 NOTE — TELEPHONE ENCOUNTER
Bere Machuca is requesting a refill of:    Signed Prescriptions:                        Disp   Refills    escitalopram (LEXAPRO) 20 MG tablet        15 tab*0        Sig: Take 1 tablet (20 mg) by mouth daily  Authorizing Provider: MADISON ROONEY  Ordering User: EMILIA GARZA

## 2020-09-07 DIAGNOSIS — Z79.01 LONG TERM CURRENT USE OF ANTICOAGULANT THERAPY: ICD-10-CM

## 2020-09-07 DIAGNOSIS — Z86.711 HISTORY OF PULMONARY EMBOLISM: ICD-10-CM

## 2020-09-08 RX ORDER — WARFARIN SODIUM 5 MG/1
TABLET ORAL
Qty: 6 TABLET | Refills: 0 | COMMUNITY
Start: 2020-09-08 | End: 2020-09-11

## 2020-09-08 NOTE — TELEPHONE ENCOUNTER
Signed Prescriptions:                        Disp   Refills    warfarin ANTICOAGULANT (COUMADIN) 5 MG tab*6 tabl*0        Sig: TAKE 1 TABLET BY MOUTH EVERY DAY WITHEXTRA ONE-HALF           TABLET ON THURSDAY  Authorizing Provider: MARNI CHANDRA  Ordering User: TY RICHARDSON      Called in short supply of warfarin until appt on 9/11/20.

## 2020-09-11 ENCOUNTER — OFFICE VISIT (OUTPATIENT)
Dept: FAMILY MEDICINE | Facility: CLINIC | Age: 65
End: 2020-09-11

## 2020-09-11 VITALS
WEIGHT: 293 LBS | HEIGHT: 66 IN | RESPIRATION RATE: 20 BRPM | BODY MASS INDEX: 47.09 KG/M2 | HEART RATE: 72 BPM | SYSTOLIC BLOOD PRESSURE: 118 MMHG | TEMPERATURE: 97 F | DIASTOLIC BLOOD PRESSURE: 78 MMHG

## 2020-09-11 DIAGNOSIS — F14.11 HISTORY OF COCAINE ABUSE (H): ICD-10-CM

## 2020-09-11 DIAGNOSIS — F33.0 MILD EPISODE OF RECURRENT MAJOR DEPRESSIVE DISORDER (H): ICD-10-CM

## 2020-09-11 DIAGNOSIS — Z86.711 HISTORY OF PULMONARY EMBOLISM: Primary | ICD-10-CM

## 2020-09-11 DIAGNOSIS — E66.01 MORBID OBESITY DUE TO EXCESS CALORIES (H): ICD-10-CM

## 2020-09-11 DIAGNOSIS — M05.79 RHEUMATOID ARTHRITIS INVOLVING MULTIPLE SITES WITH POSITIVE RHEUMATOID FACTOR (H): ICD-10-CM

## 2020-09-11 LAB — INR POINT OF CARE: 3.9 (ref 2–3)

## 2020-09-11 PROCEDURE — 99213 OFFICE O/P EST LOW 20 MIN: CPT | Performed by: PHYSICIAN ASSISTANT

## 2020-09-11 PROCEDURE — 85610 PROTHROMBIN TIME: CPT | Performed by: PHYSICIAN ASSISTANT

## 2020-09-11 PROCEDURE — 36415 COLL VENOUS BLD VENIPUNCTURE: CPT | Performed by: PHYSICIAN ASSISTANT

## 2020-09-11 RX ORDER — INFLIXIMAB 100 MG/10ML
600 INJECTION, POWDER, LYOPHILIZED, FOR SOLUTION INTRAVENOUS
COMMUNITY
Start: 2020-09-11 | End: 2021-01-29

## 2020-09-11 RX ORDER — WARFARIN SODIUM 5 MG/1
5 TABLET ORAL DAILY
Qty: 90 TABLET | Refills: 0 | Status: SHIPPED | OUTPATIENT
Start: 2020-09-11 | End: 2020-12-07

## 2020-09-11 RX ORDER — ESCITALOPRAM OXALATE 20 MG/1
20 TABLET ORAL DAILY
Qty: 90 TABLET | Refills: 3 | Status: SHIPPED | OUTPATIENT
Start: 2020-09-11 | End: 2021-08-24

## 2020-09-11 ASSESSMENT — PATIENT HEALTH QUESTIONNAIRE - PHQ9: SUM OF ALL RESPONSES TO PHQ QUESTIONS 1-9: 13

## 2020-09-11 ASSESSMENT — MIFFLIN-ST. JEOR: SCORE: 2330.36

## 2020-09-11 NOTE — Clinical Note
Patient would like to est. Care with you and will be seeing you next week. Here is here chart for review.     Catrachita Torres PA-C  9/11/2020

## 2020-09-11 NOTE — NURSING NOTE
Bere Machuca is here for an INR.    Questioned patient about current smoking habits.  Pt. quit smoking some time ago.  PULSE regular  My Chart: active  CLASSIFICATION OF OVERWEIGHT AND OBESITY BY BMI                        Obesity Class           BMI(kg/m2)  Underweight                                    < 18.5  Normal                                         18.5-24.9  Overweight                                     25.0-29.9  OBESITY                     I                  30.0-34.9                             II                 35.0-39.9  EXTREME OBESITY             III                >40                            Patient's  BMI Body mass index is 62.4 kg/m .  http://hin.nhlbi.nih.gov/menuplanner/menu.cgi  Pre-visit planning  Immunizations - up to date  Colonoscopy - is due and to be scheduled by patient for later completion  Mammogram - is due and to be scheduled by patient for later completion  Asthma -   PHQ9 -    MILEY-7 -

## 2020-09-11 NOTE — PROGRESS NOTES
SUBJECTIVE:                                                    Bere Machuca is a 64 year old female who presents to clinic today for the following health issues:        Chronic coumadin use    Indication: PE and Factor V Leiden    Lab Results   Component Value Date    INR 3.9 (A) 09/11/2020    INR 2.3 06/05/2020    INR 3.1 (A) 01/31/2020    INR 2.2 10/25/2019    INR 2.4 06/21/2019    INR 1.4 04/12/2019       Current Coumadin Dose: 7.5 mg Mon, 5 other days    Bleeding Signs/Symptoms:  Minor - bruising  Thromboembolic Signs/Symptoms:  None    Medication Changes:  No  Dietary Changes:  No  Bacterial/Viral Infection:  No    Missed Coumadin Doses:  None    Other Concerns:  Depression stable    Has a lot of chronic pain due to RA, OA, tendon tears    Wt Readings from Last 5 Encounters:   09/11/20 (!) 175.4 kg (386 lb 9.6 oz)   06/05/20 (!) 171.9 kg (379 lb)   01/31/20 (!) 169.2 kg (373 lb)   10/25/19 (!) 166.5 kg (367 lb)   06/21/19 (!) 165.1 kg (364 lb)     Not interested in bariatric surgery.  Needs to loose weight before surgeon will perform surgery on hip        ROS:   C: NEGATIVE for fever, chills, change in weight  E: NEGATIVE for vision changes or irritation  E/M: NEGATIVE for ear, mouth and throat problems  R: NEGATIVE for significant cough or SOB  CV: NEGATIVE for chest pain, palpitations or peripheral edema          Labs reviewed in EPIC  BP Readings from Last 3 Encounters:   09/11/20 118/78   06/05/20 116/82   01/31/20 118/84    Wt Readings from Last 3 Encounters:   09/11/20 (!) 175.4 kg (386 lb 9.6 oz)   06/05/20 (!) 171.9 kg (379 lb)   01/31/20 (!) 169.2 kg (373 lb)                  Patient Active Problem List   Diagnosis     History of cocaine abuse (H)     Cigarette - quit 2014 -  now vaping     Family history of malignant neoplasm of gastrointestinal tract     Esophageal reflux     Chronic airway obstruction (H)     Hypertension     Sleep apnea     Migraine     ACP (advance care planning)     Factor  5 Leiden mutation, heterozygous/ INR 2-3     Health Care Home     Family history of pulmonary embolism     Lymphedema of arm     Spinal stenosis in cervical region     History of pulmonary embolism     Major depressive disorder, recurrent episode, moderate (H)     Superior vena cava syndrome     Morbid obesity due to excess calories (H)     Displacement of lumbar intervertebral disc without myelopathy     Controlled type 2 diabetes mellitus with circulatory disorder, without long-term current use of insulin (H)     Ulcerative rectosigmoiditis (H)     PVD (peripheral vascular disease) (H)     Rheumatoid arthritis involving multiple sites with positive rheumatoid factor (H)     Past Surgical History:   Procedure Laterality Date     BIOPSY OF SKIN LESION  multiple    keloids     C ANESTH,REPAIR LO ABD HERNIA NOS  multiple/  last    panniculus     C EXPLORATORY OF ABDOMEN       C PLASTIC SURGERY, NECK  age 4     C REMOVAL COLON/ILEOSTOMY      reastamosis     C TOTAL ABDOM HYSTERECTOMY      Hysterectomy, Total Abdominal     HC REMOVAL OF OVARY/TUBE(S)      Salpingo-Oophorectomy, Unilateral     HC REMOVE ABDOMINAL WALL LESION      abscess     HC REMOVE TONSILS/ADENOIDS,<13 Y/O  age 14    T & A <14y.o.     HERNIA REPAIR       INSERTION OF ACCESS PORT  1-4-96    sepsis- removed after hospital stay       Social History     Tobacco Use     Smoking status: Former Smoker     Packs/day: 0.50     Years: 35.00     Pack years: 17.50     Types: Cigarettes     Last attempt to quit: 2014     Years since quittin.0     Smokeless tobacco: Never Used   Substance Use Topics     Alcohol use: Yes     Alcohol/week: 0.8 standard drinks     Types: 1 Standard drinks or equivalent per week     Comment: social     Family History   Problem Relation Age of Onset     Respiratory Mother         copd     Cancer Mother         skin cancers/ melanomas     C.A.D. Mother         MI  age 79     Cancer - colorectal Father          / diagnosis age 72     Gastrointestinal Disease Father         colitis     Breast Cancer No family hx of          Current Outpatient Medications   Medication Sig Dispense Refill     albuterol (2.5 MG/3ML) 0.083% nebulizer solution Take 3 mLs by nebulization every 6 hours as needed for shortness of breath / dyspnea.  [facility] specialists       albuterol (PROAIR HFA) 108 (90 Base) MCG/ACT inhaler Inhale 1-2 puffs into the lungs as needed for shortness of breath / dyspnea 2 Inhaler 1     ASPIRIN NOT PRESCRIBED (INTENTIONAL) Please choose reason not prescribed, below 0 each 0     B Complex-C (SUPER B COMPLEX PO) Take 1 tablet by mouth daily       cholecalciferol 1000 UNITS TABS Take 1,000 Units by mouth At Bedtime        cyclobenzaprine (FLEXERIL) 10 MG tablet Take 1 tablet (10 mg) by mouth 3 times daily as needed for muscle spasms 10 tablet 0     escitalopram (LEXAPRO) 20 MG tablet Take 1 tablet (20 mg) by mouth daily 15 tablet 0     Fluticasone-Umeclidin-Vilanterol (TRELEGY ELLIPTA) 100-62.5-25 MCG/INH oral inhaler Inhale 1 puff into the lungs daily - Rinse mouth and spit after using 3 Inhaler 1     folic acid (FOLVITE) 1 MG tablet TAKE 1 TABLET BY MOUTH EVERY DAY IN THE MORNING  0     gabapentin (NEURONTIN) 300 MG capsule TAKE 2 CAPSULES BY MOUTH THREE TIMES DAILY 180 capsule 2     HYDROcodone-acetaminophen (NORCO) 5-325 MG tablet Take 1 tablet by mouth every 6 hours as needed for pain  0     inFLIXimab (REMICADE) 100 MG injection 300 mg every 2 months       losartan (COZAAR) 50 MG tablet Take 1 tablet (50 mg) by mouth daily 90 tablet 3     mesalamine (LIALDA) 1.2 g EC tablet Take 1,200 mg by mouth daily (with breakfast) 4 pills a day       methotrexate sodium, pres-free, 50 MG/2ML SOLN injection ADM 0.7 ML SC Q WK       multivitamin, therapeutic with minerals (MULTI-VITAMIN) TABS Take 1 tablet by mouth daily       predniSONE (DELTASONE) 5 MG tablet        Probiotic Product (VSL#3) CAPS Take 1 capsule  "by mouth daily 90 capsule 3     warfarin ANTICOAGULANT (COUMADIN) 5 MG tablet TAKE 1 TABLET BY MOUTH EVERY DAY WITHEXTRA ONE-HALF TABLET ON THURSDAY 6 tablet 0     Allergies   Allergen Reactions     Metformin Diarrhea     Aspirin      PN: LW Reaction: sensitive/stomach     Lyrica [Pregabalin]      dizziness     Tape [Adhesive Tape] Itching     Vancomycin      Wellbutrin [Bupropion] Anxiety     Recent Labs   Lab Test 06/05/20  1437 01/31/20  1253 04/12/19  1557 04/12/19  1548 08/10/18  1549  03/29/18  1515  11/25/17  1805  11/20/17  1458  06/23/17  0551  10/24/16  01/11/16  1541   A1C 5.9 5.9  --  6.1  --    < >  --    < >  --    < >  --    < >  --   --  6.8  --   --    LDL  --   --   --   --  121*  --  76  --   --   --  173*  --   --   --   --   --   --    HDL  --   --   --   --  49*  --  76  --   --   --  86  --   --   --   --   --   --    TRIG  --   --   --   --  199*  --  139  --   --   --  162*  --   --   --   --   --   --    ALT  --   --  13  --  22  --  49*  --   --   --  53*   < >  --    < > 42  --   --    CR  --   --  0.69  --  0.65  --  0.64  --  0.64  --  0.65   < > 0.74   < > 0.78   < >  --    GFRESTIMATED  --   --  94  --  96  --  97  --  >90  --  96   < > 81   < >  --    < >  --    GFRESTBLACK  --   --   --   --   --   --   --   --  >90  --   --   --  >90  African American GFR Calc     < >  --    < >  --    POTASSIUM  --   --  4.2  --  4.1  --  4.0  --  3.5  --  3.8   < > 3.8   < > 4.5   < >  --    TSH  --   --   --   --   --   --   --   --   --   --   --   --   --   --  2.85  --  2.57    < > = values in this interval not displayed.            OBJECTIVE:                                                    /78 (BP Location: Right arm, Patient Position: Chair, Cuff Size: Adult Large)   Pulse 72   Temp 97  F (36.1  C)   Resp 20   Ht 1.676 m (5' 6\")   Wt (!) 175.4 kg (386 lb 9.6 oz)   BMI 62.40 kg/m     Body mass index is 62.4 kg/m .       Patient is a 64 year old female, in no acute distress. " Vitals noted   Cardiac:  Normal rhythm and rate, no murmurs, rubs or gallops.  Lungs: clear to auscultation bilaterally; no wheezes, crackles or rhonchi    Mental Status Exam:   Appearance: overwhelmed and upset  Grooming: adequately groomed  Demeanor: engaged, cooperative  Affect: normal  Speech: Normal.  Gait:Normal.  Movements: Normal  Form of thought: Logical, Linear and Goal directed  Thought content:  Normal  Insight:Good   Judgment: Good   Cognition: Good              ASSESSMENT/PLAN:                                                          ICD-10-CM    1. History of pulmonary embolism  Z86.711 warfarin ANTICOAGULANT (COUMADIN) 5 MG tablet   2. Mild episode of recurrent major depressive disorder (H)  F33.0 escitalopram (LEXAPRO) 20 MG tablet   3. Morbid obesity due to excess calories (H)  E66.01 COMPREHENSIVE WEIGHT MANAGEMENT   4. Rheumatoid arthritis involving multiple sites with positive rheumatoid factor (H)  M05.79    5. History of cocaine abuse (H)  F14.11          Supratherapeutic INR for goal of 2-3    SKIP TONIGHT  THEN 5 MG DAILY  RECHECK NEXT WEEK    PT LOOKING TO EST CARE WITH NEW MD  I have referred her to Dr. Bear   She will see him next week to est care and INR recheck.       Catrachita Torres PA-C  9/11/2020

## 2020-09-19 DIAGNOSIS — F33.0 MILD EPISODE OF RECURRENT MAJOR DEPRESSIVE DISORDER (H): ICD-10-CM

## 2020-09-21 RX ORDER — ESCITALOPRAM OXALATE 20 MG/1
TABLET ORAL
Qty: 15 TABLET | Refills: 0 | COMMUNITY
Start: 2020-09-21

## 2020-09-21 NOTE — TELEPHONE ENCOUNTER
Refused Prescriptions:                       Disp   Refills    escitalopram (LEXAPRO) 20 MG tablet [Pharm*15 tab*0        Sig: TAKE 1 TABLET BY MOUTH EVERY DAY  Refused By: TY RICHARDSON  Reason for Refusal: Duplicate    Pt prescribed 90 tabs with 3 refills on 9/11/20

## 2020-09-23 ENCOUNTER — TELEPHONE (OUTPATIENT)
Dept: SURGERY | Facility: CLINIC | Age: 65
End: 2020-09-23

## 2020-09-23 NOTE — TELEPHONE ENCOUNTER
Spoke to patient on 09/23/2020 reminding them to fill out the new patient questionnaire by 4 pm on 09/24/2020 or their appointment will be cancelled    Emelyn Andujar MA

## 2020-09-24 ENCOUNTER — OFFICE VISIT (OUTPATIENT)
Dept: FAMILY MEDICINE | Facility: CLINIC | Age: 65
End: 2020-09-24

## 2020-09-24 VITALS
BODY MASS INDEX: 47.09 KG/M2 | WEIGHT: 293 LBS | OXYGEN SATURATION: 96 % | DIASTOLIC BLOOD PRESSURE: 82 MMHG | HEIGHT: 66 IN | SYSTOLIC BLOOD PRESSURE: 120 MMHG | TEMPERATURE: 98.6 F | HEART RATE: 78 BPM

## 2020-09-24 DIAGNOSIS — E11.59 CONTROLLED TYPE 2 DIABETES MELLITUS WITH OTHER CIRCULATORY COMPLICATION, WITHOUT LONG-TERM CURRENT USE OF INSULIN (H): Primary | ICD-10-CM

## 2020-09-24 LAB — INR POINT OF CARE: 2.9 (ref 2–3)

## 2020-09-24 PROCEDURE — 90686 IIV4 VACC NO PRSV 0.5 ML IM: CPT | Performed by: PHYSICIAN ASSISTANT

## 2020-09-24 PROCEDURE — 36415 COLL VENOUS BLD VENIPUNCTURE: CPT | Performed by: PHYSICIAN ASSISTANT

## 2020-09-24 PROCEDURE — 85610 PROTHROMBIN TIME: CPT | Performed by: PHYSICIAN ASSISTANT

## 2020-09-24 PROCEDURE — 99212 OFFICE O/P EST SF 10 MIN: CPT | Mod: 25 | Performed by: PHYSICIAN ASSISTANT

## 2020-09-24 PROCEDURE — 90471 IMMUNIZATION ADMIN: CPT | Performed by: PHYSICIAN ASSISTANT

## 2020-09-24 ASSESSMENT — MIFFLIN-ST. JEOR: SCORE: 2327.63

## 2020-09-24 NOTE — PROGRESS NOTES
"Indication: PE    INR Today: 2.9  Current Dose:  Coumadin 5 mg per day, 7.5 mg Monday. Did not know she was supposed to take away her extra 1/2 tablet after high INR 9/11/2020.     Bleeding Signs/Symptoms:  None  Including headache, stroke symptoms (confusion, weakness, slurred speech), vision changes, nosebleeds, red/black vomit, red/black stool, prolonged bleeded, dizziness, SOB, pica.  Thromboembolic Signs/Symptoms:  None  Including pain in one leg, swelling in extremity, chest pain, numbess, weakness, mental status change.      Medication Changes:  No  Dietary Changes:  No  Bacterial/Viral Infection:  No    Missed Coumadin Doses:  None    Other Concerns: None    OBJECTIVE:  /82   Pulse 78   Temp 98.6  F (37  C) (Oral)   Ht 1.676 m (5' 6\")   Wt (!) 175.1 kg (386 lb)   SpO2 96%   BMI 62.30 kg/m    Body mass index is 62.3 kg/m .    Patient is a 64 year old female, in no acute distress. Vitals noted   Head: Normocephalic, atraumatic.  Eyes: Conjunctiva clear.  No discharge noted.  Ears: External ears, canals and TMs normal Bilaterally: gray and translucent.   Nose: Normal without discharge.  Mouth / Throat:   Pharynx non-erythematous, no exudates present, tonsils without hypertrophy. Mucous membranes moist.  Neck:  Neck supple, No lymphadenopathy, no thyromegaly.  Cardiac:  Normal rhythm and rate, no murmurs, rubs or gallops.  Lungs: clear to auscultation bilaterally; no wheezes, crackles or rhonchi    ASSESSMENT and PLAN:    Has televisit with weight clinic tomorrow to establish with them. MNCOME. Pursuing this with the goal of being able to have her right hip replaced.     Has pain clinic appt to establish care 10/13/2020, Dr. Weston Mchugh[facility]?    Therapeutic INR for goal of 2-3    New Dose: Coumadin 5 mg per day EVERY DAY    Next INR: 1 month    Hyun Braden PA-C  9/24/2020      "

## 2020-09-25 ENCOUNTER — VIRTUAL VISIT (OUTPATIENT)
Dept: SURGERY | Facility: CLINIC | Age: 65
End: 2020-09-25
Payer: COMMERCIAL

## 2020-09-25 VITALS — WEIGHT: 293 LBS | HEIGHT: 66 IN | BODY MASS INDEX: 47.09 KG/M2

## 2020-09-25 DIAGNOSIS — E11.59 CONTROLLED TYPE 2 DIABETES MELLITUS WITH OTHER CIRCULATORY COMPLICATION, WITHOUT LONG-TERM CURRENT USE OF INSULIN (H): ICD-10-CM

## 2020-09-25 DIAGNOSIS — G47.30 SLEEP APNEA, UNSPECIFIED TYPE: ICD-10-CM

## 2020-09-25 DIAGNOSIS — K21.9 GASTROESOPHAGEAL REFLUX DISEASE WITHOUT ESOPHAGITIS: ICD-10-CM

## 2020-09-25 DIAGNOSIS — I10 ESSENTIAL HYPERTENSION: ICD-10-CM

## 2020-09-25 DIAGNOSIS — E66.01 MORBID OBESITY WITH BMI OF 60.0-69.9, ADULT (H): ICD-10-CM

## 2020-09-25 DIAGNOSIS — E66.01 MORBID OBESITY WITH BMI OF 60.0-69.9, ADULT (H): Primary | ICD-10-CM

## 2020-09-25 PROCEDURE — 97802 MEDICAL NUTRITION INDIV IN: CPT | Mod: 95 | Performed by: DIETITIAN, REGISTERED

## 2020-09-25 PROCEDURE — 99204 OFFICE O/P NEW MOD 45 MIN: CPT | Mod: 95 | Performed by: PHYSICIAN ASSISTANT

## 2020-09-25 ASSESSMENT — MIFFLIN-ST. JEOR: SCORE: 2327.63

## 2020-09-25 NOTE — PROGRESS NOTES
"Bere Machuca is a 64 year old female who is being evaluated via a billable video visit.      The patient has been notified of following:     \"This video visit will be conducted via a call between you and your physician/provider. We have found that certain health care needs can be provided without the need for an in-person physical exam.  This service lets us provide the care you need with a video conversation.  If a prescription is necessary we can send it directly to your pharmacy.  If lab work is needed we can place an order for that and you can then stop by our lab to have the test done at a later time.    Video visits are billed at different rates depending on your insurance coverage.  Please reach out to your insurance provider with any questions.    If during the course of the call the physician/provider feels a video visit is not appropriate, you will not be charged for this service.\"    Patient has given verbal consent for Video visit? Yes per MA     Video-Visit Details    Type of service:  Video Visit    Video Start Time: 10:36  Video End Time: 11:04    Originating Location (pt. Location): Home    Distant Location (provider location):  [place] [facility]     Platform used for Video Visit: [facility] WEIGHT LOSS INITIAL EVALUATION  DIAGNOSIS:  Obese, class III    NUTRITION HISTORY:  Breakfast: skips   Lunch: skips or may eat at 2:00 PM   Dinner: pasta with butter; eggs; cooked carrots or raw carrots; pineapple and strawberries; egg mcmuffin ; chicken or steak-no sides; occasionally Delio herb and butter(7:00)   Snacks: toast with peanut butter; cheese and crackers   Beverage choices: 3 cups coffee per day + water   Dining out: limited   Exercise: Patient has limited mobility (needs right hip replacement and injured left ankle)  Other: Patient wakes at 9:00 AM and may not eat breakfast or lunch.  Patient feels her biggest struggle with food is eating the right type of " "foods.  Patient states she has not has whole hearted effort to lose weight in the past.  Patient needs to lose weight in order to have hip replacement surgery.  Patient moved to [place] 12 years ago and was originally from [place].  Patient does not eat fast food or drink alcohol or soda.  Patient with multiple medical problems.  Patient's sister lives near her and is a .  Sister will bring food to patient on occasion.       ANTHROPOMETRICS:  Height: 5'6\"  Weight: 386 lbs    BMI: 62.3 kg/m2  NUTRITION DIAGNOSIS:   Obese, class III related to excess energy intake as evidence by BMI of  62.3 kg/m2   NUTRITION INTERVENTIONS  Nutrition Prescription:  Recommend modified energy- nutrient intake  Implementation:  Nutrition Education (Content):    Discussed portion sizes and ways patient can prepare meals without standing too long.  Patient orders groceries online and picks up curbside.  Discussed reading labels online when purchasing foods as patient concerned about sodium content of foods.    Provided   Tips for Weight Loss and Weight Management\" handout    Nutrition Education (Application):     Patient to practice goals as stated below    Patient verbalizes understanding of diet by stating will trial frozen entrees    Anticipate fair-good compliance    Goals:  Eat meals at 10:00 AM, 2:00 PM and 7:00 PM  Trial of frozen entrees for meals   Keep food log     FOLLOW UP AND MONITORING:   Other  - follow up in 4 weeks.     TIME SPENT WITH PATIENT:  28 minutes   Nhung Chang, RD, LD  [facility] Outpatient Dietitian  [phone] (office phone)    "

## 2020-09-25 NOTE — PROGRESS NOTES
"Bere Machuca is a 64 year old female who is being evaluated via a billable video visit.      The patient has been notified of following:     \"This video visit will be conducted via a call between you and your physician/provider. We have found that certain health care needs can be provided without the need for an in-person physical exam.  This service lets us provide the care you need with a video conversation.  If a prescription is necessary we can send it directly to your pharmacy.  If lab work is needed we can place an order for that and you can then stop by our lab to have the test done at a later time.    Video visits are billed at different rates depending on your insurance coverage.  Please reach out to your insurance provider with any questions.    If during the course of the call the physician/provider feels a video visit is not appropriate, you will not be charged for this service.\"    Patient has given verbal consent for Video visit? Yes  How would you like to obtain your AVS? MyChart  If you are dropped from the video visit, the video invite should be resent to: Text to cell phone: [phone]  Will anyone else be joining your video visit? No        Video-Visit Details    Type of service:  Video Visit  Video Start Time: 9:40  Video End Time:  10:28    Originating Location (pt. Location): Home    Distant Location (provider location):  [facility]     Platform used for Video Visit: Gerald Llamas PA-C          [facility] Weight Management Consult        PATIENT:  Bere Machuca  MRN:         [id number]  :         1955  DOUGLAS:         2020      Dear Wilma Armenta MD,    I had the pleasure of seeing your patient, Bere Machuca. Full intake/assessment was done to determine barriers to weight loss success and develop a treatment plan. Bere Machuca is a 64 year old female interested in treatment of medical problems associated " "with excess weight. She has a height of 5' 6\"[pt reported[, a weight of 386 lbs 0 oz, and the calculated Body mass index is 62.3 kg/m .       ASSESSMENT/PLAN:  Patient needs a right hip replacement and right ankle surgery and needs to lose weight.  The orthopedist wants her to lose 50 lbs before the surgery.  Has lost 18 lbs.  She has ulcerative colitis that effects her meal choices a bit more strategic.    She was on metformin 2 years ago to lose weight but really effected her ulcerative colitis    Has had multiple talks about the gastric bypass but has had 6-7 abdominal surgeries.  The last surgery - tummy tuck and ventral hernia repair - she had to be on a wound vac for 5-6 months.  Has a great fear of having another abdominal surgery.  Formed abcesses after a hysterectomy when she had a colon rupture.  Last abdominal surgery was 2006.    Uses CPAP every night      Goals:  Have breakfast within an hour of getting up  Meet with dietitian    We discussed the role of pharmacological agents in the treatment of obesity and the \"off-label\" use of medications in this practice. We discussed the risks and benefits of each. We discussed indications, contraindications, potential side effects, and estimated costs of each. Discussed that medications must always be used together with lifestyle changes such as improvements in diet choices, portion control and establishing and maintaining a regular exercise program.      No personal or family history of thyroid cancer or pancreatitis.  No constipation. No GERD.    Discussed starting ozempic.  Reveiwed side effects.  Patient information put in [email or web address].  Patient will have BMP sent over from rheumatology for review before RX will be sent over.  Informed patient if causes issues with ulcerative colitis will have to stop      After patient visit was able to review drug interactions.  Her current medications interact with both topamax and ozempic.  So will focus on lifestyle.    All " "of patients questions about the weight loss program were answered fully.    Follow up:  Diet today  Diet October  Jamaal in November    She has the following co-morbidities:       9/23/2020   I have the following health issues associated with obesity: Pre-Diabetes, High Blood Pressure, Sleep Apnea, Fatty Liver, Osteoarthritis (joint disease)   I have the following symptoms associated with obesity: Knee Pain, Depression, Back Pain, Fatigue, Hip Pain       Patient Goals 9/23/2020   I am interested in having a healthier weight to diminish current health problems: Yes   I am interested in having a healthier weight in order to prevent future health problems: Yes   I am interested in having a healthier weight in order to have a future surgery: Yes   If yes, please indicate which surgery? Hip replacement       Referring Provider 9/23/2020   Please name the provider who referred you to Medical Weight Management.  If you do not know, please answer: \"I Don't Know\". [facility]       Weight History 9/23/2020   How concerned are you about your weight? Very Concerned   Would you describe your weight gain as gradual? Yes   I became overweight: As an Adult   The following factors have contributed to my weight gain:  Mental Health Issues, A Health Crisis, After Quitting Smoking, Eating Wrong Types of Food, Lack of Exercise, Stress   I have tried the following methods to lose weight: Watching Portions or Calories   My lowest weight since age 20 was: 150   My highest weight since age 20 was: 404   The most weight I have ever lost was: (lbs) 33   I have the following family history of obesity/being overweight:  I am the only one in my immediate family who is overweight   Has anyone in your family had weight loss surgery? No   How has your weight changed over the last year?  Lost   How many pounds? 28     Up at 9 am. Does not typically eat until 2 pm.  Does not wake up really hungry.      Diet Recall Review with Patient 9/23/2020 "   Do you typically eat breakfast? No     3 cups of coffee in the morning with cream and sugar   If you do eat breakfast, what types of food do you eat? Toast egg mcmuffins.  Uses sour dough toast.     Do you typically eat lunch? No   Do you typically eat supper? Yes.   6:30-7 pm   If you do eat supper, what types of food do you typically eat? Meat. Last night had taco hamburger, cheese, taco chips and sour cream.  Water.     Do you typically eat snacks? Yes - about 2 days of the week   If you do snack, what types of food do you typically eat? Cheese Hugo crackers and cream cheese   Do you like vegetables?  No   Do you drink water? Yes   How many glasses of juice do you drink in a typical day? 0   How many of glasses of milk do you drink in a typical day? 0   If you do drink milk, what type? N/A   How many 8oz glasses of sugar containing drinks such as Maurice-Aid/sweet tea do you drink in a day? 0   How many cans/bottles of sugar pop/soda/tea/sports drinks do you drink in a day? 0   How many cans/bottles of diet pop/soda/tea or sports drink do you drink in a day? 0   How often do you have a drink of alcohol? Monthly or Less   If you do drink, how many drinks might you have in a day? 3-4       Eating Habits 9/23/2020   Generally, my meals include foods like these: bread, pasta, rice, potatoes, corn, crackers, sweet dessert, pop, or juice. A Few Times a Week   Generally, my meals include foods like these: fried meats, brats, burgers, french fries, pizza, cheese, chips, or ice cream. A Few Times a Week   Eat fast food (like McDonalds, Burger Jose Manuel, Taco Bell). Less Than Weekly   Eat at a buffet or sit-down restaurant. Less Than Weekly   Eat most of my meals in front of the TV or computer. Everyday   Often skip meals, eat at random times, have no regular eating times. A Few Times a Week   Rarely sit down for a meal but snack or graze throughout.  A Few Times a Week   Eat extra snacks between meals. Once a Week   Eat most  of my food at the end of the day. A Few Times a Week   Eat in the middle of the night or wake up at night to eat. Never   Eat extra snacks to prevent or correct low blood sugar. Never   Eat to prevent acid reflux or stomach pain. Never   Worry about not having enough food to eat. Never   Have you been to the food shelf at least a few times this year? No   I eat when I am depressed. Almost Everyday   I eat when I am stressed. Almost Everyday   I eat when I am bored. Almost Everyday   I eat when I am anxious. Almost Everyday   I eat when I am happy or as a reward. Less Than Weekly   I feel hungry all the time even if I just have eaten. Less Than Weekly   Feeling full is important to me. Less Than Weekly   I finish all the food on my plate even if I am already full. Less Than Weekly   I can't resist eating delicious food or walk past the good food/smell. A Few Times a Week   I eat/snack without noticing that I am eating. A Few Times a Week   I eat when I am preparing the meal. Once a Week   I eat more than usual when I see others eating. Less Than Weekly   I have trouble not eating sweets, ice cream, cookies, or chips if they are around the house. Everyday   I think about food all day. Never   What foods, if any, do you crave? Cheese       Amount of Food 9/23/2020   I make myself vomit what I have eaten or use laxatives to get rid of food. Never   I eat a large amount of food, like a loaf of bread, a box of cookies, a pint/quart of ice cream, all at once. Never   I eat a large amount of food even when I am not hungry. Never   I eat rapidly. Monthly   I eat alone because I feel embarrassed and do not want others to see how much I have eaten. Never   I eat until I am uncomfortably full. Never   I feel bad, disgusted, or guilty after I overeat. Monthly   I make myself vomit what I have eaten or use laxatives to get rid of food. Never       Activity/Exercise History 9/23/2020   How much of a typical 12 hour day do you  spend sitting? Most of the Day   How much of a typical 12 hour day do you spend lying down? Less Than Half the Day   How much of a typical day do you spend walking/standing? Less Than Half the Day   How many hours (not including work) do you spend on the TV/Video Games/Computer/Tablet/Phone? 4-5 Hours   How many times a week are you active for the purpose of exercise? Never   What keeps you from being more active? Pain   How many total minutes do you spend doing some activity for the purpose of exercising when you exercise? None       PAST MEDICAL HISTORY:  Past Medical History:   Diagnosis Date     Anxiety      Arthritis      Clotting disorder (H)      COPD (chronic obstructive pulmonary disease) (H)      Depressive disorder      Diabetes (H)      Hypertension      Lymphedema of arm 3/8/2013     Morbidly obese (H) 9/25/2009     Pulmonary embolism (H)      Sleep apnea      Substance abuse (H)      Superior vena cava syndrome        Work/Social History Reviewed With Patient 9/23/2020   My employment status is: Part-Time   My job is: Customer service   How much of your job is spent on the computer or phone? 100%   How many hours do you spend commuting to work daily?  0   What is your marital status? Single   If in a relationship, is your significant other overweight? N/A   Do you have children? No   If you have children, are they overweight? N/A   Who do you live with?  Myself   Are they supportive of your health goals? Yes   Who does the food shopping?  Me       Mental Health History Reviewed With Patient 9/23/2020   Have you ever been physically or sexually abused? No   If yes, do you feel that the abuse is affecting your weight? N/A   If yes, would you like to talk to a counselor about the abuse? N/A   How often in the past 2 weeks have you felt little interest or pleasure in doing things? Nearly Everyday   Over the past 2 weeks how often have you felt down, depressed, or hopeless? More Than Half the Days        Sleep History Reviewed With Patient 9/23/2020   How many hours do you sleep at night? 10   Do you think that you snore loudly or has anybody ever heard you snore loudly (louder than talking or so loud it can be heard behind a shut door)? Yes   Has anyone seen or heard you stop breathing during your sleep? Yes   Do you often feel tired, fatigued, or sleepy during the day? Yes   Do you have a TV/Computer in your bedroom? Yes      Has a wheeled walker and can barely walk due to pain     ROS  General  Fatigue: Yes  Sleep Quality: good up until recently with hip pain  HEENT  Hx of glaucoma: No  Vision changes: has the beginning of cataracts  Cardiovascular  Chest Pain with Exertion: No  Palpitations: No  Hx of heart disease: No  Pulmonary  Shortness of breath at rest: No  Shortness of breath with exertion: Yes  Snoring: yes and has JERILYN  Gastrointestinal  Heartburn: No  Abdominal pain: No  Gastrourinary  History of kidney stones: No  Psychiatric  Moods Stable: Yes  History of drug abuse: over 29 years ago  Endocrine  Polydipsia: No  Polyuria: No  Neurologic:  Hx of seizures: No  Hx of paresthesias: No  Birth Control: hysterectomy    MEDICATIONS:   Current Outpatient Medications   Medication Sig Dispense Refill     albuterol (2.5 MG/3ML) 0.083% nebulizer solution Take 3 mLs by nebulization every 6 hours as needed for shortness of breath / dyspnea.  [facility] specialists       albuterol (PROAIR HFA) 108 (90 Base) MCG/ACT inhaler Inhale 1-2 puffs into the lungs as needed for shortness of breath / dyspnea 2 Inhaler 1     ASPIRIN NOT PRESCRIBED (INTENTIONAL) Please choose reason not prescribed, below 0 each 0     B Complex-C (SUPER B COMPLEX PO) Take 1 tablet by mouth daily       cholecalciferol 1000 UNITS TABS Take 1,000 Units by mouth At Bedtime        cyclobenzaprine (FLEXERIL) 10 MG tablet Take 1 tablet (10 mg) by mouth 3 times daily as needed for muscle spasms 10 tablet 0     escitalopram (LEXAPRO) 20 MG  "tablet Take 1 tablet (20 mg) by mouth daily 90 tablet 3     Fluticasone-Umeclidin-Vilanterol (TRELEGY ELLIPTA) 100-62.5-25 MCG/INH oral inhaler Inhale 1 puff into the lungs daily - Rinse mouth and spit after using 3 Inhaler 1     folic acid (FOLVITE) 1 MG tablet TAKE 1 TABLET BY MOUTH EVERY DAY IN THE MORNING  0     gabapentin (NEURONTIN) 300 MG capsule TAKE 2 CAPSULES BY MOUTH THREE TIMES DAILY 180 capsule 2     HYDROcodone-acetaminophen (NORCO) 5-325 MG tablet Take 1 tablet by mouth every 6 hours as needed for pain  0     inFLIXimab (REMICADE) 100 MG injection 600 mg every 2 months        losartan (COZAAR) 50 MG tablet Take 1 tablet (50 mg) by mouth daily 90 tablet 3     mesalamine (LIALDA) 1.2 g EC tablet Take 1,200 mg by mouth daily (with breakfast) 4 pills a day       methotrexate sodium, pres-free, 50 MG/2ML SOLN injection ADM 0.7 ML SC Q WK       multivitamin, therapeutic with minerals (MULTI-VITAMIN) TABS Take 1 tablet by mouth daily       predniSONE (DELTASONE) 5 MG tablet        Probiotic Product (VSL#3) CAPS Take 1 capsule by mouth daily 90 capsule 3     warfarin ANTICOAGULANT (COUMADIN) 5 MG tablet Take 1 tablet (5 mg) by mouth daily 90 tablet 0       ALLERGIES:   Allergies   Allergen Reactions     Metformin Diarrhea     Aspirin      PN: LW Reaction: sensitive/stomach     Lyrica [Pregabalin]      dizziness     Tape [Adhesive Tape] Itching     Vancomycin      Wellbutrin [Bupropion] Anxiety       PHYSICAL EXAM:  Ht 5' 6\" (1.676 m)   Wt (!) 386 lb (175.1 kg)   BMI 62.30 kg/m    GENERAL Pt appears in NAD.  LUNGS: Breathing unlabored  NEURO: Alert and oriented x3.           Sincerely,    Jamaal Llamas PA-C          "

## 2020-10-01 ENCOUNTER — MYC MEDICAL ADVICE (OUTPATIENT)
Dept: FAMILY MEDICINE | Facility: CLINIC | Age: 65
End: 2020-10-01

## 2020-10-06 ENCOUNTER — TELEPHONE (OUTPATIENT)
Dept: SURGERY | Facility: CLINIC | Age: 65
End: 2020-10-06

## 2020-10-06 NOTE — TELEPHONE ENCOUNTER
Called patient regarding weight loss medications.      Both topamax and ozempic interfere with current medications

## 2020-10-18 NOTE — TELEPHONE ENCOUNTER
Catheter removed. From: Bere Machuca  To: Hyun Braden PA-C  Sent: 7/26/2017 10:56 PM CDT  Subject: Question about medications    My appointment for the colonoscopy is Tuesday the 1st. Can you go over the warfrin holding and when i start and stop the lovenox shots . Thanks

## 2020-10-21 DIAGNOSIS — M48.02 SPINAL STENOSIS IN CERVICAL REGION: ICD-10-CM

## 2020-10-21 DIAGNOSIS — M05.79 RHEUMATOID ARTHRITIS INVOLVING MULTIPLE SITES WITH POSITIVE RHEUMATOID FACTOR (H): ICD-10-CM

## 2020-10-21 RX ORDER — GABAPENTIN 300 MG/1
600 CAPSULE ORAL 3 TIMES DAILY
Qty: 540 CAPSULE | Refills: 1 | Status: SHIPPED | OUTPATIENT
Start: 2020-10-21 | End: 2021-03-22

## 2020-10-21 NOTE — TELEPHONE ENCOUNTER
Last filled 7/22/2020. Sent 180 caps with 2 refills. She takes 2 caps three times day. Please advise.    Pending Prescriptions:                       Disp   Refills    gabapentin (NEURONTIN) 300 MG capsule [Ph*180 ca*2            Sig: TAKE 2 CAPSULES BY MOUTH THREE TIMES DAILY

## 2020-10-28 ENCOUNTER — TRANSFERRED RECORDS (OUTPATIENT)
Dept: FAMILY MEDICINE | Facility: CLINIC | Age: 65
End: 2020-10-28

## 2020-11-04 ENCOUNTER — TRANSFERRED RECORDS (OUTPATIENT)
Dept: FAMILY MEDICINE | Facility: CLINIC | Age: 65
End: 2020-11-04

## 2020-12-02 ENCOUNTER — TRANSFERRED RECORDS (OUTPATIENT)
Dept: FAMILY MEDICINE | Facility: CLINIC | Age: 65
End: 2020-12-02

## 2020-12-07 DIAGNOSIS — Z86.711 HISTORY OF PULMONARY EMBOLISM: ICD-10-CM

## 2020-12-07 RX ORDER — WARFARIN SODIUM 5 MG/1
TABLET ORAL
Qty: 90 TABLET | Refills: 0 | Status: SHIPPED | OUTPATIENT
Start: 2020-12-07 | End: 2021-03-23

## 2020-12-07 NOTE — TELEPHONE ENCOUNTER
Routing to Hyun who saw patient last.    Last INR in September.    Catrachita Torres PA-C  12/7/2020

## 2020-12-07 NOTE — TELEPHONE ENCOUNTER
Last OV 9/11/2020. Last INR 2.9 on 9/24/2020.     Pending Prescriptions:                       Disp   Refills    warfarin ANTICOAGULANT (COUMADIN) 5 MG ta*90 tab*0            Sig: TAKE 1 TABLET(5 MG) BY MOUTH DAILY

## 2020-12-07 NOTE — TELEPHONE ENCOUNTER
Pt is due for INR visit. Please contact her to schedule appt. Would refill enough to get to appt if needed.

## 2020-12-30 ENCOUNTER — TRANSFERRED RECORDS (OUTPATIENT)
Dept: FAMILY MEDICINE | Facility: CLINIC | Age: 65
End: 2020-12-30

## 2021-01-06 DIAGNOSIS — J44.9 CHRONIC OBSTRUCTIVE PULMONARY DISEASE, UNSPECIFIED COPD TYPE (H): ICD-10-CM

## 2021-01-06 NOTE — TELEPHONE ENCOUNTER
Received incoming refill request for  Pending Prescriptions:                       Disp   Refills    TRELEGY ELLIPTA 100-62.5-25 MCG/INH oral *                    Sig: INHALE 1 PUFF INTO THE LUNGS DAILY. RINSE MOUTH           AND SPIT AFTER USING    This is not currently on the patients medication list. Routing to [facility] for review, is patient supposed to be taking this?

## 2021-01-07 NOTE — TELEPHONE ENCOUNTER
Last filled by Dr. Armenta 6/2020. Refilled. 6 months. Will need visit for this at that time, but should be in sooner for other meds, concerns, INR visits.

## 2021-01-08 ENCOUNTER — TRANSFERRED RECORDS (OUTPATIENT)
Dept: FAMILY MEDICINE | Facility: CLINIC | Age: 66
End: 2021-01-08

## 2021-01-09 DIAGNOSIS — J44.9 CHRONIC OBSTRUCTIVE PULMONARY DISEASE, UNSPECIFIED COPD TYPE (H): ICD-10-CM

## 2021-01-11 RX ORDER — ALBUTEROL SULFATE 90 UG/1
AEROSOL, METERED RESPIRATORY (INHALATION)
Qty: 18 G | COMMUNITY
Start: 2021-01-11

## 2021-01-11 NOTE — TELEPHONE ENCOUNTER
Bere Machuca is requesting a refill of:    Refused Prescriptions:                       Disp   Refills    VENTOLIN  (90 Base) MCG/ACT inhaler*18 g            Sig: INHALE 1 TO 2 PUFFS BY MOUTH INTO THE LUNGS AS NEEDED           FOR SHORTNESS OF BREATH  Refused By: BOUCHRA HORTA  Reason for Refusal: Patient needs appointment  Reason for Refusal Comment: Appt scheduled for 01/14/21

## 2021-01-15 ENCOUNTER — HEALTH MAINTENANCE LETTER (OUTPATIENT)
Age: 66
End: 2021-01-15

## 2021-01-22 ENCOUNTER — TRANSFERRED RECORDS (OUTPATIENT)
Dept: FAMILY MEDICINE | Facility: CLINIC | Age: 66
End: 2021-01-22

## 2021-01-29 ENCOUNTER — OFFICE VISIT (OUTPATIENT)
Dept: FAMILY MEDICINE | Facility: CLINIC | Age: 66
End: 2021-01-29

## 2021-01-29 VITALS
OXYGEN SATURATION: 95 % | TEMPERATURE: 97.4 F | DIASTOLIC BLOOD PRESSURE: 82 MMHG | SYSTOLIC BLOOD PRESSURE: 130 MMHG | HEART RATE: 97 BPM

## 2021-01-29 DIAGNOSIS — Z82.49 FAMILY HISTORY OF PULMONARY EMBOLISM: Primary | ICD-10-CM

## 2021-01-29 DIAGNOSIS — M05.79 RHEUMATOID ARTHRITIS INVOLVING MULTIPLE SITES WITH POSITIVE RHEUMATOID FACTOR (H): ICD-10-CM

## 2021-01-29 DIAGNOSIS — I10 BENIGN ESSENTIAL HYPERTENSION: ICD-10-CM

## 2021-01-29 DIAGNOSIS — Z79.01 CHRONIC ANTICOAGULATION: ICD-10-CM

## 2021-01-29 DIAGNOSIS — D68.51 FACTOR 5 LEIDEN MUTATION, HETEROZYGOUS (H): ICD-10-CM

## 2021-01-29 DIAGNOSIS — Z87.891 PERSONAL HISTORY OF TOBACCO USE, PRESENTING HAZARDS TO HEALTH: ICD-10-CM

## 2021-01-29 DIAGNOSIS — I10 ESSENTIAL HYPERTENSION: ICD-10-CM

## 2021-01-29 LAB
BUN SERPL-MCNC: 17 MG/DL (ref 7–25)
BUN/CREATININE RATIO: 18.5 (ref 6–22)
CALCIUM SERPL-MCNC: 9.8 MG/DL (ref 8.6–10.3)
CHLORIDE SERPLBLD-SCNC: 107.2 MMOL/L (ref 98–110)
CO2 SERPL-SCNC: 23.1 MMOL/L (ref 20–32)
CREAT SERPL-MCNC: 0.92 MG/DL (ref 0.6–1.3)
GLUCOSE SERPL-MCNC: 119 MG/DL (ref 60–99)
INR POINT OF CARE: 2.3 (ref 2–3)
POTASSIUM SERPL-SCNC: 4.05 MMOL/L (ref 3.5–5.3)
SODIUM SERPL-SCNC: 141.3 MMOL/L (ref 135–146)

## 2021-01-29 PROCEDURE — 99214 OFFICE O/P EST MOD 30 MIN: CPT | Performed by: FAMILY MEDICINE

## 2021-01-29 PROCEDURE — 80048 BASIC METABOLIC PNL TOTAL CA: CPT | Performed by: FAMILY MEDICINE

## 2021-01-29 PROCEDURE — 36415 COLL VENOUS BLD VENIPUNCTURE: CPT | Performed by: FAMILY MEDICINE

## 2021-01-29 PROCEDURE — 85610 PROTHROMBIN TIME: CPT | Performed by: FAMILY MEDICINE

## 2021-01-29 RX ORDER — SULFASALAZINE 500 MG/1
TABLET, DELAYED RELEASE ORAL
COMMUNITY
Start: 2020-11-05 | End: 2021-01-29

## 2021-01-29 RX ORDER — TOPIRAMATE 50 MG/1
TABLET, FILM COATED ORAL
COMMUNITY
Start: 2021-01-26 | End: 2021-06-11

## 2021-01-29 RX ORDER — INFLIXIMAB 100 MG/10ML
INJECTION, POWDER, LYOPHILIZED, FOR SOLUTION INTRAVENOUS
COMMUNITY
Start: 2021-01-29 | End: 2023-06-08

## 2021-01-29 RX ORDER — SULFASALAZINE 500 MG/1
500 TABLET, DELAYED RELEASE ORAL PRN
COMMUNITY
End: 2021-06-11

## 2021-01-29 RX ORDER — LOSARTAN POTASSIUM 50 MG/1
50 TABLET ORAL DAILY
Qty: 90 TABLET | Refills: 1 | Status: SHIPPED | OUTPATIENT
Start: 2021-01-29 | End: 2021-10-05

## 2021-01-29 NOTE — PATIENT INSTRUCTIONS
"Assessment & Plan   Problem List Items Addressed This Visit        Circulatory    Essential hypertension    Relevant Medications    losartan (COZAAR) 50 MG tablet    Other Relevant Orders    Basic Metabolic Panel (BFP)       Immune    Rheumatoid arthritis involving multiple sites with positive rheumatoid factor (H)    Relevant Medications    inFLIXimab (REMICADE) 100 MG injection    sulfaSALAzine ER (AZULFIDINE EN) 500 MG EC tablet       Hematologic    Factor 5 Leiden mutation, heterozygous/ INR 2-3       Behavioral    Cigarette - quit 2014 -  now vaping       Other    Family history of pulmonary embolism - Primary    Relevant Orders    PROTHROMBIN TIME INR (BFP) (Completed)    VENOUS COLLECTION (Completed)    Chronic anticoagulation      Other Visit Diagnoses     Benign essential hypertension        Relevant Medications    losartan (COZAAR) 50 MG tablet           1. Rheumatoid arthritis involving multiple sites with positive rheumatoid factor (H)  Seeing rheumatology and pain clinic.  - inFLIXimab (REMICADE) 100 MG injection; 700 MG every 7 weeks    2. Family history of pulmonary embolism    - PROTHROMBIN TIME INR (BFP)  - VENOUS COLLECTION    3. Essential hypertension    - Basic Metabolic Panel (BFP)    4. Factor 5 Leiden mutation, heterozygous/ INR 2-3      5. Chronic anticoagulation  INR checked. In range, continue current coumadin dosing and recheck in 1 month.    6. Benign essential hypertension  Controlled, continue medications, check bmp.  - losartan (COZAAR) 50 MG tablet; Take 1 tablet (50 mg) by mouth daily  Dispense: 90 tablet; Refill: 1    7. Cigarette - quit 2014 -  now vaping  Continue with inhalers.    [id number]}     BMI:   Estimated body mass index is 62.3 kg/m  as calculated from the following:    Height as of 9/25/20: 1.676 m (5' 6\").    Weight as of 9/25/20: 175.1 kg (386 lb).   Weight management plan: Discussed healthy diet and exercise guidelines      FUTURE APPOINTMENTS:       - Follow-up visit in 1 " month    No follow-ups on file.    Elis Jack MD

## 2021-01-29 NOTE — NURSING NOTE
Darline is here today for an INR and med recheck.    Pre-visit Screening:  Immunizations:  up to date  Colonoscopy:  is up to date  Mammogram: is up to date  Asthma Action Test/Plan:  Done today  PHQ9:  NA  GAD7:  NA  Questioned patient about current smoking habits Pt. quit smoking some time ago.  Ok to leave detailed message on voice mail for today's visit only Yes, phone # [phone]

## 2021-01-29 NOTE — PROGRESS NOTES
"Assessment & Plan   Problem List Items Addressed This Visit        Circulatory    Essential hypertension    Relevant Medications    losartan (COZAAR) 50 MG tablet    Other Relevant Orders    Basic Metabolic Panel (BFP)       Immune    Rheumatoid arthritis involving multiple sites with positive rheumatoid factor (H)    Relevant Medications    inFLIXimab (REMICADE) 100 MG injection    sulfaSALAzine ER (AZULFIDINE EN) 500 MG EC tablet       Hematologic    Factor 5 Leiden mutation, heterozygous/ INR 2-3       Behavioral    Cigarette - quit 2014 -  now vaping       Other    Family history of pulmonary embolism - Primary    Relevant Orders    PROTHROMBIN TIME INR (BFP) (Completed)    VENOUS COLLECTION (Completed)    Chronic anticoagulation      Other Visit Diagnoses     Benign essential hypertension        Relevant Medications    losartan (COZAAR) 50 MG tablet           1. Rheumatoid arthritis involving multiple sites with positive rheumatoid factor (H)  Seeing rheumatology and pain clinic.  - inFLIXimab (REMICADE) 100 MG injection; 700 MG every 7 weeks    2. Family history of pulmonary embolism    - PROTHROMBIN TIME INR (BFP)  - VENOUS COLLECTION    3. Essential hypertension    - Basic Metabolic Panel (BFP)    4. Factor 5 Leiden mutation, heterozygous/ INR 2-3      5. Chronic anticoagulation  INR checked. In range, continue current coumadin dosing and recheck in 1 month.    6. Benign essential hypertension  Controlled, continue medications, check bmp.  - losartan (COZAAR) 50 MG tablet; Take 1 tablet (50 mg) by mouth daily  Dispense: 90 tablet; Refill: 1    7. Cigarette - quit 2014 -  now vaping  Continue with inhalers.    [id number]}     BMI:   Estimated body mass index is 62.3 kg/m  as calculated from the following:    Height as of 9/25/20: 1.676 m (5' 6\").    Weight as of 9/25/20: 175.1 kg (386 lb).   Weight management plan: Discussed healthy diet and exercise guidelines      FUTURE APPOINTMENTS:       - Follow-up visit in 1 " month    No follow-ups on file.    Elis Jack MD  [facility]    Subjective     Nursing Notes:   Joan Anguianon, NARESH  1/29/2021 12:52 PM  Signed  Darline is here today for an INR and med recheck.    Pre-visit Screening:  Immunizations:  up to date  Colonoscopy:  is up to date  Mammogram: is up to date  Asthma Action Test/Plan:  Done today  PHQ9:  NA  GAD7:  NA  Questioned patient about current smoking habits Pt. quit smoking some time ago.  Ok to leave detailed message on voice mail for today's visit only Yes, phone # [phone]           Bere Machuca is a 65 year old female who presents to clinic today for the following health issues   HPI     Here for INR check. She is doing well. No recent medication or dose changes.    Also needs a refill on her blood pressure medications--is on losartan.     Goes to pain clinic. Had hip injection yesterday. Has had some recent changes in her medications. Had a recent MRI and will have an epidural.     Works doing customer service     Review of Systems   Constitutional, HEENT, cardiovascular, pulmonary, gi and gu systems are negative, except as otherwise noted.      Objective    /82 (BP Location: Right arm, Patient Position: Sitting, Cuff Size: Adult Large)   Pulse 97   Temp 97.4  F (36.3  C) (Oral)   SpO2 95%   There is no height or weight on file to calculate BMI.  Physical Exam   GENERAL: healthy, alert and no distress  RESP: lungs clear to auscultation - no rales, rhonchi or wheezes  CV: regular rate and rhythm, normal S1 S2, no S3 or S4, no murmur, click or rub, no peripheral edema and peripheral pulses strong  MS: no gross musculoskeletal defects noted, no edema  PSYCH: mentation appears normal, affect normal/bright    Results for orders placed or performed in visit on 01/29/21 (from the past 24 hour(s))   PROTHROMBIN TIME INR (BFP)   Result Value Ref Range    INR Protime 2.3 2.0 - 3.0

## 2021-01-30 ASSESSMENT — ASTHMA QUESTIONNAIRES: ACT_TOTALSCORE: 23

## 2021-02-01 ENCOUNTER — TRANSFERRED RECORDS (OUTPATIENT)
Dept: FAMILY MEDICINE | Facility: CLINIC | Age: 66
End: 2021-02-01

## 2021-02-15 DIAGNOSIS — J44.9 CHRONIC OBSTRUCTIVE PULMONARY DISEASE, UNSPECIFIED COPD TYPE (H): ICD-10-CM

## 2021-02-15 RX ORDER — ALBUTEROL SULFATE 90 UG/1
1-2 AEROSOL, METERED RESPIRATORY (INHALATION) PRN
Qty: 2 INHALER | Refills: 1 | Status: SHIPPED | OUTPATIENT
Start: 2021-02-15 | End: 2021-04-25

## 2021-02-15 NOTE — TELEPHONE ENCOUNTER
Darline is requesting a refill of the albuterol inhaler. Pt was last seen by Dr. Jack on 1/29/2021. Last filled on 6/5/2020.      Pending Prescriptions:                       Disp   Refills    albuterol (PROAIR HFA) 108 (90 Base) MCG/*2 Inha*1            Sig: Inhale 1-2 puffs into the lungs as needed for           shortness of breath / dyspnea

## 2021-02-25 ENCOUNTER — TRANSFERRED RECORDS (OUTPATIENT)
Dept: FAMILY MEDICINE | Facility: CLINIC | Age: 66
End: 2021-02-25

## 2021-03-10 NOTE — TELEPHONE ENCOUNTER
Denied refill request for warfarin. Pt was seen on 1/29/2021 to f/u in one month. No appts schedule at this time.  Will send Trist message to inform.

## 2021-03-22 ENCOUNTER — OFFICE VISIT (OUTPATIENT)
Dept: FAMILY MEDICINE | Facility: CLINIC | Age: 66
End: 2021-03-22

## 2021-03-22 ENCOUNTER — TRANSFERRED RECORDS (OUTPATIENT)
Dept: FAMILY MEDICINE | Facility: CLINIC | Age: 66
End: 2021-03-22

## 2021-03-22 VITALS
HEART RATE: 109 BPM | TEMPERATURE: 97.9 F | SYSTOLIC BLOOD PRESSURE: 126 MMHG | OXYGEN SATURATION: 95 % | WEIGHT: 293 LBS | DIASTOLIC BLOOD PRESSURE: 82 MMHG | BODY MASS INDEX: 47.09 KG/M2 | HEIGHT: 66 IN

## 2021-03-22 DIAGNOSIS — Z82.49 FAMILY HISTORY OF PULMONARY EMBOLISM: Primary | ICD-10-CM

## 2021-03-22 DIAGNOSIS — Z79.01 CHRONIC ANTICOAGULATION: ICD-10-CM

## 2021-03-22 DIAGNOSIS — R35.0 URINARY FREQUENCY: ICD-10-CM

## 2021-03-22 DIAGNOSIS — T50.Z95A ADVERSE EFFECT OF VACCINE, INITIAL ENCOUNTER: ICD-10-CM

## 2021-03-22 LAB — INR POINT OF CARE: 2.1 (ref 0.9–1.1)

## 2021-03-22 PROCEDURE — 36415 COLL VENOUS BLD VENIPUNCTURE: CPT | Performed by: FAMILY MEDICINE

## 2021-03-22 PROCEDURE — 99213 OFFICE O/P EST LOW 20 MIN: CPT | Performed by: FAMILY MEDICINE

## 2021-03-22 PROCEDURE — 85610 PROTHROMBIN TIME: CPT | Performed by: FAMILY MEDICINE

## 2021-03-22 ASSESSMENT — MIFFLIN-ST. JEOR: SCORE: 2336.24

## 2021-03-22 NOTE — PATIENT INSTRUCTIONS
Assessment & Plan   Problem List Items Addressed This Visit        Other    Family history of pulmonary embolism - Primary    Relevant Orders    PROTHROMBIN TIME INR (BFP) (Completed)    VENOUS COLLECTION (Completed)    Chronic anticoagulation      Other Visit Diagnoses     Adverse effect of vaccine, initial encounter        Urinary frequency             1. Family history of pulmonary embolism    - PROTHROMBIN TIME INR (BFP)  - VENOUS COLLECTION    2. Chronic anticoagulation  INR 2.1. no recent changes. Continue same dose coumadin, followup to recheck in 1 month.    3. Adverse effect of vaccine, initial encounter  Question of infection vs reaction. There is no increased warmth,just some itching. She will watch and call tomorrow if increasing or changes, then we could consider starting an antibiotic.    4. Urinary frequency  We can check urine, drink more fluids/water             FUTURE APPOINTMENTS:       - Follow-up visit in 1 month INR check.    No follow-ups on file.    Elis Jack MD  [facility]

## 2021-03-22 NOTE — NURSING NOTE
Darline is here today for an INR recheck.    Pre-visit Screening:  Immunizations:  up to date  Colonoscopy:  is up to date  Mammogram: is up to date  Asthma Action Test/Plan:  ARGENIS  PHQ9:  NA  GAD7:  NA  Questioned patient about current smoking habits Pt. quit smoking some time ago.  Ok to leave detailed message on voice mail for today's visit only Yes, phone # [phone]

## 2021-03-22 NOTE — PROGRESS NOTES
Assessment & Plan   Problem List Items Addressed This Visit        Other    Family history of pulmonary embolism - Primary    Relevant Orders    PROTHROMBIN TIME INR (BFP) (Completed)    VENOUS COLLECTION (Completed)    Chronic anticoagulation      Other Visit Diagnoses     Adverse effect of vaccine, initial encounter        Urinary frequency             1. Family history of pulmonary embolism    - PROTHROMBIN TIME INR (BFP)  - VENOUS COLLECTION    2. Chronic anticoagulation  INR 2.1. no recent changes. Continue same dose coumadin, followup to recheck in 1 month.    3. Adverse effect of vaccine, initial encounter  Question of infection vs reaction. There is no increased warmth,just some itching. She will watch and call tomorrow if increasing or changes, then we could consider starting an antibiotic.    4. Urinary frequency  We can check urine, drink more fluids/water             FUTURE APPOINTMENTS:       - Follow-up visit in 1 month INR check.    No follow-ups on file.    Elis Jack MD  [facility]    Subjective     Nursing Notes:   Michelle Anguiano, [facility]  3/22/2021 11:47 AM  Signed  Darline is here today for an INR recheck.    Pre-visit Screening:  Immunizations:  up to date  Colonoscopy:  is up to date  Mammogram: is up to date  Asthma Action Test/Plan:  NA  PHQ9:  NA  GAD7:  NA  Questioned patient about current smoking habits Pt. quit smoking some time ago.  Ok to leave detailed message on voice mail for today's visit only Yes, phone # [phone]           Bere Machuca is a 65 year old female who presents to clinic today for the following health issues   HPI     Here for INR check. INR today 2.1, no recent dose changes, no medication or diet changes. Is no longer on gabapentin, is on topamax. Is having more urinary frequency, thinks could be from the coffee in the morning. Doesn't feel like an infection. Just the one med change.      Review of Systems   Constitutional, HEENT,  "cardiovascular, pulmonary, gi and gu systems are negative, except as otherwise noted.      Objective    /82 (BP Location: Left arm, Patient Position: Sitting, Cuff Size: Adult Large)   Pulse 109   Temp 97.9  F (36.6  C) (Oral)   Ht 1.676 m (5' 6\")   Wt (!) 176.4 kg (389 lb)   SpO2 95%   BMI 62.79 kg/m    Body mass index is 62.79 kg/m .  Physical Exam   GENERAL: healthy, alert and no distress  MS: no gross musculoskeletal defects noted, no edema  PSYCH: mentation appears normal, affect normal/bright  Right upper arm with red area where she received vaccination,no increased warmth over her other skin, non tender. Few cm diameter.    Results for orders placed or performed in visit on 03/22/21 (from the past 24 hour(s))   PROTHROMBIN TIME INR (BFP)   Result Value Ref Range    INR Protime 2.1          "

## 2021-03-24 ENCOUNTER — TRANSFERRED RECORDS (OUTPATIENT)
Dept: FAMILY MEDICINE | Facility: CLINIC | Age: 66
End: 2021-03-24

## 2021-03-29 ENCOUNTER — TRANSFERRED RECORDS (OUTPATIENT)
Dept: FAMILY MEDICINE | Facility: CLINIC | Age: 66
End: 2021-03-29

## 2021-04-11 ENCOUNTER — HEALTH MAINTENANCE LETTER (OUTPATIENT)
Age: 66
End: 2021-04-11

## 2021-04-20 ENCOUNTER — TRANSFERRED RECORDS (OUTPATIENT)
Dept: FAMILY MEDICINE | Facility: CLINIC | Age: 66
End: 2021-04-20

## 2021-04-21 ENCOUNTER — TRANSFERRED RECORDS (OUTPATIENT)
Dept: FAMILY MEDICINE | Facility: CLINIC | Age: 66
End: 2021-04-21

## 2021-04-25 DIAGNOSIS — J44.9 CHRONIC OBSTRUCTIVE PULMONARY DISEASE, UNSPECIFIED COPD TYPE (H): ICD-10-CM

## 2021-04-25 RX ORDER — METHOTREXATE 25 MG/ML
INJECTION, SOLUTION INTRA-ARTERIAL; INTRAMUSCULAR; INTRAVENOUS
COMMUNITY
Start: 2021-04-20

## 2021-04-25 RX ORDER — ALBUTEROL SULFATE 90 UG/1
AEROSOL, METERED RESPIRATORY (INHALATION)
Qty: 36 G | Refills: 0 | Status: SHIPPED | OUTPATIENT
Start: 2021-04-25 | End: 2021-05-16

## 2021-04-25 RX ORDER — PREGABALIN 100 MG/1
100 CAPSULE ORAL 2 TIMES DAILY
COMMUNITY
Start: 2021-04-20 | End: 2023-10-10

## 2021-04-25 RX ORDER — TOPIRAMATE 100 MG/1
100 TABLET, FILM COATED ORAL 2 TIMES DAILY
COMMUNITY
Start: 2021-03-24 | End: 2021-06-11

## 2021-05-13 ENCOUNTER — MYC MEDICAL ADVICE (OUTPATIENT)
Dept: FAMILY MEDICINE | Facility: CLINIC | Age: 66
End: 2021-05-13

## 2021-05-16 ENCOUNTER — MYC REFILL (OUTPATIENT)
Dept: FAMILY MEDICINE | Facility: CLINIC | Age: 66
End: 2021-05-16

## 2021-05-16 DIAGNOSIS — Z86.711 HISTORY OF PULMONARY EMBOLISM: ICD-10-CM

## 2021-05-16 DIAGNOSIS — J44.9 CHRONIC OBSTRUCTIVE PULMONARY DISEASE, UNSPECIFIED COPD TYPE (H): ICD-10-CM

## 2021-05-17 RX ORDER — ALBUTEROL SULFATE 90 UG/1
2 AEROSOL, METERED RESPIRATORY (INHALATION) EVERY 6 HOURS PRN
Qty: 36 G | Refills: 0 | Status: SHIPPED | OUTPATIENT
Start: 2021-05-17 | End: 2021-06-15

## 2021-05-17 RX ORDER — MESALAMINE 1.2 G/1
1200 TABLET, DELAYED RELEASE ORAL
OUTPATIENT
Start: 2021-05-17

## 2021-05-17 RX ORDER — WARFARIN SODIUM 5 MG/1
5 TABLET ORAL DAILY
Qty: 90 TABLET | Refills: 0 | Status: SHIPPED | OUTPATIENT
Start: 2021-05-17 | End: 2021-08-24

## 2021-05-17 NOTE — TELEPHONE ENCOUNTER
Is she on aspirin? Warning came up for being on asa and coumadin.    lialda is prescribed by another MD,not us.    I sent over coumadin and also albuterol.

## 2021-05-17 NOTE — TELEPHONE ENCOUNTER
Last OV 3/22/2021, was suppose to return in 1 month for INR. Please advise Thanks     Pending Prescriptions:                       Disp   Refills    warfarin ANTICOAGULANT (COUMADIN) 5 MG ta*90 tab*0            Sig: Take 1 tablet (5 mg) by mouth daily    albuterol (VENTOLIN HFA) 108 (90 Base) MC*36 g   0

## 2021-05-19 ENCOUNTER — TRANSFERRED RECORDS (OUTPATIENT)
Dept: FAMILY MEDICINE | Facility: CLINIC | Age: 66
End: 2021-05-19

## 2021-06-09 NOTE — PROGRESS NOTES
Assessment & Plan   Problem List Items Addressed This Visit        Nervous and Auditory    Other chronic pain       Endocrine    Controlled type 2 diabetes mellitus with circulatory disorder, without long-term current use of insulin (H) - Primary    Relevant Orders    Hemoglobin A1c (BFP) (Completed)    Albumin Random Urine Quantitative with Creat Ratio (Completed)    RI FOOT EXAM NO CHARGE (Completed)       Circulatory    Essential hypertension       Musculoskeletal and Integumentary    Lymphedema of arm       Hematologic    Factor 5 Leiden mutation, heterozygous/ INR 2-3    Relevant Orders    VENOUS COLLECTION (Completed)    PROTHROMBIN TIME INR (BFP) (Completed)       Other    Chronic anticoagulation    Relevant Orders    VENOUS COLLECTION (Completed)    PROTHROMBIN TIME INR (BFP) (Completed)      Other Visit Diagnoses     Sleep disturbance        Urinary urgency        Relevant Orders    HCL  Urinalysis, Routine (BFP) (Completed)    URINE CULTURE AEROBIC BACTERIAL ([facility])    Adult Urology Referral    Lymphedema of left arm        Relevant Orders    VASCULAR SURGERY REFERRAL    Visit for screening mammogram        Relevant Orders    RADIOLOGY REFERRAL    Mammo Screening digital (bilat)         Results for orders placed or performed in visit on 06/11/21   PROTHROMBIN TIME INR (BFP)     Status: Abnormal   Result Value Ref Range    INR Protime 1.6 (A) 2.0 - 3.0   Hemoglobin A1c (BFP)     Status: None   Result Value Ref Range    Hemoglobin A1C 5.5 4.0 - 7.0 %   Albumin Random Urine Quantitative with Creat Ratio     Status: Abnormal   Result Value Ref Range    Albumin Urine mg/spec 150 (A) 30    Albumin Urine mg/g Cr  (A) 30 MG/G Creatinine    Creatinine Urine 200 300   HCL  Urinalysis, Routine (BFP)     Status: Abnormal   Result Value Ref Range    Color Urine Yellow     Appearance Urine Slightly Cloudy (A)     Glucose Urine Neg neg mg/dL    Bilirubin Urine Neg neg    Ketones Urine Other (A) neg mg/dL     Specific Gravity >=1.030 (A)     Blood Urine Neg neg    pH Urine 5.5 5.0 - 7.0 pH    Albumin Urine 100 (A) neg - neg mg/dL    Urobilinogen Urine 0.2 0.2 - 1.0 EU/dL    Nitrite Urine Neg NEG    Leukocyte Esterase neg neg - neg    Wbc, Urine Micro 0-2 neg - 2    RBC Micro Urine 0-2 neg - 2    EP/HPF SMALL     Bacteria Urine SMALL (A) neg - neg    Casts/LPF neg     Miscellaneous neg      1. Controlled type 2 diabetes mellitus with other circulatory complication, without long-term current use of insulin (H)  Controlled with diet. Recheck in 6 months.  - Hemoglobin A1c (BFP)  - Albumin Random Urine Quantitative with Creat Ratio  - MT FOOT EXAM NO CHARGE    2. Essential hypertension  Controlled.    3. Factor 5 Leiden mutation, heterozygous/ INR 2-3    - VENOUS COLLECTION  - PROTHROMBIN TIME INR (BFP)    4. Chronic anticoagulation  Increase coumadin to 5 mg/day except Friday/tuesday 7.5 mg/day. See me back next week Friday.  - VENOUS COLLECTION  - PROTHROMBIN TIME INR (BFP)    5. Other chronic pain  Sees pain clinic.    6. Lymphedema of arm  Referral to vascular specialist.    7. Sleep disturbance  Recommend to followup with sleep clinic. She will consider this. Many things can cause these symptoms.    8. Urinary urgency  Slightly cloudy urine. I think we should do a culture and referral to urology.  - HCL  Urinalysis, Routine (BFP)  - URINE CULTURE AEROBIC BACTERIAL ([facility])    9. Lymphedema of left arm  Vascular referral.  - VASCULAR SURGERY REFERRAL; Future    10. Visit for screening mammogram    - RADIOLOGY REFERRAL  - Mammo Screening digital (bilat); Future             FUTURE APPOINTMENTS:       - Follow-up visit in 1 week    No follow-ups on file.    Elis Jack MD  [facility]    Subjective     Nursing Notes:   Elina Garcia, NARESH  6/11/2021  2:14 PM  Signed  Darline is here for nonfasting med check.    Pre-Visit  Screening:  Immunizations:UTD  Colonoscopy:Needs-declined  Mammogram:Needs  Asthma Action Test/Plan:today  PHQ9:today  GAD7:today  Questioned patient about current smoking habits Pt.former  OK to leave a detailed message on voice mail for today's visit yes, phone # [phone]           Bere Machuca is a 65 year old female who presents to clinic today for the following health issues   HPI       Here for diabetes check.  Blood sugars at home--doesn't check them. Currently not on medications, diet controlled.    Also INR. Today 1.6. no recent diet or medication changes. Has had an increase in her hydrocodone. Taking 5 mg/day.     Has horrible sleeping patterns. Today slept until 8:30. Horrible dreams. This leads to anxiety and then can't go back to sleep. Flexeril was giving her vivid dreams. Changed her to zanaflex. This has been happening a lot. Has cpap.     Urinary sx, comes and goes. A few days ago has urgency. Has been for at least a few months.    Has arm lyphedema--left arm enlarged and also the leg. The arm is getting very uncomfortable.     Review of Systems   Constitutional, HEENT, cardiovascular, pulmonary, gi and gu systems are negative, except as otherwise noted.      Objective    /80 (BP Location: Right arm, Patient Position: Sitting, Cuff Size: Adult Large)   Pulse 69   Temp 96.9  F (36.1  C)   Wt (!) 184.4 kg (406 lb 9.6 oz)   SpO2 96%   BMI 65.63 kg/m    Body mass index is 65.63 kg/m .  Physical Exam   GENERAL: healthy, alert and no distress  NECK: no adenopathy, no asymmetry, masses, or scars and thyroid normal to palpation  RESP: lungs clear to auscultation - no rales, rhonchi or wheezes  CV: regular rate and rhythm, normal S1 S2, no S3 or S4, no murmur, click or rub, no peripheral edema and peripheral pulses strong  ABDOMEN: soft, nontender, no hepatosplenomegaly, no masses and bowel sounds normal  MS: no gross musculoskeletal defects noted, no edema  NEURO: Normal strength and  tone, mentation intact and speech normal  PSYCH: mentation appears normal, affect normal/bright  Diabetic foot exam: normal DP and PT pulses, no trophic changes or ulcerative lesions and normal sensory exam  Walks with a walker.  Extremely obese, difficult to determine lymphedema    Results for orders placed or performed in visit on 06/11/21 (from the past 24 hour(s))   PROTHROMBIN TIME INR (BFP)   Result Value Ref Range    INR Protime 1.6 (A) 2.0 - 3.0   Hemoglobin A1c (BFP)   Result Value Ref Range    Hemoglobin A1C 5.5 4.0 - 7.0 %   Albumin Random Urine Quantitative with Creat Ratio   Result Value Ref Range    Albumin Urine mg/spec 150 (A) 30    Albumin Urine mg/g Cr  (A) 30 MG/G Creatinine    Creatinine Urine 200 300   HCL  Urinalysis, Routine ([facility])   Result Value Ref Range    Color Urine Yellow     Appearance Urine Slightly Cloudy (A)     Glucose Urine Neg neg mg/dL    Bilirubin Urine Neg neg    Ketones Urine Other (A) neg mg/dL    Specific Gravity >=1.030 (A)     Blood Urine Neg neg    pH Urine 5.5 5.0 - 7.0 pH    Albumin Urine 100 (A) neg - neg mg/dL    Urobilinogen Urine 0.2 0.2 - 1.0 EU/dL    Nitrite Urine Neg NEG    Leukocyte Esterase neg neg - neg    Wbc, Urine Micro 0-2 neg - 2    RBC Micro Urine 0-2 neg - 2    EP/HPF SMALL     Bacteria Urine SMALL (A) neg - neg    Casts/LPF neg     Miscellaneous neg

## 2021-06-11 ENCOUNTER — OFFICE VISIT (OUTPATIENT)
Dept: FAMILY MEDICINE | Facility: CLINIC | Age: 66
End: 2021-06-11

## 2021-06-11 VITALS
BODY MASS INDEX: 65.63 KG/M2 | DIASTOLIC BLOOD PRESSURE: 80 MMHG | WEIGHT: 293 LBS | SYSTOLIC BLOOD PRESSURE: 130 MMHG | TEMPERATURE: 96.9 F | OXYGEN SATURATION: 96 % | HEART RATE: 69 BPM

## 2021-06-11 DIAGNOSIS — G89.29 OTHER CHRONIC PAIN: ICD-10-CM

## 2021-06-11 DIAGNOSIS — G47.9 SLEEP DISTURBANCE: ICD-10-CM

## 2021-06-11 DIAGNOSIS — I89.0 LYMPHEDEMA OF ARM: ICD-10-CM

## 2021-06-11 DIAGNOSIS — I10 ESSENTIAL HYPERTENSION: ICD-10-CM

## 2021-06-11 DIAGNOSIS — D68.51 FACTOR 5 LEIDEN MUTATION, HETEROZYGOUS (H): ICD-10-CM

## 2021-06-11 DIAGNOSIS — I89.0 LYMPHEDEMA OF LEFT ARM: ICD-10-CM

## 2021-06-11 DIAGNOSIS — R39.15 URINARY URGENCY: ICD-10-CM

## 2021-06-11 DIAGNOSIS — Z79.01 CHRONIC ANTICOAGULATION: ICD-10-CM

## 2021-06-11 DIAGNOSIS — Z12.31 VISIT FOR SCREENING MAMMOGRAM: ICD-10-CM

## 2021-06-11 DIAGNOSIS — E11.59 CONTROLLED TYPE 2 DIABETES MELLITUS WITH OTHER CIRCULATORY COMPLICATION, WITHOUT LONG-TERM CURRENT USE OF INSULIN (H): Primary | ICD-10-CM

## 2021-06-11 LAB
ALBUMIN (URINE): 100 MG/DL
ALBUMIN URINE MG/G CR: ABNORMAL MG/G CREATININE
ALBUMIN URINE MG/SPEC: 150
APPEARANCE UR: ABNORMAL
BACTERIA, UR: ABNORMAL
BILIRUB UR QL: ABNORMAL
CASTS/LPF: ABNORMAL
COLOR UR: YELLOW
CREATININE URINE: 200
EP/HPF: ABNORMAL
GLUCOSE URINE: ABNORMAL MG/DL
HBA1C MFR BLD: 5.5 % (ref 4–7)
HGB UR QL: ABNORMAL
INR POINT OF CARE: 1.6 (ref 2–3)
KETONES UR QL: ABNORMAL MG/DL
LEUKOCYTE ESTERASE - QUEST: ABNORMAL
MISC.: ABNORMAL
NITRITE UR QL STRIP: ABNORMAL
PH UR STRIP: 5.5 PH (ref 5–7)
RBC, UR MICRO: ABNORMAL (ref ?–2)
SP. GRAVITY: >=1.03
UROBILINOGEN UR QL STRIP: 0.2 EU/DL (ref 0.2–1)
WBC, UR MICRO: ABNORMAL (ref ?–2)

## 2021-06-11 PROCEDURE — 36415 COLL VENOUS BLD VENIPUNCTURE: CPT | Performed by: FAMILY MEDICINE

## 2021-06-11 PROCEDURE — 85610 PROTHROMBIN TIME: CPT | Performed by: FAMILY MEDICINE

## 2021-06-11 PROCEDURE — 87086 URINE CULTURE/COLONY COUNT: CPT | Mod: 90 | Performed by: FAMILY MEDICINE

## 2021-06-11 PROCEDURE — 83036 HEMOGLOBIN GLYCOSYLATED A1C: CPT | Performed by: FAMILY MEDICINE

## 2021-06-11 PROCEDURE — 87088 URINE BACTERIA CULTURE: CPT | Mod: 90 | Performed by: FAMILY MEDICINE

## 2021-06-11 PROCEDURE — 82043 UR ALBUMIN QUANTITATIVE: CPT | Performed by: FAMILY MEDICINE

## 2021-06-11 PROCEDURE — 81001 URINALYSIS AUTO W/SCOPE: CPT | Performed by: FAMILY MEDICINE

## 2021-06-11 PROCEDURE — 99214 OFFICE O/P EST MOD 30 MIN: CPT | Performed by: FAMILY MEDICINE

## 2021-06-11 RX ORDER — HYDROCODONE BITARTRATE AND ACETAMINOPHEN 7.5; 325 MG/1; MG/1
TABLET ORAL
COMMUNITY
End: 2023-06-08

## 2021-06-11 ASSESSMENT — ANXIETY QUESTIONNAIRES
1. FEELING NERVOUS, ANXIOUS, OR ON EDGE: NEARLY EVERY DAY
7. FEELING AFRAID AS IF SOMETHING AWFUL MIGHT HAPPEN: MORE THAN HALF THE DAYS
3. WORRYING TOO MUCH ABOUT DIFFERENT THINGS: SEVERAL DAYS
GAD7 TOTAL SCORE: 11
5. BEING SO RESTLESS THAT IT IS HARD TO SIT STILL: SEVERAL DAYS
2. NOT BEING ABLE TO STOP OR CONTROL WORRYING: MORE THAN HALF THE DAYS
6. BECOMING EASILY ANNOYED OR IRRITABLE: SEVERAL DAYS

## 2021-06-11 ASSESSMENT — PATIENT HEALTH QUESTIONNAIRE - PHQ9: 5. POOR APPETITE OR OVEREATING: SEVERAL DAYS

## 2021-06-11 NOTE — PATIENT INSTRUCTIONS
Assessment & Plan   Problem List Items Addressed This Visit        Nervous and Auditory    Other chronic pain       Endocrine    Controlled type 2 diabetes mellitus with circulatory disorder, without long-term current use of insulin (H) - Primary    Relevant Orders    Hemoglobin A1c (BFP) (Completed)    Albumin Random Urine Quantitative with Creat Ratio (Completed)    NH FOOT EXAM NO CHARGE (Completed)       Circulatory    Essential hypertension       Musculoskeletal and Integumentary    Lymphedema of arm       Hematologic    Factor 5 Leiden mutation, heterozygous/ INR 2-3    Relevant Orders    VENOUS COLLECTION (Completed)    PROTHROMBIN TIME INR (BFP) (Completed)       Other    Chronic anticoagulation    Relevant Orders    VENOUS COLLECTION (Completed)    PROTHROMBIN TIME INR (BFP) (Completed)      Other Visit Diagnoses     Sleep disturbance        Urinary urgency        Relevant Orders    HCL  Urinalysis, Routine (BFP) (Completed)    URINE CULTURE AEROBIC BACTERIAL ([facility])    Adult Urology Referral    Lymphedema of left arm        Relevant Orders    VASCULAR SURGERY REFERRAL    Visit for screening mammogram        Relevant Orders    RADIOLOGY REFERRAL    Mammo Screening digital (bilat)         Results for orders placed or performed in visit on 06/11/21   PROTHROMBIN TIME INR (BFP)     Status: Abnormal   Result Value Ref Range    INR Protime 1.6 (A) 2.0 - 3.0   Hemoglobin A1c (BFP)     Status: None   Result Value Ref Range    Hemoglobin A1C 5.5 4.0 - 7.0 %   Albumin Random Urine Quantitative with Creat Ratio     Status: Abnormal   Result Value Ref Range    Albumin Urine mg/spec 150 (A) 30    Albumin Urine mg/g Cr  (A) 30 MG/G Creatinine    Creatinine Urine 200 300   HCL  Urinalysis, Routine (BFP)     Status: Abnormal   Result Value Ref Range    Color Urine Yellow     Appearance Urine Slightly Cloudy (A)     Glucose Urine Neg neg mg/dL    Bilirubin Urine Neg neg    Ketones Urine Other (A) neg mg/dL     Specific Gravity >=1.030 (A)     Blood Urine Neg neg    pH Urine 5.5 5.0 - 7.0 pH    Albumin Urine 100 (A) neg - neg mg/dL    Urobilinogen Urine 0.2 0.2 - 1.0 EU/dL    Nitrite Urine Neg NEG    Leukocyte Esterase neg neg - neg    Wbc, Urine Micro 0-2 neg - 2    RBC Micro Urine 0-2 neg - 2    EP/HPF SMALL     Bacteria Urine SMALL (A) neg - neg    Casts/LPF neg     Miscellaneous neg      1. Controlled type 2 diabetes mellitus with other circulatory complication, without long-term current use of insulin (H)  Controlled with diet. Recheck in 6 months.  - Hemoglobin A1c (BFP)  - Albumin Random Urine Quantitative with Creat Ratio  - RI FOOT EXAM NO CHARGE    2. Essential hypertension  Controlled.    3. Factor 5 Leiden mutation, heterozygous/ INR 2-3    - VENOUS COLLECTION  - PROTHROMBIN TIME INR (BFP)    4. Chronic anticoagulation  Increase coumadin to 5 mg/day except Friday/tuesday 7.5 mg/day. See me back next week Friday.  - VENOUS COLLECTION  - PROTHROMBIN TIME INR (BFP)    5. Other chronic pain  Sees pain clinic.    6. Lymphedema of arm  Referral to vascular specialist.    7. Sleep disturbance  Recommend to followup with sleep clinic. She will consider this. Many things can cause these symptoms.    8. Urinary urgency  Slightly cloudy urine. I think we should do a culture and referral to urology.  - HCL  Urinalysis, Routine (BFP)  - URINE CULTURE AEROBIC BACTERIAL ([facility])    9. Lymphedema of left arm  Vascular referral.  - VASCULAR SURGERY REFERRAL; Future    10. Visit for screening mammogram    - RADIOLOGY REFERRAL  - Mammo Screening digital (bilat); Future             FUTURE APPOINTMENTS:       - Follow-up visit in 1 week    No follow-ups on file.    Elis Jack MD  [facility]

## 2021-06-12 ASSESSMENT — ANXIETY QUESTIONNAIRES: GAD7 TOTAL SCORE: 11

## 2021-06-13 LAB — URINE VOIDED CULTURE: NORMAL

## 2021-06-16 ENCOUNTER — TRANSFERRED RECORDS (OUTPATIENT)
Dept: FAMILY MEDICINE | Facility: CLINIC | Age: 66
End: 2021-06-16

## 2021-06-17 ENCOUNTER — TRANSFERRED RECORDS (OUTPATIENT)
Dept: FAMILY MEDICINE | Facility: CLINIC | Age: 66
End: 2021-06-17

## 2021-06-24 ENCOUNTER — OFFICE VISIT (OUTPATIENT)
Dept: FAMILY MEDICINE | Facility: CLINIC | Age: 66
End: 2021-06-24

## 2021-06-24 VITALS
TEMPERATURE: 97.8 F | WEIGHT: 293 LBS | DIASTOLIC BLOOD PRESSURE: 80 MMHG | HEIGHT: 66 IN | HEART RATE: 72 BPM | BODY MASS INDEX: 47.09 KG/M2 | OXYGEN SATURATION: 97 % | SYSTOLIC BLOOD PRESSURE: 116 MMHG

## 2021-06-24 DIAGNOSIS — Z79.01 LONG TERM CURRENT USE OF ANTICOAGULANT THERAPY: Primary | ICD-10-CM

## 2021-06-24 LAB
INR POINT OF CARE: 3.3 (ref 2–3)
MAMMOGRAM: NORMAL

## 2021-06-24 PROCEDURE — 36415 COLL VENOUS BLD VENIPUNCTURE: CPT | Performed by: PHYSICIAN ASSISTANT

## 2021-06-24 PROCEDURE — 99213 OFFICE O/P EST LOW 20 MIN: CPT | Performed by: PHYSICIAN ASSISTANT

## 2021-06-24 PROCEDURE — 85610 PROTHROMBIN TIME: CPT | Performed by: PHYSICIAN ASSISTANT

## 2021-06-24 ASSESSMENT — MIFFLIN-ST. JEOR: SCORE: 2413.35

## 2021-06-24 NOTE — NURSING NOTE
Darline is here for an INR.        Pre-visit Screening:  Immunizations:  up to date  Colonoscopy:  is up to date  Mammogram: is up to date  Asthma Action Test/Plan:  NA  PHQ9:  NA  GAD7:  NA  Questioned patient about current smoking habits Pt. Occasional vaping  Ok to leave detailed message on voice mail for today's visit only Yes, phone # [phone]

## 2021-06-24 NOTE — PROGRESS NOTES
"Indication: PE    INR Today:  3.3  Current Dose:  Coumadin 5 mg per day, and extra 2.5 mg 3 days after 6/11/2021 INR of 1.6, but then right back to 5 mg daily.     Bleeding Signs/Symptoms:  None  Including headache, stroke symptoms (confusion, weakness, slurred speech), vision changes, nosebleeds, red/black vomit, red/black stool, prolonged bleeded, dizziness, SOB, pica.  Thromboembolic Signs/Symptoms:  None  Including pain in one leg, swelling in extremity, chest pain, numbess, weakness, mental status change.      Medication Changes:  No  Dietary Changes:  No  Bacterial/Viral Infection:  No    Missed Coumadin Doses:  None    Other Concerns: Seeing various specialist to work through chronic conditions.     OBJECTIVE:  /80 (BP Location: Right arm, Patient Position: Sitting, Cuff Size: Adult Large)   Pulse 72   Temp 97.8  F (36.6  C) (Temporal)   Ht 1.676 m (5' 6\")   Wt (!) 184.2 kg (406 lb)   SpO2 97%   BMI 65.53 kg/m    Body mass index is 65.53 kg/m .    Patient is a 65 year old female, in no acute distress. Vitals noted.  Head: Normocephalic, atraumatic.  Eyes: Conjunctiva clear.  No discharge noted.  Ears: External ears, canals and TMs normal Bilaterally: gray and translucent.   Nose: Normal without discharge.  Mouth / Throat:   Pharynx non-erythematous, no exudates present, tonsils without hypertrophy. Mucous membranes moist.  Neck:  Neck supple, No lymphadenopathy, no thyromegaly.  Cardiac:  Normal rhythm and rate, no murmurs, rubs or gallops.  Lungs: clear to auscultation bilaterally; no wheezes, crackles or rhonchi      ASSESSMENT and PLAN:    Supratherapeutic INR for goal of 2-3    New Dose: Coumadin 5 mg per day, other than Thurs 2.5. mg.     Next INR: 2 weeks    Hyun Pinedo PA-C  6/24/2021      "

## 2021-07-05 NOTE — NURSING NOTE
Darline is here for nonfasting med check.    Pre-Visit Screening:  Immunizations:UTD  Colonoscopy:Needs-declined  Mammogram:Needs  Asthma Action Test/Plan:today  PHQ9:today  GAD7:today  Questioned patient about current smoking habits Pt.former  OK to leave a detailed message on voice mail for today's visit yes, phone # [phone]       Mother  Still living? Unknown  Family history of thalassemia, Age at diagnosis: Age Unknown     Father  Still living? Unknown  Family history of MI (myocardial infarction), Age at diagnosis: Age Unknown  Family history of CHF (congestive heart failure), Age at diagnosis: Age Unknown

## 2021-07-08 ENCOUNTER — TRANSFERRED RECORDS (OUTPATIENT)
Dept: FAMILY MEDICINE | Facility: CLINIC | Age: 66
End: 2021-07-08

## 2021-07-15 ENCOUNTER — TRANSFERRED RECORDS (OUTPATIENT)
Dept: FAMILY MEDICINE | Facility: CLINIC | Age: 66
End: 2021-07-15

## 2021-08-11 ENCOUNTER — TRANSFERRED RECORDS (OUTPATIENT)
Dept: FAMILY MEDICINE | Facility: CLINIC | Age: 66
End: 2021-08-11

## 2021-08-13 DIAGNOSIS — I89.0 LYMPHEDEMA OF LEFT ARM: ICD-10-CM

## 2021-08-14 DIAGNOSIS — J44.9 CHRONIC OBSTRUCTIVE PULMONARY DISEASE, UNSPECIFIED COPD TYPE (H): ICD-10-CM

## 2021-08-16 ENCOUNTER — TRANSFERRED RECORDS (OUTPATIENT)
Dept: FAMILY MEDICINE | Facility: CLINIC | Age: 66
End: 2021-08-16

## 2021-08-16 RX ORDER — ALBUTEROL SULFATE 90 UG/1
AEROSOL, METERED RESPIRATORY (INHALATION)
Qty: 36 G | Refills: 1 | Status: SHIPPED | OUTPATIENT
Start: 2021-08-16 | End: 2021-10-12

## 2021-08-16 NOTE — TELEPHONE ENCOUNTER
Last Ov with KEP 6/11/2021 had INR with [facility] on 6/24/2021. Pt has not been back since. Please advise thanks     Pending Prescriptions:                       Disp   Refills    VENTOLIN  (90 Base) MCG/ACT inhale*36 g   1            Sig: INHALE 1 TO 2 PUFFS IN TO THE LUNGS AS NEEDED

## 2021-08-24 ENCOUNTER — OFFICE VISIT (OUTPATIENT)
Dept: FAMILY MEDICINE | Facility: CLINIC | Age: 66
End: 2021-08-24

## 2021-08-24 VITALS
OXYGEN SATURATION: 98 % | DIASTOLIC BLOOD PRESSURE: 82 MMHG | HEART RATE: 89 BPM | TEMPERATURE: 97 F | SYSTOLIC BLOOD PRESSURE: 114 MMHG

## 2021-08-24 DIAGNOSIS — D68.51 FACTOR 5 LEIDEN MUTATION, HETEROZYGOUS (H): Primary | ICD-10-CM

## 2021-08-24 DIAGNOSIS — F33.0 MILD EPISODE OF RECURRENT MAJOR DEPRESSIVE DISORDER (H): ICD-10-CM

## 2021-08-24 DIAGNOSIS — E66.01 MORBID OBESITY (H): ICD-10-CM

## 2021-08-24 DIAGNOSIS — L98.9 SKIN LESION: ICD-10-CM

## 2021-08-24 DIAGNOSIS — Z79.01 CHRONIC ANTICOAGULATION: ICD-10-CM

## 2021-08-24 DIAGNOSIS — Z86.711 HISTORY OF PULMONARY EMBOLISM: ICD-10-CM

## 2021-08-24 DIAGNOSIS — G89.29 OTHER CHRONIC PAIN: ICD-10-CM

## 2021-08-24 LAB — INR POINT OF CARE: 2.2 (ref 2–3)

## 2021-08-24 PROCEDURE — 99214 OFFICE O/P EST MOD 30 MIN: CPT | Performed by: FAMILY MEDICINE

## 2021-08-24 PROCEDURE — 36415 COLL VENOUS BLD VENIPUNCTURE: CPT | Performed by: FAMILY MEDICINE

## 2021-08-24 PROCEDURE — 85610 PROTHROMBIN TIME: CPT | Performed by: FAMILY MEDICINE

## 2021-08-24 RX ORDER — ESCITALOPRAM OXALATE 20 MG/1
20 TABLET ORAL DAILY
Qty: 90 TABLET | Refills: 1 | Status: SHIPPED | OUTPATIENT
Start: 2021-08-24 | End: 2022-01-04

## 2021-08-24 RX ORDER — WARFARIN SODIUM 5 MG/1
5 TABLET ORAL DAILY
Qty: 90 TABLET | Refills: 0 | Status: SHIPPED | OUTPATIENT
Start: 2021-08-24 | End: 2021-10-05

## 2021-08-24 ASSESSMENT — ANXIETY QUESTIONNAIRES
3. WORRYING TOO MUCH ABOUT DIFFERENT THINGS: SEVERAL DAYS
6. BECOMING EASILY ANNOYED OR IRRITABLE: MORE THAN HALF THE DAYS
7. FEELING AFRAID AS IF SOMETHING AWFUL MIGHT HAPPEN: MORE THAN HALF THE DAYS
5. BEING SO RESTLESS THAT IT IS HARD TO SIT STILL: NOT AT ALL
1. FEELING NERVOUS, ANXIOUS, OR ON EDGE: NEARLY EVERY DAY
GAD7 TOTAL SCORE: 10
IF YOU CHECKED OFF ANY PROBLEMS ON THIS QUESTIONNAIRE, HOW DIFFICULT HAVE THESE PROBLEMS MADE IT FOR YOU TO DO YOUR WORK, TAKE CARE OF THINGS AT HOME, OR GET ALONG WITH OTHER PEOPLE: SOMEWHAT DIFFICULT
2. NOT BEING ABLE TO STOP OR CONTROL WORRYING: SEVERAL DAYS

## 2021-08-24 ASSESSMENT — PATIENT HEALTH QUESTIONNAIRE - PHQ9
SUM OF ALL RESPONSES TO PHQ QUESTIONS 1-9: 14
5. POOR APPETITE OR OVEREATING: SEVERAL DAYS

## 2021-08-24 NOTE — NURSING NOTE
Chief Complaint   Patient presents with     INR Followup     INR     Pain     ongoing hip pain, would like to discuss disability leave from work      Referral     needs a referral for derm, skin check     Pre-visit Screening:  Immunizations:  up to date  Colonoscopy:  is up to date  Mammogram: is up to date  Asthma Action Test/Plan:  NA  PHQ9:  Done today  GAD7:  Done today  Questioned patient about current smoking habits Pt. quit smoking some time ago.  Ok to leave detailed message on voice mail for today's visit only Yes, phone # [phone]

## 2021-08-24 NOTE — PROGRESS NOTES
"Assessment & Plan   Problem List Items Addressed This Visit        Nervous and Auditory    Other chronic pain       Hematologic    Factor 5 Leiden mutation, heterozygous/ INR 2-3 - Primary    Relevant Orders    VENOUS COLLECTION (Completed)    PROTHROMBIN TIME INR (BFP) (Completed)       Other    History of pulmonary embolism    Relevant Medications    warfarin ANTICOAGULANT (COUMADIN) 5 MG tablet    Chronic anticoagulation    Relevant Orders    VENOUS COLLECTION (Completed)    PROTHROMBIN TIME INR (BFP) (Completed)      Other Visit Diagnoses     Mild episode of recurrent major depressive disorder (H)        Relevant Medications    escitalopram (LEXAPRO) 20 MG tablet    Morbid obesity (H)        Relevant Orders    Comprehensive Weight Management    Skin lesion        Relevant Orders    Adult Dermatology Referral           1. Factor 5 Leiden mutation, heterozygous/ INR 2-3    - VENOUS COLLECTION  - PROTHROMBIN TIME INR (BFP)    2. Chronic anticoagulation  INr checked in range. Continue current dose and recheck in 1 month.  - VENOUS COLLECTION  - PROTHROMBIN TIME INR (BFP)    3. Mild episode of recurrent major depressive disorder (H)  Refilled lexapro. Recommend to see psychiatry/therapy, declined.  - escitalopram (LEXAPRO) 20 MG tablet; Take 1 tablet (20 mg) by mouth daily  Dispense: 90 tablet; Refill: 1    4. History of pulmonary embolism    - warfarin ANTICOAGULANT (COUMADIN) 5 MG tablet; Take 1 tablet (5 mg) by mouth daily  Dispense: 90 tablet; Refill: 0    5. Other chronic pain  followup with pain clinic. Will discuss posssible disability    6. Morbid obesity (H)  Referral to bariatric surgery, consult.  - Comprehensive Weight Management; Future        7. Skin lesion  Referral to dermatology  - Adult Dermatology Referral; Future     BMI:   Estimated body mass index is 65.53 kg/m  as calculated from the following:    Height as of 6/24/21: 1.676 m (5' 6\").    Weight as of 6/24/21: 184.2 kg (406 lb).         FUTURE " "APPOINTMENTS:       - Follow-up visit in 1 month    No follow-ups on file.    Elis Jack MD  [facility]    Subjective     Nursing Notes:   Michelle Anguiano CMA  8/24/2021  2:12 PM  Signed  Chief Complaint   Patient presents with     INR Followup     INR     Pain     ongoing hip pain, would like to discuss disability leave from work      Referral     needs a referral for derm, skin check     Pre-visit Screening:  Immunizations:  up to date  Colonoscopy:  is up to date  Mammogram: is up to date  Asthma Action Test/Plan:  NA  PHQ9:  Done today  GAD7:  Done today  Questioned patient about current smoking habits Pt. quit smoking some time ago.  Ok to leave detailed message on voice mail for today's visit only Yes, phone # [phone]           Bere Machuca is a 65 year old female who presents to clinic today for the following health issues   HPI     Here for INR check. Takes 1 tab daily.    Wants to talk about going out on disability. Works 6 hours at home. Is in tears to start and is in agony. Doesn't want to go any higher on pain pills. Works 6 hours per day at home. It would help her to not sit in the desk chair. Has 5 herniated discs, hip replacement, ruptured tendons in the right ankle. Needs to get the hip replaced. Needs surgery but they won't do it \"because I'm too fat\". Also has a pain specialist. Is more comfortable from the recliner but can't work from there because she has 2 monitors.   Also has depression-- this is all pain related. Doesn't want to see psychiatry.  Needs a refill on the lexapro. Doesn't want to see the therapist. There is nothing different that the pain clinic can do.   Also sees rheumatology. Wouldn't consider bariatric surgery. Because she has had multiple abdominal surgeries.     Review of Systems   Constitutional, HEENT, cardiovascular, pulmonary, gi and gu systems are negative, except as otherwise noted.      Objective    /82 (BP Location: Right " arm, Patient Position: Sitting, Cuff Size: Adult Large)   Pulse 89   Temp 97  F (36.1  C) (Temporal)   SpO2 98%   There is no height or weight on file to calculate BMI.  Physical Exam   GENERAL: healthy, alert and no distress  RESP: lungs clear to auscultation - no rales, rhonchi or wheezes  CV: regular rate and rhythm, normal S1 S2, no S3 or S4, no murmur, click or rub, no peripheral edema and peripheral pulses strong  MS: no gross musculoskeletal defects noted, no edema  NEURO: Normal strength and tone, mentation intact and speech normal  PSYCH: mentation appears normal, affect normal/bright  Tearful, leaning onto her walker, stable from previous exams

## 2021-08-24 NOTE — PATIENT INSTRUCTIONS
"Assessment & Plan   Problem List Items Addressed This Visit        Nervous and Auditory    Other chronic pain       Hematologic    Factor 5 Leiden mutation, heterozygous/ INR 2-3 - Primary    Relevant Orders    VENOUS COLLECTION (Completed)    PROTHROMBIN TIME INR (BFP) (Completed)       Other    History of pulmonary embolism    Relevant Medications    warfarin ANTICOAGULANT (COUMADIN) 5 MG tablet    Chronic anticoagulation    Relevant Orders    VENOUS COLLECTION (Completed)    PROTHROMBIN TIME INR (BFP) (Completed)      Other Visit Diagnoses     Mild episode of recurrent major depressive disorder (H)        Relevant Medications    escitalopram (LEXAPRO) 20 MG tablet    Morbid obesity (H)        Relevant Orders    Comprehensive Weight Management    Skin lesion        Relevant Orders    Adult Dermatology Referral           1. Factor 5 Leiden mutation, heterozygous/ INR 2-3    - VENOUS COLLECTION  - PROTHROMBIN TIME INR (BFP)    2. Chronic anticoagulation  INr checked in range. Continue current dose and recheck in 1 month.  - VENOUS COLLECTION  - PROTHROMBIN TIME INR (BFP)    3. Mild episode of recurrent major depressive disorder (H)  Refilled lexapro. Recommend to see psychiatry/therapy, declined.  - escitalopram (LEXAPRO) 20 MG tablet; Take 1 tablet (20 mg) by mouth daily  Dispense: 90 tablet; Refill: 1    4. History of pulmonary embolism    - warfarin ANTICOAGULANT (COUMADIN) 5 MG tablet; Take 1 tablet (5 mg) by mouth daily  Dispense: 90 tablet; Refill: 0    5. Other chronic pain  followup with pain clinic. Will discuss posssible disability    6. Morbid obesity (H)  Referral to bariatric surgery, consult.  - Comprehensive Weight Management; Future        7. Skin lesion  Referral to dermatology  - Adult Dermatology Referral; Future     BMI:   Estimated body mass index is 65.53 kg/m  as calculated from the following:    Height as of 6/24/21: 1.676 m (5' 6\").    Weight as of 6/24/21: 184.2 kg (406 lb).         FUTURE " APPOINTMENTS:       - Follow-up visit in 1 month    No follow-ups on file.    Elis Jack MD  [facility]

## 2021-08-25 ASSESSMENT — ANXIETY QUESTIONNAIRES: GAD7 TOTAL SCORE: 10

## 2021-09-09 ENCOUNTER — IMMUNIZATION (OUTPATIENT)
Dept: NURSING | Facility: CLINIC | Age: 66
End: 2021-09-09
Payer: COMMERCIAL

## 2021-09-09 PROCEDURE — 0013A PR ADMIN COVID VAC MODERNA, 3RD DOSE IMM COMP PT: CPT

## 2021-09-09 PROCEDURE — 90682 RIV4 VACC RECOMBINANT DNA IM: CPT

## 2021-09-09 PROCEDURE — 91301 PR COVID VAC MODERNA 100 MCG/0.5 ML IM: CPT

## 2021-09-09 PROCEDURE — 90471 IMMUNIZATION ADMIN: CPT

## 2021-09-12 DIAGNOSIS — F33.0 MILD EPISODE OF RECURRENT MAJOR DEPRESSIVE DISORDER (H): ICD-10-CM

## 2021-09-13 RX ORDER — ESCITALOPRAM OXALATE 20 MG/1
TABLET ORAL
Qty: 90 TABLET | Refills: 1 | COMMUNITY
Start: 2021-09-13

## 2021-09-13 NOTE — TELEPHONE ENCOUNTER
Bere Machuca is requesting a refill of:    Refused Prescriptions:                       Disp   Refills    escitalopram (LEXAPRO) 20 MG tablet [Pharm*90 tab*1        Sig: TAKE 1 TABLET(20 MG) BY MOUTH DAILY  Refused By: BOUCHRA HORTA  Reason for Refusal: Should already have refills on file  Reason for Refusal Comment: Refilled on 08/24/21 for 90 with 1 refill

## 2021-09-15 ENCOUNTER — TRANSFERRED RECORDS (OUTPATIENT)
Dept: FAMILY MEDICINE | Facility: CLINIC | Age: 66
End: 2021-09-15

## 2021-09-20 ENCOUNTER — MYC MEDICAL ADVICE (OUTPATIENT)
Dept: FAMILY MEDICINE | Facility: CLINIC | Age: 66
End: 2021-09-20

## 2021-09-20 DIAGNOSIS — J44.9 CHRONIC OBSTRUCTIVE PULMONARY DISEASE, UNSPECIFIED COPD TYPE (H): ICD-10-CM

## 2021-09-21 NOTE — TELEPHONE ENCOUNTER
Darline is requesting a refill for her Trelegy Ellipta Inhaler. Routing to Dr. Jack. Thanks.      Pending Prescriptions:                       Disp   Refills    Fluticasone-Umeclidin-Vilanterol (TRELEGY*3 each 1            Sig: INHALE 1 PUFF INTO THE LUNGS DAILY. RINSE MOUTH           AND SPIT AFTER USING

## 2021-10-05 ENCOUNTER — OFFICE VISIT (OUTPATIENT)
Dept: FAMILY MEDICINE | Facility: CLINIC | Age: 66
End: 2021-10-05

## 2021-10-05 VITALS
HEART RATE: 83 BPM | TEMPERATURE: 97.8 F | DIASTOLIC BLOOD PRESSURE: 82 MMHG | SYSTOLIC BLOOD PRESSURE: 118 MMHG | OXYGEN SATURATION: 95 %

## 2021-10-05 DIAGNOSIS — E66.9 OBESITY WITH SERIOUS COMORBIDITY, UNSPECIFIED CLASSIFICATION, UNSPECIFIED OBESITY TYPE: ICD-10-CM

## 2021-10-05 DIAGNOSIS — Z79.01 CHRONIC ANTICOAGULATION: ICD-10-CM

## 2021-10-05 DIAGNOSIS — I10 BENIGN ESSENTIAL HYPERTENSION: ICD-10-CM

## 2021-10-05 DIAGNOSIS — D68.51 FACTOR 5 LEIDEN MUTATION, HETEROZYGOUS (H): Primary | ICD-10-CM

## 2021-10-05 DIAGNOSIS — Z86.711 HISTORY OF PULMONARY EMBOLISM: ICD-10-CM

## 2021-10-05 DIAGNOSIS — R22.42 LOCALIZED SWELLING OF LEFT FOOT: ICD-10-CM

## 2021-10-05 LAB — INR POINT OF CARE: 2.5 (ref 2–3)

## 2021-10-05 PROCEDURE — 99214 OFFICE O/P EST MOD 30 MIN: CPT | Performed by: FAMILY MEDICINE

## 2021-10-05 PROCEDURE — 36415 COLL VENOUS BLD VENIPUNCTURE: CPT | Performed by: FAMILY MEDICINE

## 2021-10-05 PROCEDURE — 85610 PROTHROMBIN TIME: CPT | Performed by: FAMILY MEDICINE

## 2021-10-05 RX ORDER — WARFARIN SODIUM 5 MG/1
5 TABLET ORAL DAILY
Qty: 90 TABLET | Refills: 0 | Status: SHIPPED | OUTPATIENT
Start: 2021-10-05 | End: 2022-01-04

## 2021-10-05 RX ORDER — LOSARTAN POTASSIUM 50 MG/1
50 TABLET ORAL DAILY
Qty: 90 TABLET | Refills: 1 | Status: SHIPPED | OUTPATIENT
Start: 2021-10-05 | End: 2022-04-14

## 2021-10-05 NOTE — NURSING NOTE
Chief Complaint   Patient presents with     INR Followup     recheck INR     Foot Swelling     foot swelling, started on morphine from pain clinic last monday and swelling started immediately, swelling has improved slightly since stopping the morphine      Pre-visit Screening:  Immunizations:  up to date  Colonoscopy:  is up to date  Mammogram: is up to date  Asthma Action Test/Plan:  NA  PHQ9:  NA  GAD7:  NA  Questioned patient about current smoking habits Pt. quit smoking some time ago.  Ok to leave detailed message on voice mail for today's visit only Yes, phone # [phone]

## 2021-10-05 NOTE — PROGRESS NOTES
Assessment & Plan   Problem List Items Addressed This Visit        Hematologic    Factor 5 Leiden mutation, heterozygous/ INR 2-3 - Primary    Relevant Orders    VENOUS COLLECTION (Completed)    PROTHROMBIN TIME INR (BFP) (Completed)       Other    History of pulmonary embolism    Relevant Medications    warfarin ANTICOAGULANT (COUMADIN) 5 MG tablet    Chronic anticoagulation    Relevant Orders    VENOUS COLLECTION (Completed)    PROTHROMBIN TIME INR (BFP) (Completed)      Other Visit Diagnoses     Localized swelling of left foot        Benign essential hypertension        Relevant Medications    losartan (COZAAR) 50 MG tablet    Obesity with serious comorbidity, unspecified classification, unspecified obesity type        Relevant Orders    Comprehensive Weight Management         1. Factor 5 Leiden mutation, heterozygous/ INR 2-3  inr in range. Refilled medications, continue same schdule of dosing. See me back in 1 month.  - VENOUS COLLECTION  - PROTHROMBIN TIME INR (BFP)    2. Chronic anticoagulation    - VENOUS COLLECTION  - PROTHROMBIN TIME INR (BFP)    3. Localized swelling of left foot  Recommend xray foot. It doesn't look infected. followup as needed or if no resolution. Right foot has improved. She will also discuss with pain clinic at her apt Friday. She thinks it's due to her new pain medication which she has now stopped it.    4. Benign essential hypertension  Controlled, continued and refilled medications.  - losartan (COZAAR) 50 MG tablet; Take 1 tablet (50 mg) by mouth daily  Dispense: 90 tablet; Refill: 1    5. History of pulmonary embolism    - warfarin ANTICOAGULANT (COUMADIN) 5 MG tablet; Take 1 tablet (5 mg) by mouth daily  Dispense: 90 tablet; Refill: 0    6. Obesity with serious comorbidity, unspecified classification, unspecified obesity type  Referral done for bariatric care.  - Comprehensive Weight Management; Future           BMI:   Estimated body mass index is 65.53 kg/m  as calculated from  "the following:    Height as of 6/24/21: 1.676 m (5' 6\").    Weight as of 6/24/21: 184.2 kg (406 lb).         FUTURE APPOINTMENTS:       - Follow-up visit in 1 month    No follow-ups on file.    Elis Jack MD  [facility]    Subjective     Nursing Notes:   Michelle Anguiano, NARESH  10/5/2021  2:08 PM  Signed  Chief Complaint   Patient presents with     INR Followup     recheck INR     Foot Swelling     foot swelling, started on morphine from pain clinic last monday and swelling started immediately, swelling has improved slightly since stopping the morphine      Pre-visit Screening:  Immunizations:  up to date  Colonoscopy:  is up to date  Mammogram: is up to date  Asthma Action Test/Plan:  NA  PHQ9:  NA  GAD7:  NA  Questioned patient about current smoking habits Pt. quit smoking some time ago.  Ok to leave detailed message on voice mail for today's visit only Yes, phone # [phone]           Bere Machuca is a 65 year old female who presents to clinic today for the following health issues   HPI     Here to check her INR> INR 2.5  Coumadin dose: 1 pill per day. 5 mg.    Also has some issues. Is seeing the pain clinic, was started on morphine extended release. One side effect is edema in the lower legs. More in left than right. Right is better. Almost back to normal. Not sure if its from the medication. Will have apt with them Thursday. Stopped the med and called them.    Needs refills on coumadin, losartan. Is doing well on medications.    Also wants to lose weight. Has been to bariatric surgery in the past but this reportedly isn't covered by her ins. Needs to get her hip fixed. But her weight is preventing this. Wants to see someone else. Wants to try a medication.    Review of Systems   Constitutional, HEENT, cardiovascular, pulmonary, gi and gu systems are negative, except as otherwise noted.      Objective    /82 (BP Location: Left arm, Patient Position: Sitting, Cuff Size: Adult " Large)   Pulse 83   Temp 97.8  F (36.6  C) (Temporal)   SpO2 95%   There is no height or weight on file to calculate BMI.  Physical Exam   GENERAL: healthy, alert and no distress  RESP: lungs clear to auscultation - no rales, rhonchi or wheezes  CV: regular rate and rhythm, normal S1 S2, no S3 or S4, no murmur, click or rub, no peripheral edema and peripheral pulses strong  MS: no gross musculoskeletal defects noted, no edema  NEURO: Normal strength and tone, mentation intact and speech normal  PSYCH: mentation appears normal, affect normal/bright  tearful    Results for orders placed or performed in visit on 10/05/21   PROTHROMBIN TIME INR (BFP)     Status: None   Result Value Ref Range    INR Protime 2.5 2.0 - 3.0

## 2021-10-14 ENCOUNTER — TRANSFERRED RECORDS (OUTPATIENT)
Dept: FAMILY MEDICINE | Facility: CLINIC | Age: 66
End: 2021-10-14

## 2021-11-10 DIAGNOSIS — J44.9 CHRONIC OBSTRUCTIVE PULMONARY DISEASE, UNSPECIFIED COPD TYPE (H): ICD-10-CM

## 2021-11-10 RX ORDER — ALBUTEROL SULFATE 90 UG/1
AEROSOL, METERED RESPIRATORY (INHALATION)
Qty: 18 G | Refills: 1 | Status: CANCELLED | OUTPATIENT
Start: 2021-11-10

## 2021-11-11 RX ORDER — ALBUTEROL SULFATE 90 UG/1
AEROSOL, METERED RESPIRATORY (INHALATION)
Qty: 18 G | Refills: 1 | COMMUNITY
Start: 2021-11-11

## 2021-11-11 NOTE — TELEPHONE ENCOUNTER
Bere Machuca is requesting a refill of:    Pending Prescriptions:                       Disp   Refills    VENTOLIN  (90 Base) MCG/ACT inhale*18 g   1            Sig: INHALE 1 TO 2 PUFFS INTO THE LUNGS AS NEEDED AS           DIRECTED

## 2021-11-27 DIAGNOSIS — Z86.711 HISTORY OF PULMONARY EMBOLISM: ICD-10-CM

## 2021-11-29 ENCOUNTER — TRANSFERRED RECORDS (OUTPATIENT)
Dept: FAMILY MEDICINE | Facility: CLINIC | Age: 66
End: 2021-11-29

## 2021-11-29 RX ORDER — WARFARIN SODIUM 5 MG/1
TABLET ORAL
Qty: 90 TABLET | Refills: 0 | COMMUNITY
Start: 2021-11-29

## 2021-11-29 NOTE — TELEPHONE ENCOUNTER
Bere Machuca is requesting a refill of:    Refused Prescriptions:                       Disp   Refills    warfarin ANTICOAGULANT (COUMADIN) 5 MG tab*90 tab*0        Sig: TAKE 1 TABLET(5 MG) BY MOUTH DAILY  Refused By: BOUCHRA HORTA  Reason for Refusal: Patient has requested refill too soon  Reason for Refusal Comment: Refilled 10/05/21 for 90 tablets

## 2021-11-30 DIAGNOSIS — F33.0 MILD EPISODE OF RECURRENT MAJOR DEPRESSIVE DISORDER (H): ICD-10-CM

## 2021-11-30 RX ORDER — ESCITALOPRAM OXALATE 20 MG/1
TABLET ORAL
Qty: 90 TABLET | Refills: 1 | COMMUNITY
Start: 2021-11-30

## 2021-11-30 NOTE — TELEPHONE ENCOUNTER
Bere Machuca is requesting a refill of:    Refused Prescriptions:                       Disp   Refills    escitalopram (LEXAPRO) 20 MG tablet [Pharm*90 tab*1        Sig: TAKE 1 TABLET(20 MG) BY MOUTH DAILY  Refused By: BOUCHRA HORTA  Reason for Refusal: Should already have refills on file  Reason for Refusal Comment: Refilled on 08/24/21 for 90 with 1 refill    Pt has appt tomorrow with internal medicine

## 2021-12-22 ENCOUNTER — TRANSFERRED RECORDS (OUTPATIENT)
Dept: FAMILY MEDICINE | Facility: CLINIC | Age: 66
End: 2021-12-22

## 2022-01-03 ASSESSMENT — ENCOUNTER SYMPTOMS
COUGH: 0
SHORTNESS OF BREATH: 0
NERVOUS/ANXIOUS: 1
WEAKNESS: 1
MYALGIAS: 1
EYE PAIN: 0
HEMATURIA: 0
HEARTBURN: 0
SORE THROAT: 0
DIARRHEA: 1
BREAST MASS: 0
NAUSEA: 0
PALPITATIONS: 0
CONSTIPATION: 1
DYSURIA: 0
ABDOMINAL PAIN: 0
ARTHRALGIAS: 1
HEMATOCHEZIA: 0
DIZZINESS: 0
CHILLS: 0
JOINT SWELLING: 1
HEADACHES: 0
PARESTHESIAS: 0
FREQUENCY: 1

## 2022-01-03 ASSESSMENT — PATIENT HEALTH QUESTIONNAIRE - PHQ9
SUM OF ALL RESPONSES TO PHQ QUESTIONS 1-9: 10
SUM OF ALL RESPONSES TO PHQ QUESTIONS 1-9: 10
10. IF YOU CHECKED OFF ANY PROBLEMS, HOW DIFFICULT HAVE THESE PROBLEMS MADE IT FOR YOU TO DO YOUR WORK, TAKE CARE OF THINGS AT HOME, OR GET ALONG WITH OTHER PEOPLE: VERY DIFFICULT

## 2022-01-04 ENCOUNTER — OFFICE VISIT (OUTPATIENT)
Dept: INTERNAL MEDICINE | Facility: CLINIC | Age: 67
End: 2022-01-04
Payer: COMMERCIAL

## 2022-01-04 ENCOUNTER — ANTICOAGULATION THERAPY VISIT (OUTPATIENT)
Dept: ANTICOAGULATION | Facility: CLINIC | Age: 67
End: 2022-01-04

## 2022-01-04 VITALS
WEIGHT: 293 LBS | HEIGHT: 66 IN | DIASTOLIC BLOOD PRESSURE: 72 MMHG | HEART RATE: 82 BPM | RESPIRATION RATE: 26 BRPM | TEMPERATURE: 98 F | SYSTOLIC BLOOD PRESSURE: 110 MMHG | BODY MASS INDEX: 47.09 KG/M2 | OXYGEN SATURATION: 96 %

## 2022-01-04 DIAGNOSIS — Z11.4 ENCOUNTER FOR SCREENING FOR HIV: ICD-10-CM

## 2022-01-04 DIAGNOSIS — I10 ESSENTIAL HYPERTENSION: ICD-10-CM

## 2022-01-04 DIAGNOSIS — E66.01 MORBID OBESITY WITH BMI OF 60.0-69.9, ADULT (H): ICD-10-CM

## 2022-01-04 DIAGNOSIS — Z79.01 CHRONIC ANTICOAGULATION: ICD-10-CM

## 2022-01-04 DIAGNOSIS — Z86.711 HISTORY OF PULMONARY EMBOLISM: ICD-10-CM

## 2022-01-04 DIAGNOSIS — J44.9 CHRONIC OBSTRUCTIVE PULMONARY DISEASE, UNSPECIFIED COPD TYPE (H): ICD-10-CM

## 2022-01-04 DIAGNOSIS — Z79.899 ENCOUNTER FOR LONG-TERM (CURRENT) USE OF HIGH-RISK MEDICATION: ICD-10-CM

## 2022-01-04 DIAGNOSIS — Z79.01 CHRONIC ANTICOAGULATION: Primary | ICD-10-CM

## 2022-01-04 DIAGNOSIS — F33.0 MILD EPISODE OF RECURRENT MAJOR DEPRESSIVE DISORDER (H): ICD-10-CM

## 2022-01-04 DIAGNOSIS — D68.51 FACTOR 5 LEIDEN MUTATION, HETEROZYGOUS (H): ICD-10-CM

## 2022-01-04 DIAGNOSIS — Z11.59 NEED FOR HEPATITIS C SCREENING TEST: ICD-10-CM

## 2022-01-04 DIAGNOSIS — F33.1 MAJOR DEPRESSIVE DISORDER, RECURRENT EPISODE, MODERATE (H): ICD-10-CM

## 2022-01-04 DIAGNOSIS — M05.79 SEROPOSITIVE RHEUMATOID ARTHRITIS OF MULTIPLE SITES (H): ICD-10-CM

## 2022-01-04 DIAGNOSIS — M05.79 RHEUMATOID ARTHRITIS INVOLVING MULTIPLE SITES WITH POSITIVE RHEUMATOID FACTOR (H): ICD-10-CM

## 2022-01-04 DIAGNOSIS — E11.59 CONTROLLED TYPE 2 DIABETES MELLITUS WITH OTHER CIRCULATORY COMPLICATION, WITHOUT LONG-TERM CURRENT USE OF INSULIN (H): Primary | ICD-10-CM

## 2022-01-04 LAB
ALBUMIN SERPL-MCNC: 3.5 G/DL (ref 3.4–5)
ALP SERPL-CCNC: 50 U/L (ref 40–150)
ALT SERPL W P-5'-P-CCNC: 33 U/L (ref 0–50)
ANION GAP SERPL CALCULATED.3IONS-SCNC: 7 MMOL/L (ref 3–14)
AST SERPL W P-5'-P-CCNC: 22 U/L (ref 0–45)
BASOPHILS # BLD AUTO: 0 10E3/UL (ref 0–0.2)
BASOPHILS NFR BLD AUTO: 0 %
BILIRUB SERPL-MCNC: 0.4 MG/DL (ref 0.2–1.3)
BUN SERPL-MCNC: 13 MG/DL (ref 7–30)
CALCIUM SERPL-MCNC: 9.2 MG/DL (ref 8.5–10.1)
CHLORIDE BLD-SCNC: 109 MMOL/L (ref 94–109)
CHOLEST SERPL-MCNC: 197 MG/DL
CO2 SERPL-SCNC: 26 MMOL/L (ref 20–32)
CREAT SERPL-MCNC: 0.59 MG/DL (ref 0.52–1.04)
CRP SERPL-MCNC: 12 MG/L (ref 0–8)
EOSINOPHIL # BLD AUTO: 0.1 10E3/UL (ref 0–0.7)
EOSINOPHIL NFR BLD AUTO: 1 %
ERYTHROCYTE [DISTWIDTH] IN BLOOD BY AUTOMATED COUNT: 17 % (ref 10–15)
FASTING STATUS PATIENT QL REPORTED: YES
GFR SERPL CREATININE-BSD FRML MDRD: >90 ML/MIN/1.73M2
GLUCOSE BLD-MCNC: 105 MG/DL (ref 70–99)
HBA1C MFR BLD: 5.9 % (ref 0–5.6)
HCT VFR BLD AUTO: 43.7 % (ref 35–47)
HDLC SERPL-MCNC: 76 MG/DL
HGB BLD-MCNC: 14.2 G/DL (ref 11.7–15.7)
INR BLD: 2.9 (ref 0.9–1.1)
LDLC SERPL CALC-MCNC: 101 MG/DL
LYMPHOCYTES # BLD AUTO: 1.7 10E3/UL (ref 0.8–5.3)
LYMPHOCYTES NFR BLD AUTO: 20 %
MCH RBC QN AUTO: 31.8 PG (ref 26.5–33)
MCHC RBC AUTO-ENTMCNC: 32.5 G/DL (ref 31.5–36.5)
MCV RBC AUTO: 98 FL (ref 78–100)
MONOCYTES # BLD AUTO: 0.6 10E3/UL (ref 0–1.3)
MONOCYTES NFR BLD AUTO: 7 %
NEUTROPHILS # BLD AUTO: 6.1 10E3/UL (ref 1.6–8.3)
NEUTROPHILS NFR BLD AUTO: 72 %
NONHDLC SERPL-MCNC: 121 MG/DL
PLATELET # BLD AUTO: 264 10E3/UL (ref 150–450)
POTASSIUM BLD-SCNC: 4 MMOL/L (ref 3.4–5.3)
PROT SERPL-MCNC: 7 G/DL (ref 6.8–8.8)
RBC # BLD AUTO: 4.46 10E6/UL (ref 3.8–5.2)
SODIUM SERPL-SCNC: 142 MMOL/L (ref 133–144)
TRIGL SERPL-MCNC: 99 MG/DL
TSH SERPL DL<=0.005 MIU/L-ACNC: 1.69 MU/L (ref 0.4–4)
WBC # BLD AUTO: 8.5 10E3/UL (ref 4–11)

## 2022-01-04 PROCEDURE — 83036 HEMOGLOBIN GLYCOSYLATED A1C: CPT | Performed by: NURSE PRACTITIONER

## 2022-01-04 PROCEDURE — 96127 BRIEF EMOTIONAL/BEHAV ASSMT: CPT | Performed by: NURSE PRACTITIONER

## 2022-01-04 PROCEDURE — 86140 C-REACTIVE PROTEIN: CPT | Performed by: NURSE PRACTITIONER

## 2022-01-04 PROCEDURE — 80050 GENERAL HEALTH PANEL: CPT | Performed by: NURSE PRACTITIONER

## 2022-01-04 PROCEDURE — 99215 OFFICE O/P EST HI 40 MIN: CPT | Performed by: NURSE PRACTITIONER

## 2022-01-04 PROCEDURE — 36415 COLL VENOUS BLD VENIPUNCTURE: CPT | Performed by: NURSE PRACTITIONER

## 2022-01-04 PROCEDURE — 86803 HEPATITIS C AB TEST: CPT | Performed by: NURSE PRACTITIONER

## 2022-01-04 PROCEDURE — 85610 PROTHROMBIN TIME: CPT | Performed by: NURSE PRACTITIONER

## 2022-01-04 PROCEDURE — 87389 HIV-1 AG W/HIV-1&-2 AB AG IA: CPT | Performed by: NURSE PRACTITIONER

## 2022-01-04 PROCEDURE — 80061 LIPID PANEL: CPT | Performed by: NURSE PRACTITIONER

## 2022-01-04 RX ORDER — ESCITALOPRAM OXALATE 20 MG/1
20 TABLET ORAL DAILY
Qty: 90 TABLET | Refills: 1 | Status: SHIPPED | OUTPATIENT
Start: 2022-01-04 | End: 2022-11-09

## 2022-01-04 RX ORDER — SULFASALAZINE 500 MG/1
500 TABLET ORAL 2 TIMES DAILY
COMMUNITY
Start: 2022-01-04

## 2022-01-04 RX ORDER — WARFARIN SODIUM 5 MG/1
5 TABLET ORAL DAILY
Qty: 90 TABLET | Refills: 0 | Status: SHIPPED | OUTPATIENT
Start: 2022-01-04 | End: 2022-04-05

## 2022-01-04 ASSESSMENT — ENCOUNTER SYMPTOMS
DYSURIA: 0
WEAKNESS: 1
PARESTHESIAS: 0
CONSTIPATION: 1
CHILLS: 0
NAUSEA: 0
HEMATURIA: 0
MYALGIAS: 1
EYE PAIN: 0
NERVOUS/ANXIOUS: 1
ABDOMINAL PAIN: 0
BREAST MASS: 0
COUGH: 0
ARTHRALGIAS: 1
FREQUENCY: 1
DIARRHEA: 1
DIZZINESS: 0
SORE THROAT: 0
SHORTNESS OF BREATH: 0
JOINT SWELLING: 1
HEARTBURN: 0
HEADACHES: 0
HEMATOCHEZIA: 0
PALPITATIONS: 0

## 2022-01-04 ASSESSMENT — MIFFLIN-ST. JEOR: SCORE: 2385.67

## 2022-01-04 ASSESSMENT — PATIENT HEALTH QUESTIONNAIRE - PHQ9: SUM OF ALL RESPONSES TO PHQ QUESTIONS 1-9: 10

## 2022-01-04 NOTE — NURSING NOTE
"Chief Complaint   Patient presents with     Physical     initial /72   Pulse 82   Temp 98  F (36.7  C) (Oral)   Resp 26   Ht 1.676 m (5' 6\")   Wt (!) 181.9 kg (401 lb)   SpO2 96%   BMI 64.72 kg/m   Estimated body mass index is 64.72 kg/m  as calculated from the following:    Height as of this encounter: 1.676 m (5' 6\").    Weight as of this encounter: 181.9 kg (401 lb)..  bp completed using cuff size large forearm  LEOBARDO ALVARADO LPN  "
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
## 2022-01-04 NOTE — PROGRESS NOTES
ANTICOAGULATION MANAGEMENT     Patient reports she has been taking warfarin since about 2012.    Bere Machuca 66 year old female is on warfarin with therapeutic INR result. (Goal INR 2.0-3.0)    Recent labs: (last 7 days)     01/04/22  1524   INR 2.9*       ASSESSMENT     Source(s): Chart Review and Patient/Caregiver Call       Warfarin doses taken: Warfarin taken as instructed    Diet: Decreased greens/vitamin K in diet; plans to resume previous intake, Vit K food list mailed to patient.     New illness, injury, or hospitalization: No    Medication/supplement changes: New patient to [place]    Signs or symptoms of bleeding or clotting: No    Previous INR: Transfer patient, per labs, last INR on 10/5/21 = 2.5    Additional findings: Upcoming surgery/procedure In about a month, will have steroid injection in back.     PLAN     Recommended plan for no diet, medication or health factor changes affecting INR     Dosing Instructions: Continue your current warfarin dose with next INR in 2 weeks       Summary  As of 1/4/2022    Full warfarin instructions:  35 mg every day   Next INR check:  1/18/2022             Telephone call with Bere who verbalizes understanding and agrees to plan    Lab visit scheduled    Education provided: Please call back if any changes to your diet, medications or how you've been taking warfarin and Contact [phone]  with any changes, questions or concerns.     Plan made per [facility] anticoagulation protocol    Ashley Melendez RN  Anticoagulation Clinic  1/4/2022    _______________________________________________________________________     Anticoagulation Episode Summary     Current INR goal:  2.0-3.0   TTR:  --   Target end date:  Indefinite   Send INR reminders to:  On license of [facility]    Indications    Chronic anticoagulation [Z79.01]  History of pulmonary embolism [Z86.711]  Factor 5 Leiden mutation  heterozygous/ INR 2-3 [D68.51]           Comments:           Anticoagulation  Care Providers     Provider Role Specialty Phone number    Uyen Mcintyre APRN CNP Referring Internal Medicine [phone]

## 2022-01-05 LAB
HCV AB SERPL QL IA: NONREACTIVE
HIV 1+2 AB+HIV1 P24 AG SERPL QL IA: NONREACTIVE

## 2022-01-26 ENCOUNTER — ANTICOAGULATION THERAPY VISIT (OUTPATIENT)
Dept: ANTICOAGULATION | Facility: CLINIC | Age: 67
End: 2022-01-26

## 2022-01-26 ENCOUNTER — TRANSFERRED RECORDS (OUTPATIENT)
Dept: FAMILY MEDICINE | Facility: CLINIC | Age: 67
End: 2022-01-26

## 2022-01-26 ENCOUNTER — LAB (OUTPATIENT)
Dept: LAB | Facility: CLINIC | Age: 67
End: 2022-01-26
Payer: COMMERCIAL

## 2022-01-26 DIAGNOSIS — D68.51 FACTOR 5 LEIDEN MUTATION, HETEROZYGOUS (H): ICD-10-CM

## 2022-01-26 DIAGNOSIS — Z79.01 CHRONIC ANTICOAGULATION: ICD-10-CM

## 2022-01-26 DIAGNOSIS — Z86.711 HISTORY OF PULMONARY EMBOLISM: ICD-10-CM

## 2022-01-26 DIAGNOSIS — Z79.01 CHRONIC ANTICOAGULATION: Primary | ICD-10-CM

## 2022-01-26 LAB — INR BLD: 2.3 (ref 0.9–1.1)

## 2022-01-26 PROCEDURE — 85610 PROTHROMBIN TIME: CPT

## 2022-01-26 PROCEDURE — 36416 COLLJ CAPILLARY BLOOD SPEC: CPT

## 2022-01-26 NOTE — PROGRESS NOTES
Anticoagulation Management    Unable to reach Darline today.    Today's INR result of 2.3 is therapeutic (goal INR of 2.0-3.0).  Result received from: Clinic Lab    Follow up required to confirm warfarin dose taken and assess for changes     Per review of chart, patient mentioned that she may be having a steroid injection in her spine within the month.  Will need to check with her to see if that was scheduled.    Left message to continue current dose of warfarin 35 mg tonight. Request call back for assessment.      Anticoagulation clinic to follow up    Jadyn Castro RN

## 2022-01-26 NOTE — PROGRESS NOTES
ANTICOAGULATION MANAGEMENT     Bere Machuca 66 year old female is on warfarin with therapeutic INR result. (Goal INR 2.0-3.0)    Recent labs: (last 7 days)     01/26/22  1206   INR 2.3*       ASSESSMENT     Source(s): Chart Review and Patient/Caregiver Call       Warfarin doses taken: Warfarin taken as instructed.  Patient reports that she takes 1 tablet (5 mg) daily.  Previously documented that she takes 35 mg daily.  Adjusted maintenance dose to reflect what patient takes.    Diet: No new diet changes identified    New illness, injury, or hospitalization: No    Medication/supplement changes: Patient will be having an infusion tomorrow for her rheumatoid arthritis (has this every 8 week).  Medication will be changing due to insurance coverage.    Signs or symptoms of bleeding or clotting: No    Previous INR: Therapeutic last 2(+) visits    Additional findings: None     PLAN     Recommended plan for no diet, medication or health factor changes affecting INR     Dosing Instructions: Continue your current warfarin dose with next INR in 4 weeks       Summary  As of 1/26/2022    Full warfarin instructions:  5 mg every day   Next INR check:  2/22/2022             Telephone call with Bere who agrees to plan and repeated back plan correctly    Lab visit scheduled    Education provided: Please call back if any changes to your diet, medications or how you've been taking warfarin    Plan made per [facility] anticoagulation protocol    Jadyn Csatro RN  Anticoagulation Clinic  1/26/2022    _______________________________________________________________________     Anticoagulation Episode Summary     Current INR goal:  2.0-3.0   TTR:  100.0 % (1.7 wk)   Target end date:  Indefinite   [facility]    Indications    Chronic anticoagulation [Z79.01]  History of pulmonary embolism [Z86.711]  Factor 5 Leiden mutation  heterozygous/ INR 2-3 [D68.51]           Comments:            Anticoagulation Care Providers     Provider Role Specialty Phone number    Uyen Mcintyre APRN CNP Referring Internal Medicine [phone]

## 2022-03-08 ENCOUNTER — TRANSFERRED RECORDS (OUTPATIENT)
Dept: FAMILY MEDICINE | Facility: CLINIC | Age: 67
End: 2022-03-08

## 2022-03-10 ENCOUNTER — LAB (OUTPATIENT)
Dept: LAB | Facility: CLINIC | Age: 67
End: 2022-03-10
Payer: COMMERCIAL

## 2022-03-10 ENCOUNTER — ANTICOAGULATION THERAPY VISIT (OUTPATIENT)
Dept: ANTICOAGULATION | Facility: CLINIC | Age: 67
End: 2022-03-10

## 2022-03-10 DIAGNOSIS — Z79.01 CHRONIC ANTICOAGULATION: ICD-10-CM

## 2022-03-10 DIAGNOSIS — D68.51 FACTOR 5 LEIDEN MUTATION, HETEROZYGOUS (H): ICD-10-CM

## 2022-03-10 DIAGNOSIS — Z86.711 HISTORY OF PULMONARY EMBOLISM: ICD-10-CM

## 2022-03-10 DIAGNOSIS — Z79.01 CHRONIC ANTICOAGULATION: Primary | ICD-10-CM

## 2022-03-10 LAB — INR BLD: 2.9 (ref 0.9–1.1)

## 2022-03-10 PROCEDURE — 36416 COLLJ CAPILLARY BLOOD SPEC: CPT

## 2022-03-10 PROCEDURE — 85610 PROTHROMBIN TIME: CPT

## 2022-03-10 NOTE — PROGRESS NOTES
ANTICOAGULATION MANAGEMENT     Bere Machuca 66 year old female is on warfarin with therapeutic INR result. (Goal INR 2.0-3.0)    Recent labs: (last 7 days)     03/10/22  1556   INR 2.9*       ASSESSMENT       Source(s): Chart Review and Patient/Caregiver Call       Warfarin doses taken: Warfarin taken as instructed    Diet: No new diet changes identified    New illness, injury, or hospitalization: No    Medication/supplement changes: None noted    Signs or symptoms of bleeding or clotting: No    Previous INR: Therapeutic last 2(+) visits    Additional findings: None       PLAN     Recommended plan for no diet, medication or health factor changes affecting INR     Dosing Instructions: Continue your current warfarin dose with next INR in 5 weeks       Summary  As of 3/10/2022    Full warfarin instructions:  5 mg every day   Next INR check:  4/13/2022             Telephone call with Bere who verbalizes understanding and agrees to plan    Lab visit scheduled    Education provided: Contact [phone]  with any changes, questions or concerns.     Plan made per [facility] anticoagulation protocol    Rosi Almeida RN  Anticoagulation Clinic  3/10/2022    _______________________________________________________________________     Anticoagulation Episode Summary     Current INR goal:  2.0-3.0   TTR:  100.0 % (1.8 mo)   Target end date:  Indefinite   Send INR reminders to:  [facility]    Indications    Chronic anticoagulation [Z79.01]  History of pulmonary embolism [Z86.711]  Factor 5 Leiden mutation  heterozygous/ INR 2-3 [D68.51]           Comments:           Anticoagulation Care Providers     Provider Role Specialty Phone number    Uyen Mcintyre APRN CNP Referring Internal Medicine [phone]

## 2022-03-20 DIAGNOSIS — J44.9 CHRONIC OBSTRUCTIVE PULMONARY DISEASE, UNSPECIFIED COPD TYPE (H): ICD-10-CM

## 2022-03-21 NOTE — TELEPHONE ENCOUNTER
Bere Machuca is requesting a refill of:    Refused Prescriptions:                       Disp   Refills    Fluticasone-Umeclidin-Vilanterol (TRELEGY *                Sig: INHALE 1 PUFF INTO THE LUNGS DAILY. RINSE MOUTH AND           SPIT AFTER USING  Refused By: BOUCHRA HORTA  Reason for Refusal: Should already have refills on file  Reason for Refusal Comment: Refilled by another provider    Refilled by another provider on 01/04/22

## 2022-03-29 ENCOUNTER — TRANSFERRED RECORDS (OUTPATIENT)
Dept: HEALTH INFORMATION MANAGEMENT | Facility: CLINIC | Age: 67
End: 2022-03-29
Payer: COMMERCIAL

## 2022-03-29 LAB
ALT SERPL-CCNC: 34 IU/L (ref 5–35)
AST SERPL-CCNC: 34 U/L (ref 5–34)
CREATININE (EXTERNAL): 0.69 MG/DL (ref 0.5–1.3)

## 2022-04-02 DIAGNOSIS — Z86.711 HISTORY OF PULMONARY EMBOLISM: ICD-10-CM

## 2022-04-05 RX ORDER — WARFARIN SODIUM 5 MG/1
TABLET ORAL
Qty: 90 TABLET | Refills: 0 | Status: SHIPPED | OUTPATIENT
Start: 2022-04-05 | End: 2022-06-29

## 2022-04-05 NOTE — TELEPHONE ENCOUNTER
"Requested Prescriptions   Pending Prescriptions Disp Refills     warfarin ANTICOAGULANT (COUMADIN) 5 MG tablet [Pharmacy Med Name: WARFARIN SOD 5MG TABLETS (PEACH)] 90 tablet 0     Sig: TAKE 1 TABLET(5 MG) BY MOUTH DAILY       Vitamin K Antagonists Failed - 4/2/2022  3:22 AM        Failed - INR is within goal in the past 6 weeks     Confirm INR is within goal in the past 6 weeks.     Recent Labs   Lab Test 03/10/22  1556   INR 2.9*                       Passed - Recent (12 mo) or future (30 days) visit within the authorizing provider's specialty     Patient has had an office visit with the authorizing provider or a provider within the authorizing providers department within the previous 12 mos or has a future within next 30 days. See \"Patient Info\" tab in inbasket, or \"Choose Columns\" in Meds & Orders section of the refill encounter.              Passed - Medication is active on med list        Passed - Patient is 18 years of age or older        Passed - Patient is not pregnant        Passed - No positive pregnancy on file in past 12 months           Last office visit 1/4/22. Warfarin dosing is managed by INR Clinic.  Prescription approved per [facility] Refill Protocol.    "

## 2022-04-06 ENCOUNTER — TRANSFERRED RECORDS (OUTPATIENT)
Dept: FAMILY MEDICINE | Facility: CLINIC | Age: 67
End: 2022-04-06

## 2022-04-06 DIAGNOSIS — F33.0 MILD EPISODE OF RECURRENT MAJOR DEPRESSIVE DISORDER (H): ICD-10-CM

## 2022-04-06 RX ORDER — ESCITALOPRAM OXALATE 20 MG/1
TABLET ORAL
Qty: 90 TABLET | Refills: 1 | OUTPATIENT
Start: 2022-04-06

## 2022-04-06 NOTE — TELEPHONE ENCOUNTER
Too soon to refill  E-Prescribing Status: Receipt confirmed by pharmacy (1/4/2022  3:40 PM CST)  Milena SERRANO RN, BSN

## 2022-04-14 DIAGNOSIS — I10 BENIGN ESSENTIAL HYPERTENSION: ICD-10-CM

## 2022-04-14 RX ORDER — LOSARTAN POTASSIUM 50 MG/1
TABLET ORAL
Qty: 90 TABLET | Refills: 2 | Status: SHIPPED | OUTPATIENT
Start: 2022-04-14 | End: 2022-11-19

## 2022-04-14 RX ORDER — LOSARTAN POTASSIUM 50 MG/1
TABLET ORAL
Qty: 90 TABLET | Refills: 1 | COMMUNITY
Start: 2022-04-14

## 2022-04-14 NOTE — TELEPHONE ENCOUNTER
Losartan (Cozaar) 50 mg  Prescription approved per [facility] Refill Protocol.

## 2022-04-14 NOTE — TELEPHONE ENCOUNTER
Bere Machuca is requesting a refill of:    Refused Prescriptions:                       Disp   Refills    losartan (COZAAR) 50 MG tablet [Pharmacy M*90 tab*1        Sig: TAKE 1 TABLET(50 MG) BY MOUTH DAILY  Refused By: BOUCHRA HORTA  Reason for Refusal: Patient needs appointment    Pt last seen 10/05/21 advised to return in 6 months. Due for OV. Has been seeing IM/ new provider. She will need to request refills from them

## 2022-04-19 ENCOUNTER — LAB (OUTPATIENT)
Dept: LAB | Facility: CLINIC | Age: 67
End: 2022-04-19
Payer: COMMERCIAL

## 2022-04-19 ENCOUNTER — ANTICOAGULATION THERAPY VISIT (OUTPATIENT)
Dept: ANTICOAGULATION | Facility: CLINIC | Age: 67
End: 2022-04-19

## 2022-04-19 DIAGNOSIS — Z86.711 HISTORY OF PULMONARY EMBOLISM: ICD-10-CM

## 2022-04-19 DIAGNOSIS — Z79.01 CHRONIC ANTICOAGULATION: Primary | ICD-10-CM

## 2022-04-19 DIAGNOSIS — D68.51 FACTOR 5 LEIDEN MUTATION, HETEROZYGOUS (H): ICD-10-CM

## 2022-04-19 DIAGNOSIS — Z79.01 CHRONIC ANTICOAGULATION: ICD-10-CM

## 2022-04-19 LAB — INR BLD: 3.5 (ref 0.9–1.1)

## 2022-04-19 PROCEDURE — 85610 PROTHROMBIN TIME: CPT

## 2022-04-19 PROCEDURE — 36416 COLLJ CAPILLARY BLOOD SPEC: CPT

## 2022-04-19 NOTE — PROGRESS NOTES
ANTICOAGULATION MANAGEMENT     Bere Machuca 66 year old female is on warfarin with supratherapeutic INR result. (Goal INR 2.0-3.0)    Recent labs: (last 7 days)     04/19/22  1414   INR 3.5*       ASSESSMENT       Source(s): Chart Review and Patient/Caregiver Call       Warfarin doses taken: Warfarin taken as instructed    Diet: No new diet changes identified    New illness, injury, or hospitalization: No; however, chronic pain in hips,ankles and back has been worse recently.    Medication/supplement changes: Took 1 Aleve on 4/18, pt states she takes this only periodically and it seems to help her ankle pain    Signs or symptoms of bleeding or clotting: Yes: bruises easily but this is not unusual for her.    Previous INR: Therapeutic last 2(+) visits    Additional findings: None       PLAN     Recommended plan for temporary change(s) affecting INR     Dosing Instructions: partial hold then continue your current warfarin dose with next INR in 2 weeks       Summary  As of 4/19/2022    Full warfarin instructions:  4/19: 2.5 mg; Otherwise 5 mg every day   Next INR check:  5/3/2022             Telephone call with Darline who verbalizes understanding and agrees to plan    Lab visit scheduled    Education provided: Contact [phone]  with any changes, questions or concerns.  and How pain/inflammation affects INR    Plan made per [facility] anticoagulation protocol    Rosi Almeida RN  Anticoagulation Clinic  4/19/2022    _______________________________________________________________________     Anticoagulation Episode Summary     Current INR goal:  2.0-3.0   TTR:  65.2 % (3.2 mo)   Target end date:  Indefinite   Send INR reminders to:  [facility]    Indications    Chronic anticoagulation [Z79.01]  History of pulmonary embolism [Z86.711]  Factor 5 Leiden mutation  heterozygous/ INR 2-3 [D68.51]           Comments:           Anticoagulation Care Providers     Provider Role Specialty Phone number    [place]  MARY Conroy CNP Referring Internal Medicine [phone]

## 2022-05-01 DIAGNOSIS — J44.9 CHRONIC OBSTRUCTIVE PULMONARY DISEASE, UNSPECIFIED COPD TYPE (H): ICD-10-CM

## 2022-05-03 NOTE — TELEPHONE ENCOUNTER
Umair Deluna 100-62.5 mcg inh 30 p  Routing refill request to provider for review/approval because:  Pharmacy requesting future refills.  Not on [facility] protocol

## 2022-05-07 ENCOUNTER — HEALTH MAINTENANCE LETTER (OUTPATIENT)
Age: 67
End: 2022-05-07

## 2022-05-25 DIAGNOSIS — Z86.711 HISTORY OF PULMONARY EMBOLISM: ICD-10-CM

## 2022-05-27 RX ORDER — WARFARIN SODIUM 5 MG/1
TABLET ORAL
Qty: 90 TABLET | Refills: 0 | COMMUNITY
Start: 2022-05-27

## 2022-05-27 NOTE — TELEPHONE ENCOUNTER
Bere Machuca is requesting a refill of:    Refused Prescriptions:                       Disp   Refills    warfarin ANTICOAGULANT (COUMADIN) 5 MG tab*90 tab*0        Sig: TAKE 1 TABLET(5 MG) BY MOUTH DAILY  Refused By: BOUCHRA HORTA  Reason for Refusal: Patient no longer under provider care    Pt is now seeing internal medicine and anticoagulation clinic

## 2022-06-08 ENCOUNTER — ANTICOAGULATION THERAPY VISIT (OUTPATIENT)
Dept: ANTICOAGULATION | Facility: CLINIC | Age: 67
End: 2022-06-08

## 2022-06-08 ENCOUNTER — IMMUNIZATION (OUTPATIENT)
Dept: NURSING | Facility: CLINIC | Age: 67
End: 2022-06-08
Payer: COMMERCIAL

## 2022-06-08 ENCOUNTER — LAB (OUTPATIENT)
Dept: LAB | Facility: CLINIC | Age: 67
End: 2022-06-08

## 2022-06-08 DIAGNOSIS — Z86.711 HISTORY OF PULMONARY EMBOLISM: ICD-10-CM

## 2022-06-08 DIAGNOSIS — D68.51 FACTOR 5 LEIDEN MUTATION, HETEROZYGOUS (H): ICD-10-CM

## 2022-06-08 DIAGNOSIS — Z79.01 CHRONIC ANTICOAGULATION: Primary | ICD-10-CM

## 2022-06-08 DIAGNOSIS — Z79.01 CHRONIC ANTICOAGULATION: ICD-10-CM

## 2022-06-08 LAB — INR BLD: 3.4 (ref 0.9–1.1)

## 2022-06-08 PROCEDURE — 0064A COVID-19,PF,MODERNA (18+ YRS BOOSTER .25ML): CPT

## 2022-06-08 PROCEDURE — 91306 COVID-19,PF,MODERNA (18+ YRS BOOSTER .25ML): CPT

## 2022-06-08 PROCEDURE — 36416 COLLJ CAPILLARY BLOOD SPEC: CPT

## 2022-06-08 PROCEDURE — 85610 PROTHROMBIN TIME: CPT

## 2022-06-08 NOTE — PROGRESS NOTES
ANTICOAGULATION MANAGEMENT     Bere Machuca 66 year old female is on warfarin with supratherapeutic INR result. (Goal INR 2.0-3.0)    Recent labs: (last 7 days)     06/08/22  1525   INR 3.4*       ASSESSMENT       Source(s): Chart Review and Patient/Caregiver Call       Warfarin doses taken: Warfarin taken as instructed    Diet: Decreased greens/vitamin K in diet; ongoing changeDecreased appetite recently.    New illness, injury, or hospitalization: No    Medication/supplement changes: Continues to take Aleve on occasion, but this is not new for patient.    Signs or symptoms of bleeding or clotting: No    Previous INR: Supratherapeutic    Additional findings: None       PLAN     Recommended plan for ongoing change(s) affecting INR     Dosing Instructions: decrease your warfarin dose (**7*% change) with next INR in 2 weeks       Summary  As of 6/8/2022    Full warfarin instructions:  2.5 mg every Wed; 5 mg all other days   Next INR check:  6/22/2022             Telephone call with Darline who agrees to plan and repeated back plan correctly    Lab visit scheduled    Education provided: Please call back if any changes to your diet, medications or how you've been taking warfarin    Plan made per [facility] anticoagulation protocol    Jadyn Castro RN  Anticoagulation Clinic  6/8/2022    _______________________________________________________________________     Anticoagulation Episode Summary     Current INR goal:  2.0-3.0   TTR:  42.8 % (4.8 mo)   Target end date:  Indefinite   Send INR reminders to:  [facility]    Indications    Chronic anticoagulation [Z79.01]  History of pulmonary embolism [Z86.711]  Factor 5 Leiden mutation  heterozygous/ INR 2-3 [D68.51]           Comments:           Anticoagulation Care Providers     Provider Role Specialty Phone number    Uyen Mcintyre APRN CNP Referring Internal Medicine [phone]

## 2022-06-22 ENCOUNTER — ANTICOAGULATION THERAPY VISIT (OUTPATIENT)
Dept: ANTICOAGULATION | Facility: CLINIC | Age: 67
End: 2022-06-22

## 2022-06-22 ENCOUNTER — LAB (OUTPATIENT)
Dept: LAB | Facility: CLINIC | Age: 67
End: 2022-06-22
Payer: COMMERCIAL

## 2022-06-22 DIAGNOSIS — Z79.01 CHRONIC ANTICOAGULATION: Primary | ICD-10-CM

## 2022-06-22 DIAGNOSIS — Z86.711 HISTORY OF PULMONARY EMBOLISM: ICD-10-CM

## 2022-06-22 DIAGNOSIS — D68.51 FACTOR 5 LEIDEN MUTATION, HETEROZYGOUS (H): ICD-10-CM

## 2022-06-22 DIAGNOSIS — Z79.01 CHRONIC ANTICOAGULATION: ICD-10-CM

## 2022-06-22 LAB — INR BLD: 2.1 (ref 0.9–1.1)

## 2022-06-22 PROCEDURE — 36416 COLLJ CAPILLARY BLOOD SPEC: CPT

## 2022-06-22 PROCEDURE — 85610 PROTHROMBIN TIME: CPT

## 2022-06-22 NOTE — PROGRESS NOTES
ANTICOAGULATION MANAGEMENT     Bere Machuca 66 year old female is on warfarin with therapeutic INR result. (Goal INR 2.0-3.0)    Recent labs: (last 7 days)     06/22/22  1331   INR 2.1*       ASSESSMENT       Source(s): Chart Review and Patient/Caregiver Call       Warfarin doses taken: Missed dose(s) may be affecting INR.  Missed 2 doses last week in error due to not filling her pill box correctly.  Patient did take 5 mg on Wednesday instead of 2.5 mg to make up some of the missed doses.    Diet: No new diet changes identified    New illness, injury, or hospitalization: No    Medication/supplement changes: None noted    Signs or symptoms of bleeding or clotting: No    Previous INR: Supratherapeutic    Additional findings: None       PLAN     Recommended plan for no diet, medication or health factor changes affecting INR     Dosing Instructions: continue your current warfarin dose with next INR in 2 weeks       Summary  As of 6/22/2022    Full warfarin instructions:  2.5 mg every Wed; 5 mg all other days   Next INR check:  7/6/2022             Telephone call with Darline who agrees to plan and repeated back plan correctly    Lab visit scheduled    Education provided: Please call back if any changes to your diet, medications or how you've been taking warfarin    Plan made per [facility] anticoagulation protocol    Jadyn Castro RN  Anticoagulation Clinic  6/22/2022    _______________________________________________________________________     Anticoagulation Episode Summary     Current INR goal:  2.0-3.0   TTR:  45.1 % (5.3 mo)   Target end date:  Indefinite   Send INR reminders to:  [facility]    Indications    Chronic anticoagulation [Z79.01]  History of pulmonary embolism [Z86.711]  Factor 5 Leiden mutation  heterozygous/ INR 2-3 [D68.51]           Comments:           Anticoagulation Care Providers     Provider Role Specialty Phone number    Uyen Mcintyre APRN CNP Referring Internal Medicine  [phone]

## 2022-06-29 ENCOUNTER — MYC REFILL (OUTPATIENT)
Dept: INTERNAL MEDICINE | Facility: CLINIC | Age: 67
End: 2022-06-29

## 2022-06-29 DIAGNOSIS — Z86.711 HISTORY OF PULMONARY EMBOLISM: ICD-10-CM

## 2022-06-29 RX ORDER — WARFARIN SODIUM 5 MG/1
TABLET ORAL
Qty: 85 TABLET | Refills: 1 | Status: SHIPPED | OUTPATIENT
Start: 2022-06-29 | End: 2022-10-27

## 2022-06-29 NOTE — TELEPHONE ENCOUNTER
ANTICOAGULATION MANAGEMENT:  Medication Refill    Anticoagulation Summary  As of 6/22/2022    Warfarin maintenance plan:  2.5 mg (5 mg x 0.5) every Wed; 5 mg (5 mg x 1) all other days   Next INR check:  7/6/2022   Target end date:  Indefinite    Indications    Chronic anticoagulation [Z79.01]  History of pulmonary embolism [Z86.711]  Factor 5 Leiden mutation  heterozygous/ INR 2-3 [D68.51]             Anticoagulation Care Providers     Provider Role Specialty Phone number    Uyen Mcintyre APRN CNP Referring Internal Medicine [phone]          Visit with referring provider/group within last year: Yes    [facility] referral signed within last year: Yes    Bere meets all criteria for refill (current [facility] referral, office visit with referring provider/group in last year, lab monitoring up to date or not exceeding 2 weeks overdue). Rx instructions and quantity supplied updated to match patient's current dosing plan. Warfarin 90 day supply with 1 refill granted per [facility] protocol     Jie Latham RN  Anticoagulation Clinic

## 2022-06-29 NOTE — TELEPHONE ENCOUNTER
Pending Prescriptions:                       Disp   Refills    warfarin ANTICOAGULANT (COUMADIN) 5 MG tab*90 tab*0

## 2022-07-14 ENCOUNTER — ANTICOAGULATION THERAPY VISIT (OUTPATIENT)
Dept: ANTICOAGULATION | Facility: CLINIC | Age: 67
End: 2022-07-14

## 2022-07-14 ENCOUNTER — LAB (OUTPATIENT)
Dept: LAB | Facility: CLINIC | Age: 67
End: 2022-07-14
Payer: COMMERCIAL

## 2022-07-14 DIAGNOSIS — D68.51 FACTOR 5 LEIDEN MUTATION, HETEROZYGOUS (H): ICD-10-CM

## 2022-07-14 DIAGNOSIS — Z79.01 CHRONIC ANTICOAGULATION: Primary | ICD-10-CM

## 2022-07-14 DIAGNOSIS — Z86.711 HISTORY OF PULMONARY EMBOLISM: ICD-10-CM

## 2022-07-14 DIAGNOSIS — Z79.01 CHRONIC ANTICOAGULATION: ICD-10-CM

## 2022-07-14 LAB — INR BLD: 3.6 (ref 0.9–1.1)

## 2022-07-14 PROCEDURE — 85610 PROTHROMBIN TIME: CPT

## 2022-07-14 PROCEDURE — 36416 COLLJ CAPILLARY BLOOD SPEC: CPT

## 2022-07-14 NOTE — PROGRESS NOTES
ANTICOAGULATION MANAGEMENT     Bere Machuca 66 year old female is on warfarin with supratherapeutic INR result. (Goal INR 2.0-3.0)    Recent labs: (last 7 days)     07/14/22  1214   INR 3.6*       ASSESSMENT       Source(s): Chart Review and Patient/Caregiver Call       Warfarin doses taken: Warfarin taken as instructed    Diet: Decreased greens/vitamin K in diet; ongoing change, patient reports she has not had any salads for a couple weeks, does not typically eat green veggies    New illness, injury, or hospitalization: No    Medication/supplement changes: Ozempic started on 6/22/22 which has potential for interaction; increasing INR    Signs or symptoms of bleeding or clotting: Yes: patient reports she a bruise on her leg and one on her forearm that she usually does not have bruises    Previous INR: Therapeutic last visit; previously outside of goal range    Additional findings: None       PLAN     Recommended plan for ongoing change(s) affecting INR     Dosing Instructions: partial hold then decrease your warfarin dose (7.7% change) with next INR in 10 days       Summary  As of 7/14/2022    Full warfarin instructions:  7/14: 2.5 mg; Otherwise 2.5 mg every Sun, Wed; 5 mg all other days   Next INR check:  7/26/2022             Telephone call with Darline who agrees to plan and repeated back plan correctly    Lab visit scheduled    Education provided: Please call back if any changes to your diet, medications or how you've been taking warfarin, Potential interaction between warfarin and Ozempic and Contact [phone]  with any changes, questions or concerns.     Plan made per [facility] anticoagulation protocol    Ashley Melendez RN  Anticoagulation Clinic  7/14/2022    _______________________________________________________________________     Anticoagulation Episode Summary     Current INR goal:  2.0-3.0   TTR:  46.9 % (6 mo)   Target end date:  Indefinite   Send INR reminders to:  [facility]     Indications    Chronic anticoagulation [Z79.01]  History of pulmonary embolism [Z86.711]  Factor 5 Leiden mutation  heterozygous/ INR 2-3 [D68.51]           Comments:           Anticoagulation Care Providers     Provider Role Specialty Phone number    Uyen Mcintyre APRN CNP Referring Internal Medicine [phone]

## 2022-08-03 ENCOUNTER — ANTICOAGULATION THERAPY VISIT (OUTPATIENT)
Dept: ANTICOAGULATION | Facility: CLINIC | Age: 67
End: 2022-08-03

## 2022-08-03 ENCOUNTER — LAB (OUTPATIENT)
Dept: LAB | Facility: CLINIC | Age: 67
End: 2022-08-03
Payer: COMMERCIAL

## 2022-08-03 DIAGNOSIS — Z79.01 CHRONIC ANTICOAGULATION: Primary | ICD-10-CM

## 2022-08-03 DIAGNOSIS — Z86.711 HISTORY OF PULMONARY EMBOLISM: ICD-10-CM

## 2022-08-03 DIAGNOSIS — D68.51 FACTOR 5 LEIDEN MUTATION, HETEROZYGOUS (H): ICD-10-CM

## 2022-08-03 DIAGNOSIS — Z79.01 CHRONIC ANTICOAGULATION: ICD-10-CM

## 2022-08-03 LAB — INR BLD: 2.2 (ref 0.9–1.1)

## 2022-08-03 PROCEDURE — 36416 COLLJ CAPILLARY BLOOD SPEC: CPT

## 2022-08-03 PROCEDURE — 85610 PROTHROMBIN TIME: CPT

## 2022-08-11 ENCOUNTER — TRANSFERRED RECORDS (OUTPATIENT)
Dept: LAB | Facility: CLINIC | Age: 67
End: 2022-08-11

## 2022-08-11 LAB
ALT SERPL-CCNC: 20 IU/L (ref 5–35)
AST SERPL-CCNC: 24 U/L (ref 5–34)
CREATININE (EXTERNAL): 0.67 MG/DL (ref 0.5–1.3)
GFR ESTIMATED (EXTERNAL): 94.2 ML/MIN/1.73M2

## 2022-08-25 ENCOUNTER — TELEPHONE (OUTPATIENT)
Dept: ANTICOAGULATION | Facility: CLINIC | Age: 67
End: 2022-08-25

## 2022-08-25 NOTE — TELEPHONE ENCOUNTER
ANTICOAGULATION     Bere Machuca is overdue for INR check.      Left message for patient to call and schedule lab appointment as soon as possible. If returning call, please schedule.     Kathya Dhaliwal RN on 8/25/2022 at 9:23 AM

## 2022-08-27 ENCOUNTER — HEALTH MAINTENANCE LETTER (OUTPATIENT)
Age: 67
End: 2022-08-27

## 2022-09-22 ENCOUNTER — LAB (OUTPATIENT)
Dept: LAB | Facility: CLINIC | Age: 67
End: 2022-09-22
Payer: COMMERCIAL

## 2022-09-22 ENCOUNTER — ANTICOAGULATION THERAPY VISIT (OUTPATIENT)
Dept: ANTICOAGULATION | Facility: CLINIC | Age: 67
End: 2022-09-22

## 2022-09-22 DIAGNOSIS — Z79.01 CHRONIC ANTICOAGULATION: Primary | ICD-10-CM

## 2022-09-22 DIAGNOSIS — D68.51 FACTOR 5 LEIDEN MUTATION, HETEROZYGOUS (H): ICD-10-CM

## 2022-09-22 DIAGNOSIS — Z86.711 HISTORY OF PULMONARY EMBOLISM: ICD-10-CM

## 2022-09-22 DIAGNOSIS — Z79.01 CHRONIC ANTICOAGULATION: ICD-10-CM

## 2022-09-22 LAB — INR BLD: 3.2 (ref 0.9–1.1)

## 2022-09-22 PROCEDURE — 36415 COLL VENOUS BLD VENIPUNCTURE: CPT

## 2022-09-22 PROCEDURE — 85610 PROTHROMBIN TIME: CPT

## 2022-09-22 NOTE — PROGRESS NOTES
ANTICOAGULATION MANAGEMENT     Bere Machuca 66 year old female is on warfarin with supratherapeutic INR result. (Goal INR 2.0-3.0)    Recent labs: (last 7 days)     09/22/22  1548   INR 3.2*       ASSESSMENT       Source(s): Chart Review and Patient/Caregiver Call       Warfarin doses taken: Warfarin taken as instructed    Diet: Decreased greens/vitamin K in diet; ongoing change and overall decreased appetite due to ulcerative colitis    New illness, injury, or hospitalization: No; however, patient states she has had loose stools x 2 weeks due to ulcerative colitis flare and also increased pain due to rheumatoid arthritis.    Medication/supplement changes: None noted    Signs or symptoms of bleeding or clotting: No    Previous INR: Therapeutic last visit; previously outside of goal range    Additional findings: warfarin maintenance dose was reduced 7% on 7/14/22 but due to ongoing GI issues, pain and supratherapeutic INR, I reduced warfarin maintenance dose again.       PLAN     Recommended plan for ongoing change(s) affecting INR     Dosing Instructions: decrease your warfarin dose (8% change) with next INR in 2 weeks;  However, patient opted for 3 weeks.       Summary  As of 9/22/2022    Full warfarin instructions:  2.5 mg every Sun, Wed, Fri; 5 mg all other days   Next INR check:  10/12/2022             Telephone call with Darline who agrees to plan and repeated back plan correctly    Lab visit scheduled    Education provided: Monitoring for bleeding signs and symptoms, Contact [phone]  with any changes, questions or concerns.  and how pain and inflammation affect INR    Plan made per [facility] anticoagulation protocol    Rosi Almeida RN  Anticoagulation Clinic  9/22/2022    _______________________________________________________________________     Anticoagulation Episode Summary     Current INR goal:  2.0-3.0   TTR:  54.3 % (8.4 mo)   Target end date:  Indefinite   Send INR reminders to:  [facility]    Indications    Chronic anticoagulation [Z79.01]  History of pulmonary embolism [Z86.711]  Factor 5 Leiden mutation  heterozygous/ INR 2-3 [D68.51]           Comments:           Anticoagulation Care Providers     Provider Role Specialty Phone number    Uyen Mcintyre APRN CNP Referring Internal Medicine [phone]

## 2022-09-29 DIAGNOSIS — J44.9 CHRONIC OBSTRUCTIVE PULMONARY DISEASE, UNSPECIFIED COPD TYPE (H): ICD-10-CM

## 2022-09-30 NOTE — TELEPHONE ENCOUNTER
Pending Prescriptions:                       Disp   Refills    TRELEGY ELLIPTA 100-62.5-25 MCG/INH oral i*       1        Sig: INHALE 1 PUFF INTO THE LUNGS DAILY (RINSE MOUTH AND           SPIT AFTER USING)    Routing refill request to provider for review/approval because:  Drug not on the [facility] refill protocol

## 2022-10-14 ENCOUNTER — TRANSFERRED RECORDS (OUTPATIENT)
Dept: HEALTH INFORMATION MANAGEMENT | Facility: CLINIC | Age: 67
End: 2022-10-14

## 2022-10-14 NOTE — MR AVS SNAPSHOT
After Visit Summary   6/21/2017    Bere Machuca    MRN: [id number]           Patient Information     Date Of Birth          1955        Visit Information        Provider Department      6/21/2017 12:00 PM Catrachita Torres PA [facility]        Today's Diagnoses     Long term current use of anticoagulant therapy    -  1    Cough        Increased tearing, left           Follow-ups after your visit        Who to contact     If you have questions or need follow up information about today's clinic visit or your schedule please contact [facility] directly at [phone].  Normal or non-critical lab and imaging results will be communicated to you by QuadROIhart, letter or phone within 4 business days after the clinic has received the results. If you do not hear from us within 7 days, please contact the clinic through QuadROIhart or phone. If you have a critical or abnormal lab result, we will notify you by phone as soon as possible.  Submit refill requests through Soundwave or call your pharmacy and they will forward the refill request to us. Please allow 3 business days for your refill to be completed.          Additional Information About Your Visit        MyChart Information     Soundwave gives you secure access to your electronic health record. If you see a primary care provider, you can also send messages to your care team and make appointments. If you have questions, please call your primary care clinic.  If you do not have a primary care provider, please call [phone] and they will assist you.        Care EveryWhere ID     This is your Care EveryWhere ID. This could be used by other organizations to access your [facility] medical records  [id number]        Your Vitals Were     Pulse Temperature Respirations Pulse Oximetry BMI (Body Mass Index)       90 99  F (37.2  C) (Oral) 20 98% 62.57 kg/m2        Blood Pressure from Last 3 Encounters:  Continue well-balanced diet, regular exercise  Try to limit screen time to <2 hours per day  Return to clinic 1 year or sooner as needed     06/23/17 132/81   06/21/17 118/70   06/21/17 124/84    Weight from Last 3 Encounters:   06/22/17 (!) 172.8 kg (381 lb)   06/21/17 (!) 170.6 kg (376 lb)   04/17/17 (!) 175.1 kg (386 lb)              We Performed the Following     CL BFP PROTHROMBIN TIME/INR (BFP)     VENOUS COLLECTION        Primary Care Provider Office Phone # Fax #    Wilma Armenta [phone]       [facility] [address]  [facility] [address]        Equal Access to Services     [facility] : Hadii aad nishant hadasho Soluizali, waaxda luqadaha, qaybta kaalmada adechristopheryada, aman chavez . So [facility] [phone].    ATENCIÓN: Si habla español, tiene a norman disposición servicios gratuitos de asistencia lingüística. Llame al [phone].    We comply with applicable federal civil rights laws and [place] laws. We do not discriminate on the basis of race, color, national origin, age, disability sex, sexual orientation or gender identity.            Thank you!     Thank you for choosing [facility]  for your care. Our goal is always to provide you with excellent care. Hearing back from our patients is one way we can continue to improve our services. Please take a few minutes to complete the written survey that you may receive in the mail after your visit with us. Thank you!             Your Updated Medication List - Protect others around you: Learn how to safely use, store and throw away your medicines at www.disposemymeds.org.          This list is accurate as of: 6/21/17 11:59 PM.  Always use your most recent med list.                   Brand Name Dispense Instructions for use Diagnosis    * albuterol (2.5 MG/3ML) 0.083% neb solution      Take 3 mLs by nebulization every 6 hours as needed for shortness of breath / dyspnea.  [facility]    Chronic airway obstruction, not elsewhere classified, Personal history of tobacco use, presenting hazards  to health       * albuterol 108 (90 BASE) MCG/ACT Inhaler   Generic drug:  albuterol      Inhale 1-2 puffs into the lungs as needed for shortness of breath / dyspnea.  [facility]    Chronic airway obstruction, not elsewhere classified, Personal history of tobacco use, presenting hazards to health       cholecalciferol 1000 UNITS Tabs      Take 1,000 Units by mouth At Bedtime    Morbid obesity due to excess calories (H)       citalopram 40 MG tablet    celeXA    90 tablet    Take 1 tablet (40 mg) by mouth daily    Major depressive disorder, recurrent episode, moderate (H)       cyclobenzaprine 5 MG tablet    FLEXERIL    30 tablet    Take 1 tablet (5 mg) by mouth 3 times daily as needed for muscle spasms    Displacement of lumbar intervertebral disc without myelopathy, Spinal stenosis in cervical region       doxycycline 100 MG capsule    VIBRAMYCIN    14 capsule    Take 1 capsule (100 mg) by mouth 2 times daily for 7 days        enoxaparin 150 MG/ML injection    LOVENOX    10 Syringe    Inject 1.1 mLs (165 mg) Subcutaneous every 12 hours for 5 days        hydrOXYzine 25 MG tablet    ATARAX    90 tablet    Take 1 tablet (25 mg) by mouth At Bedtime    Intrinsic eczema       losartan 25 MG tablet    COZAAR     Take 25 mg by mouth daily    Essential hypertension, benign       metFORMIN 500 MG 24 hr tablet    GLUCOPHAGE-XR     Take 2 tablets (1,000 mg) by mouth 2 times daily (with meals)    Morbid obesity due to excess calories (H)       Multi-vitamin Tabs tablet      Take 1 tablet by mouth daily        OMEGA-3 FISH OIL PO      Take 1 g by mouth 2 times daily (with meals)        predniSONE 20 MG tablet    DELTASONE    13 tablet    Take 2.5 tablets (50 mg) by mouth daily for 5 days        PROBIOTIC ACIDOPHILUS BIOBEADS Caps      Take 1 capsule by mouth At Bedtime    Myalgia and myositis, unspecified       PULMICORT FLEXHALER 180 MCG/ACT inhaler   Generic drug:  budesonide      Inhale 1 puff into the lungs  2 times daily. DR MELENDEZ pulmonology    Chronic airway obstruction, not elsewhere classified, Personal history of tobacco use, presenting hazards to health       SPIRIVA HANDIHALER 18 MCG capsule   Generic drug:  tiotropium      Inhale 1 capsule into the lungs daily. DR ANDREWS pulmonary specialists    Chronic airway obstruction, not elsewhere classified, Personal history of tobacco use, presenting hazards to health       warfarin 5 MG tablet    COUMADIN    90 tablet    Take 1 tablet (5 mg) by mouth daily    Factor 5 Leiden mutation, heterozygous (H), History of pulmonary embolism       * Notice:  This list has 2 medication(s) that are the same as other medications prescribed for you. Read the directions carefully, and ask your doctor or other care provider to review them with you.

## 2022-10-17 DIAGNOSIS — J44.9 CHRONIC OBSTRUCTIVE PULMONARY DISEASE, UNSPECIFIED COPD TYPE (H): ICD-10-CM

## 2022-10-20 RX ORDER — ALBUTEROL SULFATE 90 UG/1
AEROSOL, METERED RESPIRATORY (INHALATION)
Qty: 18 G | Refills: 1 | Status: SHIPPED | OUTPATIENT
Start: 2022-10-20 | End: 2022-12-27

## 2022-10-20 NOTE — TELEPHONE ENCOUNTER
Routing refill request to provider for review/approval because:  Never filled by provider.    Jeanette KEITH RN

## 2022-10-26 ENCOUNTER — ANTICOAGULATION THERAPY VISIT (OUTPATIENT)
Dept: ANTICOAGULATION | Facility: CLINIC | Age: 67
End: 2022-10-26

## 2022-10-26 ENCOUNTER — LAB (OUTPATIENT)
Dept: LAB | Facility: CLINIC | Age: 67
End: 2022-10-26
Payer: COMMERCIAL

## 2022-10-26 DIAGNOSIS — D68.51 FACTOR 5 LEIDEN MUTATION, HETEROZYGOUS (H): ICD-10-CM

## 2022-10-26 DIAGNOSIS — Z79.01 CHRONIC ANTICOAGULATION: ICD-10-CM

## 2022-10-26 DIAGNOSIS — Z86.711 HISTORY OF PULMONARY EMBOLISM: ICD-10-CM

## 2022-10-26 DIAGNOSIS — Z79.01 CHRONIC ANTICOAGULATION: Primary | ICD-10-CM

## 2022-10-26 LAB — INR BLD: 2.1 (ref 0.9–1.1)

## 2022-10-26 PROCEDURE — 36416 COLLJ CAPILLARY BLOOD SPEC: CPT

## 2022-10-26 PROCEDURE — 85610 PROTHROMBIN TIME: CPT

## 2022-10-26 NOTE — PROGRESS NOTES
5-Aminosalicylic Acid Derivatives may enhance the anticoagulant effect of Vitamin K Antagonists.     ANTICOAGULATION MANAGEMENT     Bere Machuca 67 year old female is on warfarin with therapeutic INR result. (Goal INR 2.0-3.0)    Recent labs: (last 7 days)     10/26/22  1323   INR 2.1*       ASSESSMENT       Source(s): Chart Review and Patient/Caregiver Call       Warfarin doses taken: Missed dose(s) may be affecting INR    Diet: No new diet changes identified    New illness, injury, or hospitalization: No, not since last INR, patient states she goes back and forth between diarrhea and constipation. Ongoing issues with RA and ulcerative colitis    Medication/supplement changes: mesalamine dose decreased on 10/14/22 which may be decreasing INR today    miralax dose increased on 10/14/22 No interaction anticipated    magnesium started on 10/14/22 No interaction anticipated    Signs or symptoms of bleeding or clotting: No    Previous INR: Supratherapeutic    Additional findings: patient was at her [facility] Clinic visit and was not willing/able to schedule next INR, but understands to schedule in next 2-3 weeks.       PLAN     Recommended plan for temporary change(s) and ongoing change(s) affecting INR     Dosing Instructions: Continue your current warfarin dose with next INR in 2-3 weeks       Summary  As of 10/26/2022    Full warfarin instructions:  2.5 mg every Sun, Wed, Fri; 5 mg all other days; Starting 10/26/2022   Next INR check:  11/9/2022             Telephone call with Darline who agrees to plan and repeated back plan correctly    Contact [phone]  to schedule and with any changes, questions or concerns.     Education provided:     Please call back if any changes to your diet, medications or how you've been taking warfarin    Interaction IS anticipated between warfarin and dose reduction of mesalamine    Contact [phone]  with any changes, questions or concerns.     Plan made per [facility] anticoagulation  protocol    Radha Duncan RN  Anticoagulation Clinic  10/26/2022    _______________________________________________________________________     Anticoagulation Episode Summary     Current INR goal:  2.0-3.0   TTR:  57.6 % (9.5 mo)   Target end date:  Indefinite   Send INR reminders to:  [facility]    Indications    Chronic anticoagulation [Z79.01]  History of pulmonary embolism [Z86.711]  Factor 5 Leiden mutation  heterozygous/ INR 2-3 [D68.51]           Comments:           Anticoagulation Care Providers     Provider Role Specialty Phone number    Uyen Mcintyre APRN CNP Referring Internal Medicine [phone]

## 2022-10-27 DIAGNOSIS — Z86.711 HISTORY OF PULMONARY EMBOLISM: ICD-10-CM

## 2022-10-27 RX ORDER — WARFARIN SODIUM 5 MG/1
TABLET ORAL
Qty: 72 TABLET | Refills: 1 | Status: SHIPPED | OUTPATIENT
Start: 2022-10-27 | End: 2023-02-07

## 2022-10-27 NOTE — TELEPHONE ENCOUNTER
ANTICOAGULATION MANAGEMENT:  Medication Refill    Anticoagulation Summary  As of 10/26/2022    Warfarin maintenance plan:  2.5 mg (5 mg x 0.5) every Sun, Wed, Fri; 5 mg (5 mg x 1) all other days; Starting 10/26/2022   Next INR check:  11/9/2022   Target end date:  Indefinite    Indications    Chronic anticoagulation [Z79.01]  History of pulmonary embolism [Z86.711]  Factor 5 Leiden mutation  heterozygous/ INR 2-3 [D68.51]             Anticoagulation Care Providers     Provider Role Specialty Phone number    Uyen Mcintyre APRN CNP Referring Internal Medicine [phone]          Visit with referring provider/group within last year: Yes    [facility] referral signed within last year: Yes    Bere meets all criteria for refill (current [facility] referral, office visit with referring provider/group in last year, lab monitoring up to date or not exceeding 2 weeks overdue). Rx instructions and quantity supplied updated to match patient's current dosing plan. Warfarin 90 day supply with 1 refill granted per [facility] protocol     Jie Latham RN  Anticoagulation Clinic

## 2022-11-03 ENCOUNTER — TRANSFERRED RECORDS (OUTPATIENT)
Dept: HEALTH INFORMATION MANAGEMENT | Facility: CLINIC | Age: 67
End: 2022-11-03

## 2022-11-07 ENCOUNTER — MYC MEDICAL ADVICE (OUTPATIENT)
Dept: INTERNAL MEDICINE | Facility: CLINIC | Age: 67
End: 2022-11-07

## 2022-11-07 DIAGNOSIS — F33.0 MILD EPISODE OF RECURRENT MAJOR DEPRESSIVE DISORDER (H): ICD-10-CM

## 2022-11-09 RX ORDER — ESCITALOPRAM OXALATE 20 MG/1
20 TABLET ORAL DAILY
Qty: 90 TABLET | Refills: 1 | Status: SHIPPED | OUTPATIENT
Start: 2022-11-09 | End: 2023-03-01

## 2022-11-16 DIAGNOSIS — I10 BENIGN ESSENTIAL HYPERTENSION: ICD-10-CM

## 2022-11-17 ENCOUNTER — DOCUMENTATION ONLY (OUTPATIENT)
Dept: ANTICOAGULATION | Facility: CLINIC | Age: 67
End: 2022-11-17

## 2022-11-17 NOTE — PROGRESS NOTES
ANTICOAGULATION     Bere Machuca is overdue for INR check.      Revenew message sent.    Fidelia Warren RN

## 2022-11-18 NOTE — TELEPHONE ENCOUNTER
Pending Prescriptions:                       Disp   Refills    losartan (COZAAR) 50 MG tablet [Pharmacy M*90 tab*2        Sig: TAKE 1 TABLET BY MOUTH EVERY DAY    Duplicate - electronically sent 4/14/22 #90 with 2 refills.    Routing refill request to provider for review/approval because:  Not within RN scope to refuse prescription.    Please advise, thanks.

## 2022-11-19 RX ORDER — LOSARTAN POTASSIUM 50 MG/1
TABLET ORAL
Qty: 90 TABLET | Refills: 3 | Status: SHIPPED | OUTPATIENT
Start: 2022-11-19 | End: 2023-11-08

## 2022-11-22 ASSESSMENT — ANXIETY QUESTIONNAIRES
7. FEELING AFRAID AS IF SOMETHING AWFUL MIGHT HAPPEN: NOT AT ALL
4. TROUBLE RELAXING: NOT AT ALL
6. BECOMING EASILY ANNOYED OR IRRITABLE: SEVERAL DAYS
GAD7 TOTAL SCORE: 3
1. FEELING NERVOUS, ANXIOUS, OR ON EDGE: SEVERAL DAYS
2. NOT BEING ABLE TO STOP OR CONTROL WORRYING: SEVERAL DAYS
5. BEING SO RESTLESS THAT IT IS HARD TO SIT STILL: NOT AT ALL
IF YOU CHECKED OFF ANY PROBLEMS ON THIS QUESTIONNAIRE, HOW DIFFICULT HAVE THESE PROBLEMS MADE IT FOR YOU TO DO YOUR WORK, TAKE CARE OF THINGS AT HOME, OR GET ALONG WITH OTHER PEOPLE: SOMEWHAT DIFFICULT
3. WORRYING TOO MUCH ABOUT DIFFERENT THINGS: NOT AT ALL

## 2022-12-07 ENCOUNTER — LAB (OUTPATIENT)
Dept: LAB | Facility: CLINIC | Age: 67
End: 2022-12-07
Payer: COMMERCIAL

## 2022-12-07 ENCOUNTER — ANTICOAGULATION THERAPY VISIT (OUTPATIENT)
Dept: ANTICOAGULATION | Facility: CLINIC | Age: 67
End: 2022-12-07

## 2022-12-07 DIAGNOSIS — Z82.49 FAMILY HISTORY OF PULMONARY EMBOLISM: ICD-10-CM

## 2022-12-07 DIAGNOSIS — D68.51 FACTOR 5 LEIDEN MUTATION, HETEROZYGOUS (H): ICD-10-CM

## 2022-12-07 DIAGNOSIS — Z79.01 CHRONIC ANTICOAGULATION: ICD-10-CM

## 2022-12-07 DIAGNOSIS — Z86.711 HISTORY OF PULMONARY EMBOLISM: ICD-10-CM

## 2022-12-07 DIAGNOSIS — D68.51 FACTOR 5 LEIDEN MUTATION, HETEROZYGOUS (H): Primary | ICD-10-CM

## 2022-12-07 LAB — INR BLD: 1.8 (ref 0.9–1.1)

## 2022-12-07 PROCEDURE — 36416 COLLJ CAPILLARY BLOOD SPEC: CPT

## 2022-12-07 PROCEDURE — 85610 PROTHROMBIN TIME: CPT

## 2022-12-07 NOTE — PROGRESS NOTES
ANTICOAGULATION MANAGEMENT     Bere Machuca 67 year old female is on warfarin with subtherapeutic INR result. (Goal INR 2.0-3.0)    Recent labs: (last 7 days)     12/07/22  1413   INR 1.8*       ASSESSMENT       Source(s): Chart Review and Patient/Caregiver Call       Warfarin doses taken: Warfarin taken as instructed    Diet: No new diet changes identified    New illness, injury, or hospitalization: Yes: Pt states that she has been off ozempic X 1 week due to a supply issue. Pt reports that she will restart this evening.    Medication/supplement changes: See above    Signs or symptoms of bleeding or clotting: No    Previous INR: Therapeutic last visit; previously outside of goal range    Additional findings: Pt reports of hx of ulcerative colitis, rheumatoid arthritis and ruptured tendons in both ankles. Pt reports mobility issues and transportation issues. Discussed the option of a home meter. Pt reports that she plans to see her PCP in January and will discuss this VS a DOAC then. Previous INR was 10/26/22.       PLAN       Dosing Instructions: Increase your warfarin dose (9.1% change) with next INR in 2 weeks       Summary  As of 12/7/2022    Full warfarin instructions:  2.5 mg every Mon, Fri; 5 mg all other days; Starting 12/7/2022   Next INR check:  12/21/2022             Telephone call with Darline who agrees to plan and repeated back plan correctly    Patient offered & declined to schedule next visit    Education provided:     Contact [phone]  with any changes, questions or concerns.     Plan made per [facility] anticoagulation protocol    Arcelia Trujillo RN  Anticoagulation Clinic  12/7/2022    _______________________________________________________________________     Anticoagulation Episode Summary     Current INR goal:  2.0-3.0   TTR:  54.5 % (10.9 mo)   Target end date:  Indefinite   Send INR reminders to:  [facility]    Indications    Chronic anticoagulation [Z79.01]  History of  pulmonary embolism [Z86.711]  Factor 5 Leiden mutation  heterozygous/ INR 2-3 [D68.51]           Comments:           Anticoagulation Care Providers     Provider Role Specialty Phone number    Uyen Mcintyre APRN CNP Referring Internal Medicine [phone]

## 2022-12-08 ENCOUNTER — TELEPHONE (OUTPATIENT)
Dept: INTERNAL MEDICINE | Facility: CLINIC | Age: 67
End: 2022-12-08

## 2022-12-08 DIAGNOSIS — Z79.01 CHRONIC ANTICOAGULATION: ICD-10-CM

## 2022-12-08 DIAGNOSIS — D68.51 FACTOR 5 LEIDEN MUTATION, HETEROZYGOUS (H): Primary | ICD-10-CM

## 2022-12-08 DIAGNOSIS — Z86.711 HISTORY OF PULMONARY EMBOLISM: ICD-10-CM

## 2022-12-08 DIAGNOSIS — I26.99 PULMONARY EMBOLISM (H): Primary | ICD-10-CM

## 2022-12-08 DIAGNOSIS — D68.51 FACTOR 5 LEIDEN MUTATION, HETEROZYGOUS (H): ICD-10-CM

## 2022-12-08 NOTE — TELEPHONE ENCOUNTER
ANTICOAGULATION CLINIC REFERRAL RENEWAL REQUEST       An annual renewal order is required for all patients referred to [facility].?  Please review and sign the pended referral order for Bere Machuca.       ANTICOAGULATION SUMMARY      Warfarin indication(s)   Chronic anticoagulation [Z79.01]  History of pulmonary embolism [Z86.711]  Factor 5 Leiden mutation  heterozygous/ INR 2-3 [D68.51]       Mechanical heart valve present?  NO       Current goal range   INR: 2.0-3.0     Goal appropriate for indication? Goal INR 2-3, standard for indication(s) above     Time in Therapeutic Range (TTR)  (Goal > 60%) 54.5%       Office visit with referring provider's group within last year yes on 1/4/22       Arcelia Trujillo RN  [facility]

## 2022-12-22 ENCOUNTER — TELEPHONE (OUTPATIENT)
Dept: ANTICOAGULATION | Facility: CLINIC | Age: 67
End: 2022-12-22

## 2022-12-22 NOTE — TELEPHONE ENCOUNTER
ANTICOAGULATION     Bere Machuca is overdue for INR check.      Left message for patient to call and schedule lab appointment as soon as possible. If returning call, please schedule.     Rosi Almeida RN

## 2022-12-26 DIAGNOSIS — J44.9 CHRONIC OBSTRUCTIVE PULMONARY DISEASE, UNSPECIFIED COPD TYPE (H): ICD-10-CM

## 2022-12-27 RX ORDER — ALBUTEROL SULFATE 90 UG/1
AEROSOL, METERED RESPIRATORY (INHALATION)
Qty: 8.5 G | Refills: 0 | Status: SHIPPED | OUTPATIENT
Start: 2022-12-27 | End: 2023-01-11

## 2022-12-28 NOTE — TELEPHONE ENCOUNTER
Pending Prescriptions:                       Disp   Refills    albuterol (PROAIR HFA/PROVENTIL HFA/MARTINA*8.5 g  0            Sig: INHALE 2 PUFFS INTO THE LUNGS EVERY 4 HOURS AS           NEEDED    Prescription approved per [facility] Refill Protocol.

## 2022-12-30 ENCOUNTER — TELEPHONE (OUTPATIENT)
Dept: ANTICOAGULATION | Facility: CLINIC | Age: 67
End: 2022-12-30

## 2023-01-06 ENCOUNTER — TELEPHONE (OUTPATIENT)
Dept: ANTICOAGULATION | Facility: CLINIC | Age: 68
End: 2023-01-06

## 2023-01-06 ASSESSMENT — ANXIETY QUESTIONNAIRES
7. FEELING AFRAID AS IF SOMETHING AWFUL MIGHT HAPPEN: NOT AT ALL
3. WORRYING TOO MUCH ABOUT DIFFERENT THINGS: SEVERAL DAYS
GAD7 TOTAL SCORE: 5
5. BEING SO RESTLESS THAT IT IS HARD TO SIT STILL: NOT AT ALL
GAD7 TOTAL SCORE: 5
6. BECOMING EASILY ANNOYED OR IRRITABLE: SEVERAL DAYS
4. TROUBLE RELAXING: SEVERAL DAYS
GAD7 TOTAL SCORE: 5
8. IF YOU CHECKED OFF ANY PROBLEMS, HOW DIFFICULT HAVE THESE MADE IT FOR YOU TO DO YOUR WORK, TAKE CARE OF THINGS AT HOME, OR GET ALONG WITH OTHER PEOPLE?: VERY DIFFICULT
IF YOU CHECKED OFF ANY PROBLEMS ON THIS QUESTIONNAIRE, HOW DIFFICULT HAVE THESE PROBLEMS MADE IT FOR YOU TO DO YOUR WORK, TAKE CARE OF THINGS AT HOME, OR GET ALONG WITH OTHER PEOPLE: VERY DIFFICULT
1. FEELING NERVOUS, ANXIOUS, OR ON EDGE: SEVERAL DAYS
2. NOT BEING ABLE TO STOP OR CONTROL WORRYING: SEVERAL DAYS
7. FEELING AFRAID AS IF SOMETHING AWFUL MIGHT HAPPEN: NOT AT ALL

## 2023-01-06 NOTE — TELEPHONE ENCOUNTER
ANTICOAGULATION     Bere Machuca is overdue for INR check.      Everplaceshart message sent to patient reminding her that she is due for an INR check and she should stop by the lab on 1/11/23 to have her INR checked when she has her provider visit scheduled.    Jadyn Castro RN

## 2023-01-11 ENCOUNTER — OFFICE VISIT (OUTPATIENT)
Dept: INTERNAL MEDICINE | Facility: CLINIC | Age: 68
End: 2023-01-11
Payer: COMMERCIAL

## 2023-01-11 ENCOUNTER — DOCUMENTATION ONLY (OUTPATIENT)
Dept: ANTICOAGULATION | Facility: CLINIC | Age: 68
End: 2023-01-11

## 2023-01-11 ENCOUNTER — ANTICOAGULATION THERAPY VISIT (OUTPATIENT)
Dept: ANTICOAGULATION | Facility: CLINIC | Age: 68
End: 2023-01-11

## 2023-01-11 VITALS
HEART RATE: 82 BPM | BODY MASS INDEX: 47.09 KG/M2 | OXYGEN SATURATION: 96 % | DIASTOLIC BLOOD PRESSURE: 78 MMHG | RESPIRATION RATE: 18 BRPM | WEIGHT: 293 LBS | HEIGHT: 66 IN | TEMPERATURE: 97.9 F | SYSTOLIC BLOOD PRESSURE: 110 MMHG

## 2023-01-11 DIAGNOSIS — Z86.711 HISTORY OF PULMONARY EMBOLISM: ICD-10-CM

## 2023-01-11 DIAGNOSIS — E11.59 CONTROLLED TYPE 2 DIABETES MELLITUS WITH OTHER CIRCULATORY COMPLICATION, WITHOUT LONG-TERM CURRENT USE OF INSULIN (H): ICD-10-CM

## 2023-01-11 DIAGNOSIS — Z79.01 CHRONIC ANTICOAGULATION: Primary | ICD-10-CM

## 2023-01-11 DIAGNOSIS — J44.9 CHRONIC OBSTRUCTIVE PULMONARY DISEASE, UNSPECIFIED COPD TYPE (H): ICD-10-CM

## 2023-01-11 DIAGNOSIS — Z12.11 SCREEN FOR COLON CANCER: ICD-10-CM

## 2023-01-11 DIAGNOSIS — E66.01 MORBID OBESITY WITH BMI OF 60.0-69.9, ADULT (H): ICD-10-CM

## 2023-01-11 DIAGNOSIS — D68.51 FACTOR 5 LEIDEN MUTATION, HETEROZYGOUS (H): ICD-10-CM

## 2023-01-11 DIAGNOSIS — Z23 NEED FOR VACCINATION: Primary | ICD-10-CM

## 2023-01-11 DIAGNOSIS — F14.11 HISTORY OF COCAINE ABUSE (H): ICD-10-CM

## 2023-01-11 DIAGNOSIS — Z13.220 SCREENING FOR HYPERLIPIDEMIA: ICD-10-CM

## 2023-01-11 DIAGNOSIS — F33.1 MAJOR DEPRESSIVE DISORDER, RECURRENT EPISODE, MODERATE (H): ICD-10-CM

## 2023-01-11 DIAGNOSIS — M05.79 SEROPOSITIVE RHEUMATOID ARTHRITIS OF MULTIPLE SITES (H): ICD-10-CM

## 2023-01-11 DIAGNOSIS — I10 ESSENTIAL HYPERTENSION: ICD-10-CM

## 2023-01-11 DIAGNOSIS — Z79.899 ENCOUNTER FOR LONG-TERM (CURRENT) USE OF MEDICATIONS: ICD-10-CM

## 2023-01-11 DIAGNOSIS — I73.9 PVD (PERIPHERAL VASCULAR DISEASE) (H): ICD-10-CM

## 2023-01-11 PROBLEM — K51.30 ULCERATIVE RECTOSIGMOIDITIS (H): Status: RESOLVED | Noted: 2018-01-19 | Resolved: 2023-01-11

## 2023-01-11 LAB
ALBUMIN SERPL BCG-MCNC: 4.2 G/DL (ref 3.5–5.2)
ALP SERPL-CCNC: 54 U/L (ref 35–104)
ALT SERPL W P-5'-P-CCNC: 28 U/L (ref 10–35)
ANION GAP SERPL CALCULATED.3IONS-SCNC: 17 MMOL/L (ref 7–15)
AST SERPL W P-5'-P-CCNC: 27 U/L (ref 10–35)
BASOPHILS # BLD AUTO: 0 10E3/UL (ref 0–0.2)
BASOPHILS NFR BLD AUTO: 0 %
BILIRUB SERPL-MCNC: 0.4 MG/DL
BUN SERPL-MCNC: 12.3 MG/DL (ref 8–23)
CALCIUM SERPL-MCNC: 10.2 MG/DL (ref 8.8–10.2)
CHLORIDE SERPL-SCNC: 104 MMOL/L (ref 98–107)
CHOLEST SERPL-MCNC: 250 MG/DL
CREAT SERPL-MCNC: 0.73 MG/DL (ref 0.51–0.95)
DEPRECATED HCO3 PLAS-SCNC: 21 MMOL/L (ref 22–29)
EOSINOPHIL # BLD AUTO: 0.1 10E3/UL (ref 0–0.7)
EOSINOPHIL NFR BLD AUTO: 1 %
ERYTHROCYTE [DISTWIDTH] IN BLOOD BY AUTOMATED COUNT: 14.9 % (ref 10–15)
GFR SERPL CREATININE-BSD FRML MDRD: >90 ML/MIN/1.73M2
GLUCOSE SERPL-MCNC: 96 MG/DL (ref 70–99)
HBA1C MFR BLD: 5.4 % (ref 0–5.6)
HCT VFR BLD AUTO: 44.9 % (ref 35–47)
HDLC SERPL-MCNC: 69 MG/DL
HGB BLD-MCNC: 15.2 G/DL (ref 11.7–15.7)
IMM GRANULOCYTES # BLD: 0 10E3/UL
IMM GRANULOCYTES NFR BLD: 0 %
INR BLD: 1.8 (ref 0.9–1.1)
LDLC SERPL CALC-MCNC: 158 MG/DL
LYMPHOCYTES # BLD AUTO: 1.8 10E3/UL (ref 0.8–5.3)
LYMPHOCYTES NFR BLD AUTO: 23 %
MCH RBC QN AUTO: 32.4 PG (ref 26.5–33)
MCHC RBC AUTO-ENTMCNC: 33.9 G/DL (ref 31.5–36.5)
MCV RBC AUTO: 96 FL (ref 78–100)
MONOCYTES # BLD AUTO: 0.6 10E3/UL (ref 0–1.3)
MONOCYTES NFR BLD AUTO: 8 %
NEUTROPHILS # BLD AUTO: 5.2 10E3/UL (ref 1.6–8.3)
NEUTROPHILS NFR BLD AUTO: 68 %
NONHDLC SERPL-MCNC: 181 MG/DL
PLATELET # BLD AUTO: 274 10E3/UL (ref 150–450)
POTASSIUM SERPL-SCNC: 4.3 MMOL/L (ref 3.4–5.3)
PROT SERPL-MCNC: 7.3 G/DL (ref 6.4–8.3)
RBC # BLD AUTO: 4.69 10E6/UL (ref 3.8–5.2)
SODIUM SERPL-SCNC: 142 MMOL/L (ref 136–145)
TRIGL SERPL-MCNC: 115 MG/DL
TSH SERPL DL<=0.005 MIU/L-ACNC: 2.71 UIU/ML (ref 0.3–4.2)
WBC # BLD AUTO: 7.7 10E3/UL (ref 4–11)

## 2023-01-11 PROCEDURE — 83036 HEMOGLOBIN GLYCOSYLATED A1C: CPT | Performed by: NURSE PRACTITIONER

## 2023-01-11 PROCEDURE — 99213 OFFICE O/P EST LOW 20 MIN: CPT | Mod: 25 | Performed by: NURSE PRACTITIONER

## 2023-01-11 PROCEDURE — 90677 PCV20 VACCINE IM: CPT | Performed by: NURSE PRACTITIONER

## 2023-01-11 PROCEDURE — 80053 COMPREHEN METABOLIC PANEL: CPT | Performed by: NURSE PRACTITIONER

## 2023-01-11 PROCEDURE — 80061 LIPID PANEL: CPT | Performed by: NURSE PRACTITIONER

## 2023-01-11 PROCEDURE — 85610 PROTHROMBIN TIME: CPT | Performed by: NURSE PRACTITIONER

## 2023-01-11 PROCEDURE — 85025 COMPLETE CBC W/AUTO DIFF WBC: CPT | Performed by: NURSE PRACTITIONER

## 2023-01-11 PROCEDURE — 36415 COLL VENOUS BLD VENIPUNCTURE: CPT | Performed by: NURSE PRACTITIONER

## 2023-01-11 PROCEDURE — G0009 ADMIN PNEUMOCOCCAL VACCINE: HCPCS | Performed by: NURSE PRACTITIONER

## 2023-01-11 PROCEDURE — 84443 ASSAY THYROID STIM HORMONE: CPT | Performed by: NURSE PRACTITIONER

## 2023-01-11 RX ORDER — ALBUTEROL SULFATE 90 UG/1
AEROSOL, METERED RESPIRATORY (INHALATION)
Qty: 8.5 G | Refills: 11 | Status: SHIPPED | OUTPATIENT
Start: 2023-01-11 | End: 2024-04-01

## 2023-01-11 RX ORDER — OXYCODONE AND ACETAMINOPHEN 7.5; 325 MG/1; MG/1
TABLET ORAL
COMMUNITY
Start: 2022-12-21 | End: 2023-07-20

## 2023-01-11 ASSESSMENT — PATIENT HEALTH QUESTIONNAIRE - PHQ9
SUM OF ALL RESPONSES TO PHQ QUESTIONS 1-9: 18
10. IF YOU CHECKED OFF ANY PROBLEMS, HOW DIFFICULT HAVE THESE PROBLEMS MADE IT FOR YOU TO DO YOUR WORK, TAKE CARE OF THINGS AT HOME, OR GET ALONG WITH OTHER PEOPLE: EXTREMELY DIFFICULT
SUM OF ALL RESPONSES TO PHQ QUESTIONS 1-9: 18

## 2023-01-11 NOTE — PATIENT INSTRUCTIONS
Lab in [address]    Check with insurance re cost of xarelto and eliquis     I will ask INR to check in with you re at home machine

## 2023-01-11 NOTE — PROGRESS NOTES
Anticoagulation Management    Discussed INR home monitoring program with Bere Machuca reviewing:      [employer] requirements: >= 3 months of anticoagulation therapy, indication for chronic anticoagulation and order from provider    Required testing frequency (q1-2 weeks)    Home meters, testing supplies, meter training, and reporting of INR results done through an outside company. Patient would be contacted by home monitoring company to review insurance coverage with home monitoring company prior to enrolling.    [employer] would continue to receive and manage INR results.    Home monitoring application may take several weeks and must continue to follow up with recommended INR monitoring in clinic until receives monitor and training completed.     Home monitoring terms reviewed with patient      Patient agrees to frequency of testing as directed by referring provider ( weekly or biweekly) Yes    Testing to be performed during business hours of [facility] Yes    Patient agrees they have the skill (or a designated caregiver) necessary to perform the self test Yes    Patient agrees to report all INR results to INR home monitoring company Yes    Patient agrees to have additional INR test in clinic if a home result is critical Yes    Patient agrees to use a [employer] approved service provider and device for home monitoring Yes    David Adrian    Referring provider: Uyen Mcintyre    Referring providers Clinic Fax number [phone]    Bere Machuca is interested home INR monitoring and requests order be submitted.

## 2023-01-11 NOTE — NURSING NOTE
"Chief Complaint   Patient presents with     Recheck Medication     initial /78   Pulse 82   Temp 97.9  F (36.6  C) (Oral)   Resp 18   Ht 1.676 m (5' 6\")   Wt (!) 168.3 kg (371 lb)   SpO2 96%   BMI 59.88 kg/m   Estimated body mass index is 59.88 kg/m  as calculated from the following:    Height as of this encounter: 1.676 m (5' 6\").    Weight as of this encounter: 168.3 kg (371 lb)..  bp completed using cuff size large  LEOBARDO ALVARADO LPN  "

## 2023-01-11 NOTE — PROGRESS NOTES
ANTICOAGULATION MANAGEMENT     Bere Machuca 67 year old female is on warfarin with subtherapeutic INR result. (Goal INR 2.0-3.0)    Recent labs: (last 7 days)     01/11/23  1529   INR 1.8*       ASSESSMENT       Source(s): Chart Review and Patient/Caregiver Call       Warfarin doses taken: Warfarin taken as instructed    Diet: No new diet changes identified    New illness, injury, or hospitalization: No    Medication/supplement changes: Semaglutide dose increased on 1/11/23 per [employer]: Concurrent use of SEMAGLUTIDE and WARFARIN may result in increased warfarin exposure    Signs or symptoms of bleeding or clotting: No    Previous INR: Subtherapeutic    Additional findings: Pt requesting home meter, application started in documentation only encounter on 1/11/23       PLAN     Recommended plan for ongoing change(s) affecting INR     Dosing Instructions: booster dose then continue your current warfarin dose with next INR in 2 weeks       Summary  As of 1/11/2023    Full warfarin instructions:  1/11: 7.5 mg; Otherwise 2.5 mg every Mon, Fri; 5 mg all other days   Next INR check:  1/25/2023             Telephone call with Darline who verbalizes understanding and agrees to plan    Patient offered & declined to schedule next visit    Education provided:     Goal range and lab monitoring: goal range and significance of current result and Importance of therapeutic range    Plan made per [facility] anticoagulation protocol    Tito Hopson RN  Anticoagulation Clinic  1/11/2023    _______________________________________________________________________     Anticoagulation Episode Summary     Current INR goal:  2.0-3.0   TTR:  49.2 % (1 y)   Target end date:  Indefinite   Send INR reminders to:  [facility]    Indications    Chronic anticoagulation [Z79.01]  History of pulmonary embolism [Z86.711]  Factor 5 Leiden mutation  heterozygous/ INR 2-3 [D68.51]           Comments:           Anticoagulation Care Providers      Provider Role Specialty Phone number    Uyen Mcintyre APRN CNP Referring Internal Medicine [phone]

## 2023-01-11 NOTE — PROGRESS NOTES
Assessment & Plan     Controlled type 2 diabetes mellitus with other circulatory complication, without long-term current use of insulin (H)  Try rybelsus as has been unable to get injectable ozempic   - Albumin Random Urine Quantitative with Creat Ratio; Future  - HEMOGLOBIN A1C; Future  - Lipid panel reflex to direct LDL Non-fasting; Future  - Comprehensive metabolic panel (BMP + Alb, Alk Phos, ALT, AST, Total. Bili, TP); Future  - TSH with free T4 reflex; Future  - CBC with platelets and differential; Future  - Semaglutide 7 MG TABS; Take 7 mg by mouth daily In a month we may increase to 14 mg daily    Seropositive rheumatoid arthritis of multiple sites (H)  Sees rheum     PVD (peripheral vascular disease) (H)  Stable     Chronic obstructive pulmonary disease, unspecified COPD type (H)  Stopping smoking and dagofan is working well   - albuterol (PROAIR HFA/PROVENTIL HFA/VENTOLIN HFA) 108 (90 Base) MCG/ACT inhaler; INHALE 2 PUFFS INTO THE LUNGS EVERY 4 HOURS AS NEEDED ### DO NOT FILL NOW.  Please update patient's profile to reflect additional refills.  ####    Factor 5 Leiden mutation, heterozygous (H)  Wants to get a home monitor   - INR point of care    Major depressive disorder, recurrent episode, moderate (H)  Stable - sees psych     Encounter for long-term (current) use of medications    - Comprehensive metabolic panel (BMP + Alb, Alk Phos, ALT, AST, Total. Bili, TP); Future  - TSH with free T4 reflex; Future    Essential hypertension  In good range   - Comprehensive metabolic panel (BMP + Alb, Alk Phos, ALT, AST, Total. Bili, TP); Future  - TSH with free T4 reflex; Future  - CBC with platelets and differential; Future    Screening for hyperlipidemia    - Lipid panel reflex to direct LDL Non-fasting; Future  - Comprehensive metabolic panel (BMP + Alb, Alk Phos, ALT, AST, Total. Bili, TP); Future    History of cocaine abuse (H)      Morbid obesity with BMI of 60.0-69.9, adult (H)  Losing weight on ozempic   "  - Semaglutide 7 MG TABS; Take 7 mg by mouth daily In a month we may increase to 14 mg daily      28 minutes spent on the date of the encounter doing chart review, history and exam, documentation and further activities per the note       BMI:   Estimated body mass index is 59.88 kg/m  as calculated from the following:    Height as of this encounter: 1.676 m (5' 6\").    Weight as of this encounter: 168.3 kg (371 lb).       Depression Screening Follow Up    PHQ 1/11/2023   PHQ-9 Total Score 18   Q9: Thoughts of better off dead/self-harm past 2 weeks Several days   F/U: Thoughts of suicide or self-harm No   F/U: Safety concerns No         Patient Instructions   Lab in [address]    Check with insurance re cost of xarelto and eliquis     I will ask INR to check in with you re at home machine         Return in about 1 year (around 1/11/2024).    MARY Terrell CNP  [facility]IONNA Gregorio is a 67 year old, presenting for the following health issues:  Recheck Medication      History of Present Illness       She eats 0-1 servings of fruits and vegetables daily.She consumes 0 sweetened beverage(s) daily.She exercises with enough effort to increase her heart rate 9 or less minutes per day.  She exercises with enough effort to increase her heart rate 3 or less days per week.   She is taking medications regularly.    Today's PHQ-9         PHQ-9 Total Score: 18    PHQ-9 Q9 Thoughts of better off dead/self-harm past 2 weeks :   Several days  Thoughts of suicide or self harm: (P) No  Self-harm Plan:     Self-harm Action:       Safety concerns for self or others: (P) No    How difficult have these problems made it for you to do your work, take care of things at home, or get along with other people: Extremely difficult     Saw gastro in October and no colonoscopy needed   On mesalamine     Pain doctor hooking up with hip surgeon - not rigid wit weight for hip replacement   Advanced spine " "and pain   Dr Carlisle     She cannot get injectable ozempic - on 1 mg dose   Will try rybelsus and see if she can get that easier  She has had weight loss with ozempic       Review of Systems   Constitutional, HEENT, cardiovascular, pulmonary, GI, , musculoskeletal, neuro, skin, endocrine and psych systems are negative, except as otherwise noted.      Objective    /78   Pulse 82   Temp 97.9  F (36.6  C) (Oral)   Resp 18   Ht 1.676 m (5' 6\")   Wt (!) 168.3 kg (371 lb)   SpO2 96%   BMI 59.88 kg/m    Body mass index is 59.88 kg/m .  Physical Exam   GENERAL:  alert and no distress  HENT: ear canals and TM's normal, nose and mouth without ulcers or lesions  RESP: lungs clear to auscultation - no rales, rhonchi or wheezes  CV: regular rate and rhythm, normal S1 S2, no S3 or S4, no murmur, click or rub, no peripheral edema and peripheral pulses strong  ABDOMEN: soft, nontender, no hepatosplenomegaly, no masses and bowel sounds normal  MS: no gross musculoskeletal defects noted, no edema  NEURO: Normal strength and tone, mentation intact and speech normal  PSYCH: mentation appears normal, affect normal/bright    Lab                 "

## 2023-01-12 DIAGNOSIS — E78.00 HYPERCHOLESTEREMIA: Primary | ICD-10-CM

## 2023-01-12 RX ORDER — ROSUVASTATIN CALCIUM 20 MG/1
20 TABLET, COATED ORAL DAILY
Qty: 90 TABLET | Refills: 3 | Status: SHIPPED | OUTPATIENT
Start: 2023-01-12 | End: 2023-11-08

## 2023-01-27 ENCOUNTER — ANTICOAGULATION THERAPY VISIT (OUTPATIENT)
Dept: ANTICOAGULATION | Facility: CLINIC | Age: 68
End: 2023-01-27
Payer: COMMERCIAL

## 2023-01-27 DIAGNOSIS — Z79.01 CHRONIC ANTICOAGULATION: Primary | ICD-10-CM

## 2023-01-27 DIAGNOSIS — Z86.711 HISTORY OF PULMONARY EMBOLISM: ICD-10-CM

## 2023-01-27 DIAGNOSIS — D68.51 FACTOR 5 LEIDEN MUTATION, HETEROZYGOUS (H): ICD-10-CM

## 2023-01-27 LAB — INR HOME MONITORING: 3.2 (ref 2–3)

## 2023-02-02 ENCOUNTER — MYC MEDICAL ADVICE (OUTPATIENT)
Dept: INTERNAL MEDICINE | Facility: CLINIC | Age: 68
End: 2023-02-02
Payer: COMMERCIAL

## 2023-02-02 NOTE — TELEPHONE ENCOUNTER
Please see patient's mychart message below.     C/o increase pain and muscle spasms from Rosuvastatin.    Different medication?    Please advise, thanks.

## 2023-02-04 DIAGNOSIS — Z86.711 HISTORY OF PULMONARY EMBOLISM: ICD-10-CM

## 2023-02-07 RX ORDER — WARFARIN SODIUM 5 MG/1
TABLET ORAL
Qty: 90 TABLET | Refills: 1 | Status: SHIPPED | OUTPATIENT
Start: 2023-02-07 | End: 2023-09-18

## 2023-02-07 NOTE — TELEPHONE ENCOUNTER
ANTICOAGULATION MANAGEMENT:  Medication Refill    Anticoagulation Summary  As of 1/27/2023    Warfarin maintenance plan:  2.5 mg (5 mg x 0.5) every Mon, Wed, Fri; 5 mg (5 mg x 1) all other days   Next INR check:  2/9/2023   Target end date:  Indefinite    Indications    Factor 5 Leiden mutation  heterozygous/ INR 2-3 [D68.51]  History of pulmonary embolism [Z86.711]             Anticoagulation Care Providers     Provider Role Specialty Phone number    Uyen Mcintyre APRN CNP Referring Internal Medicine [phone]          Visit with referring provider/group within last year: Yes    [facility] referral signed within last year: Yes    Bere meets all criteria for refill (current [facility] referral, office visit with referring provider/group in last year, lab monitoring up to date or not exceeding 2 weeks overdue). Rx instructions and quantity supplied updated to match patient's current dosing plan. Warfarin 90 day supply with 1 refill granted per [facility] protocol     Fely Kidd RN  Anticoagulation Clinic

## 2023-02-11 LAB — INR HOME MONITORING: 2.7 (ref 2–3)

## 2023-02-12 ENCOUNTER — MYC REFILL (OUTPATIENT)
Dept: INTERNAL MEDICINE | Facility: CLINIC | Age: 68
End: 2023-02-12
Payer: COMMERCIAL

## 2023-02-12 DIAGNOSIS — E11.59 CONTROLLED TYPE 2 DIABETES MELLITUS WITH OTHER CIRCULATORY COMPLICATION, WITHOUT LONG-TERM CURRENT USE OF INSULIN (H): ICD-10-CM

## 2023-02-12 DIAGNOSIS — E66.01 MORBID OBESITY WITH BMI OF 60.0-69.9, ADULT (H): ICD-10-CM

## 2023-02-12 RX ORDER — ORAL SEMAGLUTIDE 7 MG/1
TABLET ORAL
Qty: 30 TABLET | Refills: 0 | Status: CANCELLED | OUTPATIENT
Start: 2023-02-12

## 2023-02-13 ENCOUNTER — ANTICOAGULATION THERAPY VISIT (OUTPATIENT)
Dept: ANTICOAGULATION | Facility: CLINIC | Age: 68
End: 2023-02-13
Payer: COMMERCIAL

## 2023-02-13 DIAGNOSIS — D68.51 FACTOR 5 LEIDEN MUTATION, HETEROZYGOUS (H): Primary | ICD-10-CM

## 2023-02-13 DIAGNOSIS — Z86.711 HISTORY OF PULMONARY EMBOLISM: ICD-10-CM

## 2023-02-13 NOTE — PROGRESS NOTES
ANTICOAGULATION MANAGEMENT     Bere Machuca 67 year old female is on warfarin with therapeutic INR result. (Goal INR 2.0-3.0)    Recent labs: (last 7 days)     02/09/23  0000   INR 2.7       ASSESSMENT       Source(s): Chart Review and Patient/Caregiver Call       Warfarin doses taken: Warfarin taken as instructed    Diet: No new diet changes identified    New illness, injury, or hospitalization: No    Medication/supplement changes: None noted    Signs or symptoms of bleeding or clotting: No    Previous INR: Supratherapeutic    Additional findings: None       PLAN     Recommended plan for no diet, medication or health factor changes affecting INR     Dosing Instructions: Continue your current warfarin dose with next INR in 2 weeks       Summary  As of 2/13/2023    Full warfarin instructions:  2.5 mg every Mon, Wed, Fri; 5 mg all other days   Next INR check:  2/23/2023             Telephone call with Darline who verbalizes understanding and agrees to plan    Patient to recheck with home meter    Education provided:     Please call back if any changes to your diet, medications or how you've been taking warfarin    Plan made per [facility] anticoagulation protocol    Celena Mobley RN  Anticoagulation Clinic  2/13/2023    _______________________________________________________________________     Anticoagulation Episode Summary     Current INR goal:  2.0-3.0   TTR:  46.3 % (1 y)   Target end date:  Indefinite   Send INR reminders to:  [facility]IT[facility]    Indications    Factor 5 Leiden mutation  heterozygous/ INR 2-3 [D68.51]  History of pulmonary embolism [Z86.711]           Comments:           Anticoagulation Care Providers     Provider Role Specialty Phone number    Uyen Mcintyre APRN CNP Referring Internal Medicine [phone]

## 2023-02-14 RX ORDER — ORAL SEMAGLUTIDE 14 MG/1
14 TABLET ORAL DAILY
Qty: 90 TABLET | Refills: 3 | Status: SHIPPED | OUTPATIENT
Start: 2023-02-14 | End: 2023-06-08

## 2023-02-14 NOTE — TELEPHONE ENCOUNTER
Semaglutide  Routing refill request to provider for review/approval because:  Patient requesting bump up to 14 mg      LOV 01/2023   Detail Level: Zone

## 2023-02-23 DIAGNOSIS — F33.0 MILD EPISODE OF RECURRENT MAJOR DEPRESSIVE DISORDER (H): ICD-10-CM

## 2023-02-24 RX ORDER — ESCITALOPRAM OXALATE 20 MG/1
TABLET ORAL
Qty: 90 TABLET | Refills: 1 | OUTPATIENT
Start: 2023-02-24

## 2023-02-24 NOTE — TELEPHONE ENCOUNTER
Too soon to refill   E-Prescribing Status: Receipt confirmed by pharmacy (11/9/2022  7:08 AM CST)  Milena SERRANO RN, BSN

## 2023-02-25 LAB — INR HOME MONITORING: 2.9 (ref 2–3)

## 2023-02-27 ENCOUNTER — ANTICOAGULATION THERAPY VISIT (OUTPATIENT)
Dept: ANTICOAGULATION | Facility: CLINIC | Age: 68
End: 2023-02-27
Payer: COMMERCIAL

## 2023-02-27 DIAGNOSIS — D68.51 FACTOR 5 LEIDEN MUTATION, HETEROZYGOUS (H): Primary | ICD-10-CM

## 2023-02-27 DIAGNOSIS — Z86.711 HISTORY OF PULMONARY EMBOLISM: ICD-10-CM

## 2023-02-27 NOTE — PROGRESS NOTES
ANTICOAGULATION MANAGEMENT     Bere Machuca 67 year old female is on warfarin with therapeutic INR result. (Goal INR 2.0-3.0)    Recent labs: (last 7 days)     02/25/23  0000   INR 2.9       ASSESSMENT       Source(s): Chart Review    Previous INR was Therapeutic last visit; previously outside of goal range    Medication, diet, health changes since last INR chart reviewed; none identified             PLAN     Recommended plan for no diet, medication or health factor changes affecting INR     Dosing Instructions: Continue your current warfarin dose with next INR in 2 weeks       Summary  As of 2/27/2023    Full warfarin instructions:  2.5 mg every Mon, Wed, Fri; 5 mg all other days   Next INR check:  3/13/2023             Detailed voice message left for Darline with dosing instructions and follow up date.     Patient to recheck with home meter    Education provided:     Please call back if any changes to your diet, medications or how you've been taking warfarin    Plan made per [facility] anticoagulation protocol    Fidelia Warren RN  Anticoagulation Clinic  2/27/2023    _______________________________________________________________________     Anticoagulation Episode Summary     Current INR goal:  2.0-3.0   TTR:  46.6 % (1 y)   Target end date:  Indefinite   Send INR reminders to:  [facility]    Indications    Factor 5 Leiden mutation  heterozygous/ INR 2-3 [D68.51]  History of pulmonary embolism [Z86.711]           Comments:           Anticoagulation Care Providers     Provider Role Specialty Phone number    Uyen Mcintyre APRN CNP Referring Internal Medicine [phone]

## 2023-03-13 ENCOUNTER — ANTICOAGULATION THERAPY VISIT (OUTPATIENT)
Dept: ANTICOAGULATION | Facility: CLINIC | Age: 68
End: 2023-03-13
Payer: COMMERCIAL

## 2023-03-13 DIAGNOSIS — Z86.711 HISTORY OF PULMONARY EMBOLISM: ICD-10-CM

## 2023-03-13 DIAGNOSIS — D68.51 FACTOR 5 LEIDEN MUTATION, HETEROZYGOUS (H): Primary | ICD-10-CM

## 2023-03-13 LAB — INR HOME MONITORING: 2.7 (ref 2–3)

## 2023-03-13 NOTE — PROGRESS NOTES
ANTICOAGULATION MANAGEMENT     Bere Machuca 67 year old female is on warfarin with therapeutic INR result. (Goal INR 2.0-3.0)    Recent labs: (last 7 days)     03/13/23  0000   INR 2.7       ASSESSMENT       Source(s): Chart Review and Patient/Caregiver Call       Warfarin doses taken: Warfarin taken as instructed    Diet: No new diet changes identified    New illness, injury, or hospitalization: No    Medication/supplement changes: None noted    Signs or symptoms of bleeding or clotting: No    Previous INR: Therapeutic last 2(+) visits    Additional findings: None         PLAN     Recommended plan for no diet, medication or health factor changes affecting INR     Dosing Instructions: Continue your current warfarin dose with next INR in 2 weeks       Summary  As of 3/13/2023    Full warfarin instructions:  2.5 mg every Mon, Wed, Fri; 5 mg all other days   Next INR check:  3/27/2023             Telephone call with Darline who agrees to plan and repeated back plan correctly    Patient to recheck with home meter    Education provided:     Please call back if any changes to your diet, medications or how you've been taking warfarin    Plan made per [facility] anticoagulation protocol    Fely Kidd, RN  Anticoagulation Clinic  3/13/2023    _______________________________________________________________________     Anticoagulation Episode Summary     Current INR goal:  2.0-3.0   TTR:  46.9 % (1 y)   Target end date:  Indefinite   Send INR reminders to:  [facility]    Indications    Factor 5 Leiden mutation  heterozygous/ INR 2-3 [D68.51]  History of pulmonary embolism [Z86.711]           Comments:           Anticoagulation Care Providers     Provider Role Specialty Phone number    Uyen Mcintyre APRN CNP Referring Internal Medicine [phone]

## 2023-03-27 ENCOUNTER — TRANSFERRED RECORDS (OUTPATIENT)
Dept: HEALTH INFORMATION MANAGEMENT | Facility: CLINIC | Age: 68
End: 2023-03-27

## 2023-03-30 ENCOUNTER — TRANSFERRED RECORDS (OUTPATIENT)
Dept: INTERNAL MEDICINE | Facility: CLINIC | Age: 68
End: 2023-03-30
Payer: COMMERCIAL

## 2023-03-30 ENCOUNTER — ANTICOAGULATION THERAPY VISIT (OUTPATIENT)
Dept: ANTICOAGULATION | Facility: CLINIC | Age: 68
End: 2023-03-30
Payer: COMMERCIAL

## 2023-03-30 DIAGNOSIS — D68.51 FACTOR 5 LEIDEN MUTATION, HETEROZYGOUS (H): Primary | ICD-10-CM

## 2023-03-30 DIAGNOSIS — Z86.711 HISTORY OF PULMONARY EMBOLISM: ICD-10-CM

## 2023-03-30 LAB — INR HOME MONITORING: 2.7 (ref 2–3)

## 2023-03-30 NOTE — PROGRESS NOTES
ANTICOAGULATION MANAGEMENT     Bere Machuca 67 year old female is on warfarin with therapeutic INR result. (Goal INR 2.0-3.0)    Recent labs: (last 7 days)     03/30/23  0000   INR 2.7       ASSESSMENT       Source(s): Chart Review and Patient/Caregiver Call       Warfarin doses taken: Warfarin taken as instructed    Diet: No new diet changes identified    New illness, injury, or hospitalization: No    Medication/supplement changes: None noted    Signs or symptoms of bleeding or clotting: No    Previous INR: Therapeutic last 2(+) visits    Additional findings: None         PLAN     Recommended plan for no diet, medication or health factor changes affecting INR     Dosing Instructions: Continue your current warfarin dose with next INR in 2 weeks       Summary  As of 3/30/2023    Full warfarin instructions:  2.5 mg every Mon, Wed, Fri; 5 mg all other days   Next INR check:  4/13/2023             Telephone call with Darline who verbalizes understanding and agrees to plan and ; made aware of patient survey and encouraged to participate.    Patient to recheck with home meter    Education provided:     Please call back if any changes to your diet, medications or how you've been taking warfarin    Discussed manage by exception with home monitor. Continue to submit INR results to home monitor company.You will only be called when your result is out of range. Please call and notify [facility] if new medication started, dose missed, signs or symptoms of bleeding or clotting, or a surgery/procedure is scheduled.    Contact [phone]  with any changes, questions or concerns.     Plan made per [facility] anticoagulation protocol    Bailee Delgado, RN  Anticoagulation Clinic  3/30/2023    _______________________________________________________________________     Anticoagulation Episode Summary     Current INR goal:  2.0-3.0   TTR:  50.3 % (1 y)   Target end date:  Indefinite   Send INR reminders to:  [facility]     Indications    Factor 5 Leiden mutation  heterozygous/ INR 2-3 [D68.51]  History of pulmonary embolism [Z86.711]           Comments:  MBE         Anticoagulation Care Providers     Provider Role Specialty Phone number    Uyen Mcintyre APRN CNP Referring Internal Medicine [phone]

## 2023-04-15 LAB — INR HOME MONITORING: 1.6 (ref 2–3)

## 2023-04-17 ENCOUNTER — ANTICOAGULATION THERAPY VISIT (OUTPATIENT)
Dept: ANTICOAGULATION | Facility: CLINIC | Age: 68
End: 2023-04-17
Payer: COMMERCIAL

## 2023-04-17 DIAGNOSIS — Z86.711 HISTORY OF PULMONARY EMBOLISM: ICD-10-CM

## 2023-04-17 DIAGNOSIS — D68.51 FACTOR 5 LEIDEN MUTATION, HETEROZYGOUS (H): Primary | ICD-10-CM

## 2023-04-17 NOTE — PROGRESS NOTES
ANTICOAGULATION MANAGEMENT     Bere Machuca 67 year old female is on warfarin with subtherapeutic INR result. (Goal INR 2.0-3.0)    Recent labs: (last 7 days)     04/15/23  0000   INR 1.6*       ASSESSMENT       Source(s): Chart Review and Patient/Caregiver Call       Warfarin doses taken: Warfarin taken as instructed    Diet: No new diet changes identified    Medication/supplement changes: Started mounjaro, this should not effect INR    New illness, injury, or hospitalization: No    Signs or symptoms of bleeding or clotting: No    Previous INR: Therapeutic last 2(+) visits    Additional findings: None         PLAN     Recommended plan for no diet, medication or health factor changes affecting INR     Dosing Instructions: booster dose then Increase your warfarin dose (9% change) with next INR in 1 week (this is the closest writer could get to protocol without switching tablet sizes, which patient declined at this time). Patient requesting 2.5 mg twice weekly going forward as this was a previous dose she was on.    Summary  As of 4/17/2023    Full warfarin instructions:  2.5 mg every Mon, Wed, Fri; 5 mg all other days   Next INR check:               Telephone call with Darline who verbalizes understanding and agrees to plan    Patient to recheck with home meter    Education provided:     Please call back if any changes to your diet, medications or how you've been taking warfarin    Symptom monitoring: monitoring for bleeding signs and symptoms and monitoring for clotting signs and symptoms    Contact [phone]  with any changes, questions or concerns.     Plan made per [facility] anticoagulation protocol    Fidelia Warren RN  Anticoagulation Clinic  4/17/2023    _______________________________________________________________________     Anticoagulation Episode Summary     Current INR goal:  2.0-3.0   TTR:  53.4 % (1 y)   Target end date:  Indefinite   Send INR reminders to:  [facility]    Indications     Factor 5 Leiden mutation  heterozygous/ INR 2-3 [D68.51]  History of pulmonary embolism [Z86.711]           Comments:  MBE         Anticoagulation Care Providers     Provider Role Specialty Phone number    Uyen Mcintyre APRN CNP Referring Internal Medicine [phone]

## 2023-04-22 ENCOUNTER — HEALTH MAINTENANCE LETTER (OUTPATIENT)
Age: 68
End: 2023-04-22

## 2023-04-25 LAB — INR HOME MONITORING: 2.7 (ref 2–3)

## 2023-04-26 ENCOUNTER — TRANSFERRED RECORDS (OUTPATIENT)
Dept: HEALTH INFORMATION MANAGEMENT | Facility: CLINIC | Age: 68
End: 2023-04-26
Payer: COMMERCIAL

## 2023-04-26 ENCOUNTER — ANTICOAGULATION THERAPY VISIT (OUTPATIENT)
Dept: ANTICOAGULATION | Facility: CLINIC | Age: 68
End: 2023-04-26
Payer: COMMERCIAL

## 2023-04-26 DIAGNOSIS — Z86.711 HISTORY OF PULMONARY EMBOLISM: ICD-10-CM

## 2023-04-26 DIAGNOSIS — D68.51 FACTOR 5 LEIDEN MUTATION, HETEROZYGOUS (H): Primary | ICD-10-CM

## 2023-04-26 NOTE — PROGRESS NOTES
ANTICOAGULATION MANAGEMENT     Bere Machuca 67 year old female is on warfarin with therapeutic INR result. (Goal INR 2.0-3.0)    Recent labs: (last 7 days)     04/25/23  0000   INR 2.7       ASSESSMENT       Source(s): Chart Review    Previous INR was Subtherapeutic    Medication, diet, health changes since last INR chart reviewed; none identified         PLAN     Recommended plan for no diet, medication or health factor changes affecting INR     Dosing Instructions: Continue your current warfarin dose with next INR in 2 weeks       Summary  As of 4/26/2023    Full warfarin instructions:  2.5 mg every Mon, Fri; 5 mg all other days   Next INR check:  5/9/2023             Detailed voice message left for Darline with dosing instructions and follow up date.     Patient to recheck with home meter    Education provided:     Please call back if any changes to your diet, medications or how you've been taking warfarin    Resume manage by exception with home monitor. Continue to submit INR results to home monitor company.You will only be called when your result is out of range. Please call and notify [facility] if new medication started, dose missed, signs or symptoms of bleeding or clotting, or a surgery/procedure is scheduled.    Plan made per [facility] anticoagulation protocol    Tito Hopson RN  Anticoagulation Clinic  4/26/2023    _______________________________________________________________________     Anticoagulation Episode Summary     Current INR goal:  2.0-3.0   TTR:  55.0 % (1 y)   Target end date:  Indefinite   Send INR reminders to:  [facility]    Indications    Factor 5 Leiden mutation  heterozygous/ INR 2-3 [D68.51]  History of pulmonary embolism [Z86.711]           Comments:  MBE         Anticoagulation Care Providers     Provider Role Specialty Phone number    Uyen Mcintyre APRN CNP Referring Internal Medicine [phone]

## 2023-05-13 LAB — INR HOME MONITORING: 2.7 (ref 2–3)

## 2023-05-15 ENCOUNTER — DOCUMENTATION ONLY (OUTPATIENT)
Dept: ANTICOAGULATION | Facility: CLINIC | Age: 68
End: 2023-05-15
Payer: COMMERCIAL

## 2023-05-15 DIAGNOSIS — D68.51 FACTOR 5 LEIDEN MUTATION, HETEROZYGOUS (H): Primary | ICD-10-CM

## 2023-05-15 DIAGNOSIS — Z86.711 HISTORY OF PULMONARY EMBOLISM: ICD-10-CM

## 2023-05-15 NOTE — PROGRESS NOTES
ANTICOAGULATION  MANAGEMENT-Home Monitor Managed by Exception    Bere SHARMIN Machuca 67 year old female is on warfarin with therapeutic INR result. (Goal INR 2.0-3.0)    No results for input(s): INR in the last 168 hours.      Previous INR was Therapeutic    Medication, diet, health changes since last INR:chart reviewed; none identified    Contacted within the last 12 weeks by phone on 04/26/2023      JONN     Bere was NOT contacted regarding therapeutic result today per home monitoring policy manage by exception agreement.   Current warfarin dose is to be continued:     Summary  As of 5/15/2023    Full warfarin instructions:  2.5 mg every Mon, Fri; 5 mg all other days   Next INR check:  5/29/2023           ?   Bailee Delgado, RN  Anticoagulation Clinic  5/15/2023    _______________________________________________________________________     Anticoagulation Episode Summary     Current INR goal:  2.0-3.0   TTR:  59.6 % (11.9 mo)   Target end date:  Indefinite   Send INR reminders to:  [facility]    Indications    Factor 5 Leiden mutation  heterozygous/ INR 2-3 [D68.51]  History of pulmonary embolism [Z86.711]           Comments:  MBE         Anticoagulation Care Providers     Provider Role Specialty Phone number    Uyen Mcintyre APRN CNP Referring Internal Medicine [phone]

## 2023-05-23 ENCOUNTER — TRANSFERRED RECORDS (OUTPATIENT)
Dept: FAMILY MEDICINE | Facility: CLINIC | Age: 68
End: 2023-05-23

## 2023-05-26 ENCOUNTER — DOCUMENTATION ONLY (OUTPATIENT)
Dept: ANTICOAGULATION | Facility: CLINIC | Age: 68
End: 2023-05-26
Payer: COMMERCIAL

## 2023-05-26 DIAGNOSIS — D68.51 FACTOR 5 LEIDEN MUTATION, HETEROZYGOUS (H): Primary | ICD-10-CM

## 2023-05-26 DIAGNOSIS — Z86.711 HISTORY OF PULMONARY EMBOLISM: ICD-10-CM

## 2023-05-26 LAB — INR HOME MONITORING: 2.7 (ref 2–3)

## 2023-05-26 NOTE — PROGRESS NOTES
ANTICOAGULATION  MANAGEMENT-Home Monitor Managed by Exception    Bere SHARMIN Machuca 67 year old female is on warfarin with therapeutic INR result. (Goal INR 2.0-3.0)    Recent labs: (last 7 days)     05/26/23  0000   INR 2.7         Previous INR was Therapeutic    Medication, diet, health changes since last INR:chart reviewed; none identified    Contacted within the last 12 weeks by phone on 4/26/23      JONN     Bere was NOT contacted regarding therapeutic result today per home monitoring policy manage by exception agreement.   Current warfarin dose is to be continued:     Summary  As of 5/26/2023    Full warfarin instructions:  2.5 mg every Mon, Fri; 5 mg all other days   Next INR check:  6/9/2023           ?   Fidelia Warren RN  Anticoagulation Clinic  5/26/2023    _______________________________________________________________________     Anticoagulation Episode Summary     Current INR goal:  2.0-3.0   TTR:  63.4 % (1 y)   Target end date:  Indefinite   Send INR reminders to:  [facility]    Indications    Factor 5 Leiden mutation  heterozygous/ INR 2-3 [D68.51]  History of pulmonary embolism [Z86.711]           Comments:  NICK         Anticoagulation Care Providers     Provider Role Specialty Phone number    Uyen Mcintyre APRN CNP Referring Internal Medicine [phone]

## 2023-06-02 DIAGNOSIS — J44.9 CHRONIC OBSTRUCTIVE PULMONARY DISEASE, UNSPECIFIED COPD TYPE (H): ICD-10-CM

## 2023-06-04 ENCOUNTER — MYC MEDICAL ADVICE (OUTPATIENT)
Dept: INTERNAL MEDICINE | Facility: CLINIC | Age: 68
End: 2023-06-04
Payer: COMMERCIAL

## 2023-06-05 ENCOUNTER — NURSE TRIAGE (OUTPATIENT)
Dept: INTERNAL MEDICINE | Facility: CLINIC | Age: 68
End: 2023-06-05
Payer: COMMERCIAL

## 2023-06-05 RX ORDER — ALBUTEROL SULFATE 90 UG/1
AEROSOL, METERED RESPIRATORY (INHALATION)
OUTPATIENT
Start: 2023-06-05

## 2023-06-05 NOTE — TELEPHONE ENCOUNTER
Pending Prescriptions:                       Disp   Refills    albuterol (PROAIR HFA/PROVENTIL HFA/MARTINA*                    Sig: INHALE 2 PUFFS BY MOUTH EVERY 4 HOURS AS NEEDED    Duplicate request.  Medication e-scribed 1/11/23 #1 inhaler with 11 refills.    Spoke with Alexis at the pharmacy and he confirmed prescription was received.  Reports patient used a different prescription number which generated a refill request.  Medication refused.   

## 2023-06-05 NOTE — TELEPHONE ENCOUNTER
"Nurse Triage SBAR    Is this a 2nd Level Triage? NO    Situation: Patient calls back regarding [employer] message she sent yesterday for abnormal sound in her ears. Message was left for her to call back through [employer] encounter.      Background: Per 6/4/23 MyTraining.pro message from patient:  \"Hi Uyen, I am having this whooshing noise periodically in my ears and only at night when I go to bed and not all the time. It is extremely annoying and I am wondering if I should be concerned ? I did some Googling, big mistake and the condition of pulsatile tinnitus seemed to fit what I am experiencing.\"    Assessment: She describes this as a vibrating and whooshing noise - more noticeable in her left ear and only hears it when she exhales. Initially happened a few weeks ago for a few nights, then stopped and came back the last 2 night. This has only occurred when she lays down at night. She does not notice any symptoms when laying down for naps in the afternoon-normally lays down to rest each day. She does not hear the sound if she holds her breath or while she inhales. She has tried popping her ears, blowing her nose and coughing and none to them helped. Her left ear feels a little blocked and harder to hear from. She is prone to getting wax build-up in her left ear as she can only lay on her left side. No other symptoms present.     She thought the initial episode was related to not taking her Lyrica for 2-3 days because the pharmacy was waiting for refill. However, she now doubts this as she has taken Lyrica consistently since then and symptoms occurred the last 2 nights. Asked about other medication changes. Patient states she did start Suboxone yesterday, but the symptoms were occurring before she started.    Protocol Recommended Disposition:   See in Office Within 3 Days    Recommendation: Recommended appointment for further evaluation. Future appointment scheduled.   Appointments in Next Year    Jun 07, 2023  5:00 PM  (Arrive " by 4:40 PM)  Provider Visit with MARY Meng CNP  [facility] ([facility] ) [phone]         Does the patient meet one of the following criteria for ADS visit consideration? 16+ years old, with an [facility] PCP     TIP  Providers, please consider if this condition is appropriate for management at one of our Acute and Diagnostic Services sites.     If patient is a good candidate, please use dotphrase <dot>triageresponse and select Refer to ADS to document.     Bailee Luna RN  [facility]      Reason for Disposition    Symptoms only or mainly in 1 ear (unilateral tinnitus)    Additional Information    Negative: Followed an ear injury    Negative: Hearing loss is main symptom    Negative: Patient sounds very sick or weak to the triager    Negative: Taking aspirin and dosage sounds high (i.e., > 1500 mg/day)    Negative: Hearing loss in one or both ears and sudden onset and present now    Negative: Ear is painful    Negative: Decreased hearing followed sudden, extremely loud noise (e.g., explosion, not just loud concert)    Negative: Taking medication that can damage hearing (i.e., gentamycin, tobramycin, furosemide, ethacrynic acid, cisplatin, quinidine)    Negative: MODERATE-SEVERE tinnitus (i.e., interferes with work, school, or sleep)    Protocols used: TINNITUS-A-OH

## 2023-06-05 NOTE — TELEPHONE ENCOUNTER
Attempted to contact patient for more information regarding symptoms. Left voice message to call back to speak to a nurse. Mychart also sent to patient asking her to call the clinic.     Bailee Luna RN  [facility]

## 2023-06-06 ENCOUNTER — ANCILLARY ORDERS (OUTPATIENT)
Dept: MAMMOGRAPHY | Facility: CLINIC | Age: 68
End: 2023-06-06

## 2023-06-06 ENCOUNTER — PATIENT OUTREACH (OUTPATIENT)
Dept: CARE COORDINATION | Facility: CLINIC | Age: 68
End: 2023-06-06
Payer: COMMERCIAL

## 2023-06-06 DIAGNOSIS — Z12.31 VISIT FOR SCREENING MAMMOGRAM: ICD-10-CM

## 2023-06-08 ASSESSMENT — PATIENT HEALTH QUESTIONNAIRE - PHQ9
SUM OF ALL RESPONSES TO PHQ QUESTIONS 1-9: 9
10. IF YOU CHECKED OFF ANY PROBLEMS, HOW DIFFICULT HAVE THESE PROBLEMS MADE IT FOR YOU TO DO YOUR WORK, TAKE CARE OF THINGS AT HOME, OR GET ALONG WITH OTHER PEOPLE: VERY DIFFICULT
SUM OF ALL RESPONSES TO PHQ QUESTIONS 1-9: 9

## 2023-06-08 NOTE — TELEPHONE ENCOUNTER
Please see patient's most recent mychart message below.     Ok to use approval required spot for patient?    Please advise, thanks.

## 2023-06-09 ENCOUNTER — OFFICE VISIT (OUTPATIENT)
Dept: INTERNAL MEDICINE | Facility: CLINIC | Age: 68
End: 2023-06-09
Payer: COMMERCIAL

## 2023-06-09 VITALS
OXYGEN SATURATION: 95 % | DIASTOLIC BLOOD PRESSURE: 71 MMHG | TEMPERATURE: 97.3 F | BODY MASS INDEX: 47.09 KG/M2 | WEIGHT: 293 LBS | HEIGHT: 66 IN | SYSTOLIC BLOOD PRESSURE: 131 MMHG | RESPIRATION RATE: 24 BRPM | HEART RATE: 69 BPM

## 2023-06-09 DIAGNOSIS — H93.A9 PULSATILE TINNITUS: Primary | ICD-10-CM

## 2023-06-09 DIAGNOSIS — R09.89 OTHER SPECIFIED SYMPTOMS AND SIGNS INVOLVING THE CIRCULATORY AND RESPIRATORY SYSTEMS: ICD-10-CM

## 2023-06-09 DIAGNOSIS — I73.9 PVD (PERIPHERAL VASCULAR DISEASE) (H): ICD-10-CM

## 2023-06-09 DIAGNOSIS — E78.00 HYPERCHOLESTEREMIA: ICD-10-CM

## 2023-06-09 LAB — INR HOME MONITORING: 2.2 (ref 2–3)

## 2023-06-09 PROCEDURE — 99213 OFFICE O/P EST LOW 20 MIN: CPT

## 2023-06-09 RX ORDER — MULTIVITAMIN WITH IRON
1 TABLET ORAL DAILY
COMMUNITY
End: 2024-04-30

## 2023-06-09 RX ORDER — BUPRENORPHINE HYDROCHLORIDE AND NALOXONE HYDROCHLORIDE DIHYDRATE 2; .5 MG/1; MG/1
TABLET SUBLINGUAL
COMMUNITY
Start: 2023-06-03 | End: 2024-04-11

## 2023-06-09 ASSESSMENT — PATIENT HEALTH QUESTIONNAIRE - PHQ9
SUM OF ALL RESPONSES TO PHQ QUESTIONS 1-9: 9
10. IF YOU CHECKED OFF ANY PROBLEMS, HOW DIFFICULT HAVE THESE PROBLEMS MADE IT FOR YOU TO DO YOUR WORK, TAKE CARE OF THINGS AT HOME, OR GET ALONG WITH OTHER PEOPLE: VERY DIFFICULT

## 2023-06-09 NOTE — PROGRESS NOTES
"  Assessment & Plan     (H93.A9) Pulsatile tinnitus  (primary encounter diagnosis)  Comment: Pt presents with c/o of tinnitus. She notes it sounds like a whooshing noise. Started about a week ago. Only occurs when she lies down at night. In bilateral ears but worse in the left ear.     Denies hearing loss, ear drainage, ear pain, vertigo, rhinorrhea, nasal congestion, recent URI.   With symptoms only occurring when she lies flat, it is likely a vascular tinnitus. Upon exam, no carotid artery bruits present. Tympanic membranes WNL. Will order carotid US to r/o carotid artery stenosis.   Plan: US Carotid Bilateral            (I73.9) PVD (peripheral vascular disease) (H)  Plan: US Carotid Bilateral            (E78.00) Hypercholesteremia  Plan: US Carotid Bilateral            (R09.89) Other specified symptoms and signs involving the circulatory and respiratory systems  Plan: US Carotid Bilateral            20 minutes spent by me on the date of the encounter doing chart review, history and exam, documentation and further activities per the note       BMI:   Estimated body mass index is 55.52 kg/m  as calculated from the following:    Height as of this encounter: 1.676 m (5' 6\").    Weight as of this encounter: 156 kg (344 lb).   Weight management plan: Discussed healthy diet and exercise guidelines        MARY Gamino [facility]            Racquel Gregorio is a 67 year old, presenting for the following health issues:  Ear Problem        6/9/2023     2:58 PM   Additional Questions   Roomed by sivakumar     History of Present Illness       Reason for visit:  Whossing noises in ears at sleep  Symptom onset:  3-4 weeks ago  Symptoms include:  Fluttering noises in ears  Symptom intensity:  Moderate  Symptom progression:  Staying the same  Had these symptoms before:  Yes  Has tried/received treatment for these symptoms:  No  What makes it better:  Stops on exhaling    She eats 0-1 servings of " "fruits and vegetables daily.She consumes 0 sweetened beverage(s) daily.She exercises with enough effort to increase her heart rate 9 or less minutes per day.  She exercises with enough effort to increase her heart rate 3 or less days per week.   She is taking medications regularly.    Today's PHQ-9         PHQ-9 Total Score: 9    PHQ-9 Q9 Thoughts of better off dead/self-harm past 2 weeks :   Not at all    How difficult have these problems made it for you to do your work, take care of things at home, or get along with other people: Very difficult             Objective    /71   Pulse 69   Temp 97.3  F (36.3  C) (Oral)   Resp 24   Ht 1.676 m (5' 6\")   Wt (!) 156 kg (344 lb)   SpO2 95%   BMI 55.52 kg/m    Body mass index is 55.52 kg/m .  Physical Exam  Constitutional:       Appearance: Normal appearance.   HENT:      Head: Normocephalic and atraumatic.      Right Ear: Tympanic membrane, ear canal and external ear normal. There is no impacted cerumen.      Left Ear: Tympanic membrane, ear canal and external ear normal. There is no impacted cerumen.   Neck:      Vascular: No carotid bruit.   Cardiovascular:      Rate and Rhythm: Normal rate and regular rhythm.      Heart sounds: Normal heart sounds.   Pulmonary:      Effort: Pulmonary effort is normal.      Breath sounds: Normal breath sounds.   Neurological:      Mental Status: She is alert and oriented to person, place, and time.   Psychiatric:         Mood and Affect: Mood normal.         Behavior: Behavior normal.         Thought Content: Thought content normal.         Judgment: Judgment normal.                    "

## 2023-06-12 ENCOUNTER — DOCUMENTATION ONLY (OUTPATIENT)
Dept: ANTICOAGULATION | Facility: CLINIC | Age: 68
End: 2023-06-12
Payer: COMMERCIAL

## 2023-06-12 DIAGNOSIS — Z86.711 HISTORY OF PULMONARY EMBOLISM: ICD-10-CM

## 2023-06-12 DIAGNOSIS — D68.51 FACTOR 5 LEIDEN MUTATION, HETEROZYGOUS (H): Primary | ICD-10-CM

## 2023-06-12 NOTE — PROGRESS NOTES
ANTICOAGULATION  MANAGEMENT-Home Monitor Managed by Exception    Bere SHARMIN Machuca 67 year old female is on warfarin with therapeutic INR result. (Goal INR 2.0-3.0)    Recent labs: (last 7 days)     06/09/23  0000   INR 2.2         Previous INR was Therapeutic    Medication, diet, health changes since last INR:chart reviewed; none identified    Contacted within the last 12 weeks by phone on 4/26/23      JONN     Bere was NOT contacted regarding therapeutic result today per home monitoring policy manage by exception agreement.   Current warfarin dose is to be continued:     Summary  As of 6/12/2023    Full warfarin instructions:  2.5 mg every Mon, Fri; 5 mg all other days   Next INR check:  6/23/2023           ?   Fidelia Warren RN  Anticoagulation Clinic  6/12/2023    _______________________________________________________________________     Anticoagulation Episode Summary     Current INR goal:  2.0-3.0   TTR:  67.8 % (1 y)   Target end date:  Indefinite   Send INR reminders to:  [facility]    Indications    Factor 5 Leiden mutation  heterozygous/ INR 2-3 [D68.51]  History of pulmonary embolism [Z86.711]           Comments:  NICK         Anticoagulation Care Providers     Provider Role Specialty Phone number    Uyen Mcintyre APRN CNP Referring Internal Medicine [phone]

## 2023-06-23 ENCOUNTER — HOSPITAL ENCOUNTER (OUTPATIENT)
Dept: ULTRASOUND IMAGING | Facility: CLINIC | Age: 68
Discharge: HOME | End: 2023-06-23
Payer: COMMERCIAL

## 2023-06-23 DIAGNOSIS — I73.9 PVD (PERIPHERAL VASCULAR DISEASE) (H): ICD-10-CM

## 2023-06-23 DIAGNOSIS — R09.89 OTHER SPECIFIED SYMPTOMS AND SIGNS INVOLVING THE CIRCULATORY AND RESPIRATORY SYSTEMS: ICD-10-CM

## 2023-06-23 DIAGNOSIS — H93.A9 PULSATILE TINNITUS: ICD-10-CM

## 2023-06-23 DIAGNOSIS — E78.00 HYPERCHOLESTEREMIA: ICD-10-CM

## 2023-06-23 PROCEDURE — 93880 EXTRACRANIAL BILAT STUDY: CPT

## 2023-06-25 LAB — INR HOME MONITORING: 1.9 (ref 2–3)

## 2023-06-26 ENCOUNTER — ANTICOAGULATION THERAPY VISIT (OUTPATIENT)
Dept: ANTICOAGULATION | Facility: CLINIC | Age: 68
End: 2023-06-26
Payer: COMMERCIAL

## 2023-06-26 DIAGNOSIS — D68.51 FACTOR 5 LEIDEN MUTATION, HETEROZYGOUS (H): Primary | ICD-10-CM

## 2023-06-26 DIAGNOSIS — Z86.711 HISTORY OF PULMONARY EMBOLISM: ICD-10-CM

## 2023-06-26 NOTE — PROGRESS NOTES
"ANTICOAGULATION MANAGEMENT     Bere Machuca 67 year old female is on warfarin with subtherapeutic INR result. (Goal INR 2.0-3.0)    Recent labs: (last 7 days)     06/25/23  0000   INR 1.9*       ASSESSMENT       Source(s): Chart Review and Patient/Caregiver Call       Warfarin doses taken: Warfarin taken as instructed    Diet: Increased greens/vitamin K in diet; ongoing change  - pt states she has been eating a premade salad from the grocery store. Plans to keep buying and eating this, so would like a little more of a \"cushion\" to allow for this. Dose slightly increased.     Medication/supplement changes: None noted    New illness, injury, or hospitalization: No    Signs or symptoms of bleeding or clotting: No    Previous result: Therapeutic last 2(+) visits    Additional findings: None         PLAN     Recommended plan for ongoing change(s) affecting INR     Dosing Instructions: Increase your warfarin dose (8.3% change) with next INR in 1-2 weeks       Summary  As of 6/26/2023    Full warfarin instructions:  2.5 mg every Fri; 5 mg all other days   Next INR check:  7/3/2023             Telephone call with Darline who verbalizes understanding and agrees to plan    Patient to recheck with home meter    Education provided:     Please call back if any changes to your diet, medications or how you've been taking warfarin    Goal range and lab monitoring: goal range and significance of current result, Importance of therapeutic range and Importance of following up at instructed interval    Dietary considerations: importance of consistent vitamin K intake, impact of vitamin K foods on INR and vitamin K content of foods    Plan made per [facility] anticoagulation protocol    Wilma Weber, RN  Anticoagulation Clinic  6/26/2023    _______________________________________________________________________     Anticoagulation Episode Summary     Current INR goal:  2.0-3.0   TTR:  66.7 % (1 y)   Target end date:  Indefinite   Send " INR reminders to:  [facility]    Indications    Factor 5 Leiden mutation  heterozygous/ INR 2-3 [D68.51]  History of pulmonary embolism [Z86.711]           Comments:  MBE         Anticoagulation Care Providers     Provider Role Specialty Phone number    Uyen Mcintyre APRN CNP Referring Internal Medicine [phone]

## 2023-06-28 ENCOUNTER — MYC MEDICAL ADVICE (OUTPATIENT)
Dept: INTERNAL MEDICINE | Facility: CLINIC | Age: 68
End: 2023-06-28
Payer: COMMERCIAL

## 2023-06-29 NOTE — TELEPHONE ENCOUNTER
Please see patient's mychart message below.     DME for stand up recliner chair?    Please advise, thanks.

## 2023-07-10 NOTE — TELEPHONE ENCOUNTER
We can send referral to ENT if she would like to be seen to follow up on symptoms. Also may need visit for stand up recliner as these orders can have specific questions-- she may need to describe why exactly chair is needed, etc

## 2023-07-11 ENCOUNTER — DOCUMENTATION ONLY (OUTPATIENT)
Dept: ANTICOAGULATION | Facility: CLINIC | Age: 68
End: 2023-07-11
Payer: COMMERCIAL

## 2023-07-11 ENCOUNTER — MYC MEDICAL ADVICE (OUTPATIENT)
Dept: ANTICOAGULATION | Facility: CLINIC | Age: 68
End: 2023-07-11
Payer: COMMERCIAL

## 2023-07-11 LAB — INR HOME MONITORING: 2 (ref 2–3)

## 2023-07-11 NOTE — PROGRESS NOTES
ANTICOAGULATION     Bere Machuca is overdue for INR check.      Mungo message sent     Fidelia Warren RN

## 2023-07-12 ENCOUNTER — ANTICOAGULATION THERAPY VISIT (OUTPATIENT)
Dept: ANTICOAGULATION | Facility: CLINIC | Age: 68
End: 2023-07-12

## 2023-07-12 DIAGNOSIS — Z86.711 HISTORY OF PULMONARY EMBOLISM: ICD-10-CM

## 2023-07-12 DIAGNOSIS — D68.51 FACTOR 5 LEIDEN MUTATION, HETEROZYGOUS (H): Primary | ICD-10-CM

## 2023-07-12 NOTE — PROGRESS NOTES
ANTICOAGULATION MANAGEMENT     Bere Machuca 67 year old female is on warfarin with therapeutic INR result. (Goal INR 2.0-3.0)    Recent labs: (last 7 days)     07/11/23  0000   INR 2.0       ASSESSMENT       Source(s): Chart Review and Patient/Caregiver Call       Warfarin doses taken: Warfarin taken as instructed    Diet: No new diet changes identified    Medication/supplement changes: None noted    New illness, injury, or hospitalization: No    Signs or symptoms of bleeding or clotting: No    Previous result: Subtherapeutic    Additional findings: None         PLAN     Recommended plan for no diet, medication or health factor changes affecting INR     Dosing Instructions: Continue your current warfarin dose with next INR in 2 weeks       Summary  As of 7/12/2023    Full warfarin instructions:  2.5 mg every Fri; 5 mg all other days   Next INR check:  7/25/2023             Telephone call with Darline who verbalizes understanding and agrees to plan    Patient to recheck with home meter    Education provided:     Please call back if any changes to your diet, medications or how you've been taking warfarin    Plan made per [facility] anticoagulation protocol    Celena Mobley RN  Anticoagulation Clinic  7/12/2023    _______________________________________________________________________     Anticoagulation Episode Summary     Current INR goal:  2.0-3.0   TTR:  64.0 % (1 y)   Target end date:  Indefinite   Send INR reminders to:  [facility]    Indications    Factor 5 Leiden mutation  heterozygous/ INR 2-3 [D68.51]  History of pulmonary embolism [Z86.711]           Comments:  managed by [employer]up[employer] Ortega home meter         Anticoagulation Care Providers     Provider Role Specialty Phone number    Uyen Mcintyre APRN CNP Referring Internal Medicine [phone]

## 2023-07-15 ENCOUNTER — HEALTH MAINTENANCE LETTER (OUTPATIENT)
Age: 68
End: 2023-07-15

## 2023-07-20 ENCOUNTER — VIRTUAL VISIT (OUTPATIENT)
Dept: INTERNAL MEDICINE | Facility: CLINIC | Age: 68
End: 2023-07-20
Payer: COMMERCIAL

## 2023-07-20 ENCOUNTER — TELEPHONE (OUTPATIENT)
Dept: INTERNAL MEDICINE | Facility: CLINIC | Age: 68
End: 2023-07-20

## 2023-07-20 DIAGNOSIS — R25.1 TREMOR OF LEFT HAND: ICD-10-CM

## 2023-07-20 DIAGNOSIS — Z74.09 DECLINING MOBILITY: ICD-10-CM

## 2023-07-20 DIAGNOSIS — H93.A3 PULSATILE TINNITUS OF BOTH EARS: Primary | ICD-10-CM

## 2023-07-20 PROCEDURE — 99214 OFFICE O/P EST MOD 30 MIN: CPT | Mod: VID | Performed by: INTERNAL MEDICINE

## 2023-07-20 NOTE — TELEPHONE ENCOUNTER
Pt returning phone call. No encounters noted. Writer sees appointment note that a VM was left at 11:00 am. Please call Pt back and OK for detailed VM. Messaged MA who called.     Clare Melendez RN [facility]

## 2023-07-20 NOTE — PROGRESS NOTES
Darline is a 67 year old who is being evaluated via a billable video visit.      How would you like to obtain your AVS? MyChart  If the video visit is dropped, the invitation should be resent by: Send to e-mail at: [email or web address]  Will anyone else be joining your video visit? No          Assessment & Plan     Pulsatile tinnitus of both ears  Persistent symptoms.  Evaluated with carotid ultrasound which was reassuring.  Symptoms mostly nocturnal.  Further evaluation with the ENT team.  Referral placed.  - Adult ENT  Referral; Future    Tremor of left hand  Chronic, worsening symptoms of left hand tremor.  Worse with movement.  Tremor is unilateral.  No concerns of rigidity or bradykinesia.  Differentials were discussed with the patient.  Most recent lab work including TSH was also reviewed which is within normal range.  Patient would like to go ahead with a neurology referral for further evaluation.  Referral placed.  - Adult Neurology  Referral; Future  Declining mobility  Declining mobility with increased concerns especially from a sitting to standing position with chronic hip pain as well as ankle pain.  Patient will benefit from recliner to help with positional changes as well as mobility concerns.  Order placed today.  - Miscellaneous Order for DME - ONLY FOR DME                 Jennifer Shea MD  [facility]    Subjective   Darline is a 67 year old, presenting for the following health issues:  Consult        6/9/2023     2:58 PM   Additional Questions   Roomed by sivakumar MANDEL     Persistent symptoms of pulsatile tinnitus in both ears. Was evaluated with carotid US which was reassuring. No dizziness or hearing concerns. No headaches.  Tremor of left hand that is worse with activity. Hand tremor history in father and brother.  Has had tremor for many years but noticed that it has been worsening over time.  Additionally, patient has concerns about ongoing mobility  worsening symptoms with anticipated hip replacement as well as ankle concerns.  Has a difficult time standing up from a sitting position and was requesting for a stand-up recliner that would help with her mobility and movement concerns.            Review of Systems   Constitutional, HEENT, cardiovascular, pulmonary, gi and gu systems are negative, except as otherwise noted.      Objective           Vitals:  No vitals were obtained today due to virtual visit.    Physical Exam   GENERAL: Healthy, alert and no distress  EYES: Eyes grossly normal to inspection.  No discharge or erythema, or obvious scleral/conjunctival abnormalities.  RESP: No audible wheeze, cough, or visible cyanosis.  No visible retractions or increased work of breathing.    SKIN: Visible skin clear. No significant rash, abnormal pigmentation or lesions.  NEURO: Cranial nerves grossly intact.  Mentation and speech appropriate for age.  PSYCH: Mentation appears normal, affect normal/bright, judgement and insight intact, normal speech and appearance well-groomed.                Video-Visit Details    Type of service:  Video Visit     Originating Location (pt. Location): Home  Distant Location (provider location):  On-site  Platform used for Video Visit: Radha

## 2023-07-21 NOTE — TELEPHONE ENCOUNTER
Phone call was to check patient in for Virtual Visit. Video visit was completed 7/20/23 for patient.    Bailee Luna RN  [facility]

## 2023-07-25 LAB — INR HOME MONITORING: 2.7 (ref 2–3)

## 2023-07-26 ENCOUNTER — DOCUMENTATION ONLY (OUTPATIENT)
Dept: ANTICOAGULATION | Facility: CLINIC | Age: 68
End: 2023-07-26
Payer: COMMERCIAL

## 2023-07-26 DIAGNOSIS — D68.51 FACTOR 5 LEIDEN MUTATION, HETEROZYGOUS (H): Primary | ICD-10-CM

## 2023-07-26 DIAGNOSIS — Z86.711 HISTORY OF PULMONARY EMBOLISM: ICD-10-CM

## 2023-07-26 NOTE — PROGRESS NOTES
ANTICOAGULATION  MANAGEMENT-Home Monitor Managed by Exception    Bere FINNEY Sven 67 year old female is on warfarin with therapeutic INR result. (Goal INR 2.0-3.0)    Recent labs: (last 7 days)     07/25/23  0000   INR 2.7         Previous INR was Therapeutic    Medication, diet, health changes since last INR:chart reviewed; none identified    Contacted within the last 12 weeks by phone on 7/12/23    PLAN     Bere was NOT contacted regarding therapeutic result today per home monitoring policy manage by exception agreement.   Current warfarin dose is to be continued:     Summary  As of 7/26/2023    Full warfarin instructions:  2.5 mg every Fri; 5 mg all other days   Next INR check:  8/9/2023           ?   Celena Mobley RN  Anticoagulation Clinic  7/26/2023    _______________________________________________________________________     Anticoagulation Episode Summary     Current INR goal:  2.0-3.0   TTR:  67.1 % (1 y)   Target end date:  Indefinite   Send INR reminders to:  [facility]    Indications    Factor 5 Leiden mutation  heterozygous/ INR 2-3 [D68.51]  History of pulmonary embolism [Z86.711]           Comments:  managed by exception  [employer] home meter         Anticoagulation Care Providers     Provider Role Specialty Phone number    Uyen Mcintyre APRN CNP Referring Internal Medicine [phone]

## 2023-07-26 NOTE — TELEPHONE ENCOUNTER
FUTURE VISIT INFORMATION:      FUTURE VISIT INFORMATION:  Date: 11/7/23  Time: 2:30 PM  Location: [facility]  REFERRAL INFORMATION:  Referring provider: Jennifer Shea MD   Referring providers clinic: [facility]  Reason for visit/diagnosis:  Pulsatile tinnitus of both ears [H93.A], Jennifer Shea MD in [facility], recs in epic, location verified     RECORDS REQUESTED FROM:       Clinic name Comments Records Status Imaging Status   [facility] 7/20/23 note- Jennifer Shea MD   6/9/23 note- Rosi Le APRN CNP  Epic

## 2023-08-07 NOTE — PATIENT INSTRUCTIONS
Assessment & Plan   Problem List Items Addressed This Visit        Hematologic    Factor 5 Leiden mutation, heterozygous/ INR 2-3 - Primary    Relevant Orders    VENOUS COLLECTION (Completed)    PROTHROMBIN TIME INR (BFP) (Completed)       Other    History of pulmonary embolism    Relevant Medications    warfarin ANTICOAGULANT (COUMADIN) 5 MG tablet    Chronic anticoagulation    Relevant Orders    VENOUS COLLECTION (Completed)    PROTHROMBIN TIME INR (BFP) (Completed)      Other Visit Diagnoses     Localized swelling of left foot        Benign essential hypertension        Relevant Medications    losartan (COZAAR) 50 MG tablet    Obesity with serious comorbidity, unspecified classification, unspecified obesity type        Relevant Orders    Comprehensive Weight Management         1. Factor 5 Leiden mutation, heterozygous/ INR 2-3  inr in range. Refilled medications, continue same schdule of dosing. See me back in 1 month.  - VENOUS COLLECTION  - PROTHROMBIN TIME INR (BFP)    2. Chronic anticoagulation    - VENOUS COLLECTION  - PROTHROMBIN TIME INR (BFP)    3. Localized swelling of left foot  Recommend xray foot. It doesn't look infected. followup as needed or if no resolution. Right foot has improved. She will also discuss with pain clinic at her apt Friday. She thinks it's due to her new pain medication which she has now stopped it.    4. Benign essential hypertension  Controlled, continued and refilled medications.  - losartan (COZAAR) 50 MG tablet; Take 1 tablet (50 mg) by mouth daily  Dispense: 90 tablet; Refill: 1    5. History of pulmonary embolism    - warfarin ANTICOAGULANT (COUMADIN) 5 MG tablet; Take 1 tablet (5 mg) by mouth daily  Dispense: 90 tablet; Refill: 0    6. Obesity with serious comorbidity, unspecified classification, unspecified obesity type  Referral done for bariatric care.  - Comprehensive Weight Management; Future           BMI:   Estimated body mass index is 65.53 kg/m  as calculated from  "the following:    Height as of 6/24/21: 1.676 m (5' 6\").    Weight as of 6/24/21: 184.2 kg (406 lb).         FUTURE APPOINTMENTS:       - Follow-up visit in 1 month    No follow-ups on file.    Elis Jack MD  [facility]  " Adult

## 2023-08-10 ENCOUNTER — DOCUMENTATION ONLY (OUTPATIENT)
Dept: ANTICOAGULATION | Facility: CLINIC | Age: 68
End: 2023-08-10
Payer: COMMERCIAL

## 2023-08-10 DIAGNOSIS — Z86.711 HISTORY OF PULMONARY EMBOLISM: ICD-10-CM

## 2023-08-10 DIAGNOSIS — D68.51 FACTOR 5 LEIDEN MUTATION, HETEROZYGOUS (H): Primary | ICD-10-CM

## 2023-08-10 LAB — INR HOME MONITORING: 2.9 (ref 2–3)

## 2023-08-10 NOTE — PROGRESS NOTES
ANTICOAGULATION  MANAGEMENT-Home Monitor Managed by Exception    Bere FINNEY Sven 67 year old female is on warfarin with therapeutic INR result. (Goal INR 2.0-3.0)    Recent labs: (last 7 days)     08/10/23  0000   INR 2.9       Previous INR was Therapeutic  Medication, diet, health changes since last INR:chart reviewed; none identified  Contacted within the last 12 weeks by phone on  7/12/23       JONN     Bere was NOT contacted regarding therapeutic result today per home monitoring policy manage by exception agreement.   Current warfarin dose is to be continued:     Summary  As of 8/10/2023      Full warfarin instructions:  2.5 mg every Fri; 5 mg all other days   Next INR check:  8/24/2023             ?   Celena Mobley RN  Anticoagulation Clinic  8/10/2023    _______________________________________________________________________     Anticoagulation Episode Summary       Current INR goal:  2.0-3.0   TTR:  67.2 % (1 y)   Target end date:  Indefinite   Send INR reminders to:  [facility]    Indications    Factor 5 Leiden mutation  heterozygous/ INR 2-3 [D68.51]  History of pulmonary embolism [Z86.711]             Comments:  managed by exception  [employer] home meter             Anticoagulation Care Providers       Provider Role Specialty Phone number    Uyen Mcintyre APRN CNP Referring Internal Medicine [phone]

## 2023-08-23 ENCOUNTER — TELEPHONE (OUTPATIENT)
Dept: INTERNAL MEDICINE | Facility: CLINIC | Age: 68
End: 2023-08-23
Payer: COMMERCIAL

## 2023-08-23 NOTE — TELEPHONE ENCOUNTER
Patient Quality Outreach    Patient is due for the following:   Diabetes -  A1C, Microalbumin, Diabetic Follow-Up Visit, and Foot Exam  Colon Cancer Screening  Physical Preventive Adult Physical    Next Steps:   Patient has upcoming appointment, these items will be addressed at that time.    Type of outreach:    none      Questions for provider review:    None           Isra Ramsey MA  Chart routed to none.

## 2023-08-27 LAB — INR HOME MONITORING: 2.7 (ref 2–3)

## 2023-08-28 ENCOUNTER — DOCUMENTATION ONLY (OUTPATIENT)
Dept: ANTICOAGULATION | Facility: CLINIC | Age: 68
End: 2023-08-28
Payer: COMMERCIAL

## 2023-08-28 DIAGNOSIS — D68.51 FACTOR 5 LEIDEN MUTATION, HETEROZYGOUS (H): Primary | ICD-10-CM

## 2023-08-28 DIAGNOSIS — Z86.711 HISTORY OF PULMONARY EMBOLISM: ICD-10-CM

## 2023-08-28 NOTE — PROGRESS NOTES
ANTICOAGULATION  MANAGEMENT-Home Monitor Managed by Exception    Bere FINNEY Sven 67 year old female is on warfarin with therapeutic INR result. (Goal INR 2.0-3.0)    Recent labs: (last 7 days)     08/27/23  0000   INR 2.7       Previous INR was Therapeutic  Medication, diet, health changes since last INR:chart reviewed; none identified  Contacted within the last 12 weeks by phone on 7/12/23        JONN     Bere was NOT contacted regarding therapeutic result today per home monitoring policy manage by exception agreement.   Current warfarin dose is to be continued:     Summary  As of 8/28/2023      Full warfarin instructions:  2.5 mg every Fri; 5 mg all other days   Next INR check:  9/11/2023             ?   Celena Mobley RN  Anticoagulation Clinic  8/28/2023    _______________________________________________________________________     Anticoagulation Episode Summary       Current INR goal:  2.0-3.0   TTR:  67.1 % (1 y)   Target end date:  Indefinite   Send INR reminders to:  [employer] [facility]    Indications    Factor 5 Leiden mutation  heterozygous/ INR 2-3 [D68.51]  History of pulmonary embolism [Z86.711]             Comments:  managed by exception  [employer] home meter             Anticoagulation Care Providers       Provider Role Specialty Phone number    Uyen Mcintyre APRN CNP Referring Internal Medicine [phone]

## 2023-08-30 NOTE — TELEPHONE ENCOUNTER
Called Pt to let her know what she needed an OV before Flexeril could be refilled and that it needed to be with Dr. Armenta.   However, her voice mail would not let me leave a message, it hung up on me.   NARESH Bonner ([facility])    
Called and received Pt on the phone today. Gave her the message about needing an O.V for the refill of Flexeril, along with it being with Dr. Armenta.   Pt stated that she comes in every 4 weeks for an INR followup.   NARESH Bonner ([facility])    
Please call pt - needs OV before Flexeril refills.    HAVE HER SEE DR. CHANDRA HER PCP.      Catrachita Torres PA-C  1/19/2017    
Please review - do you want to refill?    Pending Prescriptions:                       Disp   Refills    cyclobenzaprine (FLEXERIL) 5 MG tablet [P*20 tab*0            Sig: TAKE 1 TABLET (5 MG) BY MOUTH 3 TIMES DAILY AS           NEEDED FOR MUSCLE SPASMS    Ready to be faxed when Rx is approved or denied.  Please close encounter when finished. Thanks, Liz    
Impairments in balance and strength

## 2023-09-12 LAB — INR HOME MONITORING: 2.7 (ref 2–3)

## 2023-09-13 ENCOUNTER — DOCUMENTATION ONLY (OUTPATIENT)
Dept: ANTICOAGULATION | Facility: CLINIC | Age: 68
End: 2023-09-13
Payer: COMMERCIAL

## 2023-09-13 DIAGNOSIS — Z86.711 HISTORY OF PULMONARY EMBOLISM: ICD-10-CM

## 2023-09-13 DIAGNOSIS — D68.51 FACTOR 5 LEIDEN MUTATION, HETEROZYGOUS (H): Primary | ICD-10-CM

## 2023-09-13 NOTE — PROGRESS NOTES
ANTICOAGULATION  MANAGEMENT-Home Monitor Managed by Exception    Bere FINNEY Sven 67 year old female is on warfarin with therapeutic INR result. (Goal INR 2.0-3.0)    Recent labs: (last 7 days)     09/12/23  0000   INR 2.7       Previous INR was Therapeutic  Medication, diet, health changes since last INR:chart reviewed; none identified  Contacted within the last 12 weeks by phone on  7/12/23       JONN     Bere was NOT contacted regarding therapeutic result today per home monitoring policy manage by exception agreement.   Current warfarin dose is to be continued:     Summary  As of 9/13/2023      Full warfarin instructions:  2.5 mg every Fri; 5 mg all other days   Next INR check:  9/27/2023             ?   Celena Mobley RN  Anticoagulation Clinic  9/13/2023    _______________________________________________________________________     Anticoagulation Episode Summary       Current INR goal:  2.0-3.0   TTR:  67.2 % (1 y)   Target end date:  Indefinite   Send INR reminders to:  [employer] [facility]    Indications    Factor 5 Leiden mutation  heterozygous/ INR 2-3 [D68.51]  History of pulmonary embolism [Z86.711]             Comments:  managed by exception  [employer] home meter             Anticoagulation Care Providers       Provider Role Specialty Phone number    Uyen Mcintyre APRN CNP Referring Internal Medicine [phone]

## 2023-09-17 DIAGNOSIS — Z86.711 HISTORY OF PULMONARY EMBOLISM: ICD-10-CM

## 2023-09-18 RX ORDER — WARFARIN SODIUM 5 MG/1
TABLET ORAL
Qty: 90 TABLET | Refills: 1 | Status: SHIPPED | OUTPATIENT
Start: 2023-09-18 | End: 2024-03-22

## 2023-09-18 NOTE — TELEPHONE ENCOUNTER
ANTICOAGULATION MANAGEMENT:  Medication Refill    Anticoagulation Summary  As of 9/13/2023      Warfarin maintenance plan:  2.5 mg (5 mg x 0.5) every Fri; 5 mg (5 mg x 1) all other days   Next INR check:  9/27/2023   Target end date:  Indefinite    Indications    Factor 5 Leiden mutation  heterozygous/ INR 2-3 [D68.51]  History of pulmonary embolism [Z86.711]                 Anticoagulation Care Providers       Provider Role Specialty Phone number    Uyen Mcintyre APRN CNP Referring Internal Medicine [phone]            Refill Criteria    Visit with referring provider/group: Meets criteria: office visit within referring provider group in the last 1 year on 7/20/23    [facility] referral signed last signed: 12/08/2022; within last year: Yes    Lab monitoring not exceeding 2 weeks overdue: Yes    Bere meets all criteria for refill. Rx instructions and quantity supplied updated to match patient's current dosing plan. Warfarin 90 day supply with 1 refill granted per [facility] protocol     Fely Kidd, RN  Anticoagulation Clinic

## 2023-09-23 ENCOUNTER — HEALTH MAINTENANCE LETTER (OUTPATIENT)
Age: 68
End: 2023-09-23

## 2023-10-01 LAB — INR HOME MONITORING: 2.9 (ref 2–3)

## 2023-10-02 ENCOUNTER — DOCUMENTATION ONLY (OUTPATIENT)
Dept: ANTICOAGULATION | Facility: CLINIC | Age: 68
End: 2023-10-02
Payer: COMMERCIAL

## 2023-10-02 DIAGNOSIS — D68.51 FACTOR 5 LEIDEN MUTATION, HETEROZYGOUS (H): Primary | ICD-10-CM

## 2023-10-02 DIAGNOSIS — Z86.711 HISTORY OF PULMONARY EMBOLISM: ICD-10-CM

## 2023-10-06 DIAGNOSIS — J44.9 CHRONIC OBSTRUCTIVE PULMONARY DISEASE, UNSPECIFIED COPD TYPE (H): ICD-10-CM

## 2023-10-09 ENCOUNTER — MYC MEDICAL ADVICE (OUTPATIENT)
Dept: INTERNAL MEDICINE | Facility: CLINIC | Age: 68
End: 2023-10-09
Payer: COMMERCIAL

## 2023-10-09 DIAGNOSIS — G89.29 OTHER CHRONIC PAIN: Primary | ICD-10-CM

## 2023-10-09 DIAGNOSIS — E66.01 MORBID OBESITY WITH BMI OF 60.0-69.9, ADULT (H): ICD-10-CM

## 2023-10-10 DIAGNOSIS — F33.0 MILD EPISODE OF RECURRENT MAJOR DEPRESSIVE DISORDER (H): ICD-10-CM

## 2023-10-10 RX ORDER — FLUTICASONE FUROATE, UMECLIDINIUM BROMIDE AND VILANTEROL TRIFENATATE 100; 62.5; 25 UG/1; UG/1; UG/1
POWDER RESPIRATORY (INHALATION)
Qty: 60 EACH | Refills: 11 | Status: SHIPPED | OUTPATIENT
Start: 2023-10-10

## 2023-10-10 RX ORDER — ESCITALOPRAM OXALATE 20 MG/1
TABLET ORAL
Qty: 90 TABLET | Refills: 1 | Status: SHIPPED | OUTPATIENT
Start: 2023-10-10 | End: 2024-04-12

## 2023-10-10 NOTE — TELEPHONE ENCOUNTER
Please see my chart message and advise.  Thanks        I am sorry I forgot one medication that has been a big help to me.   Pregabalin 200 mg. I       Darline Gonzáles (supporting Jennifer Shea MD)Yesterday (12:04 PM)     WANDY Arevalo My pain clinic has just sent out a notice that as of Nov 30th they will be closing.   The name is [facility]. [phone]  They currently dispense my   Tizanidine HCL 4 Mg  Mounjaro 7.5 Mg  Buprenophine-nalo  While I know that you won't do the pain meds can you be the one who refills the other 2 ? I will be trying to find another pain clinic. Do you have any recommendations ? Thank you Jaymei

## 2023-10-10 NOTE — TELEPHONE ENCOUNTER
Yes, I can refill the meds except buprenorphine until patient establishes care with pain clinic. Please kindly pend the appropriate dosing and pharmacy.  Pain clinic referral has been placed for the patient.

## 2023-10-11 RX ORDER — TIRZEPATIDE 7.5 MG/.5ML
7.5 INJECTION, SOLUTION SUBCUTANEOUS
Qty: 1 ML | Refills: 0 | Status: SHIPPED | OUTPATIENT
Start: 2023-10-11 | End: 2024-01-02

## 2023-10-11 RX ORDER — PREGABALIN 100 MG/1
100 CAPSULE ORAL 2 TIMES DAILY
Qty: 60 CAPSULE | Refills: 0 | Status: SHIPPED | OUTPATIENT
Start: 2023-10-11 | End: 2024-04-11

## 2023-10-12 ENCOUNTER — TRANSFERRED RECORDS (OUTPATIENT)
Dept: HEALTH INFORMATION MANAGEMENT | Facility: CLINIC | Age: 68
End: 2023-10-12
Payer: COMMERCIAL

## 2023-10-12 LAB
CREATININE (EXTERNAL): 0.66 MG/DL (ref 0.57–1)
GFR ESTIMATED (EXTERNAL): 97 ML/MIN/1.73

## 2023-10-17 ENCOUNTER — ANTICOAGULATION THERAPY VISIT (OUTPATIENT)
Dept: ANTICOAGULATION | Facility: CLINIC | Age: 68
End: 2023-10-17
Payer: COMMERCIAL

## 2023-10-17 DIAGNOSIS — Z86.711 HISTORY OF PULMONARY EMBOLISM: ICD-10-CM

## 2023-10-17 DIAGNOSIS — D68.51 FACTOR 5 LEIDEN MUTATION, HETEROZYGOUS (H): Primary | ICD-10-CM

## 2023-10-17 LAB — INR HOME MONITORING: 3 (ref 2–3)

## 2023-10-17 NOTE — PROGRESS NOTES
ANTICOAGULATION MANAGEMENT     Bere Machuca 67 year old female is on warfarin with therapeutic INR result. (Goal INR 2.0-3.0)    Recent labs: (last 7 days)     10/17/23  0000   INR 3.0       ASSESSMENT     Source(s): Chart Review and Patient/Caregiver Call     Warfarin doses taken: Warfarin taken as instructed  Diet: No new diet changes identified  Medication/supplement changes: None noted  New illness, injury, or hospitalization: No  Signs or symptoms of bleeding or clotting: No  Previous result: Therapeutic last 2(+) visits  Additional findings: None       PLAN     Recommended plan for no diet, medication or health factor changes affecting INR     Dosing Instructions: Continue your current warfarin dose with next INR in 2 weeks       Summary  As of 10/17/2023      Full warfarin instructions:  2.5 mg every Fri; 5 mg all other days   Next INR check:  10/31/2023               Telephone call with Darline who verbalizes understanding and agrees to plan    Patient to recheck with home meter    Education provided:   Please call back if any changes to your diet, medications or how you've been taking warfarin  Resume manage by exception with home monitor. Continue to submit INR results to home monitor company.You will only be called when your result is out of range. Please call and notify [facility] if new medication started, dose missed, signs or symptoms of bleeding or clotting, or a surgery/procedure is scheduled.    Plan made per [facility] anticoagulation protocol    Celena Mobley RN  Anticoagulation Clinic  10/17/2023    _______________________________________________________________________     Anticoagulation Episode Summary       Current INR goal:  2.0-3.0   TTR:  71.6% (1 y)   Target end date:  Indefinite   Send INR reminders to:  [facility]    Indications    Factor 5 Leiden mutation  heterozygous/ INR 2-3 [D68.51]  History of pulmonary embolism [Z86.711]             Comments:  managed by exception  [employer]  home meter             Anticoagulation Care Providers       Provider Role Specialty Phone number    Uyen Mcintyre APRN CNP Referring Internal Medicine [phone]

## 2023-10-18 ENCOUNTER — TRANSFERRED RECORDS (OUTPATIENT)
Dept: HEALTH INFORMATION MANAGEMENT | Facility: CLINIC | Age: 68
End: 2023-10-18
Payer: COMMERCIAL

## 2023-10-18 LAB
ALT SERPL-CCNC: 23 IU/L (ref 5–35)
AST SERPL-CCNC: 38 U/L (ref 5–34)
CREATININE (EXTERNAL): 0.62 MG/DL (ref 0.5–1.3)
GFR ESTIMATED (EXTERNAL): 102.7 ML/MIN/1.73M2

## 2023-10-30 ENCOUNTER — TRANSFERRED RECORDS (OUTPATIENT)
Dept: HEALTH INFORMATION MANAGEMENT | Facility: CLINIC | Age: 68
End: 2023-10-30
Payer: COMMERCIAL

## 2023-11-01 ENCOUNTER — DOCUMENTATION ONLY (OUTPATIENT)
Dept: ANTICOAGULATION | Facility: CLINIC | Age: 68
End: 2023-11-01
Payer: COMMERCIAL

## 2023-11-01 DIAGNOSIS — Z86.711 HISTORY OF PULMONARY EMBOLISM: ICD-10-CM

## 2023-11-01 DIAGNOSIS — D68.51 FACTOR 5 LEIDEN MUTATION, HETEROZYGOUS (H): Primary | ICD-10-CM

## 2023-11-01 LAB — INR HOME MONITORING: 2.7 (ref 2–3)

## 2023-11-01 NOTE — PROGRESS NOTES
ANTICOAGULATION  MANAGEMENT-Home Monitor Managed by Exception    Bere SHARMIN Machuca 68 year old female is on warfarin with therapeutic INR result. (Goal INR 2.0-3.0)    Recent labs: (last 7 days)     11/01/23  0000   INR 2.7       Previous INR was Therapeutic  Medication, diet, health changes since last INR:chart reviewed; none identified  Contacted within the last 12 weeks by phone on 10/17/23      PLAN     Bere was NOT contacted regarding therapeutic result today per home monitoring policy manage by exception agreement.   Current warfarin dose is to be continued:     Summary  As of 11/1/2023      Full warfarin instructions:  2.5 mg every Fri; 5 mg all other days   Next INR check:  11/15/2023             ?   Fely Kidd RN  Anticoagulation Clinic  11/1/2023    _______________________________________________________________________     Anticoagulation Episode Summary       Current INR goal:  2.0-3.0   TTR:  71.6% (1 y)   Target end date:  Indefinite   Send INR reminders to:  [employer]    Indications    Factor 5 Leiden mutation  heterozygous/ INR 2-3 [D68.51]  History of pulmonary embolism [Z86.711]             Comments:  managed by exception  [employer] home meter             Anticoagulation Care Providers       Provider Role Specialty Phone number    Uyen Mcintyre APRN CNP Referring Internal Medicine [phone]

## 2023-11-03 DIAGNOSIS — G89.29 OTHER CHRONIC PAIN: ICD-10-CM

## 2023-11-07 ENCOUNTER — PRE VISIT (OUTPATIENT)
Dept: OTOLARYNGOLOGY | Facility: CLINIC | Age: 68
End: 2023-11-07

## 2023-11-08 DIAGNOSIS — I10 BENIGN ESSENTIAL HYPERTENSION: ICD-10-CM

## 2023-11-08 DIAGNOSIS — E78.00 HYPERCHOLESTEREMIA: ICD-10-CM

## 2023-11-08 RX ORDER — ROSUVASTATIN CALCIUM 20 MG/1
20 TABLET, COATED ORAL DAILY
Qty: 90 TABLET | Refills: 0 | Status: SHIPPED | OUTPATIENT
Start: 2023-11-08 | End: 2024-02-29

## 2023-11-08 RX ORDER — LOSARTAN POTASSIUM 50 MG/1
TABLET ORAL
Qty: 90 TABLET | Refills: 0 | Status: SHIPPED | OUTPATIENT
Start: 2023-11-08 | End: 2024-03-22

## 2023-11-19 LAB — INR HOME MONITORING: 5.7 (ref 2–3)

## 2023-11-20 ENCOUNTER — ANTICOAGULATION THERAPY VISIT (OUTPATIENT)
Dept: ANTICOAGULATION | Facility: CLINIC | Age: 68
End: 2023-11-20
Payer: COMMERCIAL

## 2023-11-20 DIAGNOSIS — D68.51 FACTOR 5 LEIDEN MUTATION, HETEROZYGOUS (H): Primary | ICD-10-CM

## 2023-11-20 DIAGNOSIS — Z86.711 HISTORY OF PULMONARY EMBOLISM: ICD-10-CM

## 2023-11-20 NOTE — PROGRESS NOTES
ANTICOAGULATION MANAGEMENT     Bere Machuca 68 year old female is on warfarin with supratherapeutic INR result. (Goal INR 2.0-3.0)    Recent labs: (last 7 days)     11/19/23  0000   INR 5.7*       ASSESSMENT     Source(s): Chart Review and Patient/Caregiver Call     Warfarin doses taken: Held for 1 day (11/19/23 d/t elevated level)  recently which may be affecting INR + possible extra dose take, but patient unsure   Diet: No new diet changes identified  Medication/supplement changes: Increase Tylenol prn d/t chronic arthritis sx  New illness, injury, or hospitalization: Yes: GI issues - worsening diarrhea  Signs or symptoms of bleeding or clotting: Yes: worsening gum bleeding  Previous result: Therapeutic last 2(+) visits  Additional findings: None       PLAN     Recommended plan for temporary change(s) affecting INR     Dosing Instructions: hold 2 doses then continue your current warfarin dose with next INR in 2 days       Summary  As of 11/20/2023      Full warfarin instructions:  11/20: Hold; Otherwise 2.5 mg every Fri; 5 mg all other days   Next INR check:  11/21/2023               Telephone call with Darline who verbalizes understanding and agrees to plan    Patient to recheck with home meter    Education provided:   Please call back if any changes to your diet, medications or how you've been taking warfarin  Symptom monitoring: monitoring for bleeding signs and symptoms, monitoring for clotting signs and symptoms, and when to seek medical attention/emergency care    Plan made per [facility] anticoagulation protocol + sent for co-sign to PCP d/t critical level    Jie Latham RN  Anticoagulation Clinic  11/20/2023    _______________________________________________________________________     Anticoagulation Episode Summary       Current INR goal:  2.0-3.0   TTR:  70.0% (1 y)   Target end date:  Indefinite   Send INR reminders to:  [facility]    Indications    Factor 5 Leiden  mutation  heterozygous/ INR 2-3 [D68.51]  History of pulmonary embolism [Z86.711]             Comments:  managed by exception  [employer] home meter             Anticoagulation Care Providers       Provider Role Specialty Phone number    Uyen Mcintyre APRN CNP Referring Internal Medicine [phone]

## 2023-11-22 ENCOUNTER — TELEPHONE (OUTPATIENT)
Dept: INTERNAL MEDICINE | Facility: CLINIC | Age: 68
End: 2023-11-22
Payer: COMMERCIAL

## 2023-11-22 DIAGNOSIS — Z86.711 HISTORY OF PULMONARY EMBOLISM: ICD-10-CM

## 2023-11-22 DIAGNOSIS — D68.51 FACTOR 5 LEIDEN MUTATION, HETEROZYGOUS (H): Primary | ICD-10-CM

## 2023-11-22 NOTE — TELEPHONE ENCOUNTER
Spoke with Darline who was unable to obtain an INR using her home meter today. She states she skipped warfarin Sunday, Monday, and Tuesday. I advised her to resume warfarin at her normal dose and try again on Friday ([facility] is closed tomorrow for holiday). Darline verbalized understanding and is in agreement with plan.

## 2023-11-22 NOTE — TELEPHONE ENCOUNTER
Reason for Call:  Other call back    Detailed comments: pt needing to speak with INR nurse - could not get INR reading for today    Phone Number Patient can be reached at: Home number on file [phone] (home)    Best Time: any    Can we leave a detailed message on this number? Not Applicable    Call taken on 11/22/2023 at 12:14 PM by Yu Norris

## 2023-11-27 ENCOUNTER — ANTICOAGULATION THERAPY VISIT (OUTPATIENT)
Dept: ANTICOAGULATION | Facility: CLINIC | Age: 68
End: 2023-11-27
Payer: COMMERCIAL

## 2023-11-27 DIAGNOSIS — D68.51 FACTOR 5 LEIDEN MUTATION, HETEROZYGOUS (H): Primary | ICD-10-CM

## 2023-11-27 DIAGNOSIS — Z86.711 HISTORY OF PULMONARY EMBOLISM: ICD-10-CM

## 2023-11-27 LAB — INR HOME MONITORING: 3 (ref 2–3)

## 2023-11-27 NOTE — PROGRESS NOTES
ANTICOAGULATION MANAGEMENT     Bere Machuca 68 year old female is on warfarin with therapeutic INR result. (Goal INR 2.0-3.0)    Recent labs: (last 7 days)     11/27/23  0000   INR 3.0       ASSESSMENT     Source(s): Chart Review  Previous INR was Supratherapeutic  Medication, diet, health changes since last INR chart reviewed; none identified         PLAN     Unable to reach Darline today.    Left message to continue current dose of warfarin 5 mg tonight. Request call back for assessment.    Follow up required to confirm warfarin dose taken and assess for changes    Bailee Delgado, RN  Anticoagulation Clinic  11/27/2023

## 2023-11-28 ENCOUNTER — DOCUMENTATION ONLY (OUTPATIENT)
Dept: ANTICOAGULATION | Facility: CLINIC | Age: 68
End: 2023-11-28
Payer: COMMERCIAL

## 2023-11-28 DIAGNOSIS — D68.51 FACTOR 5 LEIDEN MUTATION, HETEROZYGOUS (H): Primary | ICD-10-CM

## 2023-11-28 DIAGNOSIS — Z86.711 HISTORY OF PULMONARY EMBOLISM: ICD-10-CM

## 2023-11-28 NOTE — PROGRESS NOTES
ANTICOAGULATION MANAGEMENT     Bere Machuca 68 year old female is on warfarin with therapeutic INR result. (Goal INR 2.0-3.0)    Recent labs: (last 7 days)     11/27/23  0000   INR 3.0       ASSESSMENT     Source(s): Chart Review and Patient/Caregiver Call     Warfarin doses taken: Warfarin taken as instructed  Diet:  patient is eating very little  Medication/supplement changes: None noted  New illness, injury, or hospitalization: No  Signs or symptoms of bleeding or clotting: No, no gum bleeding  Previous result: Supratherapeutic  Additional findings: None       PLAN     Recommended plan for temporary change(s) affecting INR     Dosing Instructions: decrease your warfarin dose (7.7% change) with next INR in 1 week       Summary  As of 11/27/2023      Full warfarin instructions:  11/28: 2.5 mg; Otherwise 2.5 mg every Fri; 5 mg all other days   Next INR check:  12/4/2023               Telephone call with Darline who verbalizes understanding and agrees to plan and who agrees to plan and repeated back plan correctly    Patient to recheck with home meter    Education provided:   None required    Plan made with [facility] Pharmacist Tika Delgado, RN  Anticoagulation Clinic  11/28/2023    _______________________________________________________________________     Anticoagulation Episode Summary       Current INR goal:  2.0-3.0   TTR:  70.0% (1 y)   Target end date:  Indefinite   Send INR reminders to:  [facility] [facility]    Indications    Factor 5 Leiden mutation  heterozygous/ INR 2-3 [D68.51]  History of pulmonary embolism [Z86.711]             Comments:  managed by [employer] home meter             Anticoagulation Care Providers       Provider Role Specialty Phone number    Uyen Mcintyre APRN CNP Referring Internal Medicine [phone]

## 2023-11-28 NOTE — PROGRESS NOTES
ANTICOAGULATION CLINIC REFERRAL RENEWAL REQUEST       An annual renewal order is required for all patients referred to [facility].?  Please review and sign the pended referral order for Bere Machuca.       ANTICOAGULATION SUMMARY      Warfarin indication(s)   PE and Heterozygous Factor V Leiden    Mechanical heart valve present?  NO       Current goal range   INR: 2.0-3.0     Goal appropriate for indication? Goal INR 2-3, standard for indication(s) above     Time in Therapeutic Range (TTR)  (Goal > 60%) 70.0%       Office visit with referring provider's group within last year yes on 06/09/2023       Bailee Delgado, RN  [facility]

## 2023-12-06 ENCOUNTER — ANTICOAGULATION THERAPY VISIT (OUTPATIENT)
Dept: ANTICOAGULATION | Facility: CLINIC | Age: 68
End: 2023-12-06
Payer: COMMERCIAL

## 2023-12-06 ENCOUNTER — TELEPHONE (OUTPATIENT)
Dept: ANTICOAGULATION | Facility: CLINIC | Age: 68
End: 2023-12-06
Payer: COMMERCIAL

## 2023-12-06 DIAGNOSIS — Z86.711 HISTORY OF PULMONARY EMBOLISM: ICD-10-CM

## 2023-12-06 DIAGNOSIS — D68.51 FACTOR 5 LEIDEN MUTATION, HETEROZYGOUS (H): Primary | ICD-10-CM

## 2023-12-06 LAB — INR HOME MONITORING: 2.9 (ref 2–3)

## 2023-12-06 NOTE — PROGRESS NOTES
ANTICOAGULATION MANAGEMENT     Bere Machuca 68 year old female is on warfarin with therapeutic INR result. (Goal INR 2.0-3.0)    Recent labs: (last 7 days)     12/06/23  0000   INR 2.9       ASSESSMENT     Source(s): Chart Review     Warfarin doses taken: Reviewed in chart    Previous result: Therapeutic last visit; previously outside of goal range  Additional findings: None       PLAN     Recommended plan for no diet, medication or health factor changes affecting INR     Dosing Instructions: Continue your current warfarin dose with next INR in 2 weeks - check sooner if Ulcerative colitis/ RA symptoms flare up       Summary  As of 12/6/2023      Full warfarin instructions:  2.5 mg every Tue, Fri; 5 mg all other days   Next INR check:  12/20/2023               Detailed voice message left for Darline with dosing instructions and follow up date.     Patient to recheck with home meter    Education provided:   Please call back if any changes to your diet, medications or how you've been taking warfarin  Importance of notifying anticoagulation clinic for: changes in medications; a sooner lab recheck maybe needed and ulcerative colitis/ RA symptoms flare up  Resume manage by exception with home monitor. Continue to submit INR results to home monitor company.You will only be called when your result is out of range. Please call and notify [facility] if new medication started, dose missed, signs or symptoms of bleeding or clotting, or a surgery/procedure is scheduled.  Contact [phone]  with any changes, questions or concerns.     Plan made per [facility] anticoagulation protocol    Fidelia Garza RN  Anticoagulation Clinic  12/6/2023    _______________________________________________________________________     Anticoagulation Episode Summary       Current INR goal:  2.0-3.0   TTR:  72.2% (1 y)   Target end date:  Indefinite   Send INR reminders to:  [facility]    Indications    Factor 5 Leiden mutation  heterozygous/  INR 2-3 [D68.51]  History of pulmonary embolism [Z86.711]             Comments:  managed by exception  [employer] home meter             Anticoagulation Care Providers       Provider Role Specialty Phone number    Uyen Mcintyre APRN CNP Referring Internal Medicine [phone]    Jennifer Shea MD Referring Internal Medicine [phone]

## 2023-12-06 NOTE — TELEPHONE ENCOUNTER
ANTICOAGULATION     Bere M Sven is overdue for an INR check.     Left message reminding patient to check INR with their home meter and call results to the home monitoring company as soon as possible.     Ronaldo Rivera RN

## 2023-12-07 DIAGNOSIS — F11.20 OPIOID DEPENDENCE (H): Primary | ICD-10-CM

## 2023-12-27 LAB — INR HOME MONITORING: 3.4 (ref 2–3)

## 2023-12-28 ENCOUNTER — ANTICOAGULATION THERAPY VISIT (OUTPATIENT)
Dept: ANTICOAGULATION | Facility: CLINIC | Age: 68
End: 2023-12-28
Payer: COMMERCIAL

## 2023-12-28 DIAGNOSIS — Z86.711 HISTORY OF PULMONARY EMBOLISM: ICD-10-CM

## 2023-12-28 DIAGNOSIS — D68.51 FACTOR 5 LEIDEN MUTATION, HETEROZYGOUS (H): Primary | ICD-10-CM

## 2023-12-28 NOTE — PROGRESS NOTES
ANTICOAGULATION MANAGEMENT     Bere Machuca 68 year old female is on warfarin with supratherapeutic INR result. (Goal INR 2.0-3.0)    Recent labs: (last 7 days)     12/27/23  0000   INR 3.4*       ASSESSMENT     Source(s): Chart Review and Patient/Caregiver Call     Warfarin doses taken: Warfarin taken as instructed  Diet:  over all not eating much  Medication/supplement changes:  mucinex OTC  New illness, injury, or hospitalization: Yes: respiratory illness for 2 weeks. Feels that she turned the corner yesterday and s feeling better.  Signs or symptoms of bleeding or clotting: No  Previous result: Therapeutic last 2(+) visits  Additional findings: None       PLAN     Recommended plan for temporary change(s) affecting INR     Dosing Instructions: partial hold then continue your current warfarin dose with next INR in 2 weeks       Summary  As of 12/28/2023      Full warfarin instructions:  12/28: 2.5 mg; Otherwise 2.5 mg every Tue, Fri; 5 mg all other days   Next INR check:  1/11/2024               Telephone call with Darline who verbalizes understanding and agrees to plan and who agrees to plan and repeated back plan correctly    Patient to recheck with home meter    Education provided:   None required    Plan made per [facility] anticoagulation protocol    Bailee Delgado, RN  Anticoagulation Clinic  12/28/2023    _______________________________________________________________________     Anticoagulation Episode Summary       Current INR goal:  2.0-3.0   TTR:  73.6% (1 y)   Target end date:  Indefinite   Send INR reminders to:  [facility]    Indications    Factor 5 Leiden mutation  heterozygous/ INR 2-3 [D68.51]  History of pulmonary embolism [Z86.711]             Comments:  managed by [employer] home meter             Anticoagulation Care Providers       Provider Role Specialty Phone number    Uyen Mcintyre APRN CNP Referring Internal Medicine [phone]    Jennifer Shea MD  Referring Internal Medicine [phone]

## 2023-12-30 DIAGNOSIS — E66.01 MORBID OBESITY WITH BMI OF 60.0-69.9, ADULT (H): ICD-10-CM

## 2024-01-01 NOTE — TELEPHONE ENCOUNTER
Bere Machuca is requesting a refill of:    Pending Prescriptions:                       Disp   Refills    albuterol (PROAIR HFA) 108 (90 Base) MCG/*1 Inha*0            Sig: Inhale 1-2 puffs into the lungs as needed for           shortness of breath / dyspnea      
Refilled one inhaler/ over due for INR/ call pateint  
2024

## 2024-01-02 RX ORDER — TIRZEPATIDE 7.5 MG/.5ML
7.5 INJECTION, SOLUTION SUBCUTANEOUS
Qty: 3 ML | Refills: 0 | Status: SHIPPED | OUTPATIENT
Start: 2024-01-02 | End: 2024-03-01

## 2024-01-12 LAB — INR HOME MONITORING: 4 (ref 2–3)

## 2024-01-15 ENCOUNTER — ANTICOAGULATION THERAPY VISIT (OUTPATIENT)
Dept: ANTICOAGULATION | Facility: CLINIC | Age: 69
End: 2024-01-15
Payer: COMMERCIAL

## 2024-01-15 DIAGNOSIS — Z86.711 HISTORY OF PULMONARY EMBOLISM: ICD-10-CM

## 2024-01-15 DIAGNOSIS — D68.51 FACTOR 5 LEIDEN MUTATION, HETEROZYGOUS (H): Primary | ICD-10-CM

## 2024-01-15 NOTE — PROGRESS NOTES
ANTICOAGULATION MANAGEMENT     Bere Machuca 68 year old female is on warfarin with supratherapeutic INR result. (Goal INR 2.0-3.0)    Recent labs: (last 7 days)     01/12/24  0000   INR 4.0*       ASSESSMENT     Source(s): Chart Review and Patient/Caregiver Call     Warfarin doses taken: Warfarin taken as instructed and per patient she held her warfarin dose on 1/12/24.  Diet:  Has not been eating much and lost about 50 pounds in a 9-10 months time frame may be affecting diet and INR per patient this is planned due to she is prepping for gastric surgery.  Medication/supplement changes: None noted  New illness, injury, or hospitalization: No  Signs or symptoms of bleeding or clotting: No  Previous result: Supratherapeutic  Additional findings: None       PLAN     Recommended plan for ongoing change(s) affecting INR     Dosing Instructions: patient held dose on 1/12/24 -decrease your warfarin dose (8% change) with next INR in 4 days       Summary  As of 1/15/2024      Full warfarin instructions:  2.5 mg every Mon, Wed, Fri; 5 mg all other days   Next INR check:  1/19/2024               Telephone call with Darline who verbalizes understanding and agrees to plan and who agrees to plan and repeated back plan correctly    Patient to recheck with home meter    Education provided:   Symptom monitoring: monitoring for bleeding signs and symptoms  Impact of weight loss on INR    Plan made per [facility] anticoagulation protocol    Fidelia Garza RN  Anticoagulation Clinic  1/15/2024    _______________________________________________________________________     Anticoagulation Episode Summary       Current INR goal:  2.0-3.0   TTR:  73.6% (1 y)   Target end date:  Indefinite   Send INR reminders to:  [facility]    Indications    Factor 5 Leiden mutation  heterozygous/ INR 2-3 [D68.51]  History of pulmonary embolism [Z86.711]             Comments:  managed by exception  [employer] home meter             Anticoagulation  Care Providers       Provider Role Specialty Phone number    Uyen Mcintyre APRN CNP Referring Internal Medicine [phone]    Jennifer Shea MD Referring Internal Medicine [phone]             86

## 2024-01-23 ENCOUNTER — MYC MEDICAL ADVICE (OUTPATIENT)
Dept: INTERNAL MEDICINE | Facility: CLINIC | Age: 69
End: 2024-01-23
Payer: COMMERCIAL

## 2024-01-23 ENCOUNTER — ANTICOAGULATION THERAPY VISIT (OUTPATIENT)
Dept: ANTICOAGULATION | Facility: CLINIC | Age: 69
End: 2024-01-23
Payer: COMMERCIAL

## 2024-01-23 DIAGNOSIS — D68.51 FACTOR 5 LEIDEN MUTATION, HETEROZYGOUS (H): Primary | ICD-10-CM

## 2024-01-23 DIAGNOSIS — Z86.711 HISTORY OF PULMONARY EMBOLISM: ICD-10-CM

## 2024-01-23 DIAGNOSIS — G47.33 OBSTRUCTIVE SLEEP APNEA SYNDROME: Primary | ICD-10-CM

## 2024-01-23 LAB — INR HOME MONITORING: 3.2 (ref 2–3)

## 2024-01-23 NOTE — PROGRESS NOTES
ANTICOAGULATION MANAGEMENT     Bere Machuca 68 year old female is on warfarin with supratherapeutic INR result. (Goal INR 2.0-3.0)    Recent labs: (last 7 days)     01/23/24  0000   INR 3.2*       ASSESSMENT     Source(s): Chart Review and Patient/Caregiver Call     Warfarin doses taken: Warfarin taken as instructed  Diet: No new diet changes identified  Medication/supplement changes:  Tylenol ES as needed use which may be increasing INR today  New illness, injury, or hospitalization: Yes: pull left leg muscle   Signs or symptoms of bleeding or clotting: No  Previous result: Supratherapeutic dose adjustment done.  Additional findings: None       PLAN     Recommended plan for temporary change(s) affecting INR     Dosing Instructions: partial hold then continue your current warfarin dose with next INR in 2 weeks       Summary  As of 1/23/2024      Full warfarin instructions:  1/23: 2.5 mg; Otherwise 2.5 mg every Mon, Wed, Fri; 5 mg all other days   Next INR check:  2/6/2024               Telephone call with Darline who verbalizes understanding and agrees to plan and who agrees to plan and repeated back plan correctly    Patient to recheck with home meter    Education provided:   Interaction IS anticipated between warfarin and Tylenol  Contact [phone]  with any changes, questions or concerns.     Plan made per [facility] anticoagulation protocol    Fidelia Garza RN  Anticoagulation Clinic  1/23/2024    _______________________________________________________________________     Anticoagulation Episode Summary       Current INR goal:  2.0-3.0   TTR:  70.8% (1 y)   Target end date:  Indefinite   Send INR reminders to:  [facility]    Indications    Factor 5 Leiden mutation  heterozygous/ INR 2-3 [D68.51]  History of pulmonary embolism [Z86.711]             Comments:  managed by rupesh Ortega home meter             Anticoagulation Care Providers       Provider Role Specialty Phone number    Uyen Mcintyre  MARY Gaines CNP Referring Internal Medicine [phone]    Jennifer Shea MD Referring Internal Medicine [phone]

## 2024-01-24 NOTE — TELEPHONE ENCOUNTER
Sent [employer] message to patient.    Tito Ventura, Triage RN Kaylah Pereira  11:58 AM 1/24/2024

## 2024-01-25 ENCOUNTER — TRANSFERRED RECORDS (OUTPATIENT)
Dept: HEALTH INFORMATION MANAGEMENT | Facility: CLINIC | Age: 69
End: 2024-01-25
Payer: COMMERCIAL

## 2024-01-26 ENCOUNTER — TELEPHONE (OUTPATIENT)
Dept: OTHER | Facility: CLINIC | Age: 69
End: 2024-01-26
Payer: COMMERCIAL

## 2024-01-26 NOTE — TELEPHONE ENCOUNTER
[facility]    Who is the name of the provider?:  NONE   What is the location you see this provider at/preferred location?: [place]  Person calling / Facility: Bere Machuca  Phone number:  [phone] (home)  Nurse call back needed:  n/a     Reason for call:  Patient called stating she has a referral from [facility]. Patient states she has left arm swelling that now goes down to her leg; Patient also mentioned a subclavian artery blockage.    Pharmacy location:  [id number] [id number]  Outside Imaging: n/a   Can we leave a detailed message on this number?  YES     1/26/2024, 2:25 PM

## 2024-01-26 NOTE — TELEPHONE ENCOUNTER
Called patient and requested she call [facility] and have relevant office notes, imaging reports and referral faxed to [facility] at [phone].    Awaiting documentation.

## 2024-01-29 ENCOUNTER — TELEPHONE (OUTPATIENT)
Dept: OTHER | Facility: CLINIC | Age: 69
End: 2024-01-29
Payer: COMMERCIAL

## 2024-01-29 NOTE — TELEPHONE ENCOUNTER
Future Appointments   Date Time Provider Department Center   1/30/2024  2:40 PM Shelton Richter MD [facility]

## 2024-01-29 NOTE — TELEPHONE ENCOUNTER
[facility]    Who is the name of the provider?:  UNKNOWN,    What is the location you see this provider at/preferred location?: [place]  Person calling / Facility: Bere Machuca  Phone number:  [phone] (home)  Nurse call back needed:  yes     Reason for call:  Referred from [facility] Dr Nelson for a blockage in left leg.  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      Referral was faxed.   Please advise.       Pharmacy location:  [id number] - [place], [place] - [address]  Outside Imaging: n/a   Can we leave a detailed message on this number?  YES     1/29/2024, 1:22 PM

## 2024-01-29 NOTE — TELEPHONE ENCOUNTER
Future Appointments   Date Time Provider Department Center   1/30/2024  2:40 PM Shelton Richter MD [facility]

## 2024-01-29 NOTE — TELEPHONE ENCOUNTER
Documentation received 1/29/24. Sent to HIM.  Copy remains on my desk.    Pt referred to [facility] by Dr. Cantor for pitting edema of bilateral lower extremities.    Former patient of Dr. Falcon 4/25/2016.    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  Will route to scheduling to coordinate an appointment at next available.    Appt note:    Ref by Dr. Cantor for pitting edema of BLE.  Extensive history, see encounter 4/25/16 with Dr. Falcon. 90 MINUTE CONSULT.

## 2024-01-29 NOTE — TELEPHONE ENCOUNTER
Left voicemail with instructions for patient to call back to schedule their appointment(s)    January 29, 2024 , 10:29 AM

## 2024-02-06 ENCOUNTER — TELEPHONE (OUTPATIENT)
Dept: INTERNAL MEDICINE | Facility: CLINIC | Age: 69
End: 2024-02-06
Payer: COMMERCIAL

## 2024-02-06 LAB — INR HOME MONITORING: 3 (ref 2–3)

## 2024-02-07 ENCOUNTER — ANTICOAGULATION THERAPY VISIT (OUTPATIENT)
Dept: ANTICOAGULATION | Facility: CLINIC | Age: 69
End: 2024-02-07
Payer: COMMERCIAL

## 2024-02-07 DIAGNOSIS — Z86.711 HISTORY OF PULMONARY EMBOLISM: ICD-10-CM

## 2024-02-07 DIAGNOSIS — D68.51 FACTOR 5 LEIDEN MUTATION, HETEROZYGOUS (H): Primary | ICD-10-CM

## 2024-02-07 NOTE — PROGRESS NOTES
ANTICOAGULATION MANAGEMENT     Bere Machuca 68 year old female is on warfarin with therapeutic INR result. (Goal INR 2.0-3.0)    Recent labs: (last 7 days)     02/06/24  0000   INR 3.0       ASSESSMENT     Source(s): Chart Review and Patient/Caregiver Call     Warfarin doses taken: Warfarin taken as instructed  Diet: No new diet changes identified  Medication/supplement changes: None noted  New illness, injury, or hospitalization: No  Signs or symptoms of bleeding or clotting: No  Previous result: Subtherapeutic  Additional findings: None       PLAN     Recommended plan for no diet, medication or health factor changes affecting INR     Dosing Instructions: Continue your current warfarin dose with next INR in 2 weeks       Summary  As of 2/7/2024      Full warfarin instructions:  2.5 mg every Mon, Wed, Fri; 5 mg all other days   Next INR check:  2/20/2024               Telephone call with Darline who agrees to plan and repeated back plan correctly    Patient to recheck with home meter    Education provided:   Please call back if any changes to your diet, medications or how you've been taking warfarin  Goal range and lab monitoring: goal range and significance of current result    Plan made per [facility] anticoagulation protocol    Catherine Lynn, KERRIE  Anticoagulation Clinic  2/7/2024    _______________________________________________________________________     Anticoagulation Episode Summary       Current INR goal:  2.0-3.0   TTR:  69.0% (1 y)   Target end date:  Indefinite   Send INR reminders to:  [facility]    Indications    Factor 5 Leiden mutation  heterozygous/ INR 2-3 [D68.51]  History of pulmonary embolism [Z86.711]             Comments:  managed by rupesh Ortega home meter             Anticoagulation Care Providers       Provider Role Specialty Phone number    Uyen Mcintyre APRN CNP Referring Internal Medicine [phone]    Jennifer Shea MD Referring Internal Medicine [phone]

## 2024-02-10 ENCOUNTER — HEALTH MAINTENANCE LETTER (OUTPATIENT)
Age: 69
End: 2024-02-10

## 2024-02-16 ENCOUNTER — OFFICE VISIT (OUTPATIENT)
Dept: OTHER | Facility: CLINIC | Age: 69
End: 2024-02-16
Attending: INTERNAL MEDICINE
Payer: COMMERCIAL

## 2024-02-16 VITALS
OXYGEN SATURATION: 98 % | WEIGHT: 293 LBS | SYSTOLIC BLOOD PRESSURE: 147 MMHG | DIASTOLIC BLOOD PRESSURE: 79 MMHG | BODY MASS INDEX: 55.2 KG/M2 | HEART RATE: 80 BPM

## 2024-02-16 DIAGNOSIS — E11.9 DM TYPE 2, GOAL HBA1C < 7% (H): ICD-10-CM

## 2024-02-16 DIAGNOSIS — R09.89 OTHER SPECIFIED SYMPTOMS AND SIGNS INVOLVING THE CIRCULATORY AND RESPIRATORY SYSTEMS: ICD-10-CM

## 2024-02-16 DIAGNOSIS — Q74.0 ASYMMETRICAL ARMS: ICD-10-CM

## 2024-02-16 DIAGNOSIS — E78.5 HYPERLIPIDEMIA LDL GOAL <70: ICD-10-CM

## 2024-02-16 DIAGNOSIS — I87.1 SUPERIOR VENA CAVA SYNDROME: Primary | ICD-10-CM

## 2024-02-16 DIAGNOSIS — I82.622 ACUTE DEEP VEIN THROMBOSIS (DVT) OF OTHER VEIN OF LEFT UPPER EXTREMITY (H): ICD-10-CM

## 2024-02-16 DIAGNOSIS — R60.9 ASYMMETRIC EDEMA OF BOTH LOWER EXTREMITIES: ICD-10-CM

## 2024-02-16 PROCEDURE — G2211 COMPLEX E/M VISIT ADD ON: HCPCS | Performed by: INTERNAL MEDICINE

## 2024-02-16 PROCEDURE — G0463 HOSPITAL OUTPT CLINIC VISIT: HCPCS | Performed by: INTERNAL MEDICINE

## 2024-02-16 PROCEDURE — 99205 OFFICE O/P NEW HI 60 MIN: CPT | Performed by: INTERNAL MEDICINE

## 2024-02-16 PROCEDURE — 99417 PROLNG OP E/M EACH 15 MIN: CPT | Performed by: INTERNAL MEDICINE

## 2024-02-16 NOTE — PROGRESS NOTES
[facility]        Patient is here for a consult to discuss Lymphedema.     Pt is currently taking Warfarin.Statin    BP (!) 147/79 (BP Location: Right arm, Patient Position: Chair, Cuff Size: Adult Large)   Pulse 80   Wt (!) 342 lb (155.1 kg)   SpO2 98%   BMI 55.20 kg/m      The provider has been notified that the patient has no concerns.     Questions patient would like addressed today are: N/A.    Refills are needed: N/A    Has homecare services and agency name:  Maria Eugenia Jones MA

## 2024-02-16 NOTE — PROGRESS NOTES
INITIAL VASCULAR MEDICAL ASSESSMENT  REFERRAL SOURCE: Dr. Nelson  REASON FOR CONSULT: Patient states she has   left arm swelling that now goes down to her leg; Patient also mentioned a   subclavian artery blockage.       A/P:    (I87.1) H/O superior vena cava syndrome  (primary encounter diagnosis)  Comment: She is very complicated. We will reimage her UEs as well as LEs given asymmetry as noted below , history, and planned orthopedic procedures.   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  Comment: She is very complicated. We will reimage her UEs as well as LEs given asymmetry as noted below , history, and planned orthopedic procedures.   Plan: [place] PROLONGED OFFICE/OUTPATIENT E/M SVC, W OR         W/O PT CONTACT EA 15 MIN, US Upper Ext Venous         Duplex Limited Bilat, US Upper Ext Arterial         Duplex Bilateral, CTA Upper Extremity Bilateral        Runoff w Contr, CTA Chest with Contrast            (E11.9) DM type 2, goal HbA1c < 7% (H)  Comment: Glycemically well controlled.  Plan: Check A1C in next two weeks, RTC tow weeks later. [place] PROLONGED OFFICE/OUTPATIENT E/M SVC, W OR         W/O PT CONTACT EA 15 MIN            (E78.5) Hyperlipidemia LDL goal <70  Comment:  Check advanced lipid testing in next two weeks, RTC tow weeks later  Plan: [place] PROLONGED OFFICE/OUTPATIENT E/M SVC, W OR         W/O PT CONTACT EA 15 MIN            (R60.9) Asymmetric lower extremities  Comment: She is very complicated. We will reimage her UEs as well as LEs given asymmetry as noted below , history, and planned orthopedic procedures.   Plan: [place] PROLONGED OFFICE/OUTPATIENT E/M SVC, W OR         W/O PT CONTACT EA 15 MIN,  US Lower Extremity         Venous Duplex Bilateral, US Aorta/Ivc/Iliac         Duplex Complete, CTV Abdomen Pelvis w Contrast            (Q74.0) Asymmetrical arms  Comment: She is very complicated. We will reimage her UEs as well as LEs given asymmetry as noted below , history, and planned orthopedic procedures.   Plan: [place] PROLONGED OFFICE/OUTPATIENT E/M SVC, W OR         W/O PT CONTACT EA 15 MIN, US Upper Ext Venous         Duplex Limited Bilat, US Upper Ext Arterial         Duplex Bilateral, CTA Upper Extremity Bilateral        Runoff w Contr, CTA Chest with Contrast            (R09.89) Other specified symptoms and signs involving the circulatory and respiratory systems  Comment: As above,  Plan: US Upper Ext Arterial Duplex Bilateral, US         Aorta/Ivc/Iliac Duplex Complete              The longitudinal care of plan for Darline was addressed during this visit. Due to added complexity of care, we will continue to support Bere Machuca  and the subsequent management of these conditions and with ongoing continuity of care for these conditions.     110 minutes total medical care. Extended time due to need to review over twenty five years of medical records, as well as review individual imaging studies referenced below.           HPI: Bere Machuca is a 68 year old female with a h/o htn, HLD, DM2, very morbid obesity (Body mass index is 55.2 kg/m . ) who is Monjauro to help with weight loss. She also has UC which is well contorlled, and RA for which she is steroid dependent, uses MTX and monoclonal Ab infusions. 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 As separte procedures.    However, she has a complex medical h/o a Lt SCV PAC placement in 1996 due to need fo prolonged IV abx due to colonic perforation. She then had this removed. In 2012 however she presented to [facility] with left arm swelling and facial puffiness. She was ultimately found to  have SVC syndrome when her SVC  was thrombosed. She underwent CD lysis and mechanical thrombectomy with satisfactory results.  She had a HC w/u and was discovered to be a Leiden heterozygote. She was ACd with warfarin and has been on this chronically since that time. She in 2016 presented to Dr. Falcon with c/o LUE swelling. Imaging as below was undertaken and revealed her Lt SCV was not thrombosed but that she had a cavoatrial calcified mass . She was not felt to be symptomatic form this and was therefore not intervened upon. She was referred to LE therapy and found no significant improvement with the above.     The patient presents today to address her orthopedist's concerns that she has a dramatically increased asymmetrical size to her LUE and LLE compared to the right. Prior to undertaking orthopedic surgery for the above, they wanted to know the status of her venous anatomy.     Review Of Systems  Skin: negative  Eyes: negative  Ears/Nose/Throat: negative  Respiratory: No shortness of breath, dyspnea on exertion, cough, or hemoptysis  Cardiovascular: negative  Gastrointestinal: negative  Genitourinary: negative  Musculoskeletal: as above, orthopaedically limited, needs a walker.   Neurologic: negative  Psychiatric: negative  Hematologic/Lymphatic/Immunologic: negative  Endocrine: negative      PAST MEDICAL HISTORY:                  Past Medical History:   Diagnosis Date    Anxiety     Arthritis     Clotting disorder (H)     COPD (chronic obstructive pulmonary disease) (H)     Depressive disorder     Diabetes (H)     Hypertension     Lymphedema of arm 3/8/2013    Morbidly obese (H) 9/25/2009    Pulmonary embolism (H)     Sleep apnea     Substance abuse (H)     Superior vena cava syndrome        PAST SURGICAL HISTORY:                  Past Surgical History:   Procedure Laterality Date    BIOPSY OF SKIN LESION  multiple    keloids    HC REMOVAL OF OVARY/TUBE(S)  1992    Salpingo-Oophorectomy, Unilateral    HC REMOVE  TONSILS/ADENOIDS,<13 Y/O  age 14    T & A <14y.o.    HERNIA REPAIR      INSERTION OF ACCESS PORT  1-4-96    sepsis- removed after hospital stay    [facility] ANESTH,REPAIR LO ABD HERNIA NOS  multiple/ 2006 last    panniculus    [facility] EXPLORATORY OF ABDOMEN  1996    [facility] PLASTIC SURGERY, NECK  age 4    ZZ REMOVAL COLON/ILEOSTOMY      reastamosis    [facility] TOTAL ABDOM HYSTERECTOMY  1992    Hysterectomy, Total Abdominal    [facility] REMOVE ABDOMINAL WALL LESION  2006    abscess       CURRENT MEDICATIONS:                  Current Outpatient Medications   Medication Sig Dispense Refill    albuterol (2.5 MG/3ML) 0.083% nebulizer solution Take 1 ampule by nebulization every 6 hours as needed for shortness of breath  [facility] pulmonary specialists      albuterol (PROAIR HFA/PROVENTIL HFA/VENTOLIN HFA) 108 (90 Base) MCG/ACT inhaler INHALE 2 PUFFS INTO THE LUNGS EVERY 4 HOURS AS NEEDED ### DO NOT FILL NOW.  Please update patient's profile to reflect additional refills.  #### 8.5 g 11    B Complex-C (SUPER B COMPLEX PO) Take 1 tablet by mouth daily      buprenorphine-naloxone (SUBOXONE) 2-0.5 MG SUBL sublingual tablet       escitalopram (LEXAPRO) 20 MG tablet TAKE 1 TABLET BY MOUTH EVERY DAY 90 tablet 1    Fluticasone-Umeclidin-Vilant (TRELEGY ELLIPTA) 100-62.5-25 MCG/ACT oral inhaler INHALE 1 PUFF INTO THE LUNGS DAILY (RINSE MOUTH AND SPIT AFTER USING) 60 each 11    folic acid (FOLVITE) 1 MG tablet TAKE 1 TABLET BY MOUTH EVERY DAY IN THE MORNING  0    inFLIXimab-axxq (AVSOLA IV) 800 mg by Intravenous/Intraosseous route Q 8 weeks      losartan (COZAAR) 50 MG tablet TAKE 1 TABLET BY MOUTH EVERY DAY 90 tablet 0    magnesium 250 MG tablet Take 1 tablet by mouth daily      mesalamine (LIALDA) 1.2 g EC tablet Take 1,200 mg by mouth daily (with breakfast) 4 pills a day      methotrexate 50 MG/2ML injection INJECT 0.7ML SUB-EVERY EVERY WEEK      multivitamin w/minerals (THERA-VIT-M) tablet Take 1 tablet by mouth daily      predniSONE (DELTASONE) 5 MG  tablet       pregabalin (LYRICA) 100 MG capsule Take 1 capsule (100 mg) by mouth 2 times daily 60 capsule 0    rosuvastatin (CRESTOR) 20 MG tablet TAKE 1 TABLET BY MOUTH EVERY DAY 90 tablet 0    sulfaSALAzine (AZULFIDINE) 500 MG tablet Take 1 tablet (500 mg) by mouth 2 times daily      tirzepatide (MOUNJARO) 7.5 MG/0.5ML pen INJECT 7.5 MG SUBCUTANEOUS EVERY 7 DAYS 3 mL 0    tiZANidine (ZANAFLEX) 4 MG tablet TAKE 1 TABLET BY MOUTH TWICE A DAY AS NEEDED 180 tablet 1    warfarin ANTICOAGULANT (COUMADIN) 5 MG tablet TAKE 1/2 TABLET (2.5 MG) BY MOUTH Every FRI; and take 1 TAB (5 MG) ALL OTHER DAYS OR AS DIRECTED by your ACC team based on INR Results. 90 tablet 1       ALLERGIES:                  Allergies   Allergen Reactions    Metformin Diarrhea    Aspirin      PN: LW Reaction: sensitive/stomach    Tape [Adhesive Tape] Itching    Topiramate     Vancomycin      Eye issue     Wellbutrin [Bupropion] Anxiety       SOCIAL HISTORY:                  Social History     Socioeconomic History    Marital status:      Spouse name: Not on file    Number of children: 0    Years of education: 12    Highest education level: Not on file   Occupational History     Employer: [employer]   Tobacco Use    Smoking status: Former     Packs/day: 0.50     Years: 35.00     Additional pack years: 0.00     Total pack years: 17.50     Types: Cigarettes, Vaping Device     Quit date: 2014     Years since quittin.5    Smokeless tobacco: Never   Vaping Use    Vaping Use: Every day    Substances: Nicotine    Devices: Refillable tank   Substance and Sexual Activity    Alcohol use: Yes     Alcohol/week: 0.8 standard drinks of alcohol     Types: 1 Standard drinks or equivalent per week     Comment: social    Drug use: No     Comment: 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    Sexual activity: Not Currently     Birth control/protection: Post-menopausal   Other Topics Concern     Service No    Blood Transfusions No    Caffeine Concern No     Occupational Exposure No    Hobby Hazards No    Sleep Concern Yes     Comment: CPAP    Stress Concern No    Weight Concern Yes    Special Diet Not Asked    Back Care Not Asked    Exercise No    Bike Helmet Not Asked    Seat Belt Yes    Self-Exams Yes    Parent/sibling w/ CABG, MI or angioplasty before 65F 55M? Not Asked   Social History Narrative    Not on file     Social Determinants of Health     Financial Resource Strain: Low Risk  (2020)    Overall Financial Resource Strain (CARDIA)     Difficulty of Paying Living Expenses: Not very hard   Food Insecurity: No Food Insecurity (2020)    Hunger Vital Sign     Worried About Running Out of Food in the Last Year: Never true     Ran Out of Food in the Last Year: Never true   Transportation Needs: No Transportation Needs (2020)    PRAPARE - Transportation     Lack of Transportation (Medical): No     Lack of Transportation (Non-Medical): No   Physical Activity: Not on file   Stress: Not on file   Social Connections: Not on file   Interpersonal Safety: Not on file   Housing Stability: Not on file       FAMILY HISTORY:                   Family History   Problem Relation Age of Onset    Respiratory Mother         copd    Cancer Mother         skin cancers/ melanomas    C.A.D. Mother         MI  age 79    Cancer - colorectal Father         / diagnosis age 72    Gastrointestinal Disease Father         colitis    Breast Cancer No family hx of          Physical exam Reveals:    BP (!) 147/79 (BP Location: Right arm, Patient Position: Chair, Cuff Size: Adult Large)   Pulse 80   Wt (!) 342 lb (155.1 kg)   SpO2 98%   BMI 55.20 kg/m        O/P: WNL  HEENT: WNL  NECK: No JVD, thyromegaly, or lymphadenopathy  HEART: RRR, no murmurs, gallops, or rubs  LUNGS: CTA bilaterally without rales, wheezes, or rhonchi  GI: NABS, nondistended, nontender, soft  EXT:without cyanosis, clubbing, or edema; LUE and LLE asymmetrically enlarged compared to the right   NEURO:  nonfocal  : no flank tenderness                [facility]  LOCATION: [facility]  DATE: 6/23/2023     EXAM: DUPLEX CAROTID ULTRASOUND     INDICATION:  Pulsatile tinnitus, PVD (peripheral vascular disease) (H), Hypercholesteremia, Other specified symptoms and signs involving the circulatory and respiratory systems      TECHNIQUE: Duplex imaging of the carotid arteries was performed, including gray-scale and color flow imaging, Doppler waveform analysis, and spectral Doppler imaging.     COMPARISON: No pertinent comparison study is available for review.     FINDINGS:   RIGHT CAROTID SYSTEM: Minimal atheromatous plaque in the right carotid bulb and proximal bifurcation vessels.       The following velocities were obtained in the RIGHT carotid system (cm/s).  CCA: PSV 63; EDV 16  ICA: PSV 68; EDV  22   ECA: PSV 68; EDV  10   ICA/CCA ratios: PS 1.1     LEFT CAROTID SYSTEM: Minimal atheromatous plaque in the left carotid bulb and proximal bifurcation vessels.       The following velocities were obtained in the LEFT carotid system (cm/s).  CCA: PSV 76; EDV 19   ICA: PSV 77; EDV 23   ECA: PSV 59; EDV 11   ICA/CCA ratios: PS 1.0     VERTEBRAL SYSTEM: The vertebral arteries are patent bilaterally with normal waveforms proximally and antegrade flow.                                                                      CONCLUSION:   1.  RIGHT CAROTID: Mild less than 50% diameter stenosis based on NASCET criteria.  2.  LEFT CAROTID: Mild less than 50% diameter stenosis based on NASCET criteria.  3.  Antegrade flow within the vertebral arteries bilaterally. \      CT ABDOMEN PELVIS WITH CONTRAST   6/22/2017 9:29 PM       HISTORY:  Escherichia coli on blood culture.     TECHNIQUE:  CT abdomen and pelvis with intravenous contrast. Radiation  dose for this scan was reduced using automated exposure control,  adjustment of the mA and/or kV according to patient size, or iterative  reconstruction technique.  100mL Isovue-370      COMPARISON:  None.     FINDINGS:  Abdomen:  Mild atelectasis or scarring at the lung bases. The heart  size is normal. There is mild diffuse fatty infiltration of liver. The  spleen, gallbladder, pancreas, kidneys are normal in appearance. Mild  nonspecific thickening of adrenal glands. There is no abdominal or  pelvic lymph node enlargement.     Pelvis: There are scattered colonic diverticula without acute  diverticulitis. No bowel obstruction or inflammation. No adnexal mass.  There are postoperative changes in the anterior abdominal wall.  Midline ventral hernia containing fat. Left lower quadrant abdominal  hernia containing fat. Few gas bubbles in the subcutaneous fat of the  left anterior abdomen may be from injections. There is degenerative  disease in the spine.                                                                      IMPRESSION:  1. No acute abnormality. No bowel obstruction or inflammation.  2. Colonic diverticula without acute diverticulitis.  3. Two ventral hernias containing fat.  4. Fatty infiltration of the liver.      MERNA ZIMMERMAN MD        INDICATION: Left upper extremity swelling     PROCEDURES PERFORMED: 11/18/2016  1. Left upper extremity venogram  2. SVC venogram     HISTORY: The patient is a 61-year-old female with history of pulmonary  embolus, factor V Leiden mutation, SVC syndrome status post  thrombolysis in 2012 who was evaluated in the outpatient for chronic  left arm swelling. She had CTA which suggested SVC occlusion versus  stenosis and patent bilateral subclavian veins. Given the persistent  left arm swelling spot compression therapy, she is referred for left  upper extremity venogram.     PROCEDURE: After discussing risks, benefits, goals, alternatives and  complications of the procedure, the patient was brought to the  catheterization lab. Left arm was prepped and draped in a sterile  fashion. Under 1% lidocaine a 7 Scottish sheath was placed in  the left  cephalic vein under ultrasound guidance. Venogram of the distal left  extremity veins was performed using the 7 Kosovan sheath. Next, a 5  Kosovan glide catheter was advanced over wire into the distal left  subclavian and additional venogram of the thoracic veins were  performed. The 7 Kosovan sheath was subsequently removed and hemostasis  achieved via manual compression.     FINDINGS:     LEFT UPPER EXTREMITY VENOGRAM: The left axillary vein is patent. The  distal left subclavian vein is patent. The proximal left subclavian  and brachiocephalic veins are chronically occluded. Extensive venous  collaterals are present. The right brachiocephalic vein is patent. The  proximal superior vena cava is patent. The distal superior vena cava  is not well visualized.                                                                      IMPRESSION:   1. 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 Chronic left upper extremity swelling, secondary to #1      PLAN: Continue compression therapy.     ANN FALCON MD        Examination:   MR CARDIAC W CONTRAST   Date:  6/20/2016 10:50 AM  Ordering: Ann Falcon  Indication:  60-year-old female with history of SVC syndrome and  chronic occlusion with a mass at the right atrium-SVC junction.  Comparison: None  Technical quality: excellent                                                                      IMPRESSION:  1.  Normal left ventricular size and systolic function with a  calculated ejection fraction of  63 %.  2.  Normal right ventricular size and systolic function with a  calculated ejection fraction of 56%.   3.  There is a heterogeneous 2.7 x 1.6 cm mass located at the junction  of the superior vena cava and the right atrium which spans around 80%  the diameter of the vessel, but does not completely obstruct the  orifice. The mass does not suppress on fat suppression sequences and  is dark both on long TI imaging as well as  cine imaging; collectively  these likely indicate a calcified thrombus.     4.  On delayed enhancement imaging, there is no abnormal  hyperenhancement to suggest myocardial scar/inflammation/infiltration.     The MRI sequences and imaging planes in this study were tailored for  cardiac imaging and are suboptimal for evaluation of non-cardiac  structures.     FINDINGS     Atria  LA size: Mildly dilated   RA size: Mildly dilated     Valves  Aortic Valve  Valve morphology: Tricuspid  Aortic stenosis: None  Aortic regurgitation: None     Mitral Valve  Mitral stenosis: None  Mitral regurgitation: Trace     Tricuspid Valve   Tricuspid stenosis: None  Tricuspid regurgitation: None     Pulmonic Valve  Pulmonic stenosis: None  Pulmonic regurgitation: None     Extracardiac  Aortic root dimension: 38 mm (at the sinuses of Valsalva)  Main PA dimension: 36 mm  Pericardial thickness: Normal  Pericardial effusion:Trivial  Pleural effusion: None     Measurements  LEFT VENTRICULAR VOLUME RESULTS  Body Surface Area:    2.91 m2                 [place]                                        ED volume:            145.69 ml               (96 - 174 ml)                                    ED volume index:      50.00 ml/m2             (56 - 100 ml/m2)                                 ES volume:            54.08 ml                (27 - 71 ml)                                     ES volume index:      18.56 ml/m2                                                              Stroke volume:        91.61 ml                (61 - 111 ml)                                    Stroke volume index:  31.44 ml/m2                                                              Cardiac output:       6.51 l/min                                                               Cardiac output index: 2.23 l/(m2*min)                                                          Ejection fraction:    62.88 %                 (54 - 74 %)                                          LV wall thickness - Anteroseptal: 1.1 cm  LV wall thickness - Inferolateral: 1.1 cm  LV wall thickness - Maximum: 1.1 cm  LV end-diastolic diameter: 5.3 cm  LV end-systolic diameter: 3.0 cm  LA danny-posterior diameter: 4.4 cm     RIGHT VENTRICULAR VOLUME RESULTS  ED volume:            156.10 ml               (83 - 178 ml)                                    ED volume index:      53.57 ml/m2             (47 - 103 ml/m2)                                 ES volume:            68.57 ml                (32 - 73 ml)                                     ES volume index:      23.53 ml/m2                                                              Stroke volume:        87.53 ml                (44 - 113 ml)                                    Stroke volume index:  30.04 ml/m2                                                              Cardiac output:       6.22 l/min                                                               Cardiac output index: 2.13 l/(m2*min)                                                          Ejection fraction:    56.07 %                 (49 - 70 %)            Sedation and contrast  Sedation used: No  Contrast agent: MultiHance  Volume administered: 36 mL     Pulse sequences used  SSFP Localizers  SSFP Cine  IR SSFP Single Shot  IR GRE Segmented  Contrast-enhanced early and late enhancement imaging  Image post-processing was performed on a [employer] workstation     I have personally reviewed the examination and initial interpretation  and I agree with the findings.     GLORY SHEA MD      ULTRASOUND VENOUS UPPER EXTREMITY LEFT 5/9/2016 3:31 PM      HISTORY: Acute embolism and thrombosis of unspecified deep veins of  left lower extremity.     COMPARISON: None.     TECHNIQUE: Ultrasound gray scale, Color Doppler flow, and spectral  Doppler waveform analysis performed.     FINDINGS: There is thrombus in the left internal jugular vein. The  left axillary, cephalic, basilic, brachial, radial, and ulnar  veins  demonstrate normal compressibility. The left innominate, subclavian,  and axillary veins have normal venous waveforms.        IMPRESSION: Nonocclusive thrombus in left internal jugular vein.     QUOC BAIRD MD        CTA ANGIOGRAM CHEST CONTRAST  5/9/2016 2:50 PM      HISTORY:  Arm swelling, chronic superior vena cava obstruction.  History of Factor V gene mutation.     COMPARISON: Chest CT 3/2/2012 and 1/21/2016.     TECHNIQUE: IV contrast imaging was performed through the chest in the  venous phase utilizing 80 mL of Isovue-370.     FINDINGS: There is chronic thrombosis of the superior vena cava which  appears to be secondary to a 2.4 x 1.6 CM calcified mass at the  cavoatrial junction. The degree of calcification in this region is  increased slightly when compared to the previous study of 3/2/2012 at  which time it measured 2.0 x 1.6 CM. The innominate and subclavian  veins appear patent. There is no other definite cause of obstruction  with specifically no evidence of mediastinitis or mediastinal  adenopathy. Cardiac size and pulmonary vascularity are normal. No  significant collaterals in the chest are identified. The lungs are  clear. There is no evidence of pleural or pericardial effusion. Fatty  infiltration of the liver. Hypertrophic degenerative changes  throughout the thoracic spine.        IMPRESSION:  1. 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  The degree of calcification is increased slightly when compared to the  previous study of 2012.  2. No significant venous collateralization in the upper chest. The  innominate and subclavian veins are patent.     PREM CASTREJON MD      CT CHEST PULMONARY EMBOLISM WITH CONTRAST  1/21/2016 2:09 PM      HISTORY:  History of PE. Sudden onset shortness of breath.     COMPARISON: CT chest 3/2/2012.     TECHNIQUE: Thin section axial images are performed from the  thoracic  inlet to the lung bases utilizing 90 mL of Isovue 370 IV contrast  without adverse event. Coronal reformatted images are also generated.     FINDINGS:  The lungs are clear.  No pneumothorax or pleural effusion.   No pericardial fluid.  Heart is normal in size.  Esophagus is  unremarkable.  No enlarged lymph nodes.  Thoracic aorta is  unremarkable.  No evidence of pulmonary embolism.  Limited images  upper abdomen demonstrate diffuse fatty infiltration of the liver.   Probable nodule left adrenal gland is unchanged.  This is too small to  characterize on this exam due to beam hardening artifact related to  patient body habitus.  Degenerative spine changes are present.        IMPRESSION:  1.  No evidence of pulmonary embolism.  Thoracic aorta is  unremarkable.  2.  Lungs are clear.  No enlarged lymph nodes.  3.  Diffuse fatty infiltration of the liver and probable stable left  adrenal nodule measuring less than 1 cm.  No further followup  suggested as this has been stable for greater than two years.     MERRILL MICHAEL MD      INTERVENTIONAL RADIOLOGY EMBO/THROMBO RECHECK  Mar 5, 2012 5:13:00 PM      HISTORY:  56-year-old female presents with superior vena cava syndrome   on two days of tPA thrombolysis for superior vena cava thrombosis.      TECHNIQUE: The procedure was explained to the patient in detail.   Initial venacavogram was performed through the existing infusion   catheter. The catheter was then repositioned into the more superior   vena cava and a second superior venacavogram was performed. The   patient was then prepped and draped and 1% lidocaine was used as local   anesthetic. An exchange wire was placed through the infusion catheter   and the infusion catheter and sheath were removed. A 7 Malay sheath   was placed in the antecubital region. Over the wire, a Ti Knight   high-pressure 14 mm x 4 cm balloon was advanced into the superior vena   cava and venous angioplasty was performed of the cava.  The balloon was   exchanged for a 10 cm infusion catheter which was positioned in the   superior vena cava and completion venogram was performed. Exchange   wire was placed through the catheter and the catheter and sheath were   removed over the wire and an 8 Hebrew sheath was placed in the   antecubital fossa. Through the 8 Hebrew sheath, a Bard Bristol   high-pressure 16 mm x 4 cm balloon was advanced over a wire into the   superior vena cava and venoplasty was again performed. The catheter   was then removed and exchanged for infusion catheter and a completion   superior venacavogram was performed. The infusion catheter, wire, and   sheath were then removed and manual compression was placed at the   venotomy site until hemostasis was obtained. There were no immediate   complications.      Fluoro time: 4 minutes   Contrast: 125 mL Visipaque   Local anesthesia: 10 mL 1% Lidocaine   Conscious sedation: 1 mg IV Versed, 50 mcg IV fentanyl   Sedation time: 30 minutes      Patient was monitored by the radiology nursing staff under my   supervision and remained stable throughout the study.      FINDINGS: The initial superior venacavogram demonstrates three areas   of significant stenosis in the proximal, mid and distal superior vena   cava. The most distal stenosis appears to be secondary to a large   calcified mediastinal lymph node. The proximal areas of stenosis are   irregular and suggest residual filling defect due to thrombus however   these have not improved significantly after 48 hours and most likely   represent chronic organized thrombus and underlying stenosis. There is   a large azygos collateral present. Given these residual findings,   revascularization was attempted with angioplasty. I elected not to   place a stent in view of the patient's relatively young age and lack   of a history of underlying malignancy as the etiology. After initial   venoplasty to 14 mm there was significant improvement in luminal    diameter however moderate residual remained particularly at the level   of the calcified lymph node. Following venoplasty to 16 mm there is   further improvement with the proximal and mid superior vena cava now   widely patent. There appeared to be moderate residual stenosis at the   distal superior vena cava at the level of the calcified lymph node,   however oblique filming demonstrated this to be patent without   residual significant stenosis. This was felt to represent a   satisfactory end point and therefore thrombolysis was discontinued and   the catheters were removed.      IMPRESSION: Successful thrombolysis and venoplasty of the superior   vena cava for treatment of superior vena cava syndrome.       INTERVENTIONAL RADIOLOGY EMBO/THROMBO RECHECK    Mar 4, 2012 10:04:00 AM      PROCEDURES:   1. Thrombolysis check.   2. Mechanical thrombectomy superior vena cava.   3. Restart of catheter-directed thrombolysis.      HISTORY: 56-year-old female with acute/subacute thrombosis of the   superior vena cava and development of symptoms of superior vena cava   syndrome. Patient returns for thrombolysis check after 21 hours of   catheter-directed thrombolysis.      DESCRIPTION OF PROCEDURE: The patient was placed in a supine position   on the fluoroscopy table. A venogram was performed through existing   infusion catheter which had been placed into the superior vena cava   one day earlier.      FINDINGS: There had been some interval decrease in amounts of thrombus   within the superior vena cava. There appeared to be possible extrinsic   compression of the lower SVC from a calcified lymph node that had been   seen on the CT scan performed on 3/2/2012.      INTERVENTION: The external portions of existing catheter and right arm   were prepped and draped in the usual sterile manner. 1% lidocaine was   injected for local anesthesia. Existing catheter was removed over a   wire. A 6 Kinyarwanda vascular sheath was placed.  Mechanical thrombectomy   within the SVC was performed using an Angiojet device. Followup   venogram showed some improvement in thrombus in the midportion of the   SVC just inferior to the level of the azygos vein takeoff. There   appeared to be persistent moderate thrombus. Therefore, it was elected   to continue catheter-directed thrombolysis. An infusion catheter was   replaced. Catheter-diretced thrombolysis was restarted.      The patient tolerated the procedure well. There were no immediate   postprocedure complications. The patient's vital signs were monitored   by radiology nursing staff under my supervision and remained stable   throughout the study.      MEDICATIONS: Versed 1.5 mg, fentanyl 75 mcg.      SEDATION TIME: 32 minutes.      FLUOROSCOPY TIME: 5.2 minutes.      CONTRAST: 65 mL Visipaque.      LOCAL ANESTHETIC: 10 mL.      IMPRESSION AND PLAN: Continued catheter-directed thrombolysis   overnight. Recheck tomorrow.     PROCEDURE: 3/3/2012  1. SVC venogram.   2. Catheter directed thrombolysis SVC.      HISTORY: 56-year-old female with acute onset neck and facial swelling.   Patient had a CT scan which shows occlusion of the superior vena cava.   There is a nonspecific calcification adjacent to the SVC. This may   represent a calcified lymph node. Patient has a past medical history   significant for a left subclavian port which was removed in early   2000. No known hypercoagulable state and no known malignancy.      COMPARISON: CT scan of the abdomen and pelvis dated 3/2/2012.      DESCRIPTION OF PROCEDURE: After obtaining informed consent, the   patient was placed in a supine position on the fluoroscopy table. The   right arm was prepped and draped in the usual sterile manner. 1%   lidocaine was injected for local anesthesia. Ultrasound was used to   evaluate and document patency of the right basilic vein. Under sterile   ultrasound guidance, access into the right basilic vein was obtained.   An  image was stated for documentation. A JOHNY-1 catheter was then passed   over a wire. The catheter was positioned in the upper SVC. A venogram   was performed.      There was occlusion of the mid and lower portion of the SVC. Flow was   primarily directed into the azygos and hemiazygos system. This was   also seen on the previous CT scan. Using a JOHNY-1 catheter and stiff   Glidewire, access across the occluded SVC was obtained. A venogram was   performed. This showed a possible stenosis of the proximal aspect of   the SVC and moderate to large amount of thrombus more distally. A 5 cm   infusion catheter was embedded in the thrombus/stenosis. 4 mg tPA was   injected into the thrombus. Catheter-directed thrombolysis was then   started. The catheter was secured to the patient's arm.      The patient tolerated the procedure well. There were no immediate   postprocedure complications. The patient's vital signs were monitored   by radiology nursing staff under my supervision and remained stable   throughout the study.      Medications: Versed 2 mg, fentanyl 100 mcg, tPA 4 mg      Sedation time: 24 minutes      Fluoroscopy time: 5.7 minutes      Contrast: 55 mL Visipaque      IMPRESSION: Catheter-directed thrombolysis started for occluded SVC   and associated SVC syndrome. TPA will be infused at a rate of 0.5 mg   per hour. Heparin will be infused through a peripheral IV at a rate of   500 units/hour. A thrombolysis check will be performed tomorrow.          ULTRASOUND VENOUS LOWER EXTREMITY BILATERAL  Mar 2, 2012 9:40 PM      HISTORY: SVC syndrome, assess for DVT.      COMPARISON: None.      FINDINGS: The right and left lower extremity venous ultrasound is   negative  for DVT. The veins do augment and compress normally. No   thrombus is seen.      IMPRESSION: Negative, no DVT identified.       CT CHEST WITH CONTRAST   Mar 2, 2012 5:33:00 PM      COMPARISON: None.      HISTORY: Chest pain, difficulty breathing, right-sided  neck pain and   swelling.      TECHNIQUE: Volumetric helical acquisition of CT images of the chest   from the lung apices to the kidneys were acquired after the   administration of 100 mL of Optiray 350  IV contrast.      FINDINGS: There is a tiny peripheral right lower lobe pulmonary   embolism. The SVC is thrombosed. There is no obvious mass lesion   causing this thrombosis. There is no definite mediastinal fibrosis or   other cause of SVC syndrome evident on the scan. This may simply be   venous thrombosis at this location. Trace pleural fluid on the left.   Minimal peripheral atelectasis. The heart is not enlarged.  Thoracic   aorta is atherosclerotic  without evidence of dissection or aneurysm.   Trace pericardial and right pleural fluid.  There is no pneumothorax.   Adrenal glands are normal.  Remainder of the visualized upper abdomen   is unremarkable.      IMPRESSION:   1. A small peripheral right lower lobe pulmonary embolism is   demonstrated.   2. No thoracic aortic dissection or aneurysm.   3. Thrombosis of the superior vena cava, of uncertain etiology.      Dr. Cutler was contacted by me  on 3/2/2012 at 0543 pm regarding the   abnormal finding of pulmonary embolism and SVC thrombosis and   verbalized understanding of the abnormal finding.       Copath Report Patient Name: CLEMENT VALENTINE  MR#: [id number]  Specimen #: [id number]  Collected: 3/5/2012 15:45  Received: 3/5/2012 22:44  Reported: 3/8/2012 11:40  Ordering Phy(s): CHACE HUFF    _________________________________________          TEST(S) REQUESTED:  A: Factor 5 Leiden and Factor 2 by PCR  B: DNA Isolation, High purity extraction    SPECIMEN DESCRIPTION:  Blood    CLINICAL COMMENTS:  PE    METHODOLOGY:   The regions of genomic DNA containing the [id number] Factor 5  gene mutation (Factor V Leiden) and the Factor 2(Prothrombin [id number])  gene mutation were simultaneously amplified using the polymerase chain  reaction.  The amplified products  were digested with restriction  endonuclease TaqI and products were analyzed by gel electrophoresis.    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    The presence of the Factor 5 gene mutation is an indication of an  increased risk of developing a thrombosis.  The extent of this risk is  dependent upon several other known thrombophilic risk factors including  smoking, obesity and the use of oral contraceptives.  Consultation  regarding these results is available upon request.  Genetic counseling  regarding these results is indicated in the evaluation of other family  members.    The patient is negative for the Factor 2 mutation.                This test was developed and its performance determined by the [facility], [place]  [facility].  It has not been cleared or approved by the U.S. Food and Drug  Administration.  The FDA has determined that such clearance or approval  is not necessary.  Pursuant to the requirements of CLIA' 88, this  laboratory has established and verified the test' s accuracy and  precision.  This test is used for clinical purposes.      Electronically Signed Out By:  Gabe Herndon MD, Physicians          TESTING LAB LOCATION:  [address] [address]  [phone]         Component      Latest Ref Rng 3/4/2012  7:00 AM   Albumin Fraction      3.7 - 5.1 g/dL 3.1 (L)    Alpha 1 Fraction      0.2 - 0.4 g/dL 0.4    Alpha 2 Fraction      0.5 - 0.9 g/dL 0.8    Beta Fraction      0.6 - 1.0 g/dL  0.9    Gamma Fraction      0.7 - 1.6 g/dL 0.8    Monoclonal Peak      0.0 g/dL 0.0    ELP Interpretation: Hypoalbuminemia. No monoclonal protein seen. Pathologic significance requires     Cardiolipin IgG Danuta      0 - 15.0 GPL <15.0     Cardiolipin IgM Danuta      0 - 12.5 MPL <12.5     Beta-2-Glycopro IgG 0    Beta-2-Glycopro IgM 6    GINI Screen by EIA      <1.0     Sed Rate      0 - 30 mm/h      Component      Latest Ref Rn 3/4/2012  7:40 AM   Albumin Fraction      3.7 - 5.1 g/dL    Alpha 1 Fraction      0.2 - 0.4 g/dL    Alpha 2 Fraction      0.5 - 0.9 g/dL    Beta Fraction      0.6 - 1.0 g/dL    Gamma Fraction      0.7 - 1.6 g/dL    Monoclonal Peak      0.0 g/dL    ELP Interpretation:    Cardiolipin IgG Danuta      0 - 15.0 GPL    Cardiolipin IgM Danuta      0 - 12.5 MPL    Beta-2-Glycopro IgG    Beta-2-Glycopro IgM    GINI Screen by EIA      <1.0  <1.0     Sed Rate      0 - 30 mm/h 10       Legend:  (L) Low          Component 11 yr ago   Copath Report Patient Name: CLEMENT VALENTINE  MR#: [id number]  Specimen #: [id number]  Collected: 3/3/2012 18:15  Received: 3/5/2012 07:35  Reported: 3/8/2012 15:55  Ordering Phy(s): CHACE HUFF    _________________________________________          TEST(S) REQUESTED:  A: JAK2  B: DNA Isolation, High purity extraction    SPECIMEN DESCRIPTION:  Blood    CLINICAL COMMENTS:  Thrombocytosis and SVC obstruction    METHODOLOGY: The above specimen was fractionated using Ficoll-Histopaque  into mononuclear and granulocyte cell fractions. Total cellular DNA from  the granulocyte cell fraction was extracted. The portion of the JAK2  gene containing the G T transversion (V617F; exon 12) was amplified with  a series of four oligonucleotide primers. The primers include one  fluorescently labeled mutant specific forward primer (mutant (V617F)  allele), one fluorescently labeled forward internal control primer, an  unlabeled mutant specific reverse primer, and an unlabeled wildtype  specific  reverse primer. The fluorescently labeled forward primer and a  common unlabeled reverse primer serve as an internal control. The PCR  product is analyzed by capillary gel electrophoresis on an MONICA 3130xl  Genetic Analyzer.    RESULTS:    JAK2 V617F Mutant Allele: Absent    JAK2 V617 Normal Allele: Present    Internal Control: Present    INTERPRETATION:  Molecular testing performed on submitted Blood.    V617F point mutation of the JAK2 gene was not detected in this sample.    COMMENTS:  Absence of the V617F mutation does not rule out clinical diagnosis of  polycythemia vera(PV), essential thrombocythemia(ET), idiopathic  myelofibrosis(IMF) or other clinically indicated diseases. It was  estimated that about 3% of PV, 43% of ET and 50% of IMF could be JAK2  V617F mutation negative. Clinical correlation is required. Analytic  sensitivity of this assay is 1%.                  This test was developed and its performance determined by the [facility], [place]  [facility].  It has not been cleared or approved by the U.S. Food and Drug  Administration.  The FDA has determined that such clearance or approval  is not necessary.  Pursuant to the requirements of CLIA' 88, this  laboratory has established and verified the test' s accuracy and  precision.  This test is used for clinical purposes.      Electronically Signed Out By:  Gabe Herndon MD, Physicians          TESTING LAB LOCATION:  [place] [id number] [address] [phone]    COLLECTION SITE:  Client:  [facility]  Location:  [facility] (S)   \F2

## 2024-02-20 RX ORDER — SULFASALAZINE 500 MG/1
TABLET ORAL
Qty: 180 TABLET | OUTPATIENT
Start: 2024-02-20

## 2024-02-20 RX ORDER — FOLIC ACID 1 MG/1
TABLET ORAL
Qty: 90 TABLET | Refills: 0 | OUTPATIENT
Start: 2024-02-20

## 2024-02-21 ENCOUNTER — ANTICOAGULATION THERAPY VISIT (OUTPATIENT)
Dept: ANTICOAGULATION | Facility: CLINIC | Age: 69
End: 2024-02-21
Payer: COMMERCIAL

## 2024-02-21 DIAGNOSIS — D68.51 FACTOR 5 LEIDEN MUTATION, HETEROZYGOUS (H): Primary | ICD-10-CM

## 2024-02-21 DIAGNOSIS — Z86.711 HISTORY OF PULMONARY EMBOLISM: ICD-10-CM

## 2024-02-21 LAB — INR HOME MONITORING: 3 (ref 2–3)

## 2024-02-21 NOTE — PROGRESS NOTES
ANTICOAGULATION MANAGEMENT     Bere Machuca 68 year old female is on warfarin with therapeutic INR result. (Goal INR 2.0-3.0)    Recent labs: (last 7 days)     02/21/24  0000   INR 3.0       ASSESSMENT     Source(s): Chart Review and Patient/Caregiver Call     Warfarin doses taken: Warfarin taken as instructed  Diet: No new diet changes identified  Medication/supplement changes: None noted  New illness, injury, or hospitalization: No  Signs or symptoms of bleeding or clotting: No  Previous result: Therapeutic last visit; previously outside of goal range  Additional findings: None       PLAN     Recommended plan for no diet, medication or health factor changes affecting INR     Dosing Instructions: Continue your current warfarin dose with next INR in 2 weeks       Summary  As of 2/21/2024      Full warfarin instructions:  2.5 mg every Mon, Wed, Fri; 5 mg all other days   Next INR check:  3/6/2024               Telephone call with Darline who verbalizes understanding and agrees to plan    Patient to recheck with home meter    Education provided:   Please call back if any changes to your diet, medications or how you've been taking warfarin  Resume manage by exception with home monitor. Continue to submit INR results to home monitor company.You will only be called when your result is out of range. Please call and notify [facility] if new medication started, dose missed, signs or symptoms of bleeding or clotting, or a surgery/procedure is scheduled.  Contact [phone]  with any changes, questions or concerns.     Plan made per [facility] anticoagulation protocol    Antonietta Cloe RN  Anticoagulation Clinic  2/21/2024    _______________________________________________________________________     Anticoagulation Episode Summary       Current INR goal:  2.0-3.0   TTR:  69.1% (1 y)   Target end date:  Indefinite   Send INR reminders to:  [facility] HOME MONITORING    Indications    Factor 5 Leiden mutation  heterozygous/ INR 2-3  [D68.51]  History of pulmonary embolism [Z86.711]             Comments:  managed by exception  [employer] home meter             Anticoagulation Care Providers       Provider Role Specialty Phone number    Uyen Mcintyre APRN CNP Referring Internal Medicine [phone]    Jennifer Shea MD Referring Internal Medicine [phone]

## 2024-02-26 ENCOUNTER — TRANSFERRED RECORDS (OUTPATIENT)
Dept: HEALTH INFORMATION MANAGEMENT | Facility: CLINIC | Age: 69
End: 2024-02-26
Payer: COMMERCIAL

## 2024-02-27 ENCOUNTER — TELEPHONE (OUTPATIENT)
Dept: OTHER | Facility: CLINIC | Age: 69
End: 2024-02-27
Payer: COMMERCIAL

## 2024-02-27 NOTE — PROGRESS NOTES
Imaging orders modified to reflect recommendations by interventional radiology physician Dr. Felicia Irizarry.    BUE venous duplex   CT chest with contrast done injecting the right arm   CTV abdomen/pelvis done in the portal venous phase with a slight delay to show IVC and iliac veins   BLE arterial duplex   BLE venous duplex

## 2024-02-27 NOTE — TELEPHONE ENCOUNTER
Follow-up to 2/16/24      Fasting labs    BUE venous duplex ultrasound  BLE arterial duplex ultrasound  BLE venous duplex ultrasound    CT chest with contrast   CTV abdomen/pelvis done in the portal venous phase with a slight delay to show IVC and iliac veins    In clinic visit 2+ weeks after labs, 1+ week after imaging.     The CTs can be done on the same day per Dr. Irizarry.       Latest Reference Range & Units Most Recent   Creatinine 0.51 - 0.95 mg/dL 0.73  1/11/23 15:29   GFR Estimate >60 mL/min/1.73m2 >90  1/11/23 15:29     No contrast allergy per chart review.

## 2024-02-28 NOTE — TELEPHONE ENCOUNTER
Future Appointments   Date Time Provider Department Center   3/13/2024 10:45 AM [facility]   3[facility]24 10[facility]   3/14/2024 10:40 [facility]   3/14/2024 12:00 PM [facility]5 [facility]   3/14/2024  1:00 PM [facility]   3/14/2024  1:40 PM SH5 [facility] [place] [facility]   3/28/2024 12:10 PM Shelton Richter MD [facility]     Patient states that she was advised by Dr Richter that he needs to be present during radiology. I've scheduled on 3/14 and Dr Richter is not available on 3/14    Please confirm if Dr Richter needs to be at [facility] during the requested radiology and thank you.

## 2024-02-28 NOTE — TELEPHONE ENCOUNTER
Yes, it should be rescheduled to a day I am physically present. She is a very complicated individual, and I can foresee that Dr. Irizarry may need to consult with me when the imaging is being read.

## 2024-02-28 NOTE — TELEPHONE ENCOUNTER
Future Appointments   Date Time Provider Department Center   3/13/2024 10:45 AM [facility] LAB [facility]   3/20/2024 10:40 AM SH[facility]1 [facility]   3/20/2024 11:00 AM [facility]1 [facility]   3/20/2024 12:00 PM SH[facility]5 [facility]   3/20/2024  1:00 PM [facility]5 [facility]   3/20/2024  1:40 PM [facility]5 [facility]   3/28/2024 12:10 PM Shelton Richter MD [facility]

## 2024-02-29 DIAGNOSIS — E78.00 HYPERCHOLESTEREMIA: ICD-10-CM

## 2024-02-29 RX ORDER — ROSUVASTATIN CALCIUM 20 MG/1
20 TABLET, COATED ORAL DAILY
Qty: 90 TABLET | Refills: 0 | Status: SHIPPED | OUTPATIENT
Start: 2024-02-29 | End: 2024-04-09

## 2024-03-01 DIAGNOSIS — E66.01 MORBID OBESITY WITH BMI OF 60.0-69.9, ADULT (H): ICD-10-CM

## 2024-03-01 RX ORDER — TIRZEPATIDE 7.5 MG/.5ML
7.5 INJECTION, SOLUTION SUBCUTANEOUS
Qty: 2 ML | Refills: 0 | Status: SHIPPED | OUTPATIENT
Start: 2024-03-01 | End: 2024-04-02

## 2024-03-09 ENCOUNTER — TELEPHONE (OUTPATIENT)
Dept: NURSING | Facility: CLINIC | Age: 69
End: 2024-03-09
Payer: COMMERCIAL

## 2024-03-09 NOTE — TELEPHONE ENCOUNTER
Patient calling reporting she received a call from an INR nurse today, and is attempting to return phone call.    Patient stating she does home monitoring. Stating she will check her voicemail for phone number to return call.    No message found from 3/9/24 in Epic.    Jimena Marino RN  [facility]

## 2024-03-10 LAB — INR HOME MONITORING: 2.7 (ref 2–3)

## 2024-03-11 ENCOUNTER — DOCUMENTATION ONLY (OUTPATIENT)
Dept: ANTICOAGULATION | Facility: CLINIC | Age: 69
End: 2024-03-11
Payer: COMMERCIAL

## 2024-03-11 NOTE — PROGRESS NOTES
ANTICOAGULATION  MANAGEMENT-Home Monitor Managed by Bautista Blackburn SHARMIN Machuca 68 year old female is on warfarin with therapeutic INR result. (Goal INR 2.0-3.0)    Recent labs: (last 7 days)     03/10/24  0000   INR 2.7       Previous INR was Therapeutic  Medication, diet, health changes since last INR:chart reviewed; none identified  Contacted within the last 12 weeks by phone on 2/21/24  Last [facility] referral date: 11/29/2023      JONN     Bere was NOT contacted regarding therapeutic result today per home monitoring policy manage by exception agreement.   Current warfarin dose is to be continued:     Summary  As of 3/11/2024      Full warfarin instructions:  2.5 mg every Mon, Wed, Fri; 5 mg all other days   Next INR check:  3/25/2024             ?   Catherine Lynn RN  Anticoagulation Clinic  3/11/2024    _______________________________________________________________________     Anticoagulation Episode Summary       Current INR goal:  2.0-3.0   TTR:  69.0% (1 y)   Target end date:  Indefinite   Send INR reminders to:  [facility] HOME MONITORING    Indications    Factor 5 Leiden mutation  heterozygous/ INR 2-3 [D68.51]  History of pulmonary embolism [Z86.711]             Comments:  managed by exception  [employer] home meter             Anticoagulation Care Providers       Provider Role Specialty Phone number    Uyen Mcintyre APRN CNP Referring Internal Medicine [phone]    Jennifer Shea MD Referring Internal Medicine [phone]

## 2024-03-14 ENCOUNTER — VIRTUAL VISIT (OUTPATIENT)
Dept: INTERNAL MEDICINE | Facility: CLINIC | Age: 69
End: 2024-03-14
Payer: COMMERCIAL

## 2024-03-14 ENCOUNTER — MYC MEDICAL ADVICE (OUTPATIENT)
Dept: INTERNAL MEDICINE | Facility: CLINIC | Age: 69
End: 2024-03-14

## 2024-03-14 DIAGNOSIS — F33.1 MAJOR DEPRESSIVE DISORDER, RECURRENT EPISODE, MODERATE (H): ICD-10-CM

## 2024-03-14 DIAGNOSIS — G89.29 OTHER CHRONIC PAIN: ICD-10-CM

## 2024-03-14 DIAGNOSIS — I10 ESSENTIAL HYPERTENSION: Primary | ICD-10-CM

## 2024-03-14 DIAGNOSIS — E66.01 MORBID OBESITY WITH BMI OF 60.0-69.9, ADULT (H): ICD-10-CM

## 2024-03-14 PROCEDURE — 99214 OFFICE O/P EST MOD 30 MIN: CPT | Mod: 95 | Performed by: INTERNAL MEDICINE

## 2024-03-14 NOTE — TELEPHONE ENCOUNTER
Sent Disc[employer] message to patient to discuss this at video appointment.     Tito Ventura, Triage RN [place]  3:02 PM 3/14/2024

## 2024-03-14 NOTE — PROGRESS NOTES
"Darline is a 68 year old who is being evaluated via a billable video visit.    How would you like to obtain your AVS? MyChart  If the video visit is dropped, the invitation should be resent by: Text to cell phone: [phone]  Will anyone else be joining your video visit? No      Assessment & Plan     Essential hypertension  Has been having improvement in blood pressure with weight reduction.  Blood pressure readings have been on the lower side with home blood pressure monitoring.  Discussed with the patient on taking losartan at half tablet per day-25 mg.  Continue monitoring blood pressure at home.    Other chronic pain  Follows up with the pain clinic.  Medications dispensed by the clinic include tizanidine, Mounjaro, Suboxone and pregabalin.  Medication refills provided by the team.    Major depressive disorder, recurrent episode, moderate (H)  Clinically stable.  Follows up with psychiatry team.  As patient is having weight reduction, patient feels that this is also improving mental health symptoms.    Morbid obesity with BMI of 60.0-69.9, adult (H)  Continues on the Mounjaro medication that is helping with weight reduction.            BMI  Estimated body mass index is 55.2 kg/m  as calculated from the following:    Height as of 6/9/23: 1.676 m (5' 6\").    Weight as of 2/16/24: 155.1 kg (342 lb).             Subjective   Darline is a 68 year old, presenting for the following health issues:  Recheck Medication      HPI     Visit completed today for review of blood pressure readings.  This patient is underwent intentional weight loss with the Mounjaro medication, blood pressure readings have improved.  patient takes losartan at 50 mg daily, blood pressure readings have been on the lower side.  Denies symptoms of dizziness or lightheadedness.          Review of Systems  Constitutional, HEENT, cardiovascular, pulmonary, gi and gu systems are negative, except as otherwise noted.      Objective           Vitals:  No " vitals were obtained today due to virtual visit.    Physical Exam   GENERAL: alert and no distress  EYES: Eyes grossly normal to inspection.  No discharge or erythema, or obvious scleral/conjunctival abnormalities.  RESP: No audible wheeze, cough, or visible cyanosis.    SKIN: Visible skin clear. No significant rash, abnormal pigmentation or lesions.  NEURO: Cranial nerves grossly intact.  Mentation and speech appropriate for age.  PSYCH: Appropriate affect, tone, and pace of words          Video-Visit Details    Type of service:  Video Visit   Originating Location (pt. Location): Home    Distant Location (provider location):  On-site  Platform used for Video Visit: Gerald  Signed Electronically by: Jennifer Shea MD

## 2024-03-20 ENCOUNTER — HOSPITAL ENCOUNTER (OUTPATIENT)
Dept: ULTRASOUND IMAGING | Facility: CLINIC | Age: 69
Discharge: HOME | End: 2024-03-20
Attending: INTERNAL MEDICINE
Payer: COMMERCIAL

## 2024-03-20 ENCOUNTER — HOSPITAL ENCOUNTER (OUTPATIENT)
Dept: CT IMAGING | Facility: CLINIC | Age: 69
Discharge: HOME | End: 2024-03-20
Attending: INTERNAL MEDICINE
Payer: COMMERCIAL

## 2024-03-20 DIAGNOSIS — R09.89 OTHER SPECIFIED SYMPTOMS AND SIGNS INVOLVING THE CIRCULATORY AND RESPIRATORY SYSTEMS: ICD-10-CM

## 2024-03-20 DIAGNOSIS — I82.622 ACUTE DEEP VEIN THROMBOSIS (DVT) OF OTHER VEIN OF LEFT UPPER EXTREMITY (H): ICD-10-CM

## 2024-03-20 DIAGNOSIS — Q74.0 ASYMMETRICAL ARMS: ICD-10-CM

## 2024-03-20 DIAGNOSIS — R60.9 ASYMMETRIC EDEMA OF BOTH LOWER EXTREMITIES: ICD-10-CM

## 2024-03-20 DIAGNOSIS — I87.1 SUPERIOR VENA CAVA SYNDROME: ICD-10-CM

## 2024-03-20 PROCEDURE — 250N000009 HC RX 250: Performed by: INTERNAL MEDICINE

## 2024-03-20 PROCEDURE — 93970 EXTREMITY STUDY: CPT

## 2024-03-20 PROCEDURE — 93925 LOWER EXTREMITY STUDY: CPT

## 2024-03-20 PROCEDURE — 250N000011 HC RX IP 250 OP 636: Performed by: INTERNAL MEDICINE

## 2024-03-20 PROCEDURE — 93970 EXTREMITY STUDY: CPT | Mod: 59

## 2024-03-20 PROCEDURE — 71260 CT THORAX DX C+: CPT

## 2024-03-20 PROCEDURE — 74174 CTA ABD&PLVS W/CONTRAST: CPT

## 2024-03-20 RX ORDER — IOPAMIDOL 755 MG/ML
135 INJECTION, SOLUTION INTRAVASCULAR ONCE
Status: COMPLETED | OUTPATIENT
Start: 2024-03-20 | End: 2024-03-20

## 2024-03-20 RX ADMIN — SODIUM CHLORIDE 80 ML: 9 INJECTION, SOLUTION INTRAVENOUS at 11:12

## 2024-03-20 RX ADMIN — IOPAMIDOL 135 ML: 755 INJECTION, SOLUTION INTRAVENOUS at 11:11

## 2024-03-21 DIAGNOSIS — Z86.711 HISTORY OF PULMONARY EMBOLISM: ICD-10-CM

## 2024-03-22 DIAGNOSIS — I10 BENIGN ESSENTIAL HYPERTENSION: ICD-10-CM

## 2024-03-22 RX ORDER — LOSARTAN POTASSIUM 50 MG/1
TABLET ORAL
Qty: 90 TABLET | Refills: 0 | Status: SHIPPED | OUTPATIENT
Start: 2024-03-22 | End: 2024-04-30

## 2024-03-22 RX ORDER — WARFARIN SODIUM 5 MG/1
TABLET ORAL
Qty: 90 TABLET | Refills: 1 | Status: SHIPPED | OUTPATIENT
Start: 2024-03-22 | End: 2024-07-15

## 2024-03-26 ENCOUNTER — TELEPHONE (OUTPATIENT)
Dept: INTERNAL MEDICINE | Facility: CLINIC | Age: 69
End: 2024-03-26
Payer: COMMERCIAL

## 2024-03-26 DIAGNOSIS — D68.51 FACTOR 5 LEIDEN MUTATION, HETEROZYGOUS (H): Primary | ICD-10-CM

## 2024-03-26 DIAGNOSIS — Z86.711 HISTORY OF PULMONARY EMBOLISM: ICD-10-CM

## 2024-03-26 NOTE — TELEPHONE ENCOUNTER
Calendar updated with the scheduled date for INR. No called placed to the patient since message is an FYI.    Bailee Delgado RN    [facility]

## 2024-03-26 NOTE — TELEPHONE ENCOUNTER
General Call      Reason for Call:  INR    What are your questions or concerns:  patient  wanted to let the INR nurse know that she was unable to get enough blood for her INR so she will be going to the lab on friday    Date of last appointment with provider: 3-14-24    Could we send this information to you in Health system or would you prefer to receive a phone call?:   Patient would prefer a phone call   Okay to leave a detailed message?: Yes at Home number on file [phone] (home)

## 2024-03-28 DIAGNOSIS — E66.01 MORBID OBESITY WITH BMI OF 60.0-69.9, ADULT (H): ICD-10-CM

## 2024-03-29 ENCOUNTER — ANTICOAGULATION THERAPY VISIT (OUTPATIENT)
Dept: ANTICOAGULATION | Facility: CLINIC | Age: 69
End: 2024-03-29

## 2024-03-29 ENCOUNTER — LAB (OUTPATIENT)
Dept: LAB | Facility: CLINIC | Age: 69
End: 2024-03-29
Payer: COMMERCIAL

## 2024-03-29 DIAGNOSIS — Z86.711 HISTORY OF PULMONARY EMBOLISM: ICD-10-CM

## 2024-03-29 DIAGNOSIS — D68.51 FACTOR 5 LEIDEN MUTATION, HETEROZYGOUS (H): ICD-10-CM

## 2024-03-29 DIAGNOSIS — D68.51 FACTOR 5 LEIDEN MUTATION, HETEROZYGOUS (H): Primary | ICD-10-CM

## 2024-03-29 DIAGNOSIS — E11.9 DM TYPE 2, GOAL HBA1C < 7% (H): ICD-10-CM

## 2024-03-29 DIAGNOSIS — J44.9 CHRONIC OBSTRUCTIVE PULMONARY DISEASE, UNSPECIFIED COPD TYPE (H): ICD-10-CM

## 2024-03-29 DIAGNOSIS — E78.5 HYPERLIPIDEMIA LDL GOAL <70: ICD-10-CM

## 2024-03-29 LAB
BASOPHILS # BLD AUTO: 0 10E3/UL (ref 0–0.2)
BASOPHILS NFR BLD AUTO: 0 %
EOSINOPHIL # BLD AUTO: 0.2 10E3/UL (ref 0–0.7)
EOSINOPHIL NFR BLD AUTO: 2 %
ERYTHROCYTE [DISTWIDTH] IN BLOOD BY AUTOMATED COUNT: 15.1 % (ref 10–15)
HBA1C MFR BLD: 5.1 % (ref 0–5.6)
HCT VFR BLD AUTO: 43.2 % (ref 35–47)
HGB BLD-MCNC: 14 G/DL (ref 11.7–15.7)
IMM GRANULOCYTES # BLD: 0 10E3/UL
IMM GRANULOCYTES NFR BLD: 0 %
INR BLD: 5.9 (ref 0.9–1.1)
LYMPHOCYTES # BLD AUTO: 4.3 10E3/UL (ref 0.8–5.3)
LYMPHOCYTES NFR BLD AUTO: 46 %
MCH RBC QN AUTO: 31.7 PG (ref 26.5–33)
MCHC RBC AUTO-ENTMCNC: 32.4 G/DL (ref 31.5–36.5)
MCV RBC AUTO: 98 FL (ref 78–100)
MONOCYTES # BLD AUTO: 0.6 10E3/UL (ref 0–1.3)
MONOCYTES NFR BLD AUTO: 6 %
NEUTROPHILS # BLD AUTO: 4.2 10E3/UL (ref 1.6–8.3)
NEUTROPHILS NFR BLD AUTO: 45 %
PLATELET # BLD AUTO: 256 10E3/UL (ref 150–450)
RBC # BLD AUTO: 4.41 10E6/UL (ref 3.8–5.2)
WBC # BLD AUTO: 9.3 10E3/UL (ref 4–11)

## 2024-03-29 PROCEDURE — 80061 LIPID PANEL: CPT | Mod: 90 | Performed by: INTERNAL MEDICINE

## 2024-03-29 PROCEDURE — 86141 C-REACTIVE PROTEIN HS: CPT | Performed by: INTERNAL MEDICINE

## 2024-03-29 PROCEDURE — 80053 COMPREHEN METABOLIC PANEL: CPT | Performed by: INTERNAL MEDICINE

## 2024-03-29 PROCEDURE — 83036 HEMOGLOBIN GLYCOSYLATED A1C: CPT | Performed by: INTERNAL MEDICINE

## 2024-03-29 PROCEDURE — 36415 COLL VENOUS BLD VENIPUNCTURE: CPT

## 2024-03-29 PROCEDURE — 84439 ASSAY OF FREE THYROXINE: CPT | Performed by: INTERNAL MEDICINE

## 2024-03-29 PROCEDURE — 85025 COMPLETE CBC W/AUTO DIFF WBC: CPT | Performed by: INTERNAL MEDICINE

## 2024-03-29 PROCEDURE — 83704 LIPOPROTEIN BLD QUAN PART: CPT | Mod: 90 | Performed by: INTERNAL MEDICINE

## 2024-03-29 PROCEDURE — 84443 ASSAY THYROID STIM HORMONE: CPT | Performed by: INTERNAL MEDICINE

## 2024-03-29 PROCEDURE — 85610 PROTHROMBIN TIME: CPT

## 2024-03-29 PROCEDURE — 84481 FREE ASSAY (FT-3): CPT | Performed by: INTERNAL MEDICINE

## 2024-03-29 PROCEDURE — 99000 SPECIMEN HANDLING OFFICE-LAB: CPT | Performed by: INTERNAL MEDICINE

## 2024-03-29 PROCEDURE — 83695 ASSAY OF LIPOPROTEIN(A): CPT | Mod: 59 | Performed by: INTERNAL MEDICINE

## 2024-03-29 NOTE — PROGRESS NOTES
ANTICOAGULATION MANAGEMENT     Bere Machuca 68 year old female is on warfarin with supratherapeutic INR result. (Goal INR 2.0-3.0)    Recent labs: (last 7 days)     03/29/24  1247   INR 5.9*       ASSESSMENT     Warfarin Lab Questionnaire    Warfarin Doses Last 7 Days      3/28/2024     2:26 PM   Dose in Tablet or Mg   TAB or MG? milligram (mg)     Pt Rptd Dose CARLOS MONDAY TUESDAY WED THURS FRIDAY SATURDAY   3/28/2024   2:26 PM 5 2.5 5 2.5 5 2.5 5         3/28/2024   Warfarin Lab Questionnaire   Missed doses within past 14 days? No   Changes in diet or alcohol within past 14 days? Yes   Please explain:  More fruit   Medication changes since last result? Yes   Please list: Back on Monjaro   Injuries or illness since last result? No   New shortness of breath, severe headaches or sudden changes in vision since last result? No   Abnormal bleeding since last result? No   Upcoming surgery, procedure? No   Best number to call with results? [phone]     Previous result: Therapeutic last visit; previously outside of goal range  Additional findings: None  Patient states she did not know you could  the machine while checking the INR. Patient states she watched the lab techs technique and feels like she can check her INR on Monday       PLAN     Recommended plan for ongoing change(s) affecting INR     Dosing Instructions: hold dose then decrease your warfarin dose (18.2% change) with next INR in 3 days       Summary  As of 3/29/2024      Full warfarin instructions:  3/29: Hold; Otherwise 5 mg every Sun, Thu; 2.5 mg all other days   Next INR check:  4/1/2024               Telephone call with Darline who verbalizes understanding and agrees to plan  Sent [employer] message with dosing and follow up instructions    Patient to recheck with home meter    Education provided:   Interaction IS anticipated between warfarin and mounjaro  Symptom monitoring: monitoring for bleeding signs and symptoms, when to seek medical  attention/emergency care, and if you hit your head or have a bad fall seek emergency care    Plan made per [facility] anticoagulation protocol    Mary Sanchez, RN  Anticoagulation Clinic  3/29/2024    _______________________________________________________________________     Anticoagulation Episode Summary       Current INR goal:  2.0-3.0   TTR:  64.3% (1 y)   Target end date:  Indefinite   Send INR reminders to:  ANTICOAG HOME [facility]IT[facility]    Indications    Factor 5 Leiden mutation  heterozygous/ INR 2-3 [D68.51]  History of pulmonary embolism [Z86.711]             Comments:  managed by rupesh [facility] home meter             Anticoagulation Care Providers       Provider Role Specialty Phone number    Uyen Mcintyre APRN CNP Referring Internal Medicine [phone]    Jennifer Shea MD Referring Internal Medicine [phone]

## 2024-03-30 LAB
ALBUMIN SERPL BCG-MCNC: 3.9 G/DL (ref 3.5–5.2)
ALP SERPL-CCNC: 57 U/L (ref 40–150)
ALT SERPL W P-5'-P-CCNC: 20 U/L (ref 0–50)
ANION GAP SERPL CALCULATED.3IONS-SCNC: 11 MMOL/L (ref 7–15)
APO A-I SERPL-MCNC: 6 MG/DL
AST SERPL W P-5'-P-CCNC: 24 U/L (ref 0–45)
BILIRUB SERPL-MCNC: 0.5 MG/DL
BUN SERPL-MCNC: 12.2 MG/DL (ref 8–23)
CALCIUM SERPL-MCNC: 9.8 MG/DL (ref 8.8–10.2)
CHLORIDE SERPL-SCNC: 103 MMOL/L (ref 98–107)
CREAT SERPL-MCNC: 0.64 MG/DL (ref 0.51–0.95)
CRP SERPL HS-MCNC: 6.49 MG/L
DEPRECATED HCO3 PLAS-SCNC: 28 MMOL/L (ref 22–29)
EGFRCR SERPLBLD CKD-EPI 2021: >90 ML/MIN/1.73M2
GLUCOSE SERPL-MCNC: 66 MG/DL (ref 70–99)
INR HOME MONITORING: 5.9 (ref 2–3)
POTASSIUM SERPL-SCNC: 4 MMOL/L (ref 3.4–5.3)
PROT SERPL-MCNC: 6.9 G/DL (ref 6.4–8.3)
SODIUM SERPL-SCNC: 142 MMOL/L (ref 135–145)
T3FREE SERPL-MCNC: 2.5 PG/ML (ref 2–4.4)
T4 FREE SERPL-MCNC: 0.91 NG/DL (ref 0.9–1.7)
TSH SERPL DL<=0.005 MIU/L-ACNC: 12.1 UIU/ML (ref 0.3–4.2)

## 2024-04-01 LAB
CHOLEST SERPL-MCNC: 150 MG/DL
HDL SERPL QN: 9.8 NM
HDL SERPL-SCNC: 26 UMOL/L
HDLC SERPL-MCNC: 63 MG/DL
HLD.LARGE SERPL-SCNC: 9.6 UMOL/L
LDL SERPL QN: 21.5 NM
LDL SERPL-SCNC: 616 NMOL/L
LDL SMALL SERPL-SCNC: <165 NMOL/L
LDLC SERPL CALC-MCNC: 65 MG/DL
PATHOLOGY STUDY: ABNORMAL
TRIGL SERPL-MCNC: 112 MG/DL
VLDL LARGE SERPL-SCNC: 2.9 NMOL/L
VLDL SERPL QN: 50.2 NM

## 2024-04-01 RX ORDER — ALBUTEROL SULFATE 90 UG/1
AEROSOL, METERED RESPIRATORY (INHALATION)
Qty: 8.5 G | Refills: 5 | Status: SHIPPED | OUTPATIENT
Start: 2024-04-01

## 2024-04-02 RX ORDER — TIRZEPATIDE 7.5 MG/.5ML
7.5 INJECTION, SOLUTION SUBCUTANEOUS
Qty: 3 ML | Refills: 1 | Status: SHIPPED | OUTPATIENT
Start: 2024-04-02 | End: 2024-04-19

## 2024-04-09 DIAGNOSIS — F33.0 MILD EPISODE OF RECURRENT MAJOR DEPRESSIVE DISORDER (H): ICD-10-CM

## 2024-04-09 DIAGNOSIS — G89.29 OTHER CHRONIC PAIN: ICD-10-CM

## 2024-04-09 DIAGNOSIS — E78.00 HYPERCHOLESTEREMIA: ICD-10-CM

## 2024-04-09 RX ORDER — ROSUVASTATIN CALCIUM 20 MG/1
20 TABLET, COATED ORAL DAILY
Qty: 90 TABLET | Refills: 0 | Status: SHIPPED | OUTPATIENT
Start: 2024-04-09 | End: 2024-07-15

## 2024-04-11 ENCOUNTER — OFFICE VISIT (OUTPATIENT)
Dept: OTHER | Facility: CLINIC | Age: 69
End: 2024-04-11
Attending: INTERNAL MEDICINE
Payer: COMMERCIAL

## 2024-04-11 VITALS
WEIGHT: 293 LBS | HEIGHT: 65 IN | DIASTOLIC BLOOD PRESSURE: 81 MMHG | HEART RATE: 65 BPM | SYSTOLIC BLOOD PRESSURE: 123 MMHG | BODY MASS INDEX: 48.82 KG/M2 | OXYGEN SATURATION: 94 %

## 2024-04-11 DIAGNOSIS — I82.622 ACUTE DEEP VEIN THROMBOSIS (DVT) OF OTHER VEIN OF LEFT UPPER EXTREMITY (H): ICD-10-CM

## 2024-04-11 DIAGNOSIS — I87.1 SUPERIOR VENA CAVA SYNDROME: ICD-10-CM

## 2024-04-11 DIAGNOSIS — Q74.0 ASYMMETRICAL ARMS: ICD-10-CM

## 2024-04-11 DIAGNOSIS — R09.89 OTHER SPECIFIED SYMPTOMS AND SIGNS INVOLVING THE CIRCULATORY AND RESPIRATORY SYSTEMS: ICD-10-CM

## 2024-04-11 DIAGNOSIS — R94.6 BORDERLINE ABNORMAL TFTS: Primary | ICD-10-CM

## 2024-04-11 DIAGNOSIS — E27.8 ADRENAL MASS (H): ICD-10-CM

## 2024-04-11 DIAGNOSIS — E11.9 DM TYPE 2, GOAL HBA1C < 7% (H): ICD-10-CM

## 2024-04-11 DIAGNOSIS — R60.9 ASYMMETRIC EDEMA OF BOTH LOWER EXTREMITIES: ICD-10-CM

## 2024-04-11 DIAGNOSIS — I87.2 VENOUS (PERIPHERAL) INSUFFICIENCY: ICD-10-CM

## 2024-04-11 DIAGNOSIS — E78.5 HYPERLIPIDEMIA LDL GOAL <70: ICD-10-CM

## 2024-04-11 DIAGNOSIS — I89.0 LYMPHEDEMA: ICD-10-CM

## 2024-04-11 DIAGNOSIS — R60.9 SWELLING: ICD-10-CM

## 2024-04-11 PROCEDURE — 99215 OFFICE O/P EST HI 40 MIN: CPT | Performed by: INTERNAL MEDICINE

## 2024-04-11 PROCEDURE — G0463 HOSPITAL OUTPT CLINIC VISIT: HCPCS | Performed by: INTERNAL MEDICINE

## 2024-04-11 PROCEDURE — G2211 COMPLEX E/M VISIT ADD ON: HCPCS | Performed by: INTERNAL MEDICINE

## 2024-04-11 PROCEDURE — 99417 PROLNG OP E/M EACH 15 MIN: CPT | Performed by: INTERNAL MEDICINE

## 2024-04-11 NOTE — PROGRESS NOTES
"Patient is here to discuss follow up    /81 (BP Location: Right arm, Patient Position: Chair, Cuff Size: Adult Regular)   Pulse 65   Ht 5' 5\" (1.651 m)   Wt (!) 340 lb (154.2 kg)   SpO2 94%   BMI 56.58 kg/m      Questions patient would like addressed today are: N/A.    Refills are needed: No    Has homecare services and agency name:  [facility]    "

## 2024-04-11 NOTE — PROGRESS NOTES
INITIAL VASCULAR MEDICAL ASSESSMENT  REFERRAL SOURCE: Dr. Nelson  REASON FOR CONSULT: Patient states she has   left arm swelling that now goes down to her leg; Patient also mentioned a   subclavian artery blockage.         A/P:     (I87.1) H/O superior vena cava syndrome  (primary encounter diagnosis)    Comment: She is very complicated. CTA chest revealed no new mass within the chest, with a similar calcified structure at the superior cavoatrial junction which results in narrowing of the lower SVC. Nonopacification and  atretic appearance of the left brachiocephalic and subclavian veins is compatible with history of chronic thrombosis.  We could obtain a dedicated CT venogram of the chest to better assess the venous system so as to ascertain if there is an interventional target. However, in a thrombosis which is chronically present for years, this is unlikely to be the case. As such, CTV is unlikely to alter ou management as her limb is not in extremis, and is asymmetrical due to venous engorgement form a chronic Lt SCV DVT which can not be thrombectomized at this point in time.     Plan: I advised no further action on the LUE.                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    Comment: She is very complicated. CTA, arterial and venous UE duplex revealed the known Lt SCV and BCV thrombosis which itself PAC induced and not due to TOS.     Plan: As above.                       (R60.9) Asymmetric lower extremities     Comment: She is very complicated.  CTV abdomen pelvis revealed no iliac extrinsic venous compression and no pelvic outflow obstruction. No DVT or PAD seen on venous and arterial duplex of BLEs. In other words, she has no central or pelvic cause for the below. Her exam is consistent with BLE venous insufficiency with secondary lymphedema.     Plan: Check BLE venous comp. Send to OT for MLD, Ace wraps. She is unlikely with her pannus and solitary  living situation to be able to use compression hosiery. We may need to use lymphedema pumps. She was told to use moisturizing lotion. She was told to use Lymphatic formula.                (Q74.0) Asymmetrical arms    Comment: She is very complicated.  CTA chest revealed no new mass within the chest, with a similar calcified structure at the superior cavoatrial junction which results in narrowing of the lower SVC. Nonopacification and  atretic appearance of the left brachiocephalic and subclavian veins is compatible with history of chronic thrombosis which itself is PAC induced and not due to TOS. . This is the reason for the L**UE venous engorgement. I explained to the patietn we are unlikely this remote form the original event to be able to recanalize the lt SCV    Plan:  Obtain a dedicated CT venogram of the chest could better assess the venous system so as to ascertain if there is an interventional target.                       (E11.9) DM type 2, goal HbA1c < 7% (H)    Comment: Glycemically well controlled.    Plan: Further glycemic mgmt per her PCP               (E78.5) Hyperlipidemia LDL goal <70    Comment:  Advanced lipid testing of 3/29/2024 revealed LDL-C of 65, LDL-P of 616, cardiac CRP of 6.49, and Lp(a) of 6.      Plan: No change to Crestor 20 mg daily, further lipid mgmt per her PCP.          (E27.8) Adrenal mass (H24)  Comment: slightly larger than previously.    Plan: I advised she address this non vascular issue with her PCP.       (I87.2) Venous (peripheral) insufficiency    Comment: See above    Plan: Lymphedema Therapy  Referral,         Echocardiogram Complete            (I89.0) Lymphedema    Plan: Lymphedema Therapy  Referral                    The longitudinal care of plan for Darline was addressed during this visit. Due to added complexity of care, we will continue to support Bere Machuca  and the subsequent management of these conditions and with ongoing continuity of  care for these conditions.       95 minutes total medical care. Extended time due to need to review multiple imaging studies in this very medically complicated , morbidly obese patietn with interelated comorbidities.              HPI: Bere Machuca is a 68 year old female with a h/o htn, HLD, DM2, very morbid obesity (Body mass index is 55.2 kg/m . ) who is on Monjauro to help with weight loss. She also has UC which is well contorlled, and RA for which she is steroid dependent, uses MTX and monoclonal Ab infusions. 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 As separte procedures.     However, she has a complex medical h/o a Lt SCV PAC placement in 1996 due to need fo prolonged IV abx due to colonic perforation. She then had this removed. In 2012 however she presented to R with left arm swelling and facial puffiness. She was ultimately found to have SVC syndrome when her SVC  was thrombosed. She underwent CD lysis and mechanical thrombectomy with satisfactory results.  She had a HC w/u and was discovered to be a Leiden heterozygote. She was ACd with warfarin and has been on this chronically since that time. She in 2016 presented to Dr. Falcon with c/o LUE swelling. Imaging as below was undertaken and revealed her Lt SCV was not thrombosed but that she had a cavoatrial calcified mass . She was not felt to be symptomatic from this and was therefore not intervened upon. She was referred to LE therapy and found no significant improvement with the above.      The patient presents today to address her orthopedist's concerns that she has a dramatically increased asymmetrical size to her LUE and LLE compared to the right. Prior to undertaking orthopedic surgery for the above, they wanted to know the status of her venous anatomy.      Review Of Systems  Skin: negative  Eyes: negative  Ears/Nose/Throat: negative  Respiratory: No shortness of breath, dyspnea on  exertion, cough, or hemoptysis  Cardiovascular: negative  Gastrointestinal: negative  Genitourinary: negative  Musculoskeletal: as above, orthopaedically limited, needs a walker.   Neurologic: negative  Psychiatric: negative  Hematologic/Lymphatic/Immunologic: negative  Endocrine: negative        PAST MEDICAL HISTORY:                  Past Medical History        Past Medical History:   Diagnosis Date    Anxiety      Arthritis      Clotting disorder (H)      COPD (chronic obstructive pulmonary disease) (H)      Depressive disorder      Diabetes (H)      Hypertension      Lymphedema of arm 3/8/2013    Morbidly obese (H) 9/25/2009    Pulmonary embolism (H)      Sleep apnea      Substance abuse (H)      Superior vena cava syndrome              PAST SURGICAL HISTORY:                  Past Surgical History         Past Surgical History:   Procedure Laterality Date    BIOPSY OF SKIN LESION   multiple     keloids    HC REMOVAL OF OVARY/TUBE(S)   1992     Salpingo-Oophorectomy, Unilateral    HC REMOVE TONSILS/ADENOIDS,<13 Y/O   age 14     T & A <14y.o.    HERNIA REPAIR        INSERTION OF ACCESS PORT   1-4-96     sepsis- removed after hospital stay    [facility]C ANESTH,REPAIR LO ABD HERNIA NOS   multiple/ 2006 last     panniculus    [facility] EXPLORATORY OF ABDOMEN   1996    [facility] PLASTIC SURGERY, NECK   age 4    ZZC REMOVAL COLON/ILEOSTOMY         reastamosis    Z TOTAL ABDOM HYSTERECTOMY   1992     Hysterectomy, Total Abdominal    ZZ REMOVE ABDOMINAL WALL LESION   2006     abscess            CURRENT MEDICATIONS:                  Current Outpatient Prescriptions          Current Outpatient Medications   Medication Sig Dispense Refill    albuterol (2.5 MG/3ML) 0.083% nebulizer solution Take 1 ampule by nebulization every 6 hours as needed for shortness of breath  [facility] specialists        albuterol (PROAIR HFA/PROVENTIL HFA/VENTOLIN HFA) 108 (90 Base) MCG/ACT inhaler INHALE 2 PUFFS INTO THE LUNGS EVERY 4 HOURS AS NEEDED ###  DO NOT FILL NOW.  Please update patient's profile to reflect additional refills.  #### 8.5 g 11    B Complex-C (SUPER B COMPLEX PO) Take 1 tablet by mouth daily        buprenorphine-naloxone (SUBOXONE) 2-0.5 MG SUBL sublingual tablet          escitalopram (LEXAPRO) 20 MG tablet TAKE 1 TABLET BY MOUTH EVERY DAY 90 tablet 1    Fluticasone-Umeclidin-Vilant (TRELEGY ELLIPTA) 100-62.5-25 MCG/ACT oral inhaler INHALE 1 PUFF INTO THE LUNGS DAILY (RINSE MOUTH AND SPIT AFTER USING) 60 each 11    folic acid (FOLVITE) 1 MG tablet TAKE 1 TABLET BY MOUTH EVERY DAY IN THE MORNING   0    inFLIXimab-axxq (AVSOLA IV) 800 mg by Intravenous/Intraosseous route Q 8 weeks        losartan (COZAAR) 50 MG tablet TAKE 1 TABLET BY MOUTH EVERY DAY 90 tablet 0    magnesium 250 MG tablet Take 1 tablet by mouth daily        mesalamine (LIALDA) 1.2 g EC tablet Take 1,200 mg by mouth daily (with breakfast) 4 pills a day        methotrexate 50 MG/2ML injection INJECT 0.7ML SUB-EVERY EVERY WEEK        multivitamin w/minerals (THERA-VIT-M) tablet Take 1 tablet by mouth daily        predniSONE (DELTASONE) 5 MG tablet          pregabalin (LYRICA) 100 MG capsule Take 1 capsule (100 mg) by mouth 2 times daily 60 capsule 0    rosuvastatin (CRESTOR) 20 MG tablet TAKE 1 TABLET BY MOUTH EVERY DAY 90 tablet 0    sulfaSALAzine (AZULFIDINE) 500 MG tablet Take 1 tablet (500 mg) by mouth 2 times daily        tirzepatide (MOUNJARO) 7.5 MG/0.5ML pen INJECT 7.5 MG SUBCUTANEOUS EVERY 7 DAYS 3 mL 0    tiZANidine (ZANAFLEX) 4 MG tablet TAKE 1 TABLET BY MOUTH TWICE A DAY AS NEEDED 180 tablet 1    warfarin ANTICOAGULANT (COUMADIN) 5 MG tablet TAKE 1/2 TABLET (2.5 MG) BY MOUTH Every FRI; and take 1 TAB (5 MG) ALL OTHER DAYS OR AS DIRECTED by your ACC team based on INR Results. 90 tablet 1            ALLERGIES:                  Allergies         Allergies   Allergen Reactions    Metformin Diarrhea    Aspirin         PN: LW Reaction: sensitive/stomach    Tape [Adhesive Tape]  Itching    Topiramate      Vancomycin         Eye issue     Wellbutrin [Bupropion] Anxiety            SOCIAL HISTORY:                  Social History   Social History            Socioeconomic History    Marital status:        Spouse name: Not on file    Number of children: 0    Years of education: 12    Highest education level: Not on file   Occupational History       Employer: [employer]   Tobacco Use    Smoking status: Former       Packs/day: 0.50       Years: 35.00       Additional pack years: 0.00       Total pack years: 17.50       Types: Cigarettes, Vaping Device       Quit date: 2014       Years since quittin.5    Smokeless tobacco: Never   Vaping Use    Vaping Use: Every day    Substances: Nicotine    Devices: Refillable tank   Substance and Sexual Activity    Alcohol use: Yes       Alcohol/week: 0.8 standard drinks of alcohol       Types: 1 Standard drinks or equivalent per week       Comment: social    Drug use: No       Comment: 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    Sexual activity: Not Currently       Birth control/protection: Post-menopausal   Other Topics Concern     Service No    Blood Transfusions No    Caffeine Concern No    Occupational Exposure No    Hobby Hazards No    Sleep Concern Yes       Comment: CPAP    Stress Concern No    Weight Concern Yes    Special Diet Not Asked    Back Care Not Asked    Exercise No    Bike Helmet Not Asked    Seat Belt Yes    Self-Exams Yes    Parent/sibling w/ CABG, MI or angioplasty before 65F 55M? Not Asked   Social History Narrative    Not on file      Social Determinants of Health           Financial Resource Strain: Low Risk  (2020)     Overall Financial Resource Strain (CARDIA)      Difficulty of Paying Living Expenses: Not very hard   Food Insecurity: No Food Insecurity (2020)     Hunger Vital Sign      Worried About Running Out of Food in the Last Year: Never true      Ran Out of Food in the Last Year: Never true   Transportation Needs: No  Transportation Needs (2020)     PRAPARE - Transportation      Lack of Transportation (Medical): No      Lack of Transportation (Non-Medical): No   Physical Activity: Not on file   Stress: Not on file   Social Connections: Not on file   Interpersonal Safety: Not on file   Housing Stability: Not on file            FAMILY HISTORY:                   Family History         Family History   Problem Relation Age of Onset    Respiratory Mother           copd    Cancer Mother           skin cancers/ melanomas    C.A.D. Mother           MI  age 79    Cancer - colorectal Father           / diagnosis age 72    Gastrointestinal Disease Father           colitis    Breast Cancer No family hx of                 Physical exam Reveals:     BP (!) 147/79 (BP Location: Right arm, Patient Position: Chair, Cuff Size: Adult Large)   Pulse 80   Wt (!) 342 lb (155.1 kg)   SpO2 98%   BMI 55.20 kg/m          O/P: WNL  HEENT: WNL  NECK: No JVD, thyromegaly, or lymphadenopathy  HEART: RRR, no murmurs, gallops, or rubs  LUNGS: CTA bilaterally without rales, wheezes, or rhonchi  GI: NABS, nondistended, nontender, soft  EXT:without cyanosis, clubbing, or edema; LUE and LLE asymmetrically enlarged compared to the right   NEURO: nonfocal  : no flank tenderness                    [facility]  LOCATION: [facility]  DATE: 2023     EXAM: DUPLEX CAROTID ULTRASOUND     INDICATION:  Pulsatile tinnitus, PVD (peripheral vascular disease) (H), Hypercholesteremia, Other specified symptoms and signs involving the circulatory and respiratory systems      TECHNIQUE: Duplex imaging of the carotid arteries was performed, including gray-scale and color flow imaging, Doppler waveform analysis, and spectral Doppler imaging.     COMPARISON: No pertinent comparison study is available for review.     FINDINGS:   RIGHT CAROTID SYSTEM: Minimal atheromatous plaque in the right carotid bulb and proximal bifurcation vessels.        The following velocities were obtained in the RIGHT carotid system (cm/s).  CCA: PSV 63; EDV 16  ICA: PSV 68; EDV  22   ECA: PSV 68; EDV  10   ICA/CCA ratios: PS 1.1     LEFT CAROTID SYSTEM: Minimal atheromatous plaque in the left carotid bulb and proximal bifurcation vessels.       The following velocities were obtained in the LEFT carotid system (cm/s).  CCA: PSV 76; EDV 19   ICA: PSV 77; EDV 23   ECA: PSV 59; EDV 11   ICA/CCA ratios: PS 1.0     VERTEBRAL SYSTEM: The vertebral arteries are patent bilaterally with normal waveforms proximally and antegrade flow.                                                                      CONCLUSION:   1.  RIGHT CAROTID: Mild less than 50% diameter stenosis based on NASCET criteria.  2.  LEFT CAROTID: Mild less than 50% diameter stenosis based on NASCET criteria.  3.  Antegrade flow within the vertebral arteries bilaterally. \      CT ABDOMEN PELVIS WITH CONTRAST   6/22/2017 9:29 PM       HISTORY:  Escherichia coli on blood culture.     TECHNIQUE:  CT abdomen and pelvis with intravenous contrast. Radiation  dose for this scan was reduced using automated exposure control,  adjustment of the mA and/or kV according to patient size, or iterative  reconstruction technique. 100mL Isovue-370      COMPARISON:  None.     FINDINGS:  Abdomen:  Mild atelectasis or scarring at the lung bases. The heart  size is normal. There is mild diffuse fatty infiltration of liver. The  spleen, gallbladder, pancreas, kidneys are normal in appearance. Mild  nonspecific thickening of adrenal glands. There is no abdominal or  pelvic lymph node enlargement.     Pelvis: There are scattered colonic diverticula without acute  diverticulitis. No bowel obstruction or inflammation. No adnexal mass.  There are postoperative changes in the anterior abdominal wall.  Midline ventral hernia containing fat. Left lower quadrant abdominal  hernia containing fat. Few gas bubbles in the subcutaneous fat of  the  left anterior abdomen may be from injections. There is degenerative  disease in the spine.                                                                      IMPRESSION:  1. No acute abnormality. No bowel obstruction or inflammation.  2. Colonic diverticula without acute diverticulitis.  3. Two ventral hernias containing fat.  4. Fatty infiltration of the liver.      MERNA ZIMMERMAN MD           INDICATION: Left upper extremity swelling     PROCEDURES PERFORMED: 11/18/2016  1. Left upper extremity venogram  2. SVC venogram     HISTORY: The patient is a 61-year-old female with history of pulmonary  embolus, factor V Leiden mutation, SVC syndrome status post  thrombolysis in 2012 who was evaluated in the outpatient for chronic  left arm swelling. She had CTA which suggested SVC occlusion versus  stenosis and patent bilateral subclavian veins. Given the persistent  left arm swelling spot compression therapy, she is referred for left  upper extremity venogram.     PROCEDURE: After discussing risks, benefits, goals, alternatives and  complications of the procedure, the patient was brought to the  catheterization lab. Left arm was prepped and draped in a sterile  fashion. Under 1% lidocaine a 7 Marshallese sheath was placed in the left  cephalic vein under ultrasound guidance. Venogram of the distal left  extremity veins was performed using the 7 Marshallese sheath. Next, a 5  Marshallese glide catheter was advanced over wire into the distal left  subclavian and additional venogram of the thoracic veins were  performed. The 7 Marshallese sheath was subsequently removed and hemostasis  achieved via manual compression.     FINDINGS:     LEFT UPPER EXTREMITY VENOGRAM: The left axillary vein is patent. The  distal left subclavian vein is patent. The proximal left subclavian  and brachiocephalic veins are chronically occluded. Extensive venous  collaterals are present. The right brachiocephalic vein is patent. The  proximal superior vena  cava is patent. The distal superior vena cava  is not well visualized.                                                                      IMPRESSION:   1. 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 Chronic left upper extremity swelling, secondary to #1      PLAN: Continue compression therapy.     ANN FALCON MD           Examination:   MR CARDIAC W CONTRAST   Date:  6/20/2016 10:50 AM  Ordering: Ann Falcon  Indication:  60-year-old female with history of SVC syndrome and  chronic occlusion with a mass at the right atrium-SVC junction.  Comparison: None  Technical quality: excellent                                                                      IMPRESSION:  1.  Normal left ventricular size and systolic function with a  calculated ejection fraction of  63 %.  2.  Normal right ventricular size and systolic function with a  calculated ejection fraction of 56%.   3.  There is a heterogeneous 2.7 x 1.6 cm mass located at the junction  of the superior vena cava and the right atrium which spans around 80%  the diameter of the vessel, but does not completely obstruct the  orifice. The mass does not suppress on fat suppression sequences and  is dark both on long TI imaging as well as cine imaging; collectively  these likely indicate a calcified thrombus.     4.  On delayed enhancement imaging, there is no abnormal  hyperenhancement to suggest myocardial scar/inflammation/infiltration.     The MRI sequences and imaging planes in this study were tailored for  cardiac imaging and are suboptimal for evaluation of non-cardiac  structures.     FINDINGS     Atria  LA size: Mildly dilated   RA size: Mildly dilated     Valves  Aortic Valve  Valve morphology: Tricuspid  Aortic stenosis: None  Aortic regurgitation: None     Mitral Valve  Mitral stenosis: None  Mitral regurgitation: Trace     Tricuspid Valve   Tricuspid stenosis: None  Tricuspid regurgitation: None     Pulmonic  Valve  Pulmonic stenosis: None  Pulmonic regurgitation: None     Extracardiac  Aortic root dimension: 38 mm (at the sinuses of Valsalva)  Main PA dimension: 36 mm  Pericardial thickness: Normal  Pericardial effusion:Trivial  Pleural effusion: None     Measurements  LEFT VENTRICULAR VOLUME RESULTS  Body Surface Area:    2.91 m2                 Mosteller                                        ED volume:            145.69 ml               (96 - 174 ml)                                    ED volume index:      50.00 ml/m2             (56 - 100 ml/m2)                                 ES volume:            54.08 ml                (27 - 71 ml)                                     ES volume index:      18.56 ml/m2                                                              Stroke volume:        91.61 ml                (61 - 111 ml)                                    Stroke volume index:  31.44 ml/m2                                                              Cardiac output:       6.51 l/min                                                               Cardiac output index: 2.23 l/(m2*min)                                                          Ejection fraction:    62.88 %                 (54 - 74 %)                                         LV wall thickness - Anteroseptal: 1.1 cm  LV wall thickness - Inferolateral: 1.1 cm  LV wall thickness - Maximum: 1.1 cm  LV end-diastolic diameter: 5.3 cm  LV end-systolic diameter: 3.0 cm  LA danny-posterior diameter: 4.4 cm     RIGHT VENTRICULAR VOLUME RESULTS  ED volume:            156.10 ml               (83 - 178 ml)                                    ED volume index:      53.57 ml/m2             (47 - 103 ml/m2)                                 ES volume:            68.57 ml                (32 - 73 ml)                                     ES volume index:      23.53 ml/m2                                                              Stroke volume:        87.53 ml                (44 -  113 ml)                                    Stroke volume index:  30.04 ml/m2                                                              Cardiac output:       6.22 l/min                                                               Cardiac output index: 2.13 l/(m2*min)                                                          Ejection fraction:    56.07 %                 (49 - 70 %)            Sedation and contrast  Sedation used: No  Contrast agent: MultiHance  Volume administered: 36 mL     Pulse sequences used  SSFP Localizers  SSFP Cine  IR SSFP Single Shot  IR GRE Segmented  Contrast-enhanced early and late enhancement imaging  Image post-processing was performed on a [employer] workstation     I have personally reviewed the examination and initial interpretation  and I agree with the findings.     GLORY SHEA MD        ULTRASOUND VENOUS UPPER EXTREMITY LEFT 5/9/2016 3:31 PM      HISTORY: Acute embolism and thrombosis of unspecified deep veins of  left lower extremity.     COMPARISON: None.     TECHNIQUE: Ultrasound gray scale, Color Doppler flow, and spectral  Doppler waveform analysis performed.     FINDINGS: There is thrombus in the left internal jugular vein. The  left axillary, cephalic, basilic, brachial, radial, and ulnar veins  demonstrate normal compressibility. The left innominate, subclavian,  and axillary veins have normal venous waveforms.        IMPRESSION: Nonocclusive thrombus in left internal jugular vein.     QUOC BAIRD MD           CTA ANGIOGRAM CHEST CONTRAST  5/9/2016 2:50 PM      HISTORY:  Arm swelling, chronic superior vena cava obstruction.  History of Factor V gene mutation.     COMPARISON: Chest CT 3/2/2012 and 1/21/2016.     TECHNIQUE: IV contrast imaging was performed through the chest in the  venous phase utilizing 80 mL of Isovue-370.     FINDINGS: There is chronic thrombosis of the superior vena cava which  appears to be secondary to a 2.4 x 1.6 CM calcified mass at the  cavoatrial  junction. The degree of calcification in this region is  increased slightly when compared to the previous study of 3/2/2012 at  which time it measured 2.0 x 1.6 CM. The innominate and subclavian  veins appear patent. There is no other definite cause of obstruction  with specifically no evidence of mediastinitis or mediastinal  adenopathy. Cardiac size and pulmonary vascularity are normal. No  significant collaterals in the chest are identified. The lungs are  clear. There is no evidence of pleural or pericardial effusion. Fatty  infiltration of the liver. Hypertrophic degenerative changes  throughout the thoracic spine.        IMPRESSION:  1. 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  The degree of calcification is increased slightly when compared to the  previous study of 2012.  2. No significant venous collateralization in the upper chest. The  innominate and subclavian veins are patent.     PREM CASTREJON MD       CT CHEST PULMONARY EMBOLISM WITH CONTRAST  1/21/2016 2:09 PM      HISTORY:  History of PE. Sudden onset shortness of breath.     COMPARISON: CT chest 3/2/2012.     TECHNIQUE: Thin section axial images are performed from the thoracic  inlet to the lung bases utilizing 90 mL of Isovue 370 IV contrast  without adverse event. Coronal reformatted images are also generated.     FINDINGS:  The lungs are clear.  No pneumothorax or pleural effusion.   No pericardial fluid.  Heart is normal in size.  Esophagus is  unremarkable.  No enlarged lymph nodes.  Thoracic aorta is  unremarkable.  No evidence of pulmonary embolism.  Limited images  upper abdomen demonstrate diffuse fatty infiltration of the liver.   Probable nodule left adrenal gland is unchanged.  This is too small to  characterize on this exam due to beam hardening artifact related to  patient body habitus.  Degenerative spine changes are present.         IMPRESSION:  1.  No evidence of pulmonary embolism.  Thoracic aorta is  unremarkable.  2.  Lungs are clear.  No enlarged lymph nodes.  3.  Diffuse fatty infiltration of the liver and probable stable left  adrenal nodule measuring less than 1 cm.  No further followup  suggested as this has been stable for greater than two years.     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      TECHNIQUE: The procedure was explained to the patient in detail.   Initial venacavogram was performed through the existing infusion   catheter. The catheter was then repositioned into the more superior   vena cava and a second superior venacavogram was performed. The   patient was then prepped and draped and 1% lidocaine was used as local   anesthetic. An exchange wire was placed through the infusion catheter   and the infusion catheter and sheath were removed. A 7 Cameroonian sheath   was placed in the antecubital region. Over the wire, a Bard Spencer   high-pressure 14 mm x 4 cm balloon was advanced into the superior vena   cava and venous angioplasty was performed of the cava. The balloon was   exchanged for a 10 cm infusion catheter which was positioned in the   superior vena cava and completion venogram was performed. Exchange   wire was placed through the catheter and the catheter and sheath were   removed over the wire and an 8 Cameroonian sheath was placed in the   antecubital fossa. Through the 8 Cameroonian sheath, a Bard Spencer   high-pressure 16 mm x 4 cm balloon was advanced over a wire into the   superior vena cava and venoplasty was again performed. The catheter   was then removed and exchanged for infusion catheter and a completion   superior venacavogram was performed. The infusion catheter, wire, and   sheath were then removed and manual compression  was placed at the   venotomy site until hemostasis was obtained. There were no immediate   complications.      Fluoro time: 4 minutes   Contrast: 125 mL Visipaque   Local anesthesia: 10 mL 1% Lidocaine   Conscious sedation: 1 mg IV Versed, 50 mcg IV fentanyl   Sedation time: 30 minutes      Patient was monitored by the radiology nursing staff under my   supervision and remained stable throughout the study.      FINDINGS: The initial superior venacavogram demonstrates three areas   of significant stenosis in the proximal, mid and distal superior vena   cava. The most distal stenosis appears to be secondary to a large   calcified mediastinal lymph node. The proximal areas of stenosis are   irregular and suggest residual filling defect due to thrombus however   these have not improved significantly after 48 hours and most likely   represent chronic organized thrombus and underlying stenosis. There is   a large azygos collateral present. Given these residual findings,   revascularization was attempted with angioplasty. I elected not to   place a stent in view of the patient's relatively young age and lack   of a history of underlying malignancy as the etiology. After initial   venoplasty to 14 mm there was significant improvement in luminal   diameter however moderate residual remained particularly at the level   of the calcified lymph node. Following venoplasty to 16 mm there is   further improvement with the proximal and mid superior vena cava now   widely patent. There appeared to be moderate residual stenosis at the   distal superior vena cava at the level of the calcified lymph node,   however oblique filming demonstrated this to be patent without   residual significant stenosis. This was felt to represent a   satisfactory end point and therefore thrombolysis was discontinued and   the catheters were removed.      IMPRESSION: Successful thrombolysis and venoplasty of the superior   vena cava for treatment of  superior vena cava syndrome.         INTERVENTIONAL RADIOLOGY EMBO/THROMBO RECHECK    Mar 4, 2012 10:04:00 AM      PROCEDURES:   1. Thrombolysis check.   2. Mechanical thrombectomy superior vena cava.   3. Restart of catheter-directed thrombolysis.      HISTORY: 56-year-old female with acute/subacute thrombosis of the   superior vena cava and development of symptoms of superior vena cava   syndrome. Patient returns for thrombolysis check after 21 hours of   catheter-directed thrombolysis.      DESCRIPTION OF PROCEDURE: The patient was placed in a supine position   on the fluoroscopy table. A venogram was performed through existing   infusion catheter which had been placed into the superior vena cava   one day earlier.      FINDINGS: There had been some interval decrease in amounts of thrombus   within the superior vena cava. There appeared to be possible extrinsic   compression of the lower SVC from a calcified lymph node that had been   seen on the CT scan performed on 3/2/2012.      INTERVENTION: The external portions of existing catheter and right arm   were prepped and draped in the usual sterile manner. 1% lidocaine was   injected for local anesthesia. Existing catheter was removed over a   wire. A 6 Albanian vascular sheath was placed. Mechanical thrombectomy   within the SVC was performed using an Angiojet device. Followup   venogram showed some improvement in thrombus in the midportion of the   SVC just inferior to the level of the azygos vein takeoff. There   appeared to be persistent moderate thrombus. Therefore, it was elected   to continue catheter-directed thrombolysis. An infusion catheter was   replaced. Catheter-diretced thrombolysis was restarted.      The patient tolerated the procedure well. There were no immediate   postprocedure complications. The patient's vital signs were monitored   by radiology nursing staff under my supervision and remained stable   throughout the study.      MEDICATIONS:  Versed 1.5 mg, fentanyl 75 mcg.      SEDATION TIME: 32 minutes.      FLUOROSCOPY TIME: 5.2 minutes.      CONTRAST: 65 mL Visipaque.      LOCAL ANESTHETIC: 10 mL.      IMPRESSION AND PLAN: Continued catheter-directed thrombolysis   overnight. Recheck tomorrow.      PROCEDURE: 3/3/2012  1. SVC venogram.   2. Catheter directed thrombolysis SVC.      HISTORY: 56-year-old female with acute onset neck and facial swelling.   Patient had a CT scan which shows occlusion of the superior vena cava.   There is a nonspecific calcification adjacent to the SVC. This may   represent a calcified lymph node. Patient has a past medical history   significant for a left subclavian port which was removed in early   2000. No known hypercoagulable state and no known malignancy.      COMPARISON: CT scan of the abdomen and pelvis dated 3/2/2012.      DESCRIPTION OF PROCEDURE: After obtaining informed consent, the   patient was placed in a supine position on the fluoroscopy table. The   right arm was prepped and draped in the usual sterile manner. 1%   lidocaine was injected for local anesthesia. Ultrasound was used to   evaluate and document patency of the right basilic vein. Under sterile   ultrasound guidance, access into the right basilic vein was obtained.   An image was stated for documentation. A JOHNY-1 catheter was then passed   over a wire. The catheter was positioned in the upper SVC. A venogram   was performed.      There was occlusion of the mid and lower portion of the SVC. Flow was   primarily directed into the azygos and hemiazygos system. This was   also seen on the previous CT scan. Using a JOHNY-1 catheter and stiff   Glidewire, access across the occluded SVC was obtained. A venogram was   performed. This showed a possible stenosis of the proximal aspect of   the SVC and moderate to large amount of thrombus more distally. A 5 cm   infusion catheter was embedded in the thrombus/stenosis. 4 mg tPA was   injected into the thrombus.  Catheter-directed thrombolysis was then   started. The catheter was secured to the patient's arm.      The patient tolerated the procedure well. There were no immediate   postprocedure complications. The patient's vital signs were monitored   by radiology nursing staff under my supervision and remained stable   throughout the study.      Medications: Versed 2 mg, fentanyl 100 mcg, tPA 4 mg      Sedation time: 24 minutes      Fluoroscopy time: 5.7 minutes      Contrast: 55 mL Visipaque      IMPRESSION: Catheter-directed thrombolysis started for occluded SVC   and associated SVC syndrome. TPA will be infused at a rate of 0.5 mg   per hour. Heparin will be infused through a peripheral IV at a rate of   500 units/hour. A thrombolysis check will be performed tomorrow.            ULTRASOUND VENOUS LOWER EXTREMITY BILATERAL  Mar 2, 2012 9:40 PM      HISTORY: SVC syndrome, assess for DVT.      COMPARISON: None.      FINDINGS: The right and left lower extremity venous ultrasound is   negative  for DVT. The veins do augment and compress normally. No   thrombus is seen.      IMPRESSION: Negative, no DVT identified.         CT CHEST WITH CONTRAST   Mar 2, 2012 5:33:00 PM      COMPARISON: None.      HISTORY: Chest pain, difficulty breathing, right-sided neck pain and   swelling.      TECHNIQUE: Volumetric helical acquisition of CT images of the chest   from the lung apices to the kidneys were acquired after the   administration of 100 mL of Optiray 350  IV contrast.      FINDINGS: There is a tiny peripheral right lower lobe pulmonary   embolism. The SVC is thrombosed. There is no obvious mass lesion   causing this thrombosis. There is no definite mediastinal fibrosis or   other cause of SVC syndrome evident on the scan. This may simply be   venous thrombosis at this location. Trace pleural fluid on the left.   Minimal peripheral atelectasis. The heart is not enlarged.  Thoracic   aorta is atherosclerotic  without evidence of  dissection or aneurysm.   Trace pericardial and right pleural fluid.  There is no pneumothorax.   Adrenal glands are normal.  Remainder of the visualized upper abdomen   is unremarkable.      IMPRESSION:   1. A small peripheral right lower lobe pulmonary embolism is   demonstrated.   2. No thoracic aortic dissection or aneurysm.   3. Thrombosis of the superior vena cava, of uncertain etiology.      Dr. Cutler was contacted by me  on 3/2/2012 at 0543 pm regarding the   abnormal finding of pulmonary embolism and SVC thrombosis and   verbalized understanding of the abnormal finding.         Copath Report Patient Name: CLEMENT VALENTINE  MR#: [id number]  Specimen #: [id number]  Collected: 3/5/2012 15:45  Received: 3/5/2012 22:44  Reported: 3/8/2012 11:40  Ordering Phy(s): CHACE HUFF    _________________________________________          TEST(S) REQUESTED:  A: Factor 5 Leiden and Factor 2 by PCR  B: DNA Isolation, High purity extraction    SPECIMEN DESCRIPTION:  Blood    CLINICAL COMMENTS:  PE    METHODOLOGY:   The regions of genomic DNA containing the [id number] Factor 5  gene mutation (Factor V Leiden) and the Factor 2(Prothrombin [id number])  gene mutation were simultaneously amplified using the polymerase chain  reaction.  The amplified products were digested with restriction  endonuclease TaqI and products were analyzed by gel electrophoresis.    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    The presence of the Factor 5 gene mutation is an indication of an  increased risk of developing a thrombosis.  The extent of this risk is  dependent upon  several other known thrombophilic risk factors including  smoking, obesity and the use of oral contraceptives.  Consultation  regarding these results is available upon request.  Genetic counseling  regarding these results is indicated in the evaluation of other family  members.    The patient is negative for the Factor 2 mutation.                This test was developed and its performance determined by the [facility], [place]  [facility].  It has not been cleared or approved by the U.S. Food and Drug  Administration.  The FDA has determined that such clearance or approval  is not necessary.  Pursuant to the requirements of CLIA' 88, this  laboratory has established and verified the test' s accuracy and  precision.  This test is used for clinical purposes.      Electronically Signed Out By:  Gabe Herndon MD, Physicians          TESTING LAB LOCATION:  [address] [address] [phone]            Component      Latest Ref Rng 3/4/2012  7:00 AM   Albumin Fraction      3.7 - 5.1 g/dL 3.1 (L)    Alpha 1 Fraction      0.2 - 0.4 g/dL 0.4    Alpha 2 Fraction      0.5 - 0.9 g/dL 0.8    Beta Fraction      0.6 - 1.0 g/dL 0.9    Gamma Fraction      0.7 - 1.6 g/dL 0.8    Monoclonal Peak      0.0 g/dL 0.0    ELP Interpretation: Hypoalbuminemia. No monoclonal protein seen. Pathologic significance requires     Cardiolipin IgG Danuta      0 - 15.0 GPL <15.0     Cardiolipin IgM Danuta      0 - 12.5 MPL <12.5     Beta-2-Glycopro IgG 0    Beta-2-Glycopro IgM 6    GINI Screen by EIA      <1.0      Sed Rate      0 - 30 mm/h        Component      Latest Ref Rng 3/4/2012  7:40 AM   Albumin Fraction      3.7 - 5.1 g/dL     Alpha 1 Fraction      0.2 - 0.4 g/dL     Alpha 2 Fraction      0.5 - 0.9 g/dL     Beta Fraction      0.6 - 1.0 g/dL     Gamma Fraction      0.7 - 1.6 g/dL     Monoclonal Peak       0.0 g/dL     ELP Interpretation:     Cardiolipin IgG Danuta      0 - 15.0 GPL     Cardiolipin IgM Danuta      0 - 12.5 MPL     Beta-2-Glycopro IgG     Beta-2-Glycopro IgM     GINI Screen by EIA      <1.0  <1.0     Sed Rate      0 - 30 mm/h 10       Legend:  (L) Low              Component 11 yr ago   Copath Report Patient Name: CLEMENT VALENTINE  MR#: [id number]  Specimen #: [id number]  Collected: 3/3/2012 18:15  Received: 3/5/2012 07:35  Reported: 3/8/2012 15:55  Ordering Phy(s): CHACE HUFF    _________________________________________          TEST(S) REQUESTED:  A: JAK2  B: DNA Isolation, High purity extraction    SPECIMEN DESCRIPTION:  Blood    CLINICAL COMMENTS:  Thrombocytosis and SVC obstruction    METHODOLOGY: The above specimen was fractionated using Ficoll-Histopaque  into mononuclear and granulocyte cell fractions. Total cellular DNA from  the granulocyte cell fraction was extracted. The portion of the JAK2  gene containing the G T transversion (V617F; exon 12) was amplified with  a series of four oligonucleotide primers. The primers include one  fluorescently labeled mutant specific forward primer (mutant (V617F)  allele), one fluorescently labeled forward internal control primer, an  unlabeled mutant specific reverse primer, and an unlabeled wildtype  specific reverse primer. The fluorescently labeled forward primer and a  common unlabeled reverse primer serve as an internal control. The PCR  product is analyzed by capillary gel electrophoresis on an MONICA 3130xl  Genetic Analyzer.    RESULTS:    JAK2 V617F Mutant Allele: Absent    JAK2 V617 Normal Allele: Present    Internal Control: Present    INTERPRETATION:  Molecular testing performed on submitted Blood.    V617F point mutation of the JAK2 gene was not detected in this sample.    COMMENTS:  Absence of the V617F mutation does not rule out clinical diagnosis of  polycythemia vera(PV), essential thrombocythemia(ET), idiopathic  myelofibrosis(IMF) or  other clinically indicated diseases. It was  estimated that about 3% of PV, 43% of ET and 50% of IMF could be JAK2  V617F mutation negative. Clinical correlation is required. Analytic  sensitivity of this assay is 1%.                  This test was developed and its performance determined by the [facility], [place]  [facility].  It has not been cleared or approved by the U.S. Food and Drug  Administration.  The FDA has determined that such clearance or approval  is not necessary.  Pursuant to the requirements of CLIA' 88, this  laboratory has established and verified the test' s accuracy and  precision.  This test is used for clinical purposes.      Electronically Signed Out By:  Gabe Herndon MD, Physicians          TESTING LAB LOCATION:  [place] [id number] [facility] [address]  [address] [place] [id number] [phone]          Component      Latest Ref Rng 3/29/2024  12:48 PM   WBC      4.0 - 11.0 10e3/uL 9.3    RBC Count      3.80 - 5.20 10e6/uL 4.41    Hemoglobin      11.7 - 15.7 g/dL 14.0    Hematocrit      35.0 - 47.0 % 43.2    MCV      78 - 100 fL 98    MCH      26.5 - 33.0 pg 31.7    MCHC      31.5 - 36.5 g/dL 32.4    RDW      10.0 - 15.0 % 15.1 (H)    Platelet Count      150 - 450 10e3/uL 256    % Neutrophils      % 45    % Lymphocytes      % 46    % Monocytes      % 6    % Eosinophils      % 2    % Basophils      % 0    % Immature Granulocytes      % 0    Absolute Neutrophils      1.6 - 8.3 10e3/uL 4.2    Absolute Lymphocytes      0.8 - 5.3 10e3/uL 4.3    Absolute Monocytes      0.0 - 1.3 10e3/uL 0.6    Absolute Eosinophils      0.0 - 0.7 10e3/uL 0.2    Absolute Basophils      0.0 - 0.2 10e3/uL 0.0    Absolute Immature Granulocytes      <=0.4 10e3/uL 0.0    Sodium      135 - 145 mmol/L 142    Potassium      3.4 - 5.3 mmol/L 4.0    Carbon Dioxide (CO2)      22 - 29 mmol/L 28    Anion Gap      7 - 15 mmol/L 11     Urea Nitrogen      8.0 - 23.0 mg/dL 12.2    Creatinine      0.51 - 0.95 mg/dL 0.64    GFR Estimate      >60 mL/min/1.73m2 >90    Calcium      8.8 - 10.2 mg/dL 9.8    Chloride      98 - 107 mmol/L 103    Glucose      70 - 99 mg/dL 66 (L)    Alkaline Phosphatase      40 - 150 U/L 57    AST      0 - 45 U/L 24    ALT      0 - 50 U/L 20    Protein Total      6.4 - 8.3 g/dL 6.9    Albumin      3.5 - 5.2 g/dL 3.9    Bilirubin Total      <=1.2 mg/dL 0.5    Total Cholesterol      <=199 mg/dL 150    Triglycerides      30 - 149 mg/dL 112    HDL Cholesterol      40 - 59 mg/dL 63 (H)    LDL Cholesterol      <=129 mg/dL 65    HDL Size      >=8.9 nm 9.8    VLDL Size      <=46.7 nm 50.2 (H)    LDL Particle Size      >=20.7 nm 21.5    Lge HDL Particle number      >=4.2 umol/L 9.6    HDL Particle Number NMR      >=33.0 umol/L 26.0 (L)    Lge VLDL Part number      <=2.7 nmol/L 2.9 (H)    Small LDL Particle number      <=634 nmol/L <165    LDL Particle Number      <=1135 nmol/L 616    EER LipoFit by NMR See Note    C-Reactive Protein High Sensitivity 6.49    Hemoglobin A1C      0.0 - 5.6 % 5.1    Lipoprotein (a)      <30 mg/dL 6    Free T3      2.0 - 4.4 pg/mL 2.5    T4 Free      0.90 - 1.70 ng/dL 0.91    TSH      0.30 - 4.20 uIU/mL 12.10 (H)       Legend:  (H) High  (L) Low      CT CHEST WITH CONTRAST 3/20/2024 11:40 AM      HISTORY: please conduct injecting the right arm; 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 She has a history of SVC occlusion that was treated in 2012.;  Superior vena cava syndrome; Asymmetrical arms; Other specified  symptoms and signs involving the circulatory and respiratory systems     COMPARISON: CTA chest 5/9/2016.     TECHNIQUE: Volumetric helical acquisition of CT images of the chest  from the clavicles to the kidneys were acquired after the  administration of 135mL ISOVUE-370 IV contrast. Radiation dose for  this scan was reduced using automated  exposure control, adjustment of  the mA and/or kV according to patient size, or iterative  reconstruction technique.     FINDINGS:      LUNGS AND PLEURA: No airspace consolidation or pulmonary mass. No  pleural effusion.     MEDIASTINUM/AXILLAE: Normal heart size without pericardial effusion.  No thoracic adenopathy. Similar size of a calcified structure at the  lower superior vena cava which measures 1.6 x 1.6 cm in size but is  difficult to delineate from surrounding contrast material. The upper  and mid superior vena cava appears patent. There is narrowing of the  lower SVC secondary to the calcified structure with patency difficult  to ascertain. The left brachiocephalic and subclavian veins are not  opacified and appear atretic.     CORONARY ARTERY CALCIFICATIONS: None.     UPPER ABDOMEN: Stable mild thickening of the bilateral adrenal glands.     MUSCULOSKELETAL: Degenerative changes of the spine.                                                                      IMPRESSION:  1.  No new mass within the chest.  2.  Similar calcified structure at the superior cavoatrial junction  which results in narrowing of the lower SVC. Nonopacification and  atretic appearance of the left brachiocephalic and subclavian veins  compatible with history of chronic thrombosis. A dedicated CT venogram  of the chest could better assess the venous system as clinically  indicated.     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 2D and 3D  reconstructions performed by the CT technologist. Dose reduction  techniques were used.  CONTRAST: 135 cc of Isovue 370, from a single use vial     FINDINGS:   VASCULAR  FINDINGS: Abdominal aorta, iliac arteries and visceral  arteries appear grossly patent with mild scattered atherosclerotic  calcified disease. The inferior vena cava is widely patent. Renal  veins appear patent bilaterally. The right iliac vein and left iliac  veins appear patent without significant narrowing or significant mass  effect.     NONVASCULAR FINDINGS:  LUNG BASES: No cardiomegaly. Mild to moderate pericardial effusion.  Mild basilar atelectasis. No pleural effusion or pneumothorax.     ABDOMEN: Right adrenal gland masslike enlargement measuring 1.3 cm.  Previously measuring 1.1 cm on 6/22/2017. Cholelithiasis. Simple right  renal cyst, no further follow-up needed. No hydronephrosis or  enhancing renal mass seen. The spleen, left adrenal gland, liver, and  pancreas are unremarkable.     PELVIS: Diverticulosis. No abnormally dilated loops of bowel. No free  fluid or free air.     MUSCULOSKELETAL: Severe degenerative disease of the right hip. Severe  degenerative disease lumbar spine.                                                                      IMPRESSION:  1.  Patent inferior vena cava and widely patent iliac veins with no  evidence of abnormal compression or stenosis.  2.  Masslike enlargement of the right adrenal gland measuring 1.3 cm.  This is enlarged from 6/22/2017 where it measured 1.1 cm. Consider  formal evaluation with CT or MRI adrenal mass protocol.  3.  Cholelithiasis.  4.  Diverticulosis.     ELIESER GRANADOS MD         ULTRASOUND BILATERAL LOWER EXTREMITY ARTERIAL DUPLEX  3/20/2024 1:23  PM      HISTORY:  Asymmetric legs. Circulatory disorder.     COMPARISON: None.     FINDINGS: Color Doppler and spectral waveform analysis performed.     The sampled arteries of the lower extremities are patent. No focal  elevations in peak systolic velocity to indicate significant stenosis.  Multiphasic waveforms are noted throughout.                                                                       IMPRESSION: No definite significant stenoses are identified in the  sampled arteries of the lower extremity.     ELANA ALCALA MD       [facility]  DATE: 3/20/2024     EXAM: BILATERAL LOWER EXTREMITY VENOUS ULTRASOUND     INDICATION: Bilateral lower extremity edema      TECHNIQUE: Duplex imaging was performed utilizing gray-scale,  compression, augmentation as appropriate, color-flow, Doppler waveform  analysis, and spectral Doppler imaging.     COMPARISON: 3/2/2012.     FINDINGS:  RIGHT LEG: The common femoral, profunda femoral, femoral, popliteal,  and segmentally visualized calf veins are patent and compressible with  appropriate vascular waveforms. No deep venous thrombus is identified.     LEFT LEG: The common femoral, profunda femoral, femoral, popliteal,  and segmentally visualized calf veins are patent and compressible with  appropriate vascular waveforms. No deep venous thrombus is identified.                                                                      IMPRESSION:   1.  RIGHT LEG: Negative for deep vein thrombosis.  2.  LEFT LEG: Negative for deep vein thrombosis.     ELIESER GRANADOS MD       US UPPER EXTREMITY VENOUS DUPLEX BILATERAL  3/20/2024 1:24 PM      HISTORY: Superior vena cava syndrome; Acute deep vein thrombosis (DVT)  of other vein of left upper extremity (H); Asymmetrical arms     COMPARISON: None.     TECHNIQUE: Examination of the upper extremity veins was performed with  graded compression and 2-D ultrasound and color doppler spectral  waveform analysis.                                                                       Impression: No evidence for DVT in the visualized veins of the upper  extremities. The left internal jugular vein was not adequately  visualized.     ELANA ALCALA MD

## 2024-04-12 LAB — INR HOME MONITORING: 4.1 (ref 2–3)

## 2024-04-12 RX ORDER — ESCITALOPRAM OXALATE 20 MG/1
TABLET ORAL
Qty: 90 TABLET | Refills: 1 | Status: SHIPPED | OUTPATIENT
Start: 2024-04-12 | End: 2024-09-10

## 2024-04-15 ENCOUNTER — ANTICOAGULATION THERAPY VISIT (OUTPATIENT)
Dept: ANTICOAGULATION | Facility: CLINIC | Age: 69
End: 2024-04-15
Payer: COMMERCIAL

## 2024-04-15 ENCOUNTER — TELEPHONE (OUTPATIENT)
Dept: OTHER | Facility: CLINIC | Age: 69
End: 2024-04-15
Payer: COMMERCIAL

## 2024-04-15 DIAGNOSIS — Z86.711 HISTORY OF PULMONARY EMBOLISM: ICD-10-CM

## 2024-04-15 DIAGNOSIS — D68.51 FACTOR 5 LEIDEN MUTATION, HETEROZYGOUS (H): Primary | ICD-10-CM

## 2024-04-15 NOTE — TELEPHONE ENCOUNTER
Follow-up to 4/11/24    BLE venous competency ultrasound  Echocardiogram  Follow up one week later.  In clinic.

## 2024-04-15 NOTE — PROGRESS NOTES
ANTICOAGULATION MANAGEMENT     Bere Machuca 68 year old female is on warfarin with supratherapeutic INR result. (Goal INR 2.0-3.0)    Recent labs: (last 7 days)     04/12/24  0000   INR 4.1*       ASSESSMENT     Source(s): Chart Review and Patient/Caregiver Call     Warfarin doses taken: Warfarin taken as instructed  Diet: No new diet changes identified  Medication/supplement changes:  mounjaro is on back order.  Patient was on it for a month and now does not have it again due to being on back order.  Patient is hoping to get her 7.5 mg dose soon.  Mounjaro can either increase or decrease the INR   New illness, injury, or hospitalization: No, but patient has been feeling very constipated due to the mounjaro.  Signs or symptoms of bleeding or clotting: No  Previous result: Supratherapeutic  Additional findings: None  Patient held dose on 4/12/24 after seeing INR result.       PLAN     Recommended plan for no diet, medication or health factor changes affecting INR     Dosing Instructions: decrease your warfarin dose (11.1% change) with next INR in 4 days       Summary  As of 4/15/2024      Full warfarin instructions:  5 mg every Sun; 2.5 mg all other days   Next INR check:  4/19/2024               Telephone call with Darline who verbalizes understanding and agrees to plan  Sent [employer] message with dosing and follow up instructions    Patient to recheck with home meter    Education provided:   Dietary considerations: importance of notifying [facility] to changes in diet-patient does not have much of an appetite while on the mounjaro.  Strongly encouraged patient to increase fiber to help with constipation.  Patient has been relying on supplements but encouraged patient to purchase fiber rich foods.    Plan made per [facility] anticoagulation protocol    Mary Sanhcez, RN  Anticoagulation Clinic  4/15/2024    _______________________________________________________________________     Anticoagulation Episode Summary        Current INR goal:  2.0-3.0   TTR:  62.1% (1 y)   Target end date:  Indefinite   Send INR reminders to:  ANTICOAG HOME MONITORING    Indications    Factor 5 Leiden mutation  heterozygous/ INR 2-3 [D68.51]  History of pulmonary embolism [Z86.711]             Comments:  managed by exception  [employer] home meter             Anticoagulation Care Providers       Provider Role Specialty Phone number    Uyen Mcintyre APRN CNP Referring Internal Medicine [phone]    Jennifer Shea MD Referring Internal Medicine [phone]

## 2024-04-17 DIAGNOSIS — F11.20 OPIOID DEPENDENCE (H): Primary | ICD-10-CM

## 2024-04-17 NOTE — TELEPHONE ENCOUNTER
Patient scheduled for venous comp US, transferred to [facility] to coordinate Echo, patient will call [facility] to schedule follow-up.    Will flag to schedule follow-up if needed.

## 2024-04-19 ENCOUNTER — TELEPHONE (OUTPATIENT)
Dept: INTERNAL MEDICINE | Facility: CLINIC | Age: 69
End: 2024-04-19
Payer: COMMERCIAL

## 2024-04-19 ENCOUNTER — ANTICOAGULATION THERAPY VISIT (OUTPATIENT)
Dept: ANTICOAGULATION | Facility: CLINIC | Age: 69
End: 2024-04-19
Payer: COMMERCIAL

## 2024-04-19 DIAGNOSIS — E66.01 MORBID OBESITY WITH BMI OF 60.0-69.9, ADULT (H): ICD-10-CM

## 2024-04-19 LAB — INR HOME MONITORING: 3 (ref 2–3)

## 2024-04-19 RX ORDER — TIRZEPATIDE 7.5 MG/.5ML
7.5 INJECTION, SOLUTION SUBCUTANEOUS
Qty: 2 ML | Refills: 2 | Status: SHIPPED | OUTPATIENT
Start: 2024-04-19 | End: 2024-04-19

## 2024-04-19 NOTE — TELEPHONE ENCOUNTER
Spoke with Bere Machuca today in regards to her Mounjaro prescription. Last filled on 3/14/24 for 28 day supply and due for refill. Per Bere Machuca, [facility] is out of stock and has been several times. Per inventory report today, [facility] has Mounjaro 7.5 mg in stock. Patient open to using this pharmacy so prescription transferred. Advised to call today and set up shipment.       Jess Nolan, PharmD, [facility]  Population Health Pharmacist  [phone]

## 2024-04-19 NOTE — PROGRESS NOTES
ANTICOAGULATION MANAGEMENT     Bere Machuca 68 year old female is on warfarin with therapeutic INR result. (Goal INR 2.0-3.0)    Recent labs: (last 7 days)     04/19/24  0000   INR 3.0       ASSESSMENT     Source(s): Chart Review and Patient/Caregiver Call     Warfarin doses taken: Warfarin taken as instructed  Diet: No new diet changes identified  Medication/supplement changes: Pt's mounjaro has been on back order [facility]. Pharmacist just transferred prescription to [facility] today. Hopefully, pt will be able to get this and start up again. Once pt has started the mounjaro, MD will need to be adjusted again.  New illness, injury, or hospitalization: No  Signs or symptoms of bleeding or clotting: No  Previous result: Supratherapeutic  Additional findings: MD was reduced on 4/15/24       PLAN     Recommended plan for ongoing change(s) affecting INR     Dosing Instructions: Continue your current warfarin dose with next INR in 1 week       Summary  As of 4/19/2024      Full warfarin instructions:  5 mg every Sun; 2.5 mg all other days   Next INR check:  4/26/2024               Telephone call with Darline who verbalizes understanding and agrees to plan    Patient to recheck with home meter    Education provided:   Contact [phone]  with any changes, questions or concerns.     Plan made per [facility] anticoagulation protocol    Arcelia Trujillo RN  Anticoagulation Clinic  4/19/2024    _______________________________________________________________________     Anticoagulation Episode Summary       Current INR goal:  2.0-3.0   TTR:  61.2% (1 y)   Target end date:  Indefinite   Send INR reminders to:  ANTICOAG HOME MONITORING    Indications    Factor 5 Leiden mutation  heterozygous/ INR 2-3 [D68.51]  History of pulmonary embolism [Z86.711]             Comments:  managed by [facility] home meter             Anticoagulation Care Providers       Provider Role Specialty Phone number    Uyen Mcintyre  MARY Gaines CNP Referring Internal Medicine [phone]    Jennifer Shea MD Referring Internal Medicine [phone]

## 2024-04-25 ENCOUNTER — TELEPHONE (OUTPATIENT)
Dept: INTERNAL MEDICINE | Facility: CLINIC | Age: 69
End: 2024-04-25
Payer: COMMERCIAL

## 2024-04-25 DIAGNOSIS — E11.59 CONTROLLED TYPE 2 DIABETES MELLITUS WITH OTHER CIRCULATORY COMPLICATION, WITHOUT LONG-TERM CURRENT USE OF INSULIN (H): Primary | ICD-10-CM

## 2024-04-25 NOTE — TELEPHONE ENCOUNTER
"Spoke with Bere Machuca today in regards to Mounjaro.     Having difficulties obtaining medication and has been out of for two weeks. Stated that online [facility] says is \"processing\" her prescription. She called them and said they do not have a supply of it. She is also in the donut hole is concerned about cost.     Patient was agreeable to a MTM consult to assist in finding a medication that may be in better supply and more affordable for her.     Thank you,    Ale Key, PharmD, [facility]    "

## 2024-04-29 ENCOUNTER — TRANSFERRED RECORDS (OUTPATIENT)
Dept: HEALTH INFORMATION MANAGEMENT | Facility: CLINIC | Age: 69
End: 2024-04-29
Payer: COMMERCIAL

## 2024-04-29 NOTE — PROGRESS NOTES
Medication Therapy Management (MTM) Encounter    ASSESSMENT:                            Medication Adherence/Access: See below for considerations    Diabetes/Obesity:   Patient is meeting A1c goal of < 7% but not meeting weight goal, overall lost 70 lbs with Mounjaro and currently unable to get. Reviewed different options, will trial Victoza to see if she can tolerate a daily dose with this verus weekly dose of Ozempic that she did not do well in the past.  When Mounjaro becomes available can restart this.  Discuss side effects, storage, administration.    Hypertension:   Stable. Patient is meeting blood pressure goal of < 130/80mmHg.    Hyperlipidemia:   Stable.    Pulmonary embolism history:  Stable, continues to work with Dr. Richter    COPD:   stable    Depression  stable    Chronic Pain: Unimproved, working with pain clinic and will get get surgery once she is able to lose more weight. Patient would benefit from discussing multivitamin with fish oil, fish oil may be adding to her bruising.    Rheumatoid arthritis/Ulcerative Colitis:   Continues to work with rheumatology and gastroenterology to management symptoms.  With the addition of Victoza recommend restarting the Miralax    Supplements:   See above on fish oil, stopping.    PLAN:                            Pain: ask your pain doctor about stopping fish    Constipation:  start Mirlax 17 grams twice week    Diabetes/Weight Management: Start Victoza see below    Addendum:  Patient called, Victoza requires prior authorization, she would like to try Trulicity 0.75mg once weekly instead    Victoza Dosing:   Start Victoza: It is a subcutaneous injection that you inject once daily and titrate the dose slowly over time. Make sure inject the same time each day.   Week 1: Inject 0.6 mg once daily  Week 2: If tolerating, increase to 1.2 mg once daily   Week 3 and after: If tolerating, increase to 1.8 mg once daily   *If you are having some nausea or other side effect(s)  to where you are hesitant to move up to the next dose, stay at the same dose you are on for an additional week to see if side effect(s) improves/resolves. Make sure to take this time to hydrate and ensure you are drinking at least 64 oz water per day.     Victoza Administration Video:   Https://www.youtube.com/watch?v=[email or web address]    Victoza Storage and Stability:   Store new, unused Victoza pens in the refrigerator. After first use, store in a refrigerator or at room temperature. Pens in use should be thrown away after 30 days even if they still have Victoza left in them.     Victoza Common Side Effects:   Nausea, diarrhea, constipation, headache, tiredness (fatigue), dizziness, stomach upset/pain. Less commonly, Victoza can cause low blood sugar (symptoms: shaky, dizzy, sweaty, agitation). It is important to ensure that you are eating consistent meals and not skipping meals. Ensure you are getting at least 64 oz water daily.      Follow-up: Return in about 1 month (around 5/30/2024) for MTM Pharmacist Visit.    SUBJECTIVE/OBJECTIVE:                          Darline Machuca is a 68 year old female called for an initial visit. She was referred to me from Dr. Shea.      Reason for visit: not able to get her Mounjaro and this is expensive, she is in her donut hole.  She is wondering about frequent, urgent urination.    Allergies/ADRs: she would like the vancomycin removed  Past Medical History: Reviewed in chart  Tobacco: She reports that she quit smoking about 9 years ago. Her smoking use included cigarettes and vaping device. She started smoking about 44 years ago. She has a 17.5 pack-year smoking history. She has never used smokeless tobacco.  Vaping, very low nicotine dose.  She is not interested in stopping today.  Alcohol: none  Caffeine: 1 cup per day  Activity: unable to do any activity due to needing hip replacement, on waker    Medication Adherence/Access: Patient uses pill box(es).  Patient takes medications  "2 time(s) per day.   Per patient, misses medication 0 times per week.   Medication barriers: cost.   The patient fills medications at [facility]: NO, fills medications at [facility].    Diabetes /Obesity    None, off Mounjaro for 2 weeks    She needs to lose weight so she can get surgery to help her pain.  She lost 70 lbs and has 70lbs more to go.  Mounjaro was expensive for her now in the donut hole about $300  Current diabetes symptoms: none  Nutrition/Eating Habits: did not discuss in detail.    Exercise/Activity: limited.   Medications Tried/Failed:  Ozempic: terrible stomach aches up to 0.5 of 1mg  Topiramate-allergy list  Bupropion-anxiety     Eye exam in the last 12 months? No    Foot exam: due  Urine Albumin:   Lab Results   Component Value Date    UMALCR  (A) 06/11/2021      Lab Results   Component Value Date    A1C 5.1 03/29/2024    A1C 5.4 01/11/2023   Estimated body mass index is 56.58 kg/m  as calculated from the following:    Height as of 4/11/24: 5' 5\" (1.651 m).    Weight as of 4/11/24: 340 lb (154.2 kg).  Wt Readings from Last 4 Encounters:   04/11/24 (!) 340 lb (154.2 kg)   02/16/24 (!) 342 lb (155.1 kg)   06/09/23 (!) 344 lb (156 kg)   01/11/23 (!) 371 lb (168.3 kg)        Hypertension   Losartan 50mg, 1/2 tablet, every day, recently decreased  Patient reports no current medication side effects  Patient does not self-monitor blood pressure.  Yesterday at infusion 112/68     BP Readings from Last 3 Encounters:   04/11/24 123/81   02/16/24 (!) 147/79   06/09/23 131/71     Pulse Readings from Last 3 Encounters:   04/11/24 65   02/16/24 80   06/09/23 69       Hyperlipidemia   rosuvastatin 20mg daily  Patient reports no new myalgias since on medication.     3/29/24 LDL 65    Pulmonary embolism history:  Warfarin 5mg on Sunday and 2.5mg every other day  Factor 5 Leiden mutation, INR goal 2-3  Side effects:  some bruising, no bleeding  History of superior vena cava syndrome, working with  " [facility]  INR   Date Value Ref Range Status   11/25/2017 2.46 (H) 0.86 - 1.14 Final     INR Point of Care   Date Value Ref Range Status   10/05/2021 2.5 2.0 - 3.0 Final     INR HOME MONITORING   Date Value Ref Range Status   04/19/2024 3.0 2.000 - 3.000 Final         COPD:   Trelegy 100-62.5-25mcg 1 puff every day  Albuterol 2 puffs as needed --not using lately  Uses CPAP  Reports well controlled with Trelegy she is very happy.  Patient rinses their mouth after using steroid inhaler.    Sees [facility]  Patient reports no current medication side effects.    Patient reports the following symptoms: none.  Blood eosinophils > 300 cells/ L?:  200       Depression  Escitalopram 20mg once daily.   Patient reports no current medication side effects.  Patient reports symptoms are stable and feels she needs.     Chronic pain:    Acetaminophen 500mg, 2 tablets as needed   Tizanidine 4mg twice daily as needed --takes to sleep at night and sometimes 1/2 tablet in the day  Suboxone 12-3 twice daily   Lyrica 200mg twice daily     Pain type/location: history of spinal stenosis, hip placement, fibromyalgia  Pain is described as  myalgia/pain all over her body .  Follws with [facility]  Patient reports the following side effects:  dry mouth and fatigue from tizanidine, has swelling on one leg and arm and working with Dr. Richter on this, working on ECHO this week for the swelling.  Working with [facility], last visit 1/30/24, hip replacement, would like BMI of 45 or less, 278 lb target weight    Rheumatoid arthritis/Ulcerative Colitis:   Infliximab-axxq/Avsola 800mg IV every 8 weeks, changing to different biological Simponi Aria, unknown dose  Methotrexate 0.7mg every week  Sulfasalazine 500mg twice daily   Prednisone 5mg every day   Folic acid 1mg every day     Mesalamine 1.2mg EC every day for UC  Miralax 17 gram as needed for constipation, better off Mounjaro, once a week  bowel movement but now every other day  Side effects: hair thinning, and will increase folic aid to see if help, has constipation from many medications.    Specialist(s):   Dr. Eryn ONTIVEROS, [facility]. Last visit recommendations on 10/30/23: stopped mesalamine and treated for narcotic-induced constipation   Dr. Carlos, [facility] 10/18/23 last visit    Supplements:   Lymphatic formulary 2 capsules per day  Women over 50 multivitamin plus fish oil 2 each every day   B complex-C every day   Magnesium 250mg every day for constipation    Patient is experiencing bruising.     Today's Vitals: There were no vitals taken for this visit.  ----------------    I spent 49 minutes with this patient today. All changes were made via collaborative practice agreement with Jennifer Shea MD. A copy of the visit note was provided to the patient's provider(s).    A summary of these recommendations was sent via [employer].    Jadyn Domingo, PharmD BCPS  Medication Therapy Management Practitioner  Pharmacist      Telemedicine Visit Details  Type of service:  Telephone visit  Start Time:  9:00am  End Time: 10:19 AM     Medication Therapy Recommendations  Morbid obesity with BMI of 60.0-69.9, adult (H)    Rationale: Untreated condition - Needs additional medication therapy - Indication   Recommendation: Start Medication - VICTOZA PEN 18 MG/3ML soln   Status: Accepted per CPA         Takes dietary supplements    Current Medication: Multiple Vitamins-Minerals (WOMENS 50+ MULTI VITAMIN/MIN PO)   Rationale: Undesirable effect - Adverse medication event - Safety   Recommendation: Discontinue Medication   Status: Contact Provider - Awaiting Response         Ulcerative colitis with complication, unspecified location (H)    Current Medication: mesalamine (LIALDA) 1.2 g EC tablet   Rationale: Synergistic therapy - Needs additional medication therapy - Indication   Recommendation: Start Medication -  MiraLax 17 g Pack   Status: Accepted - no CPA Needed

## 2024-04-30 ENCOUNTER — VIRTUAL VISIT (OUTPATIENT)
Dept: PHARMACY | Facility: CLINIC | Age: 69
End: 2024-04-30
Payer: COMMERCIAL

## 2024-04-30 DIAGNOSIS — Z78.9 TAKES DIETARY SUPPLEMENTS: ICD-10-CM

## 2024-04-30 DIAGNOSIS — E11.59 CONTROLLED TYPE 2 DIABETES MELLITUS WITH OTHER CIRCULATORY COMPLICATION, WITHOUT LONG-TERM CURRENT USE OF INSULIN (H): ICD-10-CM

## 2024-04-30 DIAGNOSIS — E78.5 HYPERLIPIDEMIA LDL GOAL <70: ICD-10-CM

## 2024-04-30 DIAGNOSIS — E66.01 MORBID OBESITY WITH BMI OF 60.0-69.9, ADULT (H): Primary | ICD-10-CM

## 2024-04-30 DIAGNOSIS — I10 BENIGN ESSENTIAL HYPERTENSION: ICD-10-CM

## 2024-04-30 DIAGNOSIS — G89.29 OTHER CHRONIC PAIN: ICD-10-CM

## 2024-04-30 DIAGNOSIS — M05.79 RHEUMATOID ARTHRITIS INVOLVING MULTIPLE SITES WITH POSITIVE RHEUMATOID FACTOR (H): ICD-10-CM

## 2024-04-30 DIAGNOSIS — J42 CHRONIC BRONCHITIS, UNSPECIFIED CHRONIC BRONCHITIS TYPE (H): ICD-10-CM

## 2024-04-30 DIAGNOSIS — F33.1 MAJOR DEPRESSIVE DISORDER, RECURRENT EPISODE, MODERATE (H): ICD-10-CM

## 2024-04-30 DIAGNOSIS — E66.01 MORBID OBESITY WITH BMI OF 60.0-69.9, ADULT (H): ICD-10-CM

## 2024-04-30 DIAGNOSIS — K51.919 ULCERATIVE COLITIS WITH COMPLICATION, UNSPECIFIED LOCATION (H): ICD-10-CM

## 2024-04-30 DIAGNOSIS — Z86.711 HISTORY OF PULMONARY EMBOLISM: ICD-10-CM

## 2024-04-30 PROCEDURE — 99207 PR NO CHARGE LOS: CPT | Mod: 93 | Performed by: PHARMACIST

## 2024-04-30 RX ORDER — ACETAMINOPHEN 500 MG
1000 TABLET ORAL EVERY 8 HOURS PRN
COMMUNITY

## 2024-04-30 RX ORDER — SEMAGLUTIDE 1.34 MG/ML
INJECTION, SOLUTION SUBCUTANEOUS
Refills: 0 | OUTPATIENT
Start: 2024-04-30

## 2024-04-30 RX ORDER — DULAGLUTIDE 0.75 MG/.5ML
0.75 INJECTION, SOLUTION SUBCUTANEOUS
Qty: 2 ML | Refills: 0 | Status: SHIPPED | OUTPATIENT
Start: 2024-04-30 | End: 2024-05-30

## 2024-04-30 RX ORDER — LOSARTAN POTASSIUM 50 MG/1
25 TABLET ORAL DAILY
Status: SHIPPED
Start: 2024-04-30 | End: 2024-07-16

## 2024-04-30 RX ORDER — PREGABALIN 200 MG/1
200 CAPSULE ORAL 2 TIMES DAILY
COMMUNITY
Start: 2023-05-29

## 2024-04-30 RX ORDER — BUPRENORPHINE AND NALOXONE 12; 3 MG/1; MG/1
1 FILM, SOLUBLE BUCCAL; SUBLINGUAL 2 TIMES DAILY
COMMUNITY
Start: 2023-12-01

## 2024-04-30 RX ORDER — LIRAGLUTIDE 6 MG/ML
INJECTION SUBCUTANEOUS
Qty: 6 ML | Refills: 0 | Status: SHIPPED | OUTPATIENT
Start: 2024-04-30 | End: 2024-04-30

## 2024-04-30 RX ORDER — POLYETHYLENE GLYCOL 3350 17 G/17G
1 POWDER, FOR SOLUTION ORAL DAILY PRN
COMMUNITY

## 2024-04-30 RX ORDER — TIRZEPATIDE 2.5 MG/.5ML
2.5 INJECTION, SOLUTION SUBCUTANEOUS
Qty: 2 ML | Refills: 0 | Status: SHIPPED | OUTPATIENT
Start: 2024-04-30 | End: 2024-05-28

## 2024-04-30 NOTE — PATIENT INSTRUCTIONS
Recommendations from today's MTM visit:                                                    MTM (medication therapy management) is a service provided by a clinical pharmacist designed to help you get the most of out of your medicines.     Pain: ask your pain doctor about stopping fish    Constipation:  start Mirlax 17 grams twice week    Diabetes/Weight Management: Start Victoza see below    Victoza Dosing:   Start Victoza: It is a subcutaneous injection that you inject once daily and titrate the dose slowly over time. Make sure inject the same time each day.   Week 1: Inject 0.6 mg once daily  Week 2: If tolerating, increase to 1.2 mg once daily   Week 3 and after: If tolerating, increase to 1.8 mg once daily   *If you are having some nausea or other side effect(s) to where you are hesitant to move up to the next dose, stay at the same dose you are on for an additional week to see if side effect(s) improves/resolves. Make sure to take this time to hydrate and ensure you are drinking at least 64 oz water per day.     Victoza Administration Video:   Https://[email or web address].com.com/watch?[email or web address]    Victoza Storage and Stability:   Store new, unused Victoza pens in the refrigerator. After first use, store in a refrigerator or at room temperature. Pens in use should be thrown away after 30 days even if they still have Victoza left in them.     Victoza Common Side Effects:   Nausea, diarrhea, constipation, headache, tiredness (fatigue), dizziness, stomach upset/pain. Less commonly, Victoza can cause low blood sugar (symptoms: shaky, dizzy, sweaty, agitation). It is important to ensure that you are eating consistent meals and not skipping meals. Ensure you are getting at least 64 oz water daily.      Follow-up: Return in about 1 month (around 5/30/2024) for MTM Pharmacist Visit.  To schedule another MTM appointment, please call the clinic directly or you may call the MTM scheduling line at [phone] or toll-free at  [phone].     My Clinical Pharmacist's contact information:                                                      It was a pleasure seeing you today!  Please feel free to contact me with any questions or concerns you have.      Jadyn Domingo, PharmD BCPS  Medication Therapy Management Practitioner    It was great to speak with you today.  I value your experience and would be very thankful for your time with providing feedback on our clinic survey. You may receive a survey via email or text message in the next few days.

## 2024-05-01 ENCOUNTER — THERAPY VISIT (OUTPATIENT)
Dept: OCCUPATIONAL THERAPY | Facility: CLINIC | Age: 69
End: 2024-05-01
Attending: INTERNAL MEDICINE
Payer: COMMERCIAL

## 2024-05-01 DIAGNOSIS — I89.0 LYMPHEDEMA: Primary | ICD-10-CM

## 2024-05-01 PROCEDURE — 97140 MANUAL THERAPY 1/> REGIONS: CPT | Mod: GO | Performed by: OCCUPATIONAL THERAPIST

## 2024-05-01 PROCEDURE — 97166 OT EVAL MOD COMPLEX 45 MIN: CPT | Mod: GO | Performed by: OCCUPATIONAL THERAPIST

## 2024-05-01 NOTE — PROGRESS NOTES
OCCUPATIONAL THERAPY EVALUATION  Type of Visit: Evaluation    See electronic medical record for Abuse and Falls Screening details.    Subjective      Presenting condition or subjective complaint: Left leg and left arm swollen and uncomfortable  Date of onset: 04/11/24 (chronic for years)    Relevant medical history: Arthritis; Bladder or bowel problems; COPD; Depression; DVT (blood clot); Fibromyalgia; High blood pressure; Non-healing wounds; Osteoarthritis; Overweight; Pain at night or rest; Rheumatoid arthritis; Significant weakness; Sleep disorder like apnea; Smoking   Dates & types of surgery: Multiple surgery since 1992    Prior diagnostic imaging/testing results: MRI; CT scan     Prior therapy history for the same diagnosis, illness or injury: Yes Years ago for my arm nothing for my leg    Prior Level of Function  Transfers: Assistive equipment  Ambulation: Assistive equipment  ADL: Assistive equipment  IADL: Driving, Finances, Housekeeping, Laundry, Meal preparation    Living Environment  Social support: Alone   Type of home: Apartment/condo   Stairs to enter the home: No       Ramp: No   Stairs inside the home: No       Help at home: Home management tasks (cooking, cleaning); Emergency call system; Other  Equipment owned: Walker with wheels; Grab bars     Employment: No    Hobbies/Interests: Swimming, shopping and spending time with family    Patient goals for therapy: All things    Pain assessment:  Pt has varous points pain. Pt needs R SINAN and reports torn ligaments in L ankle. Pt has back pain as well.     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  Time able to stand: I can stand with  a walker but it is extremely painful.  Sensation problems in hands/feet: No    Edema etiology: Chronic Venous Insufficiency, obesity; factor V leiden/clotting dosorder; inactivity    FUNCTIONAL SCALES  Lower Extremity Functional Scale (score out of 80). A lower score indicates greater impairment:    Shoulder Pain and Disability Index (score out of 100).  A higher score indicates greater impairment:      Cognitive Status Examination  Orientation: Oriented to person, place and time   Level of Consciousness: Alert  Follows Commands and Answers Questions: 100% of the time  Personal Safety and Judgement: Intact  Memory:  NT'd    EDEMA  Skin Condition: Dryness, Pitting  Scar: Yes  Capillary Refill: Symmetrical  Radial Pulse:  NT'd  Dorsal Pedal Pulse:  NT'd  Stemmer Sign: +  Ulceration: No    GIRTH MEASUREMENTS: Refer to separate girth measurement flowsheet.     VOLUME UE  Right UE (mL)    Left UE (mL)    UE Volume Comparison LUE volume greater than RUE volume   % Difference    VOLUME LE  Right LE (mL)    Left LE (mL)    LE Volume Comparison LLE volume greater than RLE volume   % Difference    Head/Neck Volume     Trunk Volume    Genital Volume       RANGE OF MOTION: LE ROM WFL  UE ROM WFL  STRENGTH: UE Strength WFL, LE Strength WFL  POSTURE: WFL  PALPATION:   ACTIVITIES OF DAILY LIVING: Ind-Mod I  BED MOBILITY: WFL  TRANSFERS: WFL  GAIT/LOCOMOTION: Mod I-4ww  BALANCE:  pt has falls history and weakness on feet  SENSATION:  pt reports neuopathy symptoms  VASCULAR:  factor V leiden  COORDINATION: WFL  MUSCLE TONE: WFL    Assessment & Plan   CLINICAL IMPRESSIONS  Medical Diagnosis: lymphedema    Treatment Diagnosis: lymphedema    Impression/Assessment:  Pt presents with BLE and LUE lymphedema in setting of obesity, CVI, and unknown origin L sided increases in swelling and fluid retention. Pt has factor v leiden but imaging shows no s/s DVT or clotting with current issues.    Clinical Decision Making  (Complexity):  Assessment of Occupational Performance: 1-3 Performance Deficits  Occupational Performance Limitations: dressing, functional mobility, and infection risk  Clinical Decision Making (Complexity): Moderate complexity    PLAN OF CARE  Treatment Interventions:  Interventions: Self-Care/Home Management, Therapeutic Exercise, Manual Therapy    Long Term Goals   OT Goal 1  Goal Identifier: Ed  Goal Description: Pt will report understanding s/s lymphedema vs s/s cellulitis and treatment options for both for Ind building with home management BLE/trunkal/LUE lymphedema  Rationale: In order to maximize safety and independence with ADL/IADLs;In order to maximize safety and independence with functional transfers and functional mobility within the home or community  Target Date: 07/24/24      Frequency of Treatment: 3x/wk  Duration of Treatment: 12 weeks with ability to taper as needed     Recommended Referrals to Other Professionals:  none at this time  Education Assessment: Learner/Method: Patient;Listening;Demonstration     Risks and benefits of evaluation/treatment have been explained.   Patient/Family/caregiver agrees with Plan of Care.     Evaluation Time:    OT Eval, Low Complexity Minutes (59938): 25       Signing Clinician: Shailesh FINNEY [facility]                                                                                   OUTPATIENT OCCUPATIONAL THERAPY      PLAN OF TREATMENT FOR OUTPATIENT REHABILITATION   Patient's Last Name, First Name, Bere Mackay YOB: 1955   Provider's Name   [facility]   Medical Record No.  [id number]     Onset Date: 04/11/24 (chronic for years) Start of Care Date: 05/01/24     Medical Diagnosis:  lymphedema      OT Treatment Diagnosis:  lymphedema Plan of Treatment  Frequency/Duration:3x/wk/12 weeks with ability to taper as needed    Certification date from 05/01/24   To  07/24/24        See note for plan of treatment details and functional goals     Shailesh Napoles                         I CERTIFY THE NEED FOR THESE SERVICES FURNISHED UNDER        THIS PLAN OF TREATMENT AND WHILE UNDER MY CARE     (Physician attestation of this document indicates review and certification of the therapy plan).              Referring Provider:  Shelton Richter    Initial Assessment  See Epic Evaluation- 05/01/24

## 2024-05-02 ENCOUNTER — ANTICOAGULATION THERAPY VISIT (OUTPATIENT)
Dept: ANTICOAGULATION | Facility: CLINIC | Age: 69
End: 2024-05-02

## 2024-05-02 ENCOUNTER — HOSPITAL ENCOUNTER (OUTPATIENT)
Dept: CARDIOLOGY | Facility: CLINIC | Age: 69
Discharge: HOME | End: 2024-05-02
Attending: INTERNAL MEDICINE | Admitting: INTERNAL MEDICINE
Payer: COMMERCIAL

## 2024-05-02 DIAGNOSIS — R60.9 ASYMMETRIC EDEMA OF BOTH LOWER EXTREMITIES: ICD-10-CM

## 2024-05-02 DIAGNOSIS — I87.2 VENOUS (PERIPHERAL) INSUFFICIENCY: ICD-10-CM

## 2024-05-02 DIAGNOSIS — D68.51 FACTOR 5 LEIDEN MUTATION, HETEROZYGOUS (H): Primary | ICD-10-CM

## 2024-05-02 DIAGNOSIS — Z86.711 HISTORY OF PULMONARY EMBOLISM: ICD-10-CM

## 2024-05-02 LAB
INR HOME MONITORING: 2.1 (ref 2–3)
LVEF ECHO: NORMAL

## 2024-05-02 PROCEDURE — 255N000002 HC RX 255 OP 636: Performed by: INTERNAL MEDICINE

## 2024-05-02 PROCEDURE — 93306 TTE W/DOPPLER COMPLETE: CPT | Mod: 26 | Performed by: INTERNAL MEDICINE

## 2024-05-02 PROCEDURE — C8929 TTE W OR WO FOL WCON,DOPPLER: HCPCS

## 2024-05-02 RX ADMIN — HUMAN ALBUMIN MICROSPHERES AND PERFLUTREN 2 ML: 10; .22 INJECTION, SOLUTION INTRAVENOUS at 14:12

## 2024-05-02 NOTE — PROGRESS NOTES
ANTICOAGULATION MANAGEMENT     Bere Machuca 68 year old female is on warfarin with therapeutic INR result. (Goal INR 2.0-3.0)    Recent labs: (last 7 days)     05/02/24  0000   INR 2.1       ASSESSMENT     Source(s): Chart Review and Patient/Caregiver Call     Warfarin doses taken: Warfarin taken as instructed  Diet: No new diet changes identified  Medication/supplement changes:  mounjaro will start on Sunday 5/5 can increase or decrease  New illness, injury, or hospitalization: No  Signs or symptoms of bleeding or clotting: No  Previous result: Therapeutic last visit; previously outside of goal range  Additional findings: None       PLAN     Recommended plan for no diet, medication or health factor changes affecting INR     Dosing Instructions: Continue your current warfarin dose with next INR in 1 week       Summary  As of 5/2/2024      Full warfarin instructions:  5 mg every Sun; 2.5 mg all other days   Next INR check:  5/9/2024               Telephone call with Darline who verbalizes understanding and agrees to plan  Sent [employer] message with dosing and follow up instructions    Patient to recheck with home meter    Education provided:   Interaction IS anticipated between warfarin and Mounjaro    Plan made per [facility] anticoagulation protocol    Mary Sanchez, RN  Anticoagulation Clinic  5/2/2024    _______________________________________________________________________     Anticoagulation Episode Summary       Current INR goal:  2.0-3.0   TTR:  61.2% (1 y)   Target end date:  Indefinite   Send INR reminders to:  [facility]    Indications    Factor 5 Leiden mutation  heterozygous/ INR 2-3 [D68.51]  History of pulmonary embolism [Z86.711]             Comments:  managed by rupesh  [employer] home meter             Anticoagulation Care Providers       Provider Role Specialty Phone number    Uyen Mcintyre APRN CNP Referring Internal Medicine [phone]    Jennifer Shea MD Referring  Internal Medicine [phone]

## 2024-05-03 ENCOUNTER — ANCILLARY PROCEDURE (OUTPATIENT)
Dept: ULTRASOUND IMAGING | Facility: CLINIC | Age: 69
End: 2024-05-03
Payer: COMMERCIAL

## 2024-05-03 DIAGNOSIS — R60.9 SWELLING: ICD-10-CM

## 2024-05-03 DIAGNOSIS — I87.2 VENOUS (PERIPHERAL) INSUFFICIENCY: ICD-10-CM

## 2024-05-03 PROCEDURE — 93970 EXTREMITY STUDY: CPT | Performed by: SURGERY

## 2024-05-06 ENCOUNTER — MYC REFILL (OUTPATIENT)
Dept: INTERNAL MEDICINE | Facility: CLINIC | Age: 69
End: 2024-05-06
Payer: COMMERCIAL

## 2024-05-08 RX ORDER — PREGABALIN 200 MG/1
200 CAPSULE ORAL 2 TIMES DAILY
OUTPATIENT
Start: 2024-05-08

## 2024-05-12 LAB — INR HOME MONITORING: 1.9 (ref 2–3)

## 2024-05-13 ENCOUNTER — ANTICOAGULATION THERAPY VISIT (OUTPATIENT)
Dept: ANTICOAGULATION | Facility: CLINIC | Age: 69
End: 2024-05-13

## 2024-05-13 DIAGNOSIS — D68.51 FACTOR 5 LEIDEN MUTATION, HETEROZYGOUS (H): Primary | ICD-10-CM

## 2024-05-13 DIAGNOSIS — Z86.711 HISTORY OF PULMONARY EMBOLISM: ICD-10-CM

## 2024-05-13 NOTE — PROGRESS NOTES
ANTICOAGULATION MANAGEMENT     Bere Machuca 68 year old female is on warfarin with subtherapeutic INR result. (Goal INR 2.0-3.0)    Recent labs: (last 7 days)     05/12/24  0000   INR 1.9*       ASSESSMENT     Source(s): Chart Review and Patient/Caregiver Call     Warfarin doses taken: Warfarin taken as instructed  Diet: No new diet changes identified  Medication/supplement changes: None noted  New illness, injury, or hospitalization: No  Signs or symptoms of bleeding or clotting: No  Previous result: Therapeutic last 2(+) visits  Additional findings:  will settle into diet on new mounjaro       PLAN     Recommended plan for ongoing change(s) affecting INR     Dosing Instructions: Continue your current warfarin dose with next INR in 2 weeks       Summary  As of 5/13/2024      Full warfarin instructions:  5 mg every Sun; 2.5 mg all other days   Next INR check:  5/30/2024               Telephone call with Darline who verbalizes understanding and agrees to plan    Patient to recheck with home meter    Education provided:   Please call back if any changes to your diet, medications or how you've been taking warfarin    Plan made per [facility] anticoagulation protocol    Artemio Bui RN  Anticoagulation Clinic  5/13/2024    _______________________________________________________________________     Anticoagulation Episode Summary       Current INR goal:  2.0-3.0   TTR:  59.7% (1 y)   Target end date:  Indefinite   Send INR reminders to:  [facility]    Indications    Factor 5 Leiden mutation  heterozygous/ INR 2-3 [D68.51]  History of pulmonary embolism [Z86.711]             Comments:  managed by rupesh Ortega home meter             Anticoagulation Care Providers       Provider Role Specialty Phone number    Uyen Mcintyre APRN CNP Referring Internal Medicine [phone]    Jennifer Shea MD Referring Internal Medicine [phone]

## 2024-05-27 DIAGNOSIS — E11.59 CONTROLLED TYPE 2 DIABETES MELLITUS WITH OTHER CIRCULATORY COMPLICATION, WITHOUT LONG-TERM CURRENT USE OF INSULIN (H): ICD-10-CM

## 2024-05-27 DIAGNOSIS — E66.01 MORBID OBESITY WITH BMI OF 60.0-69.9, ADULT (H): ICD-10-CM

## 2024-05-27 LAB — INR HOME MONITORING: 3.5 (ref 2–3)

## 2024-05-28 ENCOUNTER — ANTICOAGULATION THERAPY VISIT (OUTPATIENT)
Dept: ANTICOAGULATION | Facility: CLINIC | Age: 69
End: 2024-05-28
Payer: COMMERCIAL

## 2024-05-28 DIAGNOSIS — Z86.711 HISTORY OF PULMONARY EMBOLISM: ICD-10-CM

## 2024-05-28 DIAGNOSIS — D68.51 FACTOR 5 LEIDEN MUTATION, HETEROZYGOUS (H): Primary | ICD-10-CM

## 2024-05-28 RX ORDER — TIRZEPATIDE 2.5 MG/.5ML
2.5 INJECTION, SOLUTION SUBCUTANEOUS
Qty: 2 ML | Refills: 1 | Status: SHIPPED | OUTPATIENT
Start: 2024-05-28 | End: 2024-07-16

## 2024-05-28 NOTE — PROGRESS NOTES
Medication Therapy Management (MTM) Encounter    ASSESSMENT:                            Medication Adherence/Access: See below for considerations    Diabetes/Obesity:   Patient is meeting A1c goal of < 7% but not meeting weight goal, overall lost 70 lbs with Recommend increasing Mounjaro to 5 mg weekly.  Discussed constipation is a known side effect of Mounjaro, so that is likely why this has worsened. Recommended increasing the frequency of as needed constipation meds, since she was already taking constipation meds while on narcotics.     Hypertension:   Stable. Patient is meeting blood pressure goal of < 130/80mmHg.     Hyperlipidemia:   Stable.     Pulmonary embolism history:  Stable, continues to work with Dr. Richter     COPD:   stable     Depression  stable     Chronic Pain: Unimproved, working with pain clinic and will get get surgery once she is able to lose more weight.      Supplements:   See above on fish oil, stopping.    PLAN:                            Try taking the Miralax daily then adjust as needed. If this doesn't work try adding on a stimulant laxative like Senna.  Increase Mounjaro to 5 mg a week. Sent this to the [facility].    Follow-up: Return in about 3 months (around 8/28/2024) for Medication Therapy Management, Phone Visit.    SUBJECTIVE/OBJECTIVE:                          Darline Machuca is a 68 year old female contacted via secure video for a follow-up visit.   Video visit stopped working so switched to phone visit.    Reason for visit: med review.    Allergies/ADRs: Reviewed in chart  Past Medical History: Reviewed in chart  Tobacco: She reports that she quit smoking about 9 years ago. Her smoking use included cigarettes and vaping device. She started smoking about 44 years ago. She has a 17.5 pack-year smoking history. She has never used smokeless tobacco.  Alcohol: none  Caffeine: 1 cup per day  Activity: unable to do any activity due to needing hip replacement, on  waker    Medication Adherence/Access: Patient uses pill box(es).  Patient takes medications 2 time(s) per day.   Per patient, misses medication 0 times per week.   Medication barriers: cost.   The patient fills medications at [facility]: NO, fills medications at [facility].    Diabetes   /Obesity:  Mounjaro 2.5 mg a week x4 doses completed  Reports she has noticed she is more constipated since starting using docusate and has thought about using Mirlax.  She needs to lose weight so she can get surgery to help her pain.  She lost 70 lbs and has 70lbs more to go.  Mounjaro was expensive for her now in the donut hole about $300  Current diabetes symptoms: none  Nutrition/Eating Habits: did not discuss in detail.    Exercise/Activity: limited.   Medications Tried/Failed:  Ozempic: terrible stomach aches up to 0.5 of 1mg  Topiramate-allergy list  Bupropion-anxiety     Eye exam in the last 12 months? No  Foot exam: due  Lab Results   Component Value Date    A1C 5.1 03/29/2024    A1C 5.4 01/11/2023    A1C 5.9 01/04/2022    A1C 5.5 06/11/2021       Hypertension   Losartan 50mg, 1/2 tablet, every day, recently decreased  Patient reports no current medication side effects  Patient does not self-monitor blood pressure.  Yesterday at infusion 112/68     BP Readings from Last 3 Encounters:   04/11/24 123/81   02/16/24 (!) 147/79   06/09/23 131/71       Hyperlipidemia   rosuvastatin 20 mg daily  Patient reports no new myalgias since on medication.     Recent Labs   Lab Test 01/11/23  1529 01/04/22  1524 08/10/18  1549 03/29/18  1515   CHOL 250* 197 202* 176   HDL 69 76 49* 76   * 101* 121* 76   TRIG 115 99 199* 139   CHOLHDLRATIO  --   --  4.1 2.3       COPD   Trelegy 100-62.5-25mcg 1 puff every day  Albuterol 2 puffs as needed --not using lately  Uses CPAP  Reports well controlled with Trelegy she is very happy.  Patient rinses their mouth after using steroid inhaler.    Sees [facility]  Patient reports no  current medication side effects.    Patient reports the following symptoms: none.  Blood eosinophils > 300 cells/ L?:  200        Mental Health   Depression  Escitalopram 20mg once daily.   Patient reports no current medication side effects.  Patient reports symptoms are stable and feels she needs.        Supplements   Lymphatic formulary 2 capsules per day  Women over 50 multivitamin plus fish oil 2 each every day   B complex-C every day   No reported issues at this time.        Pulmonary embolism history:  Warfarin 5mg on Sunday and 2.5mg every other day - follows with [facility]  Factor 5 Leiden mutation, INR goal 2-3  Reports INR has been all over lately because she feels her diet has been a little unpredictable.  Side effects:  some bruising, no bleeding  History of superior vena cava syndrome, working with Dr. Richter in Vascular  Lab Results   Component Value Date    INR 3.5 05/27/2024    INR 1.9 05/12/2024    INR 2.1 05/02/2024    INR 3.0 04/19/2024       Chronic pain:    Acetaminophen 500mg, 2 tablets as needed   Tizanidine 4mg twice daily as needed --takes to sleep at night and sometimes 1/2 tablet in the day  Suboxone 12-3 twice daily   Lyrica 200mg twice daily   Pain type/location: history of spinal stenosis, hip placement, fibromyalgia  Pain is described as  myalgia/pain all over her body .  Follws with [facility]  Patient reports the following side effects:  dry mouth and fatigue from tizanidine, has swelling on one leg and arm and working with Dr. Richter on this, working on ECHO this week for the swelling.  Working with [facility], last visit 1/30/24, hip replacement, would like BMI of 45 or less, 278 lb target weight      Today's Vitals: There were no vitals taken for this visit.  ----------------      I spent 25 minutes with this patient today. All changes were made via collaborative practice agreement with Jennifer Shea MD. A copy of the visit note was provided to the  patient's provider(s).    A summary of these recommendations was sent via [employer].    Mikayla Sosa, PharmD  Medication Therapy Management Pharmacist  Voicemail: [phone]      Telemedicine Visit Details  Type of service:  Video Conference via [employer]  Start Time:  9:45 AM  End Time:  10:10 AM     Medication Therapy Recommendations  Controlled type 2 diabetes mellitus with circulatory disorder, without long-term current use of insulin (H)    Current Medication: polyethylene glycol (MIRALAX) 17 GM/Dose powder   Rationale: Synergistic therapy - Needs additional medication therapy - Indication   Recommendation: Start Medication - senna   Status: Accepted - no CPA Needed          Current Medication: tirzepatide (MOUNJARO) 2.5 MG/0.5ML pen (Discontinued)   Rationale: Dose too low - Dosage too low - Effectiveness   Recommendation: Increase Dose   Status: Accepted per CPA

## 2024-05-28 NOTE — PROGRESS NOTES
ANTICOAGULATION MANAGEMENT     Bere Machuca 68 year old female is on warfarin with supratherapeutic INR result. (Goal INR 2.0-3.0)    Recent labs: (last 7 days)     05/27/24  0000   INR 3.5*       ASSESSMENT     Source(s): Chart Review and Patient/Caregiver Call     Warfarin doses taken: Warfarin taken as instructed  Diet: Decreased greens/vitamin K in diet; ongoing change, patient only orders grocerices once weekly so she   Medication/supplement changes: None noted  New illness, injury, or hospitalization: No  Signs or symptoms of bleeding or clotting: No  Previous result: Subtherapeutic  Additional findings: None       PLAN     Recommended plan for temporary change(s) affecting INR     Dosing Instructions: Continue your current warfarin dose with next INR in 1 week       Summary  As of 5/28/2024      Full warfarin instructions:  5 mg every Sun; 2.5 mg all other days   Next INR check:  6/3/2024               Telephone call with Darline who verbalizes understanding and agrees to plan    Patient to recheck with home meter    Education provided:   Dietary considerations: importance of consistent vitamin K intake    Plan made per [facility] anticoagulation protocol    Bailee Delgado, RN  Anticoagulation Clinic  5/28/2024    _______________________________________________________________________     Anticoagulation Episode Summary       Current INR goal:  2.0-3.0   TTR:  58.2% (1 y)   Target end date:  Indefinite   Send INR reminders to:  [facility]    Indications    Factor 5 Leiden mutation  heterozygous/ INR 2-3 [D68.51]  History of pulmonary embolism [Z86.711]             Comments:  managed by rupesh  [employer] home meter             Anticoagulation Care Providers       Provider Role Specialty Phone number    Uyen Mcintyre APRN CNP Referring Internal Medicine [phone]    Jennifer Shea MD Referring Internal Medicine [phone]

## 2024-05-30 ENCOUNTER — VIRTUAL VISIT (OUTPATIENT)
Dept: PHARMACY | Facility: CLINIC | Age: 69
End: 2024-05-30
Attending: INTERNAL MEDICINE
Payer: COMMERCIAL

## 2024-05-30 DIAGNOSIS — F33.1 MAJOR DEPRESSIVE DISORDER, RECURRENT EPISODE, MODERATE (H): ICD-10-CM

## 2024-05-30 DIAGNOSIS — E66.01 MORBID OBESITY WITH BMI OF 60.0-69.9, ADULT (H): ICD-10-CM

## 2024-05-30 DIAGNOSIS — G89.29 OTHER CHRONIC PAIN: ICD-10-CM

## 2024-05-30 DIAGNOSIS — E11.59 CONTROLLED TYPE 2 DIABETES MELLITUS WITH OTHER CIRCULATORY COMPLICATION, WITHOUT LONG-TERM CURRENT USE OF INSULIN (H): Primary | ICD-10-CM

## 2024-05-30 DIAGNOSIS — Z86.711 HISTORY OF PULMONARY EMBOLISM: ICD-10-CM

## 2024-05-30 DIAGNOSIS — I10 BENIGN ESSENTIAL HYPERTENSION: ICD-10-CM

## 2024-05-30 DIAGNOSIS — Z78.9 TAKES DIETARY SUPPLEMENTS: ICD-10-CM

## 2024-05-30 DIAGNOSIS — J42 CHRONIC BRONCHITIS, UNSPECIFIED CHRONIC BRONCHITIS TYPE (H): ICD-10-CM

## 2024-05-30 DIAGNOSIS — E78.5 HYPERLIPIDEMIA LDL GOAL <70: ICD-10-CM

## 2024-05-30 PROCEDURE — 99207 PR NO CHARGE LOS: CPT | Mod: 95 | Performed by: PHARMACIST

## 2024-05-30 RX ORDER — AMOXICILLIN 250 MG
1 CAPSULE ORAL DAILY
COMMUNITY
End: 2024-08-28

## 2024-05-30 NOTE — PATIENT INSTRUCTIONS
"Recommendations from today's MTM visit:                                                       Try taking the Miralax daily then adjust as needed. If this doesn't work try adding on a stimulant laxative like Senna.  Increase Mounjaro to 5 mg a week. Sent this to the [facility].    Follow-up: Return in about 3 months (around 8/28/2024) for Medication Therapy Management, Phone Visit.    It was great speaking with you today.  I value your experience and would be very thankful for your time in providing feedback in our clinic survey. In the next few days, you may receive an email or text message from [employer] with a link to a survey related to your  clinical pharmacist.\"     To schedule another MTM appointment, please call the clinic directly or you may call the MTM scheduling line at [phone] or toll-free at [phone].     My Clinical Pharmacist's contact information:                                                      Please feel free to contact me with any questions or concerns you have.      Mikayla Sosa, PharmD  Medication Therapy Management Pharmacist  Voicemail: [phone]     "

## 2024-06-06 ENCOUNTER — LAB (OUTPATIENT)
Dept: LAB | Facility: CLINIC | Age: 69
End: 2024-06-06
Payer: COMMERCIAL

## 2024-06-06 ENCOUNTER — OFFICE VISIT (OUTPATIENT)
Dept: OTHER | Facility: CLINIC | Age: 69
End: 2024-06-06
Attending: INTERNAL MEDICINE
Payer: COMMERCIAL

## 2024-06-06 VITALS
SYSTOLIC BLOOD PRESSURE: 115 MMHG | WEIGHT: 293 LBS | HEART RATE: 59 BPM | BODY MASS INDEX: 57.08 KG/M2 | OXYGEN SATURATION: 94 % | DIASTOLIC BLOOD PRESSURE: 61 MMHG

## 2024-06-06 DIAGNOSIS — I87.1 SUPERIOR VENA CAVA SYNDROME: ICD-10-CM

## 2024-06-06 DIAGNOSIS — R94.6 BORDERLINE ABNORMAL TFTS: ICD-10-CM

## 2024-06-06 DIAGNOSIS — F11.20 OPIOID DEPENDENCE (H): ICD-10-CM

## 2024-06-06 DIAGNOSIS — I87.2 VENOUS (PERIPHERAL) INSUFFICIENCY: ICD-10-CM

## 2024-06-06 DIAGNOSIS — R60.9 ASYMMETRIC EDEMA OF BOTH LOWER EXTREMITIES: ICD-10-CM

## 2024-06-06 DIAGNOSIS — E78.5 HYPERLIPIDEMIA LDL GOAL <70: ICD-10-CM

## 2024-06-06 DIAGNOSIS — R09.89 OTHER SPECIFIED SYMPTOMS AND SIGNS INVOLVING THE CIRCULATORY AND RESPIRATORY SYSTEMS: ICD-10-CM

## 2024-06-06 DIAGNOSIS — E11.9 DM TYPE 2, GOAL HBA1C < 7% (H): ICD-10-CM

## 2024-06-06 DIAGNOSIS — I89.0 LYMPHEDEMA: ICD-10-CM

## 2024-06-06 DIAGNOSIS — E27.8 ADRENAL MASS (H): ICD-10-CM

## 2024-06-06 DIAGNOSIS — Q74.0 ASYMMETRICAL ARMS: ICD-10-CM

## 2024-06-06 DIAGNOSIS — I82.622 ACUTE DEEP VEIN THROMBOSIS (DVT) OF OTHER VEIN OF LEFT UPPER EXTREMITY (H): ICD-10-CM

## 2024-06-06 PROCEDURE — 80307 DRUG TEST PRSMV CHEM ANLYZR: CPT

## 2024-06-06 PROCEDURE — G2211 COMPLEX E/M VISIT ADD ON: HCPCS | Performed by: INTERNAL MEDICINE

## 2024-06-06 PROCEDURE — G0463 HOSPITAL OUTPT CLINIC VISIT: HCPCS | Performed by: INTERNAL MEDICINE

## 2024-06-06 PROCEDURE — 99214 OFFICE O/P EST MOD 30 MIN: CPT | Performed by: INTERNAL MEDICINE

## 2024-06-06 NOTE — PROGRESS NOTES
INITIAL VASCULAR MEDICAL ASSESSMENT  REFERRAL SOURCE: Dr. Nelson  REASON FOR CONSULT: Patient states she has   left arm swelling that now goes down to her leg; Patient also mentioned a   subclavian artery blockage.         A/P:     (I87.1) H/O superior vena cava syndrome  (primary encounter diagnosis)     Comment: She is very complicated. CTA chest revealed no new mass within the chest, with a similar calcified structure at the superior cavoatrial junction which results in narrowing of the lower SVC. Nonopacification and  atretic appearance of the left brachiocephalic and subclavian veins is compatible with history of chronic thrombosis.  We could obtain a dedicated CT venogram of the chest to better assess the venous system so as to ascertain if there is an interventional target. However, in a thrombosis which is chronically present for years, this is unlikely to be the case. As such, CTV is unlikely to alter our management as her limb is not in extremis, and is asymmetrical due to venous engorgement form a chronic Lt SCV DVT which can not be thrombectomized at this point in time.      Plan: I advised no further action on the LUE.                 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     Comment: She is very complicated. CTA, arterial and venous UE duplex revealed the known Lt SCV and BCV thrombosis which itself PAC induced and not due to TOS.      Plan: As above.                        (R60.9) Asymmetric lower extremities      Comment: She is very complicated.  CTV abdomen pelvis revealed no iliac extrinsic venous compression and no pelvic outflow obstruction. No DVT or PAD seen on venous and arterial duplex of BLEs. In other words, she has no central or pelvic cause for the below. Her exam is consistent with BLE venous insufficiency with secondary lymphedema.     BLE venous comp revealed no BLE DVT or SVT. In the RLE she had incompetence of the CFV, FV as well as  the distal calf SSV, but this was not clinically significant. She had no incompetent BLE perforators. In the LLE she had incompetence of the CFV, poximal to mid calf SSV, as well as the AASV which is the source of varicosities in the thigh and lateral leg.    Plan: Check BLE venous comp. Send to OT for MLD, Ace wraps. She is unlikely with her pannus and solitary living situation to be able to use compression hosiery. We may need to use lymphedema pumps. She was told to use moisturizing lotion. She was told to use Lymphatic formula.                 (Q74.0) Asymmetrical arms     Comment: She is very complicated.  CTA chest revealed no new mass within the chest, with a similar calcified structure at the superior cavoatrial junction which results in narrowing of the lower SVC. Nonopacification and  atretic appearance of the left brachiocephalic and subclavian veins is compatible with history of chronic thrombosis which itself is PAC induced and not due to TOS. . This is the reason for the L**UE venous engorgement. I explained to the patietn we are unlikely this remote form the original event to be able to recanalize the lt SCV     Plan:  Obtain a dedicated CT venogram of the chest could better assess the venous system so as to ascertain if there is an interventional target. However, in a thrombosis which is chronically present for years, this is unlikely to be the case. As such, CTV is unlikely to alter our management as her limb is not in extremis, and is asymmetrical due to venous engorgement form a chronic Lt SCV DVT which can not be thrombectomized at this point in time.                         (E11.9) DM type 2, goal HbA1c < 7% (H)     Comment: Glycemically well controlled.     Plan: Further glycemic mgmt per her PCP                (E78.5) Hyperlipidemia LDL goal <70     Comment:  Advanced lipid testing of 3/29/2024 revealed LDL-C of 65, LDL-P of 616, cardiac CRP of 6.49, and Lp(a) of 6.       Plan: No change to  Crestor 20 mg daily, further lipid mgmt per her PCP.              (E27.8) Adrenal mass (H24)  Comment: slightly larger than previously.     Plan: I advised she address this non vascular issue with her PCP.         (I87.2) Venous (peripheral) insufficiency     Comment: Echocardiogram Complete revealed normal LVEF as well as RVEF. She had no valvular stenoses, no evidence of pulmonary htn.      Plan: Lymphedema Therapy   was given previously. She saw them on 5/1/24 but has not schedueld f/u visits. She was advised to do so.                       (I89.0) Lymphedema     Plan: Lymphedema Therapy  Referral. As above.                       The longitudinal care of plan for Darline was addressed during this visit. Due to added complexity of care, we will continue to support Bere Machuca  and the subsequent management of these conditions and with ongoing continuity of care for these conditions.         48 minutes total medical care. Extended time due to need to review multiple imaging studies in this very medically complicated , morbidly obese patietn with interelated comorbidities.              HPI: Bere Machuca is a 68 year old female with a h/o htn, HLD, DM2, very morbid obesity (Body mass index is 55.2 kg/m . ) who is on Monjauro to help with weight loss. She also has UC which is well contorlled, and RA for which she is steroid dependent, uses MTX and monoclonal Ab infusions. 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 As separte procedures.     However, she has a complex medical h/o a Lt SCV PAC placement in 1996 due to need fo prolonged IV abx due to colonic perforation. She then had this removed. In 2012 however she presented to R with left arm swelling and facial puffiness. She was ultimately found to have SVC syndrome when her SVC  was thrombosed. She underwent CD lysis and mechanical thrombectomy with satisfactory results.   She had a HC w/u and was discovered to be a Leiden heterozygote. She was ACd with warfarin and has been on this chronically since that time. She in 2016 presented to Dr. Falcon with c/o LUE swelling. Imaging as below was undertaken and revealed her Lt SCV was not thrombosed but that she had a cavoatrial calcified mass . She was not felt to be symptomatic from this and was therefore not intervened upon. She was referred to LE therapy and found no significant improvement with the above.      The patient presents today to address her orthopedist's concerns that she has a dramatically increased asymmetrical size to her LUE and LLE compared to the right. Prior to undertaking orthopedic surgery for the above, they wanted to know the status of her venous anatomy.      Review Of Systems  Skin: negative  Eyes: negative  Ears/Nose/Throat: negative  Respiratory: No shortness of breath, dyspnea on exertion, cough, or hemoptysis  Cardiovascular: negative  Gastrointestinal: negative  Genitourinary: negative  Musculoskeletal: as above, orthopaedically limited, needs a walker.   Neurologic: negative  Psychiatric: negative  Hematologic/Lymphatic/Immunologic: negative  Endocrine: negative          PAST MEDICAL HISTORY:                  Past Medical History:   Diagnosis Date    Anxiety     Arthritis     Clotting disorder (H24)     COPD (chronic obstructive pulmonary disease) (H)     Depressive disorder     Diabetes (H)     Hypertension     Lymphedema of arm 3/8/2013    Morbidly obese (H) 9/25/2009    Pulmonary embolism (H)     Sleep apnea     Substance abuse (H)     Superior vena cava syndrome        PAST SURGICAL HISTORY:                  Past Surgical History:   Procedure Laterality Date    BIOPSY OF SKIN LESION  multiple    keloids    HC REMOVAL OF OVARY/TUBE(S)  1992    Salpingo-Oophorectomy, Unilateral    HC REMOVE TONSILS/ADENOIDS,<11 Y/O  age 14    T & A <14y.o.    HERNIA REPAIR      INSERTION OF ACCESS PORT  1-4-96    sepsis-  removed after hospital stay    [facility] ANESTH,REPAIR LO ABD HERNIA NOS  multiple/ 2006 last    panniculus    [facility] EXPLORATORY OF ABDOMEN  1996    [facility] PLASTIC SURGERY, NECK  age 4    Z REMOVAL COLON/ILEOSTOMY      reastamosis    [facility] TOTAL ABDOM HYSTERECTOMY  1992    Hysterectomy, Total Abdominal    [facility] REMOVE ABDOMINAL WALL LESION  2006    abscess       CURRENT MEDICATIONS:                  Current Outpatient Medications   Medication Sig Dispense Refill    acetaminophen (TYLENOL) 500 MG tablet Take 1,000 mg by mouth every 8 hours as needed for mild pain      albuterol (PROAIR HFA/PROVENTIL HFA/VENTOLIN HFA) 108 (90 Base) MCG/ACT inhaler INHALE 2 PUFFS INTO THE LUNGS EVERY 4 HOURS AS NEEDED 8.5 g 5    B Complex-C (SUPER B COMPLEX PO) Take 1 tablet by mouth daily      Buprenorphine HCl-Naloxone HCl (SUBOXONE) 12-3 MG FILM per film Place 1 Film under the tongue 2 times daily      escitalopram (LEXAPRO) 20 MG tablet TAKE 1 TABLET BY MOUTH EVERY DAY 90 tablet 1    Fluticasone-Umeclidin-Vilant (TRELEGY ELLIPTA) 100-62.5-25 MCG/ACT oral inhaler INHALE 1 PUFF INTO THE LUNGS DAILY (RINSE MOUTH AND SPIT AFTER USING) 60 each 11    folic acid (FOLVITE) 1 MG tablet Take 2 mg by mouth daily  0    Golimumab (SIMPONI ARIA IV) Take as directed every 8 weeks via infusion center per rheumatology      HERBALS Take 2 each by mouth daily Lymphatic formula: vitamin D 2000 units, Selenium 200mcg, micronized purified flavonoid fraction 500mg, diosmin 450mg, hesperidin 50mg      losartan (COZAAR) 50 MG tablet Take 0.5 tablets (25 mg) by mouth daily      mesalamine (LIALDA) 1.2 g EC tablet Take 1,200 mg by mouth daily (with breakfast) 4 pills a day      methotrexate 50 MG/2ML injection INJECT 0.7ML SUB-EVERY EVERY WEEK      MOUNJARO 2.5 MG/0.5ML pen INJECT 2.5 MG SUBCUTANEOUS EVERY 7 DAYS (TO RESTART WHEN AVAILABLE IN STOCK) 2 mL 1    Multiple Vitamins-Minerals (WOMENS 50+ MULTI VITAMIN/MIN PO) Take 2 tablets by mouth daily (With fish oil)       polyethylene glycol (MIRALAX) 17 GM/Dose powder Take 1 Capful by mouth daily as needed for constipation      predniSONE (DELTASONE) 5 MG tablet Take 5 mg by mouth daily      Pregabalin (LYRICA) 200 MG capsule Take 200 mg by mouth 2 times daily      rosuvastatin (CRESTOR) 20 MG tablet TAKE 1 TABLET BY MOUTH EVERY DAY 90 tablet 0    senna-docusate (SENOKOT-S/PERICOLACE) 8.6-50 MG tablet Take 1 tablet by mouth daily      sulfaSALAzine (AZULFIDINE) 500 MG tablet Take 1 tablet (500 mg) by mouth 2 times daily      tirzepatide (MOUNJARO) 5 MG/0.5ML pen Inject 5 mg Subcutaneous every 7 days 2 mL 2    tiZANidine (ZANAFLEX) 4 MG tablet TAKE 1 TABLET BY MOUTH TWICE A DAY AS NEEDED 180 tablet 1    warfarin ANTICOAGULANT (COUMADIN) 5 MG tablet TAKE 1/2 TABLET (2.5 MG) BY MOUTH ON FRI AND 1 TAB (5 MG) ALL OTHER DAYS OR AS DIRECTED ON INR 90 tablet 1       ALLERGIES:                  Allergies   Allergen Reactions    Metformin Diarrhea    Aspirin      PN: LW Reaction: sensitive/stomach    Tape [Adhesive Tape] Itching    Topiramate     Wellbutrin [Bupropion] Anxiety       SOCIAL HISTORY:                  Social History     Socioeconomic History    Marital status:      Spouse name: Not on file    Number of children: 0    Years of education: 12    Highest education level: Not on file   Occupational History     Employer: [employer]   Tobacco Use    Smoking status: Former     Current packs/day: 0.00     Average packs/day: 0.5 packs/day for 35.0 years (17.5 ttl pk-yrs)     Types: Cigarettes, Vaping Device     Start date: 1979     Quit date: 2014     Years since quittin.8    Smokeless tobacco: Never    Tobacco comments:     Currently vapes 2024   Vaping Use    Vaping status: Every Day    Substances: Nicotine    Devices: Refillable tank   Substance and Sexual Activity    Alcohol use: Not Currently     Alcohol/week: 0.8 standard drinks of alcohol     Types: 1 Standard drinks or equivalent per week     Comment: social     Drug use: No     Comment: 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    Sexual activity: Not Currently     Birth control/protection: Post-menopausal   Other Topics Concern     Service No    Blood Transfusions No    Caffeine Concern No    Occupational Exposure No    Hobby Hazards No    Sleep Concern Yes     Comment: CPAP    Stress Concern No    Weight Concern Yes    Special Diet Not Asked    Back Care Not Asked    Exercise No    Bike Helmet Not Asked    Seat Belt Yes    Self-Exams Yes    Parent/sibling w/ CABG, MI or angioplasty before 65F 55M? Not Asked   Social History Narrative    Not on file     Social Determinants of Health     Financial Resource Strain: Not on File (3/10/2021)    Received from [facility]     Financial Resource Strain     Financial Resource Strain: 0   Food Insecurity: Not on File (3/10/2021)    Received from [facility]     Food Insecurity     Food: 0   Transportation Needs: Not on File (3/10/2021)    Received from [facility]     Transportation Needs     Transportation: 0   Physical Activity: Not on File (3/10/2021)    Received from [facility]     Physical Activity     Physical Activity: 0   Stress: Not on File (3/10/2021)    Received from [facility]     Stress     Stress: 0   Social Connections: Not on File (3/10/2021)    Received from [facility]     Social Connections     Social Connections and Isolation: 0   Interpersonal Safety: Not on file   Housing Stability: Not on File (3/10/2021)    Received from [facility]     Housing Stability     Housin       FAMILY HISTORY:                   Family History   Problem Relation Age of Onset    Respiratory Mother         copd    Cancer Mother         skin cancers/ melanomas    C.A.D. Mother         MI  age 79    Cancer - colorectal Father         / diagnosis age 72    Gastrointestinal Disease Father         colitis    Breast Cancer No family hx of                     Physical exam Reveals:/61 (BP Location: Right arm,  Patient Position: Chair, Cuff Size: Adult Large)   Pulse 59   Wt (!) 343 lb (155.6 kg)   SpO2 94%   BMI 57.08 kg/m        Physical exam Reveals:    O/P: WNL  HEENT: WNL  NECK: No JVD, thyromegaly, or lymphadenopathy  HEART: RRR, no murmurs, gallops, or rubs  LUNGS: CTA bilaterally without rales, wheezes, or rhonchi  GI: NABS, nondistended, nontender, soft  EXT:without cyanosis, clubbing, one plus edema; LUE and LLE asymmetrically enlarged compared to the right positive Stemmer signs bilaterally  NEURO: nonfocal  : no flank tenderness                      [facility]  LOCATION: [facility]  DATE: 6/23/2023     EXAM: DUPLEX CAROTID ULTRASOUND     INDICATION:  Pulsatile tinnitus, PVD (peripheral vascular disease) (H), Hypercholesteremia, Other specified symptoms and signs involving the circulatory and respiratory systems      TECHNIQUE: Duplex imaging of the carotid arteries was performed, including gray-scale and color flow imaging, Doppler waveform analysis, and spectral Doppler imaging.     COMPARISON: No pertinent comparison study is available for review.     FINDINGS:   RIGHT CAROTID SYSTEM: Minimal atheromatous plaque in the right carotid bulb and proximal bifurcation vessels.       The following velocities were obtained in the RIGHT carotid system (cm/s).  CCA: PSV 63; EDV 16  ICA: PSV 68; EDV  22   ECA: PSV 68; EDV  10   ICA/CCA ratios: PS 1.1     LEFT CAROTID SYSTEM: Minimal atheromatous plaque in the left carotid bulb and proximal bifurcation vessels.       The following velocities were obtained in the LEFT carotid system (cm/s).  CCA: PSV 76; EDV 19   ICA: PSV 77; EDV 23   ECA: PSV 59; EDV 11   ICA/CCA ratios: PS 1.0     VERTEBRAL SYSTEM: The vertebral arteries are patent bilaterally with normal waveforms proximally and antegrade flow.                                                                      CONCLUSION:   1.  RIGHT CAROTID: Mild less than 50% diameter stenosis  based on NASCET criteria.  2.  LEFT CAROTID: Mild less than 50% diameter stenosis based on NASCET criteria.  3.  Antegrade flow within the vertebral arteries bilaterally. \      CT ABDOMEN PELVIS WITH CONTRAST   6/22/2017 9:29 PM       HISTORY:  Escherichia coli on blood culture.     TECHNIQUE:  CT abdomen and pelvis with intravenous contrast. Radiation  dose for this scan was reduced using automated exposure control,  adjustment of the mA and/or kV according to patient size, or iterative  reconstruction technique. 100mL Isovue-370      COMPARISON:  None.     FINDINGS:  Abdomen:  Mild atelectasis or scarring at the lung bases. The heart  size is normal. There is mild diffuse fatty infiltration of liver. The  spleen, gallbladder, pancreas, kidneys are normal in appearance. Mild  nonspecific thickening of adrenal glands. There is no abdominal or  pelvic lymph node enlargement.     Pelvis: There are scattered colonic diverticula without acute  diverticulitis. No bowel obstruction or inflammation. No adnexal mass.  There are postoperative changes in the anterior abdominal wall.  Midline ventral hernia containing fat. Left lower quadrant abdominal  hernia containing fat. Few gas bubbles in the subcutaneous fat of the  left anterior abdomen may be from injections. There is degenerative  disease in the spine.                                                                      IMPRESSION:  1. No acute abnormality. No bowel obstruction or inflammation.  2. Colonic diverticula without acute diverticulitis.  3. Two ventral hernias containing fat.  4. Fatty infiltration of the liver.      MERNA ZIMMERMAN MD           INDICATION: Left upper extremity swelling     PROCEDURES PERFORMED: 11/18/2016  1. Left upper extremity venogram  2. SVC venogram     HISTORY: The patient is a 61-year-old female with history of pulmonary  embolus, factor V Leiden mutation, SVC syndrome status post  thrombolysis in 2012 who was evaluated in the  outpatient for chronic  left arm swelling. She had CTA which suggested SVC occlusion versus  stenosis and patent bilateral subclavian veins. Given the persistent  left arm swelling spot compression therapy, she is referred for left  upper extremity venogram.     PROCEDURE: After discussing risks, benefits, goals, alternatives and  complications of the procedure, the patient was brought to the  catheterization lab. Left arm was prepped and draped in a sterile  fashion. Under 1% lidocaine a 7 Burundian sheath was placed in the left  cephalic vein under ultrasound guidance. Venogram of the distal left  extremity veins was performed using the 7 Burundian sheath. Next, a 5  Burundian glide catheter was advanced over wire into the distal left  subclavian and additional venogram of the thoracic veins were  performed. The 7 Burundian sheath was subsequently removed and hemostasis  achieved via manual compression.     FINDINGS:     LEFT UPPER EXTREMITY VENOGRAM: The left axillary vein is patent. The  distal left subclavian vein is patent. The proximal left subclavian  and brachiocephalic veins are chronically occluded. Extensive venous  collaterals are present. The right brachiocephalic vein is patent. The  proximal superior vena cava is patent. The distal superior vena cava  is not well visualized.                                                                      IMPRESSION:   1. 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 Chronic left upper extremity swelling, secondary to #1      PLAN: Continue compression therapy.     ANN FALCON MD           Examination:   MR CARDIAC W CONTRAST   Date:  6/20/2016 10:50 AM  Ordering: Ann Falcon  Indication:  60-year-old female with history of SVC syndrome and  chronic occlusion with a mass at the right atrium-SVC junction.  Comparison: None  Technical quality: excellent                                                                      IMPRESSION:  1.   Normal left ventricular size and systolic function with a  calculated ejection fraction of  63 %.  2.  Normal right ventricular size and systolic function with a  calculated ejection fraction of 56%.   3.  There is a heterogeneous 2.7 x 1.6 cm mass located at the junction  of the superior vena cava and the right atrium which spans around 80%  the diameter of the vessel, but does not completely obstruct the  orifice. The mass does not suppress on fat suppression sequences and  is dark both on long TI imaging as well as cine imaging; collectively  these likely indicate a calcified thrombus.     4.  On delayed enhancement imaging, there is no abnormal  hyperenhancement to suggest myocardial scar/inflammation/infiltration.     The MRI sequences and imaging planes in this study were tailored for  cardiac imaging and are suboptimal for evaluation of non-cardiac  structures.     FINDINGS     Atria  LA size: Mildly dilated   RA size: Mildly dilated     Valves  Aortic Valve  Valve morphology: Tricuspid  Aortic stenosis: None  Aortic regurgitation: None     Mitral Valve  Mitral stenosis: None  Mitral regurgitation: Trace     Tricuspid Valve   Tricuspid stenosis: None  Tricuspid regurgitation: None     Pulmonic Valve  Pulmonic stenosis: None  Pulmonic regurgitation: None     Extracardiac  Aortic root dimension: 38 mm (at the sinuses of Valsalva)  Main PA dimension: 36 mm  Pericardial thickness: Normal  Pericardial effusion:Trivial  Pleural effusion: None     Measurements  LEFT VENTRICULAR VOLUME RESULTS  Body Surface Area:    2.91 m2                 [facility]                                        ED volume:            145.69 ml               (96 - 174 ml)                                    ED volume index:      50.00 ml/m2             (56 - 100 ml/m2)                                 ES volume:            54.08 ml                (27 - 71 ml)                                     ES volume index:      18.56 ml/m2                                                               Stroke volume:        91.61 ml                (61 - 111 ml)                                    Stroke volume index:  31.44 ml/m2                                                              Cardiac output:       6.51 l/min                                                               Cardiac output index: 2.23 l/(m2*min)                                                          Ejection fraction:    62.88 %                 (54 - 74 %)                                         LV wall thickness - Anteroseptal: 1.1 cm  LV wall thickness - Inferolateral: 1.1 cm  LV wall thickness - Maximum: 1.1 cm  LV end-diastolic diameter: 5.3 cm  LV end-systolic diameter: 3.0 cm  LA danny-posterior diameter: 4.4 cm     RIGHT VENTRICULAR VOLUME RESULTS  ED volume:            156.10 ml               (83 - 178 ml)                                    ED volume index:      53.57 ml/m2             (47 - 103 ml/m2)                                 ES volume:            68.57 ml                (32 - 73 ml)                                     ES volume index:      23.53 ml/m2                                                              Stroke volume:        87.53 ml                (44 - 113 ml)                                    Stroke volume index:  30.04 ml/m2                                                              Cardiac output:       6.22 l/min                                                               Cardiac output index: 2.13 l/(m2*min)                                                          Ejection fraction:    56.07 %                 (49 - 70 %)            Sedation and contrast  Sedation used: No  Contrast agent: MultiHance  Volume administered: 36 mL     Pulse sequences used  SSFP Localizers  SSFP Cine  IR SSFP Single Shot  IR GRE Segmented  Contrast-enhanced early and late enhancement imaging  Image post-processing was performed on a [employer] workstation     I have personally  reviewed the examination and initial interpretation  and I agree with the findings.     GLORY SHEA MD        ULTRASOUND VENOUS UPPER EXTREMITY LEFT 5/9/2016 3:31 PM      HISTORY: Acute embolism and thrombosis of unspecified deep veins of  left lower extremity.     COMPARISON: None.     TECHNIQUE: Ultrasound gray scale, Color Doppler flow, and spectral  Doppler waveform analysis performed.     FINDINGS: There is thrombus in the left internal jugular vein. The  left axillary, cephalic, basilic, brachial, radial, and ulnar veins  demonstrate normal compressibility. The left innominate, subclavian,  and axillary veins have normal venous waveforms.        IMPRESSION: Nonocclusive thrombus in left internal jugular vein.     QUOC BAIRD MD           CTA ANGIOGRAM CHEST CONTRAST  5/9/2016 2:50 PM      HISTORY:  Arm swelling, chronic superior vena cava obstruction.  History of Factor V gene mutation.     COMPARISON: Chest CT 3/2/2012 and 1/21/2016.     TECHNIQUE: IV contrast imaging was performed through the chest in the  venous phase utilizing 80 mL of Isovue-370.     FINDINGS: There is chronic thrombosis of the superior vena cava which  appears to be secondary to a 2.4 x 1.6 CM calcified mass at the  cavoatrial junction. The degree of calcification in this region is  increased slightly when compared to the previous study of 3/2/2012 at  which time it measured 2.0 x 1.6 CM. The innominate and subclavian  veins appear patent. There is no other definite cause of obstruction  with specifically no evidence of mediastinitis or mediastinal  adenopathy. Cardiac size and pulmonary vascularity are normal. No  significant collaterals in the chest are identified. The lungs are  clear. There is no evidence of pleural or pericardial effusion. Fatty  infiltration of the liver. Hypertrophic degenerative changes  throughout the thoracic spine.        IMPRESSION:  1. 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  The degree of calcification is increased slightly when compared to the  previous study of 2012.  2. No significant venous collateralization in the upper chest. The  innominate and subclavian veins are patent.     PREM CASTREJON MD       CT CHEST PULMONARY EMBOLISM WITH CONTRAST  1/21/2016 2:09 PM      HISTORY:  History of PE. Sudden onset shortness of breath.     COMPARISON: CT chest 3/2/2012.     TECHNIQUE: Thin section axial images are performed from the thoracic  inlet to the lung bases utilizing 90 mL of Isovue 370 IV contrast  without adverse event. Coronal reformatted images are also generated.     FINDINGS:  The lungs are clear.  No pneumothorax or pleural effusion.   No pericardial fluid.  Heart is normal in size.  Esophagus is  unremarkable.  No enlarged lymph nodes.  Thoracic aorta is  unremarkable.  No evidence of pulmonary embolism.  Limited images  upper abdomen demonstrate diffuse fatty infiltration of the liver.   Probable nodule left adrenal gland is unchanged.  This is too small to  characterize on this exam due to beam hardening artifact related to  patient body habitus.  Degenerative spine changes are present.        IMPRESSION:  1.  No evidence of pulmonary embolism.  Thoracic aorta is  unremarkable.  2.  Lungs are clear.  No enlarged lymph nodes.  3.  Diffuse fatty infiltration of the liver and probable stable left  adrenal nodule measuring less than 1 cm.  No further followup  suggested as this has been stable for greater than two years.     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      TECHNIQUE: The procedure was explained to the patient in detail.   Initial venacavogram was performed through the existing infusion   catheter. The  catheter was then repositioned into the more superior   vena cava and a second superior venacavogram was performed. The   patient was then prepped and draped and 1% lidocaine was used as local   anesthetic. An exchange wire was placed through the infusion catheter   and the infusion catheter and sheath were removed. A 7 Tunisian sheath   was placed in the antecubital region. Over the wire, a [place]   high-pressure 14 mm x 4 cm balloon was advanced into the superior vena   cava and venous angioplasty was performed of the cava. The balloon was   exchanged for a 10 cm infusion catheter which was positioned in the   superior vena cava and completion venogram was performed. Exchange   wire was placed through the catheter and the catheter and sheath were   removed over the wire and an 8 Tunisian sheath was placed in the   antecubital fossa. Through the 8 [place]isian sheath, a [place]   high-pressure 16 mm x 4 cm balloon was advanced over a wire into the   superior vena cava and venoplasty was again performed. The catheter   was then removed and exchanged for infusion catheter and a completion   superior venacavogram was performed. The infusion catheter, wire, and   sheath were then removed and manual compression was placed at the   venotomy site until hemostasis was obtained. There were no immediate   complications.      Fluoro time: 4 minutes   Contrast: 125 mL Visipaque   Local anesthesia: 10 mL 1% Lidocaine   Conscious sedation: 1 mg IV Versed, 50 mcg IV fentanyl   Sedation time: 30 minutes      Patient was monitored by the radiology nursing staff under my   supervision and remained stable throughout the study.      FINDINGS: The initial superior venacavogram demonstrates three areas   of significant stenosis in the proximal, mid and distal superior vena   cava. The most distal stenosis appears to be secondary to a large   calcified mediastinal lymph node. The proximal areas of stenosis are   irregular and suggest  residual filling defect due to thrombus however   these have not improved significantly after 48 hours and most likely   represent chronic organized thrombus and underlying stenosis. There is   a large azygos collateral present. Given these residual findings,   revascularization was attempted with angioplasty. I elected not to   place a stent in view of the patient's relatively young age and lack   of a history of underlying malignancy as the etiology. After initial   venoplasty to 14 mm there was significant improvement in luminal   diameter however moderate residual remained particularly at the level   of the calcified lymph node. Following venoplasty to 16 mm there is   further improvement with the proximal and mid superior vena cava now   widely patent. There appeared to be moderate residual stenosis at the   distal superior vena cava at the level of the calcified lymph node,   however oblique filming demonstrated this to be patent without   residual significant stenosis. This was felt to represent a   satisfactory end point and therefore thrombolysis was discontinued and   the catheters were removed.      IMPRESSION: Successful thrombolysis and venoplasty of the superior   vena cava for treatment of superior vena cava syndrome.         INTERVENTIONAL RADIOLOGY EMBO/THROMBO RECHECK    Mar 4, 2012 10:04:00 AM      PROCEDURES:   1. Thrombolysis check.   2. Mechanical thrombectomy superior vena cava.   3. Restart of catheter-directed thrombolysis.      HISTORY: 56-year-old female with acute/subacute thrombosis of the   superior vena cava and development of symptoms of superior vena cava   syndrome. Patient returns for thrombolysis check after 21 hours of   catheter-directed thrombolysis.      DESCRIPTION OF PROCEDURE: The patient was placed in a supine position   on the fluoroscopy table. A venogram was performed through existing   infusion catheter which had been placed into the superior vena cava   one day  earlier.      FINDINGS: There had been some interval decrease in amounts of thrombus   within the superior vena cava. There appeared to be possible extrinsic   compression of the lower SVC from a calcified lymph node that had been   seen on the CT scan performed on 3/2/2012.      INTERVENTION: The external portions of existing catheter and right arm   were prepped and draped in the usual sterile manner. 1% lidocaine was   injected for local anesthesia. Existing catheter was removed over a   wire. A 6 Kuwaiti vascular sheath was placed. Mechanical thrombectomy   within the SVC was performed using an Angiojet device. Followup   venogram showed some improvement in thrombus in the midportion of the   SVC just inferior to the level of the azygos vein takeoff. There   appeared to be persistent moderate thrombus. Therefore, it was elected   to continue catheter-directed thrombolysis. An infusion catheter was   replaced. Catheter-diretced thrombolysis was restarted.      The patient tolerated the procedure well. There were no immediate   postprocedure complications. The patient's vital signs were monitored   by radiology nursing staff under my supervision and remained stable   throughout the study.      MEDICATIONS: Versed 1.5 mg, fentanyl 75 mcg.      SEDATION TIME: 32 minutes.      FLUOROSCOPY TIME: 5.2 minutes.      CONTRAST: 65 mL Visipaque.      LOCAL ANESTHETIC: 10 mL.      IMPRESSION AND PLAN: Continued catheter-directed thrombolysis   overnight. Recheck tomorrow.      PROCEDURE: 3/3/2012  1. SVC venogram.   2. Catheter directed thrombolysis SVC.      HISTORY: 56-year-old female with acute onset neck and facial swelling.   Patient had a CT scan which shows occlusion of the superior vena cava.   There is a nonspecific calcification adjacent to the SVC. This may   represent a calcified lymph node. Patient has a past medical history   significant for a left subclavian port which was removed in early   2000. No known  hypercoagulable state and no known malignancy.      COMPARISON: CT scan of the abdomen and pelvis dated 3/2/2012.      DESCRIPTION OF PROCEDURE: After obtaining informed consent, the   patient was placed in a supine position on the fluoroscopy table. The   right arm was prepped and draped in the usual sterile manner. 1%   lidocaine was injected for local anesthesia. Ultrasound was used to   evaluate and document patency of the right basilic vein. Under sterile   ultrasound guidance, access into the right basilic vein was obtained.   An image was stated for documentation. A JOHNY-1 catheter was then passed   over a wire. The catheter was positioned in the upper SVC. A venogram   was performed.      There was occlusion of the mid and lower portion of the SVC. Flow was   primarily directed into the azygos and hemiazygos system. This was   also seen on the previous CT scan. Using a JOHNY-1 catheter and stiff   Glidewire, access across the occluded SVC was obtained. A venogram was   performed. This showed a possible stenosis of the proximal aspect of   the SVC and moderate to large amount of thrombus more distally. A 5 cm   infusion catheter was embedded in the thrombus/stenosis. 4 mg tPA was   injected into the thrombus. Catheter-directed thrombolysis was then   started. The catheter was secured to the patient's arm.      The patient tolerated the procedure well. There were no immediate   postprocedure complications. The patient's vital signs were monitored   by radiology nursing staff under my supervision and remained stable   throughout the study.      Medications: Versed 2 mg, fentanyl 100 mcg, tPA 4 mg      Sedation time: 24 minutes      Fluoroscopy time: 5.7 minutes      Contrast: 55 mL Visipaque      IMPRESSION: Catheter-directed thrombolysis started for occluded SVC   and associated SVC syndrome. TPA will be infused at a rate of 0.5 mg   per hour. Heparin will be infused through a peripheral IV at a rate of   500  units/hour. A thrombolysis check will be performed tomorrow.            ULTRASOUND VENOUS LOWER EXTREMITY BILATERAL  Mar 2, 2012 9:40 PM      HISTORY: SVC syndrome, assess for DVT.      COMPARISON: None.      FINDINGS: The right and left lower extremity venous ultrasound is   negative  for DVT. The veins do augment and compress normally. No   thrombus is seen.      IMPRESSION: Negative, no DVT identified.         CT CHEST WITH CONTRAST   Mar 2, 2012 5:33:00 PM      COMPARISON: None.      HISTORY: Chest pain, difficulty breathing, right-sided neck pain and   swelling.      TECHNIQUE: Volumetric helical acquisition of CT images of the chest   from the lung apices to the kidneys were acquired after the   administration of 100 mL of Optiray 350  IV contrast.      FINDINGS: There is a tiny peripheral right lower lobe pulmonary   embolism. The SVC is thrombosed. There is no obvious mass lesion   causing this thrombosis. There is no definite mediastinal fibrosis or   other cause of SVC syndrome evident on the scan. This may simply be   venous thrombosis at this location. Trace pleural fluid on the left.   Minimal peripheral atelectasis. The heart is not enlarged.  Thoracic   aorta is atherosclerotic  without evidence of dissection or aneurysm.   Trace pericardial and right pleural fluid.  There is no pneumothorax.   Adrenal glands are normal.  Remainder of the visualized upper abdomen   is unremarkable.      IMPRESSION:   1. A small peripheral right lower lobe pulmonary embolism is   demonstrated.   2. No thoracic aortic dissection or aneurysm.   3. Thrombosis of the superior vena cava, of uncertain etiology.      Dr. Cutler was contacted by me  on 3/2/2012 at 0543 pm regarding the   abnormal finding of pulmonary embolism and SVC thrombosis and   verbalized understanding of the abnormal finding.         Copath Report Patient Name: CLEMENT VALENTINE  MR#: [id number]  Specimen #: [id number]  Collected: 3/5/2012  15:45  Received: 3/5/2012 22:44  Reported: 3/8/2012 11:40  Ordering Phy(s): CHACE HUFF    _________________________________________          TEST(S) REQUESTED:  A: Factor 5 Leiden and Factor 2 by PCR  B: DNA Isolation, High purity extraction    SPECIMEN DESCRIPTION:  Blood    CLINICAL COMMENTS:  PE    METHODOLOGY:   The regions of genomic DNA containing the [id number] Factor 5  gene mutation (Factor V Leiden) and the Factor 2(Prothrombin [id number])  gene mutation were simultaneously amplified using the polymerase chain  reaction.  The amplified products were digested with restriction  endonuclease TaqI and products were analyzed by gel electrophoresis.    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    The presence of the Factor 5 gene mutation is an indication of an  increased risk of developing a thrombosis.  The extent of this risk is  dependent upon several other known thrombophilic risk factors including  smoking, obesity and the use of oral contraceptives.  Consultation  regarding these results is available upon request.  Genetic counseling  regarding these results is indicated in the evaluation of other family  members.    The patient is negative for the Factor 2 mutation.                This test was developed and its performance determined by the [facility] [place]  [facility].  It has not been cleared or approved by the U.S. Food and Drug  Administration.  The FDA has determined that such clearance or approval  is not necessary.  Pursuant to the requirements of CLIA' 88, this  laboratory  has established and verified the test' s accuracy and  precision.  This test is used for clinical purposes.      Electronically Signed Out By:  Gabe Herndon MD, [facility]          TESTING LAB LOCATION:  [address] [address] [phone]            Component      Latest Ref Rn 3/4/2012  7:00 AM   Albumin Fraction      3.7 - 5.1 g/dL 3.1 (L)    Alpha 1 Fraction      0.2 - 0.4 g/dL 0.4    Alpha 2 Fraction      0.5 - 0.9 g/dL 0.8    Beta Fraction      0.6 - 1.0 g/dL 0.9    Gamma Fraction      0.7 - 1.6 g/dL 0.8    Monoclonal Peak      0.0 g/dL 0.0    ELP Interpretation: Hypoalbuminemia. No monoclonal protein seen. Pathologic significance requires     Cardiolipin IgG Danuta      0 - 15.0 GPL <15.0     Cardiolipin IgM Danuta      0 - 12.5 MPL <12.5     Beta-2-Glycopro IgG 0    Beta-2-Glycopro IgM 6    GINI Screen by EIA      <1.0      Sed Rate      0 - 30 mm/h        Component      Latest Ref Rn 3/4/2012  7:40 AM   Albumin Fraction      3.7 - 5.1 g/dL     Alpha 1 Fraction      0.2 - 0.4 g/dL     Alpha 2 Fraction      0.5 - 0.9 g/dL     Beta Fraction      0.6 - 1.0 g/dL     Gamma Fraction      0.7 - 1.6 g/dL     Monoclonal Peak      0.0 g/dL     ELP Interpretation:     Cardiolipin IgG Danuta      0 - 15.0 GPL     Cardiolipin IgM Danuta      0 - 12.5 MPL     Beta-2-Glycopro IgG     Beta-2-Glycopro IgM     GINI Screen by EIA      <1.0  <1.0     Sed Rate      0 - 30 mm/h 10       Legend:  (L) Low              Component 11 yr ago   Copath Report Patient Name: CLEMENT VALENTINE  MR#: [id number]  Specimen #: [id number]  Collected: 3/3/2012 18:15  Received: 3/5/2012 07:35  Reported: 3/8/2012 15:55  Ordering Phy(s): CHACE HUFF    _________________________________________          TEST(S) REQUESTED:  A: JAK2  B: DNA Isolation, High purity extraction    SPECIMEN DESCRIPTION:  Blood    CLINICAL COMMENTS:  Thrombocytosis and SVC  obstruction    METHODOLOGY: The above specimen was fractionated using Ficoll-Histopaque  into mononuclear and granulocyte cell fractions. Total cellular DNA from  the granulocyte cell fraction was extracted. The portion of the JAK2  gene containing the G T transversion (V617F; exon 12) was amplified with  a series of four oligonucleotide primers. The primers include one  fluorescently labeled mutant specific forward primer (mutant (V617F)  allele), one fluorescently labeled forward internal control primer, an  unlabeled mutant specific reverse primer, and an unlabeled wildtype  specific reverse primer. The fluorescently labeled forward primer and a  common unlabeled reverse primer serve as an internal control. The PCR  product is analyzed by capillary gel electrophoresis on an MONICA 3130xl  Genetic Analyzer.    RESULTS:    JAK2 V617F Mutant Allele: Absent    JAK2 V617 Normal Allele: Present    Internal Control: Present    INTERPRETATION:  Molecular testing performed on submitted Blood.    V617F point mutation of the JAK2 gene was not detected in this sample.    COMMENTS:  Absence of the V617F mutation does not rule out clinical diagnosis of  polycythemia vera(PV), essential thrombocythemia(ET), idiopathic  myelofibrosis(IMF) or other clinically indicated diseases. It was  estimated that about 3% of PV, 43% of ET and 50% of IMF could be JAK2  V617F mutation negative. Clinical correlation is required. Analytic  sensitivity of this assay is 1%.                  This test was developed and its performance determined by the [facility] [place]  [facility].  It has not been cleared or approved by the U.S. Food and Drug  Administration.  The FDA has determined that such clearance or approval  is not necessary.  Pursuant to the requirements of CLIA' 88, this  laboratory has established and verified the test' s accuracy and  precision.  This test is used for clinical  purposes.      Electronically Signed Out By:  Gabe Herndon MD, [facility]          TESTING LAB LOCATION:  [facility]  [address] [facility] [address]  [address] [place] [address] [phone]             Component      Latest Ref Rng 3/29/2024  12:48 PM   WBC      4.0 - 11.0 10e3/uL 9.3    RBC Count      3.80 - 5.20 10e6/uL 4.41    Hemoglobin      11.7 - 15.7 g/dL 14.0    Hematocrit      35.0 - 47.0 % 43.2    MCV      78 - 100 fL 98    MCH      26.5 - 33.0 pg 31.7    MCHC      31.5 - 36.5 g/dL 32.4    RDW      10.0 - 15.0 % 15.1 (H)    Platelet Count      150 - 450 10e3/uL 256    % Neutrophils      % 45    % Lymphocytes      % 46    % Monocytes      % 6    % Eosinophils      % 2    % Basophils      % 0    % Immature Granulocytes      % 0    Absolute Neutrophils      1.6 - 8.3 10e3/uL 4.2    Absolute Lymphocytes      0.8 - 5.3 10e3/uL 4.3    Absolute Monocytes      0.0 - 1.3 10e3/uL 0.6    Absolute Eosinophils      0.0 - 0.7 10e3/uL 0.2    Absolute Basophils      0.0 - 0.2 10e3/uL 0.0    Absolute Immature Granulocytes      <=0.4 10e3/uL 0.0    Sodium      135 - 145 mmol/L 142    Potassium      3.4 - 5.3 mmol/L 4.0    Carbon Dioxide (CO2)      22 - 29 mmol/L 28    Anion Gap      7 - 15 mmol/L 11    Urea Nitrogen      8.0 - 23.0 mg/dL 12.2    Creatinine      0.51 - 0.95 mg/dL 0.64    GFR Estimate      >60 mL/min/1.73m2 >90    Calcium      8.8 - 10.2 mg/dL 9.8    Chloride      98 - 107 mmol/L 103    Glucose      70 - 99 mg/dL 66 (L)    Alkaline Phosphatase      40 - 150 U/L 57    AST      0 - 45 U/L 24    ALT      0 - 50 U/L 20    Protein Total      6.4 - 8.3 g/dL 6.9    Albumin      3.5 - 5.2 g/dL 3.9    Bilirubin Total      <=1.2 mg/dL 0.5    Total Cholesterol      <=199 mg/dL 150    Triglycerides      30 - 149 mg/dL 112    HDL Cholesterol      40 - 59 mg/dL 63 (H)    LDL Cholesterol      <=129 mg/dL 65    HDL Size      >=8.9 nm 9.8    VLDL Size      <=46.7 nm 50.2 (H)     LDL Particle Size      >=20.7 nm 21.5    Lge HDL Particle number      >=4.2 umol/L 9.6    HDL Particle Number NMR      >=33.0 umol/L 26.0 (L)    Lge VLDL Part number      <=2.7 nmol/L 2.9 (H)    Small LDL Particle number      <=634 nmol/L <165    LDL Particle Number      <=1135 nmol/L 616    EER LipoFit by NMR See Note    C-Reactive Protein High Sensitivity 6.49    Hemoglobin A1C      0.0 - 5.6 % 5.1    Lipoprotein (a)      <30 mg/dL 6    Free T3      2.0 - 4.4 pg/mL 2.5    T4 Free      0.90 - 1.70 ng/dL 0.91    TSH      0.30 - 4.20 uIU/mL 12.10 (H)       Legend:  (H) High  (L) Low        CT CHEST WITH CONTRAST 3/20/2024 11:40 AM      HISTORY: please conduct injecting the right arm; 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 She has a history of SVC occlusion that was treated in 2012.;  Superior vena cava syndrome; Asymmetrical arms; Other specified  symptoms and signs involving the circulatory and respiratory systems     COMPARISON: CTA chest 5/9/2016.     TECHNIQUE: Volumetric helical acquisition of CT images of the chest  from the clavicles to the kidneys were acquired after the  administration of 135mL ISOVUE-370 IV contrast. Radiation dose for  this scan was reduced using automated exposure control, adjustment of  the mA and/or kV according to patient size, or iterative  reconstruction technique.     FINDINGS:      LUNGS AND PLEURA: No airspace consolidation or pulmonary mass. No  pleural effusion.     MEDIASTINUM/AXILLAE: Normal heart size without pericardial effusion.  No thoracic adenopathy. Similar size of a calcified structure at the  lower superior vena cava which measures 1.6 x 1.6 cm in size but is  difficult to delineate from surrounding contrast material. The upper  and mid superior vena cava appears patent. There is narrowing of the  lower SVC secondary to the calcified structure with patency difficult  to ascertain. The left brachiocephalic and  subclavian veins are not  opacified and appear atretic.     CORONARY ARTERY CALCIFICATIONS: None.     UPPER ABDOMEN: Stable mild thickening of the bilateral adrenal glands.     MUSCULOSKELETAL: Degenerative changes of the spine.                                                                      IMPRESSION:  1.  No new mass within the chest.  2.  Similar calcified structure at the superior cavoatrial junction  which results in narrowing of the lower SVC. Nonopacification and  atretic appearance of the left brachiocephalic and subclavian veins  compatible with history of chronic thrombosis. A dedicated CT venogram  of the chest could better assess the venous system as clinically  indicated.     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 2D and 3D  reconstructions performed by the CT technologist. Dose reduction  techniques were used.  CONTRAST: 135 cc of Isovue 370, from a single use vial     FINDINGS:   VASCULAR FINDINGS: Abdominal aorta, iliac arteries and visceral  arteries appear grossly patent with mild scattered atherosclerotic  calcified disease. The inferior vena cava is widely patent. Renal  veins appear patent bilaterally. The right iliac vein and left iliac  veins appear patent without significant narrowing or significant mass  effect.     NONVASCULAR FINDINGS:  LUNG BASES: No cardiomegaly. Mild to moderate pericardial effusion.  Mild basilar atelectasis. No pleural effusion or pneumothorax.     ABDOMEN: Right adrenal gland masslike enlargement measuring 1.3 cm.  Previously measuring 1.1 cm on 6/22/2017. Cholelithiasis. Simple right  renal cyst, no further follow-up needed.  No hydronephrosis or  enhancing renal mass seen. The spleen, left adrenal gland, liver, and  pancreas are unremarkable.     PELVIS: Diverticulosis. No abnormally dilated loops of bowel. No free  fluid or free air.     MUSCULOSKELETAL: Severe degenerative disease of the right hip. Severe  degenerative disease lumbar spine.                                                                      IMPRESSION:  1.  Patent inferior vena cava and widely patent iliac veins with no  evidence of abnormal compression or stenosis.  2.  Masslike enlargement of the right adrenal gland measuring 1.3 cm.  This is enlarged from 6/22/2017 where it measured 1.1 cm. Consider  formal evaluation with CT or MRI adrenal mass protocol.  3.  Cholelithiasis.  4.  Diverticulosis.     ELIESER GRANADOS MD            ULTRASOUND BILATERAL LOWER EXTREMITY ARTERIAL DUPLEX  3/20/2024 1:23  PM      HISTORY:  Asymmetric legs. Circulatory disorder.     COMPARISON: None.     FINDINGS: Color Doppler and spectral waveform analysis performed.     The sampled arteries of the lower extremities are patent. No focal  elevations in peak systolic velocity to indicate significant stenosis.  Multiphasic waveforms are noted throughout.                                                                      IMPRESSION: No definite significant stenoses are identified in the  sampled arteries of the lower extremity.     ELANA ALCALA MD         [facility]  DATE: 3/20/2024     EXAM: BILATERAL LOWER EXTREMITY VENOUS ULTRASOUND     INDICATION: Bilateral lower extremity edema      TECHNIQUE: Duplex imaging was performed utilizing gray-scale,  compression, augmentation as appropriate, color-flow, Doppler waveform  analysis, and spectral Doppler imaging.     COMPARISON: 3/2/2012.     FINDINGS:  RIGHT LEG: The common femoral, profunda femoral, femoral, popliteal,  and segmentally visualized calf veins are patent and compressible with  appropriate vascular waveforms. No deep  venous thrombus is identified.     LEFT LEG: The common femoral, profunda femoral, femoral, popliteal,  and segmentally visualized calf veins are patent and compressible with  appropriate vascular waveforms. No deep venous thrombus is identified.                                                                      IMPRESSION:   1.  RIGHT LEG: Negative for deep vein thrombosis.  2.  LEFT LEG: Negative for deep vein thrombosis.     ELIESER GRANADOS MD         US UPPER EXTREMITY VENOUS DUPLEX BILATERAL  3/20/2024 1:24 PM      HISTORY: Superior vena cava syndrome; Acute deep vein thrombosis (DVT)  of other vein of left upper extremity (H); Asymmetrical arms     COMPARISON: None.     TECHNIQUE: Examination of the upper extremity veins was performed with  graded compression and 2-D ultrasound and color doppler spectral  waveform analysis.                                                                       Impression: No evidence for DVT in the visualized veins of the upper  extremities. The left internal jugular vein was not adequately  visualized.     ELANA ALCALA MD       Name:  Bere Machuca                                        Patient ID: [id number]  Date: May 3, 2024                                                      : 1955  Sex: female                                                                 Examined by: SAGE Covarrubias RVT  Age:  68 year old                                                         Reading MD: KAMALJIT Ludwig MD     INDICATION:Venous insufficiency, swelling     EXAM TYPE  BILATERAL LOWER EXTREMITY VENOUS DUPLEX FOR VENOUS INSUFFICIENCY  TECHNICAL SUMMARY     Technically limited exam due to patient habitus, immobility, and discomfort.     A duplex ultrasound study using color flow was performed, to evaluate the bilateral lower extremity veins for valvular incompetence with the patient in a steep reversed trendelenberg.      RIGHT:     The deep veins demonstrate phasic  flow, compress and respond to augmentations.  There is no DVT.  The common femoral and mid femoral veins are incompetent and free of thrombus. The remaining deep veins are competent and free of thrombus.      The GSV demonstrates phasic flow, compresses and responds to augmentations from the saphenofemoral junction to the ankle with no evidence of reflux or thrombus. The great saphenous vein measures 7.3 mm at the saphenofemoral junction, 5.1 mm at the proximal thigh, and 2.4 mm at the knee.      The AASV is competent ( 2.6 mm) draining into the saphenofemoral junction.      The Giacomini vein is absent.     The SSV demonstrates phasic flow, compresses and responds to augmentations from the popliteal space to the ankle.  No thrombus is seen. The saphenopopliteal junction is competent (4.1 mm). The SSV is incompetent at the Distal Calf with a reflux time of 973 milliseconds.       Perforators: There is no evidence of incompetent  veins at any level.         LEFT:     The deep veins demonstrate phasic flow, compress and respond to augmentations.  There is no reflux or DVT.  The common femoral vein is incompetent and free of thrombus. The remaining deep veins are competent and free of thrombus.      The GSV demonstrates phasic flow, compresses and responds to augmentations from the saphenofemoral junction to the knee and distal calf to the ankle with no evidence of reflux or thrombus. The GSV is not visualized from proximal calf to mid calf.  The great saphenous vein measures 10.1 mm at the saphenofemoral junction, 5.6 mm at the proximal thigh, and 2.3 mm at the knee.      The AASV is incompetent ( 4.7 mm) draining into the saphenofemoral junction. The AASV is incompetent from the saphenofemoal junction to the proximal thigh with a reflux time of 5015 milliseconds. The AASV takes a straight course for 13 cm. The AASV gives rise to a varicose branch measuring 3.3 mm off the Proximal Thigh that courses Lateral  with a reflux time of 2524 milliseconds.      The Giacomini vein is absent.     The SSV demonstrates phasic flow, compresses and responds to augmentations from the popliteal space to the ankle.  No thrombus is seen. The saphenopopliteal junction is absent. The SSV is incompetent from the Proximal Calf to Mid Calf with a reflux time of 3778 milliseconds.       Perforators: there is no evidence of incompetent  veins at any level.         FINAL SUMMARY:  Right lower extremity:  Deep veinsName:  Bere Machuca                                        Patient ID: [id number]  Date: May 3, 2024                                                      : 1955  Sex: female                                                                 Examined by: SAGE Covarrubias RVT  Age:  68 year old                                                         Reading MD: KAMALJIT Ludwig MD     INDICATION:Venous insufficiency, swelling     EXAM TYPE  BILATERAL LOWER EXTREMITY VENOUS DUPLEX FOR VENOUS INSUFFICIENCY  TECHNICAL SUMMARY     Technically limited exam due to patient habitus, immobility, and discomfort.     A duplex ultrasound study using color flow was performed, to evaluate the bilateral lower extremity veins for valvular incompetence with the patient in a steep reversed trendelenberg.      RIGHT:     The deep veins demonstrate phasic flow, compress and respond to augmentations.  There is no DVT.  The common femoral and mid femoral veins are incompetent and free of thrombus. The remaining deep veins are competent and free of thrombus.      The GSV demonstrates phasic flow, compresses and responds to augmentations from the saphenofemoral junction to the ankle with no evidence of reflux or thrombus. The great saphenous vein measures 7.3 mm at the saphenofemoral junction, 5.1 mm at the proximal thigh, and 2.4 mm at the knee.      The AASV is competent ( 2.6 mm) draining into the saphenofemoral junction.      The  Giacomini vein is absent.     The SSV demonstrates phasic flow, compresses and responds to augmentations from the popliteal space to the ankle.  No thrombus is seen. The saphenopopliteal junction is competent (4.1 mm). The SSV is incompetent at the Distal Calf with a reflux time of 973 milliseconds.       Perforators: There is no evidence of incompetent  veins at any level.         LEFT:     The deep veins demonstrate phasic flow, compress and respond to augmentations.  There is no reflux or DVT.  The common femoral vein is incompetent and free of thrombus. The remaining deep veins are competent and free of thrombus.      The GSV demonstrates phasic flow, compresses and responds to augmentations from the saphenofemoral junction to the knee and distal calf to the ankle with no evidence of reflux or thrombus. The GSV is not visualized from proximal calf to mid calf.  The great saphenous vein measures 10.1 mm at the saphenofemoral junction, 5.6 mm at the proximal thigh, and 2.3 mm at the knee.      The AASV is incompetent ( 4.7 mm) draining into the saphenofemoral junction. The AASV is incompetent from the saphenofemoal junction to the proximal thigh with a reflux time of 5015 milliseconds. The AASV takes a straight course for 13 cm. The AASV gives rise to a varicose branch measuring 3.3 mm off the Proximal Thigh that courses Lateral with a reflux time of 2524 milliseconds.      The Giacomini vein is absent.     The SSV demonstrates phasic flow, compresses and responds to augmentations from the popliteal space to the ankle.  No thrombus is seen. The saphenopopliteal junction is absent. The SSV is incompetent from the Proximal Calf to Mid Calf with a reflux time of 3778 milliseconds.       Perforators: there is no evidence of incompetent  veins at any level.         FINAL SUMMARY:  Right lower extremity:  Deep veins  1.  No deep vein thrombosis  2.  Common femoral and mid femoral vein incompetence      Superficial veins  1.  No superficial vein thrombosis  2.  Distal calf small saphenous vein incompetence, not clinically significant      veins  No incompetent perforators     Left lower extremity:  Deep veins  1.  No deep vein thrombosis  2.  Common femoral vein incompetence, otherwise deep vein valves are competent     Superficial veins  1.  No superficial vein thrombosis  2.  Absent proximal to mid calf great saphenous vein, otherwise great saphenous vein is patent and competent  3.  Proximal to mid calf small saphenous vein incompetence  4.  Anterior accessory saphenous vein incompetence, the source of varicosities in the thigh and lateral leg      veins  No incompetent perforators     Incompetence Criteria: Greater than 500 milliseconds reflux in the superficial and  veins and greater than 1000 milliseconds reflux in the deep veins.     C Maryann Ludwig MD, FACS     Dictated using Dragon voice recognition software which may result in transcription errors  1.  No deep vein thrombosis  2.  Common femoral and mid femoral vein incompetence     Superficial veins  1.  No superficial vein thrombosis  2.  Distal calf small saphenous vein incompetence, not clinically significant      veins  No incompetent perforators     Left lower extremity:  Deep veins  1.  No deep vein thrombosis  2.  Common femoral vein incompetence, otherwise deep vein valves are competent     Superficial veins  1.  No superficial vein thrombosis  2.  Absent proximal to mid calf great saphenous vein, otherwise great saphenous vein is patent and competent  3.  Proximal to mid calf small saphenous vein incompetence  4.  Anterior accessory saphenous vein incompetence, the source of varicosities in the thigh and lateral leg      veins  No incompetent perforators     Incompetence Criteria: Greater than 500 milliseconds reflux in the superficial and  veins and greater than 1000 milliseconds  reflux in the deep veins.     C Maryann Ludwig MD, FACS     Dictated using Dragon voice recognition software which may result in transcription errors        [facility]  Echocardiography [facility]  [address]  [place] [address]     Name: CLEMENT VALENTINE  MRN: [id number]  : 1955  Study Date: 2024 01:37 PM  Age: 68 yrs  Gender: Female  Patient Location: [facility]  Reason For Study: Asymmetric edema of both lower extremities, Venous  insufficiency  Ordering Physician: ANIKA MUSE  Referring Physician: ANIKA MUSE  Performed By: Lashonda Lynn RDCS     BSA: 2.5 m2  Height: 65 in  Weight: 340 lb  HR: 61  BP: 108/73 mmHg  ______________________________________________________________________________  Procedure  Complete Echo Adult. Optison (NDC #9451-0544) given intravenously.  ______________________________________________________________________________  Interpretation Summary     Left ventricular systolic function is normal.  The visual ejection fraction is 65-70%.  Left ventricular diastolic function is normal.  No regional wall motion abnormalities.  Normal right ventricular size and systolic function.  No valve disease.  Normal inferior vena cava.     There is no comparison study available.  ______________________________________________________________________________  Left Ventricle  The left ventricle is normal in size. There is normal left ventricular wall  thickness. Left ventricular systolic function is normal. The visual ejection  fraction is 65-70%. Left ventricular diastolic function is normal. No regional  wall motion abnormalities noted.     Right Ventricle  The right ventricle is normal in size and function.     Atria  Normal left atrial size. Right atrial size is normal. There is no atrial shunt  seen.     Mitral Valve  The mitral valve leaflets appear normal. There is no evidence of stenosis,  fluttering, or prolapse. There is  mild mitral annular calcification. There is  trace mitral regurgitation. There is no mitral valve stenosis.     Tricuspid Valve  Normal tricuspid valve. There is trace tricuspid regurgitation. The right  ventricular systolic pressure is approximated at 26.2 mmHg plus the right  atrial pressure.     Aortic Valve  The aortic valve is trileaflet. No aortic regurgitation is present. No aortic  stenosis is present.     Pulmonic Valve  The pulmonic valve is not well seen, but is grossly normal. There is trace  pulmonic valvular regurgitation.     Vessels  The aortic root is normal size. Normal size ascending aorta. The inferior vena  cava is normal.     Pericardium  There is no pericardial effusion.     Rhythm  Sinus rhythm was noted.  ______________________________________________________________________________  MMode/2D Measurements & Calculations  IVSd: 1.0 cm     LVIDd: 4.5 cm  LVIDs: 3.3 cm  LVPWd: 1.1 cm  IVC diam: 2.4 cm  FS: 25.3 %  LV mass(C)d: 166.0 grams  LV mass(C)dI: 67.0 grams/m2  Ao root diam: 3.2 cm  asc Aorta Diam: 3.5 cm  Ao root diam index Ht(cm/m): 1.9  Ao root diam index BSA (cm/m2): 1.3  Asc Ao diam index BSA (cm/m2): 1.4  Asc Ao diam index Ht(cm/m): 2.1  LA Volume (BP): 70.9 ml     LA Volume Index (BP): 28.6 ml/m2  RWT: 0.49  TAPSE: 2.5 cm     Doppler Measurements & Calculations  MV E max raza: 108.0 cm/sec  MV A max raza: 65.0 cm/sec  MV E/A: 1.7  MV max P.9 mmHg  MV mean P.3 mmHg  MV V2 VTI: 39.1 cm  MV dec time: 0.21 sec  PA acc time: 0.06 sec  TR max raza: 255.8 cm/sec  TR max P.2 mmHg  E/E' av.0  Lateral E/e': 10.1  Medial E/e':   RV S Raza: 11.4 cm/sec     ______________________________________________________________________________  Report approved by: Dr Jennifer Bhatti 2024 02:50 PM

## 2024-06-06 NOTE — PROGRESS NOTES
[facility]        Patient is here for a  follow up.    Pt is currently taking Statin and Warfarin.    /61 (BP Location: Right arm, Patient Position: Chair, Cuff Size: Adult Large)   Pulse 59   Wt (!) 343 lb (155.6 kg)   SpO2 94%   BMI 57.08 kg/m      The provider has been notified that the patient has no concerns.     Questions patient would like addressed today are: N/A.    Refills are needed: N/A    Has homecare services and agency name:  Maria Eugenia Jones MA

## 2024-06-07 ENCOUNTER — TELEPHONE (OUTPATIENT)
Dept: OTHER | Facility: CLINIC | Age: 69
End: 2024-06-07
Payer: COMMERCIAL

## 2024-06-07 DIAGNOSIS — R60.9 SWELLING: ICD-10-CM

## 2024-06-07 DIAGNOSIS — I82.622 ACUTE DEEP VEIN THROMBOSIS (DVT) OF OTHER VEIN OF LEFT UPPER EXTREMITY (H): ICD-10-CM

## 2024-06-07 DIAGNOSIS — R60.9 ASYMMETRIC EDEMA OF BOTH LOWER EXTREMITIES: Primary | ICD-10-CM

## 2024-06-07 DIAGNOSIS — I89.0 LYMPHEDEMA: ICD-10-CM

## 2024-06-07 DIAGNOSIS — I87.1 SUPERIOR VENA CAVA SYNDROME: ICD-10-CM

## 2024-06-07 DIAGNOSIS — Q74.0 ASYMMETRICAL ARMS: ICD-10-CM

## 2024-06-07 DIAGNOSIS — E27.8 ADRENAL MASS (H): ICD-10-CM

## 2024-06-07 DIAGNOSIS — R94.6 BORDERLINE ABNORMAL TFTS: ICD-10-CM

## 2024-06-07 DIAGNOSIS — E78.5 HYPERLIPIDEMIA LDL GOAL <70: ICD-10-CM

## 2024-06-07 DIAGNOSIS — E11.9 DM TYPE 2, GOAL HBA1C < 7% (H): ICD-10-CM

## 2024-06-07 DIAGNOSIS — I87.2 VENOUS (PERIPHERAL) INSUFFICIENCY: ICD-10-CM

## 2024-06-07 DIAGNOSIS — R09.89 OTHER SPECIFIED SYMPTOMS AND SIGNS INVOLVING THE CIRCULATORY AND RESPIRATORY SYSTEMS: ICD-10-CM

## 2024-06-07 LAB
AMPHETAMINES UR QL SCN: NORMAL
BARBITURATES UR QL SCN: NORMAL
BENZODIAZ UR QL SCN: NORMAL
BZE UR QL SCN: NORMAL
CANNABINOIDS UR QL SCN: NORMAL
FENTANYL UR QL: NORMAL
OPIATES UR QL SCN: NORMAL
PCP QUAL URINE (ROCHE): NORMAL

## 2024-06-11 ENCOUNTER — ANTICOAGULATION THERAPY VISIT (OUTPATIENT)
Dept: ANTICOAGULATION | Facility: CLINIC | Age: 69
End: 2024-06-11
Payer: COMMERCIAL

## 2024-06-11 DIAGNOSIS — D68.51 FACTOR 5 LEIDEN MUTATION, HETEROZYGOUS (H): Primary | ICD-10-CM

## 2024-06-11 DIAGNOSIS — Z86.711 HISTORY OF PULMONARY EMBOLISM: ICD-10-CM

## 2024-06-11 LAB — INR HOME MONITORING: 2.8 (ref 2–3)

## 2024-06-11 NOTE — PROGRESS NOTES
ANTICOAGULATION MANAGEMENT     Bere Machuca 68 year old female is on warfarin with therapeutic INR result. (Goal INR 2.0-3.0)    Recent labs: (last 7 days)     06/11/24  0000   INR 2.8       ASSESSMENT     Source(s): Chart Review and Patient/Caregiver Call     Warfarin doses taken: Warfarin taken as instructed  Diet: No new diet changes identified  Medication/supplement changes: None noted  New illness, injury, or hospitalization: No  Signs or symptoms of bleeding or clotting: No  Previous result: Supratherapeutic  Additional findings: None       PLAN     Recommended plan for no diet, medication or health factor changes affecting INR     Dosing Instructions: Continue your current warfarin dose with next INR in 2 weeks       Summary  As of 6/11/2024      Full warfarin instructions:  5 mg every Sun; 2.5 mg all other days   Next INR check:  6/25/2024               Telephone call with Darline who verbalizes understanding and agrees to plan    Patient to recheck with home meter    Education provided:   Goal range and lab monitoring: goal range and significance of current result and Importance of therapeutic range  Resume manage by exception with home monitor. Continue to submit INR results to home monitor company.You will only be called when your result is out of range. Please call and notify [facility] if new medication started, dose missed, signs or symptoms of bleeding or clotting, or a surgery/procedure is scheduled. Due for next call no later than: 9/9/24.    Plan made per [facility] anticoagulation protocol    Tito Hopson RN  Anticoagulation Clinic  6/11/2024    _______________________________________________________________________     Anticoagulation Episode Summary       Current INR goal:  2.0-3.0   TTR:  55.4% (1 y)   Target end date:  Indefinite   Send INR reminders to:  [facility] HOME MONITORING    Indications    Factor 5 Leiden mutation  heterozygous/ INR 2-3 [D68.51]  History of pulmonary embolism [Z86.711]              Comments:  managed by exception  [employer] home meter             Anticoagulation Care Providers       Provider Role Specialty Phone number    Uyen Mcintyre APRN CNP Referring Internal Medicine [phone]    Jennifer Shea MD Referring Internal Medicine [phone]

## 2024-06-25 ENCOUNTER — ANTICOAGULATION THERAPY VISIT (OUTPATIENT)
Dept: ANTICOAGULATION | Facility: CLINIC | Age: 69
End: 2024-06-25
Payer: COMMERCIAL

## 2024-06-25 DIAGNOSIS — D68.51 FACTOR 5 LEIDEN MUTATION, HETEROZYGOUS (H): Primary | ICD-10-CM

## 2024-06-25 DIAGNOSIS — Z86.711 HISTORY OF PULMONARY EMBOLISM: ICD-10-CM

## 2024-06-25 LAB — INR HOME MONITORING: 3.3 (ref 2–3)

## 2024-06-25 NOTE — PROGRESS NOTES
ANTICOAGULATION MANAGEMENT     Bere Machuca 68 year old female is on warfarin with supratherapeutic INR result. (Goal INR 2.0-3.0)    Recent labs: (last 7 days)     06/25/24  0000   INR 3.3*       ASSESSMENT     Source(s): Chart Review and Patient/Caregiver Call     Warfarin doses taken: Warfarin taken as instructed  Diet: Decreased greens/vitamin K in diet; plans to resume previous intake, further reports decreased appetite, Patient states she will try to be more consistent and have 2 salads a week  Medication/supplement changes:  simponi aria (rx from rheumatologist) started on 6/25/24 No interaction anticipated  Patient reports she resumed Mounjaro about 7 weeks ago, dose increased about 3 weeks ago, per [employer]:  Tirzepatide may increase the serum concentration of Warfarin. Tirzepatide may decrease the serum concentration of Warfarin.   New illness, injury, or hospitalization: No  Signs or symptoms of bleeding or clotting: No  Previous result: Therapeutic last visit; previously outside of goal range  Additional findings: Patient reports she has lost another 8 lbs in the last 4 weeks       PLAN     Recommended plan for temporary change(s) affecting INR     Dosing Instructions: Continue your current warfarin dose with next INR in 2 weeks       Summary  As of 6/25/2024      Full warfarin instructions:  5 mg every Sun; 2.5 mg all other days   Next INR check:  7/9/2024               Telephone call with Darline who verbalizes understanding and agrees to plan    Patient to recheck with home meter    Education provided:   Please call back if any changes to your diet, medications or how you've been taking warfarin  Dietary considerations: importance of consistent vitamin K intake  Contact [phone]  with any changes, questions or concerns.     Plan made per [facility] anticoagulation protocol    Ashley Melendez RN  Anticoagulation  Clinic  6/25/2024    _______________________________________________________________________     Anticoagulation Episode Summary       Current INR goal:  2.0-3.0   TTR:  54.8% (1 y)   Target end date:  Indefinite   Send INR reminders to:  [facility] HOME MONITOR[facility]    Indications    Factor 5 Leiden mutation  heterozygous/ INR 2-3 [D68.51]  History of pulmonary embolism [Z86.711]             Comments:  managed by exception  [employer] home meter             Anticoagulation Care Providers       Provider Role Specialty Phone number    Uyen Mcintyre APRN CNP Referring Internal Medicine [phone]    Jennifer Shea MD Referring Internal Medicine [phone]

## 2024-06-29 ENCOUNTER — HEALTH MAINTENANCE LETTER (OUTPATIENT)
Age: 69
End: 2024-06-29

## 2024-07-10 ENCOUNTER — ANTICOAGULATION THERAPY VISIT (OUTPATIENT)
Dept: ANTICOAGULATION | Facility: CLINIC | Age: 69
End: 2024-07-10
Payer: COMMERCIAL

## 2024-07-10 DIAGNOSIS — D68.51 FACTOR 5 LEIDEN MUTATION, HETEROZYGOUS (H): Primary | ICD-10-CM

## 2024-07-10 DIAGNOSIS — Z86.711 HISTORY OF PULMONARY EMBOLISM: ICD-10-CM

## 2024-07-10 LAB — INR HOME MONITORING: 2.5 (ref 2–3)

## 2024-07-10 NOTE — PROGRESS NOTES
ANTICOAGULATION MANAGEMENT     Bere Machuca 68 year old female is on warfarin with therapeutic INR result. (Goal INR 2.0-3.0)    Recent labs: (last 7 days)     07/10/24  0000   INR 2.5       ASSESSMENT     Source(s): Chart Review and Patient/Caregiver Call     Warfarin doses taken: Warfarin taken as instructed  Diet:  Resumed her normal greens intake, no appetite  Medication/supplement changes: None noted  New illness, injury, or hospitalization: No  Signs or symptoms of bleeding or clotting: No  Previous result: Supratherapeutic  Additional findings: has lost 8 lbs in the last month with [employer].       PLAN     Recommended plan for no diet, medication or health factor changes affecting INR     Dosing Instructions: Continue your current warfarin dose with next INR in 2 weeks       Summary  As of 7/10/2024      Full warfarin instructions:  5 mg every Sun; 2.5 mg all other days   Next INR check:  7/24/2024               Telephone call with Darline who verbalizes understanding and agrees to plan    Patient to recheck with home meter    Education provided: Please call back if any changes to your diet, medications or how you've been taking warfarin  Resume manage by exception with home monitor. Continue to submit INR results to home monitor company.You will only be called when your result is out of range. Please call and notify [facility] if new medication started, dose missed, signs or symptoms of bleeding or clotting, or a surgery/procedure is scheduled. Due for next call no later than: 10/8/24.    Plan made per [facility] anticoagulation protocol    Celena Mobley RN  Anticoagulation Clinic  7/10/2024    _______________________________________________________________________     Anticoagulation Episode Summary       Current INR goal:  2.0-3.0   TTR:  57.4% (1 y)   Target end date:  Indefinite   Send INR reminders to:  [facility] HOME MONITORING    Indications    Factor 5 Leiden mutation  heterozygous/ INR 2-3 [D68.51]  History  of pulmonary embolism [Z86.711]             Comments:  managed by exception  [employer] home meter             Anticoagulation Care Providers       Provider Role Specialty Phone number    Uyen Mcintyre APRN CNP Referring Internal Medicine [phone]    Jennifer Shea MD Referring Internal Medicine [phone]

## 2024-07-14 DIAGNOSIS — Z86.711 HISTORY OF PULMONARY EMBOLISM: ICD-10-CM

## 2024-07-14 DIAGNOSIS — E78.00 HYPERCHOLESTEREMIA: ICD-10-CM

## 2024-07-15 RX ORDER — ROSUVASTATIN CALCIUM 20 MG/1
20 TABLET, COATED ORAL DAILY
Qty: 90 TABLET | Refills: 0 | Status: SHIPPED | OUTPATIENT
Start: 2024-07-15

## 2024-07-15 RX ORDER — WARFARIN SODIUM 5 MG/1
TABLET ORAL
Qty: 90 TABLET | Refills: 1 | Status: SHIPPED | OUTPATIENT
Start: 2024-07-15 | End: 2024-07-30 | Stop reason: DRUGHIGH

## 2024-07-15 SDOH — HEALTH STABILITY: PHYSICAL HEALTH: ON AVERAGE, HOW MANY MINUTES DO YOU ENGAGE IN EXERCISE AT THIS LEVEL?: 0 MIN

## 2024-07-15 SDOH — HEALTH STABILITY: PHYSICAL HEALTH: ON AVERAGE, HOW MANY DAYS PER WEEK DO YOU ENGAGE IN MODERATE TO STRENUOUS EXERCISE (LIKE A BRISK WALK)?: 0 DAYS

## 2024-07-15 ASSESSMENT — PATIENT HEALTH QUESTIONNAIRE - PHQ9
SUM OF ALL RESPONSES TO PHQ QUESTIONS 1-9: 13
SUM OF ALL RESPONSES TO PHQ QUESTIONS 1-9: 13
10. IF YOU CHECKED OFF ANY PROBLEMS, HOW DIFFICULT HAVE THESE PROBLEMS MADE IT FOR YOU TO DO YOUR WORK, TAKE CARE OF THINGS AT HOME, OR GET ALONG WITH OTHER PEOPLE: VERY DIFFICULT

## 2024-07-15 ASSESSMENT — SOCIAL DETERMINANTS OF HEALTH (SDOH): HOW OFTEN DO YOU GET TOGETHER WITH FRIENDS OR RELATIVES?: NEVER

## 2024-07-15 NOTE — TELEPHONE ENCOUNTER
ANTICOAGULATION MANAGEMENT:  Medication Refill    Anticoagulation Summary  As of 7/10/2024      Warfarin maintenance plan:  5 mg (5 mg x 1) every Sun; 2.5 mg (5 mg x 0.5) all other days   Next INR check:  7/24/2024   Target end date:  Indefinite    Indications    Factor 5 Leiden mutation  heterozygous/ INR 2-3 [D68.51]  History of pulmonary embolism [Z86.711]                 Anticoagulation Care Providers       Provider Role Specialty Phone number    Uyen Mcintyre APRN CNP Referring Internal Medicine [phone]    Jennifer Shea MD Referring Internal Medicine [phone]            Refill Criteria    Visit with referring provider/group: Meets criteria: office visit within referring provider group in the last 1 year on 6/6/24    [facility] referral last signed: 11/29/2023; within last year: Yes    Lab monitoring not exceeding 2 weeks overdue: Yes    Bere meets all criteria for refill. Rx instructions and quantity supplied updated to match patient's current dosing plan. Warfarin 90 day supply with 1 refill granted per [facility] protocol     Fely Kidd, RN  Anticoagulation Clinic

## 2024-07-16 ENCOUNTER — THERAPY VISIT (OUTPATIENT)
Dept: OCCUPATIONAL THERAPY | Facility: CLINIC | Age: 69
End: 2024-07-16
Attending: INTERNAL MEDICINE
Payer: COMMERCIAL

## 2024-07-16 ENCOUNTER — OFFICE VISIT (OUTPATIENT)
Dept: INTERNAL MEDICINE | Facility: CLINIC | Age: 69
End: 2024-07-16
Payer: COMMERCIAL

## 2024-07-16 VITALS
WEIGHT: 293 LBS | DIASTOLIC BLOOD PRESSURE: 59 MMHG | HEART RATE: 60 BPM | BODY MASS INDEX: 52.75 KG/M2 | SYSTOLIC BLOOD PRESSURE: 97 MMHG | OXYGEN SATURATION: 92 % | TEMPERATURE: 97.1 F | RESPIRATION RATE: 20 BRPM

## 2024-07-16 DIAGNOSIS — M05.79 RHEUMATOID ARTHRITIS INVOLVING MULTIPLE SITES WITH POSITIVE RHEUMATOID FACTOR (H): ICD-10-CM

## 2024-07-16 DIAGNOSIS — E78.00 HYPERCHOLESTEREMIA: ICD-10-CM

## 2024-07-16 DIAGNOSIS — H61.20 WAX IN EAR: ICD-10-CM

## 2024-07-16 DIAGNOSIS — J44.9 CHRONIC OBSTRUCTIVE PULMONARY DISEASE, UNSPECIFIED COPD TYPE (H): ICD-10-CM

## 2024-07-16 DIAGNOSIS — Z00.00 ENCOUNTER FOR ANNUAL WELLNESS VISIT (AWV) IN MEDICARE PATIENT: Primary | ICD-10-CM

## 2024-07-16 DIAGNOSIS — E11.59 CONTROLLED TYPE 2 DIABETES MELLITUS WITH OTHER CIRCULATORY COMPLICATION, WITHOUT LONG-TERM CURRENT USE OF INSULIN (H): ICD-10-CM

## 2024-07-16 DIAGNOSIS — I10 ESSENTIAL HYPERTENSION: ICD-10-CM

## 2024-07-16 DIAGNOSIS — I89.0 LYMPHEDEMA: Primary | ICD-10-CM

## 2024-07-16 DIAGNOSIS — E66.01 MORBID OBESITY WITH BMI OF 50.0-59.9, ADULT (H): ICD-10-CM

## 2024-07-16 DIAGNOSIS — Z12.31 ENCOUNTER FOR SCREENING MAMMOGRAM FOR BREAST CANCER: ICD-10-CM

## 2024-07-16 PROCEDURE — 97140 MANUAL THERAPY 1/> REGIONS: CPT | Mod: GO | Performed by: OCCUPATIONAL THERAPIST

## 2024-07-16 PROCEDURE — G0438 PPPS, INITIAL VISIT: HCPCS | Performed by: INTERNAL MEDICINE

## 2024-07-16 PROCEDURE — 99214 OFFICE O/P EST MOD 30 MIN: CPT | Mod: 25 | Performed by: INTERNAL MEDICINE

## 2024-07-16 PROCEDURE — 80061 LIPID PANEL: CPT | Performed by: INTERNAL MEDICINE

## 2024-07-16 PROCEDURE — 36415 COLL VENOUS BLD VENIPUNCTURE: CPT | Performed by: INTERNAL MEDICINE

## 2024-07-16 NOTE — PROGRESS NOTES
"Preventive Care Visit  [facility]  Jennifer Shea MD, Internal Medicine  Jul 16, 2024      Assessment & Plan     Encounter for annual wellness visit (AWV) in Medicare patient  Fasting lab work ordered.    Controlled type 2 diabetes mellitus with other circulatory complication, without long-term current use of insulin (H)  A1c within target.  Continues on current medications-Mounjaro with which the patient has noted weight reduction.    Morbid obesity with BMI of 50.0-59.9, adult (H)  Improved BMI since starting Mounjaro medication.  Continue with Mounjaro at the current dose since patient is hesitant to uptitrate the dose due to concerns of constipation.  Continue with exercising and activity as tolerated.    Essential hypertension  Overall, improvement in blood pressure with weight loss.  Takes losartan at 25 mg daily.  Blood pressure has been on the lower side.  Discontinue losartan.  Continue monitoring blood pressure at home.    Hypercholesteremia  Update lipid panel.  Continue Crestor at the current dose of 20 mg daily.  Tolerating medication well.  - Lipid panel reflex to direct LDL Fasting; Future  - Lipid panel reflex to direct LDL Fasting    Chronic obstructive pulmonary disease, unspecified COPD type (H)  Overall, symptoms have been stable.  Patient continues on Trelegy Ellipta daily.    Encounter for screening mammogram for breast cancer  - MA Screen Bilateral w/Bin; Future    Rheumatoid arthritis involving multiple sites with positive rheumatoid factor (H)  Follows up with rheumatology.    Wax in ear  - carbamide peroxide (DEBROX) 6.5 % otic solution; Place 5 drops into both ears 2 times daily    Patient has been advised of split billing requirements and indicates understanding: Yes        BMI  Estimated body mass index is 52.75 kg/m  as calculated from the following:    Height as of 4/11/24: 1.651 m (5' 5\").    Weight as of this encounter: 143.8 kg (317 lb).   Weight management " plan: Continues on Mounjaro medication.    Counseling  Appropriate preventive services were addressed with this patient via screening, questionnaire, or discussion as appropriate for fall prevention, nutrition, physical activity,         Subjective   Darline is a 68 year old, presenting for the following:  Wellness Visit        7/16/2024     2:18 PM   Additional Questions   Roomed by matilda   Accompanied by self         7/16/2024     2:18 PM   Patient Reported Additional Medications   Patient reports taking the following new medications none         Health Care Directive  Patient does not have a Health Care Directive or Living Will: Patient states has Advance Directive and will bring in a copy to clinic.    HPI        7/15/2024   General Health   How would you rate your overall physical health? (!) POOR   Feel stress (tense, anxious, or unable to sleep) Rather much      (!) STRESS CONCERN      7/15/2024   Nutrition   Diet: Other   If other, please elaborate: I have no appetite at all and mostly just eat yogurt.            7/15/2024   Exercise   Days per week of moderate/strenous exercise 0 days   Average minutes spent exercising at this level 0 min      (!) EXERCISE CONCERN      7/15/2024   Social Factors   Frequency of gathering with friends or relatives Never   Worry food won't last until get money to buy more No   Food not last or not have enough money for food? Yes   Do you have housing? (Housing is defined as stable permanent housing and does not include staying ouside in a car, in a tent, in an abandoned building, in an overnight shelter, or couch-surfing.) Yes   Are you worried about losing your housing? No   Lack of transportation? Yes   Unable to get utilities (heat,electricity)? No      (!) FOOD SECURITY CONCERN PRESENT (!) TRANSPORTATION CONCERN PRESENT(!) SOCIAL CONNECTIONS CONCERN      7/16/2024   Fall Risk   Reason Gait Speed Test Not Completed Patient declines             7/15/2024   Activities of Daily  Living- Home Safety   Needs help with the following daily activites Preparing meals    Housework   Safety concerns in the home None of the above       Multiple values from one day are sorted in reverse-chronological order         7/15/2024   Dental   Dentist two times every year? Yes            7/15/2024   Hearing Screening   Hearing concerns? None of the above            7/15/2024   Driving Risk Screening   Patient/family members have concerns about driving No            7/15/2024   General Alertness/Fatigue Screening   Have you been more tired than usual lately? (!) YES            7/15/2024   Urinary Incontinence Screening   Bothered by leaking urine in past 6 months Yes             Today's PHQ-9 Score:       7/15/2024     9:12 PM   PHQ-9 SCORE   PHQ-9 Total Score MyChart 13 (Moderate depression)   PHQ-9 Total Score 13         7/15/2024   Substance Use   Alcohol more than 3/day or more than 7/wk No   Do you have a current opioid prescription? (!) YES   How severe/bad is pain from 1 to 10? 7/10   Do you use any other substances recreationally? No             No data to display              Low Risk (0-3)  Moderate Risk (4-7)  High Risk (>8)  Social History     Tobacco Use    Smoking status: Former     Current packs/day: 0.00     Average packs/day: 0.5 packs/day for 35.0 years (17.5 ttl pk-yrs)     Types: Cigarettes, Vaping Device     Start date: 1979     Quit date: 2014     Years since quittin.9    Smokeless tobacco: Never    Tobacco comments:     Currently vapes 2024   Vaping Use    Vaping status: Every Day    Substances: Nicotine    Devices: Refillable tank   Substance Use Topics    Alcohol use: Not Currently     Alcohol/week: 0.8 standard drinks of alcohol     Types: 1 Standard drinks or equivalent per week     Comment: social    Drug use: No     Comment: 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           1/3/2022   LAST FHS-7 RESULTS   1st degree relative breast or ovarian cancer No   Any relative bilateral  breast cancer No   Any male have breast cancer No   Any ONE woman have BOTH breast AND ovarian cancer No   Any woman with breast cancer before 50yrs No   2 or more relatives with breast AND/OR ovarian cancer No   2 or more relatives with breast AND/OR bowel cancer Unknown           Mammogram Screening - Mammogram every 1-2 years updated in Health Maintenance based on mutual decision making      History of abnormal Pap smear: No - age 65 or older with adequate negative prior screening test results (3 consecutive negative cytology results, 2 consecutive negative cotesting results, or 2 consecutive negative HrHPV test results within 10 years, with the most recent test occurring within the recommended screening interval for the test used)       ASCVD Risk   The 10-year ASCVD risk score (Lazaro SINGLETARY, et al., 2019) is: 7.6%    Values used to calculate the score:      Age: 68 years      Sex: Female      Is Non- : No      Diabetic: Yes      Tobacco smoker: No      Systolic Blood Pressure: 97 mmHg      Is BP treated: Yes      HDL Cholesterol: 63 mg/dL      Total Cholesterol: 150 mg/dL            Reviewed and updated as needed this visit by Provider                      Current providers sharing in care for this patient include:  Patient Care Team:  Jennifer Shea MD as PCP - General (Internal Medicine)  Dejuan Bautista MD as Physician (Neurology)  Nissen, Rick L, MD as MD (Otolaryngology)  Jennifer Shea MD as Assigned PCP  Shelton Richter MD as Assigned Heart and Vascular Provider  Jess Nolan, Mart as Pharmacist (Pharmacist)  Jadyn Domingo RPH as Pharmacist (Pharmacist)  Mikayla Sosa RPH as Pharmacist (Pharmacist)  Mikayla Sosa RPH as Assigned MTM Pharmacist    The following health maintenance items are reviewed in Epic and correct as of today:  Health Maintenance   Topic Date Due    SPIROMETRY  Never done    COPD ACTION PLAN  Never done     "DEPRESSION ACTION PLAN  Never done    MIGRAINE ACTION PLAN  Never done    HEPATITIS A IMMUNIZATION (1 of 2 - Risk 2-dose series) Never done    RSV VACCINE (Pregnancy & 60+) (1 - 1-dose 60+ series) Never done    DEXA  02/26/2020    COLORECTAL CANCER SCREENING  08/01/2020    MICROALBUMIN  06/11/2022    DIABETIC FOOT EXAM  06/11/2022    MEDICARE ANNUAL WELLNESS VISIT  Never done    MAMMO SCREENING  06/24/2023    COVID-19 Vaccine (6 - 2023-24 season) 09/01/2023    LIPID  01/11/2024    ANNUAL REVIEW OF HM ORDERS  01/11/2024    EYE EXAM  04/26/2024    INFLUENZA VACCINE (1) 09/01/2024    A1C  09/29/2024    PHQ-9  01/16/2025    LUNG CANCER SCREENING  03/20/2025    BMP  03/29/2025    FALL RISK ASSESSMENT  07/16/2025    ADVANCE CARE PLANNING  06/11/2026    DTAP/TDAP/TD IMMUNIZATION (3 - Td or Tdap) 06/05/2030    HEPATITIS C SCREENING  Completed    Pneumococcal Vaccine: 65+ Years  Completed    ZOSTER IMMUNIZATION  Completed    IPV IMMUNIZATION  Aged Out    HPV IMMUNIZATION  Aged Out    MENINGITIS IMMUNIZATION  Aged Out    RSV MONOCLONAL ANTIBODY  Aged Out         Review of Systems  Constitutional, HEENT, cardiovascular, pulmonary, gi and gu systems are negative, except as otherwise noted.     Objective    Exam  BP 97/59 (BP Location: Right arm, Patient Position: Sitting, Cuff Size: Adult Large)   Pulse 60   Temp 97.1  F (36.2  C) (Oral)   Resp 20   Wt 143.8 kg (317 lb)   SpO2 92%   BMI 52.75 kg/m     Estimated body mass index is 52.75 kg/m  as calculated from the following:    Height as of 4/11/24: 1.651 m (5' 5\").    Weight as of this encounter: 143.8 kg (317 lb).    Physical Exam  GENERAL: alert and no distress  RESP: lungs clear to auscultation - no rales, rhonchi or wheezes  CV: regular rate and rhythm, normal S1 S2  ABDOMEN: soft, nontender, no hepatosplenomegaly, no masses   MS: no gross musculoskeletal defects noted, no edema  NEURO: Normal strength and tone, mentation intact and speech normal  PSYCH: mentation " appears normal, affect normal/bright        7/16/2024   Mini Cog   Clock Draw Score 2 Normal   3 Item Recall 3 objects recalled   Mini Cog Total Score 5                 Signed Electronically by: Jennifer Shea MD    Answers submitted by the patient for this visit:  Patient Health Questionnaire (Submitted on 7/15/2024)  If you checked off any problems, how difficult have these problems made it for you to do your work, take care of things at home, or get along with other people?: Very difficult  PHQ9 TOTAL SCORE: 13

## 2024-07-17 LAB
CHOLEST SERPL-MCNC: 126 MG/DL
FASTING STATUS PATIENT QL REPORTED: NO
HDLC SERPL-MCNC: 63 MG/DL
LDLC SERPL CALC-MCNC: 43 MG/DL
NONHDLC SERPL-MCNC: 63 MG/DL
TRIGL SERPL-MCNC: 99 MG/DL

## 2024-07-30 ENCOUNTER — ANTICOAGULATION THERAPY VISIT (OUTPATIENT)
Dept: ANTICOAGULATION | Facility: CLINIC | Age: 69
End: 2024-07-30
Payer: COMMERCIAL

## 2024-07-30 DIAGNOSIS — Z86.711 HISTORY OF PULMONARY EMBOLISM: ICD-10-CM

## 2024-07-30 DIAGNOSIS — D68.51 FACTOR 5 LEIDEN MUTATION, HETEROZYGOUS (H): Primary | ICD-10-CM

## 2024-07-30 LAB — INR HOME MONITORING: 3.7 (ref 2–3)

## 2024-07-30 RX ORDER — WARFARIN SODIUM 2.5 MG/1
TABLET ORAL
Qty: 100 TABLET | Refills: 0 | Status: SHIPPED | OUTPATIENT
Start: 2024-07-30

## 2024-07-30 NOTE — PROGRESS NOTES
ANTICOAGULATION MANAGEMENT     Bere Machuca 68 year old female is on warfarin with supratherapeutic INR result. (Goal INR 2.0-3.0)    Recent labs: (last 7 days)     07/30/24  0000   INR 3.7*       ASSESSMENT     Source(s): Chart Review and Patient/Caregiver Call     Warfarin doses taken: Warfarin taken as instructed  Diet:  Pt reports that she is eating yogurt and water which may be affecting diet and INR Pt is not eating any greens. Pt is in the process of losing weight. Pt on mounjaro  Medication/supplement changes: None noted  New illness, injury, or hospitalization: No  Signs or symptoms of bleeding or clotting: No  Previous result: Therapeutic last visit; previously outside of goal range  Additional findings: Switched tablet size from 5 mg tablets to 2.5 mg tablets to help with smaller dose adjustments. Rx sent to pharmacy. Pt advised to put the 5 mg tablets away.       PLAN     Recommended plan for ongoing change(s) affecting INR     Dosing Instructions: hold dose then decrease your warfarin dose (6.2% change) with next INR in 10-14 days       Summary  As of 7/30/2024      Full warfarin instructions:  7/30: Hold; Otherwise 3.75 mg every Sun; 2.5 mg all other days   Next INR check:  8/13/2024               Telephone call with Darline who agrees to plan and repeated back plan correctly    Patient to recheck with home meter    Education provided: Contact [phone] with any changes, questions or concerns.     Plan made per [facility] anticoagulation protocol    Arcelia Trujillo RN  Anticoagulation Clinic  7/30/2024    _______________________________________________________________________     Anticoagulation Episode Summary       Current INR goal:  2.0-3.0   TTR:  54.2% (1 y)   Target end date:  Indefinite   Send INR reminders to:  [facility]IT[facility]    Indications    Factor 5 Leiden mutation  heterozygous/ INR 2-3 [D68.51]  History of pulmonary embolism [Z86.711]             Comments:  managed by  exception  [employer] home meter             Anticoagulation Care Providers       Provider Role Specialty Phone number    Uyen Mcintyre APRN CNP Referring Internal Medicine [phone]    Jennifer Shea MD Referring Internal Medicine [phone]

## 2024-08-02 ENCOUNTER — OFFICE VISIT (OUTPATIENT)
Dept: PODIATRY | Facility: CLINIC | Age: 69
End: 2024-08-02
Payer: COMMERCIAL

## 2024-08-02 VITALS — BODY MASS INDEX: 52.75 KG/M2 | SYSTOLIC BLOOD PRESSURE: 98 MMHG | WEIGHT: 293 LBS | DIASTOLIC BLOOD PRESSURE: 60 MMHG

## 2024-08-02 DIAGNOSIS — M21.41 BILATERAL PES PLANUS: ICD-10-CM

## 2024-08-02 DIAGNOSIS — E11.42 DIABETIC POLYNEUROPATHY ASSOCIATED WITH TYPE 2 DIABETES MELLITUS (H): ICD-10-CM

## 2024-08-02 DIAGNOSIS — M21.42 BILATERAL PES PLANUS: ICD-10-CM

## 2024-08-02 DIAGNOSIS — I89.0 LYMPHEDEMA OF BOTH LOWER EXTREMITIES: ICD-10-CM

## 2024-08-02 DIAGNOSIS — M76.822 POSTERIOR TIBIAL TENDON DYSFUNCTION (PTTD) OF LEFT LOWER EXTREMITY: ICD-10-CM

## 2024-08-02 DIAGNOSIS — M79.672 FOOT PAIN, BILATERAL: Primary | ICD-10-CM

## 2024-08-02 DIAGNOSIS — M79.671 FOOT PAIN, BILATERAL: Primary | ICD-10-CM

## 2024-08-02 PROCEDURE — 99203 OFFICE O/P NEW LOW 30 MIN: CPT | Performed by: PODIATRIST

## 2024-08-02 NOTE — PATIENT INSTRUCTIONS
Thank you for choosing [facility] Podiatry / Foot & Ankle Surgery!    DR PIKE'S CLINIC:  [facility]   [address]   [place] [address]      TRIAGE LINE: [phone]  APPOINTMENTS: [phone]  RADIOLOGY: [phone]  SET UP SURGERY: [phone]  PHYSICAL THERAPY: [phone]   FAX NUMBER: [phone]  BILLING QUESTIONS: [phone]       Follow up: As needed      TENDONITIS   Tendons are the strong fibrous portions of muscles that attach to bones and allow the muscle to move a joint when it contracts. Tendons are very strong because they have a lot of force exerted on them. Sometimes tendons can become painful because they have suffered an acute injury, in which too much force was exerted at one time, or an overuse injury, in which a normal force was exerted too frequently or over a prolonged period of time. As a result, there is damage to the tendon and its surrounding soft tissue structures and they become inflammed. Because tendons do not have a great blood supply, they do not heal rapidly and the inflammation can become chronic.   Conservative treatment for tendinitis involves rest and anti-inflammatory measures. Ice is applied 15 minutes 2-3 times daily. Anti-inflammatory medications called NSAIDs (ibuprofen, example) can be taken provided they are used with caution, as they can lead to internal bleeding and increase the risk ofstroke and heart attack. Sometimes topical nitroglycerin is prescribed to help with pain. Often your doctor will use a special shoe or removable walking cast to immobilize the tendon, allowing it to heal without further damage from use. These devices are very useful in helping tendons heal, but they may slow you down or make you feel like your hip, knee, or back are out ofalignment. This is temporary and should go away once you are out ofthe immobilization. You should not use a walking cast when showering or driving. Another option is Platelet  Rich Plasma injections. (Normally done with a Sports and Orthorapedic doctor.   If conservative measures fail, your physician may need to surgically repair the tendon by removing any chronic inflammatory tissue and sewing it back together. Sometimes it is sewn to an adjacent tendon with similar function for support and sometimes it is lengthened. . Sometimes the bones around the tendon need to be realigned or reshaped to better support the tendon or prevent further damage. Your foot and ankle surgeon will discuss the specifics of your surgery with you, should you need it.    Towel stretch: Sit on a hard surface with your injured leg stretched out in front of you. Loop a towel around your toes and the ball of your foot and pull the towel toward your body keeping your leg straight. Hold this position for 15 to 30 seconds and then relax. Repeat 3 times. Then push the towel away with the ball of your foot. Repeat 3 times.  When you don't feel much of a stretch using the towel, you can start the standing calf stretch and the following exercises.  Standing calf stretch: Stand facing a wall with your hands on the wall at about eye level. Keep your injured leg back with your heel on the floor. Keep the other leg forward with the knee bent. Turn your back foot slightly inward (as if you were pigeon-toed). Slowly lean into the wall until you feel a stretch in the back of your calf. Hold the stretch for 15 to 30 seconds. Return to the starting position. Repeat 3 times. Do this exercise several times each day.   Standing soleus stretch: Stand facing a wall with your hands on the wall at about chest height. Keep your injured leg back with your heel on the floor. Keep the other leg forward with the knee bent. Turn your back foot slightly inward (as if you were pigeon-toed). Bend your back knee slightly and gently lean into the wall until you feel a stretch in the lower calf of your injured leg. Hold the stretch for 15 to 30  seconds. Return to the starting position. Repeat 3 times.   Achilles stretch: Stand with the ball of one foot on a stair. Reach for the step below with your heel until you feel a stretch in the arch of your foot. Hold this position for 15 to 30 seconds and then relax. Repeat 3 times.   Heel raise: Balance yourself while standing behind a chair or counter. Using the chair or counter as a support to help you, raise your body up onto your toes and hold for 5 seconds. Then slowly lower yourself down without holding onto the support. (It's OK to keep holding onto the support if you need to.) When this exercise becomes less painful, try lowering yourself down on the injured leg only. Repeat 15 times. Do 2 sets of 15. Rest 30 seconds between sets.   Step-up: Stand with the foot of your injured leg on a support 3 to 5 inches high (like a small step or block of wood). Keep your other foot flat on the floor. Shift your weight onto the injured leg on the support. Straighten your injured leg as the other leg comes off the floor. Return to the starting position by bending your injured leg and slowly lowering your uninjured leg back to the floor. Do 2 sets of 15.   Resisted ankle eversion: Sit with both legs stretched out in front of you, with your feet about a shoulder's width apart. Tie a loop in one end of elastic tubing. Put the foot of your injured leg through the loop so that the tubing goes around the arch of that foot and wraps around the outside of the other foot. Hold onto the other end of the tubing with your hand to provide tension. Turn the foot of your injured leg up and out. Make sure you keep your other foot still so that it will allow the tubing to stretch as you move the foot of your injured leg. Return to the starting position. Do 2 sets of 15.   Balance and reach exercises: Stand next to a chair with your injured leg farther from the chair. The chair will provide support if you need it. Stand on the foot of  your injured leg and bend your knee slightly. Try to raise the arch of this foot while keeping your big toe on the floor. Keep your foot in this position. With the hand that is farther away from the chair, reach forward in front of you by bending at the waist. Avoid bending your knee any more as you do this. Repeat this 10 times. To make the exercise more challenging, reach farther in front of you. Do 2 sets of 10.  the same position as above. While keeping your arch height, reach the hand that is farther away from the chair across your body toward the chair. The farther you reach, the more challenging the exercise. Do 2 sets of 10.   Resisted ankle eversion: Sit with both legs stretched out in front of you, with your feet about a shoulder's width apart. Tie a loop in one end of elastic tubing. Put the foot of your injured leg through the loop so that the tubing goes around the arch of that foot and wraps around the outside of the other foot. Hold onto the other end of the tubing with your hand to provide tension. Turn the foot of your injured leg up and out. Make sure you keep your other foot still so that it will allow the tubing to stretch as you move the foot of your injured leg. Return to the starting position. Do 2 sets of 15.      [facility] LOC[facility]  [facility]  [address]  [place] [address]  Phone: [phone]  Fax: [phone] [facility]   [id number]  [place] [address]  Phone: [phone]  Fax: [phone]   [facility] [place]  [address]  [place] [address]  Phone: [phone]  Fax: [phone] [facility]  [address]  [place] [address]  Phone: [phone]   Fax: [phone]   * Please call any location listed to make an appointment for a casting/fitting. Your referral was sent to their central office and they will all have  the order on file.

## 2024-08-02 NOTE — LETTER
8/2/2024      Bere Machuca  [address]  [facility] [address]      Dear Colleague,    Thank you for referring your patient, Bere Machuca, to the [facility]. Please see a copy of my visit note below.    PATIENT HISTORY:  Bere Machuca is a 68 year old female who presents to clinic for issues with her feet.  Patient is wondering about getting shoes.  She has significant pain to her feet and was seen at an outside facility but does not want to continue care there.  Pain can be 7 out of 10.  Right foot is the most sore she has a posterior tibial tendon dysfunction she notes and needs surgery however she needs hip surgery first before she gets this.  She is not able to wear ankle braces and is not able to bend down and tie them.  She does try to wear compression socks for her swelling but they are hard to put on.  She is wondering about shoes with inserts to try to help with some of her foot pain until she can get her hip surgery and then foot surgery done.    Review of Systems:  Patient denies fever, chills, rash, wound, stiffness, numbness, weakness, heart burn, blood in stool, chest pain with activity, calf pain when walking, shortness of breath with activity, chronic cough, easy bleeding/bruising,  excessive thirst, fatigue, depression, anxiety.  Patient admits to limping, swelling.     PAST MEDICAL HISTORY:   Past Medical History:   Diagnosis Date     Anxiety      Arthritis      Clotting disorder (H24)      COPD (chronic obstructive pulmonary disease) (H)      Depressive disorder      Diabetes (H)      Hypertension      Lymphedema of arm 3/8/2013     Morbidly obese (H) 9/25/2009     Pulmonary embolism (H)      Sleep apnea      Substance abuse (H)      Superior vena cava syndrome         PAST SURGICAL HISTORY:   Past Surgical History:   Procedure Laterality Date     BIOPSY OF SKIN LESION  multiple    keloids     HC REMOVAL OF OVARY/TUBE(S)  1992     Salpingo-Oophorectomy, Unilateral     HC REMOVE TONSILS/ADENOIDS,<13 Y/O  age 14    T & A <14y.o.     HERNIA REPAIR       INSERTION OF ACCESS PORT  1-4-96    sepsis- removed after hospital stay     [facility] ANESTH,REPAIR LO ABD HERNIA NOS  multiple/ 2006 last    panniculus     [facility] EXPLORATORY OF ABDOMEN  1996     [facility] PLASTIC SURGERY, NECK  age 4     Z REMOVAL COLON/ILEOSTOMY      reastamosis     [facility] TOTAL ABDOM HYSTERECTOMY  1992    Hysterectomy, Total Abdominal     [facility] REMOVE ABDOMINAL WALL LESION  2006    abscess        MEDICATIONS:   Current Outpatient Medications:      acetaminophen (TYLENOL) 500 MG tablet, Take 1,000 mg by mouth every 8 hours as needed for mild pain, Disp: , Rfl:      albuterol (PROAIR HFA/PROVENTIL HFA/VENTOLIN HFA) 108 (90 Base) MCG/ACT inhaler, INHALE 2 PUFFS INTO THE LUNGS EVERY 4 HOURS AS NEEDED, Disp: 8.5 g, Rfl: 5     B Complex-C (SUPER B COMPLEX PO), Take 1 tablet by mouth daily, Disp: , Rfl:      Buprenorphine HCl-Naloxone HCl (SUBOXONE) 12-3 MG FILM per film, Place 1 Film under the tongue 2 times daily, Disp: , Rfl:      carbamide peroxide (DEBROX) 6.5 % otic solution, Place 5 drops into both ears 2 times daily, Disp: 15 mL, Rfl: 0     escitalopram (LEXAPRO) 20 MG tablet, TAKE 1 TABLET BY MOUTH EVERY DAY, Disp: 90 tablet, Rfl: 1     Fluticasone-Umeclidin-Vilant (TRELEGY ELLIPTA) 100-62.5-25 MCG/ACT oral inhaler, INHALE 1 PUFF INTO THE LUNGS DAILY (RINSE MOUTH AND SPIT AFTER USING), Disp: 60 each, Rfl: 11     folic acid (FOLVITE) 1 MG tablet, Take 2 mg by mouth daily, Disp: , Rfl: 0     Golimumab (SIMPONI ARIA IV), Take as directed every 8 weeks via infusion center per rheumatology, Disp: , Rfl:      HERBALS, Take 2 each by mouth daily Lymphatic formula: vitamin D 2000 units, Selenium 200mcg, micronized purified flavonoid fraction 500mg, diosmin 450mg, hesperidin 50mg, Disp: , Rfl:      mesalamine (LIALDA) 1.2 g EC tablet, Take 1,200 mg by mouth daily (with breakfast) 4 pills a day,  Disp: , Rfl:      methotrexate 50 MG/2ML injection, INJECT 0.7ML SUB-EVERY EVERY WEEK, Disp: , Rfl:      Multiple Vitamins-Minerals (WOMENS 50+ MULTI VITAMIN/MIN PO), Take 2 tablets by mouth daily (With fish oil), Disp: , Rfl:      polyethylene glycol (MIRALAX) 17 GM/Dose powder, Take 1 Capful by mouth daily as needed for constipation, Disp: , Rfl:      predniSONE (DELTASONE) 5 MG tablet, Take 5 mg by mouth daily, Disp: , Rfl:      Pregabalin (LYRICA) 200 MG capsule, Take 200 mg by mouth 2 times daily, Disp: , Rfl:      rosuvastatin (CRESTOR) 20 MG tablet, TAKE 1 TABLET BY MOUTH EVERY DAY, Disp: 90 tablet, Rfl: 0     senna-docusate (SENOKOT-S/PERICOLACE) 8.6-50 MG tablet, Take 1 tablet by mouth daily, Disp: , Rfl:      sulfaSALAzine (AZULFIDINE) 500 MG tablet, Take 1 tablet (500 mg) by mouth 2 times daily, Disp: , Rfl:      tirzepatide (MOUNJARO) 5 MG/0.5ML pen, Inject 5 mg Subcutaneous every 7 days, Disp: 2 mL, Rfl: 2     tiZANidine (ZANAFLEX) 4 MG tablet, TAKE 1 TABLET BY MOUTH TWICE A DAY AS NEEDED, Disp: 180 tablet, Rfl: 1     warfarin ANTICOAGULANT (COUMADIN) 2.5 MG tablet, Take 1.5 tablets (3.75 mg) on Sundays and 1 tablet (2.5 mg) all other days or as directed by the INR clinic, Disp: 100 tablet, Rfl: 0     ALLERGIES:    Allergies   Allergen Reactions     Metformin Diarrhea     Aspirin      PN: LW Reaction: sensitive/stomach     Tape [Adhesive Tape] Itching     Topiramate      Wellbutrin [Bupropion] Anxiety        SOCIAL HISTORY:   Social History     Socioeconomic History     Marital status:      Spouse name: Not on file     Number of children: 0     Years of education: 12     Highest education level: Not on file   Occupational History     Employer: [employer]   Tobacco Use     Smoking status: Former     Current packs/day: 0.00     Average packs/day: 0.5 packs/day for 35.0 years (17.5 ttl pk-yrs)     Types: Cigarettes, Vaping Device     Start date: 8/8/1979     Quit date: 8/8/2014     Years since quitting:  9.9     Smokeless tobacco: Never     Tobacco comments:     Currently vapes 4/11/2024   Vaping Use     Vaping status: Every Day     Substances: Nicotine     Devices: Refillable tank   Substance and Sexual Activity     Alcohol use: Not Currently     Alcohol/week: 0.8 standard drinks of alcohol     Types: 1 Standard drinks or equivalent per week     Comment: social     Drug use: No     Comment: 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     Sexual activity: Not Currently     Birth control/protection: Post-menopausal   Other Topics Concern      Service No     Blood Transfusions No     Caffeine Concern No     Occupational Exposure No     Hobby Hazards No     Sleep Concern Yes     Comment: CPAP     Stress Concern No     Weight Concern Yes     Special Diet Not Asked     Back Care Not Asked     Exercise No     Bike Helmet Not Asked     Seat Belt Yes     Self-Exams Yes     Parent/sibling w/ CABG, MI or angioplasty before 65F 55M? Not Asked   Social History Narrative     Not on file     Social Determinants of Health     Financial Resource Strain: Low Risk  (7/15/2024)    Financial Resource Strain      Within the past 12 months, have you or your family members you live with been unable to get utilities (heat, electricity) when it was really needed?: No   Food Insecurity: High Risk (7/15/2024)    Food Insecurity      Within the past 12 months, did you worry that your food would run out before you got money to buy more?: Yes      Within the past 12 months, did the food you bought just not last and you didn t have money to get more?: No   Transportation Needs: High Risk (7/15/2024)    Transportation Needs      Within the past 12 months, has lack of transportation kept you from medical appointments, getting your medicines, non-medical meetings or appointments, work, or from getting things that you need?: Yes   Physical Activity: Inactive (7/15/2024)    Exercise Vital Sign      Days of Exercise per Week: 0 days      Minutes of Exercise  per Session: 0 min   Stress: Stress Concern Present (7/15/2024)    [place] [employer] - Occupational Stress Questionnaire      Feeling of Stress : Rather much   Social Connections: Unknown (7/15/2024)    Social Connection and Isolation Panel [NHANES]      Frequency of Communication with Friends and Family: Not on file      Frequency of Social Gatherings with Friends and Family: Never      Attends [employer] Services: Not on file      Active Member of Clubs or Organizations: Not on file      Attends Club or Organization Meetings: Not on file      Marital Status: Not on file   Interpersonal Safety: Low Risk  (2024)    Interpersonal Safety      Do you feel physically and emotionally safe where you currently live?: Yes      Within the past 12 months, have you been hit, slapped, kicked or otherwise physically hurt by someone?: No      Within the past 12 months, have you been humiliated or emotionally abused in other ways by your partner or ex-partner?: No   Housing Stability: Low Risk  (7/15/2024)    Housing Stability      Do you have housing? : Yes      Are you worried about losing your housing?: No        FAMILY HISTORY:   Family History   Problem Relation Age of Onset     Respiratory Mother         copd     Cancer Mother         skin cancers/ melanomas     C.A.D. Mother         MI  age 79     Cancer - colorectal Father         / diagnosis age 72     Gastrointestinal Disease Father         colitis     Breast Cancer No family hx of         EXAM:Vitals: BP 98/60   Wt 143.8 kg (317 lb)   BMI 52.75 kg/m    BMI= Body mass index is 52.75 kg/m .    A1C: 5.1 (3/29/224)    General appearance: Patient is alert and fully cooperative with history & exam.  No sign of distress is noted during the visit.     Psychiatric: Affect is pleasant & appropriate.  Patient appears motivated to improve health.     Respiratory: Breathing is regular & unlabored while sitting.     HEENT: Hearing is intact to  spoken word.  Speech is clear.  No gross evidence of visual impairment that would impact ambulation.     Dermatologic: Skin is intact to both lower extremities without significant lesions, rash or abrasion.  No paronychia or evidence of soft tissue infection is noted.     Vascular: DP & PT pulses are intact & regular bilaterally.   significant edema and varicosities noted.  CFT and skin temperature is normal to both lower extremities.     Neurologic: Lower extremity sensation is diminished to feet.     Musculoskeletal: Patient is ambulatory without assistive device or brace.  Decreased arch height bilaterally on weightbearing and nonweightbearing.  Pain on palpation of the right posterior tibial tendon.  Patient unable to invert right foot against resistance.     ASSESSMENT:    Foot pain, bilateral  Posterior tibial tendon dysfunction (PTTD) of left lower extremity  Bilateral pes planus  Lymphedema of both lower extremities  Diabetic polyneuropathy associated with type 2 diabetes mellitus (H)     Medical Decision Making/Plan:  Reviewed patient's chart in [facility].  Reviewed and discussed diabetic footcare.  We discussed causes and treatments of flat feet.  Overtime, flat feet or falling arches can become painful and possible cause tension to the posterior tibial tendon leading to tendonitis or possible tear/rupture.  Conservatively we treat these with arch supports, shoe gear, physical therapy, immobilization and sometimes, surgically such as multiple fusions in the foot to help stabilize the foot. Surgery is quite involved and requires 6-10 weeks non weight bearing followed by 1 month of protected weight bearing.   Reviewed and discussed causes of tendonitis.  We discussed treatments such as immobiliation, icing, stretching, heel lifts, orthotics, physical therapy, MRI.       At this time given the significant swelling and flatness of the foot and tendon disorder that I would recommend custom shoes and inserts as she  is not able to fit in over-the-counter ones.    Order was placed for this.    Questions were answered to patient satisfaction she will call further questions or concerns.    Patient risk factor: Patient is at low risk for infection.        Ines Ny DPM, Podiatry/Foot and Ankle Surgery      Again, thank you for allowing me to participate in the care of your patient.        Sincerely,        Ines Ny DPM, Podiatry/Foot and Ankle Surgery

## 2024-08-02 NOTE — PROGRESS NOTES
PATIENT HISTORY:  Bere Machuca is a 68 year old female who presents to clinic for issues with her feet.  Patient is wondering about getting shoes.  She has significant pain to her feet and was seen at an outside facility but does not want to continue care there.  Pain can be 7 out of 10.  Right foot is the most sore she has a posterior tibial tendon dysfunction she notes and needs surgery however she needs hip surgery first before she gets this.  She is not able to wear ankle braces and is not able to bend down and tie them.  She does try to wear compression socks for her swelling but they are hard to put on.  She is wondering about shoes with inserts to try to help with some of her foot pain until she can get her hip surgery and then foot surgery done.    Review of Systems:  Patient denies fever, chills, rash, wound, stiffness, numbness, weakness, heart burn, blood in stool, chest pain with activity, calf pain when walking, shortness of breath with activity, chronic cough, easy bleeding/bruising,  excessive thirst, fatigue, depression, anxiety.  Patient admits to limping, swelling.     PAST MEDICAL HISTORY:   Past Medical History:   Diagnosis Date    Anxiety     Arthritis     Clotting disorder (H24)     COPD (chronic obstructive pulmonary disease) (H)     Depressive disorder     Diabetes (H)     Hypertension     Lymphedema of arm 3/8/2013    Morbidly obese (H) 9/25/2009    Pulmonary embolism (H)     Sleep apnea     Substance abuse (H)     Superior vena cava syndrome         PAST SURGICAL HISTORY:   Past Surgical History:   Procedure Laterality Date    BIOPSY OF SKIN LESION  multiple    keloids    HC REMOVAL OF OVARY/TUBE(S)  1992    Salpingo-Oophorectomy, Unilateral    HC REMOVE TONSILS/ADENOIDS,<13 Y/O  age 14    T & A <14y.o.    HERNIA REPAIR      INSERTION OF ACCESS PORT  1-4-96    sepsis- removed after hospital stay    Z ANESTH,REPAIR LO ABD HERNIA NOS  multiple/ 2006 last    panniculus    [facility]  EXPLORATORY OF ABDOMEN  1996    [facility] PLASTIC SURGERY, NECK  age 4    [facility] REMOVAL COLON/ILEOSTOMY      reastamosis    [facility] TOTAL ABDOM HYSTERECTOMY  1992    Hysterectomy, Total Abdominal    [facility] REMOVE ABDOMINAL WALL LESION  2006    abscess        MEDICATIONS:   Current Outpatient Medications:     acetaminophen (TYLENOL) 500 MG tablet, Take 1,000 mg by mouth every 8 hours as needed for mild pain, Disp: , Rfl:     albuterol (PROAIR HFA/PROVENTIL HFA/VENTOLIN HFA) 108 (90 Base) MCG/ACT inhaler, INHALE 2 PUFFS INTO THE LUNGS EVERY 4 HOURS AS NEEDED, Disp: 8.5 g, Rfl: 5    B Complex-C (SUPER B COMPLEX PO), Take 1 tablet by mouth daily, Disp: , Rfl:     Buprenorphine HCl-Naloxone HCl (SUBOXONE) 12-3 MG FILM per film, Place 1 Film under the tongue 2 times daily, Disp: , Rfl:     carbamide peroxide (DEBROX) 6.5 % otic solution, Place 5 drops into both ears 2 times daily, Disp: 15 mL, Rfl: 0    escitalopram (LEXAPRO) 20 MG tablet, TAKE 1 TABLET BY MOUTH EVERY DAY, Disp: 90 tablet, Rfl: 1    Fluticasone-Umeclidin-Vilant (TRELEGY ELLIPTA) 100-62.5-25 MCG/ACT oral inhaler, INHALE 1 PUFF INTO THE LUNGS DAILY (RINSE MOUTH AND SPIT AFTER USING), Disp: 60 each, Rfl: 11    folic acid (FOLVITE) 1 MG tablet, Take 2 mg by mouth daily, Disp: , Rfl: 0    Golimumab (SIMPONI ARIA IV), Take as directed every 8 weeks via infusion center per rheumatology, Disp: , Rfl:     HERBALS, Take 2 each by mouth daily Lymphatic formula: vitamin D 2000 units, Selenium 200mcg, micronized purified flavonoid fraction 500mg, diosmin 450mg, hesperidin 50mg, Disp: , Rfl:     mesalamine (LIALDA) 1.2 g EC tablet, Take 1,200 mg by mouth daily (with breakfast) 4 pills a day, Disp: , Rfl:     methotrexate 50 MG/2ML injection, INJECT 0.7ML SUB-EVERY EVERY WEEK, Disp: , Rfl:     Multiple Vitamins-Minerals (WOMENS 50+ MULTI VITAMIN/MIN PO), Take 2 tablets by mouth daily (With fish oil), Disp: , Rfl:     polyethylene glycol (MIRALAX) 17 GM/Dose powder, Take 1 Capful by  mouth daily as needed for constipation, Disp: , Rfl:     predniSONE (DELTASONE) 5 MG tablet, Take 5 mg by mouth daily, Disp: , Rfl:     Pregabalin (LYRICA) 200 MG capsule, Take 200 mg by mouth 2 times daily, Disp: , Rfl:     rosuvastatin (CRESTOR) 20 MG tablet, TAKE 1 TABLET BY MOUTH EVERY DAY, Disp: 90 tablet, Rfl: 0    senna-docusate (SENOKOT-S/PERICOLACE) 8.6-50 MG tablet, Take 1 tablet by mouth daily, Disp: , Rfl:     sulfaSALAzine (AZULFIDINE) 500 MG tablet, Take 1 tablet (500 mg) by mouth 2 times daily, Disp: , Rfl:     tirzepatide (MOUNJARO) 5 MG/0.5ML pen, Inject 5 mg Subcutaneous every 7 days, Disp: 2 mL, Rfl: 2    tiZANidine (ZANAFLEX) 4 MG tablet, TAKE 1 TABLET BY MOUTH TWICE A DAY AS NEEDED, Disp: 180 tablet, Rfl: 1    warfarin ANTICOAGULANT (COUMADIN) 2.5 MG tablet, Take 1.5 tablets (3.75 mg) on Sundays and 1 tablet (2.5 mg) all other days or as directed by the INR clinic, Disp: 100 tablet, Rfl: 0     ALLERGIES:    Allergies   Allergen Reactions    Metformin Diarrhea    Aspirin      PN: LW Reaction: sensitive/stomach    Tape [Adhesive Tape] Itching    Topiramate     Wellbutrin [Bupropion] Anxiety        SOCIAL HISTORY:   Social History     Socioeconomic History    Marital status:      Spouse name: Not on file    Number of children: 0    Years of education: 12    Highest education level: Not on file   Occupational History     Employer: [employer]   Tobacco Use    Smoking status: Former     Current packs/day: 0.00     Average packs/day: 0.5 packs/day for 35.0 years (17.5 ttl pk-yrs)     Types: Cigarettes, Vaping Device     Start date: 1979     Quit date: 2014     Years since quittin.9    Smokeless tobacco: Never    Tobacco comments:     Currently vapes 2024   Vaping Use    Vaping status: Every Day    Substances: Nicotine    Devices: Refillable tank   Substance and Sexual Activity    Alcohol use: Not Currently     Alcohol/week: 0.8 standard drinks of alcohol     Types: 1 Standard drinks  or equivalent per week     Comment: social    Drug use: No     Comment: 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    Sexual activity: Not Currently     Birth control/protection: Post-menopausal   Other Topics Concern     Service No    Blood Transfusions No    Caffeine Concern No    Occupational Exposure No    Hobby Hazards No    Sleep Concern Yes     Comment: CPAP    Stress Concern No    Weight Concern Yes    Special Diet Not Asked    Back Care Not Asked    Exercise No    Bike Helmet Not Asked    Seat Belt Yes    Self-Exams Yes    Parent/sibling w/ CABG, MI or angioplasty before 65F 55M? Not Asked   Social History Narrative    Not on file     Social Determinants of Health     Financial Resource Strain: Low Risk  (7/15/2024)    Financial Resource Strain     Within the past 12 months, have you or your family members you live with been unable to get utilities (heat, electricity) when it was really needed?: No   Food Insecurity: High Risk (7/15/2024)    Food Insecurity     Within the past 12 months, did you worry that your food would run out before you got money to buy more?: Yes     Within the past 12 months, did the food you bought just not last and you didn t have money to get more?: No   Transportation Needs: High Risk (7/15/2024)    Transportation Needs     Within the past 12 months, has lack of transportation kept you from medical appointments, getting your medicines, non-medical meetings or appointments, work, or from getting things that you need?: Yes   Physical Activity: Inactive (7/15/2024)    Exercise Vital Sign     Days of Exercise per Week: 0 days     Minutes of Exercise per Session: 0 min   Stress: Stress Concern Present (7/15/2024)    [employer] [place] [employer] - Occupational Stress Questionnaire     Feeling of Stress : Rather much   Social Connections: Unknown (7/15/2024)    Social Connection and Isolation Panel [NHANES]     Frequency of Communication with Friends and Family: Not on file      Frequency of Social Gatherings with Friends and Family: Never     Attends Orthodoxy Services: Not on file     Active Member of Clubs or Organizations: Not on file     Attends Club or Organization Meetings: Not on file     Marital Status: Not on file   Interpersonal Safety: Low Risk  (2024)    Interpersonal Safety     Do you feel physically and emotionally safe where you currently live?: Yes     Within the past 12 months, have you been hit, slapped, kicked or otherwise physically hurt by someone?: No     Within the past 12 months, have you been humiliated or emotionally abused in other ways by your partner or ex-partner?: No   Housing Stability: Low Risk  (7/15/2024)    Housing Stability     Do you have housing? : Yes     Are you worried about losing your housing?: No        FAMILY HISTORY:   Family History   Problem Relation Age of Onset    Respiratory Mother         copd    Cancer Mother         skin cancers/ melanomas    C.A.D. Mother         MI  age 79    Cancer - colorectal Father         / diagnosis age 72    Gastrointestinal Disease Father         colitis    Breast Cancer No family hx of         EXAM:Vitals: BP 98/60   Wt 143.8 kg (317 lb)   BMI 52.75 kg/m    BMI= Body mass index is 52.75 kg/m .    A1C: 5.1 (3/29/224)    General appearance: Patient is alert and fully cooperative with history & exam.  No sign of distress is noted during the visit.     Psychiatric: Affect is pleasant & appropriate.  Patient appears motivated to improve health.     Respiratory: Breathing is regular & unlabored while sitting.     HEENT: Hearing is intact to spoken word.  Speech is clear.  No gross evidence of visual impairment that would impact ambulation.     Dermatologic: Skin is intact to both lower extremities without significant lesions, rash or abrasion.  No paronychia or evidence of soft tissue infection is noted.     Vascular: DP & PT pulses are intact & regular bilaterally.   significant edema and  varicosities noted.  CFT and skin temperature is normal to both lower extremities.     Neurologic: Lower extremity sensation is diminished to feet.     Musculoskeletal: Patient is ambulatory without assistive device or brace.  Decreased arch height bilaterally on weightbearing and nonweightbearing.  Pain on palpation of the right posterior tibial tendon.  Patient unable to invert right foot against resistance.     ASSESSMENT:    Foot pain, bilateral  Posterior tibial tendon dysfunction (PTTD) of left lower extremity  Bilateral pes planus  Lymphedema of both lower extremities  Diabetic polyneuropathy associated with type 2 diabetes mellitus (H)     Medical Decision Making/Plan:  Reviewed patient's chart in [facility].  Reviewed and discussed diabetic footcare.  We discussed causes and treatments of flat feet.  Overtime, flat feet or falling arches can become painful and possible cause tension to the posterior tibial tendon leading to tendonitis or possible tear/rupture.  Conservatively we treat these with arch supports, shoe gear, physical therapy, immobilization and sometimes, surgically such as multiple fusions in the foot to help stabilize the foot. Surgery is quite involved and requires 6-10 weeks non weight bearing followed by 1 month of protected weight bearing.   Reviewed and discussed causes of tendonitis.  We discussed treatments such as immobiliation, icing, stretching, heel lifts, orthotics, physical therapy, MRI.       At this time given the significant swelling and flatness of the foot and tendon disorder that I would recommend custom shoes and inserts as she is not able to fit in over-the-counter ones.    Order was placed for this.    Questions were answered to patient satisfaction she will call further questions or concerns.    Patient risk factor: Patient is at low risk for infection.        Ines Ny DPM, Podiatry/Foot and Ankle Surgery

## 2024-08-04 ASSESSMENT — SLEEP AND FATIGUE QUESTIONNAIRES
HOW LIKELY ARE YOU TO NOD OFF OR FALL ASLEEP IN A CAR, WHILE STOPPED FOR A FEW MINUTES IN TRAFFIC: SLIGHT CHANCE OF DOZING
HOW LIKELY ARE YOU TO NOD OFF OR FALL ASLEEP WHILE SITTING QUIETLY AFTER LUNCH WITHOUT ALCOHOL: SLIGHT CHANCE OF DOZING
HOW LIKELY ARE YOU TO NOD OFF OR FALL ASLEEP WHILE SITTING AND READING: HIGH CHANCE OF DOZING
HOW LIKELY ARE YOU TO NOD OFF OR FALL ASLEEP WHILE SITTING AND TALKING TO SOMEONE: WOULD NEVER DOZE
HOW LIKELY ARE YOU TO NOD OFF OR FALL ASLEEP WHEN YOU ARE A PASSENGER IN A CAR FOR AN HOUR WITHOUT A BREAK: MODERATE CHANCE OF DOZING
HOW LIKELY ARE YOU TO NOD OFF OR FALL ASLEEP WHILE SITTING INACTIVE IN A PUBLIC PLACE: SLIGHT CHANCE OF DOZING
HOW LIKELY ARE YOU TO NOD OFF OR FALL ASLEEP WHILE LYING DOWN TO REST IN THE AFTERNOON WHEN CIRCUMSTANCES PERMIT: HIGH CHANCE OF DOZING
HOW LIKELY ARE YOU TO NOD OFF OR FALL ASLEEP WHILE WATCHING TV: HIGH CHANCE OF DOZING

## 2024-08-05 ENCOUNTER — VIRTUAL VISIT (OUTPATIENT)
Dept: OTHER | Facility: CLINIC | Age: 69
End: 2024-08-05
Attending: INTERNAL MEDICINE
Payer: COMMERCIAL

## 2024-08-05 DIAGNOSIS — R60.9 ASYMMETRIC EDEMA OF BOTH LOWER EXTREMITIES: ICD-10-CM

## 2024-08-05 DIAGNOSIS — I89.0 LYMPHEDEMA: ICD-10-CM

## 2024-08-05 DIAGNOSIS — E78.5 HYPERLIPIDEMIA LDL GOAL <70: ICD-10-CM

## 2024-08-05 DIAGNOSIS — E27.8 ADRENAL MASS (H): ICD-10-CM

## 2024-08-05 DIAGNOSIS — R60.9 SWELLING: ICD-10-CM

## 2024-08-05 DIAGNOSIS — E11.9 DM TYPE 2, GOAL HBA1C < 7% (H): ICD-10-CM

## 2024-08-05 DIAGNOSIS — R94.6 BORDERLINE ABNORMAL TFTS: ICD-10-CM

## 2024-08-05 DIAGNOSIS — R09.89 OTHER SPECIFIED SYMPTOMS AND SIGNS INVOLVING THE CIRCULATORY AND RESPIRATORY SYSTEMS: ICD-10-CM

## 2024-08-05 DIAGNOSIS — I87.2 VENOUS (PERIPHERAL) INSUFFICIENCY: ICD-10-CM

## 2024-08-05 DIAGNOSIS — I82.622 ACUTE DEEP VEIN THROMBOSIS (DVT) OF OTHER VEIN OF LEFT UPPER EXTREMITY (H): ICD-10-CM

## 2024-08-05 DIAGNOSIS — Q74.0 ASYMMETRICAL ARMS: ICD-10-CM

## 2024-08-05 DIAGNOSIS — I87.1 SUPERIOR VENA CAVA SYNDROME: ICD-10-CM

## 2024-08-05 PROCEDURE — 99443 PR PHYSICIAN TELEPHONE EVALUATION 21-30 MIN: CPT | Mod: 93 | Performed by: INTERNAL MEDICINE

## 2024-08-05 NOTE — PROGRESS NOTES
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 function is normal.  The visual ejection fraction is 65-70%.  Left ventricular diastolic function is normal.  No regional wall motion abnormalities.  Normal right ventricular size and systolic function.  No valve disease.  Normal inferior vena cava.     There is no comparison study available.    Chest x-ray: No results found for this or any previous visit from the past 365 days.      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 She has a history of SVC occlusion that was treated in 2012.;  Superior vena cava syndrome; Asymmetrical arms; Other specified  symptoms and signs involving the circulatory and respiratory systems    COMPARISON: CTA chest 5/9/2016.    TECHNIQUE: Volumetric helical acquisition of CT images of the chest  from the clavicles to the kidneys were acquired after the  administration of 135mL ISOVUE-370 IV contrast. Radiation dose for  this scan was reduced using automated exposure control, adjustment of  the mA and/or kV according to patient size, or " "iterative  reconstruction technique.    FINDINGS:    LUNGS AND PLEURA: No airspace consolidation or pulmonary mass. No  pleural effusion.    MEDIASTINUM/AXILLAE: Normal heart size without pericardial effusion.  No thoracic adenopathy. Similar size of a calcified structure at the  lower superior vena cava which measures 1.6 x 1.6 cm in size but is  difficult to delineate from surrounding contrast material. The upper  and mid superior vena cava appears patent. There is narrowing of the  lower SVC secondary to the calcified structure with patency difficult  to ascertain. The left brachiocephalic and subclavian veins are not  opacified and appear atretic.    CORONARY ARTERY CALCIFICATIONS: None.    UPPER ABDOMEN: Stable mild thickening of the bilateral adrenal glands.    MUSCULOSKELETAL: Degenerative changes of the spine.    Impression  IMPRESSION:  1.  No new mass within the chest.  2.  Similar calcified structure at the superior cavoatrial junction  which results in narrowing of the lower SVC. Nonopacification and  atretic appearance of the left brachiocephalic and subclavian veins  compatible with history of chronic thrombosis. A dedicated CT venogram  of the chest could better assess the venous system as clinically  indicated.    RAMYA WORKMAN MD      SYSTEM ID:  [id number]      PFT: Most Recent [facility] Pulmonary Function Testing    No results found for: \"20001\"  No results found for: \"20002\"  No results found for: \"20003\"  No results found for: \"20015\"  No results found for: \"20016\"  No results found for: \"20027\"  No results found for: \"20028\"  No results found for: \"20029\"  No results found for: \"20079\"  No results found for: \"20080\"  No results found for: \"20081\"  No results found for: \"20335\"  No results found for: \"20105\"  No results found for: \"20053\"  No results found for: \"20054\"  No results found for: \"20055\"      Terri Dugan PA-C 8/5/2024     [facility]  [address], [place] [address]     [facility]  [address], [place] [address]    Chart documentation was completed, in part, with [employer] voice-recognition software. Even though reviewed, some grammatical, spelling, and word errors may remain.    51 minutes spent on day of encounter reviewing medical records, history and physical examination, review of previous testing and interpretation, documentation and further activities as noted above

## 2024-08-05 NOTE — PROGRESS NOTES
VASCULAR MEDICAL FOLLOW UP ASSESSMENT  REFERRAL SOURCE: Dr. Nelson  REASON FOR CONSULT: Patient states she has   left arm swelling that now goes down to her leg; Patient also mentioned a   subclavian artery blockage.         A/P:     (I87.1) H/O superior vena cava syndrome  (primary encounter diagnosis)     Comment: She is very complicated. CTA chest revealed no new mass within the chest, with a similar calcified structure at the superior cavoatrial junction which results in narrowing of the lower SVC. Nonopacification and  atretic appearance of the left brachiocephalic and subclavian veins is compatible with history of chronic thrombosis.  We could obtain a dedicated CT venogram of the chest to better assess the venous system so as to ascertain if there is an interventional target. However, in a thrombosis which is chronically present for years, this is unlikely to be the case. As such, CTV is unlikely to alter our management as her limb is not in extremis, and is asymmetrical due to venous engorgement form a chronic Lt SCV DVT which can not be thrombectomized at this point in time.      Plan: I advised no further action on the LUE.                 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     Comment: She is very complicated. CTA, arterial and venous UE duplex revealed the known Lt SCV and BCV thrombosis which itself IS PAC induced and not due to TOS.      Plan: As above.                        (R60.9) Asymmetric lower extremities      Comment: She is very complicated.  CTV abdomen pelvis revealed no iliac extrinsic venous compression and no pelvic outflow obstruction. No DVT or PAD seen on venous and arterial duplex of BLEs. In other words, she has no central or pelvic cause for the below. Her exam is consistent with BLE venous insufficiency with secondary lymphedema.      BLE venous comp revealed no BLE DVT or SVT. In the RLE she had incompetence of the CFV, FV as  well as the distal calf SSV, but this was not clinically significant. She had no incompetent BLE perforators. In the LLE she had incompetence of the CFV, poximal to mid calf SSV, as well as the AASV which is the source of varicosities in the thigh and lateral leg.     Plan: BLE venous comp revealed thigh high venous insufficiency. Sent to OT for MLD, Ace wraps and is doing well with that. She is unlikely with her pannus and solitary living situation to be able to use compression hosiery. She just got fitted for Velcro wraps but has not received them yet. We may need to use lymphedema pumps if she does not respond to the above modalities. She was told to use moisturizing lotion. She was told to use Lymphatic formula. RTC three months.                 (Q74.0) Asymmetrical arms     Comment: She is very complicated.  CTA chest revealed no new mass within the chest, with a similar calcified structure at the superior cavoatrial junction which results in narrowing of the lower SVC. Nonopacification and  atretic appearance of the left brachiocephalic and subclavian veins is compatible with history of chronic thrombosis which itself is PAC induced and not due to TOS. . This is the reason for the L**UE venous engorgement. I explained to the patietn we are unlikely this remote form the original event to be able to recanalize the lt SCV     Plan:  We could obtain a dedicated CT venogram of the chest could better assess the venous system so as to ascertain if there is an interventional target. However, in a thrombosis which is chronically present for years, this is unlikely to be the case. As such, CTV is unlikely to alter our management as her limb is not in extremis, and is asymmetrical due to venous engorgement form a chronic Lt SCV DVT which can not be thrombectomized at this point in time.                         (E11.9) DM type 2, goal HbA1c < 7% (H)     Comment: Glycemically well controlled.     Plan: Further glycemic  mgmt per her PCP                (E78.5) Hyperlipidemia LDL goal <70     Comment:  Advanced lipid testing of 3/29/2024 revealed LDL-C of 65, LDL-P of 616, cardiac CRP of 6.49, and Lp(a) of 6.       Plan: No change to Crestor 20 mg daily, further lipid mgmt per her PCP.              (E27.8) Adrenal mass (H24)  Comment: slightly larger than previously.     Plan: I advised she address this non vascular issue with her PCP.         (I87.2) Venous (peripheral) insufficiency     Comment: Echocardiogram Complete revealed normal LVEF as well as RVEF. She had no valvular stenoses, no evidence of pulmonary htn.      Plan: Lymphedema Therapy   was given previously. She saw them on 5/1/24 but has not schedueld f/u visits. She was advised to do so.                       (I89.0) Lymphedema     Plan: Lymphedema Therapy  Referral. As above.                     21 minutes total medical care on today's date.    MD location: office  Pt location: home    Phone call start: 10:59  Phone call end: 11:00                HPI: Bere Machuca is a 68 year old female with a h/o htn, HLD, DM2, very morbid obesity (Body mass index is 55.2 kg/m . ) who is on Monjauro to help with weight loss. She also has UC which is well contorlled, and RA for which she is steroid dependent, uses MTX and monoclonal Ab infusions. 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 As separte procedures.     However, she has a complex medical h/o a Lt SCV PAC placement in 1996 due to need fo prolonged IV abx due to colonic perforation. She then had this removed. In 2012 however she presented to R with left arm swelling and facial puffiness. She was ultimately found to have SVC syndrome when her SVC  was thrombosed. She underwent CD lysis and mechanical thrombectomy with satisfactory results.  She had a HC w/u and was discovered to be a Leiden heterozygote. She was ACd with warfarin and has  been on this chronically since that time. She in 2016 presented to Dr. Falcon with c/o LUE swelling. Imaging as below was undertaken and revealed her Lt SCV was not thrombosed but that she had a cavoatrial calcified mass . She was not felt to be symptomatic from this and was therefore not intervened upon. She was referred to LE therapy and found no significant improvement with the above.      The patient presents today to address her orthopedist's concerns that she has a dramatically increased asymmetrical size to her LUE and LLE compared to the right. Prior to undertaking orthopedic surgery for the above, they wanted to know the status of her venous anatomy.      Review Of Systems  Skin: negative  Eyes: negative  Ears/Nose/Throat: negative  Respiratory: No shortness of breath, dyspnea on exertion, cough, or hemoptysis  Cardiovascular: negative  Gastrointestinal: negative  Genitourinary: negative  Musculoskeletal: as above, orthopaedically limited, needs a walker.   Neurologic: negative  Psychiatric: negative  Hematologic/Lymphatic/Immunologic: negative  Endocrine: negative            PAST MEDICAL HISTORY:                  Past Medical History        Past Medical History:   Diagnosis Date    Anxiety      Arthritis      Clotting disorder (H24)      COPD (chronic obstructive pulmonary disease) (H)      Depressive disorder      Diabetes (H)      Hypertension      Lymphedema of arm 3/8/2013    Morbidly obese (H) 9/25/2009    Pulmonary embolism (H)      Sleep apnea      Substance abuse (H)      Superior vena cava syndrome              PAST SURGICAL HISTORY:                  Past Surgical History         Past Surgical History:   Procedure Laterality Date    BIOPSY OF SKIN LESION   multiple     keloids    HC REMOVAL OF OVARY/TUBE(S)   1992     Salpingo-Oophorectomy, Unilateral    HC REMOVE TONSILS/ADENOIDS,<11 Y/O   age 14     T & A <14y.o.    HERNIA REPAIR        INSERTION OF ACCESS PORT   1-4-96     sepsis- removed after  hospital stay    [facility] ANESTH,REPAIR LO ABD HERNIA NOS   multiple/ 2006 last     panniculus    [facility] EXPLORATORY OF ABDOMEN   1996    [facility] PLASTIC SURGERY, NECK   age 4    Z REMOVAL COLON/ILEOSTOMY         reastamosis    [facility] TOTAL ABDOM HYSTERECTOMY   1992     Hysterectomy, Total Abdominal    [facility] REMOVE ABDOMINAL WALL LESION   2006     abscess            CURRENT MEDICATIONS:                  Current Outpatient Prescriptions          Current Outpatient Medications   Medication Sig Dispense Refill    acetaminophen (TYLENOL) 500 MG tablet Take 1,000 mg by mouth every 8 hours as needed for mild pain        albuterol (PROAIR HFA/PROVENTIL HFA/VENTOLIN HFA) 108 (90 Base) MCG/ACT inhaler INHALE 2 PUFFS INTO THE LUNGS EVERY 4 HOURS AS NEEDED 8.5 g 5    B Complex-C (SUPER B COMPLEX PO) Take 1 tablet by mouth daily        Buprenorphine HCl-Naloxone HCl (SUBOXONE) 12-3 MG FILM per film Place 1 Film under the tongue 2 times daily        escitalopram (LEXAPRO) 20 MG tablet TAKE 1 TABLET BY MOUTH EVERY DAY 90 tablet 1    Fluticasone-Umeclidin-Vilant (TRELEGY ELLIPTA) 100-62.5-25 MCG/ACT oral inhaler INHALE 1 PUFF INTO THE LUNGS DAILY (RINSE MOUTH AND SPIT AFTER USING) 60 each 11    folic acid (FOLVITE) 1 MG tablet Take 2 mg by mouth daily   0    Golimumab (SIMPONI ARIA IV) Take as directed every 8 weeks via infusion center per rheumatology        HERBALS Take 2 each by mouth daily Lymphatic formula: vitamin D 2000 units, Selenium 200mcg, micronized purified flavonoid fraction 500mg, diosmin 450mg, hesperidin 50mg        losartan (COZAAR) 50 MG tablet Take 0.5 tablets (25 mg) by mouth daily        mesalamine (LIALDA) 1.2 g EC tablet Take 1,200 mg by mouth daily (with breakfast) 4 pills a day        methotrexate 50 MG/2ML injection INJECT 0.7ML SUB-EVERY EVERY WEEK        MOUNJARO 2.5 MG/0.5ML pen INJECT 2.5 MG SUBCUTANEOUS EVERY 7 DAYS (TO RESTART WHEN AVAILABLE IN STOCK) 2 mL 1    Multiple Vitamins-Minerals (WOMENS 50+ MULTI  VITAMIN/MIN PO) Take 2 tablets by mouth daily (With fish oil)        polyethylene glycol (MIRALAX) 17 GM/Dose powder Take 1 Capful by mouth daily as needed for constipation        predniSONE (DELTASONE) 5 MG tablet Take 5 mg by mouth daily        Pregabalin (LYRICA) 200 MG capsule Take 200 mg by mouth 2 times daily        rosuvastatin (CRESTOR) 20 MG tablet TAKE 1 TABLET BY MOUTH EVERY DAY 90 tablet 0    senna-docusate (SENOKOT-S/PERICOLACE) 8.6-50 MG tablet Take 1 tablet by mouth daily        sulfaSALAzine (AZULFIDINE) 500 MG tablet Take 1 tablet (500 mg) by mouth 2 times daily        tirzepatide (MOUNJARO) 5 MG/0.5ML pen Inject 5 mg Subcutaneous every 7 days 2 mL 2    tiZANidine (ZANAFLEX) 4 MG tablet TAKE 1 TABLET BY MOUTH TWICE A DAY AS NEEDED 180 tablet 1    warfarin ANTICOAGULANT (COUMADIN) 5 MG tablet TAKE 1/2 TABLET (2.5 MG) BY MOUTH ON FRI AND 1 TAB (5 MG) ALL OTHER DAYS OR AS DIRECTED ON INR 90 tablet 1            ALLERGIES:                  Allergies         Allergies   Allergen Reactions    Metformin Diarrhea    Aspirin         PN: LW Reaction: sensitive/stomach    Tape [Adhesive Tape] Itching    Topiramate      Wellbutrin [Bupropion] Anxiety            SOCIAL HISTORY:                  Social History   Social History            Socioeconomic History    Marital status:        Spouse name: Not on file    Number of children: 0    Years of education: 12    Highest education level: Not on file   Occupational History       [employer]er: [employer]   Tobacco Use    Smoking status: Former       Current packs/day: 0.00       Average packs/day: 0.5 packs/day for 35.0 years (17.5 ttl pk-yrs)       Types: Cigarettes, Vaping Device       Start date: 1979       Quit date: 2014       Years since quittin.8    Smokeless tobacco: Never    Tobacco comments:       Currently vapes 2024   Vaping Use    Vaping status: Every Day    Substances: Nicotine    Devices: Refillable tank   Substance and Sexual Activity     Alcohol use: Not Currently       Alcohol/week: 0.8 standard drinks of alcohol       Types: 1 Standard drinks or equivalent per week       Comment: social    Drug use: No       Comment: 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    Sexual activity: Not Currently       Birth control/protection: Post-menopausal   Other Topics Concern     Service No    Blood Transfusions No    Caffeine Concern No    Occupational Exposure No    Hobby Hazards No    Sleep Concern Yes       Comment: CPAP    Stress Concern No    Weight Concern Yes    Special Diet Not Asked    Back Care Not Asked    Exercise No    Bike Helmet Not Asked    Seat Belt Yes    Self-Exams Yes    Parent/sibling w/ CABG, MI or angioplasty before 65F 55M? Not Asked   Social History Narrative    Not on file      Social Determinants of Health           Financial Resource Strain: Not on File (3/10/2021)     Received from [facility]      Financial Resource Strain      Financial Resource Strain: 0   Food Insecurity: Not on File (3/10/2021)     Received from [facility]      Food Insecurity      Food: 0   Transportation Needs: Not on File (3/10/2021)     Received from [facility]      Transportation Needs      Transportation: 0   Physical Activity: Not on File (3/10/2021)     Received from [facility]      Physical Activity      Physical Activity: 0   Stress: Not on File (3/10/2021)     Received from [facility]      Stress      Stress: 0   Social Connections: Not on File (3/10/2021)     Received from [facility]      Social Connections      Social Connections and Isolation: 0   Interpersonal Safety: Not on file   Housing Stability: Not on File (3/10/2021)     Received from [facility]      Housing Stability      Housin            FAMILY HISTORY:                   Family History         Family History   Problem Relation Age of Onset    Respiratory Mother           copd    Cancer Mother           skin cancers/ melanomas    C.A.D. Mother           MI  age  79    Cancer - colorectal Father           / diagnosis age 72    Gastrointestinal Disease Father           colitis    Breast Cancer No family hx of                             Physical exam Reveals:/61 (BP Location: Right arm, Patient Position: Chair, Cuff Size: Adult Large)   Pulse 59   Wt (!) 343 lb (155.6 kg)   SpO2 94%   BMI 57.08 kg/m          Physical exam Reveals:     O/P: WNL  HEENT: WNL  NECK: No JVD, thyromegaly, or lymphadenopathy  HEART: RRR, no murmurs, gallops, or rubs  LUNGS: CTA bilaterally without rales, wheezes, or rhonchi  GI: NABS, nondistended, nontender, soft  EXT:without cyanosis, clubbing, one plus edema; LUE and LLE asymmetrically enlarged compared to the right positive Stemmer signs bilaterally  NEURO: nonfocal  : no flank tenderness                       [facility]  LOCATION: [facility]  DATE: 2023     EXAM: DUPLEX CAROTID ULTRASOUND     INDICATION:  Pulsatile tinnitus, PVD (peripheral vascular disease) (H), Hypercholesteremia, Other specified symptoms and signs involving the circulatory and respiratory systems      TECHNIQUE: Duplex imaging of the carotid arteries was performed, including gray-scale and color flow imaging, Doppler waveform analysis, and spectral Doppler imaging.     COMPARISON: No pertinent comparison study is available for review.     FINDINGS:   RIGHT CAROTID SYSTEM: Minimal atheromatous plaque in the right carotid bulb and proximal bifurcation vessels.       The following velocities were obtained in the RIGHT carotid system (cm/s).  CCA: PSV 63; EDV 16  ICA: PSV 68; EDV  22   ECA: PSV 68; EDV  10   ICA/CCA ratios: PS 1.1     LEFT CAROTID SYSTEM: Minimal atheromatous plaque in the left carotid bulb and proximal bifurcation vessels.       The following velocities were obtained in the LEFT carotid system (cm/s).  CCA: PSV 76; EDV 19   ICA: PSV 77; EDV 23   ECA: PSV 59; EDV 11   ICA/CCA ratios: PS 1.0     VERTEBRAL SYSTEM:  The vertebral arteries are patent bilaterally with normal waveforms proximally and antegrade flow.                                                                      CONCLUSION:   1.  RIGHT CAROTID: Mild less than 50% diameter stenosis based on NASCET criteria.  2.  LEFT CAROTID: Mild less than 50% diameter stenosis based on NASCET criteria.  3.  Antegrade flow within the vertebral arteries bilaterally. \      CT ABDOMEN PELVIS WITH CONTRAST   6/22/2017 9:29 PM       HISTORY:  Escherichia coli on blood culture.     TECHNIQUE:  CT abdomen and pelvis with intravenous contrast. Radiation  dose for this scan was reduced using automated exposure control,  adjustment of the mA and/or kV according to patient size, or iterative  reconstruction technique. 100mL Isovue-370      COMPARISON:  None.     FINDINGS:  Abdomen:  Mild atelectasis or scarring at the lung bases. The heart  size is normal. There is mild diffuse fatty infiltration of liver. The  spleen, gallbladder, pancreas, kidneys are normal in appearance. Mild  nonspecific thickening of adrenal glands. There is no abdominal or  pelvic lymph node enlargement.     Pelvis: There are scattered colonic diverticula without acute  diverticulitis. No bowel obstruction or inflammation. No adnexal mass.  There are postoperative changes in the anterior abdominal wall.  Midline ventral hernia containing fat. Left lower quadrant abdominal  hernia containing fat. Few gas bubbles in the subcutaneous fat of the  left anterior abdomen may be from injections. There is degenerative  disease in the spine.                                                                      IMPRESSION:  1. No acute abnormality. No bowel obstruction or inflammation.  2. Colonic diverticula without acute diverticulitis.  3. Two ventral hernias containing fat.  4. Fatty infiltration of the liver.      MERNA ZIMMERMAN MD           INDICATION: Left upper extremity swelling     PROCEDURES PERFORMED:  11/18/2016  1. Left upper extremity venogram  2. SVC venogram     HISTORY: The patient is a 61-year-old female with history of pulmonary  embolus, factor V Leiden mutation, SVC syndrome status post  thrombolysis in 2012 who was evaluated in the outpatient for chronic  left arm swelling. She had CTA which suggested SVC occlusion versus  stenosis and patent bilateral subclavian veins. Given the persistent  left arm swelling spot compression therapy, she is referred for left  upper extremity venogram.     PROCEDURE: After discussing risks, benefits, goals, alternatives and  complications of the procedure, the patient was brought to the  catheterization lab. Left arm was prepped and draped in a sterile  fashion. Under 1% lidocaine a 7 Kazakh sheath was placed in the left  cephalic vein under ultrasound guidance. Venogram of the distal left  extremity veins was performed using the 7 Kazakh sheath. Next, a 5  Kazakh glide catheter was advanced over wire into the distal left  subclavian and additional venogram of the thoracic veins were  performed. The 7 Kazakh sheath was subsequently removed and hemostasis  achieved via manual compression.     FINDINGS:     LEFT UPPER EXTREMITY VENOGRAM: The left axillary vein is patent. The  distal left subclavian vein is patent. The proximal left subclavian  and brachiocephalic veins are chronically occluded. Extensive venous  collaterals are present. The right brachiocephalic vein is patent. The  proximal superior vena cava is patent. The distal superior vena cava  is not well visualized.                                                                      IMPRESSION:   1. 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 Chronic left upper extremity swelling, secondary to #1      PLAN: Continue compression therapy.     ANN FALCON MD           Examination:   MR CARDIAC W CONTRAST   Date:  6/20/2016 10:50 AM  Ordering: Ann Falcon  Indication:   60-year-old female with history of SVC syndrome and  chronic occlusion with a mass at the right atrium-SVC junction.  Comparison: None  Technical quality: excellent                                                                      IMPRESSION:  1.  Normal left ventricular size and systolic function with a  calculated ejection fraction of  63 %.  2.  Normal right ventricular size and systolic function with a  calculated ejection fraction of 56%.   3.  There is a heterogeneous 2.7 x 1.6 cm mass located at the junction  of the superior vena cava and the right atrium which spans around 80%  the diameter of the vessel, but does not completely obstruct the  orifice. The mass does not suppress on fat suppression sequences and  is dark both on long TI imaging as well as cine imaging; collectively  these likely indicate a calcified thrombus.     4.  On delayed enhancement imaging, there is no abnormal  hyperenhancement to suggest myocardial scar/inflammation/infiltration.     The MRI sequences and imaging planes in this study were tailored for  cardiac imaging and are suboptimal for evaluation of non-cardiac  structures.     FINDINGS     Atria  LA size: Mildly dilated   RA size: Mildly dilated     Valves  Aortic Valve  Valve morphology: Tricuspid  Aortic stenosis: None  Aortic regurgitation: None     Mitral Valve  Mitral stenosis: None  Mitral regurgitation: Trace     Tricuspid Valve   Tricuspid stenosis: None  Tricuspid regurgitation: None     Pulmonic Valve  Pulmonic stenosis: None  Pulmonic regurgitation: None     Extracardiac  Aortic root dimension: 38 mm (at the sinuses of Valsalva)  Main PA dimension: 36 mm  Pericardial thickness: Normal  Pericardial effusion:Trivial  Pleural effusion: None     Measurements  LEFT VENTRICULAR VOLUME RESULTS  Body Surface Area:    2.91 m2                 [facility]                                        ED volume:            145.69 ml               (96 - 174 ml)                                     ED volume index:      50.00 ml/m2             (56 - 100 ml/m2)                                 ES volume:            54.08 ml                (27 - 71 ml)                                     ES volume index:      18.56 ml/m2                                                              Stroke volume:        91.61 ml                (61 - 111 ml)                                    Stroke volume index:  31.44 ml/m2                                                              Cardiac output:       6.51 l/min                                                               Cardiac output index: 2.23 l/(m2*min)                                                          Ejection fraction:    62.88 %                 (54 - 74 %)                                         LV wall thickness - Anteroseptal: 1.1 cm  LV wall thickness - Inferolateral: 1.1 cm  LV wall thickness - Maximum: 1.1 cm  LV end-diastolic diameter: 5.3 cm  LV end-systolic diameter: 3.0 cm  LA danny-posterior diameter: 4.4 cm     RIGHT VENTRICULAR VOLUME RESULTS  ED volume:            156.10 ml               (83 - 178 ml)                                    ED volume index:      53.57 ml/m2             (47 - 103 ml/m2)                                 ES volume:            68.57 ml                (32 - 73 ml)                                     ES volume index:      23.53 ml/m2                                                              Stroke volume:        87.53 ml                (44 - 113 ml)                                    Stroke volume index:  30.04 ml/m2                                                              Cardiac output:       6.22 l/min                                                               Cardiac output index: 2.13 l/(m2*min)                                                          Ejection fraction:    56.07 %                 (49 - 70 %)            Sedation and contrast  Sedation used: No  Contrast agent: MultiHance  Volume  administered: 36 mL     Pulse sequences used  SSFP Localizers  SSFP Cine  IR SSFP Single Shot  IR GRE Segmented  Contrast-enhanced early and late enhancement imaging  Image post-processing was performed on a [employer] workstation     I have personally reviewed the examination and initial interpretation  and I agree with the findings.     GLORY SHEA MD        ULTRASOUND VENOUS UPPER EXTREMITY LEFT 5/9/2016 3:31 PM      HISTORY: Acute embolism and thrombosis of unspecified deep veins of  left lower extremity.     COMPARISON: None.     TECHNIQUE: Ultrasound gray scale, Color Doppler flow, and spectral  Doppler waveform analysis performed.     FINDINGS: There is thrombus in the left internal jugular vein. The  left axillary, cephalic, basilic, brachial, radial, and ulnar veins  demonstrate normal compressibility. The left innominate, subclavian,  and axillary veins have normal venous waveforms.        IMPRESSION: Nonocclusive thrombus in left internal jugular vein.     QUOC BAIRD MD           CTA ANGIOGRAM CHEST CONTRAST  5/9/2016 2:50 PM      HISTORY:  Arm swelling, chronic superior vena cava obstruction.  History of Factor V gene mutation.     COMPARISON: Chest CT 3/2/2012 and 1/21/2016.     TECHNIQUE: IV contrast imaging was performed through the chest in the  venous phase utilizing 80 mL of Isovue-370.     FINDINGS: There is chronic thrombosis of the superior vena cava which  appears to be secondary to a 2.4 x 1.6 CM calcified mass at the  cavoatrial junction. The degree of calcification in this region is  increased slightly when compared to the previous study of 3/2/2012 at  which time it measured 2.0 x 1.6 CM. The innominate and subclavian  veins appear patent. There is no other definite cause of obstruction  with specifically no evidence of mediastinitis or mediastinal  adenopathy. Cardiac size and pulmonary vascularity are normal. No  significant collaterals in the chest are identified. The lungs are  clear.  There is no evidence of pleural or pericardial effusion. Fatty  infiltration of the liver. Hypertrophic degenerative changes  throughout the thoracic spine.        IMPRESSION:  1. 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  The degree of calcification is increased slightly when compared to the  previous study of 2012.  2. No significant venous collateralization in the upper chest. The  innominate and subclavian veins are patent.     PREM CASTREJON MD       CT CHEST PULMONARY EMBOLISM WITH CONTRAST  1/21/2016 2:09 PM      HISTORY:  History of PE. Sudden onset shortness of breath.     COMPARISON: CT chest 3/2/2012.     TECHNIQUE: Thin section axial images are performed from the thoracic  inlet to the lung bases utilizing 90 mL of Isovue 370 IV contrast  without adverse event. Coronal reformatted images are also generated.     FINDINGS:  The lungs are clear.  No pneumothorax or pleural effusion.   No pericardial fluid.  Heart is normal in size.  Esophagus is  unremarkable.  No enlarged lymph nodes.  Thoracic aorta is  unremarkable.  No evidence of pulmonary embolism.  Limited images  upper abdomen demonstrate diffuse fatty infiltration of the liver.   Probable nodule left adrenal gland is unchanged.  This is too small to  characterize on this exam due to beam hardening artifact related to  patient body habitus.  Degenerative spine changes are present.        IMPRESSION:  1.  No evidence of pulmonary embolism.  Thoracic aorta is  unremarkable.  2.  Lungs are clear.  No enlarged lymph nodes.  3.  Diffuse fatty infiltration of the liver and probable stable left  adrenal nodule measuring less than 1 cm.  No further followup  suggested as this has been stable for greater than two years.     MERRILL MICHAEL MD        INTERVENTIONAL RADIOLOGY EMBO/THROMBO RECHECK  Mar 5, 2012 5:13:00 PM      HISTORY:  56-year-old female presents with  superior vena cava syndrome   on two days of tPA thrombolysis for superior vena cava thrombosis.      TECHNIQUE: The procedure was explained to the patient in detail.   Initial venacavogram was performed through the existing infusion   catheter. The catheter was then repositioned into the more superior   vena cava and a second superior venacavogram was performed. The   patient was then prepped and draped and 1% lidocaine was used as local   anesthetic. An exchange wire was placed through the infusion catheter   and the infusion catheter and sheath were removed. A 7 Citizen of [place] sheath   was placed in the antecubital region. Over the wire, a [place] Warren   high-pressure 14 mm x 4 cm balloon was advanced into the superior vena   cava and venous angioplasty was performed of the cava. The balloon was   exchanged for a 10 cm infusion catheter which was positioned in the   superior vena cava and completion venogram was performed. Exchange   wire was placed through the catheter and the catheter and sheath were   removed over the wire and an 8 [place] sheath was placed in the   antecubital fossa. Through the 8 [place] sheath, a Bard Warren   high-pressure 16 mm x 4 cm balloon was advanced over a wire into the   superior vena cava and venoplasty was again performed. The catheter   was then removed and exchanged for infusion catheter and a completion   superior venacavogram was performed. The infusion catheter, wire, and   sheath were then removed and manual compression was placed at the   venotomy site until hemostasis was obtained. There were no immediate   complications.      Fluoro time: 4 minutes   Contrast: 125 mL Visipaque   Local anesthesia: 10 mL 1% Lidocaine   Conscious sedation: 1 mg IV Versed, 50 mcg IV fentanyl   Sedation time: 30 minutes      Patient was monitored by the radiology nursing staff under my   supervision and remained stable throughout the study.      FINDINGS: The initial superior venacavogram demonstrates  three areas   of significant stenosis in the proximal, mid and distal superior vena   cava. The most distal stenosis appears to be secondary to a large   calcified mediastinal lymph node. The proximal areas of stenosis are   irregular and suggest residual filling defect due to thrombus however   these have not improved significantly after 48 hours and most likely   represent chronic organized thrombus and underlying stenosis. There is   a large azygos collateral present. Given these residual findings,   revascularization was attempted with angioplasty. I elected not to   place a stent in view of the patient's relatively young age and lack   of a history of underlying malignancy as the etiology. After initial   venoplasty to 14 mm there was significant improvement in luminal   diameter however moderate residual remained particularly at the level   of the calcified lymph node. Following venoplasty to 16 mm there is   further improvement with the proximal and mid superior vena cava now   widely patent. There appeared to be moderate residual stenosis at the   distal superior vena cava at the level of the calcified lymph node,   however oblique filming demonstrated this to be patent without   residual significant stenosis. This was felt to represent a   satisfactory end point and therefore thrombolysis was discontinued and   the catheters were removed.      IMPRESSION: Successful thrombolysis and venoplasty of the superior   vena cava for treatment of superior vena cava syndrome.         INTERVENTIONAL RADIOLOGY EMBO/THROMBO RECHECK    Mar 4, 2012 10:04:00 AM      PROCEDURES:   1. Thrombolysis check.   2. Mechanical thrombectomy superior vena cava.   3. Restart of catheter-directed thrombolysis.      HISTORY: 56-year-old female with acute/subacute thrombosis of the   superior vena cava and development of symptoms of superior vena cava   syndrome. Patient returns for thrombolysis check after 21 hours of    catheter-directed thrombolysis.      DESCRIPTION OF PROCEDURE: The patient was placed in a supine position   on the fluoroscopy table. A venogram was performed through existing   infusion catheter which had been placed into the superior vena cava   one day earlier.      FINDINGS: There had been some interval decrease in amounts of thrombus   within the superior vena cava. There appeared to be possible extrinsic   compression of the lower SVC from a calcified lymph node that had been   seen on the CT scan performed on 3/2/2012.      INTERVENTION: The external portions of existing catheter and right arm   were prepped and draped in the usual sterile manner. 1% lidocaine was   injected for local anesthesia. Existing catheter was removed over a   wire. A 6 [place] vascular sheath was placed. Mechanical thrombectomy   within the SVC was performed using an Angiojet device. Followup   venogram showed some improvement in thrombus in the midportion of the   SVC just inferior to the level of the azygos vein takeoff. There   appeared to be persistent moderate thrombus. Therefore, it was elected   to continue catheter-directed thrombolysis. An infusion catheter was   replaced. Catheter-diretced thrombolysis was restarted.      The patient tolerated the procedure well. There were no immediate   postprocedure complications. The patient's vital signs were monitored   by radiology nursing staff under my supervision and remained stable   throughout the study.      MEDICATIONS: Versed 1.5 mg, fentanyl 75 mcg.      SEDATION TIME: 32 minutes.      FLUOROSCOPY TIME: 5.2 minutes.      CONTRAST: 65 mL Visipaque.      LOCAL ANESTHETIC: 10 mL.      IMPRESSION AND PLAN: Continued catheter-directed thrombolysis   overnight. Recheck tomorrow.      PROCEDURE: 3/3/2012  1. SVC venogram.   2. Catheter directed thrombolysis SVC.      HISTORY: 56-year-old female with acute onset neck and facial swelling.   Patient had a CT scan which shows  occlusion of the superior vena cava.   There is a nonspecific calcification adjacent to the SVC. This may   represent a calcified lymph node. Patient has a past medical history   significant for a left subclavian port which was removed in early   2000. No known hypercoagulable state and no known malignancy.      COMPARISON: CT scan of the abdomen and pelvis dated 3/2/2012.      DESCRIPTION OF PROCEDURE: After obtaining informed consent, the   patient was placed in a supine position on the fluoroscopy table. The   right arm was prepped and draped in the usual sterile manner. 1%   lidocaine was injected for local anesthesia. Ultrasound was used to   evaluate and document patency of the right basilic vein. Under sterile   ultrasound guidance, access into the right basilic vein was obtained.   An image was stated for documentation. A JOHNY-1 catheter was then passed   over a wire. The catheter was positioned in the upper SVC. A venogram   was performed.      There was occlusion of the mid and lower portion of the SVC. Flow was   primarily directed into the azygos and hemiazygos system. This was   also seen on the previous CT scan. Using a JOHNY-1 catheter and stiff   Glidewire, access across the occluded SVC was obtained. A venogram was   performed. This showed a possible stenosis of the proximal aspect of   the SVC and moderate to large amount of thrombus more distally. A 5 cm   infusion catheter was embedded in the thrombus/stenosis. 4 mg tPA was   injected into the thrombus. Catheter-directed thrombolysis was then   started. The catheter was secured to the patient's arm.      The patient tolerated the procedure well. There were no immediate   postprocedure complications. The patient's vital signs were monitored   by radiology nursing staff under my supervision and remained stable   throughout the study.      Medications: Versed 2 mg, fentanyl 100 mcg, tPA 4 mg      Sedation time: 24 minutes      Fluoroscopy time: 5.7  minutes      Contrast: 55 mL Visipaque      IMPRESSION: Catheter-directed thrombolysis started for occluded SVC   and associated SVC syndrome. TPA will be infused at a rate of 0.5 mg   per hour. Heparin will be infused through a peripheral IV at a rate of   500 units/hour. A thrombolysis check will be performed tomorrow.            ULTRASOUND VENOUS LOWER EXTREMITY BILATERAL  Mar 2, 2012 9:40 PM      HISTORY: SVC syndrome, assess for DVT.      COMPARISON: None.      FINDINGS: The right and left lower extremity venous ultrasound is   negative  for DVT. The veins do augment and compress normally. No   thrombus is seen.      IMPRESSION: Negative, no DVT identified.         CT CHEST WITH CONTRAST   Mar 2, 2012 5:33:00 PM      COMPARISON: None.      HISTORY: Chest pain, difficulty breathing, right-sided neck pain and   swelling.      TECHNIQUE: Volumetric helical acquisition of CT images of the chest   from the lung apices to the kidneys were acquired after the   administration of 100 mL of Optiray 350  IV contrast.      FINDINGS: There is a tiny peripheral right lower lobe pulmonary   embolism. The SVC is thrombosed. There is no obvious mass lesion   causing this thrombosis. There is no definite mediastinal fibrosis or   other cause of SVC syndrome evident on the scan. This may simply be   venous thrombosis at this location. Trace pleural fluid on the left.   Minimal peripheral atelectasis. The heart is not enlarged.  Thoracic   aorta is atherosclerotic  without evidence of dissection or aneurysm.   Trace pericardial and right pleural fluid.  There is no pneumothorax.   Adrenal glands are normal.  Remainder of the visualized upper abdomen   is unremarkable.      IMPRESSION:   1. A small peripheral right lower lobe pulmonary embolism is   demonstrated.   2. No thoracic aortic dissection or aneurysm.   3. Thrombosis of the superior vena cava, of uncertain etiology.      Dr. Cutler was contacted by me  on 3/2/2012 at  0543 pm regarding the   abnormal finding of pulmonary embolism and SVC thrombosis and   verbalized understanding of the abnormal finding.         Copath Report Patient Name: CLEMENT VALENTINE  MR#: [id number]  Specimen #: [id number]  Collected: 3/5/2012 15:45  Received: 3/5/2012 22:44  Reported: 3/8/2012 11:40  Ordering Phy(s): CHACE HUFF    _________________________________________          TEST(S) REQUESTED:  A: Factor 5 Leiden and Factor 2 by PCR  B: DNA Isolation, High purity extraction    SPECIMEN DESCRIPTION:  Blood    CLINICAL COMMENTS:  PE    METHODOLOGY:   The regions of genomic DNA containing the [id number] Factor 5  gene mutation (Factor V Leiden) and the Factor 2(Prothrombin [id number])  gene mutation were simultaneously amplified using the polymerase chain  reaction.  The amplified products were digested with restriction  endonuclease TaqI and products were analyzed by gel electrophoresis.    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    The presence of the Factor 5 gene mutation is an indication of an  increased risk of developing a thrombosis.  The extent of this risk is  dependent upon several other known thrombophilic risk factors including  smoking, obesity and the use of oral contraceptives.  Consultation  regarding these results is available upon request.  Genetic counseling  regarding these results is indicated in the evaluation of other family  members.    The patient is negative for the Factor 2 mutation.                This test was developed and its performance determined by the [facility],  [place]  [facility].  It has not been cleared or approved by the U.S. Food and Drug  Administration.  The FDA has determined that such clearance or approval  is not necessary.  Pursuant to the requirements of CLIA' 88, this  laboratory has established and verified the test' s accuracy and  precision.  This test is used for clinical purposes.      Electronically Signed Out By:  Gabe Herndon MD, Physicians          TESTING LAB LOCATION:  [address] [address] [phone]            Component      Latest Ref Rn 3/4/2012  7:00 AM   Albumin Fraction      3.7 - 5.1 g/dL 3.1 (L)    Alpha 1 Fraction      0.2 - 0.4 g/dL 0.4    Alpha 2 Fraction      0.5 - 0.9 g/dL 0.8    Beta Fraction      0.6 - 1.0 g/dL 0.9    Gamma Fraction      0.7 - 1.6 g/dL 0.8    Monoclonal Peak      0.0 g/dL 0.0    ELP Interpretation: Hypoalbuminemia. No monoclonal protein seen. Pathologic significance requires     Cardiolipin IgG Danuta      0 - 15.0 GPL <15.0     Cardiolipin IgM Danuta      0 - 12.5 MPL <12.5     Beta-2-Glycopro IgG 0    Beta-2-Glycopro IgM 6    GINI Screen by EIA      <1.0      Sed Rate      0 - 30 mm/h        Component      Latest Ref Rng 3/4/2012  7:40 AM   Albumin Fraction      3.7 - 5.1 g/dL     Alpha 1 Fraction      0.2 - 0.4 g/dL     Alpha 2 Fraction      0.5 - 0.9 g/dL     Beta Fraction      0.6 - 1.0 g/dL     Gamma Fraction      0.7 - 1.6 g/dL     Monoclonal Peak      0.0 g/dL     ELP Interpretation:     Cardiolipin IgG Danuta      0 - 15.0 GPL     Cardiolipin IgM Danuta      0 - 12.5 MPL     Beta-2-Glycopro IgG     Beta-2-Glycopro IgM     GINI Screen by EIA      <1.0  <1.0     Sed Rate      0 - 30 mm/h 10       Legend:  (L) Low              Component 11 yr ago   Copath Report Patient Name: CLEMENT VALENTINE  MR#: [id number]  Specimen #: [id number]  Collected: 3/3/2012 18:15  Received: 3/5/2012 07:35  Reported: 3/8/2012  15:55  Ordering Phy(s): CHACE HUFF    _________________________________________          TEST(S) REQUESTED:  A: JAK2  B: DNA Isolation, High purity extraction    SPECIMEN DESCRIPTION:  Blood    CLINICAL COMMENTS:  Thrombocytosis and SVC obstruction    METHODOLOGY: The above specimen was fractionated using Ficoll-Histopaque  into mononuclear and granulocyte cell fractions. Total cellular DNA from  the granulocyte cell fraction was extracted. The portion of the JAK2  gene containing the G T transversion (V617F; exon 12) was amplified with  a series of four oligonucleotide primers. The primers include one  fluorescently labeled mutant specific forward primer (mutant (V617F)  allele), one fluorescently labeled forward internal control primer, an  unlabeled mutant specific reverse primer, and an unlabeled wildtype  specific reverse primer. The fluorescently labeled forward primer and a  common unlabeled reverse primer serve as an internal control. The PCR  product is analyzed by capillary gel electrophoresis on an MONICA 3130xl  Genetic Analyzer.    RESULTS:    JAK2 V617F Mutant Allele: Absent    JAK2 V617 Normal Allele: Present    Internal Control: Present    INTERPRETATION:  Molecular testing performed on submitted Blood.    V617F point mutation of the JAK2 gene was not detected in this sample.    COMMENTS:  Absence of the V617F mutation does not rule out clinical diagnosis of  polycythemia vera(PV), essential thrombocythemia(ET), idiopathic  myelofibrosis(IMF) or other clinically indicated diseases. It was  estimated that about 3% of PV, 43% of ET and 50% of IMF could be JAK2  V617F mutation negative. Clinical correlation is required. Analytic  sensitivity of this assay is 1%.                  This test was developed and its performance determined by the [facility], [place]  [facility].  It has not been cleared or approved by the U.S. Food and  Drug  Administration.  The FDA has determined that such clearance or approval  is not necessary.  Pursuant to the requirements of CLIA' 88, this  laboratory has established and verified the test' s accuracy and  precision.  This test is used for clinical purposes.      Electronically Signed Out By:  Gabe Herndon MD, [facility]          TESTING LAB LOCATION:  [facility]  [address], [facility] [address]  [address] [place] [address] [phone]             Component      Latest Ref Rng 3/29/2024  12:48 PM   WBC      4.0 - 11.0 10e3/uL 9.3    RBC Count      3.80 - 5.20 10e6/uL 4.41    Hemoglobin      11.7 - 15.7 g/dL 14.0    Hematocrit      35.0 - 47.0 % 43.2    MCV      78 - 100 fL 98    MCH      26.5 - 33.0 pg 31.7    MCHC      31.5 - 36.5 g/dL 32.4    RDW      10.0 - 15.0 % 15.1 (H)    Platelet Count      150 - 450 10e3/uL 256    % Neutrophils      % 45    % Lymphocytes      % 46    % Monocytes      % 6    % Eosinophils      % 2    % Basophils      % 0    % Immature Granulocytes      % 0    Absolute Neutrophils      1.6 - 8.3 10e3/uL 4.2    Absolute Lymphocytes      0.8 - 5.3 10e3/uL 4.3    Absolute Monocytes      0.0 - 1.3 10e3/uL 0.6    Absolute Eosinophils      0.0 - 0.7 10e3/uL 0.2    Absolute Basophils      0.0 - 0.2 10e3/uL 0.0    Absolute Immature Granulocytes      <=0.4 10e3/uL 0.0    Sodium      135 - 145 mmol/L 142    Potassium      3.4 - 5.3 mmol/L 4.0    Carbon Dioxide (CO2)      22 - 29 mmol/L 28    Anion Gap      7 - 15 mmol/L 11    Urea Nitrogen      8.0 - 23.0 mg/dL 12.2    Creatinine      0.51 - 0.95 mg/dL 0.64    GFR Estimate      >60 mL/min/1.73m2 >90    Calcium      8.8 - 10.2 mg/dL 9.8    Chloride      98 - 107 mmol/L 103    Glucose      70 - 99 mg/dL 66 (L)    Alkaline Phosphatase      40 - 150 U/L 57    AST      0 - 45 U/L 24    ALT      0 - 50 U/L 20    Protein Total      6.4 - 8.3 g/dL 6.9    Albumin      3.5 - 5.2 g/dL 3.9    Bilirubin Total       <=1.2 mg/dL 0.5    Total Cholesterol      <=199 mg/dL 150    Triglycerides      30 - 149 mg/dL 112    HDL Cholesterol      40 - 59 mg/dL 63 (H)    LDL Cholesterol      <=129 mg/dL 65    HDL Size      >=8.9 nm 9.8    VLDL Size      <=46.7 nm 50.2 (H)    LDL Particle Size      >=20.7 nm 21.5    Lge HDL Particle number      >=4.2 umol/L 9.6    HDL Particle Number NMR      >=33.0 umol/L 26.0 (L)    Lge VLDL Part number      <=2.7 nmol/L 2.9 (H)    Small LDL Particle number      <=634 nmol/L <165    LDL Particle Number      <=1135 nmol/L 616    EER LipoFit by NMR See Note    C-Reactive Protein High Sensitivity 6.49    Hemoglobin A1C      0.0 - 5.6 % 5.1    Lipoprotein (a)      <30 mg/dL 6    Free T3      2.0 - 4.4 pg/mL 2.5    T4 Free      0.90 - 1.70 ng/dL 0.91    TSH      0.30 - 4.20 uIU/mL 12.10 (H)       Legend:  (H) High  (L) Low        CT CHEST WITH CONTRAST 3/20/2024 11:40 AM      HISTORY: please conduct injecting the right arm; 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 She has a history of SVC occlusion that was treated in 2012.;  Superior vena cava syndrome; Asymmetrical arms; Other specified  symptoms and signs involving the circulatory and respiratory systems     COMPARISON: CTA chest 5/9/2016.     TECHNIQUE: Volumetric helical acquisition of CT images of the chest  from the clavicles to the kidneys were acquired after the  administration of 135mL ISOVUE-370 IV contrast. Radiation dose for  this scan was reduced using automated exposure control, adjustment of  the mA and/or kV according to patient size, or iterative  reconstruction technique.     FINDINGS:      LUNGS AND PLEURA: No airspace consolidation or pulmonary mass. No  pleural effusion.     MEDIASTINUM/AXILLAE: Normal heart size without pericardial effusion.  No thoracic adenopathy. Similar size of a calcified structure at the  lower superior vena cava which measures 1.6 x 1.6 cm in size but  is  difficult to delineate from surrounding contrast material. The upper  and mid superior vena cava appears patent. There is narrowing of the  lower SVC secondary to the calcified structure with patency difficult  to ascertain. The left brachiocephalic and subclavian veins are not  opacified and appear atretic.     CORONARY ARTERY CALCIFICATIONS: None.     UPPER ABDOMEN: Stable mild thickening of the bilateral adrenal glands.     MUSCULOSKELETAL: Degenerative changes of the spine.                                                                      IMPRESSION:  1.  No new mass within the chest.  2.  Similar calcified structure at the superior cavoatrial junction  which results in narrowing of the lower SVC. Nonopacification and  atretic appearance of the left brachiocephalic and subclavian veins  compatible with history of chronic thrombosis. A dedicated CT venogram  of the chest could better assess the venous system as clinically  indicated.     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 2D and 3D  reconstructions performed by the CT technologist. Dose reduction  techniques were used.  CONTRAST: 135 cc of Isovue 370, from a single use vial     FINDINGS:   VASCULAR FINDINGS: Abdominal aorta, iliac arteries and visceral  arteries appear grossly patent with mild scattered atherosclerotic  calcified disease. The inferior vena cava is widely patent. Renal  veins appear patent bilaterally. The right iliac vein and left iliac  veins appear patent without significant narrowing or significant mass  effect.     NONVASCULAR FINDINGS:  LUNG BASES: No cardiomegaly. Mild to moderate pericardial  effusion.  Mild basilar atelectasis. No pleural effusion or pneumothorax.     ABDOMEN: Right adrenal gland masslike enlargement measuring 1.3 cm.  Previously measuring 1.1 cm on 6/22/2017. Cholelithiasis. Simple right  renal cyst, no further follow-up needed. No hydronephrosis or  enhancing renal mass seen. The spleen, left adrenal gland, liver, and  pancreas are unremarkable.     PELVIS: Diverticulosis. No abnormally dilated loops of bowel. No free  fluid or free air.     MUSCULOSKELETAL: Severe degenerative disease of the right hip. Severe  degenerative disease lumbar spine.                                                                      IMPRESSION:  1.  Patent inferior vena cava and widely patent iliac veins with no  evidence of abnormal compression or stenosis.  2.  Masslike enlargement of the right adrenal gland measuring 1.3 cm.  This is enlarged from 6/22/2017 where it measured 1.1 cm. Consider  formal evaluation with CT or MRI adrenal mass protocol.  3.  Cholelithiasis.  4.  Diverticulosis.     ELIESER GRANADOS MD            ULTRASOUND BILATERAL LOWER EXTREMITY ARTERIAL DUPLEX  3/20/2024 1:23  PM      HISTORY:  Asymmetric legs. Circulatory disorder.     COMPARISON: None.     FINDINGS: Color Doppler and spectral waveform analysis performed.     The sampled arteries of the lower extremities are patent. No focal  elevations in peak systolic velocity to indicate significant stenosis.  Multiphasic waveforms are noted throughout.                                                                      IMPRESSION: No definite significant stenoses are identified in the  sampled arteries of the lower extremity.     ELANA ALCALA MD         [facility]  DATE: 3/20/2024     EXAM: BILATERAL LOWER EXTREMITY VENOUS ULTRASOUND     INDICATION: Bilateral lower extremity edema      TECHNIQUE: Duplex imaging was performed utilizing gray-scale,  compression, augmentation as appropriate, color-flow, Doppler  waveform  analysis, and spectral Doppler imaging.     COMPARISON: 3/2/2012.     FINDINGS:  RIGHT LEG: The common femoral, profunda femoral, femoral, popliteal,  and segmentally visualized calf veins are patent and compressible with  appropriate vascular waveforms. No deep venous thrombus is identified.     LEFT LEG: The common femoral, profunda femoral, femoral, popliteal,  and segmentally visualized calf veins are patent and compressible with  appropriate vascular waveforms. No deep venous thrombus is identified.                                                                      IMPRESSION:   1.  RIGHT LEG: Negative for deep vein thrombosis.  2.  LEFT LEG: Negative for deep vein thrombosis.     ELIESER GRANADOS MD         US UPPER EXTREMITY VENOUS DUPLEX BILATERAL  3/20/2024 1:24 PM      HISTORY: Superior vena cava syndrome; Acute deep vein thrombosis (DVT)  of other vein of left upper extremity (H); Asymmetrical arms     COMPARISON: None.     TECHNIQUE: Examination of the upper extremity veins was performed with  graded compression and 2-D ultrasound and color doppler spectral  waveform analysis.                                                                       Impression: No evidence for DVT in the visualized veins of the upper  extremities. The left internal jugular vein was not adequately  visualized.     ELANA ALCALA MD         Name:  Bere Machuca                                        Patient ID: [id number]  Date: May 3, 2024                                                      : 1955  Sex: female                                                                 Examined by: SAGE Covarrubias RVT  Age:  68 year old                                                         Reading MD: KAMALJIT Ludwig MD     INDICATION:Venous insufficiency, swelling     EXAM TYPE  BILATERAL LOWER EXTREMITY VENOUS DUPLEX FOR VENOUS INSUFFICIENCY  TECHNICAL SUMMARY     Technically limited exam due to patient  habitus, immobility, and discomfort.     A duplex ultrasound study using color flow was performed, to evaluate the bilateral lower extremity veins for valvular incompetence with the patient in a steep reversed trendelenberg.      RIGHT:     The deep veins demonstrate phasic flow, compress and respond to augmentations.  There is no DVT.  The common femoral and mid femoral veins are incompetent and free of thrombus. The remaining deep veins are competent and free of thrombus.      The GSV demonstrates phasic flow, compresses and responds to augmentations from the saphenofemoral junction to the ankle with no evidence of reflux or thrombus. The great saphenous vein measures 7.3 mm at the saphenofemoral junction, 5.1 mm at the proximal thigh, and 2.4 mm at the knee.      The AASV is competent ( 2.6 mm) draining into the saphenofemoral junction.      The Giacomini vein is absent.     The SSV demonstrates phasic flow, compresses and responds to augmentations from the popliteal space to the ankle.  No thrombus is seen. The saphenopopliteal junction is competent (4.1 mm). The SSV is incompetent at the Distal Calf with a reflux time of 973 milliseconds.       Perforators: There is no evidence of incompetent  veins at any level.         LEFT:     The deep veins demonstrate phasic flow, compress and respond to augmentations.  There is no reflux or DVT.  The common femoral vein is incompetent and free of thrombus. The remaining deep veins are competent and free of thrombus.      The GSV demonstrates phasic flow, compresses and responds to augmentations from the saphenofemoral junction to the knee and distal calf to the ankle with no evidence of reflux or thrombus. The GSV is not visualized from proximal calf to mid calf.  The great saphenous vein measures 10.1 mm at the saphenofemoral junction, 5.6 mm at the proximal thigh, and 2.3 mm at the knee.      The AASV is incompetent ( 4.7 mm) draining into the saphenofemoral  junction. The AASV is incompetent from the saphenofemoal junction to the proximal thigh with a reflux time of 5015 milliseconds. The AASV takes a straight course for 13 cm. The AASV gives rise to a varicose branch measuring 3.3 mm off the Proximal Thigh that courses Lateral with a reflux time of 2524 milliseconds.      The Giacomini vein is absent.     The SSV demonstrates phasic flow, compresses and responds to augmentations from the popliteal space to the ankle.  No thrombus is seen. The saphenopopliteal junction is absent. The SSV is incompetent from the Proximal Calf to Mid Calf with a reflux time of 3778 milliseconds.       Perforators: there is no evidence of incompetent  veins at any level.         FINAL SUMMARY:  Right lower extremity:  Deep veinsName:  Bere Machuca                                        Patient ID: [id number]  Date: May 3, 2024                                                      : 1955  Sex: female                                                                 Examined by: SAGE Covarrubias RVT  Age:  68 year old                                                         Reading MD: KAMALJIT Ludwig MD     INDICATION:Venous insufficiency, swelling     EXAM TYPE  BILATERAL LOWER EXTREMITY VENOUS DUPLEX FOR VENOUS INSUFFICIENCY  TECHNICAL SUMMARY     Technically limited exam due to patient habitus, immobility, and discomfort.     A duplex ultrasound study using color flow was performed, to evaluate the bilateral lower extremity veins for valvular incompetence with the patient in a steep reversed trendelenberg.      RIGHT:     The deep veins demonstrate phasic flow, compress and respond to augmentations.  There is no DVT.  The common femoral and mid femoral veins are incompetent and free of thrombus. The remaining deep veins are competent and free of thrombus.      The GSV demonstrates phasic flow, compresses and responds to augmentations from the saphenofemoral junction  to the ankle with no evidence of reflux or thrombus. The great saphenous vein measures 7.3 mm at the saphenofemoral junction, 5.1 mm at the proximal thigh, and 2.4 mm at the knee.      The AASV is competent ( 2.6 mm) draining into the saphenofemoral junction.      The Giacomini vein is absent.     The SSV demonstrates phasic flow, compresses and responds to augmentations from the popliteal space to the ankle.  No thrombus is seen. The saphenopopliteal junction is competent (4.1 mm). The SSV is incompetent at the Distal Calf with a reflux time of 973 milliseconds.       Perforators: There is no evidence of incompetent  veins at any level.         LEFT:     The deep veins demonstrate phasic flow, compress and respond to augmentations.  There is no reflux or DVT.  The common femoral vein is incompetent and free of thrombus. The remaining deep veins are competent and free of thrombus.      The GSV demonstrates phasic flow, compresses and responds to augmentations from the saphenofemoral junction to the knee and distal calf to the ankle with no evidence of reflux or thrombus. The GSV is not visualized from proximal calf to mid calf.  The great saphenous vein measures 10.1 mm at the saphenofemoral junction, 5.6 mm at the proximal thigh, and 2.3 mm at the knee.      The AASV is incompetent ( 4.7 mm) draining into the saphenofemoral junction. The AASV is incompetent from the saphenofemoal junction to the proximal thigh with a reflux time of 5015 milliseconds. The AASV takes a straight course for 13 cm. The AASV gives rise to a varicose branch measuring 3.3 mm off the Proximal Thigh that courses Lateral with a reflux time of 2524 milliseconds.      The Giacomini vein is absent.     The SSV demonstrates phasic flow, compresses and responds to augmentations from the popliteal space to the ankle.  No thrombus is seen. The saphenopopliteal junction is absent. The SSV is incompetent from the Proximal Calf to Mid Calf  with a reflux time of 3778 milliseconds.       Perforators: there is no evidence of incompetent  veins at any level.         FINAL SUMMARY:  Right lower extremity:  Deep veins  1.  No deep vein thrombosis  2.  Common femoral and mid femoral vein incompetence     Superficial veins  1.  No superficial vein thrombosis  2.  Distal calf small saphenous vein incompetence, not clinically significant      veins  No incompetent perforators     Left lower extremity:  Deep veins  1.  No deep vein thrombosis  2.  Common femoral vein incompetence, otherwise deep vein valves are competent     Superficial veins  1.  No superficial vein thrombosis  2.  Absent proximal to mid calf great saphenous vein, otherwise great saphenous vein is patent and competent  3.  Proximal to mid calf small saphenous vein incompetence  4.  Anterior accessory saphenous vein incompetence, the source of varicosities in the thigh and lateral leg      veins  No incompetent perforators     Incompetence Criteria: Greater than 500 milliseconds reflux in the superficial and  veins and greater than 1000 milliseconds reflux in the deep veins.     C Maryann Ludwig MD, FACS     Dictated using Dragon voice recognition software which may result in transcription errors  1.  No deep vein thrombosis  2.  Common femoral and mid femoral vein incompetence     Superficial veins  1.  No superficial vein thrombosis  2.  Distal calf small saphenous vein incompetence, not clinically significant      veins  No incompetent perforators     Left lower extremity:  Deep veins  1.  No deep vein thrombosis  2.  Common femoral vein incompetence, otherwise deep vein valves are competent     Superficial veins  1.  No superficial vein thrombosis  2.  Absent proximal to mid calf great saphenous vein, otherwise great saphenous vein is patent and competent  3.  Proximal to mid calf small saphenous vein incompetence  4.  Anterior accessory  saphenous vein incompetence, the source of varicosities in the thigh and lateral leg      veins  No incompetent perforators     Incompetence Criteria: Greater than 500 milliseconds reflux in the superficial and  veins and greater than 1000 milliseconds reflux in the deep veins.     C Maryann Ludwig MD, FACS     Dictated using Dragon voice recognition software which may result in transcription errors           [facility]  Echocardiography Laboratory  [address] [place] [address]     Name: CLEMENT VALENTINE  MRN: [id number]  : 1955  Study Date: 2024 01:37 PM  Age: 68 yrs  Gender: Female  Patient Location: [facility]  Reason For Study: Asymmetric edema of both lower extremities, Venous  insufficiency  Ordering Physician: ANIKA MUSE  Referring Physician: ANIKA MUSE  Performed By: Lashonda Lynn RDCS     BSA: 2.5 m2  Height: 65 in  Weight: 340 lb  HR: 61  BP: 108/73 mmHg  ______________________________________________________________________________  Procedure  Complete Echo Adult. Optison (NDC #6817-4810) given intravenously.  ______________________________________________________________________________  Interpretation Summary     Left ventricular systolic function is normal.  The visual ejection fraction is 65-70%.  Left ventricular diastolic function is normal.  No regional wall motion abnormalities.  Normal right ventricular size and systolic function.  No valve disease.  Normal inferior vena cava.     There is no comparison study available.  ______________________________________________________________________________  Left Ventricle  The left ventricle is normal in size. There is normal left ventricular wall  thickness. Left ventricular systolic function is normal. The visual ejection  fraction is 65-70%. Left ventricular diastolic function is normal. No regional  wall motion abnormalities noted.     Right Ventricle  The  right ventricle is normal in size and function.     Atria  Normal left atrial size. Right atrial size is normal. There is no atrial shunt  seen.     Mitral Valve  The mitral valve leaflets appear normal. There is no evidence of stenosis,  fluttering, or prolapse. There is mild mitral annular calcification. There is  trace mitral regurgitation. There is no mitral valve stenosis.     Tricuspid Valve  Normal tricuspid valve. There is trace tricuspid regurgitation. The right  ventricular systolic pressure is approximated at 26.2 mmHg plus the right  atrial pressure.     Aortic Valve  The aortic valve is trileaflet. No aortic regurgitation is present. No aortic  stenosis is present.     Pulmonic Valve  The pulmonic valve is not well seen, but is grossly normal. There is trace  pulmonic valvular regurgitation.     Vessels  The aortic root is normal size. Normal size ascending aorta. The inferior vena  cava is normal.     Pericardium  There is no pericardial effusion.     Rhythm  Sinus rhythm was noted.  ______________________________________________________________________________  MMode/2D Measurements & Calculations  IVSd: 1.0 cm     LVIDd: 4.5 cm  LVIDs: 3.3 cm  LVPWd: 1.1 cm  IVC diam: 2.4 cm  FS: 25.3 %  LV mass(C)d: 166.0 grams  LV mass(C)dI: 67.0 grams/m2  Ao root diam: 3.2 cm  asc Aorta Diam: 3.5 cm  Ao root diam index Ht(cm/m): 1.9  Ao root diam index BSA (cm/m2): 1.3  Asc Ao diam index BSA (cm/m2): 1.4  Asc Ao diam index Ht(cm/m): 2.1  LA Volume (BP): 70.9 ml     LA Volume Index (BP): 28.6 ml/m2  RWT: 0.49  TAPSE: 2.5 cm     Doppler Measurements & Calculations  MV E max raza: 108.0 cm/sec  MV A max raza: 65.0 cm/sec  MV E/A: 1.7  MV max P.9 mmHg  MV mean P.3 mmHg  MV V2 VTI: 39.1 cm  MV dec time: 0.21 sec  PA acc time: 0.06 sec  TR max raza: 255.8 cm/sec  TR max P.2 mmHg  E/E' av.0  Lateral E/e': 10.1  Medial E/e': 11.9  RV S Raza: 11.4 cm/sec      ______________________________________________________________________________  Report approved by: Dr Jennifer Bhatti 05/02/2024 02:50 PM

## 2024-08-06 ENCOUNTER — VIRTUAL VISIT (OUTPATIENT)
Dept: SLEEP MEDICINE | Facility: CLINIC | Age: 69
End: 2024-08-06
Payer: COMMERCIAL

## 2024-08-06 VITALS — WEIGHT: 293 LBS | HEIGHT: 65 IN | BODY MASS INDEX: 48.82 KG/M2

## 2024-08-06 DIAGNOSIS — E66.01 MORBID OBESITY (H): ICD-10-CM

## 2024-08-06 DIAGNOSIS — F33.1 MAJOR DEPRESSIVE DISORDER, RECURRENT EPISODE, MODERATE (H): ICD-10-CM

## 2024-08-06 DIAGNOSIS — I10 ESSENTIAL HYPERTENSION: ICD-10-CM

## 2024-08-06 DIAGNOSIS — J44.9 CHRONIC OBSTRUCTIVE PULMONARY DISEASE, UNSPECIFIED COPD TYPE (H): ICD-10-CM

## 2024-08-06 DIAGNOSIS — G47.33 OBSTRUCTIVE SLEEP APNEA SYNDROME: Primary | ICD-10-CM

## 2024-08-06 PROCEDURE — 99204 OFFICE O/P NEW MOD 45 MIN: CPT | Mod: 95 | Performed by: PHYSICIAN ASSISTANT

## 2024-08-06 NOTE — PROGRESS NOTES
Virtual Visit Details    Type of service:  Video Visit   Video Start Time: 10:59AM  Video End Time:11:27AM    Originating Location (pt. Location): Home    Distant Location (provider location):  Off-site  Platform used for Video Visit: Gerald

## 2024-08-06 NOTE — NURSING NOTE
Current patient location: [address]  [facility] [address]    Is the patient currently in the state of [place]? YES    Visit mode:VIDEO    If the visit is dropped, the patient can be reconnected by: VIDEO VISIT: Text to cell phone:   Telephone Information:   Mobile [phone]       Will anyone else be joining the visit? NO  (If patient encounters technical issues they should call [phone]    How would you like to obtain your AVS? MyChart    Are changes needed to the allergy or medication list? No    Are refills needed on medications prescribed by this physician? NO    Rooming Documentation:  Assigned questionnaire(s) completed      Reason for visit: Consult    Samuel VALADEZF

## 2024-08-14 ENCOUNTER — ANTICOAGULATION THERAPY VISIT (OUTPATIENT)
Dept: ANTICOAGULATION | Facility: CLINIC | Age: 69
End: 2024-08-14
Payer: COMMERCIAL

## 2024-08-14 DIAGNOSIS — D68.51 FACTOR 5 LEIDEN MUTATION, HETEROZYGOUS (H): Primary | ICD-10-CM

## 2024-08-14 DIAGNOSIS — Z86.711 HISTORY OF PULMONARY EMBOLISM: ICD-10-CM

## 2024-08-14 LAB — INR HOME MONITORING: 2.4 (ref 2–3)

## 2024-08-14 NOTE — PROGRESS NOTES
ANTICOAGULATION MANAGEMENT     Bere Machuca 68 year old female is on warfarin with therapeutic INR result. (Goal INR 2.0-3.0)    Recent labs: (last 7 days)     08/14/24  0000   INR 2.4       ASSESSMENT     Source(s): Chart Review and Patient/Caregiver Call     Warfarin doses taken: Warfarin taken as instructed  Diet: No new diet changes identified  Medication/supplement changes: None noted  New illness, injury, or hospitalization: No  Signs or symptoms of bleeding or clotting: No  Previous result: Supratherapeutic  Additional findings: None       PLAN     Recommended plan for no diet, medication or health factor changes affecting INR     Dosing Instructions: Continue your current warfarin dose with next INR in 2 weeks       Summary  As of 8/14/2024      Full warfarin instructions:  3.75 mg every Sun; 2.5 mg all other days   Next INR check:  8/28/2024               Telephone call with Darline who verbalizes understanding and agrees to plan    Patient to recheck with home meter    Education provided: Please call back if any changes to your diet, medications or how you've been taking warfarin  Resume manage by exception with home monitor. Continue to submit INR results to home monitor company.You will only be called when your result is out of range. Please call and notify [facility] if new medication started, dose missed, signs or symptoms of bleeding or clotting, or a surgery/procedure is scheduled. Due for next call no later than: 11/12/24.    Plan made per [facility] anticoagulation protocol    Antonietta Cole RN  Anticoagulation Clinic  8/14/2024    _______________________________________________________________________     Anticoagulation Episode Summary       Current INR goal:  2.0-3.0   TTR:  52.0% (1 y)   Target end date:  Indefinite   Send INR reminders to:  [facility] HOME MONITOR[facility]    Indications    Factor 5 Leiden mutation  heterozygous/ INR 2-3 [D68.51]  History of pulmonary embolism [Z86.711]             Comments:   managed by rupesh Ortega home meter             Anticoagulation Care Providers       Provider Role Specialty Phone number    Uyen Mcintyre APRN CNP Referring Internal Medicine [phone]    Jennifer Shea MD Referring Internal Medicine [phone]

## 2024-08-19 DIAGNOSIS — E11.59 CONTROLLED TYPE 2 DIABETES MELLITUS WITH OTHER CIRCULATORY COMPLICATION, WITHOUT LONG-TERM CURRENT USE OF INSULIN (H): ICD-10-CM

## 2024-08-20 RX ORDER — TIRZEPATIDE 5 MG/.5ML
INJECTION, SOLUTION SUBCUTANEOUS
Qty: 2 ML | Refills: 0 | Status: SHIPPED | OUTPATIENT
Start: 2024-08-20 | End: 2024-08-28

## 2024-08-23 ENCOUNTER — DOCUMENTATION ONLY (OUTPATIENT)
Dept: SLEEP MEDICINE | Facility: CLINIC | Age: 69
End: 2024-08-23
Payer: COMMERCIAL

## 2024-08-23 DIAGNOSIS — G47.33 OBSTRUCTIVE SLEEP APNEA SYNDROME: Primary | ICD-10-CM

## 2024-08-23 NOTE — PROGRESS NOTES
Patient was offered choice of vendor and chose [facility].  Patient Bere Machuca was set up at [place] on August 23, 2024. Patient was offered the choice between s10 and s11. Patient received a Resmed Airsense 10 Pressures were set at  8-18 cm H2O.   Patient s ramp is 5 cm H2O for Auto and FLEX/EPR is EPR, 2.  Patient received a Resmed Mask name: Airtouch [id number]  Nasal mask size Medium, Standard, heated tubing and heated humidifier.  Patient has the following compliance requirements: using and visit requirements  Patient has a follow up on 1.31.25 with Terri Dugan PA-C .    Tracy L Fahrenkamp

## 2024-08-27 ENCOUNTER — TELEPHONE (OUTPATIENT)
Dept: INTERNAL MEDICINE | Facility: CLINIC | Age: 69
End: 2024-08-27
Payer: COMMERCIAL

## 2024-08-27 ENCOUNTER — DOCUMENTATION ONLY (OUTPATIENT)
Dept: PODIATRY | Facility: CLINIC | Age: 69
End: 2024-08-27
Payer: COMMERCIAL

## 2024-08-27 DIAGNOSIS — E11.42 DIABETIC POLYNEUROPATHY ASSOCIATED WITH TYPE 2 DIABETES MELLITUS (H): Primary | ICD-10-CM

## 2024-08-27 NOTE — PROGRESS NOTES
Medication Therapy Management (MTM) Encounter    ASSESSMENT:                            Medication Adherence/Access: No issues identified    Diabetes/Obesity/Constipation:   Patient is meeting A1c goal of < 7% but not meeting weight goal, overall lost 70 lbs.  Recommend increasing the Mounjaro 7.5 mg a week. Discussed it is possible constipation could worsen with dose increase. Patient would still like to try, will monitor and continue taking Miralax.     Hypertension:   Stable. Patient is meeting blood pressure goal of < 130/80mmHg. Continue to monitor for low blood pressure.     Hyperlipidemia:   Stable.     Pulmonary embolism history:  Stable, continues to work with Dr. Richter     Chronic Pain: Unimproved, working with pain clinic and will get get surgery once she is able to lose more weight.      Supplements:   Stable.    PLAN:                            Increase Mounjaro to 7.5 mg a week. Watch for worsening constipation and let me know if this isn't tolerable.    Follow-up: Return in about 3 months (around 11/21/2024) for Medication Therapy Management, Phone Visit.    SUBJECTIVE/OBJECTIVE:                          Darline Machuca is a 68 year old female seen for a follow-up visit.       Reason for visit: med review, mounjaro.    Allergies/ADRs: Reviewed in chart  Past Medical History: Reviewed in chart  Tobacco: She reports that she quit smoking about 10 years ago. Her smoking use included cigarettes and vaping device. She started smoking about 45 years ago. She has a 17.5 pack-year smoking history. She has never used smokeless tobacco.  Alcohol: none  Caffeine: 1 cup per day  Activity: unable to do any activity due to needing hip replacement, using walker    Medication Adherence/Access: Patient uses pill box(es).  Patient takes medications 2 time(s) per day.   Per patient, misses medication 0 times per week.   Medication barriers: cost.   The patient fills medications at [place]: NO, fills medications at [facility]  [facility].    Diabetes   /Obesity/Constipation:  Mounjaro 5 mg a week   Reports she has noticed she is more constipated since starting Mounjaro with Suboxone. Using Miralax daily, reports constipation has improved.  She needs to lose weight so she can get surgery to help her pain. Reports she is interested in increasing Mounjaro dose, appetite has started increase and weight plateaued.  Newly diagnosed with lymphedema - starting compression braces/garments. Reports some of her weight retention is related to this and will limit her weight loss.  Current diabetes symptoms: none  Nutrition/Eating Habits: did not discuss in detail.    Exercise/Activity: limited.   Medications Tried/Failed:  Ozempic: terrible stomach aches up to 0.5 of 1mg  Topiramate-allergy list  Bupropion-anxiety     Eye exam in the last 12 months? No  Foot exam: due  Lab Results   Component Value Date    A1C 5.1 03/29/2024    A1C 5.4 01/11/2023    A1C 5.9 01/04/2022    A1C 5.5 06/11/2021     Wt Readings from Last 5 Encounters:   08/06/24 317 lb (143.8 kg)   08/02/24 317 lb (143.8 kg)   07/16/24 317 lb (143.8 kg)   06/06/24 (!) 343 lb (155.6 kg)   04/11/24 (!) 340 lb (154.2 kg)       Hypertension   Losartan discontinued due to concerns with hypotension.  Patient reports no current medication side effects  Patient does not self-monitor blood pressure.     BP Readings from Last 3 Encounters:   08/02/24 98/60   07/16/24 97/59   06/06/24 115/61       Hyperlipidemia   rosuvastatin 20 mg daily  Patient reports no new myalgias since on medication.     Recent Labs   Lab Test 07/16/24  1459 01/11/23  1529 01/04/22  1524 08/10/18  1549 03/29/18  1515   CHOL 126 250*   < > 202* 176   HDL 63 69   < > 49* 76   LDL 43 158*   < > 121* 76   TRIG 99 115   < > 199* 139   CHOLHDLRATIO  --   --   --  4.1 2.3    < > = values in this interval not displayed.       Supplements   Women over 50 multivitamin daily  B complex-C every day   No reported issues at this time.           Pulmonary embolism history:  Warfarin 3.75 mg on Sunday and 2.5 mg every other day - follows with [facility]  Factor 5 Leiden mutation, INR goal 2-3  Reports INR has been all over lately because she feels her diet has been a little unpredictable.  Side effects:  some bruising, no bleeding  History of superior vena cava syndrome, working with Dr. Richter in Vascular    Lab Results   Component Value Date    INR 2.4 08/14/2024    INR 3.7 07/30/2024    INR 2.5 07/10/2024    INR 3.3 06/25/2024    INR 2.8 06/11/2024        Chronic pain:    Acetaminophen 500 mg, 2 tablets as needed   Tizanidine 4 mg twice daily as needed - reports she stopped this for now due to being worried about not waking up to use the restroom in the middle of the night  Suboxone 12-3 twice daily   Lyrica 200 mg twice daily   Pain type/location: history of spinal stenosis, hip placement, fibromyalgia  Pain is described as myalgia/pain all over her body. Reports still in significant pain, but nothing else she can do besides what she already doing.  Follws with [facility]  Patient reports the following side effects: dry mouth and fatigue from tizanidine, has swelling on one leg and arm and working with Dr. Richter on this, working on ECHO this week for the swelling.  Working with [facility], last visit 1/30/24, hip replacement, would like BMI of 45 or less, 278 lb target weight      Today's Vitals: There were no vitals taken for this visit.  ----------------      I spent 16 minutes with this patient today. All changes were made via collaborative practice agreement with Jennifer Shea MD. A copy of the visit note was provided to the patient's provider(s).    A summary of these recommendations was sent via [email or web address].    Mikayla Sosa, PharmD  Medication Therapy Management Pharmacist  Voicemail: [phone]      Telemedicine Visit Details  Type of service:  Telephone visit  Start Time:  3:00 PM  End Time:  3:16 PM      Medication Therapy Recommendations  Controlled type 2 diabetes mellitus with circulatory disorder, without long-term current use of insulin (H)    Current Medication: tirzepatide (MOUNJARO) 5 MG/0.5ML pen (Discontinued)   Rationale: Dose too low - Dosage too low - Effectiveness   Recommendation: Increase Dose   Status: Accepted per CPA

## 2024-08-27 NOTE — TELEPHONE ENCOUNTER
8/2/24 OV notes from podiatry visit recieved via fax. Requesting co-sign from MD. Form in your mailbox for signature. Please advise.

## 2024-08-27 NOTE — PROGRESS NOTES
Medicare requires that the MD/DO treating the patient for diabetes answers all of the questions and signs the pended order for the patient to qualify for diabetic shoes. Once the order is completed, please route the encounter back to sender.    Aruna Becerra

## 2024-08-28 ENCOUNTER — VIRTUAL VISIT (OUTPATIENT)
Dept: PHARMACY | Facility: CLINIC | Age: 69
End: 2024-08-28
Payer: COMMERCIAL

## 2024-08-28 ENCOUNTER — TELEPHONE (OUTPATIENT)
Dept: INTERNAL MEDICINE | Facility: CLINIC | Age: 69
End: 2024-08-28
Payer: COMMERCIAL

## 2024-08-28 DIAGNOSIS — E11.59 CONTROLLED TYPE 2 DIABETES MELLITUS WITH OTHER CIRCULATORY COMPLICATION, WITHOUT LONG-TERM CURRENT USE OF INSULIN (H): Primary | ICD-10-CM

## 2024-08-28 DIAGNOSIS — Z86.711 HISTORY OF PULMONARY EMBOLISM: ICD-10-CM

## 2024-08-28 DIAGNOSIS — I10 BENIGN ESSENTIAL HYPERTENSION: ICD-10-CM

## 2024-08-28 DIAGNOSIS — Z78.9 TAKES DIETARY SUPPLEMENTS: ICD-10-CM

## 2024-08-28 DIAGNOSIS — E66.01 MORBID OBESITY WITH BMI OF 60.0-69.9, ADULT (H): ICD-10-CM

## 2024-08-28 DIAGNOSIS — K59.03 DRUG-INDUCED CONSTIPATION: ICD-10-CM

## 2024-08-28 DIAGNOSIS — E78.5 HYPERLIPIDEMIA LDL GOAL <70: ICD-10-CM

## 2024-08-28 DIAGNOSIS — G89.29 OTHER CHRONIC PAIN: ICD-10-CM

## 2024-08-28 PROBLEM — E11.42 DIABETIC POLYNEUROPATHY ASSOCIATED WITH TYPE 2 DIABETES MELLITUS (H): Status: ACTIVE | Noted: 2024-08-28

## 2024-08-28 PROCEDURE — 99207 PR NO CHARGE LOS: CPT | Mod: 93 | Performed by: PHARMACIST

## 2024-08-28 NOTE — PATIENT INSTRUCTIONS
"Recommendations from today's MTM visit:                                                       Increase Mounjaro to 7.5 mg a week. Watch for worsening constipation and let me know if this isn't tolerable.    Follow-up: Return in about 3 months (around 11/21/2024) for Medication Therapy Management, Phone Visit.    It was great speaking with you today.  I value your experience and would be very thankful for your time in providing feedback in our clinic survey. In the next few days, you may receive an email or text message from [employer] with a link to a survey related to your  clinical pharmacist.\"     To schedule another MTM appointment, please call the clinic directly or you may call the MTM scheduling line at [phone] or toll-free at [phone].     My Clinical Pharmacist's contact information:                                                      Please feel free to contact me with any questions or concerns you have.      Mikayla Sosa, PharmD  Medication Therapy Management Pharmacist  Voicemail: [phone]     "

## 2024-08-28 NOTE — LETTER
August 28, 2024    To  Bere Mckenzie [facility] [address]  [facility] [address]    Your team at [facility] cares about your health. We have reviewed your chart and based on our findings; we are making the following recommendations to better manage your health.     You are in particular need of attention regarding the following:     Schedule a DIABETIC FOLLOW UP appointment for Office Visit. Patients with diabetes should see their provider regularly.  Call or MyChart message your clinic to schedule a colonoscopy, schedule/ a FIT Test, or order a Cologuard test. If you are unsure what type of test you need, please call your clinic and speak to clinic staff.   Colon cancer is now the second leading cause of cancer-related deaths in the [place] for both men and women and there are over 130,000 new cases and 50,000 deaths per year from colon cancer. Colonoscopies can prevent 90-95% of these deaths. Problem lesions can be removed before they ever become cancer. This test is not only looking for cancer, but also getting rid of precancerous lesions.   If you are under/uninsured, we recommend you contact the [employer] Program.[employer] is a free colorectal cancer screening program that provides colonoscopies for eligible under/uninsured [place] men and women. If you are interested in receiving a free colonoscopy, please call [employer] at t [phone] (mention code [employer]) to see if you're eligible. Please have them send us the results.   Schedule Annual MAMMOGRAPHY. [facility] scheduling number is [phone] or schedule in [employer] (self referral).    If you have already completed these items, please contact the clinic via phone or   [employer] so your care team can review and update your records. Thank you for   choosing [facility] for your healthcare needs. For any questions,   concerns, or to schedule an appointment please contact our clinic.    Healthy  Regards,      Your [facility] Team

## 2024-08-28 NOTE — TELEPHONE ENCOUNTER
Patient Quality Outreach    Patient is due for the following:   Diabetes -  Eye Exam, Microalbumin, and Foot Exam  Colon Cancer Screening  Breast Cancer Screening - Mammogram    Next Steps:   Schedule a office visit for Diabetes -  Eye Exam, Microalbumin, and Foot Exam    Type of outreach:    Sent letter.      Questions for provider review:    Sowmya Ramsey MA  Chart routed to none.

## 2024-09-04 ENCOUNTER — DOCUMENTATION ONLY (OUTPATIENT)
Dept: ANTICOAGULATION | Facility: CLINIC | Age: 69
End: 2024-09-04
Payer: COMMERCIAL

## 2024-09-04 NOTE — PROGRESS NOTES
ANTICOAGULATION     Bere Machuca is overdue for an INR check.     Mychart sent    Celena Mobley RN

## 2024-09-09 DIAGNOSIS — F33.0 MILD EPISODE OF RECURRENT MAJOR DEPRESSIVE DISORDER (H): ICD-10-CM

## 2024-09-10 RX ORDER — ESCITALOPRAM OXALATE 20 MG/1
TABLET ORAL
Qty: 90 TABLET | Refills: 1 | Status: SHIPPED | OUTPATIENT
Start: 2024-09-10

## 2024-09-11 ENCOUNTER — DOCUMENTATION ONLY (OUTPATIENT)
Dept: ANTICOAGULATION | Facility: CLINIC | Age: 69
End: 2024-09-11
Payer: COMMERCIAL

## 2024-09-11 DIAGNOSIS — D68.51 FACTOR 5 LEIDEN MUTATION, HETEROZYGOUS (H): Primary | ICD-10-CM

## 2024-09-11 DIAGNOSIS — Z86.711 HISTORY OF PULMONARY EMBOLISM: ICD-10-CM

## 2024-09-11 LAB — INR HOME MONITORING: 2.1 (ref 2–3)

## 2024-09-11 NOTE — PROGRESS NOTES
ANTICOAGULATION  MANAGEMENT-Home Monitor Managed by Bautista FINNEY Sven 68 year old female is on warfarin with therapeutic INR result. (Goal INR 2.0-3.0)    Recent labs: (last 7 days)     09/11/24  0000   INR 2.1       Previous INR was Therapeutic  Medication, diet, health changes since last INR:chart reviewed; none identified  Contacted within the last 12 weeks by phone on 8/14/24   Due for next call no later than: 11/12/24.   Last [facility] referral date: 11/29/2023      JONN     Bere was NOT contacted regarding therapeutic result today per home monitoring policy manage by exception agreement.   Current warfarin dose is to be continued:     Summary  As of 9/11/2024      Full warfarin instructions:  3.75 mg every Sun; 2.5 mg all other days   Next INR check:  9/25/2024             ?   Fidelia Garza RN  Anticoagulation Clinic  9/11/2024    _______________________________________________________________________     Anticoagulation Episode Summary       Current INR goal:  2.0-3.0   TTR:  52.0% (1 y)   Target end date:  Indefinite   Send INR reminders to:  [facility]    Indications    Factor 5 Leiden mutation  heterozygous/ INR 2-3 [D68.51]  History of pulmonary embolism [Z86.711]             Comments:  managed by exception  [employer] home meter             Anticoagulation Care Providers       Provider Role Specialty Phone number    Uyen Mcintyre APRN CNP Referring Internal Medicine [phone]    Jennifer Shea MD Referring Internal Medicine [phone]

## 2024-09-19 ENCOUNTER — TRANSFERRED RECORDS (OUTPATIENT)
Dept: HEALTH INFORMATION MANAGEMENT | Facility: CLINIC | Age: 69
End: 2024-09-19
Payer: COMMERCIAL

## 2024-09-19 LAB — CREATININE (EXTERNAL): 0.5 MG/DL (ref 0.5–1.05)

## 2024-09-28 LAB — INR HOME MONITORING: 2.5 (ref 2–3)

## 2024-09-30 ENCOUNTER — DOCUMENTATION ONLY (OUTPATIENT)
Dept: ANTICOAGULATION | Facility: CLINIC | Age: 69
End: 2024-09-30
Payer: COMMERCIAL

## 2024-09-30 DIAGNOSIS — Z86.711 HISTORY OF PULMONARY EMBOLISM: ICD-10-CM

## 2024-09-30 DIAGNOSIS — D68.51 FACTOR 5 LEIDEN MUTATION, HETEROZYGOUS (H): Primary | ICD-10-CM

## 2024-09-30 NOTE — PROGRESS NOTES
ANTICOAGULATION  MANAGEMENT-Home Monitor Managed by Bautista FINNEY Sven 68 year old female is on warfarin with therapeutic INR result. (Goal INR 2.0-3.0)    Recent labs: (last 7 days)     09/28/24  0000   INR 2.5       Previous INR was Therapeutic  Medication, diet, health changes since last INR:chart reviewed; none identified  Contacted within the last 12 weeks by phone on 8/14/24  Last [facility] referral date: 11/29/2023      JONN Blackburn was NOT contacted regarding therapeutic result today per home monitoring policy manage by exception agreement.   Current warfarin dose is to be continued:       ?   Fidelia Warren RN  Anticoagulation Clinic  9/30/2024    _______________________________________________________________________     Anticoagulation Episode Summary       Current INR goal:  2.0-3.0   TTR:  51.7% (1 y)   Target end date:  Indefinite   Send INR reminders to:  [facility]IT[facility]    Indications    Factor 5 Leiden mutation  heterozygous/ INR 2-3 [D68.51]  History of pulmonary embolism [Z86.711]             Comments:  managed by exception  [employer] home meter             Anticoagulation Care Providers       Provider Role Specialty Phone number    Uyen Mcintyre APRN CNP Referring Internal Medicine [phone]    Jennifer Shea MD Referring Internal Medicine [phone]

## 2024-10-02 DIAGNOSIS — F11.20 OPIOID DEPENDENCE (H): Primary | ICD-10-CM

## 2024-10-11 ENCOUNTER — PATIENT OUTREACH (OUTPATIENT)
Dept: CARE COORDINATION | Facility: CLINIC | Age: 69
End: 2024-10-11
Payer: COMMERCIAL

## 2024-10-14 ENCOUNTER — DOCUMENTATION ONLY (OUTPATIENT)
Dept: ANTICOAGULATION | Facility: CLINIC | Age: 69
End: 2024-10-14
Payer: COMMERCIAL

## 2024-10-14 DIAGNOSIS — Z86.711 HISTORY OF PULMONARY EMBOLISM: ICD-10-CM

## 2024-10-14 DIAGNOSIS — D68.51 FACTOR 5 LEIDEN MUTATION, HETEROZYGOUS (H): Primary | ICD-10-CM

## 2024-10-14 LAB — INR HOME MONITORING: 2.8 (ref 2–3)

## 2024-10-14 NOTE — PROGRESS NOTES
ANTICOAGULATION  MANAGEMENT-Home Monitor Managed by Bautista FINNEY Sven 68 year old female is on warfarin with therapeutic INR result. (Goal INR 2.0-3.0)    Recent labs: (last 7 days)     10/14/24  0000   INR 2.8       Previous INR was Therapeutic  Medication, diet, health changes since last INR:chart reviewed; none identified  Contacted within the last 12 weeks by phone on 8/14/24   Due for next call no later than: 11/12/24.   Last [facility] referral date: 11/29/2023      JONN     Bere was NOT contacted regarding therapeutic result today per home monitoring policy manage by exception agreement.   Current warfarin dose is to be continued:     Summary  As of 10/14/2024      Full warfarin instructions:  3.75 mg every Sun; 2.5 mg all other days   Next INR check:  10/28/2024             ?   Fidelia Garza RN  Anticoagulation Clinic  10/14/2024    _______________________________________________________________________     Anticoagulation Episode Summary       Current INR goal:  2.0-3.0   TTR:  52.0% (1 y)   Target end date:  Indefinite   Send INR reminders to:  [facility]    Indications    Factor 5 Leiden mutation  heterozygous/ INR 2-3 [D68.51]  History of pulmonary embolism [Z86.711]             Comments:  managed by exception  [employer] home meter             Anticoagulation Care Providers       Provider Role Specialty Phone number    Uyen Mcintyre APRN CNP Referring Internal Medicine [phone]    Jennifer Shea MD Referring Internal Medicine [phone]

## 2024-10-27 DIAGNOSIS — Z86.711 HISTORY OF PULMONARY EMBOLISM: ICD-10-CM

## 2024-10-27 DIAGNOSIS — D68.51 FACTOR 5 LEIDEN MUTATION, HETEROZYGOUS (H): ICD-10-CM

## 2024-10-28 RX ORDER — WARFARIN SODIUM 2.5 MG/1
TABLET ORAL
Qty: 100 TABLET | Refills: 1 | Status: SHIPPED | OUTPATIENT
Start: 2024-10-28

## 2024-10-28 NOTE — TELEPHONE ENCOUNTER
ANTICOAGULATION MANAGEMENT:  Medication Refill    Anticoagulation Summary  As of 10/14/2024      Warfarin maintenance plan:  3.75 mg (2.5 mg x 1.5) every Sun; 2.5 mg (2.5 mg x 1) all other days   Next INR check:  10/28/2024   Target end date:  Indefinite    Indications    Factor 5 Leiden mutation  heterozygous/ INR 2-3 [D68.51]  History of pulmonary embolism [Z86.711]                 Anticoagulation Care Providers       Provider Role Specialty Phone number    Uyen Mcintyre APRN CNP Referring Internal Medicine [phone]    Jennifer Shea MD Referring Internal Medicine [phone]            Refill Criteria    Visit with referring provider/group: Meets criteria: visit within referring provider group in the last 15 months on 9/19/24    [facility] referral last signed: 11/29/2023; within last year: Yes    Lab monitoring not exceeding 2 weeks overdue: Yes    Bere meets all criteria for refill. Rx instructions and quantity supplied updated to match patient's current dosing plan. Warfarin 90 day supply with 1 refill granted per [facility] protocol     Fely Kidd, RN  Anticoagulation Clinic

## 2024-10-28 NOTE — ED NOTES
+ blood culture. Attempted to call back. No answer, voice mail left     Geraldo Rosas MD  06/22/17 9868    Spoke to Bere. She will return to be revaluated      Geraldo Rosas MD  06/22/17 3609    
Lab in to draw blood,pt resting, joking with staff, no distress, pt aware we're waiting for x-ray.  
Pt presents with SOB, was sent here from clinic to get checked out pt had elevated D-dimer and has partial subclavian blockage. ABC's intact. A&Ox4.   
Pt resting, no distress, offers no complaints, visitor at bedside, awaiting x-ray.  
[FreeTextEntry1] : 61-year-old male hx of BPH with elevated PSA of 12 ng/mL (sept 2024). has had high PSA for years since 2017. + family history of prostate cancer in father late in age. + maternal history of breast cancer in relatives has had genetic testing --- ++ ROXANA gene correlated with Prostate Ca SCr=1.59 // GFR=50 IPSS = 15 /// WENDY = 25 Since 2020, and 2021. dec 2022: has had 3 MRIs of the prostate: "all clean per patient" Feb 2022 MRI prostate ZWP: PS=47cc / no lesions. had a prostate biopsy in 2021: all cores negative US KB (sept 2024): PS=47cc / PGJ=370 cc / no hydronephrosis ///// has recently started finasteride. non-smoker / no blood thinners. October 2024: MRI prostate: PS=46 cc / PIRADS 3 lesion, left TZa - 7x10 mm - neg NVB, SVI, LN // sx on Alfuzosin ER 10 mg: some days are okay. other days are not great. still has severe sense of urgency. PSA=10.2 / 12% free.  PSAD=0.26 October 28, 2024: s/p TP fusion Bx:  3/3 cores in target lesion: Gl 7 (3+4) up to 90% involvement--- with pattern 4 =40% of the tumor /// Right anterior bx Gl 7 (3+4)

## 2024-10-29 ENCOUNTER — TRANSFERRED RECORDS (OUTPATIENT)
Dept: HEALTH INFORMATION MANAGEMENT | Facility: CLINIC | Age: 69
End: 2024-10-29
Payer: COMMERCIAL

## 2024-10-30 ENCOUNTER — DOCUMENTATION ONLY (OUTPATIENT)
Dept: ANTICOAGULATION | Facility: CLINIC | Age: 69
End: 2024-10-30
Payer: COMMERCIAL

## 2024-10-30 ENCOUNTER — ANTICOAGULATION THERAPY VISIT (OUTPATIENT)
Dept: ANTICOAGULATION | Facility: CLINIC | Age: 69
End: 2024-10-30
Payer: COMMERCIAL

## 2024-10-30 DIAGNOSIS — D68.51 FACTOR 5 LEIDEN MUTATION, HETEROZYGOUS (H): Primary | ICD-10-CM

## 2024-10-30 LAB — INR HOME MONITORING: 3.8 (ref 2–3)

## 2024-10-30 NOTE — PROGRESS NOTES
ANTICOAGULATION MANAGEMENT     Bere Machuca 69 year old female is on warfarin with supratherapeutic INR result. (Goal INR 2.0-3.0)    Recent labs: (last 7 days)     10/30/24  0000   INR 3.8*       ASSESSMENT     Source(s): Chart Review and Patient/Caregiver Call     Warfarin doses taken: Warfarin taken as instructed  Diet: No new diet changes identified  Medication/supplement changes: None noted  New illness, injury, or hospitalization: Yes: Pt reports that she got multiple vaccines a couple days ago. Pt states that she doesn't feel great and her arm is sore. This may explain the elevated INR level today.  Signs or symptoms of bleeding or clotting: No  Previous result: Therapeutic last 2(+) visits  Additional findings: None       PLAN       Dosing Instructions: Continue your current warfarin dose with next INR in 1 week       Summary  As of 10/30/2024      Full warfarin instructions:  10/30: Hold; Otherwise 3.75 mg every Sun; 2.5 mg all other days   Next INR check:  11/6/2024               Telephone call with Darline who verbalizes understanding and agrees to plan    Patient to recheck with home meter    Education provided: Contact [phone] with any changes, questions or concerns.     Plan made per [facility] anticoagulation protocol    Arcelia Trujillo RN  10/30/2024  Anticoagulation Clinic  [employer] for routing messages: p ANTICOAG HOME MONITORING  [facility] patient phone line: [phone]        _______________________________________________________________________     Anticoagulation Episode Summary       Current INR goal:  2.0-3.0   TTR:  48.5% (1 y)   Target end date:  Indefinite   Send INR reminders to:  [facility]IT[facility]    Indications    Factor 5 Leiden mutation  heterozygous/ INR 2-3 [D68.51]  History of pulmonary embolism [Z86.711]             Comments:  managed by rupesh  [employer] home meter             Anticoagulation Care Providers       Provider Role Specialty Phone number    Uyen Mcintyre  MARY Gaines CNP Referring Internal Medicine [phone]    Jennifer Shea MD Referring Internal Medicine [phone]

## 2024-10-30 NOTE — PROGRESS NOTES
ANTICOAGULATION CLINIC REFERRAL RENEWAL REQUEST       An annual renewal order is required for all patients referred to [facility] Anticoagulation Clinic.?  Please review and sign the pended referral order for Bere Machuca.       ANTICOAGULATION SUMMARY      Warfarin indication(s)   Factor 5 Leiden mutation  heterozygous/ INR 2-3 [D68.51]  History of pulmonary embolism [Z86.711]         Current goal range   INR: 2.0-3.0     Goal appropriate for indication? Goal INR 2-3, standard for indication(s) above     Time in Therapeutic Range (TTR)  (Goal > 60%) 48.5%       Office visit with referring provider's group within last year yes on 7/16/2024       Arcelia Trujillo RN  [facility] Anticoagulation Clinic

## 2024-11-03 DIAGNOSIS — J44.9 CHRONIC OBSTRUCTIVE PULMONARY DISEASE, UNSPECIFIED COPD TYPE (H): ICD-10-CM

## 2024-11-04 RX ORDER — FLUTICASONE FUROATE, UMECLIDINIUM BROMIDE AND VILANTEROL TRIFENATATE 100; 62.5; 25 UG/1; UG/1; UG/1
POWDER RESPIRATORY (INHALATION)
Qty: 60 EACH | Refills: 11 | Status: SHIPPED | OUTPATIENT
Start: 2024-11-04

## 2024-11-07 LAB — INR HOME MONITORING: 3.6 (ref 2–3)

## 2024-11-08 ENCOUNTER — TELEPHONE (OUTPATIENT)
Dept: INTERNAL MEDICINE | Facility: CLINIC | Age: 69
End: 2024-11-08
Payer: COMMERCIAL

## 2024-11-08 ENCOUNTER — ANTICOAGULATION THERAPY VISIT (OUTPATIENT)
Dept: ANTICOAGULATION | Facility: CLINIC | Age: 69
End: 2024-11-08
Payer: COMMERCIAL

## 2024-11-08 DIAGNOSIS — D68.51 FACTOR 5 LEIDEN MUTATION, HETEROZYGOUS (H): Primary | ICD-10-CM

## 2024-11-08 DIAGNOSIS — Z86.711 HISTORY OF PULMONARY EMBOLISM: ICD-10-CM

## 2024-11-08 NOTE — PROGRESS NOTES
ANTICOAGULATION MANAGEMENT     Bere Machuca 69 year old female is on warfarin with supratherapeutic INR result. (Goal INR 2.0-3.0)    Recent labs: (last 7 days)     11/07/24  0000   INR 3.6*       ASSESSMENT     Source(s): Chart Review and Patient/Caregiver Call     Warfarin doses taken: Warfarin taken as instructed, HELD previous result as instructed  Diet: No new diet changes identified, doesn't eat much, may have eaten more than normal, nothing with Vitamin K  Medication/supplement changes: None noted  New illness, injury, or hospitalization: No, cough for several weeks, lack of sleep and constipation, not new for patient  Signs or symptoms of bleeding or clotting: No  Previous result: Supratherapeutic  Additional findings: None       PLAN     Recommended plan for ongoing change(s) affecting INR     Dosing Instructions: hold dose then decrease your warfarin dose ( 13.3 % change) with next INR in 1 week  Patient self held on 11/7/24     Summary  As of 11/8/2024      Full warfarin instructions:  1.25 mg every Mon; 2.5 mg all other days   Next INR check:  11/14/2024               Telephone call with Darline who agrees to plan and repeated back plan correctly    Patient to recheck with home meter  My Chart sent    Education provided: Please call back if any changes to your diet, medications or how you've been taking warfarin  Goal range and lab monitoring: goal range and significance of current result    Plan made per [facility] anticoagulation protocol    Catherine Lynn RN  11/8/2024  Anticoagulation Clinic  [employer] for routing messages: p ANTICOAG HOME MONITORING  [facility] patient phone line: [phone]        _______________________________________________________________________     Anticoagulation Episode Summary       Current INR goal:  2.0-3.0   TTR:  47.8% (1 y)   Target end date:  Indefinite   Send INR reminders to:  [facility]IT[facility]    Indications    Factor 5 Leiden mutation  heterozygous/ INR 2-3  [D68.51]  History of pulmonary embolism [Z86.711]             Comments:  managed by exception  [employer] home meter             Anticoagulation Care Providers       Provider Role Specialty Phone number    Uyen Mcintyre APRN CNP Referring Internal Medicine [phone]    Jennifer Shea MD Referring Internal Medicine [phone]

## 2024-11-08 NOTE — TELEPHONE ENCOUNTER
Patient Returning Call    Reason for call:  Tried to get to phone. Please call back today 11/8, Friday. Thank you!    Information relayed to patient:  N/A  Yes      [facility] or would you prefer to receive a phone call?:   Patient would prefer a phone call   Okay to leave a detailed message?: Yes at Cell number on file:    Telephone Information:   Mobile [phone]

## 2024-11-14 ENCOUNTER — LAB (OUTPATIENT)
Dept: LAB | Facility: CLINIC | Age: 69
End: 2024-11-14
Payer: COMMERCIAL

## 2024-11-14 DIAGNOSIS — F11.20 OPIOID DEPENDENCE (H): ICD-10-CM

## 2024-11-14 LAB
AMPHETAMINES UR QL SCN: NORMAL
AMPHETAMINES UR QL: NOT DETECTED
BARBITURATES UR QL SCN: NORMAL
BARBITURATES UR QL SCN: NOT DETECTED
BENZODIAZ UR QL SCN: NORMAL
BENZODIAZ UR QL SCN: NOT DETECTED
BUPRENORPHINE UR QL: ABNORMAL
BUPRENORPHINE UR QL: DETECTED
BZE UR QL SCN: NORMAL
CANNABINOIDS UR QL SCN: NORMAL
CANNABINOIDS UR QL: NOT DETECTED
COCAINE UR QL SCN: NOT DETECTED
D-METHAMPHET UR QL: NOT DETECTED
FENTANYL UR QL: NORMAL
METHADONE UR QL SCN: NOT DETECTED
OPIATES UR QL SCN: NORMAL
OPIATES UR QL SCN: NOT DETECTED
OXYCODONE UR QL SCN: NOT DETECTED
PCP QUAL URINE (ROCHE): NORMAL
PCP UR QL SCN: NOT DETECTED
TRICYCLICS UR QL SCN: NOT DETECTED

## 2024-11-14 PROCEDURE — 80307 DRUG TEST PRSMV CHEM ANLYZR: CPT

## 2024-11-16 ENCOUNTER — HEALTH MAINTENANCE LETTER (OUTPATIENT)
Age: 69
End: 2024-11-16

## 2024-11-17 LAB — INR HOME MONITORING: 2 (ref 2–3)

## 2024-11-18 ENCOUNTER — ANTICOAGULATION THERAPY VISIT (OUTPATIENT)
Dept: ANTICOAGULATION | Facility: CLINIC | Age: 69
End: 2024-11-18
Payer: COMMERCIAL

## 2024-11-18 DIAGNOSIS — D68.51 FACTOR 5 LEIDEN MUTATION, HETEROZYGOUS (H): Primary | ICD-10-CM

## 2024-11-18 DIAGNOSIS — Z86.711 HISTORY OF PULMONARY EMBOLISM: ICD-10-CM

## 2024-11-18 NOTE — PROGRESS NOTES
ANTICOAGULATION MANAGEMENT     Bere Machuca 69 year old female is on warfarin with therapeutic INR result. (Goal INR 2.0-3.0)    Recent labs: (last 7 days)     11/17/24  0000   INR 2.0       ASSESSMENT     Source(s): Chart Review  Previous INR was Supratherapeutic  Medication, diet, health changes since last INR chart reviewed; none identified         PLAN     Recommended plan for no diet, medication or health factor changes affecting INR     Dosing Instructions: Continue your current warfarin dose with next INR in 2 weeks       Summary  As of 11/18/2024      Full warfarin instructions:  1.25 mg every Mon; 2.5 mg all other days   Next INR check:  12/2/2024               Detailed voice message left for Darline with dosing instructions and follow up date.   Sent [employer] message with dosing and follow up instructions    Patient to recheck with home meter    Education provided: Please call back if any changes to your diet, medications or how you've been taking warfarin  Resume manage by exception with home monitor. Continue to submit INR results to home monitor company.You will only be called when your result is out of range. Please call and notify [facility] if new medication started, dose missed, signs or symptoms of bleeding or clotting, or a surgery/procedure is scheduled. Due for next call no later than: 2/16/25.  Contact [phone] with any changes, questions or concerns.     Plan made per [facility] anticoagulation protocol    Bailee Delgado RN  11/18/2024  Anticoagulation Clinic  [facility] for routing messages: p ANTICOAG HOME MONITORING  [facility] patient phone line: [phone]        _______________________________________________________________________     Anticoagulation Episode Summary       Current INR goal:  2.0-3.0   TTR:  49.5% (1 y)   Target end date:  Indefinite   Send INR reminders to:  ANTICOAG HOME MONITORING    Indications    Factor 5 Leiden mutation  heterozygous/ INR 2-3 [D68.51]  History of  pulmonary embolism [Z86.711]             Comments:  managed by exception  [employer] home meter             Anticoagulation Care Providers       Provider Role Specialty Phone number    Uyen Mcintyre APRN CNP Referring Internal Medicine [phone]    Jennifer Shea MD Referring Internal Medicine [phone]

## 2024-11-19 NOTE — PROGRESS NOTES
Medication Therapy Management (MTM) Encounter    ASSESSMENT:                            Medication Adherence/Access: No issues identified.    Diabetes/Obesity/Constipation:   Patient is meeting A1c goal of < 7% but not meeting weight goal, overall lost 70 lbs.  Recommend starting senna-docusate to help with constipation due to being on both Mounjaro and Suboxone contributing to constipation. Discussed often narcotics especially require a stimulant laxative to help move bowels. Looks like she was on this in the past, she doesn't recall and unclear why she stopped it.  Recommend repeat A1c with annual visit.    PLAN:                            Start Senna-docusate 1 tablet daily to help with constipation  Schedule annual visit with Dr. Shea and schedule annual labs including A1c    Follow-up: Return in about 4 weeks (around 12/19/2024) for Medication Therapy Management, Phone Visit.    SUBJECTIVE/OBJECTIVE:                          Darline Machuca is a 69 year old female seen for a follow-up visit.       Reason for visit: diabetes.    Allergies/ADRs: Reviewed in chart  Past Medical History: Reviewed in chart  Tobacco: She reports that she quit smoking about 10 years ago. Her smoking use included cigarettes and vaping device. She started smoking about 45 years ago. She has a 17.5 pack-year smoking history. She has never used smokeless tobacco.  Alcohol: none  Caffeine: 1 cup per day  Activity: unable to do any activity due to needing hip replacement, using walker    Medication Adherence/Access: Patient uses pill box(es).  Patient takes medications 2 time(s) per day.   Per patient, misses medication 0 times per week.   Medication barriers: cost.   The patient fills medications at [place]: NO, fills medications at [facility].    Diabetes   /Obesity/Constipation:  Mounjaro 7.5 mg a week   Reports she has noticed she is more constipated since starting Mounjaro with Suboxone. Using Miralax daily. Admits she doesn't  eat enough fiber. Reports she needs to get constipation under control before even thinking of increasing mounjaro further. Also not eating much so doesn't even know if she needs to increase mounjaro further.  She needs to lose weight so she can get surgery to help her pain.   Newly diagnosed with lymphedema - starting compression braces/garments. Reports some of her weight retention is related to this and will limit her weight loss.  Current diabetes symptoms: none  Nutrition/Eating Habits: No changes to diet or exercise.  Blood sugars: not checking  At home weight: 320 lb most recently at home - reports this scale always reads higher than our clinic scale though  Exercise/Activity: limited.   Medications Tried/Failed:  Ozempic: terrible stomach aches up to 0.5 of 1mg  Topiramate-allergy list  Bupropion-anxiety     Eye exam in the last 12 months? No  Foot exam: due  Lab Results   Component Value Date    A1C 5.1 03/29/2024    A1C 5.4 01/11/2023    A1C 5.9 01/04/2022    A1C 5.5 06/11/2021     Wt Readings from Last 5 Encounters:   08/06/24 317 lb (143.8 kg)   08/02/24 317 lb (143.8 kg)   07/16/24 317 lb (143.8 kg)   06/06/24 (!) 343 lb (155.6 kg)   04/11/24 (!) 340 lb (154.2 kg)         Today's Vitals: There were no vitals taken for this visit.  ----------------      I spent 15 minutes with this patient today. All changes were made via collaborative practice agreement with Jennifer Shea MD. A copy of the visit note was provided to the patient's provider(s).    A summary of these recommendations was sent via [employer].    Mikayla Sosa, PharmD  Medication Therapy Management Pharmacist  Voicemail: [phone]      Telemedicine Visit Details  The patient's medications can be safely assessed via a telemedicine encounter.  Type of service:  Telephone visit  Originating Location (pt. Location): Home    Distant Location (provider location):  On-site  Start Time:  3:00 PM  End Time:  3:15 PM     Medication Therapy  Recommendations  Drug-induced constipation   1 Current Medication: SENNA-docusate sodium (SENNA S) 8.6-50 MG tablet   Current Medication Sig: Take 1 tablet by mouth at bedtime.   Rationale: Synergistic therapy - Needs additional medication therapy - Indication   Recommendation: Start Medication   Status: Accepted per CPA   Identified Date: 11/21/2024 Completed Date: 11/21/2024

## 2024-11-21 ENCOUNTER — VIRTUAL VISIT (OUTPATIENT)
Dept: PHARMACY | Facility: CLINIC | Age: 69
End: 2024-11-21
Payer: COMMERCIAL

## 2024-11-21 DIAGNOSIS — E11.59 CONTROLLED TYPE 2 DIABETES MELLITUS WITH OTHER CIRCULATORY COMPLICATION, WITHOUT LONG-TERM CURRENT USE OF INSULIN (H): Primary | ICD-10-CM

## 2024-11-21 DIAGNOSIS — E66.01 MORBID OBESITY WITH BMI OF 60.0-69.9, ADULT (H): ICD-10-CM

## 2024-11-21 DIAGNOSIS — K59.03 DRUG-INDUCED CONSTIPATION: ICD-10-CM

## 2024-11-21 RX ORDER — SENNA AND DOCUSATE SODIUM 50; 8.6 MG/1; MG/1
1 TABLET, FILM COATED ORAL AT BEDTIME
Qty: 30 TABLET | Refills: 1 | Status: SHIPPED | OUTPATIENT
Start: 2024-11-21

## 2024-11-21 NOTE — PATIENT INSTRUCTIONS
"Recommendations from today's MTM visit:                                                       Start Senna-docusate 1 tablet daily to help with constipation  Schedule annual visit with Dr. Shea and schedule annual labs including A1c    Follow-up: Return in about 4 weeks (around 12/19/2024) for Medication Therapy Management, Phone Visit.    It was great speaking with you today.  I value your experience and would be very thankful for your time in providing feedback in our clinic survey. In the next few days, you may receive an email or text message from [employer] with a link to a survey related to your  clinical pharmacist.\"     To schedule another MTM appointment, please call the clinic directly or you may call the MTM scheduling line at [phone] or toll-free at [phone].     My Clinical Pharmacist's contact information:                                                      Please feel free to contact me with any questions or concerns you have.      Mikayla Sosa, PharmD  Medication Therapy Management Pharmacist  Voicemail: [phone]     "

## 2024-12-02 ENCOUNTER — DOCUMENTATION ONLY (OUTPATIENT)
Dept: ANTICOAGULATION | Facility: CLINIC | Age: 69
End: 2024-12-02
Payer: COMMERCIAL

## 2024-12-02 DIAGNOSIS — D68.51 FACTOR 5 LEIDEN MUTATION, HETEROZYGOUS (H): Primary | ICD-10-CM

## 2024-12-02 DIAGNOSIS — Z86.711 HISTORY OF PULMONARY EMBOLISM: ICD-10-CM

## 2024-12-02 LAB — INR HOME MONITORING: 2.6 (ref 2–3)

## 2024-12-02 NOTE — PROGRESS NOTES
ANTICOAGULATION  MANAGEMENT-Home Monitor Managed by Bautista FINNEY Sven 69 year old female is on warfarin with therapeutic INR result. (Goal INR 2.0-3.0)    Recent labs: (last 7 days)     12/02/24  0000   INR 2.6       Previous INR was Therapeutic  Medication, diet, health changes since last INR:chart reviewed; none identified  Contacted within the last 12 weeks by phone on 11/18/2024  Due for next call no later than: 2/16/25.   Last [facility] referral date: 10/31/2024      JONN     Bere was NOT contacted regarding therapeutic result today per home monitoring policy manage by exception agreement.   Current warfarin dose is to be continued:     Summary  As of 12/2/2024      Full warfarin instructions:  1.25 mg every Mon; 2.5 mg all other days   Next INR check:  12/16/2024             ?   Bailee Delgado, RN  Anticoagulation Clinic  12/2/2024    _______________________________________________________________________     Anticoagulation Episode Summary       Current INR goal:  2.0-3.0   TTR:  51.9% (1 y)   Target end date:  Indefinite   Send INR reminders to:  [facility]    Indications    Factor 5 Leiden mutation  heterozygous/ INR 2-3 [D68.51]  History of pulmonary embolism [Z86.711]             Comments:  managed by exception  [employer] home meter             Anticoagulation Care Providers       Provider Role Specialty Phone number    Uyen Mcintyre APRN CNP Referring Internal Medicine [phone]    Jennifer Shea MD Referring Internal Medicine [phone]

## 2024-12-03 NOTE — PROGRESS NOTES
ANTICOAGULATION  MANAGEMENT-Home Monitor Managed by Exception    Bere FINNEY Sven 67 year old female is on warfarin with therapeutic INR result. (Goal INR 2.0-3.0)    Recent labs: (last 7 days)     10/01/23  0000   INR 2.9       Previous INR was Therapeutic  Medication, diet, health changes since last INR:chart reviewed; none identified  Contacted within the last 12 weeks by phone on 7/12/23        JONN     Bere was NOT contacted regarding therapeutic result today per home monitoring policy manage by exception agreement.   Current warfarin dose is to be continued:     Summary  As of 10/2/2023      Full warfarin instructions:  2.5 mg every Fri; 5 mg all other days   Next INR check:  10/16/2023             ?   Celena Mobley RN  Anticoagulation Clinic  10/2/2023    _______________________________________________________________________     Anticoagulation Episode Summary       Current INR goal:  2.0-3.0   TTR:  71.5 % (1 y)   Target end date:  Indefinite   Send INR reminders to:  [facility]    Indications    Factor 5 Leiden mutation  heterozygous/ INR 2-3 [D68.51]  History of pulmonary embolism [Z86.711]             Comments:  managed by exception  [employer] home meter             Anticoagulation Care Providers       Provider Role Specialty Phone number    Uyen Mcintyre APRN CNP Referring Internal Medicine [phone]             Sven

## 2024-12-18 ENCOUNTER — NURSE TRIAGE (OUTPATIENT)
Dept: INTERNAL MEDICINE | Facility: CLINIC | Age: 69
End: 2024-12-18
Payer: COMMERCIAL

## 2024-12-18 DIAGNOSIS — E78.00 HYPERCHOLESTEREMIA: ICD-10-CM

## 2024-12-18 RX ORDER — ROSUVASTATIN CALCIUM 20 MG/1
20 TABLET, COATED ORAL DAILY
Qty: 90 TABLET | Refills: 0 | Status: SHIPPED | OUTPATIENT
Start: 2024-12-18

## 2024-12-18 NOTE — PROGRESS NOTES
Medication Therapy Management (MTM) Encounter    ASSESSMENT:                            Medication Adherence/Access: No issues identified.    Diabetes/Obesity/Constipation:   Patient is meeting A1c goal of < 7% but not meeting weight goal, overall lost 70 lbs.  Discussed we dose constipation meds according to the stool, so if we are having either too frequent or too soft of bowel movement I would reduce the frequency. Also discussed she may not need the Miralax with senna-docusate, since the senna-docusate contains a stool softener, it really depends on her stool and how she responds.  Recommend repeat A1c with annual visit.    PLAN:                            Play around with the constipation meds, Miralax and senna-docusate, and see what works best for you. Taking just one of the as needed or every other day may be a better option.    Follow-up: Return in about 13 weeks (around 3/20/2025) for Medication Therapy Management, Phone Visit.    SUBJECTIVE/OBJECTIVE:                          Darline Machuca is a 69 year old female seen for a follow-up visit.       Reason for visit: diabetes.    Allergies/ADRs: Reviewed in chart  Past Medical History: Reviewed in chart  Tobacco: She reports that she quit smoking about 10 years ago. Her smoking use included cigarettes and vaping device. She started smoking about 45 years ago. She has a 17.5 pack-year smoking history. She has never used smokeless tobacco.  Alcohol: none  Caffeine: 1 cup per day  Activity: unable to do any activity due to needing hip replacement, using walker    Medication Adherence/Access: Patient uses pill box(es).  Patient takes medications 2 time(s) per day.   Per patient, misses medication 0 times per week.   Medication barriers: cost.   The patient fills medications at [place]: NO, fills medications at [facility].    Diabetes   /Obesity/Constipation:  Mounjaro 7.5 mg a week   Reports the senna-docusate and Miralax has been a little too much. Has  been taking both daily and reports this has been too intense. Now concerns with explosive diarrhea instead of constipation.  She needs to lose weight so she can get surgery to help her pain.   Newly diagnosed with lymphedema - starting compression braces/garments. Reports some of her weight retention is related to this and will limit her weight loss.  Current diabetes symptoms: none  Nutrition/Eating Habits: No changes to diet or exercise.  Blood sugars: not checking  At home weight: no recent weights, coming into clinic tomorrow so will check it then  Exercise/Activity: limited.   Medications Tried/Failed:  Ozempic: terrible stomach aches up to 0.5 of 1mg  Topiramate-allergy list  Bupropion-anxiety     Eye exam in the last 12 months? No  Foot exam: due    Lab Results   Component Value Date    A1C 5.1 03/29/2024    A1C 5.4 01/11/2023    A1C 5.9 01/04/2022    A1C 5.5 06/11/2021     Wt Readings from Last 5 Encounters:   08/06/24 317 lb (143.8 kg)   08/02/24 317 lb (143.8 kg)   07/16/24 317 lb (143.8 kg)   06/06/24 (!) 343 lb (155.6 kg)   04/11/24 (!) 340 lb (154.2 kg)         Today's Vitals: There were no vitals taken for this visit.  ----------------      I spent 10 minutes with this patient today. All changes were made via collaborative practice agreement with Jennifer Shea MD.     A summary of these recommendations was sent via [employer].    Champ ArnoldD  Medication Therapy Management Pharmacist  Voicemail: [phone]      Telemedicine Visit Details  The patient's medications can be safely assessed via a telemedicine encounter.  Type of service:  Telephone visit  Originating Location (pt. Location): Home    Distant Location (provider location):  On-site  Start Time:  2:50 PM  End Time:  3:00 PM     Medication Therapy Recommendations  Drug-induced constipation   1 Current Medication: SENNA-docusate sodium (SENNA S) 8.6-50 MG tablet (Discontinued)   Current Medication Sig: Take 1 tablet by mouth at  bedtime.   Rationale: Dose too high - Dosage too high - Safety   Recommendation: Decrease Frequency   Status: Accepted per CPA   Identified Date: 12/19/2024 Completed Date: 12/19/2024

## 2024-12-19 ENCOUNTER — VIRTUAL VISIT (OUTPATIENT)
Dept: PHARMACY | Facility: CLINIC | Age: 69
End: 2024-12-19
Payer: COMMERCIAL

## 2024-12-19 DIAGNOSIS — E66.01 MORBID OBESITY WITH BMI OF 60.0-69.9, ADULT (H): ICD-10-CM

## 2024-12-19 DIAGNOSIS — E11.59 CONTROLLED TYPE 2 DIABETES MELLITUS WITH OTHER CIRCULATORY COMPLICATION, WITHOUT LONG-TERM CURRENT USE OF INSULIN (H): Primary | ICD-10-CM

## 2024-12-19 DIAGNOSIS — K59.03 DRUG-INDUCED CONSTIPATION: ICD-10-CM

## 2024-12-19 RX ORDER — SENNA AND DOCUSATE SODIUM 50; 8.6 MG/1; MG/1
1 TABLET, FILM COATED ORAL PRN
Qty: 30 TABLET | Refills: 1 | Status: SHIPPED | OUTPATIENT
Start: 2024-12-19

## 2024-12-19 NOTE — TELEPHONE ENCOUNTER
Sent [employer] message to patient to speak to triage RN.     Thank you,  Tito Ventura, Triage RN [facility]  10:27 AM 12/19/2024

## 2024-12-19 NOTE — TELEPHONE ENCOUNTER
Okay to continue with appointment with Melany Vasquez tomorrow.  To follow-up in nearest urgent care or emergency room if symptoms worsen today.

## 2024-12-19 NOTE — PATIENT INSTRUCTIONS
"Recommendations from today's MTM visit:                                                       Play around with the constipation meds, Miralax and senna-docusate, and see what works best for you. Taking just one of the as needed or every other day may be a better option.    Follow-up: Return in about 13 weeks (around 3/20/2025) for Medication Therapy Management, Phone Visit.    It was great speaking with you today.  I value your experience and would be very thankful for your time in providing feedback in our clinic survey. In the next few days, you may receive an email or text message from [employer] with a link to a survey related to your  clinical pharmacist.\"     To schedule another MTM appointment, please call the clinic directly or you may call the MTM scheduling line at [phone] or toll-free at [phone].     My Clinical Pharmacist's contact information:                                                      Please feel free to contact me with any questions or concerns you have.      Mikayla Sosa, PharmD  Medication Therapy Management Pharmacist  Voicemail: [phone]     "
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
## 2024-12-23 ENCOUNTER — TELEPHONE (OUTPATIENT)
Dept: ANTICOAGULATION | Facility: CLINIC | Age: 69
End: 2024-12-23
Payer: COMMERCIAL

## 2024-12-23 ENCOUNTER — MYC MEDICAL ADVICE (OUTPATIENT)
Dept: ANTICOAGULATION | Facility: CLINIC | Age: 69
End: 2024-12-23
Payer: COMMERCIAL

## 2024-12-23 DIAGNOSIS — Z86.711 HISTORY OF PULMONARY EMBOLISM: ICD-10-CM

## 2024-12-23 DIAGNOSIS — D68.51 FACTOR 5 LEIDEN MUTATION, HETEROZYGOUS: Primary | ICD-10-CM

## 2024-12-23 NOTE — TELEPHONE ENCOUNTER
[facility]-PROCEDURAL ANTICOAGULATION  MANAGEMENT    [facility] requesting pre-procedure hold orders for warfarin and review for bridging      Procedure date: 2/3/25       Procedure: Colonoscopy      Procedure location and phone number (if external): [facility]  ([phone])     Number of warfarin hold days requested and/or target INR: 4 days    Pre-op date: Not applicable      Routing to Anticoagulation Pharmacist for review.     [facility] pool: p ANTICOAG HOME MONITORING     Rosi Almeida RN

## 2024-12-25 LAB — INR HOME MONITORING: 1.1 (ref 2–3)

## 2024-12-26 ENCOUNTER — ANTICOAGULATION THERAPY VISIT (OUTPATIENT)
Dept: ANTICOAGULATION | Facility: CLINIC | Age: 69
End: 2024-12-26
Payer: COMMERCIAL

## 2024-12-26 DIAGNOSIS — Z86.711 HISTORY OF PULMONARY EMBOLISM: ICD-10-CM

## 2024-12-26 DIAGNOSIS — D68.51 FACTOR 5 LEIDEN MUTATION, HETEROZYGOUS (H): Primary | ICD-10-CM

## 2024-12-26 NOTE — PROGRESS NOTES
ANTICOAGULATION MANAGEMENT     Bree Machuca 69 year old female is on warfarin with subtherapeutic INR result. (Goal INR 2.0-3.0)    Recent labs: (last 7 days)     12/25/24  0000   INR 1.1*       ASSESSMENT     Source(s): Chart Review and Patient/Caregiver Call     Warfarin doses taken: Less warfarin taken than planned which may be affecting INR, patient had the dosing reversed since the last INR. On return call with dosing patient thinks it may have been  weeks with the reversed dosing.  Diet:  increase in food due to the holiday.  Medication/supplement changes: None noted  New illness, injury, or hospitalization: No  Signs or symptoms of bleeding or clotting: No  Previous result: Therapeutic last 2(+) visits  Additional findings: None       PLAN     Recommended plan for temporary change(s) affecting INR     Dosing Instructions: booster dose then continue your current warfarin dose with next INR in 9 days       Summary  As of 12/26/2024      Full warfarin instructions:  12/26: 5 mg; Otherwise 1.25 mg every Mon; 2.5 mg all other days   Next INR check:  1/3/2025               Telephone call with Darline who verbalizes understanding and agrees to plan    Patient to recheck with home meter    Education provided: Symptom monitoring: monitoring for clotting signs and symptoms, monitoring for stroke signs and symptoms, and when to seek medical attention/emergency care    Plan made with [facility] Pharmacist Tika Delgado RN  12/26/2024  Anticoagulation Clinic  [facility] for routing messages: p ANTICOAG HOME MONITORING  [facility] patient phone line: [phone]        _______________________________________________________________________     Anticoagulation Episode Summary       Current INR goal:  2.0-3.0   TTR:  52.6% (1 y)   Target end date:  Indefinite   Send INR reminders to:  ANTICOAG HOME MONITORING    Indications    Factor 5 Leiden mutation  heterozygous/ INR 2-3 [D68.51]  History of pulmonary  embolism [Z86.711]             Comments:  managed by exception  [employer] home meter             Anticoagulation Care Providers       Provider Role Specialty Phone number    Uyen Mcintyre APRN CNP Referring Internal Medicine [phone]    Jennifer Shea MD Referring Internal Medicine [phone]

## 2024-12-28 DIAGNOSIS — J44.9 CHRONIC OBSTRUCTIVE PULMONARY DISEASE, UNSPECIFIED COPD TYPE (H): ICD-10-CM

## 2024-12-28 DIAGNOSIS — E11.59 CONTROLLED TYPE 2 DIABETES MELLITUS WITH OTHER CIRCULATORY COMPLICATION, WITHOUT LONG-TERM CURRENT USE OF INSULIN (H): ICD-10-CM

## 2024-12-30 RX ORDER — ALBUTEROL SULFATE 90 UG/1
INHALANT RESPIRATORY (INHALATION)
Qty: 18 G | Refills: 2 | Status: SHIPPED | OUTPATIENT
Start: 2024-12-30

## 2024-12-31 RX ORDER — TIRZEPATIDE 7.5 MG/.5ML
INJECTION, SOLUTION SUBCUTANEOUS
Qty: 3 ML | Refills: 1 | Status: SHIPPED | OUTPATIENT
Start: 2024-12-31

## 2025-01-02 ENCOUNTER — ANTICOAGULATION THERAPY VISIT (OUTPATIENT)
Dept: ANTICOAGULATION | Facility: CLINIC | Age: 70
End: 2025-01-02
Payer: COMMERCIAL

## 2025-01-02 DIAGNOSIS — Z86.711 HISTORY OF PULMONARY EMBOLISM: ICD-10-CM

## 2025-01-02 DIAGNOSIS — D68.51 FACTOR 5 LEIDEN MUTATION, HETEROZYGOUS (H): Primary | ICD-10-CM

## 2025-01-02 LAB — INR HOME MONITORING: 1.6 (ref 2–3)

## 2025-01-02 NOTE — PROGRESS NOTES
ANTICOAGULATION MANAGEMENT     Bere Machuca 69 year old female is on warfarin with subtherapeutic INR result. (Goal INR 2.0-3.0)    Recent labs: (last 7 days)     01/02/25  0000   INR 1.6*       ASSESSMENT     Source(s): Chart Review and Patient/Caregiver Call     Warfarin doses taken: Warfarin taken as instructed  Diet: No new diet changes identified  Medication/supplement changes: None noted, taking constipation medication more often.  New illness, injury, or hospitalization: No  Signs or symptoms of bleeding or clotting: No  Previous result: Subtherapeutic  Additional findings: Upcoming surgery/procedure march 10, 2025 colonoscopy.       PLAN     Recommended plan for no diet, medication or health factor changes affecting INR     Dosing Instructions: Increase your warfarin dose (7.7% change) with next INR in 1 week       Summary  As of 1/2/2025      Full warfarin instructions:  2.5 mg every day   Next INR check:  1/8/2025               Telephone call with Darline who verbalizes understanding and agrees to plan    Patient to recheck with home meter    Education provided: Symptom monitoring: monitoring for bleeding signs and symptoms, monitoring for clotting signs and symptoms, monitoring for stroke signs and symptoms, and when to seek medical attention/emergency care. Reviewed pinched nerve in shoulder.    Plan made per [facility] anticoagulation protocol    Bailee Delgado RN  1/2/2025  Anticoagulation Clinic  [employer] for routing messages: p ANTICOAG HOME MONITORING  [facility] patient phone line: [phone]        _______________________________________________________________________     Anticoagulation Episode Summary       Current INR goal:  2.0-3.0   TTR:  52.4% (1 y)   Target end date:  Indefinite   Send INR reminders to:  ANTICOAG HOME MONITORING    Indications    Factor 5 Leiden mutation  heterozygous/ INR 2-3 [D68.51]  History of pulmonary embolism [Z86.711]             Comments:  managed by  exception  [employer] home meter             Anticoagulation Care Providers       Provider Role Specialty Phone number    Uyen Mcintyre APRN CNP Referring Internal Medicine [phone]    Jennifer Shea MD Referring Internal Medicine [phone]

## 2025-01-08 ENCOUNTER — ANTICOAGULATION THERAPY VISIT (OUTPATIENT)
Dept: ANTICOAGULATION | Facility: CLINIC | Age: 70
End: 2025-01-08
Payer: COMMERCIAL

## 2025-01-08 DIAGNOSIS — D68.51 FACTOR 5 LEIDEN MUTATION, HETEROZYGOUS: Primary | ICD-10-CM

## 2025-01-08 DIAGNOSIS — Z86.711 HISTORY OF PULMONARY EMBOLISM: ICD-10-CM

## 2025-01-08 LAB — INR HOME MONITORING: 1.7 (ref 2–3)

## 2025-01-08 NOTE — PROGRESS NOTES
ANTICOAGULATION MANAGEMENT     Bere Machuca 69 year old female is on warfarin with subtherapeutic INR result. (Goal INR 2.0-3.0)    Recent labs: (last 7 days)     01/08/25  0000   INR 1.7*       ASSESSMENT     Source(s): Chart Review and Patient/Caregiver Call     Warfarin doses taken: Warfarin taken as instructed  Diet: No new diet changes identified  Medication/supplement changes: None noted  New illness, injury, or hospitalization: No  Signs or symptoms of bleeding or clotting: No  Previous result: Subtherapeutic  Additional findings: Upcoming surgery/procedure 3/10/25 Colonoscopy  TE procedure plan dated 12/23/24 routed to [facility]       PLAN     Recommended plan for no diet, medication or health factor changes affecting INR     Dosing Instructions: booster dose then Increase your warfarin dose (7% change) with next INR in 1 week       Summary  As of 1/8/2025      Full warfarin instructions:  1/8: 5 mg; Otherwise 3.75 mg every Wed; 2.5 mg all other days   Next INR check:  1/15/2025               Telephone call with Darline who verbalizes understanding and agrees to plan and who agrees to plan and repeated back plan correctly    Patient to recheck with home meter    Education provided: Symptom monitoring: monitoring for clotting signs and symptoms, monitoring for stroke signs and symptoms, and when to seek medical attention/emergency care    Plan made per [facility] anticoagulation protocol    Fidelia Garza RN  1/8/2025  Anticoagulation Clinic  [employer] [place] for routing messages: p ANTICOAG HOME MONITORING  [facility] patient phone line: [phone]        _______________________________________________________________________     Anticoagulation Episode Summary       Current INR goal:  2.0-3.0   TTR:  52.4% (1 y)   Target end date:  Indefinite   Send INR reminders to:  ANTICOAG HOME MONITORING    Indications    Factor 5 Leiden mutation  heterozygous/ INR 2-3 [D68.51]  History of pulmonary embolism [Z86.711]             Comments:   managed by [employer]up[employer] Ortega home meter             Anticoagulation Care Providers       Provider Role Specialty Phone number    Uyen Mcintyre APRN CNP Referring Internal Medicine [phone]    Jennifer Shea MD Referring Internal Medicine [phone]

## 2025-01-09 ENCOUNTER — TRANSFERRED RECORDS (OUTPATIENT)
Dept: HEALTH INFORMATION MANAGEMENT | Facility: CLINIC | Age: 70
End: 2025-01-09
Payer: COMMERCIAL

## 2025-01-14 NOTE — TELEPHONE ENCOUNTER
May hold for five days pre procedurally without bridging if she would like, or with bridging if she would like. The choice is hers. If she chooses bridging, dose should be 1 mg/ kg SQ Q`12 hours starting one day warfarin is held until 24 hours preprocedure.

## 2025-01-14 NOTE — TELEPHONE ENCOUNTER
Spoke with Darline, she is comfortable with no bridge for colonoscopy based on risk/benefit and Dr. Richter's input.    Darline also stated due to transportation issues she has rescheduled her colonoscopy to 03/10/2025    Tika Melendez, PharmD BCACP  Anticoagulation Clinical Pharmacist

## 2025-01-14 NOTE — TELEPHONE ENCOUNTER
"LAURITA-PROCEDURAL ANTICOAGULATION  MANAGEMENT    ASSESSMENT     Warfarin interruption plan for colonoscopy on 03/10/2025.    Indication for Anticoagulation: DVT, PE, and Heterozygous Factor V Leiden    PE & superior vena cava thrombus  2012  Per 10/2015 [place] ONC notes, LAC+ at time of event, LAC- on retest  Superior Vena Cava Syndrome  Non-occlusive thrombus left jugular 2016  Procedure history:  Warfarin held, bridged for spinal ablation 2015  Warfarin held, bridged for KUMAR 2016  Lovenox 2017 with low INR  Warfarin held, bridged for colonoscopy 2017    Laurita-Procedure Risk stratification for thromboembolism: moderate ( Chest guidelines)    VTE:  CHEST Perioperative Management guidelines suggest against bridging for patients with Hx of VTE as sole clinical indication for warfarin except in high risk stratification patients.  There are currently no guidelines addressing laurita-procedural bridging in this indication. The decision to bridging is based on the risk of bleeding weighed against the risk of clotting.      RECOMMENDATION     Pre-Procedure:  Hold warfarin for 4 days, until after procedure startin2025   No Bridge    Post-Procedure:  Resume warfarin dose if okay with provider doing procedure on night of procedure, 03/10/2025 PM: 7.5mg  Recheck INR ~ 7 days after resuming warfarin     Plan routed to referring provider for approval  ?   Tika Melendez [facility]    SUBJECTIVE/OBJECTIVE     Bere Machuca, a 69 year old female    Goal INR Range: 2.0-3.0     Wt Readings from Last 3 Encounters:   24 143.8 kg (317 lb)   24 143.8 kg (317 lb)   24 143.8 kg (317 lb)      Ideal body weight: 57 kg (125 lb 10.6 oz)  Adjusted ideal body weight: 91.7 kg (202 lb 3.2 oz)     Estimated body mass index is 52.75 kg/m  as calculated from the following:    Height as of 24: 1.651 m (5' 5\").    Weight as of 24: 143.8 kg (317 lb).    Lab Results   Component Value Date    INR 1.7 " (L) 01/08/2025    INR 1.6 (L) 01/02/2025    INR 1.1 (L) 12/25/2024     Lab Results   Component Value Date    HGB 14.0 03/29/2024    HCT 43.2 03/29/2024     03/29/2024     Lab Results   Component Value Date    CR 0.64 03/29/2024    CR 0.73 01/11/2023    CR 0.59 01/04/2022     Estimated Creatinine Clearance: 123.5 mL/min (based on SCr of 0.64 mg/dL).

## 2025-01-16 ENCOUNTER — ANTICOAGULATION THERAPY VISIT (OUTPATIENT)
Dept: ANTICOAGULATION | Facility: CLINIC | Age: 70
End: 2025-01-16
Payer: COMMERCIAL

## 2025-01-16 DIAGNOSIS — Z86.711 HISTORY OF PULMONARY EMBOLISM: ICD-10-CM

## 2025-01-16 DIAGNOSIS — D68.51 FACTOR 5 LEIDEN MUTATION, HETEROZYGOUS: Primary | ICD-10-CM

## 2025-01-16 LAB — INR HOME MONITORING: 2 (ref 2–3)

## 2025-01-16 NOTE — TELEPHONE ENCOUNTER
Discussed procedure plan in detail with patient.   Patient verbalizes understanding and agrees to plan. No further questions or concerns.  Kathya Dhaliwal RN on 1/16/2025 at 9:58 AM

## 2025-01-16 NOTE — TELEPHONE ENCOUNTER
Voicemail left for patient to return call to Anticoagulation Clinic  Kathya Dhaliwal RN on 1/16/2025 at 9:24 AM

## 2025-01-16 NOTE — PROGRESS NOTES
ANTICOAGULATION MANAGEMENT     Bere Machuca 69 year old female is on warfarin with therapeutic INR result. (Goal INR 2.0-3.0)    Recent labs: (last 7 days)     01/16/25  0000   INR 2.0       ASSESSMENT     Source(s): Chart Review and Patient/Caregiver Call     Warfarin doses taken: Warfarin taken as instructed  Diet: No new diet changes identified  Medication/supplement changes: None noted  New illness, injury, or hospitalization: No  Signs or symptoms of bleeding or clotting: No  Previous result: Subtherapeutic  Additional findings: Upcoming surgery/procedure colonoscopy 3/10/25  (see TE 12/23/24)     PLAN     Recommended plan for no diet, medication or health factor changes affecting INR     Dosing Instructions: Continue your current warfarin dose with next INR in 1 week       Summary  As of 1/16/2025      Full warfarin instructions:  3/6: Hold; 3/7: Hold; 3/8: Hold; 3/9: Hold; 3/10: 7.5 mg; Otherwise 3.75 mg every Wed; 2.5 mg all other days   Next INR check:  1/23/2025               Telephone call with Darline who verbalizes understanding and agrees to plan    Patient to recheck with home meter    Education provided: Please call back if any changes to your diet, medications or how you've been taking warfarin  Resume manage by exception with home monitor. Continue to submit INR results to home monitor company.You will only be called when your result is out of range and at 90 day check in. Please call and notify [facility] if new medication started, dose missed, signs or symptoms of bleeding or clotting, or a surgery/procedure is scheduled. Due for next call no later than: 4/16/25.    Plan made per [facility] anticoagulation protocol    Kathya Dhaliwal RN  1/16/2025  Anticoagulation Clinic  [employer] for routing messages: [employer] [facility] [employer]  [facility] patient phone line: [phone]        _______________________________________________________________________     Anticoagulation Episode Summary       Current INR goal:   2.0-3.0   TTR:  52.4% (1 y)   Target end date:  Indefinite   Send INR reminders to:  ANTICOAG HOME MONITORING    Indications    Factor 5 Leiden mutation  heterozygous/ INR 2-3 [D68.51]  History of pulmonary embolism [Z86.711]             Comments:  managed by exception  [employer] home meter             Anticoagulation Care Providers       Provider Role Specialty Phone number    Uyen Mcintyre APRN CNP Referring Internal Medicine [phone]    Jennifer Shea MD Referring Internal Medicine [phone]

## 2025-01-22 DIAGNOSIS — F33.0 MILD EPISODE OF RECURRENT MAJOR DEPRESSIVE DISORDER: ICD-10-CM

## 2025-01-22 RX ORDER — ESCITALOPRAM OXALATE 20 MG/1
TABLET ORAL
Qty: 90 TABLET | Refills: 1 | OUTPATIENT
Start: 2025-01-22

## 2025-01-26 ASSESSMENT — SLEEP AND FATIGUE QUESTIONNAIRES
HOW LIKELY ARE YOU TO NOD OFF OR FALL ASLEEP WHILE SITTING INACTIVE IN A PUBLIC PLACE: SLIGHT CHANCE OF DOZING
HOW LIKELY ARE YOU TO NOD OFF OR FALL ASLEEP WHILE SITTING QUIETLY AFTER LUNCH WITHOUT ALCOHOL: WOULD NEVER DOZE
HOW LIKELY ARE YOU TO NOD OFF OR FALL ASLEEP WHILE SITTING AND TALKING TO SOMEONE: WOULD NEVER DOZE
HOW LIKELY ARE YOU TO NOD OFF OR FALL ASLEEP WHILE SITTING AND READING: HIGH CHANCE OF DOZING
HOW LIKELY ARE YOU TO NOD OFF OR FALL ASLEEP WHILE LYING DOWN TO REST IN THE AFTERNOON WHEN CIRCUMSTANCES PERMIT: HIGH CHANCE OF DOZING
HOW LIKELY ARE YOU TO NOD OFF OR FALL ASLEEP WHILE WATCHING TV: HIGH CHANCE OF DOZING
HOW LIKELY ARE YOU TO NOD OFF OR FALL ASLEEP WHEN YOU ARE A PASSENGER IN A CAR FOR AN HOUR WITHOUT A BREAK: MODERATE CHANCE OF DOZING
HOW LIKELY ARE YOU TO NOD OFF OR FALL ASLEEP IN A CAR, WHILE STOPPED FOR A FEW MINUTES IN TRAFFIC: WOULD NEVER DOZE

## 2025-01-27 ENCOUNTER — DOCUMENTATION ONLY (OUTPATIENT)
Dept: ANTICOAGULATION | Facility: CLINIC | Age: 70
End: 2025-01-27
Payer: COMMERCIAL

## 2025-01-27 DIAGNOSIS — Z86.711 HISTORY OF PULMONARY EMBOLISM: ICD-10-CM

## 2025-01-27 DIAGNOSIS — D68.51 FACTOR 5 LEIDEN MUTATION, HETEROZYGOUS: Primary | ICD-10-CM

## 2025-01-27 LAB — INR HOME MONITORING: 2.4 (ref 2–3)

## 2025-01-27 NOTE — PROGRESS NOTES
ANTICOAGULATION  MANAGEMENT-Home Monitor Managed by Bautista FINNEY Sven 69 year old female is on warfarin with therapeutic INR result. (Goal INR 2.0-3.0)    Recent labs: (last 7 days)     01/27/25  0000   INR 2.4       Previous INR was Therapeutic  Medication, diet, health changes since last INR:chart reviewed; none identified  Contacted within the last 12 weeks by phone on 1/16/24  Last [facility] referral date: 10/31/2024      JONN     Bere was NOT contacted regarding therapeutic result today per home monitoring policy manage by exception agreement.   Current warfarin dose is to be continued:     Summary  As of 1/27/2025      Full warfarin instructions:  3/6: Hold; 3/7: Hold; 3/8: Hold; 3/9: Hold; 3/10: 7.5 mg; Otherwise 3.75 mg every Wed; 2.5 mg all other days   Next INR check:  2/10/2025             ?   Wilma Weber, RN  Anticoagulation Clinic  1/27/2025    _______________________________________________________________________     Anticoagulation Episode Summary       Current INR goal:  2.0-3.0   TTR:  55.4% (1 y)   Target end date:  Indefinite   Send INR reminders to:  ANTICO HOME MONITORING    Indications    Factor 5 Leiden mutation  heterozygous/ INR 2-3 [D68.51]  History of pulmonary embolism [Z86.711]             Comments:  managed by [employer] home meter             Anticoagulation Care Providers       Provider Role Specialty Phone number    Uyen Mcintyre APRN CNP Referring Internal Medicine [phone]    Jennifer Shea MD Referring Internal Medicine [phone]

## 2025-01-29 DIAGNOSIS — G89.29 OTHER CHRONIC PAIN: ICD-10-CM

## 2025-02-03 ASSESSMENT — SLEEP AND FATIGUE QUESTIONNAIRES
HOW LIKELY ARE YOU TO NOD OFF OR FALL ASLEEP WHILE LYING DOWN TO REST IN THE AFTERNOON WHEN CIRCUMSTANCES PERMIT: HIGH CHANCE OF DOZING
HOW LIKELY ARE YOU TO NOD OFF OR FALL ASLEEP WHILE SITTING AND TALKING TO SOMEONE: WOULD NEVER DOZE
HOW LIKELY ARE YOU TO NOD OFF OR FALL ASLEEP WHEN YOU ARE A PASSENGER IN A CAR FOR AN HOUR WITHOUT A BREAK: MODERATE CHANCE OF DOZING
HOW LIKELY ARE YOU TO NOD OFF OR FALL ASLEEP WHILE SITTING INACTIVE IN A PUBLIC PLACE: SLIGHT CHANCE OF DOZING
HOW LIKELY ARE YOU TO NOD OFF OR FALL ASLEEP WHILE SITTING AND READING: HIGH CHANCE OF DOZING
HOW LIKELY ARE YOU TO NOD OFF OR FALL ASLEEP WHILE SITTING QUIETLY AFTER LUNCH WITHOUT ALCOHOL: WOULD NEVER DOZE
HOW LIKELY ARE YOU TO NOD OFF OR FALL ASLEEP WHILE WATCHING TV: HIGH CHANCE OF DOZING
HOW LIKELY ARE YOU TO NOD OFF OR FALL ASLEEP IN A CAR, WHILE STOPPED FOR A FEW MINUTES IN TRAFFIC: WOULD NEVER DOZE

## 2025-02-06 ENCOUNTER — CARE COORDINATION (OUTPATIENT)
Dept: SLEEP MEDICINE | Facility: CLINIC | Age: 70
End: 2025-02-06

## 2025-02-06 ENCOUNTER — OFFICE VISIT (OUTPATIENT)
Dept: SLEEP MEDICINE | Facility: CLINIC | Age: 70
End: 2025-02-06
Payer: COMMERCIAL

## 2025-02-06 VITALS
OXYGEN SATURATION: 93 % | DIASTOLIC BLOOD PRESSURE: 73 MMHG | HEART RATE: 67 BPM | HEIGHT: 65 IN | SYSTOLIC BLOOD PRESSURE: 108 MMHG | BODY MASS INDEX: 48.82 KG/M2 | WEIGHT: 293 LBS

## 2025-02-06 DIAGNOSIS — G47.33 OBSTRUCTIVE SLEEP APNEA SYNDROME: Primary | ICD-10-CM

## 2025-02-06 NOTE — PROGRESS NOTES
"[facility]   Outpatient Sleep Medicine  Feb 6, 2025       Name: Bere Machuca MRN# [id number]   Age: 69 year old YOB: 1955            Assessment and Plan:   1. Obstructive sleep apnea syndrome (Primary)  Patient's sleep apnea is adequately managed and well treated with current PAP settings [id number] with low residual AHI of 0.8 events per hour. Compliance is good, meeting all compliance goals on new machine.  She tolerates CPAP therapy well and benefits from use, \"I would never sleep without it\".  Requesting [facility] at [facility], they carry her mask and [facility] does not.  Orders were sent today and agreed on a CPAP follow-up visit every 1-2 years or sooner as needed.    - Comprehensive DME         Chief Complaint      Chief Complaint   Patient presents with    CPAP Follow Up          History of Present Illness:     Bere Machuca is a 69 year old female with morbid obesity, HTN, GERD, MDD, T2DM, PVD, RA, COPD, history of PE, history of cocaine abuse, on Suboxone, JERILYN on CPAP who presents to clinic today for CPAP follow-up visit.    Diagnostic PSG 6/10/2010 (348#, BMI 53.3) at [facility] revealed AHI 20.7, RDI 51.3, oxygen job 68%.  No significant PLM's or abnormal behaviors.       Patient started on CPAP therapy following 2010 testing and using ever since.  Established care with our office 8/2024.  At that time her machine was giving motor life exceeded error message and in need of replacement.    Set up with her new ResMed AirSense 10 on 8/23/2024 through [facility].  Has using and visit requirement for insurance.  DME     Return to clinic today for insurance required follow-up. Overall, the patient rates their experience with PAP as 8 (0 poor, 10 great). Patient is using a nasal mask. Mask is comfortable and does not leak. Does note some dry mouth, \"that's the medications I am on though I think\". May try mouth tape. Pressure settings comfortable. Denies gasp " "arousal with mask on but unsure about snoring, no bed partner to comment on this, doesn't wake self up snoring.     States \"my sleep is currently shit but it's not my CPAP and it is not anything you can do it is because I am chronically constipated and I have to get up to pee about 4 times and they say it is from the constipation\".  Sleep schedule is variable with bedtime between 11-2:00AM with short \"within minutes\" sleep latency and wake time variable based on bedtime.    ResMed Auto-PAP 8-18 cmH2O download (1/1/25-1/30/25):  30 total days of use. 0 nonuse days. 22 days with >4 hours use.  Average use 6 hours 6 minutes per day. Median Leak 0.2 L/min. 95%ile Leak 6.2 L/min. Median pressure 8.9cm. CPAP 95% pressure 12.3cm. AHI 0.8    SCALES:   INSOMNIA: Insomnia Severity Score: 23   SLEEPINESS: Sterrett Sleepiness Score:12     Past medical/surgical history, family history, social history, medications and allergies were reviewed.           Physical Examination:   /73   Pulse 67   Ht 1.651 m (5' 5\")   Wt (!) 143.8 kg (317 lb)   SpO2 93%   BMI 52.75 kg/m    General appearance: Awake, alert, cooperative. Well groomed. Sitting comfortably in chair. In no apparent distress.  HEENT: Head: Normocephalic, atraumatic. Eyes:Conjunctiva clear. Sclera normal. Nose: External appearance without deformity.   Pulmonary:  Able to speak easily in full sentences. No cough or wheeze.   Skin:  No rashes or significant lesions on visible skin.   Neurologic: Alert, oriented x3.   Psychiatric: Mood euthymic. Affect congruent with full range and intensity.      CC:  Jennifer Shea PA-C  Feb 6, 2025     [facility]  [address] [place] [address]     [facility] [place]  [address] [address]    Chart documentation was completed, in part, with [employer] voice-recognition software. Even though reviewed, some grammatical, spelling, and word " errors may remain.    30 minutes spent on day of encounter doing chart review, history and exam, documentation, and further activities as noted above

## 2025-02-06 NOTE — PROGRESS NOTES
DME orders and clinic notes were faxed to [facility] in [place] using the [phone] fax number.

## 2025-02-11 ENCOUNTER — DOCUMENTATION ONLY (OUTPATIENT)
Dept: ANTICOAGULATION | Facility: CLINIC | Age: 70
End: 2025-02-11

## 2025-02-11 DIAGNOSIS — D68.51 FACTOR 5 LEIDEN MUTATION, HETEROZYGOUS: Primary | ICD-10-CM

## 2025-02-11 DIAGNOSIS — F11.20 OPIOID TYPE DEPENDENCE, CONTINUOUS (H): Primary | ICD-10-CM

## 2025-02-11 DIAGNOSIS — Z86.711 HISTORY OF PULMONARY EMBOLISM: ICD-10-CM

## 2025-02-11 LAB — INR HOME MONITORING: 2.1 (ref 2–3)

## 2025-02-11 NOTE — PROGRESS NOTES
ANTICOAGULATION  MANAGEMENT-Home Monitor Managed by Bautista FINNEY Sven 69 year old female is on warfarin with therapeutic INR result. (Goal INR 2.0-3.0)    Recent labs: (last 7 days)     02/11/25  0000   INR 2.1       Previous INR was Therapeutic  Medication, diet, health changes since last INR:chart reviewed; none identified  Contacted within the last 12 weeks by phone on 1/16/25  Last [facility] referral date: 10/31/2024  Colonoscopy 3/10/25, see procedure plan in TE on 12/23/24.         PLAN     Bere was NOT contacted regarding therapeutic result today per home monitoring policy manage by exception agreement.   Current warfarin dose is to be continued:     Summary  As of 2/11/2025      Full warfarin instructions:  3/6: Hold; 3/7: Hold; 3/8: Hold; 3/9: Hold; 3/10: 7.5 mg; Otherwise 3.75 mg every Wed; 2.5 mg all other days   Next INR check:  2/25/2025             ?   Tito Hopson RN  Anticoagulation Clinic  2/11/2025    _______________________________________________________________________     Anticoagulation Episode Summary       Current INR goal:  2.0-3.0   TTR:  57.9% (1 y)   Target end date:  Indefinite   Send INR reminders to:  ANTICOAG HOME MONITORING    Indications    Factor 5 Leiden mutation  heterozygous/ INR 2-3 [D68.51]  History of pulmonary embolism [Z86.711]             Comments:  managed by exception  [employer] home meter             Anticoagulation Care Providers       Provider Role Specialty Phone number    Uyen Mcintyre APRN CNP Referring Internal Medicine [phone]    Jennifer Shea MD Referring Internal Medicine [phone]

## 2025-02-15 DIAGNOSIS — K59.03 DRUG-INDUCED CONSTIPATION: ICD-10-CM

## 2025-02-17 RX ORDER — DOCUSATE SODIUM 50MG AND SENNOSIDES 8.6MG 8.6; 5 MG/1; MG/1
1 TABLET, FILM COATED ORAL DAILY PRN
Qty: 30 TABLET | Refills: 1 | Status: SHIPPED | OUTPATIENT
Start: 2025-02-17

## 2025-02-25 ENCOUNTER — ANTICOAGULATION THERAPY VISIT (OUTPATIENT)
Dept: ANTICOAGULATION | Facility: CLINIC | Age: 70
End: 2025-02-25
Payer: COMMERCIAL

## 2025-02-25 DIAGNOSIS — D68.51 FACTOR 5 LEIDEN MUTATION, HETEROZYGOUS: Primary | ICD-10-CM

## 2025-02-25 DIAGNOSIS — Z86.711 HISTORY OF PULMONARY EMBOLISM: ICD-10-CM

## 2025-02-25 LAB — INR HOME MONITORING: 2.3 (ref 2–3)

## 2025-02-25 NOTE — PROGRESS NOTES
ANTICOAGULATION MANAGEMENT     Bere Machuca 69 year old female is on warfarin with therapeutic INR result. (Goal INR 2.0-3.0)    Recent labs: (last 7 days)     25  0000   INR 2.3       ASSESSMENT     Source(s): Chart Review and Patient/Caregiver Call     Warfarin doses taken: Warfarin taken differently, but did not change total weekly dose - anticoagulation calendar updated.  Diet: No new diet changes identified  Medication/supplement changes: None noted  New illness, injury, or hospitalization: No  Signs or symptoms of bleeding or clotting: No  Previous result: Therapeutic last 2(+) visits  Additional findings: Upcoming surgery/procedure 3/10/25 Colonoscopy Procedure plan on TE dated 24       PLAN     Recommended plan for no diet, medication or health factor changes affecting INR     Dosing Instructions: Continue your current warfarin dose   Then follow procedure plan:     Pre-Procedure:  Hold warfarin for 4 days, until after procedure startin2025   No Bridge     Post-Procedure:  Resume warfarin dose if okay with provider doing procedure on night of procedure, 03/10/2025 PM: 7.5mg  Recheck INR ~ 7 days after resuming warfarin     Summary  As of 2025      Full warfarin instructions:  36: Hold; 37: Hold; 38: Hold; 39: Hold; 310: 7.5 mg; Otherwise 3.75 mg every Mon; 2.5 mg all other days   Next INR check:  3/17/2025               Telephone call with Darline who verbalizes understanding and agrees to plan    Patient to recheck with home meter    Education provided: Contact [phone] with any changes, questions or concerns.     Plan made per [facility] anticoagulation protocol    Fidelia Garza RN  2025  Anticoagulation Clinic  [employer] for routing messages: [employer]  [facility] patient phone line: [phone]        _______________________________________________________________________     Anticoagulation Episode Summary       Current INR goal:  2.0-3.0   TTR:  57.9% (1  y)   Target end date:  Indefinite   Send INR reminders to:  ANTICOAG HOME MONITORING    Indications    Factor 5 Leiden mutation  heterozygous/ INR 2-3 [D68.51]  History of pulmonary embolism [Z86.711]             Comments:  managed by exception  [employer] home meter             Anticoagulation Care Providers       Provider Role Specialty Phone number    Uyen Mcintyre APRN CNP Referring Internal Medicine [phone]    Jennifer Shea MD Referring Internal Medicine [phone]

## 2025-03-04 RX ORDER — AMOXICILLIN 500 MG
1200 CAPSULE ORAL DAILY
COMMUNITY

## 2025-03-05 ASSESSMENT — PATIENT HEALTH QUESTIONNAIRE - PHQ9
SUM OF ALL RESPONSES TO PHQ QUESTIONS 1-9: 8
SUM OF ALL RESPONSES TO PHQ QUESTIONS 1-9: 8
10. IF YOU CHECKED OFF ANY PROBLEMS, HOW DIFFICULT HAVE THESE PROBLEMS MADE IT FOR YOU TO DO YOUR WORK, TAKE CARE OF THINGS AT HOME, OR GET ALONG WITH OTHER PEOPLE: SOMEWHAT DIFFICULT

## 2025-03-06 ENCOUNTER — LAB (OUTPATIENT)
Dept: LAB | Facility: CLINIC | Age: 70
End: 2025-03-06
Payer: COMMERCIAL

## 2025-03-06 ENCOUNTER — OFFICE VISIT (OUTPATIENT)
Dept: INTERNAL MEDICINE | Facility: CLINIC | Age: 70
End: 2025-03-06
Payer: COMMERCIAL

## 2025-03-06 VITALS
TEMPERATURE: 97.3 F | DIASTOLIC BLOOD PRESSURE: 54 MMHG | HEIGHT: 65 IN | SYSTOLIC BLOOD PRESSURE: 110 MMHG | OXYGEN SATURATION: 95 % | RESPIRATION RATE: 18 BRPM | HEART RATE: 120 BPM | WEIGHT: 285.9 LBS | BODY MASS INDEX: 47.63 KG/M2

## 2025-03-06 DIAGNOSIS — Z01.818 PREOP GENERAL PHYSICAL EXAM: Primary | ICD-10-CM

## 2025-03-06 DIAGNOSIS — I10 ESSENTIAL HYPERTENSION: ICD-10-CM

## 2025-03-06 DIAGNOSIS — D68.51 FACTOR 5 LEIDEN MUTATION, HETEROZYGOUS: ICD-10-CM

## 2025-03-06 DIAGNOSIS — F11.20 OPIOID TYPE DEPENDENCE, CONTINUOUS (H): ICD-10-CM

## 2025-03-06 DIAGNOSIS — E11.42 DIABETIC POLYNEUROPATHY ASSOCIATED WITH TYPE 2 DIABETES MELLITUS (H): ICD-10-CM

## 2025-03-06 DIAGNOSIS — K51.80 OTHER ULCERATIVE COLITIS WITHOUT COMPLICATION (H): ICD-10-CM

## 2025-03-06 LAB
ANION GAP SERPL CALCULATED.3IONS-SCNC: 11 MMOL/L (ref 7–15)
BUN SERPL-MCNC: 15.9 MG/DL (ref 8–23)
CALCIUM SERPL-MCNC: 10.1 MG/DL (ref 8.8–10.4)
CHLORIDE SERPL-SCNC: 104 MMOL/L (ref 98–107)
CREAT SERPL-MCNC: 0.64 MG/DL (ref 0.51–0.95)
CREAT UR-MCNC: 266 MG/DL
EGFRCR SERPLBLD CKD-EPI 2021: >90 ML/MIN/1.73M2
EST. AVERAGE GLUCOSE BLD GHB EST-MCNC: 97 MG/DL
GLUCOSE SERPL-MCNC: 77 MG/DL (ref 70–99)
HBA1C MFR BLD: 5 % (ref 0–5.6)
HCO3 SERPL-SCNC: 29 MMOL/L (ref 22–29)
MICROALBUMIN UR-MCNC: 58.5 MG/L
MICROALBUMIN/CREAT UR: 21.99 MG/G CR (ref 0–25)
POTASSIUM SERPL-SCNC: 4 MMOL/L (ref 3.4–5.3)
SODIUM SERPL-SCNC: 144 MMOL/L (ref 135–145)

## 2025-03-06 NOTE — PROGRESS NOTES
Preoperative Evaluation  [place] [address]  [address]  [facility] [address]  Phone: [phone]  Primary Provider: Jennifer Shea MD  Pre-op Performing Provider: Jennifer Shea MD  Mar 6, 2025             3/1/2025   Surgical Information   What procedure is being done? colonoscopy   Facility or Hospital where procedure/surgery will be performed: [facility]   Who is doing the procedure / surgery? Dr Smith   Date of surgery / procedure: 03/10   Time of surgery / procedure: 8:00 am   Where do you plan to recover after surgery? at home with family     Fax number for surgical facility: Note does not need to be faxed, will be available electronically in Epic.    Assessment & Plan     The proposed surgical procedure is considered LOW risk.    Preop general physical exam  Other ulcerative colitis without complication (H)  - EKG 12-lead complete w/read - Clinics    At this time, blood pressure is noted to be at an acceptable level. Patient has previously tolerated anesthesia without issue.  Patient does have a BMP that are pending. I would consider patient to be acceptable candidate to proceed with a noncardiac related surgery. Assuming no unexpected abnormalities on outstanding labs, patient should be able to proceed with procedure as scheduled. patient should follow any additional instructions as provided by performing surgeon.         Essential hypertension  As above.  Currently not on any medications for blood pressure which improved with weight loss.  - BASIC METABOLIC PANEL; Future    Diabetic polyneuropathy associated with type 2 diabetes mellitus (H)  Has stopped Mounjaro medication 1 week prior to procedure.  Update A1c lab work.  - Albumin Random Urine Quantitative with Creat Ratio; Future  - HEMOGLOBIN A1C; Future    Factor 5 Leiden mutation, heterozygous/ INR 2-3  On warfarin medication which is currently on hold in preparation for upcoming procedure.  Procedure  team was okay with or without bridging and patient opted to continue without bridging.  Continue follow-up with INR clinic.        Risks and Recommendations  The patient has the following additional risks and recommendations for perioperative complications:   - No identified additional risk factors other than previously addressed    Antiplatelet or Anticoagulation Medication Instructions   - warfarin: Patient will start holding warfarin from tonight.    Additional Medication Instructions  To take the rest of the medications on the morning of the procedure with small sip of water.    Recommendation  Approval given to proceed with proposed procedure, without further diagnostic evaluation.    Racquel Gregorio is a 69 year old, presenting for the following:  Pre-Op Exam          3/6/2025     9:16 AM   Additional Questions   Roomed by Candelariai   Accompanied by Self     HPI: Answers submitted by the patient for this visit:  Patient Health Questionnaire (Submitted on 3/5/2025)  If you checked off any problems, how difficult have these problems made it for you to do your work, take care of things at home, or get along with other people?: Somewhat difficult  PHQ9 TOTAL SCORE: 8            3/1/2025   Pre-Op Questionnaire   Have you ever had a heart attack or stroke? No   Have you ever had surgery on your heart or blood vessels, such as a stent placement, a coronary artery bypass, or surgery on an artery in your head, neck, heart, or legs? No   Do you have chest pain with activity? No   Do you have a history of heart failure? No   Do you currently have a cold, bronchitis or symptoms of other infection? No   Do you have a cough, shortness of breath, or wheezing? No   Do you or anyone in your family have previous history of blood clots? (!) YES    Do you or does anyone in your family have a serious bleeding problem such as prolonged bleeding following surgeries or cuts? No   Have you ever had problems with anemia or been told to  take iron pills? No   Have you had any abnormal blood loss such as black, tarry or bloody stools, or abnormal vaginal bleeding? No   Have you ever had a blood transfusion? No   Are you willing to have a blood transfusion if it is medically needed before, during, or after your surgery? Yes   Have you or any of your relatives ever had problems with anesthesia? No   Do you have sleep apnea, excessive snoring or daytime drowsiness? (!) YES   Do you have a CPAP machine? Yes   Do you have any artifical heart valves or other implanted medical devices like a pacemaker, defibrillator, or continuous glucose monitor? No   Do you have artificial joints? No   Are you allergic to latex? No     Health Care Directive  Patient does not have a Health Care Directive: Discussed advance care planning with patient; however, patient declined at this time.    Preoperative Review of    reviewed - controlled substances reflected in medication list.          Patient Active Problem List    Diagnosis Date Noted    DM -- needs DIABETIC SHOES-- diabetic polyneuropathy and feet deformity. 08/28/2024     Priority: Medium    Other chronic pain 06/11/2021     Priority: Medium    Chronic anticoagulation 01/29/2021     Priority: Medium    Rheumatoid arthritis involving multiple sites with positive rheumatoid factor (H) 04/16/2019     Priority: Medium    PVD (peripheral vascular disease) 11/30/2018     Priority: Medium     Diagnosed through [facility]      Controlled type 2 diabetes mellitus with circulatory disorder, without long-term current use of insulin (H) 07/05/2017     Priority: Medium    Displacement of lumbar intervertebral disc without myelopathy 02/03/2017     Priority: Medium    Morbid obesity with BMI of 60.0-69.9, adult (H) 09/19/2016     Priority: Medium    Superior vena cava syndrome      Priority: Medium    Major depressive disorder, recurrent episode, moderate (H) 09/17/2015     Priority: Medium    History of pulmonary  embolism 02/27/2015     Priority: Medium    Spinal stenosis in cervical region 07/28/2014     Priority: Medium    Lymphedema of arm 03/08/2013     Priority: Medium    ACP (advance care planning) 05/18/2012     Priority: Medium     Advance Care Planning:   ACP Review and Resources Provided:  Reviewed chart for advance care plan.  Bere Machuca has no plan or code status on file however states presence of ACP document. Copy requested. Confirmed code status reflects current choices pending receipt of document/advance care plan review. Confirmed/documented designated decision maker(s). See permanent comments section of demographics in clinical tab.   Added by Liz Lopez on 1/20/2014              Factor 5 Leiden mutation, heterozygous/ INR 2-3 05/18/2012     Priority: Medium    Family history of pulmonary embolism 03/03/2012     Priority: Medium    Sleep apnea 08/27/2010     Priority: Medium    Migraine 08/27/2010     Priority: Medium     Problem list name updated by automated process. Provider to review      History of cocaine abuse (H) 10/10/2008     Priority: Medium    Cigarette - quit 2014 -  now vaping 10/10/2008     Priority: Medium    Family history of malignant neoplasm of gastrointestinal tract 10/10/2008     Priority: Medium    Esophageal reflux 10/10/2008     Priority: Medium    Chronic airway obstruction (H) 10/10/2008     Priority: Medium     Problem list name updated by automated process. Provider to review      Essential hypertension 02/22/2008     Priority: Medium      Past Medical History:   Diagnosis Date    Antiplatelet or antithrombotic long-term use     Anxiety     Arthritis     Clotting disorder     Coagulation disorder     COPD (chronic obstructive pulmonary disease) (H)     Depressive disorder     Diabetes (H)     Gastroesophageal reflux disease     Hypertension     Lymphedema of arm 03/08/2013    Morbidly obese (H) 09/25/2009    Pulmonary embolism (H)     Sleep apnea     Substance abuse  (H)     Superior vena cava syndrome     Thrombosis      Past Surgical History:   Procedure Laterality Date    BIOPSY OF SKIN LESION  multiple    keloids    HC REMOVAL OF OVARY/TUBE(S)  1992    Salpingo-Oophorectomy, Unilateral    HC REMOVE TONSILS/ADENOIDS,<13 Y/O  age 14    T & A <14y.o.    HERNIA REPAIR      INSERTION OF ACCESS PORT  1-4-96    sepsis- removed after hospital stay    [facility] ANESTH,REPAIR LO ABD HERNIA NOS  multiple/ 2006 last    panniculus    [facility] EXPLORATORY OF ABDOMEN  1996    [facility] PLASTIC SURGERY, NECK  age 4    [facility] REMOVAL COLON/ILEOSTOMY      reastamosis    [facility] TOTAL ABDOM HYSTERECTOMY  1992    Hysterectomy, Total Abdominal    [facility] REMOVE ABDOMINAL WALL LESION  2006    abscess     Current Outpatient Medications   Medication Sig Dispense Refill    acetaminophen (TYLENOL) 500 MG tablet Take 1,000 mg by mouth every 8 hours as needed for mild pain      albuterol (PROAIR HFA/PROVENTIL HFA/VENTOLIN HFA) 108 (90 Base) MCG/ACT inhaler INHALE 2 PUFFS BY MOUTH EVERY 4 HOURS AS NEEDED 18 g 2    B Complex-C (SUPER B COMPLEX PO) Take 1 tablet by mouth daily      Buprenorphine HCl-Naloxone HCl (SUBOXONE) 12-3 MG FILM per film Place 1 Film under the tongue 2 times daily      carbamide peroxide (DEBROX) 6.5 % otic solution Place 5 drops into both ears 2 times daily 15 mL 0    escitalopram (LEXAPRO) 20 MG tablet TAKE 1 TABLET BY MOUTH EVERY DAY 90 tablet 1    Fluticasone-Umeclidin-Vilant (TRELEGY ELLIPTA) 100-62.5-25 MCG/ACT oral inhaler INHALE 1 PUFF INTO THE LUNGS DAILY (RINSE MOUTH AND SPIT AFTER USING) 60 each 11    folic acid (FOLVITE) 1 MG tablet Take 2 mg by mouth daily  0    Golimumab (SIMPONI ARIA IV) Take as directed every 8 weeks via infusion center per rheumatology      mesalamine (LIALDA) 1.2 g EC tablet Take 1,200 mg by mouth 2 times daily.      methotrexate 50 MG/2ML injection INJECT 0.7ML SUB-EVERY EVERY WEEK      Multiple Vitamins-Minerals (WOMENS 50+ MULTI VITAMIN/MIN PO) Take 2 tablets by mouth  daily (With fish oil)      Omega-3 Fatty Acids (FISH OIL) 1200 MG capsule Take 1,200 mg by mouth daily.      polyethylene glycol (MIRALAX) 17 GM/Dose powder Take 1 Capful by mouth daily as needed for constipation      predniSONE (DELTASONE) 5 MG tablet Take 5 mg by mouth daily      Pregabalin (LYRICA) 200 MG capsule Take 200 mg by mouth 2 times daily      rosuvastatin (CRESTOR) 20 MG tablet TAKE 1 TABLET BY MOUTH EVERY DAY 90 tablet 0    SENEXON-S 8.6-50 MG tablet TAKE 1 TABLET BY MOUTH EVERY DAY AS NEEDED 30 tablet 1    sulfaSALAzine (AZULFIDINE) 500 MG tablet Take 1 tablet (500 mg) by mouth 2 times daily      Tirzepatide (MOUNJARO) 7.5 MG/0.5ML SOAJ INJECT 7.5 MG SUBCUTANEOUSLY EVERY 7 DAYS. 3 mL 1    tiZANidine (ZANAFLEX) 4 MG tablet TAKE 1 TABLET BY MOUTH TWICE A DAY AS NEEDED 180 tablet 1    warfarin ANTICOAGULANT (COUMADIN) 2.5 MG tablet TAKE 1.5 TABS(3.75 MG)ON SUNDAYS & 1 TAB (2.5 MG) ALL OTHER DAYS OR AS DIRECTED BY THE INR CLINIC 100 tablet 1       Allergies   Allergen Reactions    Metformin Diarrhea    Aspirin      PN: LW Reaction: sensitive/stomach    Tape [Adhesive Tape] Itching    Topiramate Unknown    Wellbutrin [Bupropion] Anxiety        Social History     Tobacco Use    Smoking status: Former     Current packs/day: 0.00     Average packs/day: 0.5 packs/day for 35.0 years (17.5 ttl pk-yrs)     Types: Cigarettes, Vaping Device     Start date: 8/8/1979     Quit date: 8/8/2014     Years since quitting: 10.5    Smokeless tobacco: Never    Tobacco comments:     Currently vapes as of 3/4/25   Substance Use Topics    Alcohol use: Not Currently     Alcohol/week: 0.8 standard drinks of alcohol     Types: 1 Standard drinks or equivalent per week       History   Drug Use No     Comment: 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             Review of Systems  Constitutional, HEENT, cardiovascular, pulmonary, gi and gu systems are negative, except as otherwise noted.    Objective    /54 (BP Location: Right arm, Patient  "Position: Sitting, Cuff Size: Adult Large)   Pulse 120   Temp 97.3  F (36.3  C) (Tympanic)   Resp 18   Ht 1.651 m (5' 5\")   Wt 129.7 kg (285 lb 14.4 oz)   SpO2 95%   BMI 47.58 kg/m     Estimated body mass index is 47.58 kg/m  as calculated from the following:    Height as of this encounter: 1.651 m (5' 5\").    Weight as of this encounter: 129.7 kg (285 lb 14.4 oz).  Physical Exam  GENERAL: alert and no distress  RESP: lungs clear to auscultation - no rales, rhonchi or wheezes  CV: regular rate and rhythm, normal S1 S2  MS: no gross musculoskeletal defects noted, no edema  NEURO: Normal strength and tone, mentation intact and speech normal  PSYCH: mentation appears normal, affect normal.    Recent Labs   Lab Test 02/25/25  0000 02/11/25  0000 04/12/24  0000 03/29/24  1248   HGB  --   --   --  14.0   PLT  --   --   --  256   INR 2.3 2.1   < >  --    NA  --   --   --  142   POTASSIUM  --   --   --  4.0   CR  --   --   --  0.64   A1C  --   --   --  5.1    < > = values in this interval not displayed.        Diagnostics  Labs pending at this time.  Results will be reviewed when available.   EKG: appears normal, NSR    Revised Cardiac Risk Index (RCRI)  The patient has the following serious cardiovascular risks for perioperative complications:   - No serious cardiac risks = 0 points     RCRI Interpretation: 0 points: Class I (very low risk - 0.4% complication rate)         Signed Electronically by: Jennifer Shea MD  A copy of this evaluation report is provided to the requesting physician.         "

## 2025-03-06 NOTE — PATIENT INSTRUCTIONS
Stop Mounjaro 1 week prior to procedure.    Stop warfarin medication as instructed by INR clinic starting today.  Please take the rest of your morning medications with a small sip of water.  All medications can be resumed after procedure.

## 2025-03-09 ENCOUNTER — MYC REFILL (OUTPATIENT)
Dept: INTERNAL MEDICINE | Facility: CLINIC | Age: 70
End: 2025-03-09
Payer: COMMERCIAL

## 2025-03-09 DIAGNOSIS — F33.0 MILD EPISODE OF RECURRENT MAJOR DEPRESSIVE DISORDER: ICD-10-CM

## 2025-03-10 ENCOUNTER — ANESTHESIA (OUTPATIENT)
Dept: SURGERY | Facility: CLINIC | Age: 70
End: 2025-03-10
Payer: COMMERCIAL

## 2025-03-10 ENCOUNTER — HOSPITAL ENCOUNTER (OUTPATIENT)
Facility: CLINIC | Age: 70
Discharge: HOME | End: 2025-03-10
Attending: INTERNAL MEDICINE | Admitting: INTERNAL MEDICINE
Payer: COMMERCIAL

## 2025-03-10 ENCOUNTER — ANESTHESIA EVENT (OUTPATIENT)
Dept: SURGERY | Facility: CLINIC | Age: 70
End: 2025-03-10
Payer: COMMERCIAL

## 2025-03-10 ENCOUNTER — TELEPHONE (OUTPATIENT)
Dept: ANTICOAGULATION | Facility: CLINIC | Age: 70
End: 2025-03-10

## 2025-03-10 VITALS
RESPIRATION RATE: 18 BRPM | WEIGHT: 293 LBS | BODY MASS INDEX: 48.82 KG/M2 | SYSTOLIC BLOOD PRESSURE: 103 MMHG | OXYGEN SATURATION: 96 % | HEART RATE: 75 BPM | TEMPERATURE: 97.5 F | HEIGHT: 65 IN | DIASTOLIC BLOOD PRESSURE: 71 MMHG

## 2025-03-10 DIAGNOSIS — D68.51 FACTOR 5 LEIDEN MUTATION, HETEROZYGOUS: Primary | ICD-10-CM

## 2025-03-10 DIAGNOSIS — Z86.711 HISTORY OF PULMONARY EMBOLISM: ICD-10-CM

## 2025-03-10 LAB
COLONOSCOPY: NORMAL
GLUCOSE BLDC GLUCOMTR-MCNC: 74 MG/DL (ref 70–99)
GLUCOSE BLDC GLUCOMTR-MCNC: 93 MG/DL (ref 70–99)
INR PPP: 2.23 (ref 0.85–1.15)

## 2025-03-10 PROCEDURE — 36415 COLL VENOUS BLD VENIPUNCTURE: CPT | Performed by: ANESTHESIOLOGY

## 2025-03-10 PROCEDURE — 88305 TISSUE EXAM BY PATHOLOGIST: CPT | Mod: TC | Performed by: INTERNAL MEDICINE

## 2025-03-10 PROCEDURE — 710N000012 HC RECOVERY PHASE 2, PER MINUTE: Performed by: INTERNAL MEDICINE

## 2025-03-10 PROCEDURE — 250N000009 HC RX 250: Performed by: NURSE ANESTHETIST, CERTIFIED REGISTERED

## 2025-03-10 PROCEDURE — 370N000017 HC ANESTHESIA TECHNICAL FEE, PER MIN: Performed by: INTERNAL MEDICINE

## 2025-03-10 PROCEDURE — 250N000011 HC RX IP 250 OP 636: Performed by: NURSE ANESTHETIST, CERTIFIED REGISTERED

## 2025-03-10 PROCEDURE — 272N000001 HC OR GENERAL SUPPLY STERILE: Performed by: INTERNAL MEDICINE

## 2025-03-10 PROCEDURE — 85610 PROTHROMBIN TIME: CPT | Performed by: ANESTHESIOLOGY

## 2025-03-10 PROCEDURE — 82962 GLUCOSE BLOOD TEST: CPT

## 2025-03-10 PROCEDURE — 360N000075 HC SURGERY LEVEL 2, PER MIN: Performed by: INTERNAL MEDICINE

## 2025-03-10 PROCEDURE — 999N000141 HC STATISTIC PRE-PROCEDURE NURSING ASSESSMENT: Performed by: INTERNAL MEDICINE

## 2025-03-10 PROCEDURE — 258N000003 HC RX IP 258 OP 636: Performed by: ANESTHESIOLOGY

## 2025-03-10 PROCEDURE — 250N000011 HC RX IP 250 OP 636: Performed by: INTERNAL MEDICINE

## 2025-03-10 RX ORDER — LIDOCAINE 40 MG/G
CREAM TOPICAL
Status: DISCONTINUED | OUTPATIENT
Start: 2025-03-10 | End: 2025-03-10 | Stop reason: HOSPADM

## 2025-03-10 RX ORDER — FLUMAZENIL 0.1 MG/ML
0.2 INJECTION, SOLUTION INTRAVENOUS
Status: CANCELLED | OUTPATIENT
Start: 2025-03-10 | End: 2025-03-10

## 2025-03-10 RX ORDER — FENTANYL CITRATE 50 UG/ML
25 INJECTION, SOLUTION INTRAMUSCULAR; INTRAVENOUS
Status: CANCELLED | OUTPATIENT
Start: 2025-03-10

## 2025-03-10 RX ORDER — ONDANSETRON 2 MG/ML
4 INJECTION INTRAMUSCULAR; INTRAVENOUS EVERY 30 MIN PRN
Status: CANCELLED | OUTPATIENT
Start: 2025-03-10

## 2025-03-10 RX ORDER — NALOXONE HYDROCHLORIDE 0.4 MG/ML
0.2 INJECTION, SOLUTION INTRAMUSCULAR; INTRAVENOUS; SUBCUTANEOUS
Status: CANCELLED | OUTPATIENT
Start: 2025-03-10

## 2025-03-10 RX ORDER — NALOXONE HYDROCHLORIDE 0.4 MG/ML
0.1 INJECTION, SOLUTION INTRAMUSCULAR; INTRAVENOUS; SUBCUTANEOUS
Status: CANCELLED | OUTPATIENT
Start: 2025-03-10

## 2025-03-10 RX ORDER — ONDANSETRON 2 MG/ML
4 INJECTION INTRAMUSCULAR; INTRAVENOUS EVERY 6 HOURS PRN
Status: CANCELLED | OUTPATIENT
Start: 2025-03-10

## 2025-03-10 RX ORDER — OXYCODONE HYDROCHLORIDE 5 MG/1
5 TABLET ORAL
Status: CANCELLED | OUTPATIENT
Start: 2025-03-10

## 2025-03-10 RX ORDER — PROCHLORPERAZINE MALEATE 5 MG/1
5 TABLET ORAL EVERY 6 HOURS PRN
Status: CANCELLED | OUTPATIENT
Start: 2025-03-10

## 2025-03-10 RX ORDER — NALOXONE HYDROCHLORIDE 0.4 MG/ML
0.4 INJECTION, SOLUTION INTRAMUSCULAR; INTRAVENOUS; SUBCUTANEOUS
Status: CANCELLED | OUTPATIENT
Start: 2025-03-10

## 2025-03-10 RX ORDER — PROPOFOL 10 MG/ML
INJECTION, EMULSION INTRAVENOUS CONTINUOUS PRN
Status: DISCONTINUED | OUTPATIENT
Start: 2025-03-10 | End: 2025-03-10

## 2025-03-10 RX ORDER — ONDANSETRON 4 MG/1
4 TABLET, ORALLY DISINTEGRATING ORAL EVERY 6 HOURS PRN
Status: CANCELLED | OUTPATIENT
Start: 2025-03-10

## 2025-03-10 RX ORDER — KETAMINE HYDROCHLORIDE 10 MG/ML
INJECTION INTRAMUSCULAR; INTRAVENOUS PRN
Status: DISCONTINUED | OUTPATIENT
Start: 2025-03-10 | End: 2025-03-10

## 2025-03-10 RX ORDER — ONDANSETRON 2 MG/ML
4 INJECTION INTRAMUSCULAR; INTRAVENOUS
Status: COMPLETED | OUTPATIENT
Start: 2025-03-10 | End: 2025-03-10

## 2025-03-10 RX ORDER — GLYCOPYRROLATE 0.2 MG/ML
INJECTION, SOLUTION INTRAMUSCULAR; INTRAVENOUS PRN
Status: DISCONTINUED | OUTPATIENT
Start: 2025-03-10 | End: 2025-03-10

## 2025-03-10 RX ORDER — SODIUM CHLORIDE, SODIUM LACTATE, POTASSIUM CHLORIDE, CALCIUM CHLORIDE 600; 310; 30; 20 MG/100ML; MG/100ML; MG/100ML; MG/100ML
INJECTION, SOLUTION INTRAVENOUS CONTINUOUS
Status: DISCONTINUED | OUTPATIENT
Start: 2025-03-10 | End: 2025-03-10 | Stop reason: HOSPADM

## 2025-03-10 RX ORDER — ONDANSETRON 4 MG/1
4 TABLET, ORALLY DISINTEGRATING ORAL EVERY 30 MIN PRN
Status: CANCELLED | OUTPATIENT
Start: 2025-03-10

## 2025-03-10 RX ORDER — DEXAMETHASONE SODIUM PHOSPHATE 4 MG/ML
4 INJECTION, SOLUTION INTRA-ARTICULAR; INTRALESIONAL; INTRAMUSCULAR; INTRAVENOUS; SOFT TISSUE
Status: CANCELLED | OUTPATIENT
Start: 2025-03-10

## 2025-03-10 RX ORDER — OXYCODONE HYDROCHLORIDE 5 MG/1
10 TABLET ORAL
Status: CANCELLED | OUTPATIENT
Start: 2025-03-10

## 2025-03-10 RX ADMIN — PROPOFOL 100 MCG/KG/MIN: 10 INJECTION, EMULSION INTRAVENOUS at 11:14

## 2025-03-10 RX ADMIN — Medication 5 MG: at 11:19

## 2025-03-10 RX ADMIN — GLYCOPYRROLATE 0.2 MG: 0.2 INJECTION, SOLUTION INTRAMUSCULAR; INTRAVENOUS at 11:11

## 2025-03-10 RX ADMIN — ONDANSETRON 4 MG: 2 INJECTION INTRAMUSCULAR; INTRAVENOUS at 11:47

## 2025-03-10 RX ADMIN — Medication 5 MG: at 11:24

## 2025-03-10 RX ADMIN — PROPOFOL 20 MG: 10 INJECTION, EMULSION INTRAVENOUS at 11:17

## 2025-03-10 RX ADMIN — SODIUM CHLORIDE, POTASSIUM CHLORIDE, SODIUM LACTATE AND CALCIUM CHLORIDE: 600; 310; 30; 20 INJECTION, SOLUTION INTRAVENOUS at 10:13

## 2025-03-10 ASSESSMENT — ACTIVITIES OF DAILY LIVING (ADL)
ADLS_ACUITY_SCORE: 54

## 2025-03-10 ASSESSMENT — COPD QUESTIONNAIRES
COPD: 1
CAT_SEVERITY: MILD

## 2025-03-10 NOTE — ANESTHESIA POSTPROCEDURE EVALUATION
Patient: Bere Machuca    Procedure: Procedure(s):  Colonoscopy with polypectomy and clipping, biopsies       Anesthesia Type:  MAC    Note:  Disposition: Outpatient   Postop Pain Control: Uneventful            Sign Out: Well controlled pain   PONV: No   Neuro/Psych: Uneventful            Sign Out: Acceptable/Baseline neuro status   Airway/Respiratory: Uneventful            Sign Out: Acceptable/Baseline resp. status   CV/Hemodynamics: Uneventful            Sign Out: Acceptable CV status; No obvious hypovolemia; No obvious fluid overload   Other NRE: NONE   DID A NON-ROUTINE EVENT OCCUR? No           Last vitals:  Vitals Value Taken Time   /57 03/10/25 1205   Temp 97.9  F (36.6  C) 03/10/25 1152   Pulse 68 03/10/25 1205   Resp     SpO2 92 % 03/10/25 1209   Vitals shown include unfiled device data.    Electronically Signed By: Artemio Joyner MD  March 10, 2025  12:11 PM

## 2025-03-10 NOTE — ANESTHESIA PREPROCEDURE EVALUATION
Anesthesia Pre-Procedure Evaluation    Patient: Bere Machuca   MRN: [id number] : 1955        Procedure : Procedure(s):  Colonoscopy          Past Medical History:   Diagnosis Date    Antiplatelet or antithrombotic long-term use     Anxiety     Arthritis     Clotting disorder     Coagulation disorder     COPD (chronic obstructive pulmonary disease) (H)     Depressive disorder     Diabetes (H)     Gastroesophageal reflux disease     Hypertension     Lymphedema of arm 2013    Morbidly obese (H) 2009    Pulmonary embolism (H)     Sleep apnea     Substance abuse (H)     Superior vena cava syndrome     Thrombosis       Past Surgical History:   Procedure Laterality Date    BIOPSY OF SKIN LESION  multiple    keloids    HC REMOVAL OF OVARY/TUBE(S)      Salpingo-Oophorectomy, Unilateral    HC REMOVE TONSILS/ADENOIDS,<13 Y/O  age 14    T & A <14y.o.    HERNIA REPAIR      INSERTION OF ACCESS PORT  1-4-96    sepsis- removed after hospital stay    [facility] ANESTH,REPAIR LO ABD HERNIA NOS  multiple/  last    panniculus    Z EXPLORATORY OF ABDOMEN      Z PLASTIC SURGERY, NECK  age 4    [facility] REMOVAL COLON/ILEOSTOMY      reastamosis    Z TOTAL ABDOM HYSTERECTOMY      Hysterectomy, Total Abdominal    [facility] REMOVE ABDOMINAL WALL LESION  2006    abscess      Allergies   Allergen Reactions    Metformin Diarrhea    Aspirin      PN: LW Reaction: sensitive/stomach    Tape [Adhesive Tape] Itching    Topiramate Unknown    Wellbutrin [Bupropion] Anxiety      Social History     Tobacco Use    Smoking status: Former     Current packs/day: 0.00     Average packs/day: 0.5 packs/day for 35.0 years (17.5 ttl pk-yrs)     Types: Cigarettes, Vaping Device     Start date: 1979     Quit date: 2014     Years since quitting: 10.5    Smokeless tobacco: Never    Tobacco comments:     Currently vapes as of 3/4/25   Substance Use Topics    Alcohol use: Not Currently     Alcohol/week: 0.8 standard drinks of  "alcohol     Types: 1 Standard drinks or equivalent per week      Wt Readings from Last 1 Encounters:   03/10/25 (!) 139.6 kg (307 lb 11.2 oz)        Anesthesia Evaluation        History of anesthetic complications       ROS/MED HX  ENT/Pulmonary:     (+) sleep apnea,                        mild,  COPD,              Neurologic:       Cardiovascular:     (+)  hypertension- -   -  - -   Taking blood thinners                                   METS/Exercise Tolerance:     Hematologic:     (+) History of blood clots,    pt is anticoagulated,           Musculoskeletal:       GI/Hepatic:     (+) GERD, Asymptomatic on medication,                  Renal/Genitourinary:       Endo:     (+) type I DM,              Obesity,       Psychiatric/Substance Use:       Infectious Disease:       Malignancy:       Other:      (+)  , H/O Chronic Pain,         Physical Exam    Airway        Mallampati: II   TM distance: > 3 FB   Neck ROM: full   Mouth opening: > 3 cm    Respiratory Devices and Support         Dental       (+) Minor Abnormalities - some fillings, tiny chips      Cardiovascular   cardiovascular exam normal          Pulmonary   pulmonary exam normal                OUTSIDE LABS:  CBC:   Lab Results   Component Value Date    WBC 9.3 03/29/2024    WBC 7.7 01/11/2023    HGB 14.0 03/29/2024    HGB 15.2 01/11/2023    HCT 43.2 03/29/2024    HCT 44.9 01/11/2023     03/29/2024     01/11/2023     BMP:   Lab Results   Component Value Date     03/06/2025     03/29/2024    POTASSIUM 4.0 03/06/2025    POTASSIUM 4.0 03/29/2024    CHLORIDE 104 03/06/2025    CHLORIDE 103 03/29/2024    CO2 29 03/06/2025    CO2 28 03/29/2024    BUN 15.9 03/06/2025    BUN 12.2 03/29/2024    CR 0.64 03/06/2025    CR 0.64 03/29/2024    GLC 93 03/10/2025    GLC 77 03/06/2025     COAGS:   Lab Results   Component Value Date    PTT 42 (H) 11/18/2016    INR 2.23 (H) 03/10/2025    FIBR 525 (H) 03/09/2012     POC: No results found for: \"BGM\", " "\"HCG\", \"HCGS\"  HEPATIC:   Lab Results   Component Value Date    ALBUMIN 3.9 03/29/2024    PROTTOTAL 6.9 03/29/2024    ALT 20 03/29/2024    AST 24 03/29/2024    ALKPHOS 57 03/29/2024    BILITOTAL 0.5 03/29/2024     OTHER:   Lab Results   Component Value Date    LACT 1.5 06/23/2017    A1C 5.0 03/06/2025    TAWANA 10.1 03/06/2025    MAG 1.8 06/23/2017    TSH 12.10 (H) 03/29/2024    T4 0.91 03/29/2024    CRP 12.0 (H) 01/04/2022    SED 19 01/21/2015       Anesthesia Plan    ASA Status:  3    NPO Status:  NPO Appropriate    Anesthesia Type: MAC.     - Reason for MAC: immobility needed   Induction: Propofol.   Maintenance: TIVA.        Consents    Anesthesia Plan(s) and associated risks, benefits, and realistic alternatives discussed. Questions answered and patient/representative(s) expressed understanding.     - Discussed:     - Discussed with:  Patient      - Extended Intubation/Ventilatory Support Discussed: No.      - Patient is DNR/DNI Status: No     Use of blood products discussed: No .     Postoperative Care    Pain management: IV analgesics.   PONV prophylaxis: Ondansetron (or other 5HT-3), Dexamethasone or Solumedrol     Comments:               Artemio Joyner MD    I have reviewed the pertinent notes and labs in the chart from the past 30 days and (re)examined the patient.  Any updates or changes from those notes are reflected in this note.    Clinically Significant Risk Factors Present on Admission                # Drug Induced Coagulation Defect: home medication list includes an anticoagulant medication    # Hypertension: Noted on problem list           # Severe Obesity: Estimated body mass index is 51.19 kg/m  as calculated from the following:    Height as of this encounter: 1.651 m (5' 5.01\").    Weight as of this encounter: 139.6 kg (307 lb 11.2 oz).                "

## 2025-03-10 NOTE — ANESTHESIA CARE TRANSFER NOTE
Patient: Bere Machuca    Procedure: Procedure(s):  Colonoscopy with polypectomy and clipping, biopsies       Diagnosis: Ulcerative colitis (H) [K51.90]  Diagnosis Additional Information: No value filed.    Anesthesia Type:   MAC     Note:    Oropharynx: oropharynx clear of all foreign objects and spontaneously breathing  Level of Consciousness: awake  Oxygen Supplementation: face mask  Level of Supplemental Oxygen (L/min / FiO2): 5  Independent Airway: airway patency satisfactory and stable  Dentition: dentition unchanged  Vital Signs Stable: post-procedure vital signs reviewed and stable  Report to RN Given: handoff report given  Patient transferred to: Phase II    Handoff Report: Identifed the Patient, Identified the Reponsible Provider, Reviewed the pertinent medical history, Discussed the surgical course, Reviewed Intra-OP anesthesia mangement and issues during anesthesia, Set expectations for post-procedure period and Allowed opportunity for questions and acknowledgement of understanding  Vitals:  Vitals Value Taken Time   /69 03/10/25 1151   Temp 97.9  F (36.6  C) 03/10/25 1152   Pulse 79 03/10/25 1151   Resp     SpO2 97 % 03/10/25 1154   Vitals shown include unfiled device data.    Electronically Signed By: MARY Hernandes CRNA  March 10, 2025  11:54 AM

## 2025-03-10 NOTE — TELEPHONE ENCOUNTER
ANTICOAGULATION  MANAGEMENT: Discharge Review    Bere Machuca chart reviewed for anticoagulation continuity of care    Hospital Admission on 3/10/25 for Colonoscopy Procedure.    Discharge disposition: Home    Results:    Recent labs: (last 7 days)     03/10/25  0956   INR 2.23*     Anticoagulation inpatient management:     Patient was holding warfarin prior to procedure    Anticoagulation discharge instructions:     Warfarin dosing:  See below.   Bridging: No   INR goal change: No      Medication changes affecting anticoagulation: No    Additional factors affecting anticoagulation: No     PLAN     No adjustment to anticoagulation plan needed    Post-Procedure:  Resume warfarin dose if okay with provider doing procedure on night of procedure, 03/10/2025 PM: 7.5mg  Recheck INR ~ 7 days after resuming warfarin   [facility] consulted if booster dose is needed since INR is therapeutic  Spoke with Darline and she confirmed that she held warfarin 4 days prior to procedure- she stated that they did the procedure but she needs to go back in again for polyps removal.    Instructed patient not to take the booster dose today , only resume current doses and check INR in one week.    Anticoagulation Calendar updated    Fidelia Garza RN  3/10/2025  Anticoagulation Clinic  [facility] for routing messages: [employer] [facility]  [facility] patient phone line: [phone]

## 2025-03-10 NOTE — OR NURSING
MANOJ Joyner notified of preprocedure INR of 2.23, no new orders at this time and okay to proceed with colonoscopy as scheduled.    Shahana Hughes RN    03/10/25  10:28 AM

## 2025-03-11 LAB
PATH REPORT.COMMENTS IMP SPEC: NORMAL
PATH REPORT.COMMENTS IMP SPEC: NORMAL
PATH REPORT.FINAL DX SPEC: NORMAL
PATH REPORT.GROSS SPEC: NORMAL
PATH REPORT.MICROSCOPIC SPEC OTHER STN: NORMAL
PATH REPORT.RELEVANT HX SPEC: NORMAL
PHOTO IMAGE: NORMAL

## 2025-03-11 PROCEDURE — 88305 TISSUE EXAM BY PATHOLOGIST: CPT | Mod: 26 | Performed by: PATHOLOGY

## 2025-03-11 RX ORDER — ESCITALOPRAM OXALATE 20 MG/1
20 TABLET ORAL DAILY
Qty: 90 TABLET | Refills: 1 | Status: SHIPPED | OUTPATIENT
Start: 2025-03-11

## 2025-03-18 NOTE — PROGRESS NOTES
Medication Therapy Management (MTM) Encounter    ASSESSMENT:                            Medication Adherence/Access: No issues identified.    Diabetes/Obesity/Constipation:   Patient is meeting A1c goal of < 7% but not meeting weight goal, overall lost 70 lbs.  Recommend increasing Mounjaro to 10 mg weekly for additional weight loss and blood sugar control. Reviewed risk of worsening constipation with dose increase.    Hypertension:   Stable. Patient is meeting blood pressure goal of < 130/80mmHg. Continue to monitor for low blood pressure.     Hyperlipidemia:   Stable.    COPD:   stable     Depression  stable     Pulmonary embolism history:  Stable, continues to work with Dr. Richter     Chronic Pain: Unimproved, working with pain clinic. Planned surgery this summer.     Supplements:   Stable.    PLAN:                            Increase Mounjaro to 10 mg a week for additional blood sugar control and weight loss      Follow-up: Return in about 6 weeks (around 5/1/2025) for Medication Therapy Management, Phone Visit.    SUBJECTIVE/OBJECTIVE:                          Darline Machuca is a 69 year old female seen for a follow-up visit.       Reason for visit: med review, diabetes.    Allergies/ADRs: Reviewed in chart  Past Medical History: Reviewed in chart  Tobacco: She reports that she quit smoking about 10 years ago. Her smoking use included cigarettes and vaping device. She started smoking about 45 years ago. She has a 17.5 pack-year smoking history. She has never used smokeless tobacco.  Alcohol: none  Caffeine: 1 cup per day  Activity: unable to do any activity due to needing hip replacement, on waker    Medication Adherence/Access: Patient uses pill box(es).  Patient takes medications 2 time(s) per day.   Per patient, misses medication 0 times per week.   Medication barriers: cost.   The patient fills medications at [place]: NO, fills medications at [facility].    Diabetes   /Obesity/Constipation:  Mounjaro  7.5 mg a week   Reports the senna-docusate and Miralax daily now. This regimen seems to be keeping it under control for now.  She needs to lose weight so she can get surgery to help her pain. Was told to lose another 20-30 lbs before July.  Newly diagnosed with lymphedema - starting compression braces/garments. Reports some of her weight retention is related to this and will limit her weight loss.  Current diabetes symptoms: none  Nutrition/Eating Habits: No changes to diet or exercise.  Blood sugars: not checking  At home weight: no recent weights, coming into clinic tomorrow so will check it then  Exercise/Activity: limited.   Medications Tried/Failed:  Ozempic: terrible stomach aches up to 0.5 of 1mg  Topiramate-allergy list  Bupropion-anxiety     Eye exam in the last 12 months? No  Foot exam: due  Lab Results   Component Value Date    A1C 5.0 03/06/2025    A1C 5.1 03/29/2024    A1C 5.4 01/11/2023    A1C 5.9 01/04/2022    A1C 5.5 06/11/2021     Wt Readings from Last 5 Encounters:   03/10/25 (!) 307 lb 11.2 oz (139.6 kg)   03/06/25 285 lb 14.4 oz (129.7 kg)   02/06/25 (!) 317 lb (143.8 kg)   08/06/24 317 lb (143.8 kg)   08/02/24 317 lb (143.8 kg)       Hypertension   Losartan discontinued due to concerns with hypotension.  Patient reports no current medication side effects  Patient does not self-monitor blood pressure.     BP Readings from Last 3 Encounters:   03/10/25 103/71   03/06/25 110/54   02/06/25 108/73       Hyperlipidemia   rosuvastatin 20 mg daily  Patient reports no new myalgias since on medication.     Recent Labs   Lab Test 07/16/24  1459 01/11/23  1529 01/04/22  1524 08/10/18  1549 03/29/18  1515   CHOL 126 250*   < > 202* 176   HDL 63 69   < > 49* 76   LDL 43 158*   < > 121* 76   TRIG 99 115   < > 199* 139   CHOLHDLRATIO  --   --   --  4.1 2.3    < > = values in this interval not displayed.     COPD   Trelegy 100-62.5-25mcg 1 puff every day  Albuterol 2 puffs as needed --not using lately  Uses  CPAP  Reports well controlled with Trelegy she is very happy.  Patient rinses their mouth after using steroid inhaler.    Sees [facility]  Patient reports no current medication side effects.    Patient reports the following symptoms: none.  Blood eosinophils > 300 cells/ L?:  200        Mental Health   Depression  Escitalopram 20mg once daily.   Patient reports no current medication side effects.  Patient reports symptoms are stable and feels she needs.        Supplements   Women over 50 multivitamin daily  B complex-C every day   No reported issues at this time.        Pulmonary embolism history:  Warfarin 3.75 mg on Monday and 2.5 mg every other day - follows with [facility]  Factor 5 Leiden mutation, INR goal 2-3  Reports INR has been all very stable lately. INR didn't go down despite stopping prior to colonoscopy so could not remove polyps.  Side effects:  some bruising, no bleeding  History of superior vena cava syndrome, working with Dr. Richter in Vascular     Lab Results   Component Value Date    INR 2.23 03/10/2025    INR 2.3 02/25/2025    INR 2.1 02/11/2025    INR 2.4 01/27/2025    INR 2.0 01/16/2025    INR 1.7 01/08/2025    INR 1.6 01/02/2025    INR 1.1 12/25/2024    INR 2.6 12/02/2024       Chronic pain:    Acetaminophen 500 mg, 2 tablets as needed   Tizanidine 4 mg twice daily as needed   Suboxone 12-3 twice daily   Lyrica 200 mg twice daily   Pain type/location: history of spinal stenosis, hip placement, fibromyalgia  Pain is described as myalgia/pain all over her body. Reports still in significant pain, but nothing else she can do besides what she already doing.  Alfred with [facility]  Patient reports the following side effects: dry mouth and fatigue from tizanidine  Working with [facility], last visit 1/30/24, hip replacement, would like BMI of 45 or less, 278 lb target weight. Right now surgery is scheduled for July, was recommended to lose 20-30lbs before  then       Today's Vitals: There were no vitals taken for this visit.  ----------------      I spent 20 minutes with this patient today. All changes were made via collaborative practice agreement with Jennifer Shea MD.     A summary of these recommendations was sent via [employer].    Champ ArnoldD  Medication Therapy Management Pharmacist  Voicemail: [phone]      Telemedicine Visit Details  The patient's medications can be safely assessed via a telemedicine encounter.  Type of service:  Telephone visit  Originating Location (pt. Location): Home    Distant Location (provider location):  On-site  Start Time:  3:00 PM  End Time:  3:20 PM     Medication Therapy Recommendations  Controlled type 2 diabetes mellitus with circulatory disorder, without long-term current use of insulin (H)   1 Current Medication: Tirzepatide (MOUNJARO) 7.5 MG/0.5ML SOAJ (Discontinued)   Current Medication Sig: INJECT 7.5 MG SUBCUTANEOUSLY EVERY 7 DAYS.   Rationale: Dose too low - Dosage too low - Effectiveness   Recommendation: Increase Dose   Status: Accepted per CPA   Identified Date: 3/20/2025 Completed Date: 3/20/2025

## 2025-03-19 DIAGNOSIS — E78.00 HYPERCHOLESTEREMIA: ICD-10-CM

## 2025-03-19 RX ORDER — ROSUVASTATIN CALCIUM 20 MG/1
20 TABLET, COATED ORAL DAILY
Qty: 90 TABLET | Refills: 3 | Status: SHIPPED | OUTPATIENT
Start: 2025-03-19

## 2025-03-20 ENCOUNTER — VIRTUAL VISIT (OUTPATIENT)
Dept: PHARMACY | Facility: CLINIC | Age: 70
End: 2025-03-20
Payer: COMMERCIAL

## 2025-03-20 DIAGNOSIS — Z78.9 TAKES DIETARY SUPPLEMENTS: ICD-10-CM

## 2025-03-20 DIAGNOSIS — E78.5 HYPERLIPIDEMIA LDL GOAL <70: ICD-10-CM

## 2025-03-20 DIAGNOSIS — E11.59 CONTROLLED TYPE 2 DIABETES MELLITUS WITH OTHER CIRCULATORY COMPLICATION, WITHOUT LONG-TERM CURRENT USE OF INSULIN (H): Primary | ICD-10-CM

## 2025-03-20 DIAGNOSIS — I10 BENIGN ESSENTIAL HYPERTENSION: ICD-10-CM

## 2025-03-20 DIAGNOSIS — K59.03 DRUG-INDUCED CONSTIPATION: ICD-10-CM

## 2025-03-20 DIAGNOSIS — Z86.711 HISTORY OF PULMONARY EMBOLISM: ICD-10-CM

## 2025-03-20 DIAGNOSIS — F33.1 MAJOR DEPRESSIVE DISORDER, RECURRENT EPISODE, MODERATE (H): ICD-10-CM

## 2025-03-20 DIAGNOSIS — E66.01 MORBID OBESITY WITH BMI OF 60.0-69.9, ADULT (H): ICD-10-CM

## 2025-03-20 DIAGNOSIS — J42 CHRONIC BRONCHITIS, UNSPECIFIED CHRONIC BRONCHITIS TYPE (H): ICD-10-CM

## 2025-03-20 DIAGNOSIS — G89.29 OTHER CHRONIC PAIN: ICD-10-CM

## 2025-03-20 NOTE — PATIENT INSTRUCTIONS
"Recommendations from today's MTM visit:                                                       Increase Mounjaro to 10 mg a week for additional blood sugar control and weight loss    Follow-up: Return in about 6 weeks (around 5/1/2025) for Medication Therapy Management, Phone Visit.    It was great speaking with you today.  I value your experience and would be very thankful for your time in providing feedback in our clinic survey. In the next few days, you may receive an email or text message from [employer] with a link to a survey related to your  clinical pharmacist.\"     To schedule another MTM appointment, please call the clinic directly or you may call the MTM scheduling line at [phone].    My Clinical Pharmacist's contact information:                                                      Please feel free to contact me with any questions or concerns you have.      Mikayla Sosa, PharmD  Medication Therapy Management Pharmacist  Voicemail: [phone]     "

## 2025-03-23 LAB — INR HOME MONITORING: 1.7 (ref 2–3)

## 2025-03-24 ENCOUNTER — ANTICOAGULATION THERAPY VISIT (OUTPATIENT)
Dept: ANTICOAGULATION | Facility: CLINIC | Age: 70
End: 2025-03-24
Payer: COMMERCIAL

## 2025-03-24 DIAGNOSIS — D68.51 FACTOR 5 LEIDEN MUTATION, HETEROZYGOUS: Primary | ICD-10-CM

## 2025-03-24 DIAGNOSIS — Z86.711 HISTORY OF PULMONARY EMBOLISM: ICD-10-CM

## 2025-03-24 NOTE — PROGRESS NOTES
"ANTICOAGULATION MANAGEMENT     Bere Machuca 69 year old female is on warfarin with subtherapeutic INR result. (Goal INR 2.0-3.0)    Recent labs: (last 7 days)     03/23/25  0000   INR 1.7*       ASSESSMENT     Source(s): Chart Review and Patient/Caregiver Call     Warfarin doses taken: Missed dose(s) may be affecting INR  Diet: No new diet changes identified  Medication/supplement changes: None noted  New illness, injury, or hospitalization: No  Signs or symptoms of bleeding or clotting: No  Previous result: Therapeutic last 2(+) visits  Additional findings: Upcoming surgery/procedure patient has a colonoscopy on 6/16/25 and right hip replacement on 6/17/25 -per patient she was not able to have her polyps removed during her colonoscopy on 3/10/25 because her INR was 2.23 therefore \"something different has to be done for the next one\".         PLAN     Recommended plan for temporary change(s) affecting INR     Dosing Instructions: booster dose then continue your current warfarin dose with next INR in 2 weeks       Summary  As of 3/24/2025      Full warfarin instructions:  3/24: 5 mg; Otherwise 3.75 mg every Mon; 2.5 mg all other days   Next INR check:  4/7/2025               Telephone call with Darline who verbalizes understanding and agrees to plan  Sent [employer] message with dosing and follow up instructions    Patient to recheck with home meter    Education provided: Please call back if any changes to your diet, medications or how you've been taking warfarin    Plan made per [facility] anticoagulation protocol    Mary Sanchez RN  3/24/2025  Anticoagulation Clinic  [facility] for routing messages: p ANTICOAG HOME MONITORING  [facility] patient phone line: [phone]        _______________________________________________________________________     Anticoagulation Episode Summary       Current INR goal:  2.0-3.0   TTR:  59.2% (1 y)   Target end date:  Indefinite   Send INR reminders to:  ANTICOAG HOME MONITORING    " Indications    Factor 5 Leiden mutation  heterozygous/ INR 2-3 [D68.51]  History of pulmonary embolism [Z86.711]             Comments:  managed by exception  [employer] home meter             Anticoagulation Care Providers       Provider Role Specialty Phone number    Uyen Mcintyre APRN CNP Referring Internal Medicine [phone]    Jennifer Shea MD Referring Internal Medicine [phone]

## 2025-04-02 DIAGNOSIS — Z86.711 HISTORY OF PULMONARY EMBOLISM: ICD-10-CM

## 2025-04-02 RX ORDER — WARFARIN SODIUM 2.5 MG/1
TABLET ORAL
Qty: 100 TABLET | Refills: 1 | Status: SHIPPED | OUTPATIENT
Start: 2025-04-02

## 2025-04-02 NOTE — TELEPHONE ENCOUNTER
ANTICOAGULATION MANAGEMENT:  Medication Refill    Anticoagulation Summary  As of 3/24/2025      Warfarin maintenance plan:  3.75 mg (2.5 mg x 1.5) every Mon; 2.5 mg (2.5 mg x 1) all other days   Next INR check:  4/7/2025   Target end date:  Indefinite    Indications    Factor 5 Leiden mutation  heterozygous/ INR 2-3 [D68.51]  History of pulmonary embolism [Z86.711]                 Anticoagulation Care Providers       Provider Role Specialty Phone number    Uyen Mcintyre APRN CNP Referring Internal Medicine [phone]    Jennifer Shea MD Referring Internal Medicine [phone]            Refill Criteria    Visit with referring provider/group: Meets criteria: visit within referring provider group in the last 15 months on 3/6/25    [facility] referral last signed: 10/31/2024; within last year:  Yes    Lab monitoring is up to date (not exceeding 2 weeks overdue): Yes    Bere meets all criteria for refill. Rx instructions and quantity supplied updated to match patient's current dosing plan. Warfarin 90 day supply with 1 refill granted per [facility] protocol. Refill request was for 5 mg warfarin tablets; patient current dosing uses 2.5 mg tablets, so prescription updated.    Katherin Schuler, RN  Anticoagulation Clinic

## 2025-04-09 ENCOUNTER — ANTICOAGULATION THERAPY VISIT (OUTPATIENT)
Dept: ANTICOAGULATION | Facility: CLINIC | Age: 70
End: 2025-04-09
Payer: COMMERCIAL

## 2025-04-09 DIAGNOSIS — D68.51 FACTOR 5 LEIDEN MUTATION, HETEROZYGOUS: Primary | ICD-10-CM

## 2025-04-09 DIAGNOSIS — Z86.711 HISTORY OF PULMONARY EMBOLISM: ICD-10-CM

## 2025-04-09 LAB — INR HOME MONITORING: 2.3 (ref 2–3)

## 2025-04-09 NOTE — PROGRESS NOTES
ANTICOAGULATION MANAGEMENT     Bere Machuca 69 year old female is on warfarin with therapeutic INR result. (Goal INR 2.0-3.0)    Recent labs: (last 7 days)     04/09/25  0000   INR 2.3       ASSESSMENT     Source(s): Chart Review and Patient/Caregiver Call     Warfarin doses taken: Warfarin taken as instructed  Diet: No new diet changes identified  Medication/supplement changes: Patient reports up to 10 mg of Monjouro  New illness, injury, or hospitalization: Yes: constipation, she reports BM today with miralax  Signs or symptoms of bleeding or clotting: No  Previous result: Subtherapeutic  Additional findings: Upcoming surgery/procedure patient has a colonoscopy on 6/16/25 and right hip replacement on 6/17/25 -per patient she was not able to have her polyps removed during her colonoscopy on 3/10/25 because her INR was 2.23   Patient has lost 100 lbs since starting Monjouro but only 2-3 lbs this month       PLAN     Recommended plan for temporary change(s) affecting INR     Dosing Instructions: Continue your current warfarin dose with next INR in 2 weeks       Summary  As of 4/9/2025      Full warfarin instructions:  3.75 mg every Mon; 2.5 mg all other days   Next INR check:  4/23/2025               Telephone call with Darline who verbalizes understanding and agrees to plan    Patient to recheck with home meter    Education provided: Please call back if any changes to your diet, medications or how you've been taking warfarin  Resume manage by exception with home monitor. Continue to submit INR results to home monitor company.You will only be called when your result is out of range and at 90 day check in. Please call and notify [facility] if new medication started, dose missed, signs or symptoms of bleeding or clotting, or a surgery/procedure is scheduled. Due for next call no later than: 7/8/25.    Plan made per [facility] anticoagulation protocol    Celena Mobley RN  4/9/2025  Anticoagulation Clinic  [facility] for routing  messages: p ANTICOAG HOME MONITORING  [facility] patient phone line: [phone]        _______________________________________________________________________     Anticoagulation Episode Summary       Current INR goal:  2.0-3.0   TTR:  61.3% (1 y)   Target end date:  Indefinite   Send INR reminders to:  ANTICOAG HOME MONITORING    Indications    Factor 5 Leiden mutation  heterozygous/ INR 2-3 [D68.51]  History of pulmonary embolism [Z86.711]             Comments:  managed by rupesh  [employer] home meter             Anticoagulation Care Providers       Provider Role Specialty Phone number    Uyen Mcintyre APRN CNP Referring Internal Medicine [phone]    Jennifer Shea MD Referring Internal Medicine [phone]

## 2025-04-20 ENCOUNTER — HEALTH MAINTENANCE LETTER (OUTPATIENT)
Age: 70
End: 2025-04-20

## 2025-04-26 LAB — INR HOME MONITORING: 3 (ref 2–3)

## 2025-04-28 ENCOUNTER — DOCUMENTATION ONLY (OUTPATIENT)
Dept: ANTICOAGULATION | Facility: CLINIC | Age: 70
End: 2025-04-28
Payer: COMMERCIAL

## 2025-04-28 DIAGNOSIS — Z86.711 HISTORY OF PULMONARY EMBOLISM: ICD-10-CM

## 2025-04-28 DIAGNOSIS — D68.51 FACTOR 5 LEIDEN MUTATION, HETEROZYGOUS: Primary | ICD-10-CM

## 2025-04-28 NOTE — PROGRESS NOTES
ANTICOAGULATION  MANAGEMENT-Home Monitor Managed by Bautista FINNEY Sven 69 year old female is on warfarin with therapeutic INR result. (Goal INR 2.0-3.0)    Recent labs: (last 7 days)     04/26/25  0000   INR 3.0       Previous INR was Therapeutic  Medication, diet, health changes since last INR:chart reviewed; none identified  Contacted within the last 12 weeks by phone on 4/9/25  Last [facility] referral date: 10/31/2024      JONN Blackburn was NOT contacted regarding therapeutic result today per home monitoring policy manage by exception agreement.   Current warfarin dose is to be continued:       ?   Maria L OLIVEROS RN  Anticoagulation Clinic  4/28/2025    _______________________________________________________________________     Anticoagulation Episode Summary       Current INR goal:  2.0-3.0   TTR:  63.9% (1 y)   Target end date:  Indefinite   Send INR reminders to:  [facility] HOME MONITORING    Indications    Factor 5 Leiden mutation  heterozygous/ INR 2-3 [D68.51]  History of pulmonary embolism [Z86.711]             Comments:  managed by exception  [employer] home meter             Anticoagulation Care Providers       Provider Role Specialty Phone number    Uyen Mcintyre APRN CNP Referring Internal Medicine [phone]    Jennifer Shea MD Referring Internal Medicine [phone]

## 2025-05-02 NOTE — PROGRESS NOTES
Medication Therapy Management (MTM) Encounter    ASSESSMENT:                            Medication Adherence/Access: No issues identified.    Diabetes/Obesity/Constipation:   Patient is meeting A1c goal of < 7% but not meeting weight goal.  Will stay on current Mounjaro dose leading into procedures. Will plan to have another visit after procedures and see if she wants to increase this dose further.  Discussed some of her meds will need to be held prior to procedures, so encouraged her to schedule pre-op with PCP to discuss.     Hypertension:   Stable. Patient is meeting blood pressure goal of < 130/80mmHg. Continue to monitor for low blood pressure.     Hyperlipidemia:   Stable.    PLAN:                            Continue current medications unchanged.  Make sure to schedule a pre-op with Dr. Shea    Follow-up: Return in about 7 weeks (around 6/25/2025) for Medication Therapy Management, Phone Visit.    SUBJECTIVE/OBJECTIVE:                          Darline Machuca is a 69 year old female seen for a follow-up visit.       Reason for visit: med review.    Allergies/ADRs: Reviewed in chart  Past Medical History: Reviewed in chart  Tobacco: She reports that she quit smoking about 10 years ago. Her smoking use included cigarettes and vaping device. She started smoking about 45 years ago. She has a 17.5 pack-year smoking history. She has never used smokeless tobacco.  Alcohol: none  Caffeine: 1 cup per day  Activity: unable to do any activity due to needing hip replacement, on waker    Medication Adherence/Access: Patient uses pill box(es).  Patient takes medications 2 time(s) per day.   Per patient, misses medication 0 times per week.   Medication barriers: cost.   The patient fills medications at [place]: NO, fills medications at [facility].    Diabetes   /Obesity/Constipation:  Mounjaro 10 mg a week   Reports the senna-docusate and Miralax daily now. This regimen seems to be keeping it under control for  now.  She needs to lose weight so she can get surgery to help her pain. Was told to lose another 20-30 lbs before July.  Has surgery and colonoscopy scheduled. Would like to not changes until after these procedures.  Newly diagnosed with lymphedema - starting compression braces/garments. Reports some of her weight retention is related to this and will limit her weight loss.  Current diabetes symptoms: none  Nutrition/Eating Habits: No changes to diet or exercise.  Blood sugars: not checking  At home weight: no scale at home  Exercise/Activity: limited.   Medications Tried/Failed:  Ozempic: terrible stomach aches up to 0.5 of 1mg  Topiramate-allergy list  Bupropion-anxiety     Eye exam in the last 12 months? No  Foot exam: due  Lab Results   Component Value Date    A1C 5.0 03/06/2025    A1C 5.1 03/29/2024    A1C 5.4 01/11/2023    A1C 5.9 01/04/2022     Wt Readings from Last 5 Encounters:   03/10/25 (!) 307 lb 11.2 oz (139.6 kg)   03/06/25 285 lb 14.4 oz (129.7 kg)   02/06/25 (!) 317 lb (143.8 kg)   08/06/24 317 lb (143.8 kg)   08/02/24 317 lb (143.8 kg)       Hypertension   Losartan discontinued due to concerns with hypotension.  Patient reports no current medication side effects  Patient does not self-monitor blood pressure.     BP Readings from Last 3 Encounters:   03/10/25 103/71   03/06/25 110/54   02/06/25 108/73       Hyperlipidemia   rosuvastatin 20 mg daily  Patient reports no new myalgias since on medication.     Recent Labs   Lab Test 07/16/24  1459 01/11/23  1529 01/04/22  1524 08/10/18  1549 03/29/18  1515   CHOL 126 250*   < > 202* 176   HDL 63 69   < > 49* 76   LDL 43 158*   < > 121* 76   TRIG 99 115   < > 199* 139   CHOLHDLRATIO  --   --   --  4.1 2.3    < > = values in this interval not displayed.         Today's Vitals: There were no vitals taken for this visit.  ----------------      I spent 15 minutes with this patient today. All changes were made via collaborative practice agreement with [facility]  MD Monse.     A summary of these recommendations was sent via [employer].    Mikayla Sosa PharmD  Medication Therapy Management Pharmacist  Voicemail: [phone]      Telemedicine Visit Details  The patient's medications can be safely assessed via a telemedicine encounter.  Type of service:  Telephone visit  Originating Location (pt. Location): Home    Distant Location (provider location):  On-site  Start Time:  2:30 PM  End Time:  2:45 PM     Medication Therapy Recommendations  No medication therapy recommendations to display

## 2025-05-05 ENCOUNTER — VIRTUAL VISIT (OUTPATIENT)
Dept: PHARMACY | Facility: CLINIC | Age: 70
End: 2025-05-05
Payer: COMMERCIAL

## 2025-05-05 DIAGNOSIS — I10 BENIGN ESSENTIAL HYPERTENSION: ICD-10-CM

## 2025-05-05 DIAGNOSIS — E11.59 CONTROLLED TYPE 2 DIABETES MELLITUS WITH OTHER CIRCULATORY COMPLICATION, WITHOUT LONG-TERM CURRENT USE OF INSULIN (H): Primary | ICD-10-CM

## 2025-05-05 DIAGNOSIS — E66.01 MORBID OBESITY WITH BMI OF 60.0-69.9, ADULT (H): ICD-10-CM

## 2025-05-05 DIAGNOSIS — E78.5 HYPERLIPIDEMIA LDL GOAL <70: ICD-10-CM

## 2025-05-05 DIAGNOSIS — K59.03 DRUG-INDUCED CONSTIPATION: ICD-10-CM

## 2025-05-05 PROCEDURE — 99207 PR NO CHARGE LOS: CPT | Mod: 93 | Performed by: PHARMACIST

## 2025-05-05 NOTE — PATIENT INSTRUCTIONS
"Recommendations from today's MTM visit:                                                       Continue current medications unchanged.  Make sure to schedule a pre-op with Dr. Shea    Follow-up: Return in about 7 weeks (around 6/25/2025) for Medication Therapy Management, Phone Visit.    It was great speaking with you today.  I value your experience and would be very thankful for your time in providing feedback in our clinic survey. In the next few days, you may receive an email or text message from [employer] with a link to a survey related to your  clinical pharmacist.\"     To schedule another MTM appointment, please call the clinic directly or you may call the MTM scheduling line at [phone].    My Clinical Pharmacist's contact information:                                                      Please feel free to contact me with any questions or concerns you have.      Mikayla Sosa, PharmD  Medication Therapy Management Pharmacist  Voicemail: [phone]     "

## 2025-05-08 DIAGNOSIS — K59.03 DRUG-INDUCED CONSTIPATION: ICD-10-CM

## 2025-05-08 RX ORDER — DOCUSATE SODIUM 50MG AND SENNOSIDES 8.6MG 8.6; 5 MG/1; MG/1
1 TABLET, FILM COATED ORAL
Qty: 30 TABLET | Refills: 3 | Status: SHIPPED | OUTPATIENT
Start: 2025-05-08

## 2025-05-12 ENCOUNTER — RESULTS FOLLOW-UP (OUTPATIENT)
Dept: ANTICOAGULATION | Facility: CLINIC | Age: 70
End: 2025-05-12

## 2025-05-12 ENCOUNTER — DOCUMENTATION ONLY (OUTPATIENT)
Dept: ANTICOAGULATION | Facility: CLINIC | Age: 70
End: 2025-05-12
Payer: COMMERCIAL

## 2025-05-12 DIAGNOSIS — D68.51 FACTOR 5 LEIDEN MUTATION, HETEROZYGOUS: Primary | ICD-10-CM

## 2025-05-12 DIAGNOSIS — Z86.711 HISTORY OF PULMONARY EMBOLISM: ICD-10-CM

## 2025-05-12 LAB — INR HOME MONITORING: 2.4 (ref 2–3)

## 2025-05-12 NOTE — PROGRESS NOTES
ANTICOAGULATION  MANAGEMENT-Home Monitor Managed by Bautista FINNEY Sven 69 year old female is on warfarin with therapeutic INR result. (Goal INR 2.0-3.0)    Recent labs: (last 7 days)     05/12/25  0000   INR 2.4       Previous INR was Therapeutic  Medication, diet, health changes since last INR:chart reviewed; none identified  Contacted within the last 12 weeks by phone on 04/09/25  Colonoscopy 6/16/25 and procedure plan sent on 5/12/25  Last [facility] referral date: 10/31/2024      JONN Blackburn was NOT contacted regarding therapeutic result today per home monitoring policy manage by exception agreement.   Current warfarin dose is to be continued:     Summary  As of 5/12/2025      Full warfarin instructions:  3.75 mg every Mon; 2.5 mg all other days   Next INR check:  5/27/2025             ?   Bailee Delgado, RN  Anticoagulation Clinic  5/12/2025    _______________________________________________________________________     Anticoagulation Episode Summary       Current INR goal:  2.0-3.0   TTR:  65.5% (1 y)   Target end date:  Indefinite   Send INR reminders to:  ANTICO HOME MONITORING    Indications    Factor 5 Leiden mutation  heterozygous/ INR 2-3 [D68.51]  History of pulmonary embolism [Z86.711]             Comments:  managed by exception  [employer] home meter             Anticoagulation Care Providers       Provider Role Specialty Phone number    Uyen Mcintyre APRN CNP Referring Internal Medicine [phone]    Jennifre Shea MD Referring Internal Medicine [phone]

## 2025-05-27 NOTE — PROVIDER NOTIFICATION
05/27/25 1425   Discharge Planning   Patient/Family Anticipates Transition to home with family   Concerns to be Addressed denies needs/concerns at this time   Living Arrangements   People in Home alone   Type of Residence Private Residence   Is your private residence a single family home or apartment? Apartment   Number of Stairs, Within Home, Primary none   Stair Railings, Within Home, Primary none   Once home, are you able to live on one level? Yes   Which level? Main Level   Bathroom Shower/Tub Walk-in shower   Equipment Currently Used at Home walker, rolling;shower chair;grab bar, toilet;grab bar, tub/shower   Support System   Support Systems Family Members   Do you have someone available to stay with you one or two nights once you are home? Yes   Blood   Known Bleeding Disorder or Coagulopathy No   Does the patient have any Yazdanism/cultural preferences related to blood products? No   Education   Has the patient scheduled or completed pre-op total joint education, either in class or online, in the last 12 months? Yes   What day did the patient complete, or plan to complete, pre-op total joint education? 06/02/25   Patient attended total joint pre-op class/received pre-op teaching  online   Falls Risk   Has the patient been identified as a high falls risk before surgery? (fallen in the last 6 months, history of falls, unsteady gait, or balance issues) No     Fely Ellison RN on 5/27/2025 at 2:32 PM

## 2025-05-29 ENCOUNTER — ANTICOAGULATION THERAPY VISIT (OUTPATIENT)
Dept: ANTICOAGULATION | Facility: CLINIC | Age: 70
End: 2025-05-29
Payer: COMMERCIAL

## 2025-05-29 ENCOUNTER — RESULTS FOLLOW-UP (OUTPATIENT)
Dept: ANTICOAGULATION | Facility: CLINIC | Age: 70
End: 2025-05-29

## 2025-05-29 DIAGNOSIS — D68.51 FACTOR 5 LEIDEN MUTATION, HETEROZYGOUS: Primary | ICD-10-CM

## 2025-05-29 DIAGNOSIS — Z86.711 HISTORY OF PULMONARY EMBOLISM: ICD-10-CM

## 2025-05-29 LAB — INR HOME MONITORING: 1.8 (ref 2–3)

## 2025-05-29 NOTE — PROGRESS NOTES
ANTICOAGULATION MANAGEMENT     Bere Machuca 69 year old female is on warfarin with subtherapeutic INR result. (Goal INR 2.0-3.0)    Recent labs: (last 7 days)     05/29/25  0000   INR 1.8*       ASSESSMENT     Source(s): Chart Review and Patient/Caregiver Call     Warfarin doses taken: Warfarin taken as instructed  Diet: Increased greens/vitamin K in diet; ongoing change-patient had a salad this past week and would like to continue  Medication/supplement changes: None noted  New illness, injury, or hospitalization: No  Signs or symptoms of bleeding or clotting: No  Previous result: Therapeutic last 2(+) visits  Additional findings: Upcoming surgery/procedure patient has colonoscopy on 6/16 and was told by the GI provider her INR needs tu be 1.5 or less.  Patient also having a right total hip replacement the next day on 6/17.  Per patient this second surgery was just added.    Will send 5/12/25 procedure plan back to [facility] for review for second procedure.       PLAN     Recommended plan for ongoing change(s) affecting INR     Dosing Instructions: Increase your warfarin dose (6.7% change) with next INR in 1 week       Summary  As of 5/29/2025      Full warfarin instructions:  3.75 mg every Mon, Thu; 2.5 mg all other days   Next INR check:  6/5/2025               Telephone call with Darline who verbalizes understanding and agrees to plan    Patient to recheck with home meter    Education provided: Dietary considerations: importance of consistent vitamin K intake and impact of vitamin K foods on INR    Plan made per [facility] anticoagulation protocol    Mary Sanchez RN  5/29/2025  Anticoagulation Clinic  [employer] for routing messages: [employer] [facility]  [facility] patient phone line: [phone]        _______________________________________________________________________     Anticoagulation Episode Summary       Current INR goal:  2.0-3.0   TTR:  66.0% (1 y)   Target end date:  Indefinite   Send INR reminders  to:  [facility]    Indications    Factor 5 Leiden mutation  heterozygous/ INR 2-3 [D68.51]  History of pulmonary embolism [Z86.711]             Comments:  managed by exception  [employer] home meter             Anticoagulation Care Providers       Provider Role Specialty Phone number    Uyen Mcintyre APRN CNP Referring Internal Medicine [phone]    Jennifer Shea MD Referring Internal Medicine [phone]

## 2025-06-05 LAB — INR HOME MONITORING: 2.2 (ref 2–3)

## 2025-06-06 ENCOUNTER — RESULTS FOLLOW-UP (OUTPATIENT)
Dept: ANTICOAGULATION | Facility: CLINIC | Age: 70
End: 2025-06-06

## 2025-06-10 ENCOUNTER — OFFICE VISIT (OUTPATIENT)
Dept: INTERNAL MEDICINE | Facility: CLINIC | Age: 70
End: 2025-06-10
Payer: COMMERCIAL

## 2025-06-10 ENCOUNTER — RESULTS FOLLOW-UP (OUTPATIENT)
Dept: ANTICOAGULATION | Facility: CLINIC | Age: 70
End: 2025-06-10

## 2025-06-10 ENCOUNTER — TELEPHONE (OUTPATIENT)
Dept: INTERNAL MEDICINE | Facility: CLINIC | Age: 70
End: 2025-06-10

## 2025-06-10 ENCOUNTER — ANTICOAGULATION THERAPY VISIT (OUTPATIENT)
Dept: ANTICOAGULATION | Facility: CLINIC | Age: 70
End: 2025-06-10

## 2025-06-10 VITALS
OXYGEN SATURATION: 96 % | TEMPERATURE: 97.1 F | HEART RATE: 68 BPM | WEIGHT: 255 LBS | RESPIRATION RATE: 16 BRPM | SYSTOLIC BLOOD PRESSURE: 110 MMHG | DIASTOLIC BLOOD PRESSURE: 79 MMHG | BODY MASS INDEX: 42.49 KG/M2 | HEIGHT: 65 IN

## 2025-06-10 DIAGNOSIS — E66.01 MORBID OBESITY (H): ICD-10-CM

## 2025-06-10 DIAGNOSIS — M05.79 RHEUMATOID ARTHRITIS INVOLVING MULTIPLE SITES WITH POSITIVE RHEUMATOID FACTOR (H): ICD-10-CM

## 2025-06-10 DIAGNOSIS — D68.51 FACTOR 5 LEIDEN MUTATION, HETEROZYGOUS: ICD-10-CM

## 2025-06-10 DIAGNOSIS — D12.6 ADENOMATOUS POLYP OF COLON, UNSPECIFIED PART OF COLON: ICD-10-CM

## 2025-06-10 DIAGNOSIS — M16.11 PRIMARY OSTEOARTHRITIS OF RIGHT HIP: ICD-10-CM

## 2025-06-10 DIAGNOSIS — D68.51 FACTOR 5 LEIDEN MUTATION, HETEROZYGOUS: Primary | ICD-10-CM

## 2025-06-10 DIAGNOSIS — E11.59 CONTROLLED TYPE 2 DIABETES MELLITUS WITH OTHER CIRCULATORY COMPLICATION, WITHOUT LONG-TERM CURRENT USE OF INSULIN (H): ICD-10-CM

## 2025-06-10 DIAGNOSIS — Z86.711 HISTORY OF PULMONARY EMBOLISM: ICD-10-CM

## 2025-06-10 DIAGNOSIS — Z01.818 PREOP GENERAL PHYSICAL EXAM: Primary | ICD-10-CM

## 2025-06-10 LAB
ERYTHROCYTE [DISTWIDTH] IN BLOOD BY AUTOMATED COUNT: 15 % (ref 10–15)
HCT VFR BLD AUTO: 41.4 % (ref 35–47)
HGB BLD-MCNC: 13.8 G/DL (ref 11.7–15.7)
INR BLD: 2.2 (ref 0.9–1.1)
MCH RBC QN AUTO: 33.2 PG (ref 26.5–33)
MCHC RBC AUTO-ENTMCNC: 33.3 G/DL (ref 31.5–36.5)
MCV RBC AUTO: 100 FL (ref 78–100)
PLATELET # BLD AUTO: 241 10E3/UL (ref 150–450)
RBC # BLD AUTO: 4.16 10E6/UL (ref 3.8–5.2)
WBC # BLD AUTO: 9.3 10E3/UL (ref 4–11)

## 2025-06-10 PROCEDURE — 36415 COLL VENOUS BLD VENIPUNCTURE: CPT | Performed by: INTERNAL MEDICINE

## 2025-06-10 PROCEDURE — 3078F DIAST BP <80 MM HG: CPT | Performed by: INTERNAL MEDICINE

## 2025-06-10 PROCEDURE — 3074F SYST BP LT 130 MM HG: CPT | Performed by: INTERNAL MEDICINE

## 2025-06-10 PROCEDURE — 99214 OFFICE O/P EST MOD 30 MIN: CPT | Performed by: INTERNAL MEDICINE

## 2025-06-10 PROCEDURE — 85610 PROTHROMBIN TIME: CPT | Performed by: INTERNAL MEDICINE

## 2025-06-10 PROCEDURE — 1125F AMNT PAIN NOTED PAIN PRSNT: CPT | Performed by: INTERNAL MEDICINE

## 2025-06-10 PROCEDURE — 85027 COMPLETE CBC AUTOMATED: CPT | Performed by: INTERNAL MEDICINE

## 2025-06-10 ASSESSMENT — PAIN SCALES - GENERAL: PAINLEVEL_OUTOF10: SEVERE PAIN (7)

## 2025-06-10 NOTE — PATIENT INSTRUCTIONS
Stop mesalamine and sulfasalazine 1 day prior to procedure and can resume 3 days after procedure.    Warfarin recommendations as per INR clinic.

## 2025-06-10 NOTE — TELEPHONE ENCOUNTER
Spoke with Darline. Gave dosing for next two days and reviewed procedure plan. No further questions.    Adrian Eddy RN

## 2025-06-10 NOTE — PROGRESS NOTES
ANTICOAGULATION MANAGEMENT     Bere Machuca 69 year old female is on warfarin with therapeutic INR result. (Goal INR 2.0-3.0)    Recent labs: (last 7 days)     06/10/25  1503   INR 2.2*       ASSESSMENT     Source(s): Chart Review  Previous INR was Therapeutic last 2(+) visits  Medication, diet, health changes since last INR chart reviewed; none identified  Colonoscopy/right total hip arthroplasty scheduled for 6/16-6/17. Procedure plan given to patient on 6/6 during [facility] encounter. [employer] message sent with procedure instructions.         PLAN     Recommended plan for no diet, medication or health factor changes affecting INR     Dosing Instructions: Continue your current warfarin dose with next INR in 2 weeks       Summary  As of 6/10/2025      Full warfarin instructions:  6/12: Hold; 6/13: Hold; 6/14: Hold; 6/15: Hold; 6/16: Hold; Otherwise 3.75 mg every Mon, Fri; 2.5 mg all other days   Next INR check:  --               Detailed voice message left for Darline with dosing instructions and follow up date.     Patient to recheck with home meter    Education provided: Please call back if any changes to your diet, medications or how you've been taking warfarin    Plan made per [facility] anticoagulation protocol    Adrian Eddy RN  6/10/2025  Anticoagulation Clinic  [facility] for routing messages: [employer] [facility]  [facility] patient phone line: [phone]        _______________________________________________________________________     Anticoagulation Episode Summary       Current INR goal:  2.0-3.0   TTR:  67.5% (1 y)   Target end date:  Indefinite   Send INR reminders to:  [facility]    Indications    Factor 5 Leiden mutation  heterozygous/ INR 2-3 [D68.51]  History of pulmonary embolism [Z86.711]             Comments:  managed by exception  [employer] home meter             Anticoagulation Care Providers       Provider Role Specialty Phone number    Uyen Mcintyre APRN CNP Referring  Internal Medicine [phone]    Jennifer Shea MD Referring Internal Medicine [phone]

## 2025-06-10 NOTE — PROGRESS NOTES
Preoperative Evaluation  [facility]  [address]  [address]  [facility] [address]  Phone: [phone]  Primary Provider: Jennifer Shea MD  Pre-op Performing Provider: Jennifer Shea MD  Cesar 10, 2025             6/5/2025   Surgical Information   What procedure is being done? Right hip replacement on the 17th. Repeat colonoscopy on the 16th.   Facility or Hospital where procedure/surgery will be performed: [facility] in [place]   Who is doing the procedure / surgery? HUE Estrada, Hector Schafer   Date of surgery / procedure: June 16th and June 17th   Time of surgery / procedure: 8:30 and 9:30   Where do you plan to recover after surgery? at home with family     Fax number for surgical facility: Note does not need to be faxed, will be available electronically in Epic.    Assessment & Plan     The proposed surgical procedure is considered INTERMEDIATE risk.    Preop general physical exam  - CBC with platelets; Future  - CBC with platelets    Primary osteoarthritis of right hip  Adenomatous polyp of colon  Factor 5 Leiden mutation, heterozygous  - INR point of care (finger stick)    Controlled type 2 diabetes mellitus with other circulatory complication, without long-term current use of insulin (H)  Morbid obesity (H)  Rheumatoid arthritis involving multiple sites with positive rheumatoid factor (H)      At this time, blood pressure is noted to be at an acceptable level. Patient has previously tolerated anesthesia without issue.Patient does have a CBC  pending. I would consider patient to be acceptable candidate to proceed with a noncardiac related surgery. Assuming no unexpected abnormalities on outstanding labs, patient should be able to proceed with his surgery as scheduled. patient should follow any additional instructions as provided by performing surgeon.   Warfarin medication recommendations discussed and patient will be bridging with Lovenox.  Has stopped Mounjaro  medication in anticipation of upcoming procedures.  Patient will also stopped sulfasalazine and mesalamine 1 day prior to procedure.         - No identified additional risk factors other than previously addressed         Recommendation  Approval given to proceed with proposed procedure, without further diagnostic evaluation.        Racquel Gregorio is a 69 year old, presenting for the following:  Pre-Op Exam          6/10/2025     2:16 PM   Additional Questions   Roomed by Teresa Kilgore   Accompanied by sister         6/10/2025     2:16 PM   Patient Reported Additional Medications   Patient reports taking the following new medications no     HPI: Patient will be undergoing colonoscopy for colon cancer screening and right hip replacement the following day for primary osteoarthritis of the right hip.          6/5/2025   Pre-Op Questionnaire   Have you ever had a heart attack or stroke? No   Have you ever had surgery on your heart or blood vessels, such as a stent placement, a coronary artery bypass, or surgery on an artery in your head, neck, heart, or legs? No   Do you have chest pain with activity? No   Do you have a history of heart failure? No   Do you currently have a cold, bronchitis or symptoms of other infection? No   Do you have a cough, shortness of breath, or wheezing? No   Do you or anyone in your family have previous history of blood clots? (!) YES    Do you or does anyone in your family have a serious bleeding problem such as prolonged bleeding following surgeries or cuts? No   Have you ever had problems with anemia or been told to take iron pills? No   Have you had any abnormal blood loss such as black, tarry or bloody stools, or abnormal vaginal bleeding? No   Have you ever had a blood transfusion? No   Are you willing to have a blood transfusion if it is medically needed before, during, or after your surgery? Yes   Have you or any of your relatives ever had problems with anesthesia? No   Do you  have sleep apnea, excessive snoring or daytime drowsiness? (!) YES   Do you have a CPAP machine? Yes   Do you have any artifical heart valves or other implanted medical devices like a pacemaker, defibrillator, or continuous glucose monitor? No   Do you have artificial joints? No   Are you allergic to latex? No     Advance Care Planning    Discussed advance care planning with patient; however, patient declined at this time.    Preoperative Review of    reviewed - no record of controlled substances prescribed.          Patient Active Problem List    Diagnosis Date Noted    DM -- needs DIABETIC SHOES-- diabetic polyneuropathy and feet deformity. 08/28/2024     Priority: Medium    Other chronic pain 06/11/2021     Priority: Medium    Chronic anticoagulation 01/29/2021     Priority: Medium    Rheumatoid arthritis involving multiple sites with positive rheumatoid factor (H) 04/16/2019     Priority: Medium    PVD (peripheral vascular disease) 11/30/2018     Priority: Medium     Diagnosed through [facility]      Controlled type 2 diabetes mellitus with circulatory disorder, without long-term current use of insulin (H) 07/05/2017     Priority: Medium    Displacement of lumbar intervertebral disc without myelopathy 02/03/2017     Priority: Medium    Morbid obesity with BMI of 60.0-69.9, adult (H) 09/19/2016     Priority: Medium    Superior vena cava syndrome      Priority: Medium    Major depressive disorder, recurrent episode, moderate (H) 09/17/2015     Priority: Medium    History of pulmonary embolism 02/27/2015     Priority: Medium    Spinal stenosis in cervical region 07/28/2014     Priority: Medium    Lymphedema of arm 03/08/2013     Priority: Medium    ACP (advance care planning) 05/18/2012     Priority: Medium     Advance Care Planning:   ACP Review and Resources Provided:  Reviewed chart for advance care plan.  Bere Machuca has no plan or code status on file however states presence of ACP document.  Copy requested. Confirmed code status reflects current choices pending receipt of document/advance care plan review. Confirmed/documented designated decision maker(s). See permanent comments section of demographics in clinical tab.   Added by Liz Lopez on 1/20/2014              Factor 5 Leiden mutation, heterozygous/ INR 2-3 05/18/2012     Priority: Medium    Family history of pulmonary embolism 03/03/2012     Priority: Medium    Sleep apnea 08/27/2010     Priority: Medium    Migraine 08/27/2010     Priority: Medium     Problem list name updated by automated process. Provider to review      History of cocaine abuse (H) 10/10/2008     Priority: Medium    Cigarette - quit 2014 -  now vaping 10/10/2008     Priority: Medium    Family history of malignant neoplasm of gastrointestinal tract 10/10/2008     Priority: Medium    Esophageal reflux 10/10/2008     Priority: Medium    Chronic airway obstruction (H) 10/10/2008     Priority: Medium     Problem list name updated by automated process. Provider to review      Essential hypertension 02/22/2008     Priority: Medium      Past Medical History:   Diagnosis Date    Antiplatelet or antithrombotic long-term use     Anxiety     Arthritis     Clotting disorder     Coagulation disorder     COPD (chronic obstructive pulmonary disease) (H)     Depressive disorder     Diabetes (H)     Gastroesophageal reflux disease     Hypertension     Lymphedema of arm 03/08/2013    Morbidly obese (H) 09/25/2009    Pulmonary embolism (H)     Sleep apnea     Substance abuse (H)     Superior vena cava syndrome     Thrombosis      Past Surgical History:   Procedure Laterality Date    BIOPSY OF SKIN LESION  multiple    keloids    COLONOSCOPY N/A 3/10/2025    Procedure: Colonoscopy with polypectomy and clipping, biopsies;  Surgeon: Fili Smith MD;  Location: [facility] OR    HC REMOVAL OF OVARY/TUBE(S)  1992    Salpingo-Oophorectomy, Unilateral    HC REMOVE TONSILS/ADENOIDS,<11 Y/O  age 14    T & A  <14y.o.    HERNIA REPAIR      INSERTION OF ACCESS PORT  1-4-96    sepsis- removed after hospital stay    [facility] ANESTH,REPAIR LO ABD HERNIA NOS  multiple/ 2006 last    panniculus    [facility] EXPLORATORY OF ABDOMEN  1996    [facility] PLASTIC SURGERY, NECK  age 4    Z REMOVAL COLON/ILEOSTOMY      reastamosis    [facility] TOTAL ABDOM HYSTERECTOMY  1992    Hysterectomy, Total Abdominal    [facility] REMOVE ABDOMINAL WALL LESION  2006    abscess     Current Outpatient Medications   Medication Sig Dispense Refill    acetaminophen (TYLENOL) 500 MG tablet Take 1,000 mg by mouth every 8 hours as needed for mild pain      albuterol (PROAIR HFA/PROVENTIL HFA/VENTOLIN HFA) 108 (90 Base) MCG/ACT inhaler INHALE 2 PUFFS BY MOUTH EVERY 4 HOURS AS NEEDED 18 g 2    Buprenorphine HCl-Naloxone HCl (SUBOXONE) 12-3 MG FILM per film Place 1 Film under the tongue 2 times daily      Calcium Carbonate-Vit D-Min (CALCIUM 1200 PO) Take by mouth.      enoxaparin ANTICOAGULANT (LOVENOX) 40 MG/0.4ML syringe Inject 0.4 mLs (40 mg) subcutaneously every 24 hours. As directed by INR clinic 4 mL 1    escitalopram (LEXAPRO) 20 MG tablet Take 1 tablet (20 mg) by mouth daily. 90 tablet 1    Fluticasone-Umeclidin-Vilant (TRELEGY ELLIPTA) 100-62.5-25 MCG/ACT oral inhaler INHALE 1 PUFF INTO THE LUNGS DAILY (RINSE MOUTH AND SPIT AFTER USING) 60 each 11    folic acid (FOLVITE) 1 MG tablet Take 2 mg by mouth daily  0    Golimumab (SIMPONI ARIA IV) Take as directed every 8 weeks via infusion center per rheumatology      mesalamine (LIALDA) 1.2 g EC tablet Take 1,200 mg by mouth 2 times daily.      methotrexate 50 MG/2ML injection INJECT 0.7ML SUB-EVERY EVERY WEEK      Multiple Vitamins-Minerals (WOMENS 50+ MULTI VITAMIN/MIN PO) Take 1 tablet by mouth daily. (With fish oil)      polyethylene glycol (MIRALAX) 17 GM/Dose powder Take 1 Capful by mouth daily as needed for constipation      predniSONE (DELTASONE) 5 MG tablet Take 5 mg by mouth daily      Pregabalin (LYRICA) 200 MG capsule  Take 200 mg by mouth 2 times daily      rosuvastatin (CRESTOR) 20 MG tablet TAKE 1 TABLET BY MOUTH EVERY DAY 90 tablet 3    senna-docusate (SENEXON-S) 8.6-50 MG tablet TAKE 1 TABLET BY MOUTH EVERY DAY AS NEEDED 30 tablet 3    sulfaSALAzine (AZULFIDINE) 500 MG tablet Take 1 tablet (500 mg) by mouth 2 times daily      tirzepatide (MOUNJARO) 10 MG/0.5ML SOAJ auto-injector pen Inject 0.5 mLs (10 mg) subcutaneously once a week. 2 mL 2    tiZANidine (ZANAFLEX) 4 MG tablet TAKE 1 TABLET BY MOUTH TWICE A DAY AS NEEDED 180 tablet 1    warfarin ANTICOAGULANT (COUMADIN) 2.5 MG tablet Take 3.75 mg (2.5 mg x 1) every Mon; 2.5 mg (2.5 mg x 1) all other days or as directed by INR clinic 100 tablet 1    warfarin ANTICOAGULANT (COUMADIN) 2.5 MG tablet TAKE 1.5 TABS(3.75 MG)ON SUNDAYS & 1 TAB (2.5 MG) ALL OTHER DAYS OR AS DIRECTED BY THE INR CLINIC 100 tablet 1    B Complex-C (SUPER B COMPLEX PO) Take 1 tablet by mouth daily (Patient not taking: Reported on 6/10/2025)      carbamide peroxide (DEBROX) 6.5 % otic solution Place 5 drops into both ears 2 times daily (Patient not taking: Reported on 6/10/2025) 15 mL 0    Omega-3 Fatty Acids (FISH OIL) 1200 MG capsule Take 1,200 mg by mouth daily. (Patient not taking: Reported on 6/10/2025)         Allergies   Allergen Reactions    Metformin Diarrhea    Aspirin      PN: LW Reaction: sensitive/stomach    Tape [Adhesive Tape] Itching    Topiramate Unknown    Wellbutrin [Bupropion] Anxiety        Social History     Tobacco Use    Smoking status: Former     Current packs/day: 0.00     Average packs/day: 0.5 packs/day for 35.0 years (17.5 ttl pk-yrs)     Types: Cigarettes, Vaping Device     Start date: 8/8/1979     Quit date: 8/8/2014     Years since quitting: 10.8    Smokeless tobacco: Never    Tobacco comments:     Currently vapes as of 3/4/25   Substance Use Topics    Alcohol use: Not Currently     Alcohol/week: 0.8 standard drinks of alcohol     Types: 1 Standard drinks or equivalent per  "week       History   Drug Use No     Comment: 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             Review of Systems  Constitutional, HEENT, cardiovascular, pulmonary, gi and gu systems are negative, except as otherwise noted.    Objective    There were no vitals taken for this visit.   Estimated body mass index is 51.19 kg/m  as calculated from the following:    Height as of 3/10/25: 1.651 m (5' 5.01\").    Weight as of 3/10/25: 139.6 kg (307 lb 11.2 oz).  Physical Exam  GENERAL: alert and no distress  RESP: lungs clear to auscultation - no rales, rhonchi or wheezes  CV: regular rate and rhythm, normal S1 S2, no S3 or S4, no murmur, click or rub, no peripheral edema  MS: no gross musculoskeletal defects noted, no edema  NEURO: Normal strength and tone, mentation intact and speech normal  PSYCH: mentation appears normal, affect normal/bright    Recent Labs   Lab Test 06/05/25  0000 05/29/25  0000 03/10/25  0956 03/06/25  1034   INR 2.2 1.8*   < >  --    NA  --   --   --  144   POTASSIUM  --   --   --  4.0   CR  --   --   --  0.64   A1C  --   --   --  5.0    < > = values in this interval not displayed.        Diagnostics  Labs pending at this time.  Results will be reviewed when available.   No EKG this visit, completed in the last 90 days.    Revised Cardiac Risk Index (RCRI)  The patient has the following serious cardiovascular risks for perioperative complications:   - No serious cardiac risks = 0 points     RCRI Interpretation: 0 points: Class I (very low risk - 0.4% complication rate)         Signed Electronically by: Jennifer Shea MD  A copy of this evaluation report is provided to the requesting physician.        "

## 2025-06-10 NOTE — TELEPHONE ENCOUNTER
Patient Returning Call    Reason for call:  CB    Information relayed to patient:  No msg left and nurse was trying to CB while we were on the phone but PT missed call    Patient has additional questions:  Yes    What are your questions/concerns:  What was call regarding    Could we send this information to you in [facility] or would you prefer to receive a phone call?:   Patient would prefer a phone call   Okay to leave a detailed message?: Yes at Cell number on file:    Telephone Information:   Mobile [phone]

## 2025-06-11 ENCOUNTER — RESULTS FOLLOW-UP (OUTPATIENT)
Dept: INTERNAL MEDICINE | Facility: CLINIC | Age: 70
End: 2025-06-11

## 2025-06-15 NOTE — PHARMACY-ADMISSION MEDICATION HISTORY
Medication reconciliation completed by pre-admitting.    PTA Med List   Medication Sig Last Dose/Taking    acetaminophen (TYLENOL) 500 MG tablet Take 1,000 mg by mouth every 8 hours as needed for mild pain Taking As Needed    albuterol (PROAIR HFA/PROVENTIL HFA/VENTOLIN HFA) 108 (90 Base) MCG/ACT inhaler INHALE 2 PUFFS BY MOUTH EVERY 4 HOURS AS NEEDED Taking    Buprenorphine HCl-Naloxone HCl (SUBOXONE) 12-3 MG FILM per film Place 1 Film under the tongue 2 times daily Taking    Calcium Carbonate-Vit D-Min (CALCIUM 1200 PO) Take by mouth. Taking    escitalopram (LEXAPRO) 20 MG tablet Take 1 tablet (20 mg) by mouth daily. Taking    Fluticasone-Umeclidin-Vilant (TRELEGY ELLIPTA) 100-62.5-25 MCG/ACT oral inhaler INHALE 1 PUFF INTO THE LUNGS DAILY (RINSE MOUTH AND SPIT AFTER USING) Taking    folic acid (FOLVITE) 1 MG tablet Take 2 mg by mouth daily Taking    Golimumab (SIMPONI ARIA IV) Take as directed every 8 weeks via infusion center per rheumatology Taking    mesalamine (LIALDA) 1.2 g EC tablet Take 1,200 mg by mouth 2 times daily. Taking    methotrexate 50 MG/2ML injection INJECT 0.7ML SUB-EVERY EVERY WEEK Taking    Multiple Vitamins-Minerals (WOMENS 50+ MULTI VITAMIN/MIN PO) Take 1 tablet by mouth daily. (With fish oil) Taking    polyethylene glycol (MIRALAX) 17 GM/Dose powder Take 1 Capful by mouth daily as needed for constipation Taking As Needed    predniSONE (DELTASONE) 5 MG tablet Take 5 mg by mouth daily Taking    Pregabalin (LYRICA) 200 MG capsule Take 200 mg by mouth 2 times daily Taking    rosuvastatin (CRESTOR) 20 MG tablet TAKE 1 TABLET BY MOUTH EVERY DAY Taking    senna-docusate (SENEXON-S) 8.6-50 MG tablet TAKE 1 TABLET BY MOUTH EVERY DAY AS NEEDED Taking    sulfaSALAzine (AZULFIDINE) 500 MG tablet Take 1 tablet (500 mg) by mouth 2 times daily Taking    tirzepatide (MOUNJARO) 10 MG/0.5ML SOAJ auto-injector pen Inject 0.5 mLs (10 mg) subcutaneously once a week. Taking    tiZANidine (ZANAFLEX) 4 MG tablet  TAKE 1 TABLET BY MOUTH TWICE A DAY AS NEEDED Taking    warfarin ANTICOAGULANT (COUMADIN) 2.5 MG tablet Take 3.75 mg (2.5 mg x 1) every Mon; 2.5 mg (2.5 mg x 1) all other days or as directed by INR clinic Taking

## 2025-06-16 ENCOUNTER — PATIENT OUTREACH (OUTPATIENT)
Dept: CARE COORDINATION | Facility: CLINIC | Age: 70
End: 2025-06-16
Payer: COMMERCIAL

## 2025-06-17 ENCOUNTER — ANESTHESIA (OUTPATIENT)
Dept: SURGERY | Facility: CLINIC | Age: 70
End: 2025-06-17
Payer: COMMERCIAL

## 2025-06-17 ENCOUNTER — MYC REFILL (OUTPATIENT)
Dept: INTERNAL MEDICINE | Facility: CLINIC | Age: 70
End: 2025-06-17

## 2025-06-17 ENCOUNTER — APPOINTMENT (OUTPATIENT)
Dept: GENERAL RADIOLOGY | Facility: CLINIC | Age: 70
End: 2025-06-17
Attending: ORTHOPAEDIC SURGERY
Payer: COMMERCIAL

## 2025-06-17 ENCOUNTER — ANESTHESIA EVENT (OUTPATIENT)
Dept: SURGERY | Facility: CLINIC | Age: 70
End: 2025-06-17
Payer: COMMERCIAL

## 2025-06-17 ENCOUNTER — HOSPITAL ENCOUNTER (OUTPATIENT)
Facility: CLINIC | Age: 70
Discharge: HOME | End: 2025-06-18
Attending: ORTHOPAEDIC SURGERY | Admitting: ORTHOPAEDIC SURGERY
Payer: COMMERCIAL

## 2025-06-17 DIAGNOSIS — D68.51 FACTOR 5 LEIDEN MUTATION, HETEROZYGOUS: ICD-10-CM

## 2025-06-17 DIAGNOSIS — Z86.711 HISTORY OF PULMONARY EMBOLISM: ICD-10-CM

## 2025-06-17 DIAGNOSIS — Z96.641 STATUS POST TOTAL REPLACEMENT OF RIGHT HIP: Primary | ICD-10-CM

## 2025-06-17 DIAGNOSIS — G89.29 OTHER CHRONIC PAIN: ICD-10-CM

## 2025-06-17 LAB
CREAT SERPL-MCNC: 0.59 MG/DL (ref 0.51–0.95)
EGFRCR SERPLBLD CKD-EPI 2021: >90 ML/MIN/1.73M2
GLUCOSE BLDC GLUCOMTR-MCNC: 109 MG/DL (ref 70–99)
GLUCOSE BLDC GLUCOMTR-MCNC: 129 MG/DL (ref 70–99)
INR PPP: 1.15 (ref 0.85–1.15)
INR PPP: 1.17 (ref 0.85–1.15)
PROTHROMBIN TIME: 14.7 SECONDS (ref 11.8–14.8)
PROTHROMBIN TIME: 15 SECONDS (ref 11.8–14.8)

## 2025-06-17 PROCEDURE — 36415 COLL VENOUS BLD VENIPUNCTURE: CPT | Performed by: ORTHOPAEDIC SURGERY

## 2025-06-17 PROCEDURE — 250N000013 HC RX MED GY IP 250 OP 250 PS 637: Performed by: PHYSICIAN ASSISTANT

## 2025-06-17 PROCEDURE — 82565 ASSAY OF CREATININE: CPT | Performed by: ORTHOPAEDIC SURGERY

## 2025-06-17 PROCEDURE — 999N000065 XR PELVIS AND HIP PORTABLE RIGHT 1 VIEW

## 2025-06-17 PROCEDURE — C1713 ANCHOR/SCREW BN/BN,TIS/BN: HCPCS | Performed by: ORTHOPAEDIC SURGERY

## 2025-06-17 PROCEDURE — 999N000141 HC STATISTIC PRE-PROCEDURE NURSING ASSESSMENT: Performed by: ORTHOPAEDIC SURGERY

## 2025-06-17 PROCEDURE — C1776 JOINT DEVICE (IMPLANTABLE): HCPCS | Performed by: ORTHOPAEDIC SURGERY

## 2025-06-17 PROCEDURE — 250N000009 HC RX 250

## 2025-06-17 PROCEDURE — 85610 PROTHROMBIN TIME: CPT | Performed by: ORTHOPAEDIC SURGERY

## 2025-06-17 PROCEDURE — 258N000003 HC RX IP 258 OP 636: Performed by: NURSE ANESTHETIST, CERTIFIED REGISTERED

## 2025-06-17 PROCEDURE — 250N000011 HC RX IP 250 OP 636

## 2025-06-17 PROCEDURE — 258N000003 HC RX IP 258 OP 636: Performed by: ANESTHESIOLOGY

## 2025-06-17 PROCEDURE — 250N000011 HC RX IP 250 OP 636: Mod: JZ | Performed by: INTERNAL MEDICINE

## 2025-06-17 PROCEDURE — 85610 PROTHROMBIN TIME: CPT | Performed by: ANESTHESIOLOGY

## 2025-06-17 PROCEDURE — 258N000003 HC RX IP 258 OP 636

## 2025-06-17 PROCEDURE — 360N000077 HC SURGERY LEVEL 4, PER MIN: Performed by: ORTHOPAEDIC SURGERY

## 2025-06-17 PROCEDURE — 250N000009 HC RX 250: Performed by: ORTHOPAEDIC SURGERY

## 2025-06-17 PROCEDURE — 82962 GLUCOSE BLOOD TEST: CPT

## 2025-06-17 PROCEDURE — 250N000011 HC RX IP 250 OP 636: Performed by: NURSE ANESTHETIST, CERTIFIED REGISTERED

## 2025-06-17 PROCEDURE — 258N000001 HC RX 258: Performed by: ORTHOPAEDIC SURGERY

## 2025-06-17 PROCEDURE — 370N000017 HC ANESTHESIA TECHNICAL FEE, PER MIN: Performed by: ORTHOPAEDIC SURGERY

## 2025-06-17 PROCEDURE — 250N000011 HC RX IP 250 OP 636: Performed by: PHYSICIAN ASSISTANT

## 2025-06-17 PROCEDURE — 250N000011 HC RX IP 250 OP 636: Performed by: ANESTHESIOLOGY

## 2025-06-17 PROCEDURE — 250N000025 HC SEVOFLURANE, PER MIN: Performed by: ORTHOPAEDIC SURGERY

## 2025-06-17 PROCEDURE — 250N000009 HC RX 250: Performed by: NURSE ANESTHETIST, CERTIFIED REGISTERED

## 2025-06-17 PROCEDURE — 250N000013 HC RX MED GY IP 250 OP 250 PS 637: Performed by: ORTHOPAEDIC SURGERY

## 2025-06-17 PROCEDURE — 272N000001 HC OR GENERAL SUPPLY STERILE: Performed by: ORTHOPAEDIC SURGERY

## 2025-06-17 PROCEDURE — 250N000011 HC RX IP 250 OP 636: Performed by: ORTHOPAEDIC SURGERY

## 2025-06-17 PROCEDURE — 710N000009 HC RECOVERY PHASE 1, LEVEL 1, PER MIN: Performed by: ORTHOPAEDIC SURGERY

## 2025-06-17 PROCEDURE — 999N000063 XR HIP PORT RIGHT 1 VIEW

## 2025-06-17 PROCEDURE — 250N000012 HC RX MED GY IP 250 OP 636 PS 637: Performed by: ORTHOPAEDIC SURGERY

## 2025-06-17 PROCEDURE — 258N000003 HC RX IP 258 OP 636: Performed by: ORTHOPAEDIC SURGERY

## 2025-06-17 DEVICE — IMPLANTABLE DEVICE: Type: IMPLANTABLE DEVICE | Site: HIP | Status: FUNCTIONAL

## 2025-06-17 DEVICE — IMPLANTABLE DEVICE
Type: IMPLANTABLE DEVICE | Site: HIP | Status: FUNCTIONAL
Brand: G7® ACETABULAR SYSTEM

## 2025-06-17 DEVICE — IMPLANTABLE DEVICE
Type: IMPLANTABLE DEVICE | Site: HIP | Status: FUNCTIONAL
Brand: G7® DUAL MOBILITY ACETABULAR SYSTEM

## 2025-06-17 DEVICE — IMPLANTABLE DEVICE
Type: IMPLANTABLE DEVICE | Site: HIP | Status: FUNCTIONAL
Brand: TAPERLOC COMPLETE PRIMARY FEMORAL

## 2025-06-17 DEVICE — IMPLANTABLE DEVICE
Type: IMPLANTABLE DEVICE | Site: HIP | Status: FUNCTIONAL
Brand: BIOLOX® DELTA MODULAR CERAMIC HEAD

## 2025-06-17 RX ORDER — NALOXONE HYDROCHLORIDE 0.4 MG/ML
0.1 INJECTION, SOLUTION INTRAMUSCULAR; INTRAVENOUS; SUBCUTANEOUS
Status: DISCONTINUED | OUTPATIENT
Start: 2025-06-17 | End: 2025-06-17 | Stop reason: HOSPADM

## 2025-06-17 RX ORDER — MAGNESIUM SULFATE HEPTAHYDRATE 40 MG/ML
2 INJECTION, SOLUTION INTRAVENOUS
Status: DISCONTINUED | OUTPATIENT
Start: 2025-06-17 | End: 2025-06-17 | Stop reason: HOSPADM

## 2025-06-17 RX ORDER — ONDANSETRON 2 MG/ML
4 INJECTION INTRAMUSCULAR; INTRAVENOUS EVERY 6 HOURS PRN
Status: DISCONTINUED | OUTPATIENT
Start: 2025-06-17 | End: 2025-06-18 | Stop reason: HOSPADM

## 2025-06-17 RX ORDER — ONDANSETRON 2 MG/ML
4 INJECTION INTRAMUSCULAR; INTRAVENOUS EVERY 30 MIN PRN
Status: DISCONTINUED | OUTPATIENT
Start: 2025-06-17 | End: 2025-06-17 | Stop reason: HOSPADM

## 2025-06-17 RX ORDER — HYDROCORTISONE SODIUM SUCCINATE 100 MG/2ML
INJECTION INTRAMUSCULAR; INTRAVENOUS PRN
Status: DISCONTINUED | OUTPATIENT
Start: 2025-06-17 | End: 2025-06-17

## 2025-06-17 RX ORDER — OXYCODONE HYDROCHLORIDE 5 MG/1
5 TABLET ORAL EVERY 4 HOURS PRN
Status: DISCONTINUED | OUTPATIENT
Start: 2025-06-17 | End: 2025-06-18 | Stop reason: HOSPADM

## 2025-06-17 RX ORDER — BUPRENORPHINE AND NALOXONE 4; 1 MG/1; MG/1
3 FILM, SOLUBLE BUCCAL; SUBLINGUAL 2 TIMES DAILY
Status: DISCONTINUED | OUTPATIENT
Start: 2025-06-17 | End: 2025-06-18 | Stop reason: HOSPADM

## 2025-06-17 RX ORDER — CELECOXIB 100 MG/1
100 CAPSULE ORAL 2 TIMES DAILY
Status: DISCONTINUED | OUTPATIENT
Start: 2025-06-17 | End: 2025-06-18 | Stop reason: HOSPADM

## 2025-06-17 RX ORDER — HYDROMORPHONE HYDROCHLORIDE 1 MG/ML
0.5 INJECTION, SOLUTION INTRAMUSCULAR; INTRAVENOUS; SUBCUTANEOUS EVERY 4 HOURS PRN
Status: DISCONTINUED | OUTPATIENT
Start: 2025-06-17 | End: 2025-06-18

## 2025-06-17 RX ORDER — VANCOMYCIN HYDROCHLORIDE 1 G/20ML
INJECTION, POWDER, LYOPHILIZED, FOR SOLUTION INTRAVENOUS PRN
Status: DISCONTINUED | OUTPATIENT
Start: 2025-06-17 | End: 2025-06-17 | Stop reason: HOSPADM

## 2025-06-17 RX ORDER — HYDROMORPHONE HCL IN WATER/PF 6 MG/30 ML
0.2 PATIENT CONTROLLED ANALGESIA SYRINGE INTRAVENOUS EVERY 4 HOURS PRN
Status: DISCONTINUED | OUTPATIENT
Start: 2025-06-17 | End: 2025-06-17

## 2025-06-17 RX ORDER — NALOXONE HYDROCHLORIDE 0.4 MG/ML
0.2 INJECTION, SOLUTION INTRAMUSCULAR; INTRAVENOUS; SUBCUTANEOUS
Status: DISCONTINUED | OUTPATIENT
Start: 2025-06-17 | End: 2025-06-18 | Stop reason: HOSPADM

## 2025-06-17 RX ORDER — ACETAMINOPHEN 325 MG/1
975 TABLET ORAL ONCE
Status: DISCONTINUED | OUTPATIENT
Start: 2025-06-17 | End: 2025-06-17

## 2025-06-17 RX ORDER — ONDANSETRON 4 MG/1
4 TABLET, ORALLY DISINTEGRATING ORAL EVERY 30 MIN PRN
Status: DISCONTINUED | OUTPATIENT
Start: 2025-06-17 | End: 2025-06-17 | Stop reason: HOSPADM

## 2025-06-17 RX ORDER — DIPHENHYDRAMINE HYDROCHLORIDE 50 MG/ML
12.5 INJECTION, SOLUTION INTRAMUSCULAR; INTRAVENOUS EVERY 6 HOURS PRN
Status: DISCONTINUED | OUTPATIENT
Start: 2025-06-17 | End: 2025-06-17 | Stop reason: HOSPADM

## 2025-06-17 RX ORDER — NALOXONE HYDROCHLORIDE 0.4 MG/ML
0.4 INJECTION, SOLUTION INTRAMUSCULAR; INTRAVENOUS; SUBCUTANEOUS
Status: DISCONTINUED | OUTPATIENT
Start: 2025-06-17 | End: 2025-06-18 | Stop reason: HOSPADM

## 2025-06-17 RX ORDER — DEXAMETHASONE SODIUM PHOSPHATE 4 MG/ML
4 INJECTION, SOLUTION INTRA-ARTICULAR; INTRALESIONAL; INTRAMUSCULAR; INTRAVENOUS; SOFT TISSUE
Status: DISCONTINUED | OUTPATIENT
Start: 2025-06-17 | End: 2025-06-17 | Stop reason: HOSPADM

## 2025-06-17 RX ORDER — CEPHALEXIN 500 MG/1
500 CAPSULE ORAL 4 TIMES DAILY
Qty: 40 CAPSULE | Refills: 0 | Status: SHIPPED | OUTPATIENT
Start: 2025-06-17 | End: 2025-06-27

## 2025-06-17 RX ORDER — SODIUM CHLORIDE, SODIUM LACTATE, POTASSIUM CHLORIDE, CALCIUM CHLORIDE 600; 310; 30; 20 MG/100ML; MG/100ML; MG/100ML; MG/100ML
INJECTION, SOLUTION INTRAVENOUS CONTINUOUS
Status: DISCONTINUED | OUTPATIENT
Start: 2025-06-17 | End: 2025-06-17 | Stop reason: HOSPADM

## 2025-06-17 RX ORDER — CELECOXIB 200 MG/1
200 CAPSULE ORAL DAILY
Qty: 14 CAPSULE | Refills: 0 | Status: SHIPPED | OUTPATIENT
Start: 2025-06-17 | End: 2025-07-01

## 2025-06-17 RX ORDER — WARFARIN SODIUM 3 MG/1
3 TABLET ORAL
Status: COMPLETED | OUTPATIENT
Start: 2025-06-17 | End: 2025-06-17

## 2025-06-17 RX ORDER — POLYETHYLENE GLYCOL 3350 17 G/17G
17 POWDER, FOR SOLUTION ORAL DAILY
Status: DISCONTINUED | OUTPATIENT
Start: 2025-06-18 | End: 2025-06-18 | Stop reason: HOSPADM

## 2025-06-17 RX ORDER — CEFAZOLIN SODIUM/WATER 3 G/30 ML
3 SYRINGE (ML) INTRAVENOUS SEE ADMIN INSTRUCTIONS
Status: DISCONTINUED | OUTPATIENT
Start: 2025-06-17 | End: 2025-06-17 | Stop reason: HOSPADM

## 2025-06-17 RX ORDER — HYDROXYZINE HYDROCHLORIDE 10 MG/1
10 TABLET, FILM COATED ORAL EVERY 6 HOURS PRN
Qty: 30 TABLET | Refills: 0 | Status: SHIPPED | OUTPATIENT
Start: 2025-06-17

## 2025-06-17 RX ORDER — LABETALOL HYDROCHLORIDE 5 MG/ML
10 INJECTION, SOLUTION INTRAVENOUS
Status: DISCONTINUED | OUTPATIENT
Start: 2025-06-17 | End: 2025-06-17 | Stop reason: HOSPADM

## 2025-06-17 RX ORDER — PROCHLORPERAZINE MALEATE 5 MG/1
5 TABLET ORAL EVERY 6 HOURS PRN
Status: DISCONTINUED | OUTPATIENT
Start: 2025-06-17 | End: 2025-06-18 | Stop reason: HOSPADM

## 2025-06-17 RX ORDER — TRANEXAMIC ACID 650 MG/1
1950 TABLET ORAL ONCE
Status: COMPLETED | OUTPATIENT
Start: 2025-06-17 | End: 2025-06-17

## 2025-06-17 RX ORDER — DIPHENHYDRAMINE HCL 12.5MG/5ML
12.5 LIQUID (ML) ORAL EVERY 6 HOURS PRN
Status: DISCONTINUED | OUTPATIENT
Start: 2025-06-17 | End: 2025-06-17 | Stop reason: HOSPADM

## 2025-06-17 RX ORDER — AMOXICILLIN 250 MG
1-2 CAPSULE ORAL 2 TIMES DAILY
Qty: 30 TABLET | Refills: 0 | Status: SHIPPED | OUTPATIENT
Start: 2025-06-17

## 2025-06-17 RX ORDER — ENOXAPARIN SODIUM 100 MG/ML
40 INJECTION SUBCUTANEOUS EVERY 24 HOURS
Status: DISCONTINUED | OUTPATIENT
Start: 2025-06-18 | End: 2025-06-18

## 2025-06-17 RX ORDER — ACETAMINOPHEN 325 MG/1
975 TABLET ORAL ONCE
Status: COMPLETED | OUTPATIENT
Start: 2025-06-17 | End: 2025-06-17

## 2025-06-17 RX ORDER — ACETAMINOPHEN 325 MG/1
975 TABLET ORAL EVERY 8 HOURS
Status: DISCONTINUED | OUTPATIENT
Start: 2025-06-17 | End: 2025-06-18 | Stop reason: HOSPADM

## 2025-06-17 RX ORDER — METHADONE HYDROCHLORIDE 10 MG/ML
2 INJECTION, SOLUTION INTRAMUSCULAR; INTRAVENOUS; SUBCUTANEOUS 3 TIMES DAILY PRN
Status: COMPLETED | OUTPATIENT
Start: 2025-06-17 | End: 2025-06-17

## 2025-06-17 RX ORDER — METHADONE HYDROCHLORIDE 10 MG/ML
INJECTION, SOLUTION INTRAMUSCULAR; INTRAVENOUS; SUBCUTANEOUS PRN
Status: DISCONTINUED | OUTPATIENT
Start: 2025-06-17 | End: 2025-06-17

## 2025-06-17 RX ORDER — LIDOCAINE 40 MG/G
CREAM TOPICAL
Status: DISCONTINUED | OUTPATIENT
Start: 2025-06-17 | End: 2025-06-17 | Stop reason: HOSPADM

## 2025-06-17 RX ORDER — ESCITALOPRAM OXALATE 20 MG/1
20 TABLET ORAL DAILY
Status: DISCONTINUED | OUTPATIENT
Start: 2025-06-18 | End: 2025-06-18 | Stop reason: HOSPADM

## 2025-06-17 RX ORDER — CEFAZOLIN SODIUM/WATER 3 G/30 ML
3 SYRINGE (ML) INTRAVENOUS
Status: COMPLETED | OUTPATIENT
Start: 2025-06-17 | End: 2025-06-17

## 2025-06-17 RX ORDER — ALBUTEROL SULFATE 0.83 MG/ML
2.5 SOLUTION RESPIRATORY (INHALATION) EVERY 4 HOURS PRN
Status: DISCONTINUED | OUTPATIENT
Start: 2025-06-17 | End: 2025-06-17 | Stop reason: HOSPADM

## 2025-06-17 RX ORDER — LIDOCAINE 40 MG/G
CREAM TOPICAL
Status: DISCONTINUED | OUTPATIENT
Start: 2025-06-17 | End: 2025-06-18 | Stop reason: HOSPADM

## 2025-06-17 RX ORDER — KETOROLAC TROMETHAMINE 15 MG/ML
15 INJECTION, SOLUTION INTRAMUSCULAR; INTRAVENOUS
Status: COMPLETED | OUTPATIENT
Start: 2025-06-17 | End: 2025-06-17

## 2025-06-17 RX ORDER — HYDRALAZINE HYDROCHLORIDE 20 MG/ML
10 INJECTION INTRAMUSCULAR; INTRAVENOUS EVERY 10 MIN PRN
Status: DISCONTINUED | OUTPATIENT
Start: 2025-06-17 | End: 2025-06-17 | Stop reason: HOSPADM

## 2025-06-17 RX ORDER — FAMOTIDINE 20 MG/1
20 TABLET, FILM COATED ORAL 2 TIMES DAILY
Status: DISCONTINUED | OUTPATIENT
Start: 2025-06-17 | End: 2025-06-18 | Stop reason: HOSPADM

## 2025-06-17 RX ORDER — BISACODYL 10 MG
10 SUPPOSITORY, RECTAL RECTAL DAILY PRN
Status: DISCONTINUED | OUTPATIENT
Start: 2025-06-17 | End: 2025-06-18 | Stop reason: HOSPADM

## 2025-06-17 RX ORDER — LIDOCAINE HYDROCHLORIDE 20 MG/ML
INJECTION, SOLUTION INFILTRATION; PERINEURAL PRN
Status: DISCONTINUED | OUTPATIENT
Start: 2025-06-17 | End: 2025-06-17

## 2025-06-17 RX ORDER — SODIUM CHLORIDE, SODIUM LACTATE, POTASSIUM CHLORIDE, CALCIUM CHLORIDE 600; 310; 30; 20 MG/100ML; MG/100ML; MG/100ML; MG/100ML
INJECTION, SOLUTION INTRAVENOUS CONTINUOUS
Status: DISCONTINUED | OUTPATIENT
Start: 2025-06-17 | End: 2025-06-18 | Stop reason: HOSPADM

## 2025-06-17 RX ORDER — HYDROXYZINE HYDROCHLORIDE 10 MG/1
10 TABLET, FILM COATED ORAL EVERY 6 HOURS PRN
Status: DISCONTINUED | OUTPATIENT
Start: 2025-06-17 | End: 2025-06-18 | Stop reason: HOSPADM

## 2025-06-17 RX ORDER — HYDROMORPHONE HCL IN WATER/PF 6 MG/30 ML
0.1 PATIENT CONTROLLED ANALGESIA SYRINGE INTRAVENOUS EVERY 4 HOURS PRN
Status: DISCONTINUED | OUTPATIENT
Start: 2025-06-17 | End: 2025-06-17

## 2025-06-17 RX ORDER — EPHEDRINE SULFATE 50 MG/ML
INJECTION, SOLUTION INTRAMUSCULAR; INTRAVENOUS; SUBCUTANEOUS PRN
Status: DISCONTINUED | OUTPATIENT
Start: 2025-06-17 | End: 2025-06-17

## 2025-06-17 RX ORDER — KETOROLAC TROMETHAMINE 30 MG/ML
INJECTION, SOLUTION INTRAMUSCULAR; INTRAVENOUS PRN
Status: DISCONTINUED | OUTPATIENT
Start: 2025-06-17 | End: 2025-06-17

## 2025-06-17 RX ORDER — CEFAZOLIN SODIUM 2 G/50ML
2 SOLUTION INTRAVENOUS EVERY 8 HOURS
Status: COMPLETED | OUTPATIENT
Start: 2025-06-17 | End: 2025-06-18

## 2025-06-17 RX ORDER — ACETAMINOPHEN 325 MG/1
650 TABLET ORAL EVERY 4 HOURS PRN
Qty: 100 TABLET | Refills: 0 | Status: SHIPPED | OUTPATIENT
Start: 2025-06-17

## 2025-06-17 RX ORDER — AMOXICILLIN 250 MG
1 CAPSULE ORAL 2 TIMES DAILY
Status: DISCONTINUED | OUTPATIENT
Start: 2025-06-17 | End: 2025-06-18 | Stop reason: HOSPADM

## 2025-06-17 RX ORDER — BUPRENORPHINE AND NALOXONE 4; 1 MG/1; MG/1
1 FILM, SOLUBLE BUCCAL; SUBLINGUAL 2 TIMES DAILY
Status: DISCONTINUED | OUTPATIENT
Start: 2025-06-17 | End: 2025-06-17

## 2025-06-17 RX ORDER — OXYCODONE HYDROCHLORIDE 5 MG/1
5-10 TABLET ORAL EVERY 4 HOURS PRN
Qty: 26 TABLET | Refills: 0 | Status: SHIPPED | OUTPATIENT
Start: 2025-06-17 | End: 2025-06-18

## 2025-06-17 RX ORDER — ONDANSETRON 4 MG/1
4 TABLET, ORALLY DISINTEGRATING ORAL EVERY 6 HOURS PRN
Status: DISCONTINUED | OUTPATIENT
Start: 2025-06-17 | End: 2025-06-18 | Stop reason: HOSPADM

## 2025-06-17 RX ORDER — ONDANSETRON 2 MG/ML
INJECTION INTRAMUSCULAR; INTRAVENOUS PRN
Status: DISCONTINUED | OUTPATIENT
Start: 2025-06-17 | End: 2025-06-17

## 2025-06-17 RX ORDER — DEXAMETHASONE SODIUM PHOSPHATE 4 MG/ML
INJECTION, SOLUTION INTRA-ARTICULAR; INTRALESIONAL; INTRAMUSCULAR; INTRAVENOUS; SOFT TISSUE PRN
Status: DISCONTINUED | OUTPATIENT
Start: 2025-06-17 | End: 2025-06-17

## 2025-06-17 RX ORDER — GLYCOPYRROLATE 0.2 MG/ML
INJECTION, SOLUTION INTRAMUSCULAR; INTRAVENOUS PRN
Status: DISCONTINUED | OUTPATIENT
Start: 2025-06-17 | End: 2025-06-17

## 2025-06-17 RX ORDER — PROPOFOL 10 MG/ML
INJECTION, EMULSION INTRAVENOUS PRN
Status: DISCONTINUED | OUTPATIENT
Start: 2025-06-17 | End: 2025-06-17

## 2025-06-17 RX ADMIN — HYDROXYZINE HYDROCHLORIDE 10 MG: 10 TABLET, FILM COATED ORAL at 17:26

## 2025-06-17 RX ADMIN — SODIUM CHLORIDE, SODIUM LACTATE, POTASSIUM CHLORIDE, AND CALCIUM CHLORIDE: .6; .31; .03; .02 INJECTION, SOLUTION INTRAVENOUS at 11:13

## 2025-06-17 RX ADMIN — HYDROMORPHONE HYDROCHLORIDE 1 MG: 1 INJECTION, SOLUTION INTRAMUSCULAR; INTRAVENOUS; SUBCUTANEOUS at 18:43

## 2025-06-17 RX ADMIN — PROPOFOL 120 MG: 10 INJECTION, EMULSION INTRAVENOUS at 12:18

## 2025-06-17 RX ADMIN — DEXAMETHASONE SODIUM PHOSPHATE 8 MG: 4 INJECTION, SOLUTION INTRA-ARTICULAR; INTRALESIONAL; INTRAMUSCULAR; INTRAVENOUS; SOFT TISSUE at 12:18

## 2025-06-17 RX ADMIN — FAMOTIDINE 20 MG: 20 TABLET, FILM COATED ORAL at 20:01

## 2025-06-17 RX ADMIN — PHENYLEPHRINE HYDROCHLORIDE 50 MCG: 10 INJECTION INTRAVENOUS at 14:19

## 2025-06-17 RX ADMIN — ROCURONIUM BROMIDE 50 MG: 50 INJECTION, SOLUTION INTRAVENOUS at 12:18

## 2025-06-17 RX ADMIN — DEXMEDETOMIDINE HYDROCHLORIDE 0.5 MCG/KG/HR: 100 INJECTION, SOLUTION INTRAVENOUS at 12:18

## 2025-06-17 RX ADMIN — ACETAMINOPHEN 975 MG: 325 TABLET, FILM COATED ORAL at 17:26

## 2025-06-17 RX ADMIN — ACETAMINOPHEN 975 MG: 325 TABLET ORAL at 10:34

## 2025-06-17 RX ADMIN — KETOROLAC TROMETHAMINE 15 MG: 30 INJECTION, SOLUTION INTRAMUSCULAR at 12:18

## 2025-06-17 RX ADMIN — LIDOCAINE HYDROCHLORIDE 50 MG: 20 INJECTION, SOLUTION INFILTRATION; PERINEURAL at 12:18

## 2025-06-17 RX ADMIN — METHADONE HYDROCHLORIDE 2 MG: 10 INJECTION, SOLUTION INTRAMUSCULAR; INTRAVENOUS; SUBCUTANEOUS at 15:43

## 2025-06-17 RX ADMIN — SUGAMMADEX 200 MG: 100 INJECTION, SOLUTION INTRAVENOUS at 14:23

## 2025-06-17 RX ADMIN — GLYCOPYRROLATE 0.2 MG: 0.2 INJECTION, SOLUTION INTRAMUSCULAR; INTRAVENOUS at 13:17

## 2025-06-17 RX ADMIN — MIDAZOLAM 2 MG: 1 INJECTION INTRAMUSCULAR; INTRAVENOUS at 12:09

## 2025-06-17 RX ADMIN — EPHEDRINE SULFATE 5 MG: 5 INJECTION INTRAVENOUS at 13:25

## 2025-06-17 RX ADMIN — EPHEDRINE SULFATE 10 MG: 5 INJECTION INTRAVENOUS at 12:33

## 2025-06-17 RX ADMIN — HYDROCORTISONE SODIUM SUCCINATE 50 MG: 100 INJECTION, POWDER, FOR SOLUTION INTRAMUSCULAR; INTRAVENOUS at 13:45

## 2025-06-17 RX ADMIN — KETOROLAC TROMETHAMINE 15 MG: 15 INJECTION, SOLUTION INTRAMUSCULAR; INTRAVENOUS at 16:02

## 2025-06-17 RX ADMIN — WARFARIN SODIUM 3 MG: 3 TABLET ORAL at 18:44

## 2025-06-17 RX ADMIN — EPHEDRINE SULFATE 5 MG: 5 INJECTION INTRAVENOUS at 13:35

## 2025-06-17 RX ADMIN — METHADONE HYDROCHLORIDE 2 MG: 10 INJECTION, SOLUTION INTRAMUSCULAR; INTRAVENOUS; SUBCUTANEOUS at 15:01

## 2025-06-17 RX ADMIN — SENNOSIDES AND DOCUSATE SODIUM 1 TABLET: 50; 8.6 TABLET ORAL at 20:01

## 2025-06-17 RX ADMIN — PHENYLEPHRINE HYDROCHLORIDE 50 MCG: 10 INJECTION INTRAVENOUS at 14:08

## 2025-06-17 RX ADMIN — METHADONE HYDROCHLORIDE 15 MG: 10 INJECTION, SOLUTION INTRAMUSCULAR; INTRAVENOUS; SUBCUTANEOUS at 12:18

## 2025-06-17 RX ADMIN — ONDANSETRON 4 MG: 2 INJECTION INTRAMUSCULAR; INTRAVENOUS at 12:18

## 2025-06-17 RX ADMIN — BUPRENORPHINE AND NALOXONE 3 FILM: 4; 1 FILM BUCCAL; SUBLINGUAL at 20:08

## 2025-06-17 RX ADMIN — PROPOFOL 80 MG: 10 INJECTION, EMULSION INTRAVENOUS at 13:15

## 2025-06-17 RX ADMIN — OXYCODONE HYDROCHLORIDE 5 MG: 5 TABLET ORAL at 17:26

## 2025-06-17 RX ADMIN — MAGNESIUM SULFATE HEPTAHYDRATE 2 G: 40 INJECTION, SOLUTION INTRAVENOUS at 16:06

## 2025-06-17 RX ADMIN — CEFAZOLIN 3 G: 10 INJECTION, POWDER, FOR SOLUTION INTRAVENOUS at 12:08

## 2025-06-17 RX ADMIN — TRANEXAMIC ACID 1950 MG: 650 TABLET ORAL at 11:35

## 2025-06-17 RX ADMIN — OXYCODONE HYDROCHLORIDE 5 MG: 5 TABLET ORAL at 23:02

## 2025-06-17 RX ADMIN — EPHEDRINE SULFATE 5 MG: 5 INJECTION INTRAVENOUS at 13:42

## 2025-06-17 RX ADMIN — CEFAZOLIN SODIUM 2 G: 2 SOLUTION INTRAVENOUS at 20:01

## 2025-06-17 RX ADMIN — METHADONE HYDROCHLORIDE 2 MG: 10 INJECTION, SOLUTION INTRAMUSCULAR; INTRAVENOUS; SUBCUTANEOUS at 15:24

## 2025-06-17 RX ADMIN — SODIUM CHLORIDE, SODIUM LACTATE, POTASSIUM CHLORIDE, AND CALCIUM CHLORIDE: .6; .31; .03; .02 INJECTION, SOLUTION INTRAVENOUS at 22:31

## 2025-06-17 ASSESSMENT — COLUMBIA-SUICIDE SEVERITY RATING SCALE - C-SSRS
6. HAVE YOU EVER DONE ANYTHING, STARTED TO DO ANYTHING, OR PREPARED TO DO ANYTHING TO END YOUR LIFE?: NO
2. HAVE YOU ACTUALLY HAD ANY THOUGHTS OF KILLING YOURSELF IN THE PAST MONTH?: NO
1. IN THE PAST MONTH, HAVE YOU WISHED YOU WERE DEAD OR WISHED YOU COULD GO TO SLEEP AND NOT WAKE UP?: NO

## 2025-06-17 ASSESSMENT — ACTIVITIES OF DAILY LIVING (ADL)
ADLS_ACUITY_SCORE: 33
ADLS_ACUITY_SCORE: 44
ADLS_ACUITY_SCORE: 31
ADLS_ACUITY_SCORE: 44
ADLS_ACUITY_SCORE: 44
ADLS_ACUITY_SCORE: 43
ADLS_ACUITY_SCORE: 44
ADLS_ACUITY_SCORE: 33

## 2025-06-17 ASSESSMENT — COPD QUESTIONNAIRES: COPD: 1

## 2025-06-17 NOTE — ANESTHESIA CARE TRANSFER NOTE
Patient: Bere Machuca    Procedure: Procedure(s):  Right total hip arthroplasty       Diagnosis: Osteoarthritis [M19.90]  Diagnosis Additional Information: No value filed.    Anesthesia Type:   General     Note:    Oropharynx: oral airway in place  Level of Consciousness: drowsy  Oxygen Supplementation: face mask  Level of Supplemental Oxygen (L/min / FiO2): 6  Independent Airway: airway patency satisfactory and stable  Dentition: dentition unchanged  Vital Signs Stable: post-procedure vital signs reviewed and stable  Report to RN Given: handoff report given  Patient transferred to: PACU    Handoff Report: Identifed the Patient, Identified the Reponsible Provider, Reviewed the pertinent medical history, Discussed the surgical course, Reviewed Intra-OP anesthesia mangement and issues during anesthesia, Set expectations for post-procedure period and Allowed opportunity for questions and acknowledgement of understanding      Vitals:  Vitals Value Taken Time   /54 06/17/25 14:32   Temp 97.16  F (36.2  C) 06/17/25 14:35   Pulse 63 06/17/25 14:35   Resp 19 06/17/25 14:35   SpO2 96 % 06/17/25 14:35   Vitals shown include unfiled device data.    Electronically Signed By: MARY Nieves CRNA  June 17, 2025  2:36 PM

## 2025-06-17 NOTE — ANESTHESIA PREPROCEDURE EVALUATION
Anesthesia Pre-Procedure Evaluation    Patient: Bere Machuca   MRN: [id number] : 1955          Procedure : Procedure(s):  Right total hip arthroplasty         Past Medical History:   Diagnosis Date    Antiplatelet or antithrombotic long-term use     Anxiety     Arthritis     Clotting disorder     Coagulation disorder     COPD (chronic obstructive pulmonary disease) (H)     Depressive disorder     Diabetes (H)     Gastroesophageal reflux disease     Hypertension     Lymphedema of arm 2013    Morbidly obese (H) 2009    Pulmonary embolism (H)     Sleep apnea     Substance abuse (H)     Superior vena cava syndrome     Thrombosis       Past Surgical History:   Procedure Laterality Date    BIOPSY OF SKIN LESION  multiple    keloids    COLONOSCOPY N/A 3/10/2025    Procedure: Colonoscopy with polypectomy and clipping, biopsies;  Surgeon: Fili Smith MD;  Location: [facility] OR    HC REMOVAL OF OVARY/TUBE(S)      Salpingo-Oophorectomy, Unilateral    HC REMOVE TONSILS/ADENOIDS,<11 Y/O  age 14    T & A <14y.o.    HERNIA REPAIR      INSERTION OF ACCESS PORT  1-4-96    sepsis- removed after hospital stay    ZZC ANESTH,REPAIR LO ABD HERNIA NOS  multiple/ 2006 last    panniculus    Z[facility] EXPLORATORY OF ABDOMEN      ZC PLASTIC SURGERY, NECK  age 4    ZZC REMOVAL COLON/ILEOSTOMY      reastamosis    ZZ TOTAL ABDOM HYSTERECTOMY      Hysterectomy, Total Abdominal    ZZHC REMOVE ABDOMINAL WALL LESION      abscess      Allergies   Allergen Reactions    Metformin Diarrhea    Aspirin      PN: LW Reaction: sensitive/stomach    Tape [Adhesive Tape] Itching    Topiramate Unknown    Wellbutrin [Bupropion] Anxiety      Social History     Tobacco Use    Smoking status: Former     Current packs/day: 0.00     Average packs/day: 0.5 packs/day for 35.0 years (17.5 ttl pk-yrs)     Types: Cigarettes, Vaping Device     Start date: 1979     Quit date: 2014     Years since quitting: 10.8    Smokeless  tobacco: Never    Tobacco comments:     Currently vapes as of 3/4/25   Substance Use Topics    Alcohol use: Not Currently     Alcohol/week: 0.8 standard drinks of alcohol     Types: 1 Standard drinks or equivalent per week      Wt Readings from Last 1 Encounters:   06/17/25 128.5 kg (283 lb 4.8 oz)        Anesthesia Evaluation            ROS/MED HX  ENT/Pulmonary:     (+) sleep apnea,                         COPD,              Neurologic:       Cardiovascular:     (+)  hypertension- -   -  - -   Taking blood thinners Pt has received instructions:                             Previous cardiac testing   Echo: Date: 2024 Results:  Left ventricular systolic function is normal.  The visual ejection fraction is 65-70%.  Left ventricular diastolic function is normal.  No regional wall motion abnormalities.  Normal right ventricular size and systolic function.  No valve disease.  Normal inferior vena cava.    Stress Test:  Date: Results:    ECG Reviewed:  Date: Results:    Cath:  Date: Results:      METS/Exercise Tolerance:     Hematologic: Comments: Factor 5    (+) History of blood clots,    pt is anticoagulated,           Musculoskeletal:   (+)  arthritis,             GI/Hepatic:     (+) GERD,                   Renal/Genitourinary:  - neg Renal ROS     Endo:     (+)  type II DM,          Chronic steroid usage for Arthritis.  Obesity,       Psychiatric/Substance Use:       Infectious Disease:       Malignancy:       Other:      (+)  , H/O Chronic Pain,           Physical Exam  Airway  Mallampati: II  TM distance: >3 FB  Neck ROM: full  Mouth opening: >= 4 cm    Cardiovascular - normal exam   Dental   (+) Modest Abnormalities - crowns, retainers, 1 or 2 missing teeth      Pulmonary - normal exam      Neurological - normal exam  She appears awake, alert and oriented x3.    Other Findings       OUTSIDE LABS:  CBC:   Lab Results   Component Value Date    WBC 9.3 06/10/2025    WBC 9.3 03/29/2024    HGB 13.8 06/10/2025    HGB  "14.0 03/29/2024    HCT 41.4 06/10/2025    HCT 43.2 03/29/2024     06/10/2025     03/29/2024     BMP:   Lab Results   Component Value Date     03/06/2025     03/29/2024    POTASSIUM 4.0 03/06/2025    POTASSIUM 4.0 03/29/2024    CHLORIDE 104 03/06/2025    CHLORIDE 103 03/29/2024    CO2 29 03/06/2025    CO2 28 03/29/2024    BUN 15.9 03/06/2025    BUN 12.2 03/29/2024    CR 0.64 03/06/2025    CR 0.64 03/29/2024     (H) 06/17/2025    GLC 74 03/10/2025     COAGS:   Lab Results   Component Value Date    PTT 42 (H) 11/18/2016    INR 2.2 (H) 06/10/2025    FIBR 525 (H) 03/09/2012     POC: No results found for: \"BGM\", \"HCG\", \"HCGS\"  HEPATIC:   Lab Results   Component Value Date    ALBUMIN 3.9 03/29/2024    PROTTOTAL 6.9 03/29/2024    ALT 20 03/29/2024    AST 24 03/29/2024    ALKPHOS 57 03/29/2024    BILITOTAL 0.5 03/29/2024     OTHER:   Lab Results   Component Value Date    LACT 1.5 06/23/2017    A1C 5.0 03/06/2025    TAWANA 10.1 03/06/2025    MAG 1.8 06/23/2017    TSH 12.10 (H) 03/29/2024    T4 0.91 03/29/2024    CRP 12.0 (H) 01/04/2022    SED 19 01/21/2015       Anesthesia Plan    ASA Status:  3      NPO Status: NPO Appropriate   Anesthesia Type: General.  Airway: oral.  Induction: intravenous.  Maintenance: Balanced.   Techniques and Equipment:       - Monitoring Plan: standard ASA monitoring     Consents    Anesthesia Plan(s) and associated risks, benefits, and realistic alternatives discussed. Questions answered and patient/representative(s) expressed understanding.     - Discussed: anesthesiologist     - Discussed with:  Patient        - Pt is DNR/DNI Status: no DNR     Blood Consent:      - Discussed with: not discussed.     Postoperative Care    Pain management: non-narcotic analgesics, plan for postoperative opioid use, multimodal analgesia.     Comments:    Other Comments:   Methadone 20 mg  Dexadron 8 mg  Zofran 4 mg  Toradol 15 mg  Precedex  Sugammadex if paralytic to be used  " "              Mason Melendez MD        Clinically Significant Risk Factors Present on Admission                # Drug Induced Coagulation Defect: home medication list includes an anticoagulant medication    # Hypertension: Noted on problem list           # Morbid Obesity: Estimated body mass index is 47.13 kg/m  as calculated from the following:    Height as of 6/10/25: 1.651 m (5' 5.01\").    Weight as of this encounter: 128.5 kg (283 lb 4.8 oz).                    "

## 2025-06-17 NOTE — ANESTHESIA POSTPROCEDURE EVALUATION
Patient: Bere Machuca    Procedure: Procedure(s):  Right total hip arthroplasty       Anesthesia Type:  General    Note:  Disposition: Admission   Postop Pain Control: Uneventful            Sign Out: Well controlled pain   PONV: No   Neuro/Psych: Uneventful            Sign Out: Acceptable/Baseline neuro status   Airway/Respiratory: Uneventful            Sign Out: Acceptable/Baseline resp. status   CV/Hemodynamics: Uneventful            Sign Out: Acceptable CV status   Other NRE: NONE   DID A NON-ROUTINE EVENT OCCUR? No           Last vitals:  Vitals Value Taken Time   /66 06/17/25 15:00   Temp 96.98  F (36.1  C) 06/17/25 15:03   Pulse 64 06/17/25 15:03   Resp 18 06/17/25 15:03   SpO2 97 % 06/17/25 15:03   Vitals shown include unfiled device data.    Electronically Signed By: Mason Melendez MD  June 17, 2025  3:04 PM

## 2025-06-17 NOTE — PROGRESS NOTES
Patient's pain was not controlled post-op  He was asking for his Suboxone dose which was restarted and the dose of Dilaudid increased to 0.5 mg and 1 mg for moderate and severe pains  Once Suboxone is effective the dose of Dilaudid can be decreased

## 2025-06-17 NOTE — OP NOTE
[facility]  OPERATIVE NOTE    Pre-Op Diagnosis:  Osteoarthritis [M19.90]  Post-Op Diagnosis: Same     Procedure(s):  Right total hip arthroplasty    Surgeon: Hector Nelson MD    Assistant: Jeanette Kent PA-C - Assisting    Anesthesia Type: General    Estimated Blood Loss: 300 cc    Specimen(s): None  Complications: None  Findings: As expected    Implant(s):   Implant Name Type Inv. Item Serial No.  Lot No. LRB No. Used Action   IMP SHELL BIOM G7 ACETAB PPS STANTON HOLE 52MM SZ E [id number] - [id number] Total Joint Component/Insert IMP SHELL BIOM G7 ACETAB PPS STANTON HOLE 52MM SZ E [id number]  [employer] [id number] Right 1 Implanted   LINER ACTB G7 E 42MM COCR HIP 2 MBL - [id number] Total Joint Component/Insert LINER ACTB G7 E 42MM COCR HIP 2 MBL  [employer] [id number] Right 1 Implanted   IMP SCR ZIM 6.5X20MM ACET CUP SELF TAP -774-20 - [id number] Metallic Hardware/Ewing IMP SCR ZIM 6.5X20MM ACET CUP SELF TAP -139-20  [employer] [id number] Right 1 Implanted   IMP SCR ZIM 6.5X20MM ACET CUP SELF TAP -273-20 - [id number] Metallic Hardware/Ewing IMP SCR ZIM 6.5X20MM ACET CUP SELF TAP -926-20  [employer] [id number] Right 1 Implanted   IMP SCR ZIM 6.5X20MM ACET CUP SELF TAP -098-20 - [id number] Metallic Hardware/Ewing IMP SCR ZIM 6.5X20MM ACET CUP SELF TAP -142-20  [employer] [id number] Right 1 Implanted   IMP STEM FEM BIOM TAPERLOC STD OFFSET TYPE 1 SZ11 -110 - [id number] Total Joint Component/Insert IMP STEM FEM BIOM TAPERLOC STD OFFSET TYPE 1 SZ11 -110  [employer] [id number] Right 1 Implanted   HEAD FEM -3MM OFST 28MM HIP MDLR TY 1 BLX D G7 664-3796 - [id number] Total Joint Component/Insert HEAD FEM -3MM OFST 28MM HIP MDLR TY 1 BLX D G7 [id number]  [employer] [id number] Right 1 Implanted   DUAL MOBILITY POLYETHYLENE BEARING, 28MM Total Joint Component/Insert   [employer] [id number] Right 1 Implanted       Indication for Procedure:   The  patient is a 69 year old morbidly obese female with severe arthritis of the hip.  I discussed the treatment options with her and explained the risks benefits and potential complication of the surgery she want to proceed.  Discussion of the risks included but was not specific to infection, neurologic complication, DVT, septic and aseptic loosening or fibrosis, fracture, leg length inequality, and instability of the hip.  After this discussion she wanted proceed with surgery.      Procedure Summary Narrative:  Bere was taken to the operating room and placed on the operating table in the supine position.  After adequate induction of a general anesthetic she was transferred to the lateral position and held this way with the [facility] hip positioner. An axillary roll was placed, and care was taken to pad all bony prominences. She was given 2 g of Ancef for infection prophylaxis 30 min prior to incision. We then performed a sterile prep and drape of the right hip and lower extremity.  After sterile prep and drape an incision was made and proceeded with the anterior approach to the hip.  We identified the tensor fascia divided in line with its fibers.  I then took down the anterior one third of the abductors and tagged and retracted medially for lateral repair and then did a T capsulotomy preserve the capsule for later repair.  We then were able to dislocate the hip dislocation and made femoral neck cut 12 mm proximal to the less trochanter.  We removed the femoral head from the field and exposed the acetabulum.    We placed retractors anteriorly, superiorly, and posteriorly to expose the acetabulum, and reamed to 51 mm eventually put in a 52 mm cup.  We impacted the cup into position in approximately 20 degrees of anteversion and 40 degrees of abduction.  Once the cup was fully seated 3 screws were applied for additional fixation one in the inter table of the the pelvis, 2 in the posterior column.  We then placed  neutral liner for a dual mobility femoral head.   We elected to use a dual mobility bearing because of her obesity, we were concerned about the accuracy of the position of her pelvis on the table.  We felt that this would give us a larger safe range of motion of the hip.  We then directed our attention to the femur used a broach only technique broached to a size 11 standard offset Biomet taperloc stem and reduced the hip from the very stable full extension, external rotation, flexion, adduction, internal rotation with restoration of leg length and offset.  We then obtained an intraoperative AP pelvis x-ray which showed ideal position of the cup and screws with a good fit on the femoral side with restoration of leg length and offset.  We then dislocated the hip remove the trial components irrigated using the pulsatile jet lavage.  We then put on the actual stem once it was fully seated we trialed the head and reduced the hip again and found the hip to be stable with restoration of leg length and offset with a size -3 neck on a 28 mm head with a dual mobility component.  So we impacted a 28 mm -3 neck biolox ceramic femoral head with a 42 outer diameter dual mobility femoral head on the trunion and the hip was reduced.      We then soaked the hip in a dilute solution of Betadine for 3 minutes and irrigated using the pulsatile device.   We then repaired the capsule using 0 vicryl sutures.  Abductors repaired using #5 ethibond sutures through bone tunnels.  The fascial layer was closed using 0 Ethibond was oversewn using #1 stratafix, and we placed 1 gram of powered vancomycin deep to the fascia.  We then closed the soft tissue with absorbable sutures and staples in the skin.  Sterile dressing was then applied.  She tolerated the procedure there were no intraoperative complications.  Patient was sent to the [facility] in stable condition.      Plan will be for the patient get 24 hours of Ancef followed by 10 days of oral  Keflex for infection prophylaxis and the patient will restart her home dose of Coumadin and bridge with Lovenox until her INR is therapeutic for DVT prophylaxis.      Should be noted that my assistant was vital throughout the entire procedure for the safe transfer of the patient from the hospital bed to the operating table and back to the hospital bed is also used for retraction and closure of the wounds.     Hector Nelson MD  [facility]

## 2025-06-18 ENCOUNTER — DOCUMENTATION ONLY (OUTPATIENT)
Dept: ANTICOAGULATION | Facility: CLINIC | Age: 70
End: 2025-06-18
Payer: COMMERCIAL

## 2025-06-18 VITALS
TEMPERATURE: 96.8 F | OXYGEN SATURATION: 92 % | HEART RATE: 60 BPM | SYSTOLIC BLOOD PRESSURE: 102 MMHG | DIASTOLIC BLOOD PRESSURE: 54 MMHG | BODY MASS INDEX: 47.13 KG/M2 | RESPIRATION RATE: 16 BRPM | WEIGHT: 283.3 LBS

## 2025-06-18 DIAGNOSIS — Z86.711 HISTORY OF PULMONARY EMBOLISM: ICD-10-CM

## 2025-06-18 DIAGNOSIS — D68.51 FACTOR 5 LEIDEN MUTATION, HETEROZYGOUS: Primary | ICD-10-CM

## 2025-06-18 LAB
FASTING STATUS PATIENT QL REPORTED: YES
GLUCOSE SERPL-MCNC: 118 MG/DL (ref 70–99)
HGB BLD-MCNC: 11.3 G/DL (ref 11.7–15.7)
INR PPP: 1.16 (ref 0.85–1.15)
MCV RBC AUTO: 99 FL (ref 78–100)
MCV RBC AUTO: 99 FL (ref 78–100)
PLATELET # BLD AUTO: 187 10E3/UL (ref 150–450)
PROTHROMBIN TIME: 14.9 SECONDS (ref 11.8–14.8)

## 2025-06-18 PROCEDURE — 96372 THER/PROPH/DIAG INJ SC/IM: CPT | Performed by: PHYSICIAN ASSISTANT

## 2025-06-18 PROCEDURE — 36415 COLL VENOUS BLD VENIPUNCTURE: CPT | Performed by: ORTHOPAEDIC SURGERY

## 2025-06-18 PROCEDURE — 97116 GAIT TRAINING THERAPY: CPT | Mod: GP

## 2025-06-18 PROCEDURE — 250N000011 HC RX IP 250 OP 636: Performed by: PHYSICIAN ASSISTANT

## 2025-06-18 PROCEDURE — 97161 PT EVAL LOW COMPLEX 20 MIN: CPT | Mod: GP

## 2025-06-18 PROCEDURE — 250N000013 HC RX MED GY IP 250 OP 250 PS 637: Performed by: INTERNAL MEDICINE

## 2025-06-18 PROCEDURE — 85018 HEMOGLOBIN: CPT | Performed by: ORTHOPAEDIC SURGERY

## 2025-06-18 PROCEDURE — 97165 OT EVAL LOW COMPLEX 30 MIN: CPT | Mod: GO | Performed by: REHABILITATION PRACTITIONER

## 2025-06-18 PROCEDURE — 250N000013 HC RX MED GY IP 250 OP 250 PS 637: Performed by: PHYSICIAN ASSISTANT

## 2025-06-18 PROCEDURE — 250N000009 HC RX 250: Performed by: PHYSICIAN ASSISTANT

## 2025-06-18 PROCEDURE — 85049 AUTOMATED PLATELET COUNT: CPT | Performed by: ORTHOPAEDIC SURGERY

## 2025-06-18 PROCEDURE — 82947 ASSAY GLUCOSE BLOOD QUANT: CPT | Performed by: ORTHOPAEDIC SURGERY

## 2025-06-18 PROCEDURE — 250N000011 HC RX IP 250 OP 636: Performed by: ORTHOPAEDIC SURGERY

## 2025-06-18 PROCEDURE — 85610 PROTHROMBIN TIME: CPT | Performed by: ORTHOPAEDIC SURGERY

## 2025-06-18 PROCEDURE — 99204 OFFICE O/P NEW MOD 45 MIN: CPT | Performed by: PHYSICIAN ASSISTANT

## 2025-06-18 PROCEDURE — 97110 THERAPEUTIC EXERCISES: CPT | Mod: GP

## 2025-06-18 PROCEDURE — 97535 SELF CARE MNGMENT TRAINING: CPT | Mod: GO | Performed by: REHABILITATION PRACTITIONER

## 2025-06-18 PROCEDURE — 250N000012 HC RX MED GY IP 250 OP 636 PS 637: Performed by: ORTHOPAEDIC SURGERY

## 2025-06-18 PROCEDURE — 99214 OFFICE O/P EST MOD 30 MIN: CPT | Performed by: PHYSICIAN ASSISTANT

## 2025-06-18 PROCEDURE — 250N000013 HC RX MED GY IP 250 OP 250 PS 637: Performed by: ORTHOPAEDIC SURGERY

## 2025-06-18 RX ORDER — HYDROMORPHONE HYDROCHLORIDE 1 MG/ML
0.3 INJECTION, SOLUTION INTRAMUSCULAR; INTRAVENOUS; SUBCUTANEOUS EVERY 4 HOURS PRN
Status: DISCONTINUED | OUTPATIENT
Start: 2025-06-18 | End: 2025-06-18

## 2025-06-18 RX ORDER — ENOXAPARIN SODIUM 100 MG/ML
40 INJECTION SUBCUTANEOUS 2 TIMES DAILY
Status: SHIPPED
Start: 2025-06-18 | End: 2025-06-18

## 2025-06-18 RX ORDER — HYDROMORPHONE HYDROCHLORIDE 1 MG/ML
0.5 INJECTION, SOLUTION INTRAMUSCULAR; INTRAVENOUS; SUBCUTANEOUS EVERY 4 HOURS PRN
Status: DISCONTINUED | OUTPATIENT
Start: 2025-06-18 | End: 2025-06-18

## 2025-06-18 RX ORDER — ENOXAPARIN SODIUM 100 MG/ML
40 INJECTION SUBCUTANEOUS EVERY 12 HOURS
Status: DISCONTINUED | OUTPATIENT
Start: 2025-06-18 | End: 2025-06-18 | Stop reason: HOSPADM

## 2025-06-18 RX ORDER — PREGABALIN 100 MG/1
200 CAPSULE ORAL 2 TIMES DAILY
Status: DISCONTINUED | OUTPATIENT
Start: 2025-06-18 | End: 2025-06-18 | Stop reason: HOSPADM

## 2025-06-18 RX ORDER — BACITRACIN ZINC AND POLYMYXIN B SULFATE 500; 1000 [USP'U]/G; [USP'U]/G
OINTMENT TOPICAL 2 TIMES DAILY
Status: SHIPPED
Start: 2025-06-18

## 2025-06-18 RX ORDER — BACITRACIN ZINC AND POLYMYXIN B SULFATE 500; 1000 [USP'U]/G; [USP'U]/G
OINTMENT TOPICAL 2 TIMES DAILY
Status: DISCONTINUED | OUTPATIENT
Start: 2025-06-18 | End: 2025-06-18 | Stop reason: HOSPADM

## 2025-06-18 RX ORDER — WARFARIN SODIUM 2.5 MG/1
TABLET ORAL
Status: SHIPPED
Start: 2025-06-18

## 2025-06-18 RX ORDER — WARFARIN SODIUM 7.5 MG
3.75 TABLET ORAL
Status: DISCONTINUED | OUTPATIENT
Start: 2025-06-18 | End: 2025-06-18 | Stop reason: HOSPADM

## 2025-06-18 RX ORDER — WARFARIN SODIUM 2.5 MG/1
TABLET ORAL
Status: SHIPPED
Start: 2025-06-18 | End: 2025-06-18

## 2025-06-18 RX ORDER — OXYCODONE HYDROCHLORIDE 5 MG/1
2.5-5 TABLET ORAL EVERY 4 HOURS PRN
Qty: 26 TABLET | Refills: 0 | Status: SHIPPED | OUTPATIENT
Start: 2025-06-18

## 2025-06-18 RX ORDER — ENOXAPARIN SODIUM 100 MG/ML
40 INJECTION SUBCUTANEOUS DAILY
Qty: 2 ML | Refills: 0 | Status: SHIPPED | OUTPATIENT
Start: 2025-06-18

## 2025-06-18 RX ADMIN — BUPRENORPHINE AND NALOXONE 3 FILM: 4; 1 FILM BUCCAL; SUBLINGUAL at 08:04

## 2025-06-18 RX ADMIN — OXYCODONE HYDROCHLORIDE 5 MG: 5 TABLET ORAL at 06:42

## 2025-06-18 RX ADMIN — BACITRACIN ZINC AND POLYMYXIN B SULFATE: at 12:55

## 2025-06-18 RX ADMIN — CEFAZOLIN SODIUM 2 G: 2 SOLUTION INTRAVENOUS at 03:57

## 2025-06-18 RX ADMIN — OXYCODONE HYDROCHLORIDE 5 MG: 5 TABLET ORAL at 12:55

## 2025-06-18 RX ADMIN — ENOXAPARIN SODIUM 40 MG: 40 INJECTION SUBCUTANEOUS at 10:20

## 2025-06-18 RX ADMIN — ACETAMINOPHEN 975 MG: 325 TABLET, FILM COATED ORAL at 08:04

## 2025-06-18 RX ADMIN — ACETAMINOPHEN 975 MG: 325 TABLET, FILM COATED ORAL at 01:27

## 2025-06-18 RX ADMIN — HYDROXYZINE HYDROCHLORIDE 10 MG: 10 TABLET, FILM COATED ORAL at 01:27

## 2025-06-18 RX ADMIN — CELECOXIB 100 MG: 100 CAPSULE ORAL at 08:05

## 2025-06-18 RX ADMIN — PREGABALIN 200 MG: 100 CAPSULE ORAL at 10:20

## 2025-06-18 RX ADMIN — ESCITALOPRAM OXALATE 20 MG: 20 TABLET ORAL at 08:05

## 2025-06-18 RX ADMIN — SENNOSIDES AND DOCUSATE SODIUM 1 TABLET: 50; 8.6 TABLET ORAL at 08:06

## 2025-06-18 ASSESSMENT — ACTIVITIES OF DAILY LIVING (ADL)
ADLS_ACUITY_SCORE: 43
ADLS_ACUITY_SCORE: 42
ADLS_ACUITY_SCORE: 43
ADLS_ACUITY_SCORE: 43
ADLS_ACUITY_SCORE: 42
ADLS_ACUITY_SCORE: 43
ADLS_ACUITY_SCORE: 43
ADLS_ACUITY_SCORE: 42
ADLS_ACUITY_SCORE: 43
ADLS_ACUITY_SCORE: 43

## 2025-06-18 NOTE — PROGRESS NOTES
ANTICOAGULATION  MANAGEMENT: Discharge Review    Bereani Machuca chart reviewed for anticoagulation continuity of care    Hospital Admission on 6/17/25 -6/18/25  for Right total hip arthroplasty.    Discharge disposition: Home    Results:    Recent labs: (last 7 days)     06/17/25  1109 06/17/25  1734 06/18/25  0613   INR 1.15 1.17* 1.16*     Anticoagulation inpatient management:     anticoagulation calendar updated with inpatient dosing    Anticoagulation discharge instructions:     Warfarin dosing: increase dose to 3.75 mg today 6/18 and 6/19 then resume PTA dose of 3.75 mg every Mon, Fri; 2.5 mg all other days   Bridging: bridging with enoxaparin (Lovenox) 40 mg SubQ every 24 hours   INR goal change: No      Medication changes affecting anticoagulation: No    Additional factors affecting anticoagulation: post procedure healing, inflammation, activity level      PLAN     Agree with dosing adjustment on discharge to take 3.75 mg today 6/18 and tomorrow 6/19 and continue with lovenox 40 mg subQ every 24 hours.  Plan to recheck on Friday 6/20/25 before the weekend.      Spoke with Darline who verbalizes understanding and agrees with plan.     Anticoagulation Calendar updated    Antonietta Cole RN  6/18/2025  Anticoagulation Clinic  [facility] for routing messages: [facility]  [facility] patient phone line: [phone]

## 2025-06-18 NOTE — CONSULTS
[facility] ACUTE INPATIENT PAIN SERVICE    [facility], [facility], [facility], [facility], [place]   PAIN CONSULT          ASSESSMENT/ PLAN:    Acute post-operative pain in the setting of chronic pain; managed on Suboxone.Takes 12-3mg twice daily at baseline    POD#1 right total hip arthoplasty with Dr. Hector Nelson    Opioids used in the past 24h:  *(1) 1mg IV hydromorphone   *(2) 12-3mg Suboxone films  *(3) 5mg PO oxycodone      Multimodal Medication Therapy:   Adjuvants:   - APAP 975mg tid  - Celebrex 100mg bid  - Topical lidocaine   - Pregabalin 200mg bid  Opioids:   - Oxycodone 2.5-5mg q4h prn  - Discontinue IV hydromorphone  Non-medication interventions- Ice, PT  Constipation Prophylaxis- Scheduled Senna and Miralax  Follow up /Discharge Recommendations - We recommend prescribing the following at the time of discharge:    Resume PTA Suboxone 12-3mg bid  Resume PTA Pregabalin 200mg bid  Oxycodone 2.5-5mg q4h prn      Subjective:  Darline is seen today in her bed alert and oriented in no acute distress. Reports her pain is currently a 4/10 located in right hip. Her chronic back pain is controlled.     She has questions today regarding her Suboxone with oxycodone. Reports oxycodone is effective in controlling her post-operative pain. Reviewed that Suboxone will block some of her analgesia, however, since her pain is well controlled we will keep her on her PTA suboxone dose at this time. Agreeable to discontinue her IV hydromorphone today.     Denies nausea, vomiting, constipation.      Reviewed continuing with current plan as mentioned above, all questions answered.     HPI:  Bere Machuca is a 69 year old female who was admitted on 6/17/2025.  I was asked by Dr. Hector Nelson to see the patient for management of her acute post-operative pain in the setting of chronic Suboxone use. POD#1 right total hip arthoplasty. History of Rheumatoid arthritis, PVD, T2DM, obesity, MDD, cocaine abuse.          PDMP  RESULTS: Suboxone prescriber - Jeanette Eason CNP  Suboxone 12-3mg bid, last filled 30 day Rx on 6/03  Pregabalin 200mg bid, last filled 30 day Rx on 6/03    History   Drug Use No     Comment: 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         Tobacco Use      Smoking status: Former        Packs/day: 0.00        Years: 0.5 packs/day for 35.0 years (17.5 ttl pk-yrs)        Types: Cigarettes, Vaping Device        Start date: 8/8/1979        Quit date: 8/8/2014        Years since quitting: 10.8      Smokeless tobacco: Never      Tobacco comments: Currently vapes as of 3/4/25        Objective:  Vital signs in last 24 hours:  B/P: 102/54, T: 96.8, P: 60, R: 16   Blood pressure 102/54, pulse 60, temperature 96.8  F (36  C), temperature source Oral, resp. rate 16, weight 128.5 kg (283 lb 4.8 oz), SpO2 92%, not currently breastfeeding.        Review of Systems:   As per subjective, all others negative.    Physical Exam    General: in no apparent distress and non-toxic   HEENT: Head normocephalic atraumatic, oral mucosa moist. Sclerae anicteric  CV: Regular rhythm, normal rate, no murmurs  Resp: No wheezes, no rales or rhonchi, no focal consolidations  GI: Soft; non-tender; +Bs  Skin: Surgical site CDI  Extremities: No peripheral edema  Psych: Normal affect, mood euthymic  Neuro: Grossly normal          Imaging:  Personally Reviewed.    Results for orders placed or performed during the hospital encounter of 06/17/25   XR Pelvis w Hip Port Right 1 View    Impression    IMPRESSION: Postop changes of a recent right total hip arthroplasty. No fracture.        Lab Results:  Personally Reviewed.   Last Comprehensive Metabolic Panel:  Sodium   Date Value Ref Range Status   03/06/2025 144 135 - 145 mmol/L Final   01/29/2021 141.3 135 - 146 mmol/L Final     Potassium   Date Value Ref Range Status   03/06/2025 4.0 3.4 - 5.3 mmol/L Final   01/04/2022 4.0 3.4 - 5.3 mmol/L Final   01/29/2021 4.05 3.5 - 5.3 mmol/L Final     Chloride   Date Value Ref Range  Status   03/06/2025 104 98 - 107 mmol/L Final   01/04/2022 109 94 - 109 mmol/L Final   01/29/2021 107.2 98 - 110 mmol/L Final     Carbon Dioxide   Date Value Ref Range Status   01/29/2021 23.1 20 - 32 mmol/L Final     Carbon Dioxide (CO2)   Date Value Ref Range Status   03/06/2025 29 22 - 29 mmol/L Final   01/04/2022 26 20 - 32 mmol/L Final     Anion Gap   Date Value Ref Range Status   03/06/2025 11 7 - 15 mmol/L Final   01/04/2022 7 3 - 14 mmol/L Final   11/25/2017 10 3 - 14 mmol/L Final     Glucose   Date Value Ref Range Status   06/18/2025 118 (H) 70 - 99 mg/dL Final   01/04/2022 105 (H) 70 - 99 mg/dL Final   01/29/2021 119 (A) 60 - 99 mg/dL Final     GLUCOSE BY METER POCT   Date Value Ref Range Status   06/17/2025 129 (H) 70 - 99 mg/dL Final     Urea Nitrogen   Date Value Ref Range Status   03/06/2025 15.9 8.0 - 23.0 mg/dL Final   01/04/2022 13 7 - 30 mg/dL Final   01/29/2021 17 7 - 25 mg/dL Final     BUN/Creatinine Ratio   Date Value Ref Range Status   01/29/2021 18.5 6 - 22 Final     Creatinine   Date Value Ref Range Status   06/17/2025 0.59 0.51 - 0.95 mg/dL Final   01/29/2021 0.92 0.60 - 1.30 mg/dL Final     GFR Estimate   Date Value Ref Range Status   06/17/2025 >90 >60 mL/min/1.73m2 Final     Comment:     eGFR calculated using 2021 CKD-EPI equation.   04/12/2019 94 > OR = 60 mL/min/1.73m2 Final     Calcium   Date Value Ref Range Status   03/06/2025 10.1 8.8 - 10.4 mg/dL Final   01/29/2021 9.8 8.6 - 10.3 mg/dL Final        UA:   Amphetamines Urine   Date Value Ref Range Status   03/06/2025 Screen Negative Screen Negative Final     Comment:     Cutoff for a negative amphetamine is less than 500 ng/mL.     Barbituates Urine   Date Value Ref Range Status   03/06/2025 Screen Negative Screen Negative Final     Comment:     Cutoff for a negative barbiturate is less than 200 ng/mL.     Cannabinoids Urine   Date Value Ref Range Status   03/06/2025 Screen Negative Screen Negative Final     Comment:     Cutoff for a  negative cannabinoid is less than 50 ng/mL.     Cocaine Urine   Date Value Ref Range Status   03/06/2025 Screen Negative Screen Negative Final     Comment:     Cutoff for a negative cocaine is less than 300 ng/mL.     Opiates Urine   Date Value Ref Range Status   03/06/2025 Screen Negative Screen Negative Final     Comment:     Cutoff for a negative opiate is less than 300 ng/mL.     PCP Urine   Date Value Ref Range Status   03/06/2025 Screen Negative Screen Negative Final     Comment:     Cutoff for a negative PCP is less than 25 ng/mL.              Please see A&P for additional details of medical decision making.      Bill Fregoso PA-C  Acute Care Pain Management  Team  Hours of pain coverage Mon-Fri 8-1600, afterhours please call the primary team    [employer] ([place], [place], [place], [facility])   Page via Vocera text web console -Click for Affirm

## 2025-06-18 NOTE — PHARMACY-ANTICOAGULATION SERVICE
Clinical Pharmacy- Warfarin Discharge Note  This patient is currently on warfarin for the treatment of DVT/PE ppx.  INR Goal= 2-3  Expected length of therapy undetermined.    Warfarin PTA Regimen: 3.75mg on Mon; 2.5mg ROW      Anticoagulation Dose History  More data exists         Latest Ref Rng & Units 4/26/2025 5/12/2025 5/29/2025 6/5/2025 6/10/2025 6/17/2025 6/18/2025   Recent Dosing and Labs   warfarin ANTICOAGULANT (COUMADIN/JANTOVEN) 3 MG tablet - - - - - - 3 mg, $Given -   INR 0.85 - 1.15 3.0  2.4  1.8  2.2  2.2  1.17  1.15  1.16       Details          Multiple values from one day are sorted in reverse-chronological order               Vitamin K doses administered during the last 7 days: none  FFP administered during the last 7 days: none    Recommend discharging the patient on a warfarin regimen of 3.75mg tonight and tomorrow and then resume her PTA dosing schedule on Friday. Bridge with Lovenox given subtherapeutic INR and hx of VTE.     The patient should have an INR checked on Friday or next Monday.     Valarie Gomez, PharmD  June 18, 2025

## 2025-06-18 NOTE — PHARMACY-ANTICOAGULATION SERVICE
Clinical Pharmacy - Warfarin Dosing Consult     Pharmacy has been consulted to manage this patient s warfarin therapy.  Indication: DVT/PE Prophylaxis  Therapy Goal: INR 2-3  Warfarin Prior to Admission: Yes  Warfarin PTA Regimen: 3.75mg on Mon; 2.5mg ROW    INR   Date Value Ref Range Status   06/18/2025 1.16 (H) 0.85 - 1.15 Final   06/17/2025 1.17 (H) 0.85 - 1.15 Final       Recommend warfarin 3.75 mg today given subtherapeutic INR.  Pharmacy will monitor Bere Machuca daily and order warfarin doses to achieve specified goal.      Please contact pharmacy as soon as possible if the warfarin needs to be held for a procedure or if the warfarin goals change.       Valarie Gomez, PharmD  June 18, 2025

## 2025-06-18 NOTE — PROGRESS NOTES
Occupational Therapy Discharge Summary    Reason for therapy discharge:    All goals and outcomes met, no further needs identified.    Progress towards therapy goal(s). See goals on Care Plan in [facility] electronic health record for goal details.  Goals met    Therapy recommendation(s):    No further therapy is recommended.

## 2025-06-18 NOTE — PROGRESS NOTES
06/18/25 0855   Appointment Info   Signing Clinician's Name / Credentials (PT) Maria Esther Sneed DPT   Quick Adds   Quick Adds [facility] Auth & Certification   Living Environment   People in Home alone   Current Living Arrangements condominium   Number of Stairs, Within Home, Primary none   Stair Railings, Within Home, Primary none   Self-Care   Usual Activity Tolerance good   Current Activity Tolerance moderate   Equipment Currently Used at Home grab bar, tub/shower;shower chair;walker, rolling   Fall history within last six months no   Activity/Exercise/Self-Care Comment Patient reports that she used a 4WW prior to surgery (reports leaning over onto forearms with gait at baseline).   General Information   Onset of Illness/Injury or Date of Surgery 06/17/25   Referring Physician Leslie CASTELLANOS MD   Patient/Family Therapy Goals Statement (PT) return to home   Pertinent History of Current Problem (include personal factors and/or comorbidities that impact the POC) Patient seen POD #1 s/p right SINAN   Existing Precautions/Restrictions   (no hip precautions)   Weight-Bearing Status - RLE weight-bearing as tolerated   Cognition   Orientation Status (Cognition) oriented x 4   Integumentary/Edema   Integumentary/Edema Comments Incision not visualized during session   Posture    Posture Forward head position;Protracted shoulders   Range of Motion (ROM)   Range of Motion ROM deficits secondary to surgical procedure;ROM deficits secondary to pain   Strength (Manual Muscle Testing)   Strength (Manual Muscle Testing) Deficits observed during functional mobility   Bed Mobility   Comment, (Bed Mobility) Required min A at right LE   Transfers   Transfers sit-stand transfer   Sit-Stand Transfer   Sit-Stand [place] (Transfers) contact guard   Assistive Device (Sit-Stand Transfers) walker, 4-wheeled   Gait/Stairs (Locomotion)   [place] Level (Gait) contact guard   Assistive Device (Gait) walker, 4-wheeled   Distance in Feet  (Gait) 10(eval)+100 (treat)   Balance   Balance Comments Requires bilateral UE support with gait at 4WW   Clinical Impression   Criteria for Skilled Therapeutic Intervention Yes, treatment indicated   PT Diagnosis (PT) Impaired functional mobility   Influenced by the following impairments pain, decreased strength, activity tolerance, balance, cognition   Functional limitations due to impairments difficulties with transfers and ambulation   Clinical Presentation (PT Evaluation Complexity) stable   Clinical Presentation Rationale clinical judgement   Clinical Decision Making (Complexity) low complexity   Planned Therapy Interventions (PT) balance training;bed mobility training;gait training;patient/family education;strengthening;transfer training;progressive activity/exercise;ROM (range of motion);stair training;home program guidelines   Risk & Benefits of therapy have been explained evaluation/treatment results reviewed;care plan/treatment goals reviewed;risks/benefits reviewed;current/potential barriers reviewed;participants included;patient;participants voiced agreement with care plan   PT Total Evaluation Time   PT Eval, Low Complexity Minutes (79883) 10   Therapy Certification   Start of care date 06/18/25   Certification date from 06/18/25   Certification date to 06/18/25   Medical Diagnosis Right SINAN   [facility] Authorization Information   Condition type Initial onset (within last 3 months)   Cause of current episode Post Surgical   Type of surgery 5-Joint Replacement   Nature of treatment Rehabilitative   Functional ability Moderate functional limitations   Documented POC (choose all that apply) Measurable short and long term/discharge treatment goals related to physical and functional deficits.;Frequency of treatment visits and treatment activities to address deficit areas.;Patient agrees to program participation including home program   Briefly describe symptoms increased pain, decreased ROM   How did the symptoms  start following surgery   Last 24H: avg pain/symptom intensity  7/10   Past wk: avg pain/symptom intensity 6/10   Frequency of Symptoms Constantly (% of the time)   Symptom impact on ADLs Quite a bit   Condition change since eval N/A (first visit)   General health reported by patient Good   Physical Therapy Goals   PT Goals Transfers;Gait;Aerobic Activity;Stairs;Bed Mobility   PT: Bed Mobility Supervision/stand-by assist;Supine to/from sit;Within precautions   PT: Transfers Sit to/from stand;Bed to/from chair;Assistive device;Supervision/stand-by assist;Within precautions   PT: Gait Assistive device;Supervision/stand-by assist;150 feet;Within precautions   PT: Perform aerobic activity with stable cardiovascular response 10 minutes;ambulation;intermittent activity   Interventions   Interventions Quick Adds Gait Training;Therapeutic Activity;Therapeutic Procedure   Therapeutic Procedure/Exercise   Ther. Procedure: strength, endurance, ROM, flexibillity Minutes (29228) 10   Symptoms Noted During/After Treatment increased pain   Treatment Detail/Skilled Intervention Facilitated therapeutic exercise to increase independence with transfers and ambulation.  Patient performed the following exercises x 10 reps with verbal and tactile cueing for correct technique: ankle DF/PF, quad/ham/glut sets, heel slides < 90 degrees hip flex, SAQ, SLR.  Handout provided.   Therapeutic Activity   Therapeutic Activities: dynamic activities to improve functional performance Minutes (84599) 5   Symptoms Noted During/After Treatment Increased pain   Treatment Detail/Skilled Intervention Patient seated in bedside chair upon arrival.  Facilitated  transfer between sitting and standing with 2WW and graded assist from CGA to SBA.  Returned to supine with min A at left LE.  Patient educated in car transfers.   Gait Training   Gait Training Minutes (17880) 10   Symptoms Noted During/After Treatment (Gait Training) increased pain   Treatment  Detail/Skilled Intervention Patient amb 100 feet with 4WW and graded assist from CGA to SBA.  Patient with decreased gait speed, increased trunk flexion, increased reliance on UE support at walker (leans onto forearms at walker), fair foot clearance.  Able to demonstrate step through gait pattern with verbal cueing.   Distance in Feet 100   PT Discharge Planning   PT Plan dc   PT Discharge Recommendation (DC Rec)   (per ortho md)   PT Rationale for DC Rec Patient has met all PT goals; has demonstrated mobility with supervision and walker.   PT Brief overview of current status SBA with 4WW   PT Total Distance Amb During Session (feet) 100   Physical Therapy Time and Intention   Timed Code Treatment Minutes 25   Total Session Time (sum of timed and untimed services) 35     [facility]                                                                                   OUTPATIENT PHYSICAL THERAPY    PLAN OF TREATMENT FOR OUTPATIENT REHABILITATION   Patient's Last Name, First Name, Bere Mackay YOB: 1955   Provider's Name   [facility]   Medical Record No.  [id number]     Onset Date: 06/17/25 Start of Care Date: 06/18/25     Medical Diagnosis:  Right SINAN               PT Diagnosis:  Impaired functional mobility Certification Dates:  From: 06/18/25  To: 06/18/25       See note for plan of treatment, functional goals, and certification details.    I CERTIFY THE NEED FOR THESE SERVICES FURNISHED UNDER        THIS PLAN OF TREATMENT AND WHILE UNDER MY CARE (Physician co-signature of this document indicates review and certification of the therapy plan).

## 2025-06-18 NOTE — DISCHARGE INSTRUCTIONS
Warfarin:   Take 3.75mg tonight (6/18) and tomorrow (6/19) and then resume her PTA dosing schedule on Friday. [Take 3.75 mg (2.5 mg x 1) every Mon; 2.5 mg (2.5 mg x 1) all other days or as directed by INR clinic]    Bacitracin is available over-the-counter use twice a day with gauze or small strip of fabric for right inguinal fold.    Call your anticoagulation clinic to schedule INR appointment likely on Friday, 6/20 note dosing of Lovenox twice a day.  Follow-up with INR clinic regarding resumption.  Stop Lovenox once INR above 2    Check with Surgeon about celebrex or NSAID use while on Warfarin.   
none

## 2025-06-18 NOTE — PROGRESS NOTES
[facility]                                                                                   OUTPATIENT OCCUPATIONAL THERAPY    PLAN OF TREATMENT FOR OUTPATIENT REHABILITATION   Patient's Last Name, First Name, Bere Mackay YOB: 1955   Provider's Name   [facility]   Medical Record No.  [id number]     Onset Date: 06/17/25 Start of Care Date: 06/18/25     Medical Diagnosis:  R SINAN               OT Diagnosis:  decreased ADL/IADls Certification Dates:  From: 06/18/25  To: 06/18/25     See note for plan of treatment, functional goals, and certification details.    I CERTIFY THE NEED FOR THESE SERVICES FURNISHED UNDER        THIS PLAN OF TREATMENT AND WHILE UNDER MY CARE (Physician co-signature of this document indicates review and certification of the therapy plan).                         06/18/25 0917   Appointment Info   Signing Clinician's Name / Credentials (OT) Yary Renee OTR/L   Quick Adds   Quick Adds [facility] Auth & Certification   Living Environment   People in Home alone   Current Living Arrangements condominium   Living Environment Comments walk in shower, SHT   Self-Care   Usual Activity Tolerance good   Current Activity Tolerance moderate   Equipment Currently Used at Home grab bar, tub/shower;shower chair;walker, rolling;dressing device   Fall history within last six months no   Activity/Exercise/Self-Care Comment patient was indp with ADls/IADls prior to admisison   Instrumental Activities of Daily Living (IADL)   IADL Comments sister to complete as needed, pt has housecleaning service 2x/month   General Information   Onset of Illness/Injury or Date of Surgery 06/17/25   Referring Physician Dr. Nelson   Patient/Family Therapy Goal Statement (OT) to return home   Additional Occupational Profile Info/Pertinent History of Current Problem patient is POD #1 for R SINAN   Existing Precautions/Restrictions fall  (no hip  precautions)   General Observations and Info patient was in bed and agreeable to OT session   Cognitive Status Examination   Orientation Status orientation to person, place and time   Visual Perception   Visual Impairment/Limitations WFL   Sensory   Sensory Quick Adds sensation intact   Pain Assessment   Patient Currently in Pain Yes, see Vital Sign flowsheet  (rating pain 5/10)   Posture   Posture not impaired   Range of Motion Comprehensive   General Range of Motion bilateral upper extremity ROM WFL   Strength Comprehensive (MMT)   Comment, General Manual Muscle Testing (MMT) Assessment decreased strength in R LE to be expected   Muscle Tone Assessment   Muscle Tone Quick Adds No deficits were identified   Coordination   Upper Extremity Coordination No deficits were identified   Bed Mobility   Comment (Bed Mobility) SBA, bedrail support supine to sit   Transfers   Transfers toilet transfer;shower transfer;sit-stand transfer   Sit-Stand Transfer   Sit-Stand [place] (Transfers) contact guard;verbal cues   Assistive Device (Sit-Stand Transfers) walker, 4-wheeled   Shower Transfer   [place] Level (Shower Transfer) minimum assist (75% patient effort);verbal cues   Assistive Device (Shower Transfer) walker, 4-wheeled   Toilet Transfer   Type (Toilet Transfer) stand-sit   [place] Level (Toilet Transfer) minimum assist (75% patient effort);verbal cues   Assistive Device (Toilet Transfer) grab bars/safety frame;walker, 4-wheeled   Balance   Balance Comments LOB was not noted, general unsteadiness to be expected   Activities of Daily Living   BADL Assessment/Intervention lower body dressing;toileting;grooming   Lower Body Dressing Assessment/Training   [place] Level (Lower Body Dressing) contact guard assist;lower body dressing skills   Grooming Assessment/Training   [place] Level (Grooming) grooming skills;contact guard assist   Toileting   [place] Level (Toileting) toileting skills;minimum  assist (75% patient effort)   Clinical Impression   Criteria for Skilled Therapeutic Interventions Met (OT) Yes, treatment indicated   OT Diagnosis decreased ADL/IADls   OT Problem List-Impairments impacting ADL activity tolerance impaired;balance;range of motion (ROM);strength;pain   Assessment of Occupational Performance 5 or more Performance Deficits   Identified Performance Deficits dsg, toileting, bathing, functional/community mobility, houeshold chores, driving ,errands   Planned Therapy Interventions (OT) ADL retraining;progressive activity/exercise;transfer training   Clinical Decision Making Complexity (OT) problem focused assessment/low complexity   Risk & Benefits of therapy have been explained evaluation/treatment results reviewed;care plan/treatment goals reviewed;risks/benefits reviewed;current/potential barriers reviewed;participants voiced agreement with care plan;patient   OT Total Evaluation Time   OT Eval, Low Complexity Minutes (09009) 8   Therapy Certification   Start of Care Date 06/18/25   Certification date from 06/18/25   Certification date to 06/18/25   Medical Diagnosis R SINAN   [facility] Authorization Information   Condition type Initial onset (within last 3 months)   Cause of current episode Post Surgical   Type of surgery 5-Joint Replacement   Nature of treatment Rehabilitative   Functional ability Moderate functional limitations   Documented POC (choose all that apply) Measurable short and long term/discharge treatment goals related to physical and functional deficits.;Frequency of treatment visits and treatment activities to address deficit areas.;Patient agrees to program participation including home program   Briefly describe symptoms pain, decreased ROM   How did the symptoms start post surgery   Last 24H: avg pain/symptom intensity  7/10   Past wk: avg pain/symptom intensity 6/10   Frequency of Symptoms Constantly (% of the time)   Symptom impact on ADLs Quite a bit   Condition  change since eval N/A (first visit)   General health reported by patient Good   OT Goals   Therapy Frequency (OT) One time eval and treatment   OT Goals Lower Body Dressing;Transfers;Toilet Transfer/Toileting;OT Goal 1;OT Goal 2   OT: Lower Body Dressing Supervision/stand-by assist;using adaptive equipment;Goal Met   OT: Transfer Supervision/stand-by assist;with assistive device;Goal Met  (walk in shower)   OT: Toilet Transfer/Toileting Modified independent;toilet transfer;cleaning and garment management;using adaptive equipment;Goal Met   OT: Goal 1 Patient will verbalize at least 2-3 adaptations to ADL routine at home to accommodate energy level and safety needs. goal met   OT: Goal 2 Patient will demonstrate safe use of  walker during ADls. goal met   OT Discharge Planning   OT Plan DC   OT Rationale for DC Rec defer to ortho team for dc plan- patient has met needed goals for safe discharge home with family to A as needed with IADL's; household chores, driving, errands, cooking and bathing. Patient has needed AE. Will discharge from OT services.   OT Brief overview of current status SBA, 4ww functional mobility, transfers and basic ADLs   OT Total Distance Amb During Session (feet) 40   Total Session Time   Total Session Time (sum of timed and untimed services) 8

## 2025-06-18 NOTE — PROGRESS NOTES
Orthopedic Surgery  6/18/2025  POD#: 1    S: Patient voices no complaints today.     O: Blood pressure 102/54, pulse 60, temperature 96.8  F (36  C), temperature source Oral, resp. rate 16, weight 128.5 kg (283 lb 4.8 oz), SpO2 92%, not currently breastfeeding.  Lab Results   Component Value Date    HGB 11.3 06/18/2025    HGB 13.0 04/12/2019     Lab Results   Component Value Date    INR 1.16 06/18/2025    INR 2.2 06/05/2025    INR 2.5 10/05/2021    INR 2.46 11/25/2017        I/O last 3 completed shifts:  In: 1793 [P.O.:240; I.V.:1553]  Out: 800 [Urine:500; Blood:300]    Neurovascularly intact.  Calves are negative bilaterally, both soft and nontender.  The wound is C/D/I. Aquacel intact with minimal drainage.  The wound looks good with minimal erythema of the surrounding skin.    A: Ms. Machuca is doing well status post Procedure(s):  Right total hip arthroplasty.    P:   1. Mobilize and continue physical therapy. No hip precautions.  2. Inguinal fold dermatitis wound, some was noted prior to surgery, but likely exacerbated from draping. Apply BID bacitracin with light gauze.  3. Anticoagulation - resume warfarin per pharmacy dosing, INR 1.16 today and should recheck at home, while bridging with lovenox until therapeutic.  Note recommendation for BID lovenox based on weight.  Ortho team prefers keeping once daily dosing for bridging.  If concerns, please contact us.  4. Pain management - PTA suboxone and low dose oxycodone per pain team, appreciate recs.  5. Anticipate discharge to home this afternoon. OK by ortho.      Jeanette Kent PA-C  [facility]  O: [phone]

## 2025-06-18 NOTE — PLAN OF CARE
"Goal Outcome Evaluation:      Plan of Care Reviewed With: patient    Overall Patient Progress: improvingOverall Patient Progress: improving    Outcome Evaluation: VSS. On 1L NC, on capno. A&Ox4. Assist of 1 GB/W. Voiding to BSC. Tolerating regular diet. PIV is running LR at 100 mL/hr. On warfarin. On ancef. CPAP at night. Interdry in place - abdominal folds. Pain managed with scheduled suboxone, PRN IV dilaudid, oxycodone, and atarax. Plan is discharge to TCU when medically ready.      Problem: Adult Inpatient Plan of Care  Goal: Plan of Care Review  Description: The Plan of Care Review/Shift note should be completed every shift.  The Outcome Evaluation is a brief statement about your assessment that the patient is improving, declining, or no change.  This information will be displayed automatically on your shift  note.  Outcome: Progressing  Flowsheets (Taken 6/17/2025 2115)  Outcome Evaluation: VSS. On 1L NC, on capno. A&Ox4. Assist of 1 GB/W. Voiding to BSC. Tolerating regular diet. PIV is running LR at 100 mL/hr. On warfarin. On ancef. CPAP at night. Interdry in place - abdominal folds. Pain managed with scheduled suboxone, PRN IV dilaudid, oxycodone, and atarax. Plan is discharge to TCU when medically ready.  Plan of Care Reviewed With: patient  Overall Patient Progress: improving  Goal: Patient-Specific Goal (Individualized)  Description: You can add care plan individualizations to a care plan. Examples of Individualization might be:  \"Parent requests to be called daily at 9am for status\", \"I have a hard time hearing out of my right ear\", or \"Do not touch me to wake me up as it startles  me\".  Outcome: Progressing  Goal: Absence of Hospital-Acquired Illness or Injury  Outcome: Progressing  Intervention: Identify and Manage Fall Risk  Recent Flowsheet Documentation  Taken 6/17/2025 1650 by Taras Ward, KERRIE  Safety Promotion/Fall Prevention:   activity supervised   assistive device/personal items within reach   " clutter free environment maintained   lighting adjusted   mobility aid in reach   nonskid shoes/slippers when out of bed   patient and family education   room near nurse's station   room organization consistent   safety round/check completed   supervised activity  Intervention: Prevent Skin Injury  Recent Flowsheet Documentation  Taken 6/17/2025 1757 by Taras Ward RN  Body Position: supine, head elevated  Taken 6/17/2025 1650 by Taras Ward RN  Body Position: supine, head elevated  Skin Protection:   adhesive use limited   incontinence pads utilized  Intervention: Prevent and Manage VTE (Venous Thromboembolism) Risk  Recent Flowsheet Documentation  Taken 6/17/2025 1650 by Taras Ward RN  VTE Prevention/Management: SCDs on (sequential compression devices)  Intervention: Prevent Infection  Recent Flowsheet Documentation  Taken 6/17/2025 1650 by Taras Ward RN  Infection Prevention:   hand hygiene promoted   rest/sleep promoted   single patient room provided  Goal: Optimal Comfort and Wellbeing  Outcome: Progressing  Intervention: Monitor Pain and Promote Comfort  Recent Flowsheet Documentation  Taken 6/17/2025 1650 by Taras Ward RN  Pain Management Interventions:   care clustered   cold applied   distraction   rest  Goal: Readiness for Transition of Care  Outcome: Progressing  Intervention: Mutually Develop Transition Plan  Recent Flowsheet Documentation  Taken 6/17/2025 1800 by Taras Ward RN  Equipment Currently Used at Home:   grab bar, tub/shower   grab bar, toilet   shower chair   walker, rolling     Problem: Delirium  Goal: Optimal Coping  Outcome: Progressing  Goal: Improved Behavioral Control  Outcome: Progressing  Intervention: Minimize Safety Risk  Recent Flowsheet Documentation  Taken 6/17/2025 1650 by Taras Ward RN  Enhanced Safety Measures:   pain management   patient/family teach back on injury risk   review medications for side effects with activity   room near unit station  Goal: Improved  Attention and Thought Clarity  Outcome: Progressing  Goal: Improved Sleep  Outcome: Progressing

## 2025-06-18 NOTE — CONSULTS
[facility]  Hospitalist Consult Note  Name: Bere Machuca    MRN: [id number]  YOB: 1955    Age: 69 year old  Date of admission: 6/17/2025  Primary care provider: Jennifer Shea     Requesting Physician:  Dr Nelson  Reason for consult:  Post-operative medical management         Assessment and Plan:   Bere Machuca is a 69 year old female with a history of COPD, JERILYN, hypertension, prior cocaine abuse, migraine, depression, GERD, clotting disorder with history of PE maintained on warfarin, diverticulitis, chronic pain, RA, OA, who was admitted for right total hip arthroplasty.    DJD s/p right total hip arthroplasty on 6/17: The patient is doing well, currently has well controlled pain and is hemodynamically stable. Will defer diet, activity, DVT prophylaxis, and pain control to the primary team. Continue physical and occupational therapy.   -topical cares for dermatitis right inguinal fold, possibly from drapes/adhesive. Recommend bacitracin gauze with inter dry fabric to offload moisture     Factor 5 Leiden Mutation, heterozygous   Chronic Warfarin Use   Hx of VTE    History of pulmonary embolism and clotting disorder, currently on Lovenox and Coumadin with subtherapeutic INR.  Managed by anticoagulation clinic.  -restart her home dose of Coumadin and bridge with Lovenox until her INR is therapeutic for DVT prophylaxis.    -INR subtherapeutic, follow up with anticoagulation clinic regarding continuation of lovenox  -lovenox weight based for 40 mg BID (patient has prescription for once daily already filled for 10 days worth)     COPD  Compensated.   Hx of former tobacco dependence, quit over 12 y/o.   -resume PTA inhalers: Allergy, albuterol as needed    Chronic Pain on Suboxone  Chronic pain of spinal stenosis, hip, fibromyalgia.  Follows with [facility] clinic [place]  - Continue prior to admission Suboxone twice daily, Lyrica twice daily, tizanidine as  needed      HTN  Hx of hypotension on losartan, not on current BP medication.   -monitor    HLD   -continue statin     T2DM/Prediabetes/Obesity   -Resume MILAGRO Brenner as outpatient or directed by PCP  -A1c was 5 3 months ago, no need for POCT checks at this time unless fasting sugars over 150  -had decadron perioperatively (8mg)     RA   - Continue folic acid, mesalamine, prednisone (5mg daily), sulfasalazine   - no need for stress dose steroids   - f/up with Rheumatology regarding methotrexate and Golimumab infusion timing (pt reports 2 week hold)    JERILYN  -CPAP     MDD  -resume escitalopram     History of superior vena cava syndrome  - Followed by Dr. Richter of vascular    Code status: Full  Prophylaxis: Lovenox   Disposition: Home    Thank you for the consultation, we will continue to follow along during the hospitalization. Please page with any questions or concerns.         History of Present Illness:   Bere Machuca is a 69 year old female who was hospitalized for right total hip arthroplasty.  Had general anesthesia.   cc.  No complications.. Pre-operative note was fully reviewed and recommendations acknowledged. Op note and anesthesia notes and flow sheets reviewed.     The patient had no complications related to the procedure and has had an unremarkable post-operative course to this point. Currently pain is adequately controlled. No nausea, vomiting, diarrhea or constipation. No fevers, chills, diaphoresis. No chest pain, palpitations, dyspnea. Richter catheter still in place. Tolerating oral intake. No excessive somnolence and patient is fully alert and oriented. The patient has no other complaints at this time.                  Past Medical History:     Past Medical History:   Diagnosis Date    Antiplatelet or antithrombotic long-term use     Anxiety     Arthritis     Clotting disorder     Coagulation disorder     COPD (chronic obstructive pulmonary disease) (H)     Depressive disorder      Diabetes (H)     Gastroesophageal reflux disease     Hypertension     Lymphedema of arm 03/08/2013    Morbidly obese (H) 09/25/2009    Pulmonary embolism (H)     Sleep apnea     Substance abuse (H)     Superior vena cava syndrome     Thrombosis              Past Surgical History:     Past Surgical History:   Procedure Laterality Date    BIOPSY OF SKIN LESION  multiple    keloids    COLONOSCOPY N/A 3/10/2025    Procedure: Colonoscopy with polypectomy and clipping, biopsies;  Surgeon: Fili Smith MD;  Location: [facility] OR    HC REMOVAL OF OVARY/TUBE(S)  1992    Salpingo-Oophorectomy, Unilateral    HC REMOVE TONSILS/ADENOIDS,<11 Y/O  age 14    T & A <14y.o.    HERNIA REPAIR      INSERTION OF ACCESS PORT  1-4-96    sepsis- removed after hospital stay    ZZC ANESTH,REPAIR LO ABD HERNIA NOS  multiple/ 2006 last    panniculus    Z EXPLORATORY OF ABDOMEN  1996    Z PLASTIC SURGERY, NECK  age 4    ZZC REMOVAL COLON/ILEOSTOMY      reastamosis    Z TOTAL ABDOM HYSTERECTOMY  1992    Hysterectomy, Total Abdominal    ZZ REMOVE ABDOMINAL WALL LESION  2006    abscess               Social History:     Social History     Tobacco Use    Smoking status: Former     Current packs/day: 0.00     Average packs/day: 0.5 packs/day for 35.0 years (17.5 ttl pk-yrs)     Types: Cigarettes, Vaping Device     Start date: 8/8/1979     Quit date: 8/8/2014     Years since quitting: 10.8    Smokeless tobacco: Never    Tobacco comments:     Currently vapes as of 3/4/25   Substance Use Topics    Alcohol use: Not Currently     Alcohol/week: 0.8 standard drinks of alcohol     Types: 1 Standard drinks or equivalent per week             Family History:   Family history was fully reviewed and non-contributory in this case.          Allergies:     Allergies   Allergen Reactions    Metformin Diarrhea    Aspirin      PN: LW Reaction: sensitive/stomach    Tape [Adhesive Tape] Itching    Topiramate Unknown    Wellbutrin [Bupropion] Anxiety              Medications:     Prior to Admission medications    Medication Sig Last Dose Taking? Auth Provider Long Term End Date   acetaminophen (TYLENOL) 325 MG tablet Take 2 tablets (650 mg) by mouth every 4 hours as needed for other (mild pain).  Yes Hector Nelson MD     acetaminophen (TYLENOL) 500 MG tablet Take 1,000 mg by mouth every 8 hours as needed for mild pain 6/17/2025 Morning Yes Reported, Patient     albuterol (PROAIR HFA/PROVENTIL HFA/VENTOLIN HFA) 108 (90 Base) MCG/ACT inhaler INHALE 2 PUFFS BY MOUTH EVERY 4 HOURS AS NEEDED 6/17/2025 Morning Yes Uyen Mcintyre APRN CNP Yes    Buprenorphine HCl-Naloxone HCl (SUBOXONE) 12-3 MG FILM per film Place 1 Film under the tongue 2 times daily 6/16/2025 Evening Yes Reported, Patient No    Calcium Carbonate-Vit D-Min (CALCIUM 1200 PO) Take by mouth. Past Week Yes Reported, Patient     celecoxib (CELEBREX) 200 MG capsule Take 1 capsule (200 mg) by mouth daily for 14 days. Do not take within 6 hours of ibuprofen (MOTRIN, ADVIL) or ketorolac (TORADOL) if prescribed.  Yes Hector Nelson MD Yes 7/1/25   cephALEXin (KEFLEX) 500 MG capsule Take 1 capsule (500 mg) by mouth 4 times daily for 10 days.  Yes Hector Nelson MD  6/27/25   escitalopram (LEXAPRO) 20 MG tablet Take 1 tablet (20 mg) by mouth daily. 6/17/2025 Morning Yes Jennifer Shea MD Yes    Fluticasone-Umeclidin-Vilant (TRELEGY ELLIPTA) 100-62.5-25 MCG/ACT oral inhaler INHALE 1 PUFF INTO THE LUNGS DAILY (RINSE MOUTH AND SPIT AFTER USING) 6/17/2025 Morning Yes Jennifer Shea MD     folic acid (FOLVITE) 1 MG tablet Take 2 mg by mouth daily Past Week Yes Reported, Patient     Golimumab (SIMPONI ARIA IV) Take as directed every 8 weeks via infusion center per rheumatology Past Month Yes Reported, Patient     hydrOXYzine HCl (ATARAX) 10 MG tablet Take 1 tablet (10 mg) by mouth every 6 hours as needed for itching or anxiety (with pain, moderate pain).  Yes Hector Nelson MD      mesalamine (LIALDA) 1.2 g EC tablet Take 1,200 mg by mouth 2 times daily. Past Week Yes Reported, Patient     methotrexate 50 MG/2ML injection INJECT 0.7ML SUB-EVERY EVERY WEEK Past Month Yes Reported, Patient No    Multiple Vitamins-Minerals (WOMENS 50+ MULTI VITAMIN/MIN PO) Take 1 tablet by mouth daily. (With fish oil) Past Week Yes Reported, Patient No    oxyCODONE (ROXICODONE) 5 MG tablet Take 1-2 tablets (5-10 mg) by mouth every 4 hours as needed for moderate to severe pain.  Yes Hector Nelson MD     polyethylene glycol (MIRALAX) 17 GM/Dose powder Take 1 Capful by mouth daily as needed for constipation 6/17/2025 Morning Yes Reported, Patient     predniSONE (DELTASONE) 5 MG tablet Take 5 mg by mouth daily 6/17/2025 Morning Yes Reported, Patient     Pregabalin (LYRICA) 200 MG capsule Take 200 mg by mouth 2 times daily 6/17/2025 Morning Yes Reported, Patient No    rosuvastatin (CRESTOR) 20 MG tablet TAKE 1 TABLET BY MOUTH EVERY DAY 6/17/2025 Morning Yes Jennifer Shea MD Yes    senna-docusate (SENEXON-S) 8.6-50 MG tablet TAKE 1 TABLET BY MOUTH EVERY DAY AS NEEDED 6/17/2025 Morning Yes Jennifre Shea MD No    senna-docusate (SENOKOT-S/PERICOLACE) 8.6-50 MG tablet Take 1-2 tablets by mouth 2 times daily. Take while on oral narcotics to prevent or treat constipation.  Yes Hector Nelson MD No    sulfaSALAzine (AZULFIDINE) 500 MG tablet Take 1 tablet (500 mg) by mouth 2 times daily Past Week Yes Uyen Mcintyre APRN CNP     tirzepatide (MOUNJARO) 10 MG/0.5ML SOAJ auto-injector pen Inject 0.5 mLs (10 mg) subcutaneously once a week.  Yes Jennifer Shea MD No    warfarin ANTICOAGULANT (COUMADIN) 2.5 MG tablet Take 3.75 mg (2.5 mg x 1) every Mon; 2.5 mg (2.5 mg x 1) all other days or as directed by INR clinic Past Week Yes Jennifer Shea MD No    enoxaparin ANTICOAGULANT (LOVENOX) 40 MG/0.4ML syringe Inject 0.4 mLs (40 mg) subcutaneously every 24 hours. As directed by INR clinic   Monse,  MD Jennifer     tiZANidine (ZANAFLEX) 4 MG tablet TAKE 1 TABLET BY MOUTH TWICE A DAY AS NEEDED   Jennifer Shea MD         Current hospital administered medication list (MAR) also reviewed.          Review of Systems:     A comprehensive greater than 10 system review of systems was carried out.  Pertinent positives and negatives are noted above.  Otherwise negative for contributory info.            Physical Exam:   Blood pressure 102/54, pulse 60, temperature 96.8  F (36  C), temperature source Oral, resp. rate 16, weight 128.5 kg (283 lb 4.8 oz), SpO2 92%, not currently breastfeeding.  Exam:  General: Alert, awake, no acute distress.  HEENT: Normocephalic and atraumatic, eyes anicteric and without scleral injection, EOMI, face symmetric, MMM.  Cardiac: RRR, normal S1, S2. No m/g/r, no LE edema.  Pulmonary: Normal chest rise, normal work of breathing.  Lungs CTAB without crackles or wheezing.  Abdomen: soft, non-tender, non-distended.  Normoactive bowel sounds, no guarding or rebound tenderness.  Extremities: no deformities.  Warm, well perfused.  Skin: friction dermatitis to right inguinal fold. No open purulence or wound.   Neuro: No focal deficits.  Speech clear.  Spontaneously moving all extremities in bed.  Psych: Alert and oriented x3. Appropriate affect.          Data:   Reviewed.     Ainsley Dodd PA-C  [facility] Hospitalist  June 18, 2025

## 2025-06-18 NOTE — PLAN OF CARE
Physical Therapy Discharge Summary    Reason for therapy discharge:    All goals and outcomes met, no further needs identified.      Progress towards therapy goal(s). See goals on Care Plan in [facility] electronic health record for goal details.  Goals met  PT provided min A for R LE to get into bed. -pt reports sister will assist or will sleep in recliner for easier access.    Therapy recommendation(s):    Recommend use of 4WW for all mobility and assist with transfers into bed.

## 2025-06-18 NOTE — PLAN OF CARE
"Goal Outcome Evaluation:      Plan of Care Reviewed With: patient    Overall Patient Progress: improvingOverall Patient Progress: improving    Outcome Evaluation: Pt ambulating with x1 assist W/GB, voiding well, PIV SL, pain managed with scheduled apap and suboxone as well as PRN PO oxy and atarax, plan for PT/OT today    [id number] RN  Pt A/Ox4, VSS, on 2L overnight with CPAP, PIV SL, Ax1 W/GB, pain managed with scheduled apap and suboxone as well as PO PRN oxy and atarax, ice applied intermittently, CMS intact, dressing to R hip CDI, voiding well, tolerating a regular diet, plan for PT/OT evaluations this AM, possible TCU at discharge     Problem: Adult Inpatient Plan of Care  Goal: Plan of Care Review  Description: The Plan of Care Review/Shift note should be completed every shift.  The Outcome Evaluation is a brief statement about your assessment that the patient is improving, declining, or no change.  This information will be displayed automatically on your shift  note.  Outcome: Progressing  Flowsheets (Taken 6/18/2025 0205)  Outcome Evaluation: Pt ambulating with x1 assist W/GB, voiding well, PIV SL, pain managed with scheduled apap and suboxone as well as PRN PO oxy and atarax, plan for PT/OT today  Plan of Care Reviewed With: patient  Overall Patient Progress: improving  Goal: Patient-Specific Goal (Individualized)  Description: You can add care plan individualizations to a care plan. Examples of Individualization might be:  \"Parent requests to be called daily at 9am for status\", \"I have a hard time hearing out of my right ear\", or \"Do not touch me to wake me up as it startles  me\".  Outcome: Progressing  Goal: Absence of Hospital-Acquired Illness or Injury  Outcome: Progressing  Intervention: Identify and Manage Fall Risk  Recent Flowsheet Documentation  Taken 6/17/2025 2034 by Rossana Alcaraz, RN  Safety Promotion/Fall Prevention:   activity supervised   assistive device/personal items within reach   " clutter free environment maintained   nonskid shoes/slippers when out of bed   room near nurse's station   room organization consistent   safety round/check completed   supervised activity  Intervention: Prevent Skin Injury  Recent Flowsheet Documentation  Taken 6/17/2025 2034 by Rossana Alcaraz RN  Skin Protection:   adhesive use limited   drying agents applied   hydrocolloids used   incontinence pads utilized  Intervention: Prevent and Manage VTE (Venous Thromboembolism) Risk  Recent Flowsheet Documentation  Taken 6/17/2025 2034 by Rossana Alcaraz RN  VTE Prevention/Management: SCDs on (sequential compression devices)  Intervention: Prevent Infection  Recent Flowsheet Documentation  Taken 6/17/2025 2034 by Rossana Alcaraz RN  Infection Prevention:   hand hygiene promoted   rest/sleep promoted   single patient room provided  Goal: Optimal Comfort and Wellbeing  Outcome: Progressing  Intervention: Monitor Pain and Promote Comfort  Recent Flowsheet Documentation  Taken 6/18/2025 0127 by Rossana Alcaraz RN  Pain Management Interventions: medication (see MAR)  Taken 6/17/2025 2302 by Rossana Alcaraz RN  Pain Management Interventions:   medication (see MAR)   cold applied   care clustered   quiet environment facilitated   repositioned   rest  Goal: Readiness for Transition of Care  Outcome: Progressing     Problem: Hip Arthroplasty  Goal: Optimal Coping  Outcome: Progressing  Goal: Absence of Bleeding  Outcome: Progressing  Goal: Effective Bowel Elimination  Outcome: Progressing  Goal: Fluid and Electrolyte Balance  Outcome: Progressing  Goal: Optimal Functional Ability  Outcome: Progressing  Intervention: Promote Optimal Functional Status  Recent Flowsheet Documentation  Taken 6/17/2025 2302 by Rossana Alcaraz RN  Assistive Device Utilized:   gait belt   walker  Activity Management:   activity adjusted per tolerance   activity encouraged   ambulated to bathroom   back to bed  Goal: Absence of Infection Signs and  Symptoms  Outcome: Progressing  Goal: Intact Neurovascular Status  Outcome: Progressing  Goal: Anesthesia/Sedation Recovery  Outcome: Progressing  Goal: Optimal Pain Control and Function  Outcome: Progressing  Intervention: Prevent or Manage Pain  Recent Flowsheet Documentation  Taken 6/18/2025 0127 by Rossana Alcaraz, RN  Pain Management Interventions: medication (see MAR)  Taken 6/17/2025 2302 by Rossana Alcaraz, RN  Pain Management Interventions:   medication (see MAR)   cold applied   care clustered   quiet environment facilitated   repositioned   rest  Goal: Nausea and Vomiting Relief  Outcome: Progressing  Goal: Effective Urinary Elimination  Outcome: Progressing  Goal: Effective Oxygenation and Ventilation  Outcome: Progressing  Intervention: Optimize Oxygenation and Ventilation  Recent Flowsheet Documentation  Taken 6/17/2025 2302 by Rossana Alcaraz, RN  Activity Management:   activity adjusted per tolerance   activity encouraged   ambulated to bathroom   back to bed

## 2025-06-20 NOTE — PROGRESS NOTES
[facility] EMERGENCY DEPARTMENT  EMERGENCY DEPARTMENT ENCOUNTER      Pt Name: Lore Candelaria  MRN: [id number]  Birthdate 1964  Date of evaluation: 6/20/2025  Provider: Nick Gonzalez DO  11:56 AM EDT    CHIEF COMPLAINT       Chief Complaint   Patient presents with    Abdominal Pain     Mid upper         HISTORY OF PRESENT ILLNESS   (Location/Symptom, Timing/Onset, Context/Setting, Quality, Duration, Modifying Factors, Severity)  Note limiting factors.   Lore Candelaria is a 60 y.o. female who presents to the emergency department fro abdominal pain. She reports it began yesterday while she was home.  She describes the pain as strong, constant. She localizes the pain to the epigastric region and non-radiating. She has had surgeries on her abdomen, including c-sections, and cholecystomy. She feels nauseous and had two episodes. Patient states no dysuria or difficulty urinating. She denies chest pain. She has baseline shortness of breath but worse with the pain. She ate a cooked meal last night made by her.      HPI    Nursing Notes were reviewed.    REVIEW OF SYSTEMS    (2-9 systems for level 4, 10 or more for level 5)     Review of Systems   Constitutional:  Negative for chills and fever.   HENT:  Negative for ear pain and sinus pain.    Eyes:  Negative for pain and redness.   Respiratory:  Positive for shortness of breath. Negative for cough.    Cardiovascular:  Negative for chest pain.   Gastrointestinal:  Positive for abdominal pain, nausea and vomiting.   Genitourinary:  Negative for dysuria and flank pain.   Musculoskeletal:  Negative for back pain.   Skin:  Negative for rash.   Neurological:  Negative for dizziness and headaches.   Psychiatric/Behavioral:  Negative for confusion. The patient is not nervous/anxious.        Except as noted above the remainder of the review of systems was reviewed and negative.       PAST MEDICAL HISTORY     Past Medical History:   Diagnosis Date    Arthritis     Bipolar disorder  ANTICOAGULATION MANAGEMENT     Bere Machuca 66 year old female is on warfarin with therapeutic INR result. (Goal INR 2.0-3.0)    Recent labs: (last 7 days)     08/03/22  1612   INR 2.2*       ASSESSMENT       Source(s): Chart Review and Patient/Caregiver Call       Warfarin doses taken: Warfarin taken as instructed    Diet: No new diet changes identified    New illness, injury, or hospitalization: No    Medication/supplement changes: Ozempic dose was increased today.  Per Up to Date, Ozempic can increase INR.    Signs or symptoms of bleeding or clotting: No    Previous INR: Supratherapeutic.  Maintenance dose was lowered by 7.7% at last INR check.    Additional findings: None       PLAN     Recommended plan for no diet, medication or health factor changes affecting INR     Dosing Instructions: Continue your current warfarin dose with next INR in 2 weeks       Summary  As of 8/3/2022    Full warfarin instructions:  2.5 mg every Sun, Wed; 5 mg all other days   Next INR check:  8/17/2022             Telephone call with Darline who agrees to plan and repeated back plan correctly    Lab visit scheduled    Education provided: Please call back if any changes to your diet, medications or how you've been taking warfarin and Potential interaction between warfarin and Ozempic    Plan made per [facility] anticoagulation protocol    Jadyn Castro RN  Anticoagulation Clinic  8/3/2022    _______________________________________________________________________     Anticoagulation Episode Summary     Current INR goal:  2.0-3.0   TTR:  47.9 % (6.7 mo)   Target end date:  Indefinite   Send INR reminders to:  [facility]    Indications    Chronic anticoagulation [Z79.01]  History of pulmonary embolism [Z86.711]  Factor 5 Leiden mutation  heterozygous/ INR 2-3 [D68.51]           Comments:           Anticoagulation Care Providers     Provider Role Specialty Phone number    Uyen Mcintyre APRN CNP Referring Internal  Medicine [phone]

## 2025-06-21 LAB — INR HOME MONITORING: 1.6 (ref 2–3)

## 2025-06-23 ENCOUNTER — RESULTS FOLLOW-UP (OUTPATIENT)
Dept: ANTICOAGULATION | Facility: CLINIC | Age: 70
End: 2025-06-23

## 2025-06-23 ENCOUNTER — ANTICOAGULATION THERAPY VISIT (OUTPATIENT)
Dept: ANTICOAGULATION | Facility: CLINIC | Age: 70
End: 2025-06-23
Payer: COMMERCIAL

## 2025-06-23 DIAGNOSIS — D68.51 FACTOR 5 LEIDEN MUTATION, HETEROZYGOUS: Primary | ICD-10-CM

## 2025-06-23 DIAGNOSIS — Z86.711 HISTORY OF PULMONARY EMBOLISM: ICD-10-CM

## 2025-06-23 LAB — INR HOME MONITORING: 1.7 (ref 2–3)

## 2025-06-23 NOTE — PROGRESS NOTES
ANTICOAGULATION MANAGEMENT     Bere Machuca 69 year old female is on warfarin with subtherapeutic INR result. (Goal INR 2.0-3.0)    Recent labs: (last 7 days)     06/23/25  0000   INR 1.7*       ASSESSMENT     Source(s): Chart Review and Patient/Caregiver Call     Warfarin doses taken: Warfarin taken as instructed  Diet: No new diet changes identified  Medication/supplement changes: None noted  New illness, injury, or hospitalization: No  Signs or symptoms of bleeding or clotting: No  Previous result: Subtherapeutic  Additional findings: Bridging with Enoxaparin until INR >= 2.0-- need to continue        PLAN     Recommended plan for no diet, medication or health factor changes affecting INR     Dosing Instructions: booster dose x1.5 then continue your current warfarin dose with next INR in 2 days       Summary  As of 6/23/2025      Full warfarin instructions:  6/23: 5 mg; 6/24: 3.75 mg; Otherwise 3.75 mg every Mon, Fri; 2.5 mg all other days   Next INR check:  6/25/2025               Telephone call with Darline who verbalizes understanding and agrees to plan    Patient to recheck with home meter    Education provided: Please call back if any changes to your diet, medications or how you've been taking warfarin  Goal range and lab monitoring: goal range and significance of current result, Importance of therapeutic range, and Importance of following up at instructed interval    Plan made per [facility] anticoagulation protocol    Wilma Weber, RN  6/23/2025  Anticoagulation Clinic  [facility] for routing messages: p ANTICOAG HOME MONITORING  [facility] patient phone line: [phone]        _______________________________________________________________________     Anticoagulation Episode Summary       Current INR goal:  2.0-3.0   TTR:  66.1% (1 y)   Target end date:  Indefinite   Send INR reminders to:  [facility]    Indications    Factor 5 Leiden mutation  heterozygous/ INR 2-3 [D68.51]  History of  pulmonary embolism [Z86.711]             Comments:  managed by exception  [employer] home meter             Anticoagulation Care Providers       Provider Role Specialty Phone number    Uyen Mcintyre APRN CNP Referring Internal Medicine [phone]    Jennifer Shea MD Referring Internal Medicine [phone]

## 2025-06-23 NOTE — PROGRESS NOTES
"ANTICOAGULATION MANAGEMENT     Bere Machuca 69 year old female is on warfarin with subtherapeutic INR result. (Goal INR 2.0-3.0)    Recent labs: (last 7 days)     06/21/25  0000   INR 1.6*       ASSESSMENT     Source(s): Chart Review and Patient/Caregiver Call     Warfarin doses taken: Warfarin taken as instructed  Diet: No new diet changes identified  Medication/supplement changes: None noted  New illness, injury, or hospitalization: Yes: hip surgery on 6/17. Pt reports that post-op she's been in a lot of pain. 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 She has lots of bruises up and down her leg and a 9 inch incision   Signs or symptoms of bleeding or clotting: Yes: bruising due to surgery   Previous result: Subtherapeutic  Additional findings: None and Bridging with Enoxaparin until INR >= 2.0    Pt reports this INR was from Friday 6/20, but she forgot to hit submit which is why we didn't get it. She continued with her usual dose of Coumadin over the weekend.        PLAN     Recommended plan for temporary change(s) affecting INR     Dosing Instructions: Continue your current warfarin dose with next INR in 3 days   (pt will recheck INR again today, Monday 6/23)    Summary  As of 6/23/2025      Full warfarin instructions:  3.75 mg every Mon, Fri; 2.5 mg all other days   Next INR check:  6/24/2025               Telephone call with Darline who verbalizes understanding and agrees to plan    Patient to recheck with home meter    Education provided: Please call back if any changes to your diet, medications or how you've been taking warfarin    Plan made per [facility] anticoagulation protocol    Wilma Weber, RN  6/23/2025  Anticoagulation Clinic  [facility] [employer] for routing messages: [employer] [facility]  [facility] patient phone line: " [phone]        _______________________________________________________________________     Anticoagulation Episode Summary       Current INR goal:  2.0-3.0   TTR:  66.5% (1 y)   Target end date:  Indefinite   Send INR reminders to:  ANTICOAG HOME [facility]IT[facility]    Indications    Factor 5 Leiden mutation  heterozygous/ INR 2-3 [D68.51]  History of pulmonary embolism [Z86.711]             Comments:  managed by exception  [employer] home meter             Anticoagulation Care Providers       Provider Role Specialty Phone number    Uyen Mcintyre APRN CNP Referring Internal Medicine [phone]    Jennifer Shea MD Referring Internal Medicine [phone]

## 2025-06-24 ENCOUNTER — RESULTS FOLLOW-UP (OUTPATIENT)
Dept: ANTICOAGULATION | Facility: CLINIC | Age: 70
End: 2025-06-24

## 2025-06-24 ENCOUNTER — ANTICOAGULATION THERAPY VISIT (OUTPATIENT)
Dept: ANTICOAGULATION | Facility: CLINIC | Age: 70
End: 2025-06-24
Payer: COMMERCIAL

## 2025-06-24 DIAGNOSIS — Z86.711 HISTORY OF PULMONARY EMBOLISM: ICD-10-CM

## 2025-06-24 DIAGNOSIS — D68.51 FACTOR 5 LEIDEN MUTATION, HETEROZYGOUS: Primary | ICD-10-CM

## 2025-06-24 LAB — INR HOME MONITORING: 1.9 (ref 2–3)

## 2025-06-24 NOTE — PROGRESS NOTES
Medication Therapy Management (MTM) Encounter    ASSESSMENT:                            Medication Adherence/Access: No issues identified.    Diabetes/Obesity/Constipation:   Patient is meeting A1c goal of < 7% but not meeting weight goal.  Will stay on current Mounjaro dose since she has just restarted this, but will plan to discuss increasing dose further next month to help with continued weight loss.     Hypertension:   Stable. Patient is meeting blood pressure goal of < 130/80mmHg. Continue to monitor for low blood pressure.     Hyperlipidemia:   Stable.    Pain  Discussed the Suboxone reduces the effect of the oxycodone so agreed it likely isn't having much of an effect. Encouraged her to optimize use of both acetaminophen and Celebrex to help manage her pain instead. Ecnouraged her to reach out to pain clinic if pain is not manageable.    PLAN:                            Continue using the acetaminophen 650 mg scheduled for pain and use up the remainder of your Celebrex 200 mg daily to help control pain.  Complete a month at the Mounjaro 10 mg a week since you have been off of this for a couple weeks and then next month we will discuss continuing to increase the dose to help with weight loss.      Follow-up: Return in about 5 weeks (around 7/30/2025) for Medication Therapy Management, Phone Visit.    SUBJECTIVE/OBJECTIVE:                          Darline Machuca is a 69 year old female seen for a follow-up visit.       Reason for visit: diabetes.    Allergies/ADRs: Reviewed in chart  Past Medical History: Reviewed in chart  Tobacco: She reports that she quit smoking about 10 years ago. Her smoking use included cigarettes and vaping device. She started smoking about 45 years ago. She has a 17.5 pack-year smoking history. She has never used smokeless tobacco.  Alcohol: none  Caffeine: 1 cup per day  Activity: unable to do any activity due to needing hip replacement, on waker    Medication Adherence/Access: Patient  uses pill box(es).  Patient takes medications 2 time(s) per day.   Per patient, misses medication 0 times per week.   Medication barriers: cost.   The patient fills medications at [place]: NO, fills medications at [facility].    Diabetes   /Obesity/Constipation:  Mounjaro 10 mg a week - just restarted this today, was off of it for a couple weeks since 6/4 due to surgery, needs a refill  Reports the senna-docusate and Miralax daily now. This regimen seems to be keeping it under control for now.  Reports she currently recovering from hip surgery. A lot less active currently.  Current diabetes symptoms: none  Blood sugars: not checking  At home weight: no scale at home  Exercise/Activity: limited.   Medications Tried/Failed:  Ozempic: terrible stomach aches up to 0.5 of 1mg  Topiramate-allergy list  Bupropion-anxiety     Eye exam in the last 12 months? No  Foot exam: due  Lab Results   Component Value Date    A1C 5.0 03/06/2025    A1C 5.1 03/29/2024    A1C 5.4 01/11/2023    A1C 5.9 01/04/2022       Hypertension   Losartan discontinued due to concerns with hypotension.  Patient does not self-monitor blood pressure.     BP Readings from Last 3 Encounters:   06/18/25 102/54   06/10/25 110/79   03/10/25 103/71       Hyperlipidemia   rosuvastatin 20 mg daily  Patient reports no new myalgias since on medication.     Recent Labs   Lab Test 07/16/24  1459 01/11/23  1529 01/04/22  1524 08/10/18  1549 03/29/18  1515   CHOL 126 250*   < > 202* 176   HDL 63 69   < > 49* 76   LDL 43 158*   < > 121* 76   TRIG 99 115   < > 199* 139   CHOLHDLRATIO  --   --   --  4.1 2.3    < > = values in this interval not displayed.     Pain  Suboxone 1 film twice daily  Oxycodone 5 mg as needed - doesn't feel like it is that effective  Acetaminophen 650 mg every 4 hours scheduled  Celebrex 200 mg daily for 14 days post surgery - hasn't been taking really as pain was so bad she went right to the oxycodone  Lyrica 200 mg twice daily   Reports  she has significant pain from hip surgery. Was really bad this morning. Reports she spoke with the pain clinic who said she should have tapered off the suboxone before surgery but was told there is nothing they can do about it now.      Today's Vitals: There were no vitals taken for this visit.  ----------------  Post Discharge Medication Reconciliation Status: discharge medications reconciled and changed, per note/orders.    I spent 20 minutes with this patient today. All changes were made via collaborative practice agreement with Jennifer Shea MD.     A summary of these recommendations was sent via [email or web address].    Mikayla Sosa, PharmD  Medication Therapy Management Pharmacist  Voicemail: [phone]      Telemedicine Visit Details  The patient's medications can be safely assessed via a telemedicine encounter.  Type of service:  Telephone visit  Originating Location (pt. Location): Home    Distant Location (provider location):  Off-site  Start Time: 2:30 PM  End Time: 2:50 PM     Medication Therapy Recommendations  Other chronic pain   1 Current Medication: celecoxib (CELEBREX) 200 MG capsule   Current Medication Sig: Take 1 capsule (200 mg) by mouth daily for 14 days. Do not take within 6 hours of ibuprofen (MOTRIN, ADVIL) or ketorolac (TORADOL) if prescribed.   Rationale: Does not understand instructions - Adherence - Adherence   Recommendation: Provide Education   Status: Patient Agreed - Adherence/Education   Identified Date: 6/25/2025 Completed Date: 6/25/2025

## 2025-06-24 NOTE — PROGRESS NOTES
ANTICOAGULATION MANAGEMENT     Bere Machuca 69 year old female is on warfarin with subtherapeutic INR result. (Goal INR 2.0-3.0)    Recent labs: (last 7 days)     06/24/25  0000   INR 1.9*       ASSESSMENT     Source(s): Chart Review and Patient/Caregiver Call     Warfarin doses taken: Warfarin taken as instructed  Diet: No new diet changes identified  Medication/supplement changes: None noted  New illness, injury, or hospitalization: Yes: hip surgery 6/17  Signs or symptoms of bleeding or clotting: No  Previous result: Subtherapeutic  Additional findings: None and Bridging with Enoxaparin until INR >= 2.0       PLAN     Recommended plan for temporary change(s) affecting INR     Dosing Instructions: Continue your current warfarin dose with next INR in 1 day       Summary  As of 6/24/2025      Full warfarin instructions:  6/24: 3.75 mg; Otherwise 3.75 mg every Mon, Fri; 2.5 mg all other days   Next INR check:  6/25/2025               Telephone call with Darline who verbalizes understanding and agrees to plan    Patient to recheck with home meter    Education provided: None required    Plan made per [facility] anticoagulation protocol    Bailee Delgado, RN  6/24/2025  Anticoagulation Clinic  [employer] for routing messages: p [facility]  [facility] patient phone line: [phone]        _______________________________________________________________________     Anticoagulation Episode Summary       Current INR goal:  2.0-3.0   TTR:  66.1% (1 y)   Target end date:  Indefinite   Send INR reminders to:  [facility]IT[facility]    Indications    Factor 5 Leiden mutation  heterozygous/ INR 2-3 [D68.51]  History of pulmonary embolism [Z86.711]             Comments:  managed by rupesh  [employer] home meter             Anticoagulation Care Providers       Provider Role Specialty Phone number    Uyen Mcintyre APRN CNP Referring Internal Medicine [phone]    Jennifer Shea MD Referring Internal Medicine  [phone]

## 2025-06-25 ENCOUNTER — VIRTUAL VISIT (OUTPATIENT)
Dept: PHARMACY | Facility: CLINIC | Age: 70
End: 2025-06-25
Payer: COMMERCIAL

## 2025-06-25 ENCOUNTER — ANTICOAGULATION THERAPY VISIT (OUTPATIENT)
Dept: ANTICOAGULATION | Facility: CLINIC | Age: 70
End: 2025-06-25
Payer: COMMERCIAL

## 2025-06-25 ENCOUNTER — RESULTS FOLLOW-UP (OUTPATIENT)
Dept: ANTICOAGULATION | Facility: CLINIC | Age: 70
End: 2025-06-25

## 2025-06-25 DIAGNOSIS — E11.59 CONTROLLED TYPE 2 DIABETES MELLITUS WITH OTHER CIRCULATORY COMPLICATION, WITHOUT LONG-TERM CURRENT USE OF INSULIN (H): ICD-10-CM

## 2025-06-25 DIAGNOSIS — D68.51 FACTOR 5 LEIDEN MUTATION, HETEROZYGOUS: Primary | ICD-10-CM

## 2025-06-25 DIAGNOSIS — G89.29 OTHER CHRONIC PAIN: ICD-10-CM

## 2025-06-25 DIAGNOSIS — E66.01 MORBID OBESITY WITH BMI OF 60.0-69.9, ADULT (H): ICD-10-CM

## 2025-06-25 DIAGNOSIS — E78.5 HYPERLIPIDEMIA LDL GOAL <70: ICD-10-CM

## 2025-06-25 DIAGNOSIS — I10 BENIGN ESSENTIAL HYPERTENSION: ICD-10-CM

## 2025-06-25 DIAGNOSIS — K59.03 DRUG-INDUCED CONSTIPATION: Primary | ICD-10-CM

## 2025-06-25 DIAGNOSIS — Z86.711 HISTORY OF PULMONARY EMBOLISM: ICD-10-CM

## 2025-06-25 LAB — INR HOME MONITORING: 2.4 (ref 2–3)

## 2025-06-25 PROCEDURE — 1111F DSCHRG MED/CURRENT MED MERGE: CPT | Mod: 93 | Performed by: PHARMACIST

## 2025-06-25 PROCEDURE — 99207 PR NO CHARGE LOS: CPT | Mod: 93 | Performed by: PHARMACIST

## 2025-06-25 NOTE — PATIENT INSTRUCTIONS
"Recommendations from today's MTM visit:                                                       Continue using the acetaminophen 650 mg scheduled for pain and use up the remainder of your Celebrex 200 mg daily to help control pain.  Complete a month at the Mounjaro 10 mg a week since you have been off of this for a couple weeks and then next month we will discuss continuing to increase the dose to help with weight loss.    Follow-up: Return in about 5 weeks (around 7/30/2025) for Medication Therapy Management, Phone Visit.    It was great speaking with you today.  I value your experience and would be very thankful for your time in providing feedback in our clinic survey. In the next few days, you may receive an email or text message from [employer] with a link to a survey related to your  clinical pharmacist.\"     To schedule another MTM appointment, please call the clinic directly or you may call the MTM scheduling line at [phone].    My Clinical Pharmacist's contact information:                                                      Please feel free to contact me with any questions or concerns you have.      Mikayla Sosa, PharmD  Medication Therapy Management Pharmacist  Voicemail: [phone]     "

## 2025-06-25 NOTE — PROGRESS NOTES
ANTICOAGULATION MANAGEMENT     Bere Machuca 69 year old female is on warfarin with therapeutic INR result. (Goal INR 2.0-3.0)    Recent labs: (last 7 days)     06/25/25  0000   INR 2.4       ASSESSMENT     Source(s): Chart Review and Patient/Caregiver Call     Warfarin doses taken: Warfarin taken as instructed  Diet: No new diet changes identified  Medication/supplement changes: None noted  New illness, injury, or hospitalization: Yes: hip surgery 6/17  Signs or symptoms of bleeding or clotting: No  Previous result: Subtherapeutic  Additional findings: None  Ok to stop Lovenox       PLAN     Recommended plan for no diet, medication or health factor changes affecting INR     Dosing Instructions: Continue your current warfarin dose with next INR in 1 week       Summary  As of 6/25/2025      Full warfarin instructions:  3.75 mg every Mon, Fri; 2.5 mg all other days   Next INR check:  7/1/2025               Telephone call with Darline who verbalizes understanding and agrees to plan    Patient to recheck with home meter    Education provided: Ok to stop lovenox    Plan made per [facility] anticoagulation protocol    Bailee Delgado RN  6/25/2025  Anticoagulation Clinic  [employer] for routing messages: p ANTICOAG [facility]IT[facility]  [facility] patient phone line: [phone]        _______________________________________________________________________     Anticoagulation Episode Summary       Current INR goal:  2.0-3.0   TTR:  66.4% (1 y)   Target end date:  Indefinite   Send INR reminders to:  ANTICOAG HOME [facility]IT[facility]    Indications    Factor 5 Leiden mutation  heterozygous/ INR 2-3 [D68.51]  History of pulmonary embolism [Z86.711]             Comments:  managed by exception  [employer] home meter             Anticoagulation Care Providers       Provider Role Specialty Phone number    Uyen Mcintyre APRN CNP Referring Internal Medicine [phone]    Jennifer Shea MD Referring Internal Medicine [phone]

## 2025-06-30 ENCOUNTER — PATIENT OUTREACH (OUTPATIENT)
Dept: CARE COORDINATION | Facility: CLINIC | Age: 70
End: 2025-06-30
Payer: COMMERCIAL

## 2025-07-09 ENCOUNTER — ANTICOAGULATION THERAPY VISIT (OUTPATIENT)
Dept: ANTICOAGULATION | Facility: CLINIC | Age: 70
End: 2025-07-09
Payer: COMMERCIAL

## 2025-07-09 DIAGNOSIS — Z86.711 HISTORY OF PULMONARY EMBOLISM: ICD-10-CM

## 2025-07-09 DIAGNOSIS — D68.51 FACTOR 5 LEIDEN MUTATION, HETEROZYGOUS: Primary | ICD-10-CM

## 2025-07-09 LAB — INR HOME MONITORING: 1.6 (ref 2–3)

## 2025-07-09 NOTE — PROGRESS NOTES
ANTICOAGULATION MANAGEMENT     Bere Machuca 69 year old female is on warfarin with subtherapeutic INR result. (Goal INR 2.0-3.0)    Recent labs: (last 7 days)     07/09/25  0000   INR 1.6*       ASSESSMENT     Source(s): Chart Review and Patient/Caregiver Call     Warfarin doses taken: Warfarin taken as instructed  Diet: No new diet changes identified  Medication/supplement changes: celebrex 2 per day and tylenol daily four times per day.  New illness, injury, or hospitalization: No hip surgery 6/17 patient is up and walking as much as she can. Edema in the leg that she had hip surgery on. Did inform surgeon of this. Compression, ice and elevate. Did not know at the time that INR is low.  Signs or symptoms of bleeding or clotting: No  Previous result: Therapeutic last visit; previously outside of goal range  Additional findings: None  Patient has lovenox on hand.  Notes that [employer] said to recheck on 7/9 informed to go by what INR has instructed.       PLAN     Recommended plan for no diet, medication or health factor changes affecting INR     Dosing Instructions: booster dose then Increase your warfarin dose (18.8% change) Start bridging with Enoxaparin with next INR in 2 days       Summary  As of 7/9/2025      Full warfarin instructions:  7/9: 5 mg; Otherwise 2.5 mg every Tue, Sat; 3.75 mg all other days   Next INR check:  7/11/2025               Telephone call with Darline who agrees to plan and repeated back plan correctly    Patient to recheck with home meter    Education provided: Patient notes that [facility] did the surgery. She was informed to contact them and let them know of the continued edema and subtherapeutic INR to see if US is warranted. Informed that note will be sent to PCP also.  Informed message will be sent to PCP also.    Plan made with [facility] Pharmacist Tika Delgado, RN  7/9/2025  Anticoagulation Clinic  [facility] for routing messages: [employer] [facility]  [facility]  patient phone line: [phone]        _______________________________________________________________________     Anticoagulation Episode Summary       Current INR goal:  2.0-3.0   TTR:  66.0% (1 y)   Target end date:  Indefinite   Send INR reminders to:  ANTICOAG HOME [facility]IT[facility]    Indications    Factor 5 Leiden mutation  heterozygous/ INR 2-3 [D68.51]  History of pulmonary embolism [Z86.711]             Comments:  managed by exception  [employer] home meter             Anticoagulation Care Providers       Provider Role Specialty Phone number    Uyen Mcintyre APRN CNP Referring Internal Medicine [phone]    Jennifer Shea MD Referring Internal Medicine [phone]

## 2025-07-14 ENCOUNTER — ANTICOAGULATION THERAPY VISIT (OUTPATIENT)
Dept: ANTICOAGULATION | Facility: CLINIC | Age: 70
End: 2025-07-14
Payer: COMMERCIAL

## 2025-07-14 DIAGNOSIS — D68.51 FACTOR 5 LEIDEN MUTATION, HETEROZYGOUS: Primary | ICD-10-CM

## 2025-07-14 DIAGNOSIS — Z86.711 HISTORY OF PULMONARY EMBOLISM: ICD-10-CM

## 2025-07-14 LAB — INR HOME MONITORING: 2.5 (ref 2–3)

## 2025-07-14 NOTE — PROGRESS NOTES
ANTICOAGULATION MANAGEMENT     Bere Machuca 69 year old female is on warfarin with therapeutic INR result. (Goal INR 2.0-3.0)    Recent labs: (last 7 days)     07/14/25  0000   INR 2.5       ASSESSMENT     Source(s): Chart Review and Patient/Caregiver Call     Warfarin doses taken: Booster dose(s) recently taken which may be affecting INR  Diet: No new diet changes identified  Medication/supplement changes: resumed celebrex yesterday   Summary Nonsteroidal Anti-Inflammatory Agents (TINOCO-2 Selective) may increase anticoagulant effects of Anticoagulants.   Pt did take Celebrex prior to the procedure  New illness, injury, or hospitalization: No  Signs or symptoms of bleeding or clotting: No  Previous result: Subtherapeutic  Additional findings: Bridging with Enoxaparin until INR >= 2.0     PLAN     Recommended plan for temporary change(s) and ongoing change(s) affecting INR     Dosing Instructions: Continue your current warfarin dose with next INR in 1 week . Monitor closely with celebrex.     Discontinue Lovenox     Summary  As of 7/14/2025      Full warfarin instructions:  2.5 mg every Tue, Sat; 3.75 mg all other days   Next INR check:  7/21/2025               Telephone call with Darline who verbalizes understanding and agrees to plan    Patient to recheck with home meter    Education provided: Please call back if any changes to your diet, medications or how you've been taking warfarin    Plan made per [facility] anticoagulation protocol    Kathya Dhaliwal RN  7/14/2025  Anticoagulation Clinic  [facility] for routing messages: p ANTICOAG HOME MONITORING  [facility] patient phone line: [phone]        _______________________________________________________________________     Anticoagulation Episode Summary       Current INR goal:  2.0-3.0   TTR:  65.3% (1 y)   Target end date:  Indefinite   Send INR reminders to:  ANTICOAG HOME MONITORING    Indications    Factor 5 Leiden mutation  heterozygous/ INR 2-3 [D68.51]  History of  pulmonary embolism [Z86.711]             Comments:  managed by exception  [employer] home meter             Anticoagulation Care Providers       Provider Role Specialty Phone number    Uyen Mcintyre APRN CNP Referring Internal Medicine [phone]    Jennifer Shea MD Referring Internal Medicine [phone]

## 2025-07-22 ENCOUNTER — ANTICOAGULATION THERAPY VISIT (OUTPATIENT)
Dept: ANTICOAGULATION | Facility: CLINIC | Age: 70
End: 2025-07-22
Payer: COMMERCIAL

## 2025-07-22 DIAGNOSIS — D68.51 FACTOR 5 LEIDEN MUTATION, HETEROZYGOUS: Primary | ICD-10-CM

## 2025-07-22 DIAGNOSIS — Z86.711 HISTORY OF PULMONARY EMBOLISM: ICD-10-CM

## 2025-07-22 LAB — INR HOME MONITORING: 3.1 (ref 2–3)

## 2025-07-22 NOTE — PROGRESS NOTES
ANTICOAGULATION MANAGEMENT     Bere Machuca 69 year old female is on warfarin with supratherapeutic INR result. (Goal INR 2.0-3.0)    Recent labs: (last 7 days)     07/22/25  0000   INR 3.1*       ASSESSMENT     Source(s): Chart Review and Patient/Caregiver Call     Warfarin doses taken: Warfarin taken as instructed  Diet: No new diet changes identified  Medication/supplement changes: stopped celebrex.   New illness, injury, or hospitalization: Yes: increased pain  Signs or symptoms of bleeding or clotting: No  Previous result: Therapeutic last visit; previously outside of goal range  Additional findings: None       PLAN     Recommended plan for ongoing change(s) affecting INR     Dosing Instructions: decrease your warfarin dose (5.3% change) with next INR in 1 week       Summary  As of 7/22/2025      Full warfarin instructions:  2.5 mg every Tue, Thu, Sat; 3.75 mg all other days   Next INR check:  7/29/2025               Telephone call with Darline who verbalizes understanding and agrees to plan  Sent [employer] message with dosing and follow up instructions    Patient to recheck with home meter    Education provided: None required    Plan made per [facility] anticoagulation protocol    Bailee Delgado, RN  7/22/2025  Anticoagulation Clinic  [id number] for routing messages: p ANTICOAG HOME MONITORING  [facility] patient phone line: [phone]        _______________________________________________________________________     Anticoagulation Episode Summary       Current INR goal:  2.0-3.0   TTR:  65.9% (1 y)   Target end date:  Indefinite   Send INR reminders to:  ANTICOAG HOME MONITORING    Indications    Factor 5 Leiden mutation  heterozygous/ INR 2-3 [D68.51]  History of pulmonary embolism [Z86.711]             Comments:  managed by rupesh  [employer] home meter             Anticoagulation Care Providers       Provider Role Specialty Phone number    Uyen Mcintyre APRN CNP Referring Internal Medicine [phone]     Jennifer Shea MD [facility] Internal Medicine [phone]

## 2025-07-29 ENCOUNTER — ANTICOAGULATION THERAPY VISIT (OUTPATIENT)
Dept: ANTICOAGULATION | Facility: CLINIC | Age: 70
End: 2025-07-29
Payer: COMMERCIAL

## 2025-07-29 DIAGNOSIS — Z86.711 HISTORY OF PULMONARY EMBOLISM: ICD-10-CM

## 2025-07-29 DIAGNOSIS — D68.51 FACTOR 5 LEIDEN MUTATION, HETEROZYGOUS: Primary | ICD-10-CM

## 2025-07-29 LAB — INR HOME MONITORING: 3.3 (ref 2–3)

## 2025-07-29 NOTE — PROGRESS NOTES
ANTICOAGULATION MANAGEMENT     Bere Machuca 69 year old female is on warfarin with supratherapeutic INR result. (Goal INR 2.0-3.0)    Recent labs: (last 7 days)     07/29/25  0000   INR 3.3*       ASSESSMENT     Source(s): Chart Review and Patient/Caregiver Call     Warfarin doses taken: Warfarin taken as instructed - confirmed dosing decrease advised last visit  Diet: No new diet changes identified - doesn't drink any alcohol  Medication/supplement changes: Has been using more tylenol lately for ongoing left ankle pain (estimates 2,000 mg daily).   New illness, injury, or hospitalization: No  Signs or symptoms of bleeding or clotting: No  Previous result: Supratherapeutic  Additional findings: Upcoming surgery/procedure - 9/18/25 colonoscopy. Will send TE to [facility] closer to procedure date.       PLAN     Recommended plan for ongoing change(s) affecting INR     Dosing Instructions: decrease your warfarin dose (5.6% change) with next INR in 1 week       Summary  As of 7/29/2025      Full warfarin instructions:  3.75 mg every Mon, Wed, Fri; 2.5 mg all other days   Next INR check:  8/5/2025               Telephone call with Darline who verbalizes understanding and agrees to plan + [employer] message sent    Patient to recheck with home meter    Education provided: Please call back if any changes to your diet, medications or how you've been taking warfarin    Plan made per [facility] anticoagulation protocol    Leah Melendez RN  7/29/2025  Anticoagulation Clinic  [facility] for routing messages: p ANTICOAG HOME MONITORING  [facility] patient phone line: [phone]        _______________________________________________________________________     Anticoagulation Episode Summary       Current INR goal:  2.0-3.0   TTR:  65.9% (1 y)   Target end date:  Indefinite   Send INR reminders to:  ANTICOAG HOME MONITORING    Indications    Factor 5 Leiden mutation  heterozygous/ INR 2-3 [D68.51]  History of pulmonary embolism  [F38.136]             Comments:  managed by exception  [employer] home meter             Anticoagulation Care Providers       Provider Role Specialty Phone number    Uyen Mcintyre APRN CNP Referring Internal Medicine [phone]    Jennifer Shea MD Referring Internal Medicine [phone]

## 2025-07-29 NOTE — PROGRESS NOTES
Medication Therapy Management (MTM) Encounter    ASSESSMENT:                            Medication Adherence/Access: No issues identified.    Diabetes/Obesity/Constipation:   Patient is meeting A1c goal of < 7% but not meeting weight goal.  Recommended increasing Mounjaro to 12.5 mg a week to help her lose additional weight, especially with plans for upcoming additional surgery.     Hypertension:   Stable. Patient is meeting blood pressure goal of < 130/80mmHg. Continue to monitor for low blood pressure.     Hyperlipidemia:   Stable.     Pain  Follow up with surgeon as planned.    PLAN:                            Increase Mounjaro to 12.5 mg weekly    Follow-up: Return in about 6 weeks (around 9/10/2025) for Medication Therapy Management, Phone Visit.    SUBJECTIVE/OBJECTIVE:                          Darline Machuca is a 69 year old female seen for a follow-up visit.       Reason for visit: diabetes.    Allergies/ADRs: Reviewed in chart  Past Medical History: Reviewed in chart  Tobacco: She reports that she quit smoking about 10 years ago. Her smoking use included cigarettes and vaping device. She started smoking about 46 years ago. She has a 17.5 pack-year smoking history. She has never used smokeless tobacco.  Alcohol: none  Caffeine: 1 cup per day  Activity: unable to do any activity due to needing hip replacement, on waker    Medication Adherence/Access: Patient uses pill box(es).  Patient takes medications 2 time(s) per day.   Per patient, misses medication 0 times per week.   Medication barriers: cost.   The patient fills medications at [facility]: NO, fills medications at [facility].    Diabetes   /Obesity/Constipation:  Mounjaro 10 mg a week - been on this dose for a little over a month  Reports the senna-docusate and Miralax daily now. This regimen seems to be keeping it under control for now.  Reports she currently recovering from hip surgery. A lot less active currently.  Current diabetes symptoms:  none  Blood sugars: not checking  At home weight: no scale at home  Exercise/Activity: limited since still healing from surgery.  Medications Tried/Failed:  Ozempic: terrible stomach aches up to 0.5 of 1mg  Topiramate-allergy list  Bupropion-anxiety       Eye exam in the last 12 months? No  Foot exam: due  Lab Results   Component Value Date    A1C 5.0 03/06/2025    A1C 5.1 03/29/2024    A1C 5.4 01/11/2023    A1C 5.9 01/04/2022       Hypertension   Losartan discontinued due to concerns with hypotension.  Patient does not self-monitor blood pressure.     BP Readings from Last 3 Encounters:   06/18/25 102/54   06/10/25 110/79   03/10/25 103/71     Hyperlipidemia   rosuvastatin 20 mg daily  Patient reports no new myalgias since on medication.     Recent Labs   Lab Test 07/16/24  1459 01/11/23  1529 01/04/22  1524 08/10/18  1549 03/29/18  1515   CHOL 126 250*   < > 202* 176   HDL 63 69   < > 49* 76   LDL 43 158*   < > 121* 76   TRIG 99 115   < > 199* 139   CHOLHDLRATIO  --   --   --  4.1 2.3    < > = values in this interval not displayed.       Pain   Suboxone 1 film twice daily  Acetaminophen 650 mg every 4 hours scheduled  Lyrica 200 mg twice daily   Reports she is still healing from hip surgery. It has been a slow go, has her 6 week follow up this afternoon. Still not able to get up and walk around that much.  Planning to have additional surgery on left ankle.           Today's Vitals: There were no vitals taken for this visit.  ----------------      I spent 10 minutes with this patient today. All changes were made via collaborative practice agreement with Jennifer Shea MD.     A summary of these recommendations was sent via [employer].    Mikayla Sosa, PharmD  Medication Therapy Management Pharmacist  Voicemail: [phone]      Telemedicine Visit Details  The patient's medications can be safely assessed via a telemedicine encounter.  Type of service:  Telephone visit  Originating Location (pt. Location):  Home    Distant Location (provider location):  On-site  Start Time: 1:30 PM  End Time: 1:40 PM     Medication Therapy Recommendations  Controlled type 2 diabetes mellitus with circulatory disorder, without long-term current use of insulin (H)   1 Current Medication: tirzepatide (MOUNJARO) 10 MG/0.5ML SOAJ auto-injector pen (Discontinued)   Current Medication Sig: Inject 0.5 mLs (10 mg) subcutaneously once a week.   Rationale: Dose too low - Dosage too low - Effectiveness   Recommendation: Increase Dose   Status: Accepted per CPA   Identified Date: 7/30/2025 Completed Date: 7/30/2025

## 2025-07-30 ENCOUNTER — VIRTUAL VISIT (OUTPATIENT)
Dept: PHARMACY | Facility: CLINIC | Age: 70
End: 2025-07-30
Payer: COMMERCIAL

## 2025-07-30 DIAGNOSIS — I10 BENIGN ESSENTIAL HYPERTENSION: ICD-10-CM

## 2025-07-30 DIAGNOSIS — K59.03 DRUG-INDUCED CONSTIPATION: ICD-10-CM

## 2025-07-30 DIAGNOSIS — G89.29 OTHER CHRONIC PAIN: ICD-10-CM

## 2025-07-30 DIAGNOSIS — E78.5 HYPERLIPIDEMIA LDL GOAL <70: ICD-10-CM

## 2025-07-30 DIAGNOSIS — E66.01 MORBID OBESITY WITH BMI OF 60.0-69.9, ADULT (H): ICD-10-CM

## 2025-07-30 DIAGNOSIS — E11.59 CONTROLLED TYPE 2 DIABETES MELLITUS WITH OTHER CIRCULATORY COMPLICATION, WITHOUT LONG-TERM CURRENT USE OF INSULIN (H): Primary | ICD-10-CM

## 2025-07-30 NOTE — PATIENT INSTRUCTIONS
"Recommendations from today's MTM visit:                                                       Increase Mounjaro to 12.5 mg weekly    Follow-up: Return in about 6 weeks (around 9/10/2025) for Medication Therapy Management, Phone Visit.    It was great speaking with you today.  I value your experience and would be very thankful for your time in providing feedback in our clinic survey. In the next few days, you may receive an email or text message from [facility] with a link to a survey related to your  clinical pharmacist.\"     To schedule another MTM appointment, please call the clinic directly or you may call the MTM scheduling line at [phone].    My Clinical Pharmacist's contact information:                                                      Please feel free to contact me with any questions or concerns you have.      Mikayla Sosa, PharmD  Medication Therapy Management Pharmacist  Voicemail: [phone]     "

## 2025-08-05 ENCOUNTER — ANTICOAGULATION THERAPY VISIT (OUTPATIENT)
Dept: ANTICOAGULATION | Facility: CLINIC | Age: 70
End: 2025-08-05
Payer: COMMERCIAL

## 2025-08-05 DIAGNOSIS — D68.51 FACTOR 5 LEIDEN MUTATION, HETEROZYGOUS: Primary | ICD-10-CM

## 2025-08-05 DIAGNOSIS — Z86.711 HISTORY OF PULMONARY EMBOLISM: ICD-10-CM

## 2025-08-05 LAB — INR HOME MONITORING: 2.5 (ref 2–3)

## 2025-08-13 ENCOUNTER — DOCUMENTATION ONLY (OUTPATIENT)
Dept: ANTICOAGULATION | Facility: CLINIC | Age: 70
End: 2025-08-13
Payer: COMMERCIAL

## 2025-08-13 ENCOUNTER — ANTICOAGULATION THERAPY VISIT (OUTPATIENT)
Dept: ANTICOAGULATION | Facility: CLINIC | Age: 70
End: 2025-08-13
Payer: COMMERCIAL

## 2025-08-13 DIAGNOSIS — D68.51 FACTOR 5 LEIDEN MUTATION, HETEROZYGOUS: Primary | ICD-10-CM

## 2025-08-13 DIAGNOSIS — Z86.711 HISTORY OF PULMONARY EMBOLISM: ICD-10-CM

## 2025-08-13 LAB — INR HOME MONITORING: 2.7 (ref 2–3)

## 2025-08-20 ENCOUNTER — ANTICOAGULATION THERAPY VISIT (OUTPATIENT)
Dept: ANTICOAGULATION | Facility: CLINIC | Age: 70
End: 2025-08-20
Payer: COMMERCIAL

## 2025-08-20 DIAGNOSIS — D68.51 FACTOR 5 LEIDEN MUTATION, HETEROZYGOUS: Primary | ICD-10-CM

## 2025-08-20 DIAGNOSIS — Z86.711 HISTORY OF PULMONARY EMBOLISM: ICD-10-CM

## 2025-08-20 LAB — INR HOME MONITORING: 2.5 (ref 2–3)

## 2025-08-24 ENCOUNTER — HEALTH MAINTENANCE LETTER (OUTPATIENT)
Age: 70
End: 2025-08-24

## 2025-09-03 ENCOUNTER — ANTICOAGULATION THERAPY VISIT (OUTPATIENT)
Dept: ANTICOAGULATION | Facility: CLINIC | Age: 70
End: 2025-09-03
Payer: COMMERCIAL

## 2025-09-03 DIAGNOSIS — D68.51 FACTOR 5 LEIDEN MUTATION, HETEROZYGOUS: Primary | ICD-10-CM

## 2025-09-03 DIAGNOSIS — Z86.711 HISTORY OF PULMONARY EMBOLISM: ICD-10-CM

## 2025-09-03 LAB — INR HOME MONITORING: 2.2 (ref 2–3)

## (undated) DEVICE — GLOVE PROTEXIS POWDER FREE ORANGE 8.0  2D72PT80X

## (undated) DEVICE — GLOVE PROTEXIS BLUE W/NEU-THERA 8.0  2D73EB80

## (undated) DEVICE — BAG CLEAR TRASH 1.3M 39X33" P4040C

## (undated) DEVICE — PACK TOTAL HIP RIDGES LATEX PO15HIFSG

## (undated) DEVICE — LINEN FULL SHEET 5511

## (undated) DEVICE — DRAPE STERI TOWEL LG 1010

## (undated) DEVICE — ENDO FORCEP ENDOJAW BIOPSY 2.8MMX230CM FB-220U

## (undated) DEVICE — DRILL BIT BIOM QUICK CONNECTING RING LOCK 3.2X20MM 31-323220

## (undated) DEVICE — SUCTION MANIFOLD NEPTUNE 2 SYS 4 PORT 0702-020-000

## (undated) DEVICE — SU STRATAFIX PDS PLUS 1 CT-1 12" SXPP1A443

## (undated) DEVICE — PREP CHLORAPREP 26ML TINTED HI-LITE ORANGE 930815

## (undated) DEVICE — GLOVE PROTEXIS POWDER FREE 7.0 ORTHO LIME 2D73ET70

## (undated) DEVICE — SU VICRYL+ 1 MO-4 18" DYED VCP702D

## (undated) DEVICE — BLADE SAW SAGITTAL STRK 25X90X1.27MM HD SYS 6 6125-127-090

## (undated) DEVICE — ESU PENCIL SMOKE EVAC W/ROCKER SWITCH 0703-047-000

## (undated) DEVICE — GLOVE PROTEXIS BLUE W/NEU-THERA 7.0  2D73EB70

## (undated) DEVICE — DRAPE IOBAN INCISE 23X17" 6650EZ

## (undated) DEVICE — DRSG TEGADERM 4X4 3/4" 1626W

## (undated) DEVICE — LINEN HALF SHEET 5512

## (undated) DEVICE — TUBING SUCTION MEDI-VAC SOFT 3/16"X20' N520A

## (undated) DEVICE — APPLICATORS COTTON TIP 6" X2

## (undated) DEVICE — LINEN ORTHO ACL PACK 5447

## (undated) DEVICE — SET HANDPIECE INTERPULSE W/COAXIAL FAN SPRAY TIP 0210118000

## (undated) DEVICE — SPONGE RAY-TEC 4X8" 7318

## (undated) DEVICE — SUTURE MONOCRYL+ 2-0 CT-1 36" UNDYED MCP945H

## (undated) DEVICE — SOLUTION IV WATER 1000ML R5000-01

## (undated) DEVICE — BNDG COBAN 6"X5YDS STERILE

## (undated) DEVICE — DRAPE LEGGINGS 8421

## (undated) DEVICE — SOLUTION IV IRRIGATION 0.9% NACL 3L R8206

## (undated) DEVICE — CLIP HEMOSTASIS ASSURANCE W16 MM BX00711884

## (undated) DEVICE — SU ETHIBOND 5 V-37 4X30" MB66G

## (undated) DEVICE — PAD CHUX UNDERPAD 30X30"

## (undated) DEVICE — LINEN DRAPE 54X72" 5467

## (undated) DEVICE — ESU ELEC BLADE 6" COATED E1450-6

## (undated) DEVICE — GLOVE BIOGEL PI SZ 7.5 40875

## (undated) DEVICE — ENDO SNARE POLYPECTOMY OVAL 15MM LOOP SD-240U-15

## (undated) DEVICE — HOOD SURG T7PLUS PEEL AWAY FACE SHIELD STRL LF 0416-801-100

## (undated) RX ORDER — GLYCOPYRROLATE 0.2 MG/ML
INJECTION, SOLUTION INTRAMUSCULAR; INTRAVENOUS
Status: DISPENSED
Start: 2025-03-10

## (undated) RX ORDER — DEXAMETHASONE SODIUM PHOSPHATE 4 MG/ML
INJECTION, SOLUTION INTRA-ARTICULAR; INTRALESIONAL; INTRAMUSCULAR; INTRAVENOUS; SOFT TISSUE
Status: DISPENSED
Start: 2025-06-17

## (undated) RX ORDER — METHADONE HYDROCHLORIDE 10 MG/ML
INJECTION, SOLUTION INTRAMUSCULAR; INTRAVENOUS; SUBCUTANEOUS
Status: DISPENSED
Start: 2025-06-17

## (undated) RX ORDER — ACETAMINOPHEN 325 MG/1
TABLET ORAL
Status: DISPENSED
Start: 2025-06-17

## (undated) RX ORDER — VANCOMYCIN HYDROCHLORIDE 1 G/20ML
INJECTION, POWDER, LYOPHILIZED, FOR SOLUTION INTRAVENOUS
Status: DISPENSED
Start: 2025-06-17

## (undated) RX ORDER — PROPOFOL 10 MG/ML
INJECTION, EMULSION INTRAVENOUS
Status: DISPENSED
Start: 2025-03-10

## (undated) RX ORDER — MAGNESIUM SULFATE HEPTAHYDRATE 40 MG/ML
INJECTION, SOLUTION INTRAVENOUS
Status: DISPENSED
Start: 2025-06-17

## (undated) RX ORDER — EPHEDRINE SULFATE 50 MG/ML
INJECTION, SOLUTION INTRAMUSCULAR; INTRAVENOUS; SUBCUTANEOUS
Status: DISPENSED
Start: 2025-06-17

## (undated) RX ORDER — ONDANSETRON 2 MG/ML
INJECTION INTRAMUSCULAR; INTRAVENOUS
Status: DISPENSED
Start: 2025-03-10

## (undated) RX ORDER — TRANEXAMIC ACID 650 MG/1
TABLET ORAL
Status: DISPENSED
Start: 2025-06-17

## (undated) RX ORDER — KETOROLAC TROMETHAMINE 30 MG/ML
INJECTION, SOLUTION INTRAMUSCULAR; INTRAVENOUS
Status: DISPENSED
Start: 2025-06-17

## (undated) RX ORDER — GLYCOPYRROLATE 0.2 MG/ML
INJECTION, SOLUTION INTRAMUSCULAR; INTRAVENOUS
Status: DISPENSED
Start: 2025-06-17

## (undated) RX ORDER — PROPOFOL 10 MG/ML
INJECTION, EMULSION INTRAVENOUS
Status: DISPENSED
Start: 2025-06-17

## (undated) RX ORDER — KETOROLAC TROMETHAMINE 15 MG/ML
INJECTION, SOLUTION INTRAMUSCULAR; INTRAVENOUS
Status: DISPENSED
Start: 2025-06-17

## (undated) RX ORDER — CEFAZOLIN SODIUM/WATER 3 G/30 ML
SYRINGE (ML) INTRAVENOUS
Status: DISPENSED
Start: 2025-06-17

## (undated) RX ORDER — ONDANSETRON 2 MG/ML
INJECTION INTRAMUSCULAR; INTRAVENOUS
Status: DISPENSED
Start: 2025-06-17

## (undated) RX ORDER — LIDOCAINE HYDROCHLORIDE 10 MG/ML
INJECTION, SOLUTION EPIDURAL; INFILTRATION; INTRACAUDAL; PERINEURAL
Status: DISPENSED
Start: 2025-06-17